# Patient Record
Sex: MALE | Race: WHITE | NOT HISPANIC OR LATINO | Employment: OTHER | URBAN - METROPOLITAN AREA
[De-identification: names, ages, dates, MRNs, and addresses within clinical notes are randomized per-mention and may not be internally consistent; named-entity substitution may affect disease eponyms.]

---

## 2017-01-06 ENCOUNTER — CLINICAL SUPPORT (OUTPATIENT)
Dept: AUDIOLOGY | Facility: CLINIC | Age: 82
End: 2017-01-06
Payer: MEDICARE

## 2017-01-06 DIAGNOSIS — H90.3 SENSORINEURAL HEARING LOSS, BILATERAL: Primary | ICD-10-CM

## 2017-01-06 PROCEDURE — 99499 UNLISTED E&M SERVICE: CPT | Mod: S$GLB,,, | Performed by: OTOLARYNGOLOGY

## 2017-01-06 NOTE — PROGRESS NOTES
Mr. Mendoza was seen for a hearing aid consultation.  I demonstrated the [employer] OPN1 [employer] hearing aids.  We discussed technology in the aids and he enjoyed that he could hear me whisper.  He liked the hearing aid in the left ear only.      He will think about it and call me when he is ready to order.

## 2017-01-19 ENCOUNTER — OFFICE VISIT (OUTPATIENT)
Dept: INTERNAL MEDICINE | Facility: CLINIC | Age: 82
End: 2017-01-19
Payer: MEDICARE

## 2017-01-19 ENCOUNTER — LAB VISIT (OUTPATIENT)
Dept: LAB | Facility: HOSPITAL | Age: 82
End: 2017-01-19
Attending: INTERNAL MEDICINE
Payer: MEDICARE

## 2017-01-19 DIAGNOSIS — Z00.00 ANNUAL PHYSICAL EXAM: ICD-10-CM

## 2017-01-19 DIAGNOSIS — R01.1 SYSTOLIC EJECTION MURMUR: ICD-10-CM

## 2017-01-19 DIAGNOSIS — I10 ESSENTIAL HYPERTENSION: ICD-10-CM

## 2017-01-19 DIAGNOSIS — L57.0 AK (ACTINIC KERATOSIS): Primary | ICD-10-CM

## 2017-01-19 LAB
ALBUMIN SERPL BCP-MCNC: 3.5 G/DL
ALP SERPL-CCNC: 49 U/L
ALT SERPL W/O P-5'-P-CCNC: 15 U/L
ANION GAP SERPL CALC-SCNC: 6 MMOL/L
AST SERPL-CCNC: 18 U/L
BASOPHILS # BLD AUTO: 0.05 K/UL
BASOPHILS NFR BLD: 1.1 %
BILIRUB SERPL-MCNC: 0.4 MG/DL
BUN SERPL-MCNC: 19 MG/DL
CALCIUM SERPL-MCNC: 9.7 MG/DL
CHLORIDE SERPL-SCNC: 106 MMOL/L
CHOLEST/HDLC SERPL: 3.2 {RATIO}
CO2 SERPL-SCNC: 28 MMOL/L
CREAT SERPL-MCNC: 0.9 MG/DL
DIFFERENTIAL METHOD: ABNORMAL
EOSINOPHIL # BLD AUTO: 0.2 K/UL
EOSINOPHIL NFR BLD: 4.8 %
ERYTHROCYTE [DISTWIDTH] IN BLOOD BY AUTOMATED COUNT: 14.9 %
EST. GFR  (AFRICAN AMERICAN): >60 ML/MIN/1.73 M^2
EST. GFR  (NON AFRICAN AMERICAN): >60 ML/MIN/1.73 M^2
GLUCOSE SERPL-MCNC: 98 MG/DL
HCT VFR BLD AUTO: 40.8 %
HDL/CHOLESTEROL RATIO: 31.6 %
HDLC SERPL-MCNC: 231 MG/DL
HDLC SERPL-MCNC: 73 MG/DL
HGB BLD-MCNC: 13.1 G/DL
LDLC SERPL CALC-MCNC: 142.4 MG/DL
LYMPHOCYTES # BLD AUTO: 1.1 K/UL
LYMPHOCYTES NFR BLD: 22.8 %
MCH RBC QN AUTO: 31.5 PG
MCHC RBC AUTO-ENTMCNC: 32.1 %
MCV RBC AUTO: 98 FL
MONOCYTES # BLD AUTO: 0.6 K/UL
MONOCYTES NFR BLD: 13.7 %
NEUTROPHILS # BLD AUTO: 2.7 K/UL
NEUTROPHILS NFR BLD: 57.6 %
NONHDLC SERPL-MCNC: 158 MG/DL
PLATELET # BLD AUTO: 205 K/UL
PMV BLD AUTO: 10.1 FL
POTASSIUM SERPL-SCNC: 4.8 MMOL/L
PROT SERPL-MCNC: 6.8 G/DL
RBC # BLD AUTO: 4.16 M/UL
SODIUM SERPL-SCNC: 140 MMOL/L
TRIGL SERPL-MCNC: 78 MG/DL
TSH SERPL DL<=0.005 MIU/L-ACNC: 2.17 UIU/ML
WBC # BLD AUTO: 4.6 K/UL

## 2017-01-19 PROCEDURE — 90471 IMMUNIZATION ADMIN: CPT | Mod: S$GLB,,, | Performed by: INTERNAL MEDICINE

## 2017-01-19 PROCEDURE — 84443 ASSAY THYROID STIM HORMONE: CPT

## 2017-01-19 PROCEDURE — 85025 COMPLETE CBC W/AUTO DIFF WBC: CPT | Mod: PO

## 2017-01-19 PROCEDURE — 99397 PER PM REEVAL EST PAT 65+ YR: CPT | Mod: 25,S$GLB,, | Performed by: INTERNAL MEDICINE

## 2017-01-19 PROCEDURE — 80061 LIPID PANEL: CPT

## 2017-01-19 PROCEDURE — 90715 TDAP VACCINE 7 YRS/> IM: CPT | Mod: S$GLB,,, | Performed by: INTERNAL MEDICINE

## 2017-01-19 PROCEDURE — 99499 UNLISTED E&M SERVICE: CPT | Mod: S$GLB,,, | Performed by: INTERNAL MEDICINE

## 2017-01-19 PROCEDURE — 80053 COMPREHEN METABOLIC PANEL: CPT

## 2017-01-19 PROCEDURE — 99999 PR PBB SHADOW E&M-EST. PATIENT-LVL IV: CPT | Mod: PBBFAC,,, | Performed by: INTERNAL MEDICINE

## 2017-01-19 PROCEDURE — 36415 COLL VENOUS BLD VENIPUNCTURE: CPT | Mod: PO

## 2017-01-19 RX ORDER — VALSARTAN 80 MG/1
80 TABLET ORAL DAILY
Qty: 45 TABLET | Refills: 6 | Status: SHIPPED | OUTPATIENT
Start: 2017-01-19 | End: 2017-10-27 | Stop reason: SDUPTHER

## 2017-01-21 VITALS
DIASTOLIC BLOOD PRESSURE: 66 MMHG | HEIGHT: 72 IN | BODY MASS INDEX: 24.2 KG/M2 | HEART RATE: 52 BPM | WEIGHT: 178.69 LBS | SYSTOLIC BLOOD PRESSURE: 120 MMHG | OXYGEN SATURATION: 97 %

## 2017-01-21 NOTE — PROGRESS NOTES
Mr. Mendoza is a very sweet 89-year-old gentleman, known to myself, who   presented to clinic on January 19th, at which time clinic billing was performed.    His note is being dictated today on January 21st.    CHIEF COMPLAINT:  Annual followup of hypertension and wellness exam.    HISTORY OF PRESENT ILLNESS:  Mr. Mendoza notes that for the most part he has   been doing well.  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  He was told by the physician that he had   a cardiac murmur.  He questions if this is something that should be evaluated.    He has no chest pain, no shortness of breath.  He does exercise a number of   times during the week.  He enjoys Pilates, does some weights for muscle   strengthening.  He has had some worsened hearing, even had a hearing evaluation   done.  Notes the hearing aid did seem to help, but not ready to pay the cost yet   for it.  May to have let down the road.    PAST MEDICAL, SURGICAL, SOCIAL HISTORY:  Please see as thoroughly stated in EPIC   chart, which has been reviewed.    REVIEW OF SYSTEMS:  HEENT:  Positive for decreased hearing, seems to be worse on the right.  CARDIOPULMONARY:  No chest pain, no shortness of breath.  The patient notes that   he did have some occasional mild fleeting dizzy type symptoms.  He notes that   he actually decreased his Diovan to 80 mg one tablet alternating with two   tablets on alternate days and notes that the symptoms have completely resolved.    He had no loss of consciousness.  No focal neurological abnormalities.  GASTROINTESTINAL:  No change in bowel habits.  No blood per rectum.  EXTREMITIES/RHEUMATOLOGIC:  Positive for some occasional discomfort radiating   from the right buttocks into the right leg, nothing significant.  Also positive   for occasional cramping in the legs at night once again only about once a month,   not regular.  SKIN:  Positive for rough patches of skin on  the face.  The patient would like   to return to Dermatology, whom he has seen in the past.    PHYSICAL EXAMINATION:  VITAL SIGNS:  Weight 178 pounds, blood pressure 136/64, pulse 52.  Recheck blood   pressure per MD is 120/66.  GENERAL:  The patient looks well.  HEENT:  Does show a couple of keratotic horns and actinic keratoses on the   forehead.  NECK:  Supple, negative for masses, thyromegaly, negative for lymphadenopathy.  LUNGS:  Clear bilaterally.  HEART:  Shows systolic ejection murmur at the left sternal location, no   definitive radiation to the carotid arteries.  No abnormalities otherwise.  ABDOMEN:  Soft, nontender, no masses or organomegaly.  EXTREMITIES:  Negative for edema.    ASSESSMENT AND PLAN:  1.  Essential hypertension, stable on Diovan 80 mg one tab every other day   alternating with two tabs on alternate days.  A. Check full fasting annual lab work with phone review.  2.  Physical positive for actinic keratoses on forehead.  A. Refer to Dermatology, Dr. Kerr.   3.  Physical positive for systolic ejection murmur, which seems to be new.  A. We will order 2D echocardiogram with color-flow Doppler studies and phone   review.  4.  Health maintenance.  Screening:  Last urological exam cystoscope in April 2016 per Dr. Barbosa.  Last colonoscopy approximately 5-1/2 years ago and normal.  A. Immunizations, tetanus Tdap.  B. We will order full fasting lab with phone review.  C. Phone review after echocardiogram as above, go from there.      FMS/HN  dd: 01/21/2017 11:31:38 (CST)  td: 01/21/2017 12:46:57 (CST)  Doc ID   #[id number]  Job ID #[id number]    CC:     This office note has been dictated.

## 2017-01-26 ENCOUNTER — HOSPITAL ENCOUNTER (OUTPATIENT)
Dept: CARDIOLOGY | Facility: CLINIC | Age: 82
Discharge: HOME | End: 2017-01-26
Payer: MEDICARE

## 2017-01-26 DIAGNOSIS — R01.1 SYSTOLIC EJECTION MURMUR: ICD-10-CM

## 2017-01-26 LAB
AORTIC VALVE STENOSIS: ABNORMAL
DIASTOLIC DYSFUNCTION: YES
ESTIMATED PA SYSTOLIC PRESSURE: 15.96
MITRAL VALVE REGURGITATION: ABNORMAL
RETIRED EF AND QEF - SEE NOTES: 55 (ref 55–65)
TRICUSPID VALVE REGURGITATION: ABNORMAL

## 2017-01-26 PROCEDURE — 93306 TTE W/DOPPLER COMPLETE: CPT | Mod: S$GLB,,, | Performed by: INTERNAL MEDICINE

## 2017-01-30 ENCOUNTER — TELEPHONE (OUTPATIENT)
Dept: INTERNAL MEDICINE | Facility: CLINIC | Age: 82
End: 2017-01-30

## 2017-01-31 NOTE — TELEPHONE ENCOUNTER
Spoke with Mr Mendoza ie his test results(1/26): Labs-showed CBC,CMP,TSH to be ok, Cholesterol/HDL/LDL were 231/72/142; 2D ECHO-showed normal EF at55%, +mild aortic stenosis,and +diastolic dysfunction/Left atrial enlargement. A/P#1-Mild HLD a)low fat diet/exercise/recheck x 1 year, #2-Mild AS a)repeat ECHO x 1 year.

## 2017-03-02 ENCOUNTER — OFFICE VISIT (OUTPATIENT)
Dept: UROLOGY | Facility: CLINIC | Age: 82
End: 2017-03-02
Payer: MEDICARE

## 2017-03-02 VITALS
HEIGHT: 72 IN | HEART RATE: 58 BPM | BODY MASS INDEX: 24.04 KG/M2 | WEIGHT: 177.5 LBS | DIASTOLIC BLOOD PRESSURE: 66 MMHG | SYSTOLIC BLOOD PRESSURE: 146 MMHG

## 2017-03-02 DIAGNOSIS — R39.198 DIFFICULTY URINATING: Primary | ICD-10-CM

## 2017-03-02 DIAGNOSIS — Z86.018 HISTORY OF BENIGN BLADDER TUMOR: ICD-10-CM

## 2017-03-02 LAB
BILIRUB SERPL-MCNC: NORMAL MG/DL
BLOOD URINE, POC: NORMAL
COLOR, POC UA: YELLOW
GLUCOSE UR QL STRIP: NORMAL
KETONES UR QL STRIP: NORMAL
LEUKOCYTE ESTERASE URINE, POC: NORMAL
NITRITE, POC UA: NORMAL
PH, POC UA: 5
POC RESIDUAL URINE VOLUME: 176 ML (ref 0–100)
PROTEIN, POC: NORMAL
SPECIFIC GRAVITY, POC UA: 1
UROBILINOGEN, POC UA: NORMAL

## 2017-03-02 PROCEDURE — 81002 URINALYSIS NONAUTO W/O SCOPE: CPT | Mod: S$GLB,,, | Performed by: NURSE PRACTITIONER

## 2017-03-02 PROCEDURE — 1159F MED LIST DOCD IN RCRD: CPT | Mod: S$GLB,,, | Performed by: NURSE PRACTITIONER

## 2017-03-02 PROCEDURE — 1126F AMNT PAIN NOTED NONE PRSNT: CPT | Mod: S$GLB,,, | Performed by: NURSE PRACTITIONER

## 2017-03-02 PROCEDURE — 1157F ADVNC CARE PLAN IN RCRD: CPT | Mod: S$GLB,,, | Performed by: NURSE PRACTITIONER

## 2017-03-02 PROCEDURE — 87086 URINE CULTURE/COLONY COUNT: CPT

## 2017-03-02 PROCEDURE — 99213 OFFICE O/P EST LOW 20 MIN: CPT | Mod: 25,S$GLB,, | Performed by: NURSE PRACTITIONER

## 2017-03-02 PROCEDURE — 51798 US URINE CAPACITY MEASURE: CPT | Mod: S$GLB,,, | Performed by: NURSE PRACTITIONER

## 2017-03-02 PROCEDURE — 99999 PR PBB SHADOW E&M-EST. PATIENT-LVL IV: CPT | Mod: PBBFAC,,, | Performed by: NURSE PRACTITIONER

## 2017-03-02 PROCEDURE — 1160F RVW MEDS BY RX/DR IN RCRD: CPT | Mod: S$GLB,,, | Performed by: NURSE PRACTITIONER

## 2017-03-02 NOTE — PROGRESS NOTES
CHIEF COMPLAINT:    Mr. Mendoza is a 89 y.o. male presenting for difficulty urinating.    PRESENTING ILLNESS:    Norm Mendoza is a 89 y.o. male with a PMH s/p TURBT w/ benign path report who presents for difficulty urinating.   Last seen with Dr. Barbosa on 6/18/15.    5/16/16- cysto with Dr. Barbosa. Urethra normal: Yes Prostate normal: trilobar hyperplasia. Bladder neck normal: Bladder neck normal Bladder normal: Yes     Before Mardi Gras he started the Atkins diet. That night he started to experience nocturia q hour, difficulty urinating with dysuria, decreased amount of urine, and then frequency every 30 min- 1 hr.   He also started to have constipation, so he increased his metamucil daily and gradually the constipation alleviated.   He stopped Atkins and then his urination problems have resolved.     Today, he has no urination complaints.  He reports doing well. Denies hematuria, abdominal pain, flank pain, fever, chills, nausea, and vomiting.  He reports a good urinary stream and complete bladder emptying.  Improved from 2 weeks.   He reports nocturia q 2 hrs and less frequency.   Denies constipation.   He has a hx of past UTI.       REVIEW OF SYSTEMS:    Review of Systems    Constitutional: Negative for fever and chills.   HENT: Negative for hearing loss.   Eyes: Negative for visual disturbance.   Respiratory: Negative for shortness of breath.   Cardiovascular: Negative for chest pain.   Gastrointestinal: Negative for nausea, vomiting, and constipation.   Genitourinary:  See above   Neurological: Negative for dizziness.   Hematological: Does not bruise/bleed easily.   Psychiatric/Behavioral: Negative for confusion.       PATIENT HISTORY:    Past Medical History:   Diagnosis Date    Allergy     Bladder cancer     tumor that was benign     Hematuria     Hypertension     Skin cancer     x3, had mohs on nose    Squamous cell carcinoma excised 12/5/14    R lower back    Urinary tract infection     x4        Past Surgical History:   Procedure Laterality Date    ACHILLES TENDON SURGERY      cataracts      CYSTOSCOPY      bladder tumor    EYE SURGERY      SKIN CANCER EXCISION         Family History   Problem Relation Age of Onset    Anesthesia problems Neg Hx     Malignant hypertension Neg Hx     Hypotension Neg Hx     Malignant hyperthermia Neg Hx     Pseudochol deficiency Neg Hx     Melanoma Neg Hx     Stroke Father     Stroke Brother        Social History     Social History    Marital status:      Spouse name: N/A    Number of children: 2    Years of education: N/A     Occupational History    Financial Work/ Retired    actor           Social History Main Topics    Smoking status: Former Smoker     Packs/day: 1.00     Years: 25.00     Types: Cigarettes     Quit date: 9/3/1970    Smokeless tobacco: Not on file    Alcohol use 2.4 oz/week     2 Glasses of wine, 2 Shots of liquor, 0 Standard drinks or equivalent per week      Comment: 4-5 daily    Drug use: No    Sexual activity: Yes     Partners: Female     Other Topics Concern    Not on file     Social History Narrative       Allergies:  Review of patient's allergies indicates no known allergies.    Medications:    Current Outpatient Prescriptions:     ACIDOPHILUS/PECTIN, CITRUS (ACIDOPHILUS PROBIOTIC ORAL), Take 1 capsule by mouth once daily., Disp: , Rfl:     aspirin 81 MG Chew, Take 81 mg by mouth once daily., Disp: , Rfl:     psyllium (METAMUCIL) packet, Take 1 packet by mouth once daily., Disp: , Rfl:     valsartan (DIOVAN) 80 MG tablet, Take 1 tablet (80 mg total) by mouth once daily. 1 tab every other day alternating with 2 tabs on alternate days, Disp: 45 tablet, Rfl: 6    PHYSICAL EXAMINATION:    Constitutional: He is oriented to person, place, and time. He appears well-developed and well-nourished.  He is in no apparent distress.    Neck: No tracheal deviation present.     Cardiovascular: Normal  rate.      Pulmonary/Chest: Effort normal. No respiratory distress.     Abdominal:  He exhibits no distension.  There is no CVA tenderness.     Lymphadenopathy:        Right: No supraclavicular adenopathy present.        Left: No supraclavicular adenopathy present.     Neurological: He is alert and oriented to person, place, and time.     Skin: Skin is warm and dry.     Extremities: No pitting edema noted in lower extremities bilaterally    Psych: Cooperative with normal affect.    Genitourinary: The penis is normal. The urethral meatus is normal. The testes, epididymides, and cord structures are normal in size and contour bilaterally. The scrotum is normal in size and contour.      Physical Exam      LABS:  PVR today with bladder scan 176.   U/a today shows no blood or infection.     Lab Results   Component Value Date    PSA 2.5 09/10/2015       IMPRESSION:    Encounter Diagnoses   Name Primary?    Difficulty urinating Yes    History of benign bladder tumor          PLAN:  Discussed LUTS. Not interested in medications for LUTS. Will continue to monitor.   UA   UC sent to the lab. Will call with results.    I encouraged him or any of his family members to call or email me with questions and/or concerns.    DAVID Billings

## 2017-03-02 NOTE — MR AVS SNAPSHOT
[facility] - Urology 4th Floor  [address]  [facility] [address]  Phone: [phone]                  Norm Mendoza   3/2/2017 9:40 AM   Office Visit    Description:  Male : 1926   Provider:  Melissa Hernandez NP   Department:  [facility]rology 4th Floor           Reason for Visit     Follow-up     Urinary Frequency           Diagnoses this Visit        Comments    Difficulty urinating    -  Primary     History of benign bladder tumor                To Do List           Future Appointments        Provider Department Dept Phone    3/14/2017 11:00 AM Shante Kerr MD [facility] D[facility]atology [phone]      Goals (5 Years of Data)     None      [facility]     [facility] Care Line -  Assistance  Registered nurses in the [facility] provide clinical advisement, health education, appointment booking, and other advisory services.  Call for this free service at [phone].             Medications           Message regarding Medications     Verify the changes and/or additions to your medication regime listed below are the same as discussed with your clinician today.  If any of these changes or additions are incorrect, please notify your healthcare provider.             Verify that the below list of medications is an accurate representation of the medications you are currently taking.  If none reported, the list may be blank. If incorrect, please contact your healthcare provider. Carry this list with you in case of emergency.           Current Medications     ACIDOPHILUS/PECTIN, CITRUS (ACIDOPHILUS PROBIOTIC ORAL) Take 1 capsule by mouth once daily.    aspirin 81 MG Chew Take 81 mg by mouth once daily.    psyllium (METAMUCIL) packet Take 1 packet by mouth once daily.    valsartan (DIOVAN) 80 MG tablet Take 1 tablet (80 mg total) by mouth once daily. 1 tab every other day alternating with 2 tabs on alternate days           Clinical Reference Information            Your Vitals Were     BP                   146/66           Blood Pressure          Most Recent Value    BP  (!)  146/66      Allergies as of 3/2/2017     No Known Allergies      Immunizations Administered on Date of Encounter - 3/2/2017     None      Orders Placed During Today's Visit      Normal Orders This Visit    POCT Bladder Scan     POCT urine dipstick without microscope     Urine culture          3/2/2017 10:10 AM - Ray Mcqueen LPN      Component Results     Component    Color    yellow    Spec Grav    1.000    pH, UA    5    WBC, UA    n    Nitrite    n    Protein    n    Glucose, UA    n    Ketones, UA    n    Urobilinogen    n    Bilirubin    n    Blood, UA    n         3/2/2017 10:12 AM - Ray Mcqueen LPN      Component Results     Component Value Flag Ref Range Units Status    POC Residual Urine Volume 176 (A) 0 - 100 mL Final            [facility] Sign-Up     Activating your [facility] account is as easy as 1-2-3!     1) Visit [email or web address], select Sign Up Now, enter this activation code and your date of birth, then select Next.  [email or web address]  Expires: 4/16/2017 10:12 AM      2) Create a username and password to use when you visit [facility] in the future and select a security question in case you lose your password and select Next.    3) Enter your e-mail address and click Sign Up!    Additional Information  If you have questions, please e-mail [email or web address] or call [phone] to talk to our [facility] staff. Remember, [facility] is NOT to be used for urgent needs. For medical emergencies, dial 911.         Language Assistance Services     ATTENTION: Language assistance services are available, free of charge. Please call [phone].      ATENCIÓN: Si habla español, tiene a lau disposición servicios gratuitos de asistencia lingüística. Llame al [phone].     CHÚ Ý: N?u b?n nói Ti?ng Vi?t, có các d?ch v? h? tr? ngôn ng? mi?n phí dành cho b?n. G?i s?  [phone].         [facility]th [facility] complies with applicable Federal civil rights laws and does not discriminate on the basis of race, color, national origin, age, disability, or sex.

## 2017-03-03 LAB — BACTERIA UR CULT: NO GROWTH

## 2017-04-27 ENCOUNTER — TELEPHONE (OUTPATIENT)
Dept: INTERNAL MEDICINE | Facility: CLINIC | Age: 82
End: 2017-04-27

## 2017-04-27 DIAGNOSIS — Z12.11 COLON CANCER SCREENING: Primary | ICD-10-CM

## 2017-04-27 NOTE — TELEPHONE ENCOUNTER
Pt states it is time for his colonoscopy. States he would like it done with [facility]/ [phone] ph or [phone] ph[address] [phone]. Pt needs a referral.     Please advise thanks.

## 2017-04-27 NOTE — TELEPHONE ENCOUNTER
----- Message from Guero Alarconr sent at 4/27/2017  4:16 PM CDT -----  Contact: self/ [phone] home  Type: Referral to a Specialist    Where would you like a referral to? Colonoscopy    Have you previously requested? Yes    Is the physician within the [facility]? No    Name and phone number of specialist: [facility]/ [phone]  or [phone] /[address] [place] [address]    Reason for appointment: Routine screening    Is an appointment scheduled with specialist? When? No    Comments: Pt states that he received a call from the company above stating that it was time for his Colonoscopy, it's been five years.  He would like to go to the company listed above and would like a referral sent in as soon as possible.  He would like a call back to discuss.  Please call and advise.    Thank you

## 2017-05-05 ENCOUNTER — OFFICE VISIT (OUTPATIENT)
Dept: DERMATOLOGY | Facility: CLINIC | Age: 82
End: 2017-05-05
Payer: MEDICARE

## 2017-05-05 DIAGNOSIS — L82.1 SK (SEBORRHEIC KERATOSIS): ICD-10-CM

## 2017-05-05 DIAGNOSIS — Z85.828 HISTORY OF NONMELANOMA SKIN CANCER: ICD-10-CM

## 2017-05-05 DIAGNOSIS — D18.00 ANGIOMA: ICD-10-CM

## 2017-05-05 DIAGNOSIS — D17.1 LIPOMA OF BACK: ICD-10-CM

## 2017-05-05 DIAGNOSIS — L57.0 AK (ACTINIC KERATOSIS): Primary | ICD-10-CM

## 2017-05-05 PROCEDURE — 17004 DESTROY PREMAL LESIONS 15/>: CPT | Mod: S$GLB,,, | Performed by: DERMATOLOGY

## 2017-05-05 PROCEDURE — 99213 OFFICE O/P EST LOW 20 MIN: CPT | Mod: 25,S$GLB,, | Performed by: DERMATOLOGY

## 2017-05-05 PROCEDURE — 1160F RVW MEDS BY RX/DR IN RCRD: CPT | Mod: S$GLB,,, | Performed by: DERMATOLOGY

## 2017-05-05 PROCEDURE — 1159F MED LIST DOCD IN RCRD: CPT | Mod: S$GLB,,, | Performed by: DERMATOLOGY

## 2017-05-05 PROCEDURE — 99999 PR PBB SHADOW E&M-EST. PATIENT-LVL II: CPT | Mod: PBBFAC,,, | Performed by: DERMATOLOGY

## 2017-05-05 NOTE — PROGRESS NOTES
Subjective:       Patient ID:  Norm Mendoza is a 89 y.o. male who presents for   Chief Complaint   Patient presents with    Skin Check     UBSE     HPI Comments: History of Present Illness: The patient presents for follow up of skin check.    The patient was last seen on: 12/18/15 for cryosurgery to actinic keratoses which have resolved.   No h/o nmsc  Other skin complaints: none        Review of Systems   Skin: Positive for recent sunburn (face). Negative for dry skin, daily sunscreen use, activity-related sunscreen use and tendency to form keloidal scars.   Hematologic/Lymphatic: Bruises/bleeds easily (on asa).        Objective:    Physical Exam   Constitutional: He appears well-developed and well-nourished. No distress.   HENT:   Mouth/Throat: Lips normal.    Eyes: Lids are normal.    Neurological: He is alert. He is not disoriented.   Psychiatric: He has a normal mood and affect.   Skin:   Areas Examined (abnormalities noted in diagram):   Scalp / Hair Palpated and Inspected  Head / Face Inspection Performed  Neck Inspection Performed  Chest / Axilla Inspection Performed  Back Inspection Performed  RUE Inspected  LUE Inspection Performed  Nails and Digits Inspection Performed                       Diagram Legend     Erythematous scaling macule/papule c/w actinic keratosis       Vascular papule c/w angioma      Pigmented verrucoid papule/plaque c/w seborrheic keratosis      Yellow umbilicated papule c/w sebaceous hyperplasia      Irregularly shaped tan macule c/w lentigo     1-2 mm smooth white papules consistent with Milia      Movable subcutaneous cyst with punctum c/w epidermal inclusion cyst      Subcutaneous movable cyst c/w pilar cyst      Firm pink to brown papule c/w dermatofibroma      Pedunculated fleshy papule(s) c/w skin tag(s)      Evenly pigmented macule c/w junctional nevus     Mildly variegated pigmented, slightly irregular-bordered macule c/w mildly atypical nevus      Flesh colored to  evenly pigmented papule c/w intradermal nevus       Pink pearly papule/plaque c/w basal cell carcinoma      Erythematous hyperkeratotic cursted plaque c/w SCC      Surgical scar with no sign of skin cancer recurrence      Open and closed comedones      Inflammatory papules and pustules      Verrucoid papule consistent consistent with wart     Erythematous eczematous patches and plaques     Dystrophic onycholytic nail with subungual debris c/w onychomycosis     Umbilicated papule    Erythematous-base heme-crusted tan verrucoid plaque consistent with inflamed seborrheic keratosis     Erythematous Silvery Scaling Plaque c/w Psoriasis     See annotation      Assessment / Plan:        AK (actinic keratosis)  Cryosurgery Procedure Note    Verbal consent from the patient is obtained and the patient is aware of the precancerous quality and need for treatment of these lesions. Liquid nitrogen cryosurgery is applied to the 15 actinic keratoses, as detailed in the physical exam, to produce a freeze injury. The patient is aware that blisters may form and is instructed on wound care with gentle cleansing and use of vaseline ointment to keep moist until healed. The patient is supplied a handout on cryosurgery and is instructed to call if lesions do not completely resolve.      SK (seborrheic keratosis)  These are benign inherited growths without a malignant potential. Reassurance given to patient. No treatment is necessary.       Angioma  This is a benign vascular lesion. Reassurance given. No treatment required.       History of nonmelanoma skin cancer  Area(s) of previous NMSC evaluated with no signs of recurrence.    Upper body skin examination performed today including at least 6 points as noted in physical examination. No lesions suspicious for malignancy noted.      Lipoma of back\  Reassurance.             Return in about 6 months (around 11/5/2017).

## 2017-05-05 NOTE — LETTER
May 5, 2017      Amber Jenkins MD  [address]  [place] [address]           [facility] - Dermatology  [address]  [facility] [address]  Phone: [phone]  Fax: [phone]          Patient: Norm Mendoza   MR Number: [id number]   YOB: 1926   Date of Visit: 5/5/2017       Dear Dr. Amber Jenkins:    Thank you for referring Norm Mendoza to me for evaluation. Attached you will find relevant portions of my assessment and plan of care.    If you have questions, please do not hesitate to call me. I look forward to following Norm Mendoza along with you.    Sincerely,    Shante Kerr MD    Enclosure  CC:  No Recipients    If you would like to receive this communication electronically, please contact [email or web address] or [phone] to request more information on [employer] access.    For providers and/or their staff who would like to refer a patient to [facility], please contact us through our one-stop-shop provider referral line, [facility], at [phone].    If you feel you have received this communication in error or would no longer like to receive these types of communications, please e-mail [email or web address]

## 2017-05-05 NOTE — MR AVS SNAPSHOT
[facility]  [address]  [facility] [address]  Phone: [phone]  Fax: [phone]                  Norm Mendoza   2017 10:00 AM   Office Visit    Description:  Male : 1926   Provider:  Shante Kerr MD   Department:  [facility]           Reason for Visit     Skin Check           Diagnoses this Visit        Comments    AK (actinic keratosis)    -  Primary     SK (seborrheic keratosis)         Angioma         History of nonmelanoma skin cancer         Lipoma of back                To Do List           Goals (5 Years of Data)     None      Follow-Up and Disposition     Return in about 6 months (around 2017).    Follow-up and Disposition History      [facility]     [facility] Nurse Care Line -  Assistance  Unless otherwise directed by your provider, please contact [facility], our nurse care line that is available for  assistance.     Registered nurses in the [facility] Center provide: appointment scheduling, clinical advisement, health education, and other advisory services.  Call: [phone] (toll free)               Medications           Message regarding Medications     Verify the changes and/or additions to your medication regime listed below are the same as discussed with your clinician today.  If any of these changes or additions are incorrect, please notify your healthcare provider.             Verify that the below list of medications is an accurate representation of the medications you are currently taking.  If none reported, the list may be blank. If incorrect, please contact your healthcare provider. Carry this list with you in case of emergency.           Current Medications     ACIDOPHILUS/PECTIN, CITRUS (ACIDOPHILUS PROBIOTIC ORAL) Take 1 capsule by mouth once daily.    aspirin 81 MG Chew Take 81 mg by mouth once daily.    psyllium (METAMUCIL) packet Take 1 packet by mouth once daily.    valsartan  (DIOVAN) 80 MG tablet Take 1 tablet (80 mg total) by mouth once daily. 1 tab every other day alternating with 2 tabs on alternate days           Clinical Reference Information           Allergies as of 5/5/2017     No Known Allergies      Immunizations Administered on Date of Encounter - 5/5/2017     None      [facility] Sign-Up     Activating your [facility] account is as easy as 1-2-3!     1) Visit [email or web address], select Sign Up Now, enter this activation code and your date of birth, then select Next.  [email or web address]  Expires: 6/19/2017 10:36 AM      2) Create a username and password to use when you visit [facility] in the future and select a security question in case you lose your password and select Next.    3) Enter your e-mail address and click Sign Up!    Additional Information  If you have questions, please e-mail [email or web address] or call [phone] to talk to our [facility] staff. Remember, [facility] is NOT to be used for urgent needs. For medical emergencies, dial 911.         Instructions    CRYOSURGERY      Your doctor has used a method called cryosurgery to treat your skin condition. Cryosurgery refers to the use of very cold substances to treat a variety of skin conditions such as warts, pre-skin cancers, molluscum contagiosum, sun spots, and several benign growths. The substance we use in cryosurgery is liquid nitrogen and is so cold (-195 degrees Celsius) that is burns when administered.     Following treatment in the office, the skin may immediately burn and become red. You may find the area around the lesion is affected as well. It is sometimes necessary to treat not only the lesion, but a small area of the surrounding normal skin to achieve a good response.     A blister, and even a blood filled blister, may form after treatment.   This is a normal response. If the blister is painful, it is acceptable to sterilize a needle and with rubbing alcohol and gently pop the blister. It is important  that you gently wash the area with soap and warm water as the blister fluid may contain wart virus if a wart was treated. Do no remove the roof of the blister.     The area treated can take anywhere from 1-3 weeks to heal. Healing time depends on the kind skin lesion treated, the location, and how aggressively the lesion was treated. It is recommended that the areas treated are covered with Vaseline or bacitracin ointment and a band-aid. If a band-aid is not practical, just ointment applied several times per day will do. Keeping these areas moist will speed the healing time.    Treatment with liquid nitrogen can leave a scar. In dark skin, it may be a light or dark scar, in light skin it may be a white or pink scar. These will generally fade with time, but may never go away completely.     If you have any concerns after your treatment, please feel free to call the office.       [address] [place] [address]/ [phone] FAX/ [email or web address]           Language Assistance Services     ATTENTION: Language assistance services are available, free of charge. Please call [phone].      ATENCIÓN: Si habla isabel, tiene a lau disposición servicios gratuitos de asistencia lingüística. Llame al [phone].     ALAYNA Ý: N?u b?n nói Ti?ng Vi?t, có các d?ch v? h? tr? ngôn ng? mi?n phí dành cho b?n. G?i s? [phone].         [facility] complies with applicable Federal civil rights laws and does not discriminate on the basis of race, color, national origin, age, disability, or sex.

## 2017-05-18 ENCOUNTER — OFFICE VISIT (OUTPATIENT)
Dept: INTERNAL MEDICINE | Facility: CLINIC | Age: 82
End: 2017-05-18
Payer: MEDICARE

## 2017-05-18 ENCOUNTER — TELEPHONE (OUTPATIENT)
Dept: INTERNAL MEDICINE | Facility: CLINIC | Age: 82
End: 2017-05-18

## 2017-05-18 ENCOUNTER — HOSPITAL ENCOUNTER (OUTPATIENT)
Dept: RADIOLOGY | Facility: HOSPITAL | Age: 82
Discharge: HOME | End: 2017-05-18
Attending: INTERNAL MEDICINE
Payer: MEDICARE

## 2017-05-18 VITALS
TEMPERATURE: 98 F | SYSTOLIC BLOOD PRESSURE: 134 MMHG | HEART RATE: 58 BPM | OXYGEN SATURATION: 95 % | BODY MASS INDEX: 22.57 KG/M2 | HEIGHT: 72 IN | WEIGHT: 166.63 LBS | DIASTOLIC BLOOD PRESSURE: 70 MMHG

## 2017-05-18 DIAGNOSIS — J18.9 PNEUMONIA OF RIGHT LOWER LOBE DUE TO INFECTIOUS ORGANISM: Primary | ICD-10-CM

## 2017-05-18 DIAGNOSIS — J18.9 PNEUMONIA OF RIGHT LOWER LOBE DUE TO INFECTIOUS ORGANISM: ICD-10-CM

## 2017-05-18 DIAGNOSIS — J40 BRONCHITIS: ICD-10-CM

## 2017-05-18 PROCEDURE — 96372 THER/PROPH/DIAG INJ SC/IM: CPT | Mod: S$GLB,,, | Performed by: INTERNAL MEDICINE

## 2017-05-18 PROCEDURE — 99999 PR PBB SHADOW E&M-EST. PATIENT-LVL IV: CPT | Mod: PBBFAC,,, | Performed by: INTERNAL MEDICINE

## 2017-05-18 PROCEDURE — 1159F MED LIST DOCD IN RCRD: CPT | Mod: S$GLB,,, | Performed by: INTERNAL MEDICINE

## 2017-05-18 PROCEDURE — 99214 OFFICE O/P EST MOD 30 MIN: CPT | Mod: 25,S$GLB,, | Performed by: INTERNAL MEDICINE

## 2017-05-18 PROCEDURE — 1126F AMNT PAIN NOTED NONE PRSNT: CPT | Mod: S$GLB,,, | Performed by: INTERNAL MEDICINE

## 2017-05-18 PROCEDURE — 71020 XR CHEST PA AND LATERAL: CPT | Mod: TC,PO

## 2017-05-18 PROCEDURE — 71020 XR CHEST PA AND LATERAL: CPT | Mod: 26,,, | Performed by: RADIOLOGY

## 2017-05-18 RX ORDER — GUAIFENESIN 1200 MG/1
TABLET, EXTENDED RELEASE ORAL
Qty: 20 TABLET | Refills: 0 | Status: SHIPPED | OUTPATIENT
Start: 2017-05-18 | End: 2017-07-13 | Stop reason: ALTCHOICE

## 2017-05-18 RX ORDER — AMOXICILLIN AND CLAVULANATE POTASSIUM 875; 125 MG/1; MG/1
1 TABLET, FILM COATED ORAL 2 TIMES DAILY
Qty: 20 TABLET | Refills: 0 | Status: SHIPPED | OUTPATIENT
Start: 2017-05-18 | End: 2017-05-28

## 2017-05-18 RX ORDER — TRIAMCINOLONE ACETONIDE 40 MG/ML
60 INJECTION, SUSPENSION INTRA-ARTICULAR; INTRAMUSCULAR
Status: COMPLETED | OUTPATIENT
Start: 2017-05-18 | End: 2017-05-18

## 2017-05-18 RX ORDER — CODEINE PHOSPHATE AND GUAIFENESIN 10; 100 MG/5ML; MG/5ML
5 SOLUTION ORAL EVERY 8 HOURS PRN
Qty: 118 ML | Refills: 0 | Status: SHIPPED | OUTPATIENT
Start: 2017-05-18 | End: 2017-05-28

## 2017-05-18 RX ADMIN — TRIAMCINOLONE ACETONIDE 60 MG: 40 INJECTION, SUSPENSION INTRA-ARTICULAR; INTRAMUSCULAR at 01:05

## 2017-05-18 NOTE — TELEPHONE ENCOUNTER
Tione, please call Mr Kelly and let him know that his CXR(5/18)-was clear/no pneumonia.  He still needs to take at least 1 week of the antibiotics and call if no better.  Thanks

## 2017-05-18 NOTE — MR AVS SNAPSHOT
[facility] [address]  [address]  [address]  [facility] [address]  Phone: [phone]                  Norm Mendoza   2017 11:30 AM   Office Visit    Description:  Male : 1926   Provider:  Amber Jenkins MD   Department:  [facility] [address]           Reason for Visit     Cough           Diagnoses this Visit        Comments    Pneumonia of right lower lobe due to infectious organism    -  Primary     Bronchitis                To Do List           Future Appointments        Provider Department Dept Phone    2017 1:30 PM [facility] XR1 SPORTS  LB LIMIT [facility] [phone]      Goals (5 Years of Data)     None       These Medications        Disp Refills Start End    amoxicillin-clavulanate 875-125mg (AUGMENTIN) 875-125 mg per tablet 20 tablet 0 2017    Take 1 tablet by mouth 2 (two) times daily. - Oral    Pharmacy: [facility] [address] [place] [address] [address] [facility] Ph #: [phone]       guaifenesin-codeine 100-10 mg/5 ml (TUSSI-ORGANIDIN NR)  mg/5 mL syrup 118 mL 0 2017    Take 5 mLs by mouth every 8 (eight) hours as needed for Cough. - Oral    Pharmacy: [facility] [place] [address] [address] [facility] Ph #: [phone]       guaifenesin (MUCINEX) 1,200 mg Ta12 20 tablet 0 2017     1 tab Q12 hours as needed for cough/congestion    Pharmacy: [facility] [place] [id number] [facility] Ph #: [phone]         [facility]     [facility] Care [facility] -  Assistance  Unless otherwise directed by your provider, please contact [facility], our nurse care line that is available for  assistance.     Registered nurses in the [facility] Center provide: appointment scheduling, clinical advisement, health education, and  other advisory services.  Call: [phone] (toll free)               Medications           Message regarding Medications     Verify the changes and/or additions to your medication regime listed below are the same as discussed with your clinician today.  If any of these changes or additions are incorrect, please notify your healthcare provider.        START taking these NEW medications        Refills    amoxicillin-clavulanate 875-125mg (AUGMENTIN) 875-125 mg per tablet 0    Sig: Take 1 tablet by mouth 2 (two) times daily.    Class: Normal    Route: Oral    guaifenesin-codeine 100-10 mg/5 ml (TUSSI-ORGANIDIN NR)  mg/5 mL syrup 0    Sig: Take 5 mLs by mouth every 8 (eight) hours as needed for Cough.    Class: Print    Route: Oral    guaifenesin (MUCINEX) 1,200 mg Ta12 0    Si tab Q12 hours as needed for cough/congestion    Class: Print      These medications were administered today        Dose Freq    triamcinolone acetonide injection 60 mg 60 mg Clinic/HOD 1 time    Sig: Inject 1.5 mLs (60 mg total) into the muscle one time.    Class: Normal    Route: Intramuscular           Verify that the below list of medications is an accurate representation of the medications you are currently taking.  If none reported, the list may be blank. If incorrect, please contact your healthcare provider. Carry this list with you in case of emergency.           Current Medications     ACIDOPHILUS/PECTIN, CITRUS (ACIDOPHILUS PROBIOTIC ORAL) Take 1 capsule by mouth once daily.    aspirin 81 MG Chew Take 81 mg by mouth once daily.    psyllium (METAMUCIL) packet Take 1 packet by mouth once daily.    valsartan (DIOVAN) 80 MG tablet Take 1 tablet (80 mg total) by mouth once daily. 1 tab every other day alternating with 2 tabs on alternate days    amoxicillin-clavulanate 875-125mg (AUGMENTIN) 875-125 mg per tablet Take 1 tablet by mouth 2 (two) times daily.    guaifenesin (MUCINEX) 1,200 mg Ta12 1 tab Q12 hours as needed for  cough/congestion    guaifenesin-codeine 100-10 mg/5 ml (TUSSI-ORGANIDIN NR)  mg/5 mL syrup Take 5 mLs by mouth every 8 (eight) hours as needed for Cough.           Clinical Reference Information           Your Vitals Were     BP Pulse Temp Height Weight SpO2    134/70 58 97.7 °F (36.5 °C) (Oral) 6' (1.829 m) 75.6 kg (166 lb 9.6 oz) 95%    PF BMI             250 L/min 22.6 kg/m2         Blood Pressure          Most Recent Value    BP  134/70      Allergies as of 5/18/2017     No Known Allergies      Immunizations Administered on Date of Encounter - 5/18/2017     None      Orders Placed During Today's Visit     Future Labs/Procedures Expected by Expires    X-Ray Chest PA And Lateral  5/18/2017 5/18/2018      Administrations This Visit     triamcinolone acetonide injection 60 mg     Admin Date Action Dose Route Administered By             05/18/2017 Given 60 mg Intramuscular Brittney Raymundo LPN                      [facility] Sign-Up     Activating your [facility] account is as easy as 1-2-3!     1) Visit [email or web address], select Sign Up Now, enter this activation code and your date of birth, then select Next.  [email or web address]  Expires: 6/19/2017 10:36 AM      2) Create a username and password to use when you visit [facility] in the future and select a security question in case you lose your password and select Next.    3) Enter your e-mail address and click Sign Up!    Additional Information  If you have questions, please e-mail [email or web address] or call [phone] to talk to our [facility] staff. Remember, [facility] is NOT to be used for urgent needs. For medical emergencies, dial 911.         Language Assistance Services     ATTENTION: Language assistance services are available, free of charge. Please call [phone].      ATENCIÓN: Si habla español, tiene a lau disposición servicios gratuitos de asistencia lingüística. Llame al [phone].     CHÚ Ý: N?u b?n nói Ti?ng Vi?t, có các d?ch v? h? tr?  kam zhu? mi?n phí dành cho b?n. G?i s? [phone].         [facility] [address] complies with applicable Federal civil rights laws and does not discriminate on the basis of race, color, national origin, age, disability, or sex.

## 2017-05-18 NOTE — PROGRESS NOTES
Mr. Mendoza is a very sweet 89-year-old gentleman, who presents today for   urgent care type appointment.    CHIEF COMPLAINT:  Cough.    HISTORY OF PRESENT ILLNESS:  Mr. Mendoza presents with a one-week history of   cough.  He notes his cough and congestion seem to worsen at night.  He has no   shortness of breath.  No chest pain.  He has been very fatigued and just feels   crummy during the day.  He is producing yellow discolored mucus.  He does have   some sinus congestion.  He notes he has used Nyquil a few times at bedtime.  He   notes in the past he has had walking pneumonia and his symptoms feel quite   similar to this.  He notes he gotten better and then got worse again, which is   disconcerting to him.  Feels congestion in the chest.  No nausea or vomiting.    PAST MEDICAL, SURGICAL, SOCIAL HISTORY:  Please see as thoroughly stated in EPIC   chart, which has been reviewed.    PHYSICAL EXAMINATION:  VITAL SIGNS:  Weight 166 pounds, blood pressure 134/70, pulse 58, temperature   97.7, peak flow is 250 liters per minute, pulse ox normal at 95%.  GENERAL:  The patient looks fatigued, but in no acute distress.  HEENT:  Sinuses nontender.  Retropharyngeal shows erythema, but no exudates.    TMs bilaterally are clear.  NECK:  Supple, negative for masses, negative for thyromegaly, negative for   lymphadenopathy, negative for JVD.  LUNGS:  Show coarse breath sounds throughout with crackles, more prominently in   the right mid to lower lung field zone.  HEART:  Regular rate and rhythm without arrhythmias.  ABDOMEN:  Soft, nontender.  EXTREMITIES:  Negative edema.    ASSESSMENT AND PLAN:  1.  Presumed pneumonia, right lower lobe.  A. Chest x-ray today to confirm.  B. Augmentin 875 mg one p.o. b.i.d. x10 days.  C. Robitussin with codeine as needed for cough and Mucinex over-the-counter as   directed.  2.  Bronchitis with secondary pneumonia.  A. Kenalog 60 mg.  B. Mucinex 1200 mg one q. 12 hours as needed.  C.  Robitussin with codeine and if no improvement, the patient will let us know.    ADDENDUM:  Plan to phone review after chest x-ray, go from there.  If any   development of shortness of breath, chest pain, fevers, chills or worsening, the   patient to present to clinic or Emergency Room ASAP.      FMS/HN  dd: 05/18/2017 13:07:02 (CDT)  td: 05/19/2017 10:37:52 (CDT)  Doc ID   #[id number]  Job ID #[id number]    CC:     This office note has been dictated.

## 2017-07-13 ENCOUNTER — OFFICE VISIT (OUTPATIENT)
Dept: INTERNAL MEDICINE | Facility: CLINIC | Age: 82
End: 2017-07-13
Payer: MEDICARE

## 2017-07-13 VITALS
HEIGHT: 72 IN | BODY MASS INDEX: 22.9 KG/M2 | WEIGHT: 169.06 LBS | HEART RATE: 60 BPM | SYSTOLIC BLOOD PRESSURE: 160 MMHG | DIASTOLIC BLOOD PRESSURE: 70 MMHG

## 2017-07-13 DIAGNOSIS — F10.20 ALCOHOL DEPENDENCE, DAILY USE: ICD-10-CM

## 2017-07-13 DIAGNOSIS — I51.89 LEFT VENTRICULAR DIASTOLIC DYSFUNCTION WITH PRESERVED SYSTOLIC FUNCTION: ICD-10-CM

## 2017-07-13 DIAGNOSIS — I10 ESSENTIAL HYPERTENSION: ICD-10-CM

## 2017-07-13 DIAGNOSIS — I35.0 NONRHEUMATIC AORTIC VALVE STENOSIS: ICD-10-CM

## 2017-07-13 DIAGNOSIS — Z85.828 H/O NONMELANOMA SKIN CANCER: ICD-10-CM

## 2017-07-13 DIAGNOSIS — I70.0 AORTIC ATHEROSCLEROSIS: ICD-10-CM

## 2017-07-13 DIAGNOSIS — Z13.5 SCREENING FOR GLAUCOMA: ICD-10-CM

## 2017-07-13 DIAGNOSIS — Z00.00 ENCOUNTER FOR PREVENTIVE HEALTH EXAMINATION: Primary | ICD-10-CM

## 2017-07-13 DIAGNOSIS — Z86.018 HISTORY OF BENIGN BLADDER TUMOR: ICD-10-CM

## 2017-07-13 PROCEDURE — 99999 PR PBB SHADOW E&M-EST. PATIENT-LVL III: CPT | Mod: PBBFAC,,, | Performed by: NURSE PRACTITIONER

## 2017-07-13 PROCEDURE — 99499 UNLISTED E&M SERVICE: CPT | Mod: S$GLB,,, | Performed by: NURSE PRACTITIONER

## 2017-07-13 PROCEDURE — G0439 PPPS, SUBSEQ VISIT: HCPCS | Mod: S$GLB,,, | Performed by: NURSE PRACTITIONER

## 2017-07-13 NOTE — PATIENT INSTRUCTIONS
Counseling and Referral of Other Preventative  (Italic type indicates deductible and co-insurance are waived)    Patient Name: Norm Mendoza  Today's Date: 7/13/2017      SERVICE LIMITATIONS RECOMMENDATION    Vaccines    · Pneumococcal (once after 65)    · Influenza (annually)    · Hepatitis B (if medium/high risk)    · Prevnar 13      Hepatitis B medium/high risk factors:       - End-stage renal disease       - Hemophiliacs who received Factor VII or         IX concentrates       - Clients of institutions for the mentally             retarded       - Persons who live in the same house as          a HepB carrier       - Homosexual men       - Illicit injectable drug abusers     Pneumococcal: Done, no repeat necessary     Influenza: Due fall 2017     Hepatitis B: N/A     Prevnar 13: Done, no repeat necessary    Prostate cancer screening (annually to age 75)     Prostate specific antigen (PSA) Shared decision making with Provider. Sometimes a co-pay may be required if the patient decides to have this test. The USPSTF no longer recommends prostate cancer screening routinely in medicine: per PCP    Colorectal cancer screening (to age 75)    · Fecal occult blood test (annual)  · Flexible sigmoidoscopy (5y)  · Screening colonoscopy (10y)  · Barium enema   N/A    Diabetes self-management training (no USPSTF recommendations)  Requires referral by treating physician for patient with diabetes or renal disease. 10 hours of initial DSMT sessions of no less than 30 minutes each in a continuous 12-month period. 2 hours of follow-up DSMT in subsequent years.  N/A    Glaucoma screening (no USPSTF recommendation)  Diabetes mellitus, family history   , age 50 or over    American, age 65 or over  Scheduled, see appointments    Medical nutrition therapy for diabetes or renal disease (no recommended schedule)  Requires referral by treating physician for patient with diabetes or renal disease or kidney  transplant within the past 3 years.  Can be provided in same year as diabetes self-management training (DSMT), and CMS recommends medical nutrition therapy take place after DSMT. Up to 3 hours for initial year and 2 hours in subsequent years.  N/A    Cardiovascular screening blood tests (every 5 years)  · Fasting lipid panel  Order as a panel if possible  Done this year, repeat every year    Diabetes screening tests (at least every 3 years, Medicare covers annually or at 6-month intervals for prediabetic patients)  · Fasting blood sugar (FBS) or glucose tolerance test (GTT)  Patient must be diagnosed with one of the following:       - Hypertension       - Dyslipidemia       - Obesity (BMI 30kg/m2)       - Previous elevated impaired FBS or GTT       ... or any two of the following:       - Overweight (BMI 25 but <30)       - Family history of diabetes       - Age 65 or older       - History of gestational diabetes or birth of baby weighing more than 9 pounds  Done this year, repeat every year    Abdominal aortic aneurysm screening (once)  · Sonogram   Limited to patients who meet one of the following criteria:       - Men who are 65-75 years old and have smoked more than 100 cigarette in their lifetime       - Anyone with a family history of abdominal aortic aneurysm       - Anyone recommended for screening by the USPSTF  N/A    HIV screening (annually for increased risk patients)  · HIV-1 and HIV-2 by EIA, or PEDRO, rapid antibody test or oral mucosa transudate  Patients must be at increased risk for HIV infection per USPSTF guidelines or pregnant. Tests covered annually for patient at increased risk or as requested by the patient. Pregnant patients may receive up to 3 tests during pregnancy.  Risks discussed, screening is not recommended    Smoking cessation counseling (up to 8 sessions per year)  Patients must be asymptomatic of tobacco-related conditions to receive as a preventative service.  Non-smoker     Subsequent annual wellness visit  At least 12 months since last AWV  Return in one year     The following information is provided to all patients.  This information is to help you find resources for any of the problems found today that may be affecting your health:                Living healthy guide: www.[facility].[place].[facility]      Understanding Diabetes: www.diabetes.org      Eating healthy: www.cdc.gov/healthyweight      CDC home safety checklist: www.cdc.gov/steadi/patient.html      Agency on Aging: www.goea.[place].[facility]      Alcoholics anonymous (AA): www.aa.org      Physical Activity: www.elis.nih.gov/wf7nzqc      Tobacco use: www.quitwithusla.org

## 2017-07-13 NOTE — PROGRESS NOTES
Norm Mendoza presented for a  Medicare AWV and comprehensive Health Risk Assessment today. The following components were reviewed and updated:    · Medical history  · Family History  · Social history  · Allergies and Current Medications  · Health Risk Assessment  · Health Maintenance  · Care Team     ** See Completed Assessments for Annual Wellness Visit within the encounter summary.**       The following assessments were completed:  · Living Situation  · CAGE  · Depression Screening  · Timed Get Up and Go  · Whisper Test  · Cognitive Function Screening  ·   ·   ·   · Nutrition Screening  · ADL Screening  · PAQ Screening    Vitals:    07/13/17 1004   BP: (!) 160/70   Pulse: 60   Weight: 76.7 kg (169 lb 1.5 oz)   Height: 6' (1.829 m)     Body mass index is 22.93 kg/m².  Physical Exam   Constitutional: He is oriented to person, place, and time. He appears well-developed and well-nourished.   HENT:   Head: Normocephalic.   Cardiovascular: Normal rate and regular rhythm.    Pulmonary/Chest: Effort normal and breath sounds normal.   Abdominal: Soft. Bowel sounds are normal.   Musculoskeletal: Normal range of motion. He exhibits no edema.   Neurological: He is alert and oriented to person, place, and time.   Psychiatric: He has a normal mood and affect.   Nursing note and vitals reviewed.        Diagnoses and health risks identified today and associated recommendations/orders:    1. Encounter for preventive health examination  Here for Health Risk Assessment. Follow up in one year.    2. Screening for glaucoma  - Ambulatory Referral to Optometry    3. Essential hypertension  Chronic, B/P elevated today. Advised to check B/P at home/pharmacy and Notify PCP if systolic B/P remains greater emilio 140. Followed by PCP.    4. Aortic atherosclerosis  Chronic, stable on current medication. Noted on CXR 1/26/17. Followed by PCP.    5. 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 Noted on ECHO 1/26/17. Followed by PCP.    6. Nonrheumatic aortic valve stenosis  Chronic, mild, stable. Followed by PCP.    7. History of benign bladder tumor  Stable. Followed by Urology.    8. H/O nonmelanoma skin cancer  Stable. Followed by Dermatology.    9. Alcohol dependence, daily use  Chronic, reports 3 bourbons daily and 12 beers on weekends. Followed by PCP.      Provided Norm with a 5-10 year written screening schedule and personal prevention plan. Recommendations were developed using the USPSTF age appropriate recommendations. Education, counseling, and referrals were provided as needed. After Visit Summary printed and given to patient which includes a list of additional screenings\tests needed.    Return in about 6 months (around 1/19/2018).with PCP.    Tamy Gibson NP

## 2017-10-27 DIAGNOSIS — I10 ESSENTIAL HYPERTENSION: ICD-10-CM

## 2017-10-27 RX ORDER — VALSARTAN 80 MG/1
TABLET ORAL
Qty: 45 TABLET | Refills: 1 | Status: SHIPPED | OUTPATIENT
Start: 2017-10-27 | End: 2018-02-22 | Stop reason: SDUPTHER

## 2017-12-01 ENCOUNTER — TELEPHONE (OUTPATIENT)
Dept: INTERNAL MEDICINE | Facility: CLINIC | Age: 82
End: 2017-12-01

## 2017-12-01 DIAGNOSIS — I10 ESSENTIAL HYPERTENSION: Primary | ICD-10-CM

## 2017-12-01 NOTE — TELEPHONE ENCOUNTER
----- Message from Laura Silverman sent at 12/1/2017  2:55 PM CST -----  Contact: Self [phone]  Doctor appointment and lab have been scheduled.  Please link lab orders to the lab appointment.  Date of doctor appointment:    Physical or EP:  04/05  Date of lab appointment: 03/29   Comments:

## 2018-01-05 ENCOUNTER — OFFICE VISIT (OUTPATIENT)
Dept: DERMATOLOGY | Facility: CLINIC | Age: 83
End: 2018-01-05
Payer: MEDICARE

## 2018-01-05 DIAGNOSIS — Z85.828 HISTORY OF NONMELANOMA SKIN CANCER: ICD-10-CM

## 2018-01-05 DIAGNOSIS — L57.8 CHRONIC SOLAR DERMATITIS: ICD-10-CM

## 2018-01-05 DIAGNOSIS — L82.1 SK (SEBORRHEIC KERATOSIS): ICD-10-CM

## 2018-01-05 DIAGNOSIS — L57.0 AK (ACTINIC KERATOSIS): Primary | ICD-10-CM

## 2018-01-05 PROCEDURE — 99213 OFFICE O/P EST LOW 20 MIN: CPT | Mod: 25,S$GLB,, | Performed by: DERMATOLOGY

## 2018-01-05 PROCEDURE — 99999 PR PBB SHADOW E&M-EST. PATIENT-LVL II: CPT | Mod: PBBFAC,,, | Performed by: DERMATOLOGY

## 2018-01-05 NOTE — ASSESSMENT & PLAN NOTE
Today's Plan:      PDT face 90 min     And  Cryosurgery Procedure Note    Verbal consent from the patient is obtained and the patient is aware of the precancerous quality and need for treatment of these lesions. Liquid nitrogen cryosurgery is applied to the 7 actinic keratoses, as detailed in the physical exam, to produce a freeze injury. The patient is aware that blisters may form and is instructed on wound care with gentle cleansing and use of vaseline ointment to keep moist until healed. The patient is supplied a handout on cryosurgery and is instructed to call if lesions do not completely resolve.

## 2018-01-05 NOTE — PATIENT INSTRUCTIONS

## 2018-01-05 NOTE — PROGRESS NOTES
Subjective:       Patient ID:  Norm Mendoza is a 89 y.o. male who presents for   Chief Complaint   Patient presents with    Skin Check     UBSE     History of Present Illness: The patient presents for follow up of skin check.    The patient was last seen on: 5.5.17 for cryosurgery to actinic keratoses which have resolved.   No h/o nmsc  Other skin complaints: none          Review of Systems   Skin: Positive for recent sunburn (face). Negative for dry skin, daily sunscreen use, activity-related sunscreen use and tendency to form keloidal scars.   Hematologic/Lymphatic: Bruises/bleeds easily (on asa).        Objective:    Physical Exam   Constitutional: He appears well-developed and well-nourished. No distress.   HENT:   Mouth/Throat: Lips normal.    Eyes: Lids are normal.    Neurological: He is alert. He is not disoriented.   Psychiatric: He has a normal mood and affect.   Skin:   Areas Examined (abnormalities noted in diagram):   Scalp / Hair Palpated and Inspected  Head / Face Inspection Performed  Neck Inspection Performed  Chest / Axilla Inspection Performed  Back Inspection Performed  RUE Inspected  LUE Inspection Performed  Nails and Digits Inspection Performed                   Diagram Legend     Erythematous scaling macule/papule c/w actinic keratosis       Vascular papule c/w angioma      Pigmented verrucoid papule/plaque c/w seborrheic keratosis      Yellow umbilicated papule c/w sebaceous hyperplasia      Irregularly shaped tan macule c/w lentigo     1-2 mm smooth white papules consistent with Milia      Movable subcutaneous cyst with punctum c/w epidermal inclusion cyst      Subcutaneous movable cyst c/w pilar cyst      Firm pink to brown papule c/w dermatofibroma      Pedunculated fleshy papule(s) c/w skin tag(s)      Evenly pigmented macule c/w junctional nevus     Mildly variegated pigmented, slightly irregular-bordered macule c/w mildly atypical nevus      Flesh colored to evenly pigmented  papule c/w intradermal nevus       Pink pearly papule/plaque c/w basal cell carcinoma      Erythematous hyperkeratotic cursted plaque c/w SCC      Surgical scar with no sign of skin cancer recurrence      Open and closed comedones      Inflammatory papules and pustules      Verrucoid papule consistent consistent with wart     Erythematous eczematous patches and plaques     Dystrophic onycholytic nail with subungual debris c/w onychomycosis     Umbilicated papule    Erythematous-base heme-crusted tan verrucoid plaque consistent with inflamed seborrheic keratosis     Erythematous Silvery Scaling Plaque c/w Psoriasis     See annotation      Assessment / Plan:        AK (actinic keratosis)  -     Photodynamic Therapy; Future    SK (seborrheic keratosis)  These are benign inherited growths without a malignant potential. Reassurance given to patient. No treatment is necessary.       Screening for skin cancer   Upper body skin examination performed today including at least 6 points as noted in physical examination. No lesions suspicious for malignancy noted.      Chronic solar dermatitis  Apply Am Lactin lotion or cream to arms and hands nightly. Available over-the counter.      AK (actinic keratosis)  Today's Plan:      PDT face 90 min     And  Cryosurgery Procedure Note    Verbal consent from the patient is obtained and the patient is aware of the precancerous quality and need for treatment of these lesions. Liquid nitrogen cryosurgery is applied to the 7 actinic keratoses, as detailed in the physical exam, to produce a freeze injury. The patient is aware that blisters may form and is instructed on wound care with gentle cleansing and use of vaseline ointment to keep moist until healed. The patient is supplied a handout on cryosurgery and is instructed to call if lesions do not completely resolve.        Return in about 3 months (around 4/5/2018).

## 2018-01-10 ENCOUNTER — TELEPHONE (OUTPATIENT)
Dept: DERMATOLOGY | Facility: CLINIC | Age: 83
End: 2018-01-10

## 2018-02-15 ENCOUNTER — CLINICAL SUPPORT (OUTPATIENT)
Dept: DERMATOLOGY | Facility: CLINIC | Age: 83
End: 2018-02-15
Payer: MEDICARE

## 2018-02-15 DIAGNOSIS — L57.0 AK (ACTINIC KERATOSIS): ICD-10-CM

## 2018-02-15 PROCEDURE — 96567 PDT DSTR PRMLG LES SKN: CPT | Mod: S$GLB,,, | Performed by: DERMATOLOGY

## 2018-02-15 PROCEDURE — 99999 PR PBB SHADOW E&M-EST. PATIENT-LVL II: CPT | Mod: PBBFAC,,,

## 2018-02-15 PROCEDURE — 99499 UNLISTED E&M SERVICE: CPT | Mod: S$GLB,,, | Performed by: DERMATOLOGY

## 2018-02-15 NOTE — Clinical Note
The pt lost track of time and the med sat on his face for 105 min instead of 90.  He did well under the light.

## 2018-02-22 DIAGNOSIS — I10 ESSENTIAL HYPERTENSION: ICD-10-CM

## 2018-02-22 RX ORDER — VALSARTAN 80 MG/1
TABLET ORAL
Qty: 45 TABLET | Refills: 0 | Status: SHIPPED | OUTPATIENT
Start: 2018-02-22 | End: 2018-03-29 | Stop reason: SDUPTHER

## 2018-03-29 ENCOUNTER — LAB VISIT (OUTPATIENT)
Dept: LAB | Facility: HOSPITAL | Age: 83
End: 2018-03-29
Attending: INTERNAL MEDICINE
Payer: MEDICARE

## 2018-03-29 DIAGNOSIS — I10 ESSENTIAL HYPERTENSION: ICD-10-CM

## 2018-03-29 LAB
ALBUMIN SERPL BCP-MCNC: 3.4 G/DL
ALP SERPL-CCNC: 48 U/L
ALT SERPL W/O P-5'-P-CCNC: 13 U/L
ANION GAP SERPL CALC-SCNC: 6 MMOL/L
AST SERPL-CCNC: 18 U/L
BASOPHILS # BLD AUTO: 0.06 K/UL
BASOPHILS NFR BLD: 1.3 %
BILIRUB SERPL-MCNC: 0.4 MG/DL
BUN SERPL-MCNC: 24 MG/DL
CALCIUM SERPL-MCNC: 9.3 MG/DL
CHLORIDE SERPL-SCNC: 110 MMOL/L
CHOLEST SERPL-MCNC: 227 MG/DL
CHOLEST/HDLC SERPL: 3.2 {RATIO}
CO2 SERPL-SCNC: 27 MMOL/L
CREAT SERPL-MCNC: 1.1 MG/DL
DIFFERENTIAL METHOD: ABNORMAL
EOSINOPHIL # BLD AUTO: 0.2 K/UL
EOSINOPHIL NFR BLD: 4 %
ERYTHROCYTE [DISTWIDTH] IN BLOOD BY AUTOMATED COUNT: 14.7 %
EST. GFR  (AFRICAN AMERICAN): >60 ML/MIN/1.73 M^2
EST. GFR  (NON AFRICAN AMERICAN): 58.4 ML/MIN/1.73 M^2
GLUCOSE SERPL-MCNC: 99 MG/DL
HCT VFR BLD AUTO: 41 %
HDLC SERPL-MCNC: 72 MG/DL
HDLC SERPL: 31.7 %
HGB BLD-MCNC: 13.4 G/DL
IMM GRANULOCYTES # BLD AUTO: 0.04 K/UL
IMM GRANULOCYTES NFR BLD AUTO: 0.9 %
LDLC SERPL CALC-MCNC: 142.4 MG/DL
LYMPHOCYTES # BLD AUTO: 1 K/UL
LYMPHOCYTES NFR BLD: 21.1 %
MCH RBC QN AUTO: 32 PG
MCHC RBC AUTO-ENTMCNC: 32.7 G/DL
MCV RBC AUTO: 98 FL
MONOCYTES # BLD AUTO: 0.4 K/UL
MONOCYTES NFR BLD: 9.7 %
NEUTROPHILS # BLD AUTO: 2.9 K/UL
NEUTROPHILS NFR BLD: 63 %
NONHDLC SERPL-MCNC: 155 MG/DL
NRBC BLD-RTO: 0 /100 WBC
PLATELET # BLD AUTO: 212 K/UL
PMV BLD AUTO: 10 FL
POTASSIUM SERPL-SCNC: 4.4 MMOL/L
PROT SERPL-MCNC: 6.5 G/DL
RBC # BLD AUTO: 4.19 M/UL
SODIUM SERPL-SCNC: 143 MMOL/L
TRIGL SERPL-MCNC: 63 MG/DL
TSH SERPL DL<=0.005 MIU/L-ACNC: 1.26 UIU/ML
WBC # BLD AUTO: 4.54 K/UL

## 2018-03-29 PROCEDURE — 80061 LIPID PANEL: CPT

## 2018-03-29 PROCEDURE — 84443 ASSAY THYROID STIM HORMONE: CPT

## 2018-03-29 PROCEDURE — 36415 COLL VENOUS BLD VENIPUNCTURE: CPT | Mod: PO

## 2018-03-29 PROCEDURE — 85025 COMPLETE CBC W/AUTO DIFF WBC: CPT

## 2018-03-29 PROCEDURE — 80053 COMPREHEN METABOLIC PANEL: CPT

## 2018-03-29 RX ORDER — VALSARTAN 80 MG/1
TABLET ORAL
Qty: 45 TABLET | Refills: 0 | Status: SHIPPED | OUTPATIENT
Start: 2018-03-29 | End: 2018-04-05 | Stop reason: SDUPTHER

## 2018-04-05 ENCOUNTER — OFFICE VISIT (OUTPATIENT)
Dept: INTERNAL MEDICINE | Facility: CLINIC | Age: 83
End: 2018-04-05
Payer: MEDICARE

## 2018-04-05 VITALS
SYSTOLIC BLOOD PRESSURE: 162 MMHG | HEART RATE: 53 BPM | BODY MASS INDEX: 24.4 KG/M2 | HEIGHT: 72 IN | OXYGEN SATURATION: 99 % | WEIGHT: 180.13 LBS | DIASTOLIC BLOOD PRESSURE: 78 MMHG

## 2018-04-05 DIAGNOSIS — I70.0 AORTIC ATHEROSCLEROSIS: ICD-10-CM

## 2018-04-05 DIAGNOSIS — I10 ESSENTIAL HYPERTENSION: ICD-10-CM

## 2018-04-05 DIAGNOSIS — Z00.00 ANNUAL PHYSICAL EXAM: Primary | ICD-10-CM

## 2018-04-05 DIAGNOSIS — E78.5 HYPERLIPIDEMIA, UNSPECIFIED HYPERLIPIDEMIA TYPE: ICD-10-CM

## 2018-04-05 PROCEDURE — 99397 PER PM REEVAL EST PAT 65+ YR: CPT | Mod: S$GLB,,, | Performed by: INTERNAL MEDICINE

## 2018-04-05 PROCEDURE — 99999 PR PBB SHADOW E&M-EST. PATIENT-LVL V: CPT | Mod: PBBFAC,,, | Performed by: INTERNAL MEDICINE

## 2018-04-05 PROCEDURE — 99499 UNLISTED E&M SERVICE: CPT | Mod: S$PBB,,, | Performed by: INTERNAL MEDICINE

## 2018-04-05 RX ORDER — VALSARTAN 80 MG/1
TABLET ORAL
Qty: 45 TABLET | Refills: 6 | Status: SHIPPED | OUTPATIENT
Start: 2018-04-05 | End: 2018-05-03 | Stop reason: SDUPTHER

## 2018-04-05 NOTE — PROGRESS NOTES
Mr. Mendoza is a very sweet 89-year-old gentleman, known to myself, who   presents for scheduled appointment.    CHIEF COMPLAINT:  Annual wellness check.    HISTORY OF PRESENT ILLNESS:  Mr. eMndoza presents for followup of hypertension   and health maintenance issues.  He notes he has been following a   low-carbohydrate diet and has been eating more meats and fried chicken and is   curious to see if his cholesterol has improved or gone up.  He does note that he   would like to see a cardiologist or have cardiac evaluation to try and get   evaluated for preventative cardiac evaluation, as he is concerned given his   family history of strokes in his father and brother.  He has had no chest pain,   no shortness of breath, no significant palpitations.  He notes he stays active   doing Pilates twice a week.  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    PAST MEDICAL, SURGICAL AND SOCIAL HISTORY:  Please see as thoroughly stated in   EPIC chart, which has been reviewed.    REVIEW OF SYSTEMS:  HEENT:  Negative for headaches or dizziness.  CARDIOPULMONARY:  No chest pain, no shortness of breath.  GASTROINTESTINAL:  No change in bowel habits.  No blood per rectum.  GENITOURINARY:  No BPH symptoms.  No urinary difficulties.  EXTREMITIES:  Negative for pain, cramps, etc.    PHYSICAL EXAMINATION:  VITAL SIGNS:  Weight 180 pounds, blood pressure 164/70, pulse 53.  Recheck   pressure per MD is 162/78.  Pulse ox 99%.  HEENT:  Grossly unremarkable.  NECK:  Negative for masses and thyromegaly.  Negative for lymphadenopathy.  LUNGS:  Clear.  HEART:  Regular rate and rhythm without arrhythmias, murmurs or gallops.  ABDOMEN:  Positive bowel sounds, soft and nontender.  No masses or organomegaly.  EXTREMITIES:  Showing trace of any pretibial edema.  DP pulses are present, but   decreased.  No  discoloration of nail beds.  Bilateral feet nice and healthy.    WORKUP:  Lab work done on 03/29/2018, showing TSH to be normal.  Cholesterol is   227 with , about the same as on last  check.  CBC is with H and H of 13   and 41.  Rest is unremarkable.    ASSESSMENT AND PLAN:  1.  Essential hypertension, uncontrolled, but the patient is noting he has only   been taking the valsartan once a day.  A.  We will go back to prior dosage on valsartan 80 mg one tablet every other   day alternating with two tablets on alternate days.  B.  Return to clinic in three to four months with one week prior BMP for   followup blood pressure check.  2.  Hyperlipidemia.  A.  I have discussed the importance of low-fat diet and decrease in intake of   red meats, fried foods with increased intake of vegetables.  B.  Repeat lipid panel in three to four months.  If no improvement, we will   consider pharmacologic therapy.  3.  History of aortic atherosclerosis in a 89-year-old gentleman requesting   cardiac evaluation.  A.  Refer to Cardiology, Dr. Guerra.  B.  Blood pressure and cholesterol control.  4.  Health maintenance.  5.  Screening.  6.  Last colonoscopy 6-1/2 years ago was normal.  Last urological exam in March 2017 with the patient recommended to return as needed.  No problems.  A.  Shingrix vaccine to be scheduled.  B.  Return to clinic in four months with one week prior lab work to include   basic chemistries, lipid panel and go from there or see sooner if needed.          /ls [id number] jaiden(s)        FMS/HN  dd: 04/05/2018 09:59:52 (CDT)  td: 04/05/2018 23:14:26 (CDT)  Doc ID   #[id number]  Job ID #[id number]    CC:     This office note has been dictated.

## 2018-04-10 ENCOUNTER — PES CALL (OUTPATIENT)
Dept: ADMINISTRATIVE | Facility: CLINIC | Age: 83
End: 2018-04-10

## 2018-05-02 PROBLEM — E78.2 MIXED HYPERLIPIDEMIA: Status: ACTIVE | Noted: 2018-05-02

## 2018-05-02 NOTE — PROGRESS NOTES
Subjective:   Patient ID:  Norm Mendoza is a 89 y.o. male who presents for evaluation of CVD    HPI:  The patient is here for dyslipidemia and other CVD.    The patient has no chest pain, SOB, TIA, palpitations, syncope or pre-syncope.Patient does not exercise a lot but does Pilates twice per week.Stopped driving.        Review of Systems   Constitution: Negative for chills, decreased appetite, diaphoresis, fever, weakness, malaise/fatigue, night sweats, weight gain and weight loss.   HENT: Negative for congestion, hoarse voice, nosebleeds, sore throat and tinnitus.    Eyes: Negative for blurred vision, double vision, vision loss in left eye, vision loss in right eye, visual disturbance and visual halos.   Cardiovascular: Negative for chest pain, claudication, cyanosis, dyspnea on exertion, irregular heartbeat, leg swelling, near-syncope, orthopnea, palpitations, paroxysmal nocturnal dyspnea and syncope.   Respiratory: Negative for cough, hemoptysis, shortness of breath, sleep disturbances due to breathing, snoring, sputum production and wheezing.    Endocrine: Negative for cold intolerance, heat intolerance, polydipsia, polyphagia and polyuria.   Hematologic/Lymphatic: Negative for adenopathy and bleeding problem. Does not bruise/bleed easily.   Skin: Negative for color change, dry skin, flushing, itching, nail changes, poor wound healing, rash, skin cancer, suspicious lesions and unusual hair distribution.   Musculoskeletal: Negative for arthritis, back pain, falls, gout, joint pain, joint swelling, muscle cramps, muscle weakness, myalgias and stiffness.   Gastrointestinal: Negative for abdominal pain, anorexia, change in bowel habit, constipation, diarrhea, dysphagia, heartburn, hematemesis, hematochezia, melena and vomiting.   Genitourinary: Negative for decreased libido, dysuria, hematuria, hesitancy and urgency.   Neurological: Negative for excessive daytime sleepiness, dizziness, focal weakness,  headaches, light-headedness, loss of balance, numbness, paresthesias, seizures, sensory change, tremors and vertigo.   Psychiatric/Behavioral: Negative for altered mental status, depression, hallucinations, memory loss, substance abuse and suicidal ideas. The patient does not have insomnia and is not nervous/anxious.    Allergic/Immunologic: Negative for environmental allergies and hives.       Objective: /64   Pulse 68   Ht 6' (1.829 m)   Wt 78.8 kg (173 lb 11.6 oz)   BMI 23.56 kg/m²      Physical Exam   Constitutional: He is oriented to person, place, and time. He appears well-developed and well-nourished. No distress.   HENT:   Head: Normocephalic.   Eyes: EOM are normal. Pupils are equal, round, and reactive to light.   Neck: Normal range of motion. No thyromegaly present.   Cardiovascular: Normal rate, regular rhythm and intact distal pulses.  Exam reveals friction rub. Exam reveals no gallop.    No murmur heard.  Pulses:       Carotid pulses are 3+ on the right side, and 3+ on the left side.       Radial pulses are 3+ on the right side, and 3+ on the left side.        Femoral pulses are 3+ on the right side, and 3+ on the left side.       Popliteal pulses are 3+ on the right side, and 3+ on the left side.        Dorsalis pedis pulses are 3+ on the right side, and 3+ on the left side.        Posterior tibial pulses are 3+ on the right side, and 3+ on the left side.   Pulmonary/Chest: Effort normal and breath sounds normal. No respiratory distress. He has no wheezes. He has no rales. He exhibits no tenderness.   Abdominal: Soft. He exhibits no distension and no mass. There is no tenderness.   Musculoskeletal: Normal range of motion.   Lymphadenopathy:     He has no cervical adenopathy.   Neurological: He is alert and oriented to person, place, and time.   Skin: Skin is warm. He is not diaphoretic. No cyanosis. Nails show no clubbing.   Psychiatric: He has a normal mood and affect. His speech is normal  and behavior is normal. Judgment and thought content normal. Cognition and memory are normal.   Visually appears more in mid 70s instead of 89    Assessment:     1. Aortic atherosclerosis    2. Left ventricular diastolic dysfunction with preserved systolic function    3. Nonrheumatic aortic valve stenosis    4. Essential hypertension    5. Aortic root dilation    6. Hypercholesterolemia    7. PVC's (premature ventricular contractions)        Plan:   Discussed diet , achieving and maintaining ideal body weight, and exercise.   We reviewed meds in detail.  Reassured-discussed goals, options , plan.  Discussed low dose statin and need to keep BP low with aortic dilation but factoring in his advanced age  Take the Valsartan twice per day  Add Pravastatin 40 mg at nite  Omega 3 600 per day of EPA /DHA in addition to his sardines and fish  Norm was seen today for hyperlipidemia, hypertension and heart murmur.    Diagnoses and all orders for this visit:    Aortic atherosclerosis  -     Lipid panel; Future; Expected date: 08/03/2018  -     Comprehensive metabolic panel; Future; Expected date: 08/03/2018  -     EKG 12-lead; Future; Expected date: 07/03/2019  -     pravastatin (PRAVACHOL) 40 MG tablet; Take 1 tablet (40 mg total) by mouth once daily. Take at nite  -     valsartan (DIOVAN) 80 MG tablet; TAKE 1 TABLET BY MOUTH twice daily-new order    Left ventricular diastolic dysfunction with preserved systolic function  -     valsartan (DIOVAN) 80 MG tablet; TAKE 1 TABLET BY MOUTH twice daily-new order    Nonrheumatic aortic valve stenosis  -     Lipid panel; Future; Expected date: 08/03/2018  -     Comprehensive metabolic panel; Future; Expected date: 08/03/2018  -     2D echo with color flow doppler; Future; Expected date: 05/04/2018  -     EKG 12-lead; Future; Expected date: 07/03/2019  -     pravastatin (PRAVACHOL) 40 MG tablet; Take 1 tablet (40 mg total) by mouth once daily. [facility]    Essential hypertension  -      EKG 12-lead; Future; Expected date: 07/03/2019  -     valsartan (DIOVAN) 80 MG tablet; TAKE 1 TABLET BY MOUTH twice daily-new order    Aortic root dilation  -     Comprehensive metabolic panel; Future; Expected date: 08/03/2018  -     2D echo with color flow doppler; Future; Expected date: 05/04/2018  -     EKG 12-lead; Future; Expected date: 07/03/2019    Hypercholesterolemia  -     Lipid panel; Future; Expected date: 08/03/2018  -     Comprehensive metabolic panel; Future; Expected date: 08/03/2018  -     pravastatin (PRAVACHOL) 40 MG tablet; Take 1 tablet (40 mg total) by mouth once daily. Take at nite    PVC's (premature ventricular contractions)  -     EKG 12-lead; Future; Expected date: 07/03/2019            Follow-up in about 15 months (around 8/3/2019) for with ECG; labs 3 months;CFD Charlie colunga.

## 2018-05-03 ENCOUNTER — OFFICE VISIT (OUTPATIENT)
Dept: CARDIOLOGY | Facility: CLINIC | Age: 83
End: 2018-05-03
Payer: MEDICARE

## 2018-05-03 VITALS
HEIGHT: 72 IN | HEART RATE: 68 BPM | DIASTOLIC BLOOD PRESSURE: 64 MMHG | WEIGHT: 173.75 LBS | SYSTOLIC BLOOD PRESSURE: 120 MMHG | BODY MASS INDEX: 23.53 KG/M2

## 2018-05-03 DIAGNOSIS — I10 ESSENTIAL HYPERTENSION: ICD-10-CM

## 2018-05-03 DIAGNOSIS — I49.3 PVC'S (PREMATURE VENTRICULAR CONTRACTIONS): ICD-10-CM

## 2018-05-03 DIAGNOSIS — E78.00 HYPERCHOLESTEROLEMIA: ICD-10-CM

## 2018-05-03 DIAGNOSIS — I77.810 AORTIC ROOT DILATION: ICD-10-CM

## 2018-05-03 DIAGNOSIS — I35.0 NONRHEUMATIC AORTIC VALVE STENOSIS: ICD-10-CM

## 2018-05-03 DIAGNOSIS — I70.0 AORTIC ATHEROSCLEROSIS: Primary | ICD-10-CM

## 2018-05-03 DIAGNOSIS — I51.89 LEFT VENTRICULAR DIASTOLIC DYSFUNCTION WITH PRESERVED SYSTOLIC FUNCTION: ICD-10-CM

## 2018-05-03 PROCEDURE — 99999 PR PBB SHADOW E&M-EST. PATIENT-LVL IV: CPT | Mod: PBBFAC,,, | Performed by: INTERNAL MEDICINE

## 2018-05-03 PROCEDURE — 99499 UNLISTED E&M SERVICE: CPT | Mod: S$PBB,,, | Performed by: INTERNAL MEDICINE

## 2018-05-03 PROCEDURE — 99204 OFFICE O/P NEW MOD 45 MIN: CPT | Mod: S$GLB,,, | Performed by: INTERNAL MEDICINE

## 2018-05-03 RX ORDER — VALSARTAN 80 MG/1
TABLET ORAL
Qty: 180 TABLET | Refills: 3 | Status: SHIPPED | OUTPATIENT
Start: 2018-05-03 | End: 2018-07-27

## 2018-05-03 RX ORDER — PRAVASTATIN SODIUM 40 MG/1
40 TABLET ORAL DAILY
Qty: 90 TABLET | Refills: 3 | Status: SHIPPED | OUTPATIENT
Start: 2018-05-03 | End: 2018-08-10

## 2018-05-03 NOTE — PATIENT INSTRUCTIONS
Discussed diet , achieving and maintaining ideal body weight, and exercise.   We reviewed meds in detail.  Reassured-discussed goals, options , plan.  Discussed low dose statin and need to keep BP low with aortic dilation but factoring in his advanced age  Take the Valsartan twice per day  Add Pravastatin 40 mg at nite  Omega 3 600 per day of EPA /DHA in addition to his sardines and fish

## 2018-05-03 NOTE — LETTER
May 3, 2018      Amber Jenkins MD  [address]  [place] [address]           [facility] - Cardiology  [address]  [facility] [address]  Phone: [phone]          Patient: Norm Mendoza   MR Number: [id number]   YOB: 1926   Date of Visit: 5/3/2018       Dear Dr. Amber Jenkins:    Thank you for referring Norm Mendoza to me for evaluation. Attached you will find relevant portions of my assessment and plan of care.    If you have questions, please do not hesitate to call me. I look forward to following Norm Mendoza along with you.    Sincerely,    Grant Guerra MD    Enclosure  CC:  No Recipients    If you would like to receive this communication electronically, please contact [email or web address] or [phone] to request more information on [facility] access.    For providers and/or their staff who would like to refer a patient to [facility], please contact us through our one-stop-shop provider referral line, [facility], at [phone].    If you feel you have received this communication in error or would no longer like to receive these types of communications, please e-mail [email or web address]

## 2018-05-10 ENCOUNTER — HOSPITAL ENCOUNTER (OUTPATIENT)
Dept: CARDIOLOGY | Facility: CLINIC | Age: 83
Discharge: HOME | End: 2018-05-10
Attending: INTERNAL MEDICINE
Payer: MEDICARE

## 2018-05-10 ENCOUNTER — CLINICAL SUPPORT (OUTPATIENT)
Dept: INFECTIOUS DISEASES | Facility: CLINIC | Age: 83
End: 2018-05-10
Payer: MEDICARE

## 2018-05-10 DIAGNOSIS — I35.0 NONRHEUMATIC AORTIC VALVE STENOSIS: ICD-10-CM

## 2018-05-10 DIAGNOSIS — I77.810 AORTIC ROOT DILATION: ICD-10-CM

## 2018-05-10 DIAGNOSIS — Z29.9 PREVENTIVE MEASURE: Primary | ICD-10-CM

## 2018-05-10 LAB
AORTIC VALVE REGURGITATION: NORMAL
ESTIMATED PA SYSTOLIC PRESSURE: 32.81
MITRAL VALVE REGURGITATION: NORMAL
RETIRED EF AND QEF - SEE NOTES: 58 (ref 55–65)
TRICUSPID VALVE REGURGITATION: NORMAL

## 2018-05-10 PROCEDURE — 90471 IMMUNIZATION ADMIN: CPT | Mod: S$GLB,,, | Performed by: INTERNAL MEDICINE

## 2018-05-10 PROCEDURE — 93306 TTE W/DOPPLER COMPLETE: CPT | Mod: S$GLB,,, | Performed by: INTERNAL MEDICINE

## 2018-05-10 PROCEDURE — 90736 HZV VACCINE LIVE SUBQ: CPT | Mod: S$GLB,,, | Performed by: INTERNAL MEDICINE

## 2018-05-10 NOTE — PROGRESS NOTES
Pt received the Shingrix vaccination. Pt tolerated the injection well. Return appt provided. Pt left the unit in NAD.

## 2018-06-07 ENCOUNTER — OFFICE VISIT (OUTPATIENT)
Dept: INTERNAL MEDICINE | Facility: CLINIC | Age: 83
End: 2018-06-07
Payer: MEDICARE

## 2018-06-07 VITALS
SYSTOLIC BLOOD PRESSURE: 120 MMHG | HEART RATE: 54 BPM | OXYGEN SATURATION: 97 % | WEIGHT: 175.69 LBS | DIASTOLIC BLOOD PRESSURE: 80 MMHG | BODY MASS INDEX: 23.8 KG/M2 | HEIGHT: 72 IN

## 2018-06-07 DIAGNOSIS — I77.810 AORTIC ROOT DILATION: ICD-10-CM

## 2018-06-07 DIAGNOSIS — L57.0 AK (ACTINIC KERATOSIS): ICD-10-CM

## 2018-06-07 DIAGNOSIS — I77.819 ECTATIC AORTA: ICD-10-CM

## 2018-06-07 DIAGNOSIS — I77.1 TORTUOUS AORTA: ICD-10-CM

## 2018-06-07 DIAGNOSIS — E78.2 MIXED HYPERLIPIDEMIA: ICD-10-CM

## 2018-06-07 DIAGNOSIS — I70.0 AORTIC ATHEROSCLEROSIS: ICD-10-CM

## 2018-06-07 DIAGNOSIS — I10 ESSENTIAL HYPERTENSION: ICD-10-CM

## 2018-06-07 DIAGNOSIS — I51.89 LEFT VENTRICULAR DIASTOLIC DYSFUNCTION WITH PRESERVED SYSTOLIC FUNCTION: ICD-10-CM

## 2018-06-07 DIAGNOSIS — Z00.00 ENCOUNTER FOR PREVENTIVE HEALTH EXAMINATION: Primary | ICD-10-CM

## 2018-06-07 DIAGNOSIS — Z85.828 H/O NONMELANOMA SKIN CANCER: ICD-10-CM

## 2018-06-07 DIAGNOSIS — Z86.018 HISTORY OF BENIGN BLADDER TUMOR: ICD-10-CM

## 2018-06-07 DIAGNOSIS — I49.3 PVC'S (PREMATURE VENTRICULAR CONTRACTIONS): ICD-10-CM

## 2018-06-07 DIAGNOSIS — E78.00 HYPERCHOLESTEROLEMIA: ICD-10-CM

## 2018-06-07 DIAGNOSIS — I35.0 NONRHEUMATIC AORTIC VALVE STENOSIS: ICD-10-CM

## 2018-06-07 DIAGNOSIS — F10.20 ALCOHOL DEPENDENCE, DAILY USE: ICD-10-CM

## 2018-06-07 PROCEDURE — 99999 PR PBB SHADOW E&M-EST. PATIENT-LVL IV: CPT | Mod: PBBFAC,,, | Performed by: NURSE PRACTITIONER

## 2018-06-07 PROCEDURE — G0439 PPPS, SUBSEQ VISIT: HCPCS | Mod: S$GLB,,, | Performed by: NURSE PRACTITIONER

## 2018-06-07 PROCEDURE — 99499 UNLISTED E&M SERVICE: CPT | Mod: S$PBB,,, | Performed by: NURSE PRACTITIONER

## 2018-06-07 NOTE — PATIENT INSTRUCTIONS
Counseling and Referral of Other Preventative  (Italic type indicates deductible and co-insurance are waived)    Patient Name: Norm Mendoza  Today's Date: 6/7/2018    Health Maintenance       Date Due Completion Date    Influenza Vaccine 08/01/2018 12/7/2017    Override on 2/4/2016: Done    Lipid Panel 03/29/2023 3/29/2018    TETANUS VACCINE 01/19/2027 1/19/2017        No orders of the defined types were placed in this encounter.    The following information is provided to all patients.  This information is to help you find resources for any of the problems found today that may be affecting your health:                Living healthy guide: www.[facility].[place].[facility]      Understanding Diabetes: www.diabetes.org      Eating healthy: www.cdc.gov/healthyweight      [facility] [place] home safety checklist: www.cdc.gov/steadi/patient.html      Agency on Aging: www.goea.[place].[facility]      Alcoholics anonymous (AA): www.aa.org      Physical Activity: www.elis.nih.gov/jq5gvio      Tobacco use: www.quitwithusla.org

## 2018-06-07 NOTE — PROGRESS NOTES
Norm Mendoza presented for a  Medicare AWV and comprehensive Health Risk Assessment today. The following components were reviewed and updated:    · Medical history  · Family History  · Social history  · Allergies and Current Medications  · Health Risk Assessment  · Health Maintenance  · Care Team     ** See Completed Assessments for Annual Wellness Visit within the encounter summary.**       The following assessments were completed:  · Living Situation  · CAGE  · Depression Screening  · Timed Get Up and Go  · Whisper Test  · Cognitive Function Screening  ·   ·   · Nutrition Screening  · ADL Screening  · PAQ Screening    Vitals:    06/07/18 0909   BP: 120/80   Pulse: (!) 54   SpO2: 97%   Weight: 79.7 kg (175 lb 11.3 oz)   Height: 6' (1.829 m)     Body mass index is 23.83 kg/m².  Physical Exam   Constitutional: He is oriented to person, place, and time. He appears well-developed and well-nourished.   HENT:   Head: Normocephalic and atraumatic.   Nose: Nose normal.   Eyes: Conjunctivae and EOM are normal.   Cardiovascular: Normal rate, regular rhythm, normal heart sounds and intact distal pulses.    Pulmonary/Chest: Effort normal and breath sounds normal.   Musculoskeletal: Normal range of motion.   Neurological: He is alert and oriented to person, place, and time.   Skin: Skin is warm and dry.   Psychiatric: He has a normal mood and affect. His behavior is normal. Judgment and thought content normal.   Nursing note and vitals reviewed.        Diagnoses and health risks identified today and associated recommendations/orders:    1. Encounter for preventive health examination  Assessment performed. Health maintenance updated. Chart review completed.    2. Aortic atherosclerosis  Noted on imaging. Stable with blood pressure control and statin therapy. Followed by PCP.    3. Tortuous aorta  Noted on imaging. Stable with blood pressure control and statin therapy. Followed by PCP.    4. Ectatic aorta  Noted on imaging.  Stable with blood pressure control and statin therapy. Followed by PCP.    5. Aortic root dilation  Chronic. Stable on current regimen. Followed by Cardiology.    6. Alcohol dependence, daily use  Stable. Followed by PCP.  Resources provided per handout.    7. Essential hypertension  Chronic. Stable on current regimen. Followed by PCP.    8. Hypercholesterolemia  Chronic. Stable on current regimen. Followed by PCP.    9. Left ventricular diastolic dysfunction with preserved systolic function  Chronic. Stable on current regimen. Followed by Cardiology.    10. Nonrheumatic aortic valve stenosis  Chronic. Stable on current regimen. Followed by Cardiology.    11. Mixed hyperlipidemia  Chronic. Stable on current regimen. Followed by PCP.    12. PVC's (premature ventricular contractions)  Chronic. Stable on current regimen. Followed by Cardiology.    13. H/O nonmelanoma skin cancer  Stable. Followed by Dermatology.    14. History of benign bladder tumor  Chronic.Stable. Followed by Urology.    15. AK (actinic keratosis)  Stable. Followed by Dermatology.      Provided Norm with a 5-10 year written screening schedule and personal prevention plan. Recommendations were developed using the USPSTF age appropriate recommendations. Education, counseling, and referrals were provided as needed. After Visit Summary printed and given to patient which includes a list of additional screenings\tests needed.    Follow-up for follow up with Primary Care Provider as instructed, ;sooner if problems, HRA in 1 year.    PAULIE Avendaño

## 2018-06-15 ENCOUNTER — OFFICE VISIT (OUTPATIENT)
Dept: DERMATOLOGY | Facility: CLINIC | Age: 83
End: 2018-06-15
Payer: MEDICARE

## 2018-06-15 DIAGNOSIS — L82.1 SK (SEBORRHEIC KERATOSIS): ICD-10-CM

## 2018-06-15 DIAGNOSIS — L57.0 AK (ACTINIC KERATOSIS): Primary | ICD-10-CM

## 2018-06-15 DIAGNOSIS — L82.0 INFLAMED SEBORRHEIC KERATOSIS: ICD-10-CM

## 2018-06-15 PROCEDURE — 17110 DESTRUCTION B9 LES UP TO 14: CPT | Mod: S$GLB,,, | Performed by: DERMATOLOGY

## 2018-06-15 PROCEDURE — 99999 PR PBB SHADOW E&M-EST. PATIENT-LVL II: CPT | Mod: PBBFAC,,, | Performed by: DERMATOLOGY

## 2018-06-15 PROCEDURE — 99212 OFFICE O/P EST SF 10 MIN: CPT | Mod: 25,S$GLB,, | Performed by: DERMATOLOGY

## 2018-06-15 NOTE — PATIENT INSTRUCTIONS

## 2018-06-15 NOTE — PROGRESS NOTES
Subjective:       Patient ID:  Norm Mendoza is a 89 y.o. male who presents for   Chief Complaint   Patient presents with    Lesion    Skin Check     post PDT  face      Patient complains of lesion(s)  Location: right lower lip  Duration: 1 week  Symptoms: scabs and bled. H/o fever blisters on lower lip in past  Relieving factors/Previous treatments: no symptoms and no previous treatment          Actinic Keratosis  - Follow-up  Symptom course: improving  Currently using: PDT face 105 min incubation.  Affected locations: face  Signs / symptoms: asymptomatic        Review of Systems   Skin: Positive for daily sunscreen use and activity-related sunscreen use. Negative for dry skin, tendency to form keloidal scars and recent sunburn.   Hematologic/Lymphatic: Bruises/bleeds easily (on asa).        Objective:    Physical Exam   Constitutional: He appears well-developed and well-nourished. No distress.   Neurological: He is alert and oriented to person, place, and time. He is not disoriented.   Psychiatric: He has a normal mood and affect.   Skin:   Areas Examined (abnormalities noted in diagram):   Scalp / Hair Palpated and Inspected  Head / Face Inspection Performed  Neck Inspection Performed              Diagram Legend     Erythematous scaling macule/papule c/w actinic keratosis       Vascular papule c/w angioma      Pigmented verrucoid papule/plaque c/w seborrheic keratosis      Yellow umbilicated papule c/w sebaceous hyperplasia      Irregularly shaped tan macule c/w lentigo     1-2 mm smooth white papules consistent with Milia      Movable subcutaneous cyst with punctum c/w epidermal inclusion cyst      Subcutaneous movable cyst c/w pilar cyst      Firm pink to brown papule c/w dermatofibroma      Pedunculated fleshy papule(s) c/w skin tag(s)      Evenly pigmented macule c/w junctional nevus     Mildly variegated pigmented, slightly irregular-bordered macule c/w mildly atypical nevus      Flesh colored to  evenly pigmented papule c/w intradermal nevus       Pink pearly papule/plaque c/w basal cell carcinoma      Erythematous hyperkeratotic cursted plaque c/w SCC      Surgical scar with no sign of skin cancer recurrence      Open and closed comedones      Inflammatory papules and pustules      Verrucoid papule consistent consistent with wart     Erythematous eczematous patches and plaques     Dystrophic onycholytic nail with subungual debris c/w onychomycosis     Umbilicated papule    Erythematous-base heme-crusted tan verrucoid plaque consistent with inflamed seborrheic keratosis     Erythematous Silvery Scaling Plaque c/w Psoriasis     See annotation      Assessment / Plan:        Inflamed seborrheic keratosis - left neck  Cuts when shaves  Procedure note for destruction via hyfrecation and curettage:    Verbal consent obtained. Risks of recurrence and scarring discussed.   1 lesions cleaned with alcohol and anesthetized with 1% lidocaine with epinephrine. Areas then lightly hyfrecated and curetted to remove gross lesion. Hemostasis achieved with aluminum chloride application. No complications.   Areas dressed with Vaseline jelly and bandage. Wound care discussed.      SK (seborrheic keratosis)  These are benign inherited growths without a malignant potential. Reassurance given to patient. No treatment is necessary.     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   Pt to f/u if not resolved in 2 months    AK (actinic keratosis)  Today's Plan:      Looks great s/p PDT face  Cont daily face sun protection    F/u 6 months      Follow-up in about 6 months (around 12/15/2018).

## 2018-07-11 ENCOUNTER — TELEPHONE (OUTPATIENT)
Dept: INTERNAL MEDICINE | Facility: CLINIC | Age: 83
End: 2018-07-11

## 2018-07-11 NOTE — TELEPHONE ENCOUNTER
Brionna, Mr Kelly had his 1st Shingrix vaccine 5/10(in ID) and he is being billed $483.00.  He should only be billed a $46.00 dollar co-pay. Could you call billing dept and find out why he is being billed this and ask them to clear this up. Thanks

## 2018-07-12 ENCOUNTER — HOSPITAL ENCOUNTER (OUTPATIENT)
Dept: RADIOLOGY | Facility: HOSPITAL | Age: 83
Discharge: HOME | End: 2018-07-12
Attending: NURSE PRACTITIONER
Payer: MEDICARE

## 2018-07-12 ENCOUNTER — CLINICAL SUPPORT (OUTPATIENT)
Dept: INFECTIOUS DISEASES | Facility: CLINIC | Age: 83
End: 2018-07-12
Payer: MEDICARE

## 2018-07-12 ENCOUNTER — OFFICE VISIT (OUTPATIENT)
Dept: INTERNAL MEDICINE | Facility: CLINIC | Age: 83
End: 2018-07-12
Payer: MEDICARE

## 2018-07-12 ENCOUNTER — TELEPHONE (OUTPATIENT)
Dept: INTERNAL MEDICINE | Facility: CLINIC | Age: 83
End: 2018-07-12

## 2018-07-12 VITALS
DIASTOLIC BLOOD PRESSURE: 64 MMHG | HEART RATE: 62 BPM | SYSTOLIC BLOOD PRESSURE: 122 MMHG | HEIGHT: 72 IN | OXYGEN SATURATION: 98 % | BODY MASS INDEX: 23.47 KG/M2 | WEIGHT: 173.31 LBS
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
## 2018-07-12 DIAGNOSIS — M54.2 NECK PAIN: ICD-10-CM

## 2018-07-12 DIAGNOSIS — I10 ESSENTIAL HYPERTENSION: ICD-10-CM

## 2018-07-12 DIAGNOSIS — I35.0 NONRHEUMATIC AORTIC VALVE STENOSIS: ICD-10-CM
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
## 2018-07-12 PROCEDURE — 90471 IMMUNIZATION ADMIN: CPT | Mod: S$GLB,,, | Performed by: INTERNAL MEDICINE

## 2018-07-12 PROCEDURE — 72040 X-RAY EXAM NECK SPINE 2-3 VW: CPT | Mod: 26,,, | Performed by: RADIOLOGY

## 2018-07-12 PROCEDURE — 99999 PR PBB SHADOW E&M-EST. PATIENT-LVL III: CPT | Mod: PBBFAC,,, | Performed by: NURSE PRACTITIONER

## 2018-07-12 PROCEDURE — 99213 OFFICE O/P EST LOW 20 MIN: CPT | Mod: S$GLB,,, | Performed by: NURSE PRACTITIONER

## 2018-07-12 PROCEDURE — 72040 X-RAY EXAM NECK SPINE 2-3 VW: CPT | Mod: TC

## 2018-07-12 PROCEDURE — 90750 HZV VACC RECOMBINANT IM: CPT | Mod: S$GLB,,, | Performed by: INTERNAL MEDICINE

## 2018-07-12 NOTE — TELEPHONE ENCOUNTER
----- Message from Cami Dong NP sent at 7/12/2018 11:33 AM CDT -----  Neck xray without fractures. Mild degeneration noted but not acute

## 2018-07-12 NOTE — PROGRESS NOTES
Pt received the final dose of his Shingrix vaccination. Pt tolerated the injection well. Pt left the unit in NAD.

## 2018-07-12 NOTE — PROGRESS NOTES
INTERNAL MEDICINE URGENT CARE NOTE    CHIEF COMPLAINT     Chief Complaint   Patient presents with    Fall     friday night, fell on side onto concreate     Back Pain     went dancing sunday, pain began sunday night/ pain worst lying down    Neck Pain     took tyelnol monday-wednesday AM, none taken last night       HPI     Norm Mendoza is a 89 y.o. male with htn, hld, aortic valve stenosis, and h/o bladder tumor and skin cancers who presents for an urgent visit today.    Here with c/o fall on Friday night tripped on uneven concrete and fell onto his left side (hit left side of face and left arm and left hip). No loc, denies disorientation.  Pain to the cervical spine/neck started 1-2 days later after dancing. Worse when laying down.   Pain located to the cervical spine to thoracic spine and radiating to the right shoulder.   Treated with tylenol     HTN- on valsartan 80mg. No lightheadedness, dizziness, chest pain.     Past Medical History:  Past Medical History:   Diagnosis Date    Alcohol dependence, daily use 7/13/2017    Allergy     Bladder cancer     tumor that was benign     Hematuria     Hypertension     Mixed hyperlipidemia 5/2/2018    Skin cancer     x3, had mohs on nose    Squamous cell carcinoma excised 12/5/14    R lower back    Urinary tract infection     x4       Home Medications:  Prior to Admission medications    Medication Sig Start Date End Date Taking? Authorizing Provider   ACIDOPHILUS/PECTIN, CITRUS (ACIDOPHILUS PROBIOTIC ORAL) Take 1 capsule by mouth once daily.   Yes Historical Provider, MD   aspirin 81 MG Chew Take 81 mg by mouth once daily.   Yes Historical Provider, MD   pravastatin (PRAVACHOL) 40 MG tablet Take 1 tablet (40 mg total) by mouth once daily. Take at nite 5/3/18  Yes Grant Guerra MD   psyllium (METAMUCIL) packet Take 1 packet by mouth once daily.   Yes Historical Provider, MD   valsartan (DIOVAN) 80 MG tablet TAKE 1 TABLET BY MOUTH twice daily-new order  5/3/18  Yes Grant Guerra MD       Review of Systems:  Review of Systems   Constitutional: Negative for chills and fever.   HENT: Negative for congestion, rhinorrhea, sinus pressure and sore throat.    Eyes: Negative for visual disturbance.   Respiratory: Negative for cough and shortness of breath.    Cardiovascular: Negative for chest pain, palpitations and leg swelling.   Gastrointestinal: Negative for abdominal pain, constipation, diarrhea, nausea and vomiting.   Genitourinary: Negative for dysuria, frequency and urgency.   Musculoskeletal: Positive for neck pain and neck stiffness. Negative for arthralgias and myalgias.   Skin: Positive for wound.   Neurological: Negative for dizziness, light-headedness and headaches.       Health Maintainence:   Immunizations:  Health Maintenance       Date Due Completion Date    Influenza Vaccine 08/01/2018 12/7/2017    Override on 2/4/2016: Done    Lipid Panel 03/29/2023 3/29/2018    TETANUS VACCINE 01/19/2027 1/19/2017           PHYSICAL EXAM     /64 (BP Location: Right arm, Patient Position: Sitting, BP Method: Large (Manual))   Pulse 62   Ht 6' (1.829 m)   Wt 78.6 kg (173 lb 4.5 oz)   SpO2 98%   BMI 23.50 kg/m²     Physical Exam   Constitutional: He is oriented to person, place, and time. He appears well-developed and well-nourished.   HENT:   Head: Normocephalic.   Right Ear: External ear normal.   Left Ear: External ear normal.   Nose: Nose normal.   Mouth/Throat: Oropharynx is clear and moist. No oropharyngeal exudate.   Eyes: Pupils are equal, round, and reactive to light.   Neck: No JVD present. No tracheal deviation present. No thyromegaly present.   Cardiovascular: Normal rate, regular rhythm and intact distal pulses.  Exam reveals no gallop and no friction rub.    Murmur heard.  Pulmonary/Chest: Breath sounds normal. No respiratory distress. He has no wheezes. He has no rales. He exhibits no tenderness.   Abdominal: Soft. Bowel sounds are normal. He  exhibits no distension. There is no tenderness.   Musculoskeletal: Normal range of motion. He exhibits no edema.        Cervical back: He exhibits tenderness and pain. He exhibits normal range of motion, no bony tenderness, no swelling, no edema, no deformity, no laceration, no spasm and normal pulse.        Back:    Lymphadenopathy:     He has no cervical adenopathy.   Neurological: He is alert and oriented to person, place, and time.   Skin: Skin is warm and dry. No rash noted.   Psychiatric: He has a normal mood and affect. His behavior is normal.   Vitals reviewed.      LABS     No results found for: LABA1C, HGBA1C  CMP  Sodium   Date Value Ref Range Status   03/29/2018 143 136 - 145 mmol/L Final     Potassium   Date Value Ref Range Status   03/29/2018 4.4 3.5 - 5.1 mmol/L Final     Chloride   Date Value Ref Range Status   03/29/2018 110 95 - 110 mmol/L Final     CO2   Date Value Ref Range Status   03/29/2018 27 23 - 29 mmol/L Final     Glucose   Date Value Ref Range Status   03/29/2018 99 70 - 110 mg/dL Final     BUN, Bld   Date Value Ref Range Status   03/29/2018 24 10 - 30 mg/dL Final     Creatinine   Date Value Ref Range Status   03/29/2018 1.1 0.5 - 1.4 mg/dL Final     Calcium   Date Value Ref Range Status   03/29/2018 9.3 8.7 - 10.5 mg/dL Final     Total Protein   Date Value Ref Range Status   03/29/2018 6.5 6.0 - 8.4 g/dL Final     Albumin   Date Value Ref Range Status   03/29/2018 3.4 (L) 3.5 - 5.2 g/dL Final     Total Bilirubin   Date Value Ref Range Status   03/29/2018 0.4 0.1 - 1.0 mg/dL Final     Comment:     For infants and newborns, interpretation of results should be based  on gestational age, weight and in agreement with clinical  observations.  Premature Infant recommended reference ranges:  Up to 24 hours.............<8.0 mg/dL  Up to 48 hours............<12.0 mg/dL  3-5 days..................<15.0 mg/dL  6-29 days.................<15.0 mg/dL       Alkaline Phosphatase   Date Value Ref Range  Status   03/29/2018 48 (L) 55 - 135 U/L Final     AST   Date Value Ref Range Status   03/29/2018 18 10 - 40 U/L Final     ALT   Date Value Ref Range Status   03/29/2018 13 10 - 44 U/L Final     Anion Gap   Date Value Ref Range Status   03/29/2018 6 (L) 8 - 16 mmol/L Final     eGFR if    Date Value Ref Range Status   03/29/2018 >60.0 >60 mL/min/1.73 m^2 Final     eGFR if non    Date Value Ref Range Status   03/29/2018 58.4 (A) >60 mL/min/1.73 m^2 Final     Comment:     Calculation used to obtain the estimated glomerular filtration  rate (eGFR) is the CKD-EPI equation.        Lab Results   Component Value Date    WBC 4.54 03/29/2018    HGB 13.4 (L) 03/29/2018    HCT 41.0 03/29/2018    MCV 98 03/29/2018     03/29/2018     Lab Results   Component Value Date    CHOL 227 (H) 03/29/2018    CHOL 231 (H) 01/19/2017    CHOL 220 (H) 03/24/2016     Lab Results   Component Value Date    HDL 72 03/29/2018    HDL 73 01/19/2017    HDL 71 03/24/2016     Lab Results   Component Value Date    LDLCALC 142.4 03/29/2018    LDLCALC 142.4 01/19/2017    LDLCALC 138.6 03/24/2016     Lab Results   Component Value Date    TRIG 63 03/29/2018    TRIG 78 01/19/2017    TRIG 52 03/24/2016     Lab Results   Component Value Date    CHOLHDL 31.7 03/29/2018    CHOLHDL 31.6 01/19/2017    CHOLHDL 32.3 03/24/2016     Lab Results   Component Value Date    TSH 1.261 03/29/2018       ASSESSMENT/PLAN     Norm Mendoza is a 89 y.o. male with  Past Medical History:   Diagnosis Date    Alcohol dependence, daily use 7/13/2017    Allergy     Bladder cancer     tumor that was benign     Hematuria     Hypertension     Mixed hyperlipidemia 5/2/2018    Skin cancer     x3, had mohs on nose    Squamous cell carcinoma excised 12/5/14    R lower back    Urinary tract infection     x4     Fall, initial encounter- no LOC. Advised to clean wounds bid with soap and water. Apply polysporin and cover.   -     X-Ray Cervical  Spine AP And Lateral; Future; Expected date: 07/12/2018    Neck pain- will send for imaging of the neck. May use tylenol as needed. Resolving.   -     X-Ray Cervical Spine AP And Lateral; Future; Expected date: 07/12/2018    Essential hypertension- stable/at goal. Will cont current meds.     Nonrheumatic aortic valve stenosis- stable. Followed by cards.     Follow up as needed and with PCP     Patient education provided from [facility]. Patient was counseled on when and how to seek emergent care.       Cami ROWLAND, MYCHAL, FNP-c   Department of Internal Medicine - [facility]  9:36 AM

## 2018-07-27 ENCOUNTER — TELEPHONE (OUTPATIENT)
Dept: INTERNAL MEDICINE | Facility: CLINIC | Age: 83
End: 2018-07-27

## 2018-07-27 DIAGNOSIS — I10 ESSENTIAL HYPERTENSION: Primary | ICD-10-CM

## 2018-07-27 RX ORDER — IRBESARTAN 150 MG/1
TABLET ORAL
Qty: 30 TABLET | Refills: 4 | Status: SHIPPED | OUTPATIENT
Start: 2018-07-27 | End: 2018-08-10 | Stop reason: SDUPTHER

## 2018-07-27 NOTE — TELEPHONE ENCOUNTER
Janet SADLER GregoryJamie MCLAUGHLIN Staff   Caller: Pita [phone] (Today, 10:55 AM)             The pharmacist call for an alternative medicine for the valsartan (DIOVAN) 80 MG tablet. She said it's looks like you called in the brand name to replace it but, his insurance doesn't cover the brand name.     Pharmacy: [facility] [address] [place] [address] [phone] (Phone) [phone] (Fax)

## 2018-07-27 NOTE — TELEPHONE ENCOUNTER
Juanita, please call Mr Mendoza and let him know I changed his Diovan(2 of 80mg tabs)to Avapro 150mg at 1 tab daily and sent it into his [facility]. Thanks

## 2018-08-03 ENCOUNTER — LAB VISIT (OUTPATIENT)
Dept: LAB | Facility: HOSPITAL | Age: 83
End: 2018-08-03
Attending: INTERNAL MEDICINE
Payer: MEDICARE

## 2018-08-03 DIAGNOSIS — I10 ESSENTIAL HYPERTENSION: ICD-10-CM

## 2018-08-03 DIAGNOSIS — I77.810 AORTIC ROOT DILATION: ICD-10-CM

## 2018-08-03 DIAGNOSIS — E78.5 HYPERLIPIDEMIA, UNSPECIFIED HYPERLIPIDEMIA TYPE: ICD-10-CM

## 2018-08-03 DIAGNOSIS — I35.0 NONRHEUMATIC AORTIC VALVE STENOSIS: ICD-10-CM

## 2018-08-03 DIAGNOSIS — I70.0 AORTIC ATHEROSCLEROSIS: ICD-10-CM

## 2018-08-03 DIAGNOSIS — E78.00 HYPERCHOLESTEROLEMIA: ICD-10-CM

## 2018-08-03 LAB
ALBUMIN SERPL BCP-MCNC: 3.4 G/DL
ALP SERPL-CCNC: 56 U/L
ALT SERPL W/O P-5'-P-CCNC: 15 U/L
ANION GAP SERPL CALC-SCNC: 6 MMOL/L
ANION GAP SERPL CALC-SCNC: 6 MMOL/L
AST SERPL-CCNC: 17 U/L
BILIRUB SERPL-MCNC: 0.5 MG/DL
BUN SERPL-MCNC: 19 MG/DL
BUN SERPL-MCNC: 19 MG/DL
CALCIUM SERPL-MCNC: 9.5 MG/DL
CALCIUM SERPL-MCNC: 9.5 MG/DL
CHLORIDE SERPL-SCNC: 106 MMOL/L
CHLORIDE SERPL-SCNC: 106 MMOL/L
CHOLEST SERPL-MCNC: 224 MG/DL
CHOLEST SERPL-MCNC: 224 MG/DL
CHOLEST/HDLC SERPL: 2.9 {RATIO}
CHOLEST/HDLC SERPL: 2.9 {RATIO}
CO2 SERPL-SCNC: 27 MMOL/L
CO2 SERPL-SCNC: 27 MMOL/L
CREAT SERPL-MCNC: 1 MG/DL
CREAT SERPL-MCNC: 1 MG/DL
EST. GFR  (AFRICAN AMERICAN): >60 ML/MIN/1.73 M^2
EST. GFR  (AFRICAN AMERICAN): >60 ML/MIN/1.73 M^2
EST. GFR  (NON AFRICAN AMERICAN): >60 ML/MIN/1.73 M^2
EST. GFR  (NON AFRICAN AMERICAN): >60 ML/MIN/1.73 M^2
GLUCOSE SERPL-MCNC: 100 MG/DL
GLUCOSE SERPL-MCNC: 100 MG/DL
HDLC SERPL-MCNC: 76 MG/DL
HDLC SERPL-MCNC: 76 MG/DL
HDLC SERPL: 33.9 %
HDLC SERPL: 33.9 %
LDLC SERPL CALC-MCNC: 136.6 MG/DL
LDLC SERPL CALC-MCNC: 136.6 MG/DL
NONHDLC SERPL-MCNC: 148 MG/DL
NONHDLC SERPL-MCNC: 148 MG/DL
POTASSIUM SERPL-SCNC: 4.4 MMOL/L
POTASSIUM SERPL-SCNC: 4.4 MMOL/L
PROT SERPL-MCNC: 6.9 G/DL
SODIUM SERPL-SCNC: 139 MMOL/L
SODIUM SERPL-SCNC: 139 MMOL/L
TRIGL SERPL-MCNC: 57 MG/DL
TRIGL SERPL-MCNC: 57 MG/DL

## 2018-08-03 PROCEDURE — 36415 COLL VENOUS BLD VENIPUNCTURE: CPT | Mod: PO

## 2018-08-03 PROCEDURE — 80061 LIPID PANEL: CPT

## 2018-08-03 PROCEDURE — 80053 COMPREHEN METABOLIC PANEL: CPT

## 2018-08-05 ENCOUNTER — TELEPHONE (OUTPATIENT)
Dept: CARDIOLOGY | Facility: CLINIC | Age: 83
End: 2018-08-05

## 2018-08-05 NOTE — TELEPHONE ENCOUNTER
Results of recent labs were given to patient and he verbalized understanding. He said he will discuss this with his PCP,  Dr. Jenkins, at his visit with nher next week. He does not want us to call in Atorvastatin yet as he still has a bottle of the Pravastatin. He will call when he's ready for us to call in the Atorvastatin.    Notes recorded by Grant Guerra MD on 8/3/2018 at 5:18 PM CDT  Lipids high so we should double dose of Prava till pills run out and then switch to atorva 20 mg -get comp and lipids in 4 months

## 2018-08-06 ENCOUNTER — TELEPHONE (OUTPATIENT)
Dept: CARDIOLOGY | Facility: CLINIC | Age: 83
End: 2018-08-06

## 2018-08-06 NOTE — TELEPHONE ENCOUNTER
Called patient gave the results patient verbalized understanding.  Wants to talk with Dr Jenkins before he does anything.

## 2018-08-06 NOTE — TELEPHONE ENCOUNTER
----- Message from Grant Guerra MD sent at 8/3/2018  5:18 PM CDT -----  Lipids high so we should double dose of Prava till pills run out and then switch to atorva 20 mg -get comp and lipids in 4 months

## 2018-08-10 ENCOUNTER — OFFICE VISIT (OUTPATIENT)
Dept: INTERNAL MEDICINE | Facility: CLINIC | Age: 83
End: 2018-08-10
Payer: MEDICARE

## 2018-08-10 VITALS
HEIGHT: 72 IN | TEMPERATURE: 98 F | HEART RATE: 55 BPM | WEIGHT: 171.75 LBS | BODY MASS INDEX: 23.26 KG/M2 | DIASTOLIC BLOOD PRESSURE: 70 MMHG | SYSTOLIC BLOOD PRESSURE: 140 MMHG | OXYGEN SATURATION: 99 %

## 2018-08-10 DIAGNOSIS — I10 ESSENTIAL HYPERTENSION: ICD-10-CM

## 2018-08-10 DIAGNOSIS — E78.5 HYPERLIPIDEMIA, UNSPECIFIED HYPERLIPIDEMIA TYPE: ICD-10-CM

## 2018-08-10 DIAGNOSIS — J40 BRONCHITIS: Primary | ICD-10-CM

## 2018-08-10 PROCEDURE — 99214 OFFICE O/P EST MOD 30 MIN: CPT | Mod: S$GLB,,, | Performed by: INTERNAL MEDICINE

## 2018-08-10 PROCEDURE — 99999 PR PBB SHADOW E&M-EST. PATIENT-LVL IV: CPT | Mod: PBBFAC,,, | Performed by: INTERNAL MEDICINE

## 2018-08-10 RX ORDER — GUAIFENESIN 1200 MG/1
TABLET, EXTENDED RELEASE ORAL
Qty: 20 TABLET | Refills: 0 | Status: SHIPPED | OUTPATIENT
Start: 2018-08-10 | End: 2019-04-18

## 2018-08-10 RX ORDER — AMOXICILLIN AND CLAVULANATE POTASSIUM 875; 125 MG/1; MG/1
1 TABLET, FILM COATED ORAL 2 TIMES DAILY
Qty: 20 TABLET | Refills: 0 | Status: SHIPPED | OUTPATIENT
Start: 2018-08-10 | End: 2018-12-05

## 2018-08-10 RX ORDER — IRBESARTAN 150 MG/1
TABLET ORAL
Qty: 135 TABLET | Refills: 0 | Status: SHIPPED | OUTPATIENT
Start: 2018-08-10 | End: 2018-10-10 | Stop reason: SDUPTHER

## 2018-08-10 NOTE — PROGRESS NOTES
Mr. Mendoza is a 89-year-old gentleman, known to myself, who presents for   scheduled followup appointment.    CHIEF COMPLAINT:  Followup hypertension and complaints of cough.    HISTORY OF PRESENT ILLNESS:  Mr. Mendoza presents status post having had his   Diovan changed to Avapro.  He is presently taking Avapro at 150 mg one tablet   daily.  He is disconcerted.  His is a little high on initial check.  It is   150/80.  He has questions about whether the Avapro is the same strength as the   prior Diovan he was taking Diovan 80 mg one tablet every other day alternating   with two tabs every other day.  Questions if the Avapro dose should be adjusted,   not having any problems with it.  His valsartan medication was pulled due   contaminants, has questions about that as well.  I have tried to reassure him   that he should be fine now that he is off the prior valsartan.  He does have an   upper respiratory tract infection with cough for the last two weeks.  He is   producing some yellow mucus.  Notes he was much sicker two weeks ago, but is a   little bit better now.  He notes that he saw Dr. Guerra who recommended therapy   for cholesterol that is Pravachol, but he took it for a few days, did not feel   well on it, stopped it.  He would like to avoid any medications, therapy and   would like to just work on diet for control of his cholesterol.  He has had no   chest pain, no shortness of breath.    PAST MEDICAL, SURGICAL, SOCIAL HISTORY:  Please see as thoroughly stated in EPIC   chart, which has been reviewed.    PHYSICAL EXAMINATION:  VITAL SIGNS:  Weight 171 pounds, blood pressure 150/70, pulse 55, temperature   97.5, recheck blood pressure per MD is 140/70, pulse ox 99%.  GENERAL:  The patient looks uncomfortable, but in no acute distress.  HEENT:  Sinuses nontender.  Retropharyngeal shows mild erythema.  No exudates.    Bilateral ear canals have some cerumen buildup.  TMs partially visualized and   normal.  NECK:   Supple.  Negative for masses or lymphadenopathy.  Negative for   thyromegaly.  LUNGS:  Show coarse breath sounds with congestion and wheezing on the right   side, left side much clearer, no crackles.  HEART:  Regular rate and rhythm with systolic ejection murmur, II/VI auscultated   at mid precordial location.  ABDOMEN:  Soft, nontender, no masses or organomegaly.  EXTREMITIES:  Negative edema.    WORKUP:  Review of lab work done on August 3rd showing comprehensive chemistry   to be unremarkable.  Cholesterol total 224 with , HDL 76.    ASSESSMENT AND PLAN:  1.  Essential hypertension with blood pressure slightly elevated.  A. We will increase Avapro 150 mg to one tablet every other day alternating with   two tabs every other day.  B. Return to clinic with prior chemistry in four months for followup or see the   patient sooner if needed.  2.  Mild hyperlipidemia in a patient with a history of tortuous aorta, mild   aortic valve stenosis and left ventricular diastolic dysfunction with no   evidence of coronary artery disease in a patient deferring on pharmacologic   therapy.  A. I have discussed low-fat diet at length and regular exercise.  B. We will repeat lipid panel in four months for followup.  C. I have recommended the patient continue with his baby aspirin one a day.  3.  Acute bronchitis.  A. Augmentin 875 mg one p.o. b.i.d. x10 days.  B. I have recommended Mucinex 1200 mg one q. 12 hours as needed or Robitussin,   which the patient notes he has been using over-the-counter.  C. If no improvement, the patient to call and we will order chest x-ray.  4.  Health maintenance.  A. Return to clinic in four months with one week prior lab to include   chemistries, lipid panel, address health maintenance issues then or see the   patient sooner if needed.      FMS/HN  dd: 08/10/2018 10:19:37 (CDT)  td: 08/10/2018 13:04:59 (CDT)  Doc ID   #[id number]  Job ID #[id number]    CC:     This office note has been dictated.

## 2018-08-10 NOTE — PATIENT INSTRUCTIONS
Medication Notes:  #1-Increase Avapro 150mg to 1 tab every other day alternating with 2 tabs every other day for Blood Pressure  #2-Augmentin antibiotics at 1 tab 2x/day x 10 days

## 2018-10-10 DIAGNOSIS — I10 ESSENTIAL HYPERTENSION: ICD-10-CM

## 2018-10-10 RX ORDER — IRBESARTAN 150 MG/1
TABLET ORAL
Qty: 135 TABLET | Refills: 1 | Status: SHIPPED | OUTPATIENT
Start: 2018-10-10 | End: 2018-10-11 | Stop reason: SDUPTHER

## 2018-10-11 RX ORDER — IRBESARTAN 150 MG/1
TABLET ORAL
Qty: 135 TABLET | Refills: 1 | Status: SHIPPED | OUTPATIENT
Start: 2018-10-11 | End: 2018-11-30

## 2018-10-11 NOTE — TELEPHONE ENCOUNTER
----- Message from Shaji Patel sent at 10/11/2018 12:00 PM CDT -----  Contact: self/[phone]  Pt is calling to speak with someone in the office in regards to his medication irbesartan (AVAPRO) 150 MG tablet. Pt states that they will not refill this medication. He states that the pharmacy is telling him they have not recieved the script. Please advise.        Thanks

## 2018-11-29 ENCOUNTER — TELEPHONE (OUTPATIENT)
Dept: INTERNAL MEDICINE | Facility: CLINIC | Age: 83
End: 2018-11-29

## 2018-11-29 NOTE — TELEPHONE ENCOUNTER
Left a message for the patient to call the office back.   To schedule appointment      Please Advise  Thank You

## 2018-11-29 NOTE — TELEPHONE ENCOUNTER
----- Message from Brittney Nascimento sent at 11/29/2018  8:25 AM CST -----  Contact: self/[phone]  Patient called in regards needing to Dr Jenkins office about the appointment that he is suppose/was schedule for the end of the year (unable to find those appointment lab and f/u). Please call and advise. Thank you

## 2018-11-30 ENCOUNTER — TELEPHONE (OUTPATIENT)
Dept: INTERNAL MEDICINE | Facility: CLINIC | Age: 83
End: 2018-11-30

## 2018-11-30 ENCOUNTER — LAB VISIT (OUTPATIENT)
Dept: LAB | Facility: HOSPITAL | Age: 83
End: 2018-11-30
Attending: INTERNAL MEDICINE
Payer: MEDICARE

## 2018-11-30 ENCOUNTER — OFFICE VISIT (OUTPATIENT)
Dept: INTERNAL MEDICINE | Facility: CLINIC | Age: 83
End: 2018-11-30
Payer: MEDICARE

## 2018-11-30 DIAGNOSIS — R53.83 OTHER FATIGUE: ICD-10-CM

## 2018-11-30 DIAGNOSIS — I10 ESSENTIAL HYPERTENSION: ICD-10-CM

## 2018-11-30 DIAGNOSIS — I48.91 ATRIAL FIBRILLATION, NEW ONSET: ICD-10-CM

## 2018-11-30 DIAGNOSIS — I49.9 CARDIAC ARRHYTHMIA, UNSPECIFIED CARDIAC ARRHYTHMIA TYPE: Primary | ICD-10-CM

## 2018-11-30 PROBLEM — I48.0 PAROXYSMAL ATRIAL FIBRILLATION: Status: ACTIVE | Noted: 2018-11-30

## 2018-11-30 LAB
ALBUMIN SERPL BCP-MCNC: 3.2 G/DL
ALP SERPL-CCNC: 52 U/L
ALT SERPL W/O P-5'-P-CCNC: 20 U/L
ANION GAP SERPL CALC-SCNC: 9 MMOL/L
AST SERPL-CCNC: 22 U/L
BASOPHILS # BLD AUTO: 0.06 K/UL
BASOPHILS NFR BLD: 0.8 %
BILIRUB SERPL-MCNC: 0.9 MG/DL
BUN SERPL-MCNC: 16 MG/DL
CALCIUM SERPL-MCNC: 9.3 MG/DL
CHLORIDE SERPL-SCNC: 107 MMOL/L
CO2 SERPL-SCNC: 24 MMOL/L
CREAT SERPL-MCNC: 1 MG/DL
DIFFERENTIAL METHOD: ABNORMAL
EOSINOPHIL # BLD AUTO: 0.1 K/UL
EOSINOPHIL NFR BLD: 0.6 %
ERYTHROCYTE [DISTWIDTH] IN BLOOD BY AUTOMATED COUNT: 14.6 %
EST. GFR  (AFRICAN AMERICAN): >60 ML/MIN/1.73 M^2
EST. GFR  (NON AFRICAN AMERICAN): >60 ML/MIN/1.73 M^2
GLUCOSE SERPL-MCNC: 96 MG/DL
HCT VFR BLD AUTO: 38.3 %
HGB BLD-MCNC: 12.6 G/DL
IMM GRANULOCYTES # BLD AUTO: 0.04 K/UL
IMM GRANULOCYTES NFR BLD AUTO: 0.5 %
LYMPHOCYTES # BLD AUTO: 1.1 K/UL
LYMPHOCYTES NFR BLD: 14.7 %
MCH RBC QN AUTO: 32.1 PG
MCHC RBC AUTO-ENTMCNC: 32.9 G/DL
MCV RBC AUTO: 98 FL
MONOCYTES # BLD AUTO: 1 K/UL
MONOCYTES NFR BLD: 13 %
NEUTROPHILS # BLD AUTO: 5.4 K/UL
NEUTROPHILS NFR BLD: 70.4 %
NRBC BLD-RTO: 0 /100 WBC
PLATELET # BLD AUTO: 235 K/UL
PMV BLD AUTO: 9.9 FL
POTASSIUM SERPL-SCNC: 4.4 MMOL/L
PROT SERPL-MCNC: 6.7 G/DL
RBC # BLD AUTO: 3.93 M/UL
SODIUM SERPL-SCNC: 140 MMOL/L
TROPONIN I SERPL DL<=0.01 NG/ML-MCNC: 0.01 NG/ML
TSH SERPL DL<=0.005 MIU/L-ACNC: 2.77 UIU/ML
WBC # BLD AUTO: 7.7 K/UL

## 2018-11-30 PROCEDURE — 1100F PTFALLS ASSESS-DOCD GE2>/YR: CPT | Mod: CPTII,HCNC,S$GLB, | Performed by: INTERNAL MEDICINE

## 2018-11-30 PROCEDURE — 99214 OFFICE O/P EST MOD 30 MIN: CPT | Mod: HCNC,S$GLB,, | Performed by: INTERNAL MEDICINE

## 2018-11-30 PROCEDURE — 3288F FALL RISK ASSESSMENT DOCD: CPT | Mod: CPTII,HCNC,S$GLB, | Performed by: INTERNAL MEDICINE

## 2018-11-30 PROCEDURE — 84443 ASSAY THYROID STIM HORMONE: CPT | Mod: HCNC

## 2018-11-30 PROCEDURE — 80053 COMPREHEN METABOLIC PANEL: CPT | Mod: HCNC

## 2018-11-30 PROCEDURE — 93010 ELECTROCARDIOGRAM REPORT: CPT | Mod: HCNC,S$GLB,, | Performed by: INTERNAL MEDICINE

## 2018-11-30 PROCEDURE — 85025 COMPLETE CBC W/AUTO DIFF WBC: CPT | Mod: HCNC

## 2018-11-30 PROCEDURE — 99999 PR PBB SHADOW E&M-EST. PATIENT-LVL IV: CPT | Mod: PBBFAC,HCNC,, | Performed by: INTERNAL MEDICINE

## 2018-11-30 PROCEDURE — 84484 ASSAY OF TROPONIN QUANT: CPT | Mod: HCNC

## 2018-11-30 PROCEDURE — 36415 COLL VENOUS BLD VENIPUNCTURE: CPT | Mod: HCNC,PO

## 2018-11-30 PROCEDURE — 93005 ELECTROCARDIOGRAM TRACING: CPT | Mod: HCNC,S$GLB,, | Performed by: INTERNAL MEDICINE

## 2018-11-30 RX ORDER — METOPROLOL SUCCINATE 25 MG/1
TABLET, EXTENDED RELEASE ORAL
Qty: 45 TABLET | Refills: 0 | Status: SHIPPED | OUTPATIENT
Start: 2018-11-30 | End: 2018-12-05

## 2018-11-30 RX ORDER — METOPROLOL SUCCINATE 25 MG/1
TABLET, EXTENDED RELEASE ORAL
Qty: 45 TABLET | Refills: 0 | Status: SHIPPED | OUTPATIENT
Start: 2018-11-30 | End: 2018-11-30 | Stop reason: SDUPTHER

## 2018-11-30 RX ORDER — IRBESARTAN 150 MG/1
TABLET ORAL
Qty: 90 TABLET | Refills: 0
Start: 2018-11-30 | End: 2018-12-05

## 2018-11-30 NOTE — TELEPHONE ENCOUNTER
Patient called and hung up before being transferred to schedule appointment, I called back, had to leave a message  for the patient to call the office back.       Please Advise  Thank You

## 2018-11-30 NOTE — TELEPHONE ENCOUNTER
Telephoned patient to inform of appointments made for f/u with Dr. Owens----- Message from Peggy Barrios sent at 11/30/2018  9:17 AM CST -----  Contact: Patient [phone]  Caller is requesting a sooner appointment. Caller declined first available appointment listed below. Caller will not accept being placed on the wait list and is requesting a message be sent to the provider.    When is the next available appointment: 12/1/18with Dr South  Did you offer to schedule the next available appt and put the patient on the wait list?: yes    What visit type: ep  Symptoms:    Patient preference of timeframe to be scheduled:    What is the reason the patient is requesting a sooner appointment? (insurance terminating, changing jobs):  Feels health is deteriorated.  Would you prefer an answer via Mindflas[employer]?:  no  Comments:  Patient would like an appointment with PCP

## 2018-12-01 ENCOUNTER — TELEPHONE (OUTPATIENT)
Dept: INTERNAL MEDICINE | Facility: CLINIC | Age: 83
End: 2018-12-01

## 2018-12-01 VITALS
HEIGHT: 72 IN | TEMPERATURE: 98 F | BODY MASS INDEX: 23.86 KG/M2 | HEART RATE: 67 BPM | WEIGHT: 176.13 LBS | SYSTOLIC BLOOD PRESSURE: 120 MMHG | DIASTOLIC BLOOD PRESSURE: 80 MMHG | OXYGEN SATURATION: 98 %

## 2018-12-01 NOTE — PROGRESS NOTES
HISTORY OF PRESENT ILLNESS:  Mr. Mendoza is a 89-year-old gentleman, known to   myself, who presented to clinic yesterday, 11/30/2018, at which time clinic   billing was performed.  His note is being dictated today on 12/01/2018.    CHIEF COMPLAINT:  Palpitations.    HISTORY OF PRESENT ILLNESS:  Mr. Mendoza presents noting that he feels as   though he is having decreased stamina.  He has been sleeping more.  He has   noticed palpitations, which seemed to occur about twice a day.  He notes they   only last seconds.  This has been ongoing now for about the last month or two.    He notes he has had no chest pain.  He has no shortness of breath, but he notes   that activities of daily living such as unloading the  are more   laborious than normal.  He has had no fevers or chills.  No trauma.  No present   complaints of chest pain.  He notes that he has been taking his Avapro 150 mg 1   tab daily only.    PAST MEDICAL, SURGICAL AND SOCIAL HISTORY:  Please see as thoroughly stated in   EPIC chart, which has been reviewed.    PHYSICAL EXAMINATION:  VITAL SIGNS:  Weight 176 pounds, blood pressure 125/77, pulse 67, recheck blood   pressure per M.D. is 120/80, pulse with 2 minute walk increases to 126.  GENERAL:  The patient looks comfortable, although somewhat fatigued.  HEENT:  Grossly unremarkable.  NECK:  Supple.  Negative for masses or thyromegaly, negative for   lymphadenopathy, negative for JVD.  LUNGS:  Clear bilaterally.  HEART:  Shows irregular rhythm with ventricular heart rate on exam at 84 to 90.  ABDOMEN:  Soft and nontender.  EXTREMITIES:  Negative for edema.    WORKUP:  EKG performed in clinic showing a new onset atrial fibrillation with   ventricular heart rate at 87 and no acute ischemic changes noted, although with   bundle-branch block in place.  I have reviewed with Dr. Guerra.    ASSESSMENT AND PLAN:  1.  Atrial fibrillation, new onset.  A.  We will initiate Toprol-XL 25 mg half tablet at  bedtime.  B.  We will start Eliquis 5 mg one p.o. b.i.d.  C.  Refer to Arrhythmia Cardiology ASAP.  D.  If any development of chest pain, shortness of breath, the patient is to   call or return to clinic or Emergency Room ASAP.  2.  Essential hypertension, controlled.  A.  For now, continue on the irbesartan 150 mg one a day.  B.  Toprol-XL 25 mg half tablet at bedtime for rate control only.  3.  Fatigue, probably secondary to new onset atrial fibrillation, rule out   other.  A.  Order chemistry, CBC, TSH, troponin.    ADDENDUM:  Plan is to phone review as the above.  See the patient back in four   to six weeks or see sooner if needed.      FMS/IN  dd: 12/01/2018 09:34:09 (CST)  td: 12/01/2018 09:54:38 (CST)  Doc ID   #[id number]  Job ID #[id number]    CC:     This office note has been dictated.

## 2018-12-01 NOTE — TELEPHONE ENCOUNTER
Message left for Mr Mendoza that labs(11/30) all normal.  Victor Hugo, would you please schedule pt for a RTC x 2-3 months and mail out to him.  Thanks

## 2018-12-03 NOTE — TELEPHONE ENCOUNTER
Spoke with patient, RTC appt scheduled. He wants to know if he will need labs before this appt. Please Advise.

## 2018-12-05 ENCOUNTER — HOSPITAL ENCOUNTER (OUTPATIENT)
Dept: CARDIOLOGY | Facility: CLINIC | Age: 83
Discharge: HOME | DRG: 378 | End: 2018-12-05
Payer: MEDICARE

## 2018-12-05 ENCOUNTER — INITIAL CONSULT (OUTPATIENT)
Dept: ELECTROPHYSIOLOGY | Facility: CLINIC | Age: 83
DRG: 378 | End: 2018-12-05
Payer: MEDICARE

## 2018-12-05 VITALS
BODY MASS INDEX: 23.3 KG/M2 | DIASTOLIC BLOOD PRESSURE: 70 MMHG | SYSTOLIC BLOOD PRESSURE: 128 MMHG | HEIGHT: 72 IN | WEIGHT: 172 LBS | HEART RATE: 93 BPM

## 2018-12-05 DIAGNOSIS — I77.810 AORTIC ROOT DILATION: ICD-10-CM

## 2018-12-05 DIAGNOSIS — I48.0 PAROXYSMAL ATRIAL FIBRILLATION: Primary | ICD-10-CM

## 2018-12-05 DIAGNOSIS — E78.2 MIXED HYPERLIPIDEMIA: ICD-10-CM

## 2018-12-05 DIAGNOSIS — E78.00 HYPERCHOLESTEROLEMIA: ICD-10-CM

## 2018-12-05 DIAGNOSIS — I49.3 PVC'S (PREMATURE VENTRICULAR CONTRACTIONS): ICD-10-CM

## 2018-12-05 DIAGNOSIS — I48.0 PAROXYSMAL ATRIAL FIBRILLATION: ICD-10-CM

## 2018-12-05 DIAGNOSIS — I10 ESSENTIAL HYPERTENSION: Primary | ICD-10-CM

## 2018-12-05 DIAGNOSIS — I70.0 AORTIC ATHEROSCLEROSIS: ICD-10-CM

## 2018-12-05 DIAGNOSIS — I35.0 NONRHEUMATIC AORTIC VALVE STENOSIS: ICD-10-CM

## 2018-12-05 PROCEDURE — 1101F PT FALLS ASSESS-DOCD LE1/YR: CPT | Mod: CPTII,HCNC,S$GLB, | Performed by: INTERNAL MEDICINE

## 2018-12-05 PROCEDURE — 93005 ELECTROCARDIOGRAM TRACING: CPT | Mod: HCNC,S$GLB,, | Performed by: INTERNAL MEDICINE

## 2018-12-05 PROCEDURE — 99205 OFFICE O/P NEW HI 60 MIN: CPT | Mod: HCNC,S$GLB,, | Performed by: INTERNAL MEDICINE

## 2018-12-05 PROCEDURE — 99999 PR PBB SHADOW E&M-EST. PATIENT-LVL III: CPT | Mod: PBBFAC,HCNC,, | Performed by: INTERNAL MEDICINE

## 2018-12-05 PROCEDURE — 93010 ELECTROCARDIOGRAM REPORT: CPT | Mod: HCNC,S$GLB,, | Performed by: INTERNAL MEDICINE

## 2018-12-05 RX ORDER — VALSARTAN 80 MG/1
80 TABLET ORAL DAILY
COMMUNITY
End: 2018-12-15 | Stop reason: CLARIF

## 2018-12-05 NOTE — LETTER
December 5, 2018      Amber Jenkins MD  [address]  [address], [address]  [facility] [address]           [facility] - Arrhythmia  [address]  [facility] [address]  Phone: [phone]  Fax: [phone]          Patient: Norm Mendoza   MR Number: [id number]   YOB: 1926   Date of Visit: 12/5/2018       Dear Dr. Amber Jenkins:    Thank you for referring Norm Mendoza to me for evaluation. Attached you will find relevant portions of my assessment and plan of care.    If you have questions, please do not hesitate to call me. I look forward to following Norm Mendoza along with you.    Sincerely,    Nikko Esquivel MD    Enclosure  CC:  No Recipients    If you would like to receive this communication electronically, please contact [email or web address] or [phone] to request more information on [employer] Link access.    For providers and/or their staff who would like to refer a patient to [facility], please contact us through our one-stop-shop provider referral line, [facility], at [phone].    If you feel you have received this communication in error or would no longer like to receive these types of communications, please e-mail [email or web address]

## 2018-12-05 NOTE — PROGRESS NOTES
Subjective:    Patient ID:  Norm Mendoza is a 89 y.o. male who presents for evaluation of AF    HPI     89 y.o. M  HTN on meds  HL on meds    For past month or so, had reduced stamina. Palpitations at times.  Found in new AF. Started on eliquis and toprol. While on toprol, had episodes of sluggishness... so stopped both eliquis and toprol.  TSH normal.    Great exertion tolerance. Pilates, workouts...    5/18 echo 55-60% LVEF    My interpretation of today's ECG is AF with controlled rate    Review of Systems   Constitution: Positive for malaise/fatigue. Negative for weakness.   HENT: Negative.  Negative for ear pain and tinnitus.    Eyes: Negative for blurred vision.   Cardiovascular: Positive for dyspnea on exertion and palpitations. Negative for chest pain, near-syncope and syncope.   Respiratory: Negative.  Negative for shortness of breath.    Endocrine: Negative.  Negative for polyuria.   Hematologic/Lymphatic: Does not bruise/bleed easily.   Skin: Negative.  Negative for rash.   Musculoskeletal: Negative.  Negative for joint pain and muscle weakness.   Gastrointestinal: Negative.  Negative for abdominal pain and change in bowel habit.   Genitourinary: Negative for frequency.   Neurological: Negative.  Negative for dizziness.   Psychiatric/Behavioral: Negative.  Negative for depression. The patient is not nervous/anxious.    Allergic/Immunologic: Negative for environmental allergies.        Objective:    Physical Exam   Constitutional: He is oriented to person, place, and time. He appears well-developed and well-nourished.   HENT:   Head: Normocephalic and atraumatic.   Eyes: Conjunctivae, EOM and lids are normal. No scleral icterus.   Neck: Normal range of motion. No JVD present. No tracheal deviation present. No thyromegaly present.   Cardiovascular: Normal rate, normal heart sounds and intact distal pulses. An irregularly irregular rhythm present.  No extrasystoles are present. PMI is not displaced.  Exam reveals no gallop and no friction rub.   No murmur heard.  Pulses:       Radial pulses are 2+ on the right side, and 2+ on the left side.   Pulmonary/Chest: Effort normal and breath sounds normal. No accessory muscle usage. No tachypnea. No respiratory distress. He has no wheezes. He has no rales.   Abdominal: Soft. Bowel sounds are normal. He exhibits no distension. There is no hepatosplenomegaly. There is no tenderness.   Musculoskeletal: Normal range of motion. He exhibits no edema.   Neurological: He is alert and oriented to person, place, and time. He has normal reflexes. He exhibits normal muscle tone.   Skin: Skin is warm and dry. No rash noted.   Psychiatric: He has a normal mood and affect. His behavior is normal.   Nursing note and vitals reviewed.        Assessment:       1. Essential hypertension    2. Nonrheumatic aortic valve stenosis    3. Mixed hyperlipidemia    4. Hypercholesterolemia    5. Paroxysmal atrial fibrillation         Plan:       Discussed AF and its basic pathophysiology, including its health implications and treatment options (rate vs. rhythm control, meds vs. procedural/device treatment) as appropriate for the patient.    CHADS-VASc=3 (age, HTN). Agree with a/c; continue Eliquis.  Stop toprol, which was likely the cause of sluggishness episodes (vs. tachy/stanislav?).  JACINTO/DCCV then 30-day monitor with autotrigger. f/u after that.    I discussed with patient risks, indications, benefits, and alternatives of the planned procedure. All questions were answered. Patient understands and wishes to proceed.

## 2018-12-05 NOTE — PROGRESS NOTES
CARDIOVERSION EDUCATION CHECK LIST      DAY OF PROCEDURE     12/7/18 @ 1 PM  JACINTO / CARDIOVERSION  Report to Cardiology Waiting Room on 3rd floor of the hospital  · Do not eat or drink anything after 12 mn on the night before your procedure.      Medications:  · You may take your usual morning medications with a sip of water      12/14/18 @ 10 AM - FOLLOW-UP EKG  @ [facility].  REPORT TO CARDIOLOGY WAITING ROOM ON 3RD FLOOR OF THE CLINIC       Directions to the Cardiology Waiting Room  If you park in the Parking Garage:  Take elevators to the 2nd floor  Walk up ramp and turn right by Gold Elevators  Take elevator to the 3rd floor  Upon exiting the elevator, turn away from the clinic areas  Walk long swift around to front of hospital to area with windows overlooking [facility]  Check in at Reception Desk  OR  If family is dropping you off:  Have them drop you off at the front of the Hospital  (Near the ER, where all the flags are hung).  Take the E elevators to the 3rd floor.  Check in at the Reception Desk in the waiting room.        · YOU WILL BE GOING HOME THE SAME DAY AS YOUR PROCEDURE  · YOU WILL NEED SOMEONE TO DRIVE YOU HOME AFTER YOUR PROCEDURE   · YOUR PAIN DURING YOUR PROCEDURE WILL BE MANAGED BY THE ANESTHESIA TEAM      Any need to reschedule or cancel procedures, or any questions regarding your procedure should be addressed directly with the Arrhythmia Department Nurses at the following phone number: [phone]

## 2018-12-06 ENCOUNTER — TELEPHONE (OUTPATIENT)
Dept: ELECTROPHYSIOLOGY | Facility: CLINIC | Age: 83
End: 2018-12-06

## 2018-12-06 ENCOUNTER — HOSPITAL ENCOUNTER (INPATIENT)
Facility: HOSPITAL | Age: 83
LOS: 3 days | Discharge: HOME | DRG: 378 | End: 2018-12-09
Attending: EMERGENCY MEDICINE | Admitting: EMERGENCY MEDICINE
Payer: MEDICARE

## 2018-12-06 DIAGNOSIS — D64.9 SYMPTOMATIC ANEMIA: Primary | ICD-10-CM

## 2018-12-06 DIAGNOSIS — K92.1 MELENA: ICD-10-CM

## 2018-12-06 DIAGNOSIS — K92.1 GASTROINTESTINAL HEMORRHAGE WITH MELENA: ICD-10-CM

## 2018-12-06 PROBLEM — K92.2 GIB (GASTROINTESTINAL BLEEDING): Status: ACTIVE | Noted: 2018-12-06

## 2018-12-06 LAB
ABO + RH BLD: NORMAL
ALBUMIN SERPL BCP-MCNC: 3 G/DL
ALP SERPL-CCNC: 40 U/L
ALT SERPL W/O P-5'-P-CCNC: 19 U/L
ANION GAP SERPL CALC-SCNC: 8 MMOL/L
AST SERPL-CCNC: 18 U/L
BASOPHILS # BLD AUTO: 0.07 K/UL
BASOPHILS NFR BLD: 0.8 %
BILIRUB SERPL-MCNC: 0.2 MG/DL
BLD GP AB SCN CELLS X3 SERPL QL: NORMAL
BUN SERPL-MCNC: 39 MG/DL
CALCIUM SERPL-MCNC: 8.6 MG/DL
CHLORIDE SERPL-SCNC: 110 MMOL/L
CO2 SERPL-SCNC: 22 MMOL/L
CREAT SERPL-MCNC: 0.9 MG/DL
DIFFERENTIAL METHOD: ABNORMAL
EOSINOPHIL # BLD AUTO: 0 K/UL
EOSINOPHIL NFR BLD: 0.1 %
ERYTHROCYTE [DISTWIDTH] IN BLOOD BY AUTOMATED COUNT: 14.5 %
EST. GFR  (AFRICAN AMERICAN): >60 ML/MIN/1.73 M^2
EST. GFR  (NON AFRICAN AMERICAN): >60 ML/MIN/1.73 M^2
GLUCOSE SERPL-MCNC: 126 MG/DL
HCT VFR BLD AUTO: 24.5 %
HGB BLD-MCNC: 7.1 G/DL
HGB BLD-MCNC: 7.8 G/DL
IMM GRANULOCYTES # BLD AUTO: 0.17 K/UL
IMM GRANULOCYTES NFR BLD AUTO: 2 %
INR PPP: 1
LYMPHOCYTES # BLD AUTO: 1.1 K/UL
LYMPHOCYTES NFR BLD: 12.9 %
MCH RBC QN AUTO: 31.8 PG
MCHC RBC AUTO-ENTMCNC: 31.8 G/DL
MCV RBC AUTO: 100 FL
MONOCYTES # BLD AUTO: 0.7 K/UL
MONOCYTES NFR BLD: 8.2 %
NEUTROPHILS # BLD AUTO: 6.6 K/UL
NEUTROPHILS NFR BLD: 76 %
NRBC BLD-RTO: 0 /100 WBC
PLATELET # BLD AUTO: 266 K/UL
PMV BLD AUTO: 9.7 FL
POTASSIUM SERPL-SCNC: 4.1 MMOL/L
PROT SERPL-MCNC: 5.8 G/DL
PROTHROMBIN TIME: 10.3 SEC
RBC # BLD AUTO: 2.45 M/UL
SODIUM SERPL-SCNC: 140 MMOL/L
WBC # BLD AUTO: 8.62 K/UL

## 2018-12-06 PROCEDURE — 96374 THER/PROPH/DIAG INJ IV PUSH: CPT | Mod: HCNC

## 2018-12-06 PROCEDURE — 85018 HEMOGLOBIN: CPT | Mod: HCNC

## 2018-12-06 PROCEDURE — 99285 EMERGENCY DEPT VISIT HI MDM: CPT | Mod: 25,HCNC

## 2018-12-06 PROCEDURE — 86920 COMPATIBILITY TEST SPIN: CPT | Mod: HCNC

## 2018-12-06 PROCEDURE — 25000003 PHARM REV CODE 250: Mod: HCNC | Performed by: INTERNAL MEDICINE

## 2018-12-06 PROCEDURE — C9113 INJ PANTOPRAZOLE SODIUM, VIA: HCPCS | Mod: HCNC | Performed by: EMERGENCY MEDICINE

## 2018-12-06 PROCEDURE — 99223 1ST HOSP IP/OBS HIGH 75: CPT | Mod: HCNC,AI,, | Performed by: INTERNAL MEDICINE

## 2018-12-06 PROCEDURE — 99285 EMERGENCY DEPT VISIT HI MDM: CPT | Mod: HCNC,,, | Performed by: EMERGENCY MEDICINE

## 2018-12-06 PROCEDURE — 63600175 PHARM REV CODE 636 W HCPCS: Mod: HCNC | Performed by: INTERNAL MEDICINE

## 2018-12-06 PROCEDURE — 96361 HYDRATE IV INFUSION ADD-ON: CPT | Mod: HCNC

## 2018-12-06 PROCEDURE — 25000003 PHARM REV CODE 250: Mod: HCNC | Performed by: EMERGENCY MEDICINE

## 2018-12-06 PROCEDURE — 93005 ELECTROCARDIOGRAM TRACING: CPT | Mod: HCNC

## 2018-12-06 PROCEDURE — C9113 INJ PANTOPRAZOLE SODIUM, VIA: HCPCS | Mod: HCNC | Performed by: INTERNAL MEDICINE

## 2018-12-06 PROCEDURE — 63600175 PHARM REV CODE 636 W HCPCS: Mod: HCNC | Performed by: EMERGENCY MEDICINE

## 2018-12-06 PROCEDURE — 85025 COMPLETE CBC W/AUTO DIFF WBC: CPT | Mod: HCNC

## 2018-12-06 PROCEDURE — 36415 COLL VENOUS BLD VENIPUNCTURE: CPT | Mod: HCNC

## 2018-12-06 PROCEDURE — 80053 COMPREHEN METABOLIC PANEL: CPT | Mod: HCNC

## 2018-12-06 PROCEDURE — 85610 PROTHROMBIN TIME: CPT | Mod: HCNC

## 2018-12-06 PROCEDURE — 93010 ELECTROCARDIOGRAM REPORT: CPT | Mod: HCNC,,, | Performed by: INTERNAL MEDICINE

## 2018-12-06 PROCEDURE — 11000001 HC ACUTE MED/SURG PRIVATE ROOM: Mod: HCNC

## 2018-12-06 PROCEDURE — 86901 BLOOD TYPING SEROLOGIC RH(D): CPT | Mod: HCNC

## 2018-12-06 RX ORDER — FOLIC ACID 1 MG/1
1 TABLET ORAL DAILY
Status: DISCONTINUED | OUTPATIENT
Start: 2018-12-06 | End: 2018-12-09 | Stop reason: HOSPADM

## 2018-12-06 RX ORDER — SODIUM CHLORIDE 0.9 % (FLUSH) 0.9 %
5 SYRINGE (ML) INJECTION
Status: DISCONTINUED | OUTPATIENT
Start: 2018-12-06 | End: 2018-12-09 | Stop reason: HOSPADM

## 2018-12-06 RX ORDER — ONDANSETRON 2 MG/ML
4 INJECTION INTRAMUSCULAR; INTRAVENOUS EVERY 8 HOURS PRN
Status: DISCONTINUED | OUTPATIENT
Start: 2018-12-06 | End: 2018-12-09 | Stop reason: HOSPADM

## 2018-12-06 RX ORDER — GLUCAGON 1 MG
1 KIT INJECTION
Status: DISCONTINUED | OUTPATIENT
Start: 2018-12-06 | End: 2018-12-09 | Stop reason: HOSPADM

## 2018-12-06 RX ORDER — IBUPROFEN 200 MG
24 TABLET ORAL
Status: DISCONTINUED | OUTPATIENT
Start: 2018-12-06 | End: 2018-12-09 | Stop reason: HOSPADM

## 2018-12-06 RX ORDER — HYDROCODONE BITARTRATE AND ACETAMINOPHEN 500; 5 MG/1; MG/1
TABLET ORAL
Status: DISCONTINUED | OUTPATIENT
Start: 2018-12-06 | End: 2018-12-09 | Stop reason: HOSPADM

## 2018-12-06 RX ORDER — THIAMINE HCL 100 MG
100 TABLET ORAL DAILY
Status: DISCONTINUED | OUTPATIENT
Start: 2018-12-06 | End: 2018-12-09 | Stop reason: HOSPADM

## 2018-12-06 RX ORDER — PANTOPRAZOLE SODIUM 40 MG/10ML
40 INJECTION, POWDER, LYOPHILIZED, FOR SOLUTION INTRAVENOUS 2 TIMES DAILY
Status: DISCONTINUED | OUTPATIENT
Start: 2018-12-06 | End: 2018-12-07

## 2018-12-06 RX ORDER — IBUPROFEN 200 MG
16 TABLET ORAL
Status: DISCONTINUED | OUTPATIENT
Start: 2018-12-06 | End: 2018-12-09 | Stop reason: HOSPADM

## 2018-12-06 RX ORDER — LORAZEPAM 0.5 MG/1
0.5 TABLET ORAL EVERY 6 HOURS PRN
Status: DISCONTINUED | OUTPATIENT
Start: 2018-12-06 | End: 2018-12-09 | Stop reason: HOSPADM

## 2018-12-06 RX ORDER — PANTOPRAZOLE SODIUM 40 MG/10ML
40 INJECTION, POWDER, LYOPHILIZED, FOR SOLUTION INTRAVENOUS DAILY
Status: DISCONTINUED | OUTPATIENT
Start: 2018-12-07 | End: 2018-12-06

## 2018-12-06 RX ORDER — PANTOPRAZOLE SODIUM 40 MG/10ML
80 INJECTION, POWDER, LYOPHILIZED, FOR SOLUTION INTRAVENOUS
Status: COMPLETED | OUTPATIENT
Start: 2018-12-06 | End: 2018-12-06

## 2018-12-06 RX ADMIN — SODIUM CHLORIDE 1000 ML: 0.9 INJECTION, SOLUTION INTRAVENOUS at 02:12

## 2018-12-06 RX ADMIN — PANTOPRAZOLE SODIUM 80 MG: 40 INJECTION, POWDER, FOR SOLUTION INTRAVENOUS at 02:12

## 2018-12-06 RX ADMIN — PANTOPRAZOLE SODIUM 40 MG: 40 INJECTION, POWDER, FOR SOLUTION INTRAVENOUS at 10:12

## 2018-12-06 RX ADMIN — FOLIC ACID 1 MG: 1 TABLET ORAL at 10:12

## 2018-12-06 RX ADMIN — THERA TABS 1 TABLET: TAB at 10:12

## 2018-12-06 RX ADMIN — Medication 100 MG: at 10:12

## 2018-12-06 NOTE — ED TRIAGE NOTES
C/o black tarry stool x 1 week after starting eliquis, states he stopped taking eliquis 2 days ago, denies pain, denies n/v/d, generalized weakness intermittent x 1 week reported by patient, denies weakness at present

## 2018-12-06 NOTE — ED PROVIDER NOTES
Encounter Date: 12/6/2018    SCRIBE #1 NOTE: I, Son Heidi, am scribing for, and in the presence of,  Dr. Olivia . I have scribed the following portions of the note - the Resident attestation.       History     Chief Complaint   Patient presents with    Melena     Pt reports dark stools x 1wk.  Pt on Eliquis.      HPI  Review of patient's allergies indicates:  No Known Allergies    Mr. Mendoza is a 89 yo M w/ pmh notable for daily EtOH use, last use yesterday, HTN, a fib, recently diagnosed a fib started on eliquis recently, started to have light headedness, dizziness, increased shortness of breath, and generalized weakness, associated with dark black tarry stools. Patient was seen at a physicians office yesterday and had a decreased H&H from baseline. Today presents with worsening exertional dyspnea and fatigue.  Denies chest pain, abd pain, nausea, vomiting, diarrhea, back pain. Denies fever or chills. Denies recent trauma.     Past Medical History:   Diagnosis Date    Alcohol dependence, daily use 7/13/2017    Allergy     Bladder cancer     tumor that was benign     Hematuria     Hypertension     Mixed hyperlipidemia 5/2/2018    Paroxysmal atrial fibrillation 11/30/2018    Skin cancer     x3, had mohs on nose    Squamous cell carcinoma excised 12/5/14    R lower back    Urinary tract infection     x4     Past Surgical History:   Procedure Laterality Date    ACHILLES TENDON SURGERY      cataracts      CYSTOSCOPY      bladder tumor    CYSTOSCOPY N/A 3/28/2014    [facility]formed [facility] at [facility] 1ST FLR    DILATION, URETHRA N/A 3/28/2014    Performed by [facility] at [facility]ST FLR    EXCISION-BLADDER TUMOR-TRANSURETHRAL (TURBT) N/A 3/28/2014    Performed by [facility] at [facility] 1ST FLR    EYE SURGERY      SKIN CANCER EXCISION       Family History   Problem Relation Age of Onset    Stroke Father     Stroke Brother     Anesthesia problems Neg Hx      Malignant hypertension Neg Hx     Hypotension Neg Hx     Malignant hyperthermia Neg Hx     Pseudochol deficiency Neg Hx     Melanoma Neg Hx      Social History     Tobacco Use    Smoking status: Former Smoker     Packs/day: 1.00     Years: 25.00     Pack years: 25.00     Types: Cigarettes     Last attempt to quit: 9/3/1970     Years since quittin.2    Smokeless tobacco: Never Used   Substance Use Topics    Alcohol use: Yes     Alcohol/week: 1.8 oz     Types: 3 Shots of liquor per week     Comment: 3 bourbons daily - 12 beer on weekends     Drug use: No     Review of Systems   Constitutional: Positive for fatigue. Negative for chills and fever.   HENT: Negative for congestion and sore throat.    Eyes: Negative for photophobia and visual disturbance.   Respiratory: Positive for shortness of breath. Negative for cough, choking, wheezing and stridor.    Cardiovascular: Negative for chest pain and leg swelling.   Gastrointestinal: Positive for blood in stool. Negative for abdominal distention, abdominal pain, constipation, diarrhea, nausea and vomiting.   Genitourinary: Negative for dysuria, flank pain and hematuria.   Musculoskeletal: Negative for back pain and neck pain.   Skin: Positive for pallor. Negative for rash and wound.   Neurological: Positive for dizziness, weakness (generalized) and light-headedness. Negative for syncope and numbness.   Psychiatric/Behavioral: Negative for agitation and confusion.       Physical Exam     Initial Vitals [18 1226]   BP Pulse Resp Temp SpO2   (!) 144/66 107 20 97.4 °F (36.3 °C) 100 %      MAP       --         Physical Exam    Nursing note and vitals reviewed.  Constitutional: He appears well-developed and well-nourished. He is not diaphoretic. He is cooperative.  Non-toxic appearance. He does not have a sickly appearance. No distress.   HENT:   Head: Normocephalic and atraumatic. Head is without raccoon's eyes, without Wong's sign, without abrasion, without  contusion and without laceration.   Mouth/Throat: Oropharynx is clear and moist.   Eyes: Conjunctivae and EOM are normal. Pupils are equal, round, and reactive to light. No scleral icterus.   Neck: Normal range of motion. No tracheal deviation present. No JVD present.   Cardiovascular: Intact distal pulses. Exam reveals no gallop and no friction rub.    Pulmonary/Chest: Breath sounds normal. No accessory muscle usage or stridor. No apnea. No respiratory distress. He has no decreased breath sounds. He has no wheezes. He has no rhonchi. He has no rales. He exhibits no tenderness.   Abdominal: Soft. He exhibits no distension. There is no tenderness. There is no rigidity, no rebound, no guarding, no CVA tenderness, no tenderness at McBurney's point and negative Ramos's sign.   Genitourinary: Rectal exam shows guaiac positive stool. Rectal exam shows no external hemorrhoid, no internal hemorrhoid, no fissure, no tenderness and anal tone normal. Guaiac positive stool. : Acceptable.  Musculoskeletal: Normal range of motion. He exhibits no edema.   Neurological: He is alert and oriented to person, place, and time. He has normal strength. He displays no atrophy and no tremor. No cranial nerve deficit or sensory deficit. He exhibits normal muscle tone. He displays no seizure activity. GCS eye subscore is 4. GCS verbal subscore is 5. GCS motor subscore is 6.   Skin: Skin is warm and dry. No rash noted. No erythema. There is pallor.         ED Course   Procedures  Labs Reviewed   CBC W/ AUTO DIFFERENTIAL - Abnormal; Notable for the following components:       Result Value    RBC 2.45 (*)     Hemoglobin 7.8 (*)     Hematocrit 24.5 (*)      (*)     MCH 31.8 (*)     MCHC 31.8 (*)     Immature Granulocytes 2.0 (*)     Immature Grans (Abs) 0.17 (*)     Gran% 76.0 (*)     Lymph% 12.9 (*)     All other components within normal limits   COMPREHENSIVE METABOLIC PANEL - Abnormal; Notable for the following  components:    CO2 22 (*)     Glucose 126 (*)     BUN, Bld 39 (*)     Calcium 8.6 (*)     Total Protein 5.8 (*)     Albumin 3.0 (*)     Alkaline Phosphatase 40 (*)     All other components within normal limits   PROTIME-INR   TYPE & SCREEN        HO3  89 yo M w/ recently diagnosed a fib presenting to the ED for evaluation of worsening fatigue, generalized weakness, intermittent shortness of breath, light headedness, and dizziness, after labwork at his provider found him to have a decreasing hemoglobin and hematocrit (9.3/28.7), dark stools, after recently starting eliquis for recently diagnosed a fib.  No abd or flank pain, no chest pain, no infectious symptoms.  Physical exam only remarkable for a fib, tachycardia, and hemoccult positive stool.  Concern for UGIB vs LGIB, symptomatic anemia, a fib.   Labs ordered at this time, including repeat CBC and fluids for resuscitation.   H&H is currently 7.8 and 24.5, BUN mildly elevated at 39, preserved egfr.   Anticipate admission and EGD during hospital stay.   Pierre Alejandra MD PGY3  12/06/2018 3:20 PM     HO3  At this time patient has been consulted to medicine for admission, admitted at this time, patient will require consult to GI.   Pierre Alejandra MD PGY3  12/06/2018 4:25 PM    Imaging Results    None                     Scribe Attestation:   Scribe #1: I performed the above scribed service and the documentation accurately describes the services I performed. I attest to the accuracy of the note.    Attending Attestation:   Physician Attestation Statement for Resident:  As the supervising MD   Physician Attestation Statement: I have personally seen and examined this patient.   I agree with the above history. -: Patient has melena, dehydration, anemia that is acute going to need to admit to the floor.    As the supervising MD I agree with the above PE.    As the supervising MD I agree with the above treatment, course, plan, and disposition.                        Clinical Impression:   The primary encounter diagnosis was Symptomatic anemia. A diagnosis of Melena was also pertinent to this visit.                             Pierre Alejandra MD  Resident  12/06/18 3066

## 2018-12-06 NOTE — TELEPHONE ENCOUNTER
Pre op labs reviewed, H&H significant drop. Called pt who reports he has had black stools for 1 week. This is about the time pt started Eliquis. Advised pt to report to ER now. Pt agrees and verbalized understanding. Canceled procedure for tomorrow PM.

## 2018-12-07 ENCOUNTER — ANESTHESIA (OUTPATIENT)
Dept: ENDOSCOPY | Facility: HOSPITAL | Age: 83
DRG: 378 | End: 2018-12-07
Payer: MEDICARE

## 2018-12-07 ENCOUNTER — ANESTHESIA EVENT (OUTPATIENT)
Dept: ENDOSCOPY | Facility: HOSPITAL | Age: 83
DRG: 378 | End: 2018-12-07
Payer: MEDICARE

## 2018-12-07 LAB
ANION GAP SERPL CALC-SCNC: 7 MMOL/L
BASOPHILS # BLD AUTO: 0.04 K/UL
BASOPHILS NFR BLD: 0.6 %
BLD PROD TYP BPU: NORMAL
BLD PROD TYP BPU: NORMAL
BLOOD UNIT EXPIRATION DATE: NORMAL
BLOOD UNIT EXPIRATION DATE: NORMAL
BLOOD UNIT TYPE CODE: 5100
BLOOD UNIT TYPE CODE: 5100
BLOOD UNIT TYPE: NORMAL
BLOOD UNIT TYPE: NORMAL
BUN SERPL-MCNC: 26 MG/DL
CALCIUM SERPL-MCNC: 8.4 MG/DL
CHLORIDE SERPL-SCNC: 111 MMOL/L
CO2 SERPL-SCNC: 23 MMOL/L
CODING SYSTEM: NORMAL
CODING SYSTEM: NORMAL
CREAT SERPL-MCNC: 0.8 MG/DL
DIFFERENTIAL METHOD: ABNORMAL
DISPENSE STATUS: NORMAL
DISPENSE STATUS: NORMAL
EOSINOPHIL # BLD AUTO: 0 K/UL
EOSINOPHIL NFR BLD: 0.1 %
ERYTHROCYTE [DISTWIDTH] IN BLOOD BY AUTOMATED COUNT: 17.4 %
EST. GFR  (AFRICAN AMERICAN): >60 ML/MIN/1.73 M^2
EST. GFR  (NON AFRICAN AMERICAN): >60 ML/MIN/1.73 M^2
GLUCOSE SERPL-MCNC: 108 MG/DL
HCT VFR BLD AUTO: 27 %
HGB BLD-MCNC: 6.5 G/DL
HGB BLD-MCNC: 8.2 G/DL
HGB BLD-MCNC: 8.5 G/DL
HGB BLD-MCNC: 8.6 G/DL
HGB BLD-MCNC: 8.7 G/DL
IMM GRANULOCYTES # BLD AUTO: 0.09 K/UL
IMM GRANULOCYTES NFR BLD AUTO: 1.3 %
LYMPHOCYTES # BLD AUTO: 0.7 K/UL
LYMPHOCYTES NFR BLD: 10.3 %
MCH RBC QN AUTO: 30.5 PG
MCHC RBC AUTO-ENTMCNC: 31.9 G/DL
MCV RBC AUTO: 96 FL
MONOCYTES # BLD AUTO: 0.3 K/UL
MONOCYTES NFR BLD: 4.9 %
NEUTROPHILS # BLD AUTO: 5.6 K/UL
NEUTROPHILS NFR BLD: 82.8 %
NRBC BLD-RTO: 0 /100 WBC
PLATELET # BLD AUTO: 217 K/UL
PMV BLD AUTO: 9.2 FL
POTASSIUM SERPL-SCNC: 4.1 MMOL/L
RBC # BLD AUTO: 2.82 M/UL
SODIUM SERPL-SCNC: 141 MMOL/L
TRANS ERYTHROCYTES VOL PATIENT: NORMAL ML
TRANS ERYTHROCYTES VOL PATIENT: NORMAL ML
WBC # BLD AUTO: 6.79 K/UL

## 2018-12-07 PROCEDURE — 63600175 PHARM REV CODE 636 W HCPCS: Mod: HCNC | Performed by: NURSE ANESTHETIST, CERTIFIED REGISTERED

## 2018-12-07 PROCEDURE — 85018 HEMOGLOBIN: CPT | Mod: 91,HCNC

## 2018-12-07 PROCEDURE — D9220A PRA ANESTHESIA: Mod: HCNC,ANES,, | Performed by: ANESTHESIOLOGY

## 2018-12-07 PROCEDURE — 88342 IMHCHEM/IMCYTCHM 1ST ANTB: CPT | Mod: HCNC | Performed by: PATHOLOGY

## 2018-12-07 PROCEDURE — 99232 SBSQ HOSP IP/OBS MODERATE 35: CPT | Mod: 25,HCNC,GC, | Performed by: INTERNAL MEDICINE

## 2018-12-07 PROCEDURE — 37000008 HC ANESTHESIA 1ST 15 MINUTES: Mod: HCNC | Performed by: INTERNAL MEDICINE

## 2018-12-07 PROCEDURE — 99233 SBSQ HOSP IP/OBS HIGH 50: CPT | Mod: HCNC,,, | Performed by: INTERNAL MEDICINE

## 2018-12-07 PROCEDURE — 88305 TISSUE EXAM BY PATHOLOGIST: CPT | Mod: HCNC | Performed by: PATHOLOGY

## 2018-12-07 PROCEDURE — 85025 COMPLETE CBC W/AUTO DIFF WBC: CPT | Mod: HCNC

## 2018-12-07 PROCEDURE — 85018 HEMOGLOBIN: CPT | Mod: HCNC

## 2018-12-07 PROCEDURE — 88342 IMHCHEM/IMCYTCHM 1ST ANTB: CPT | Mod: 26,HCNC,59, | Performed by: PATHOLOGY

## 2018-12-07 PROCEDURE — C9113 INJ PANTOPRAZOLE SODIUM, VIA: HCPCS | Mod: HCNC | Performed by: INTERNAL MEDICINE

## 2018-12-07 PROCEDURE — 80048 BASIC METABOLIC PNL TOTAL CA: CPT | Mod: HCNC

## 2018-12-07 PROCEDURE — 43239 EGD BIOPSY SINGLE/MULTIPLE: CPT | Mod: HCNC,GC,, | Performed by: INTERNAL MEDICINE

## 2018-12-07 PROCEDURE — 0DB68ZX EXCISION OF STOMACH, VIA NATURAL OR ARTIFICIAL OPENING ENDOSCOPIC, DIAGNOSTIC: ICD-10-PCS | Performed by: INTERNAL MEDICINE

## 2018-12-07 PROCEDURE — 11000001 HC ACUTE MED/SURG PRIVATE ROOM: Mod: HCNC

## 2018-12-07 PROCEDURE — 36415 COLL VENOUS BLD VENIPUNCTURE: CPT | Mod: HCNC

## 2018-12-07 PROCEDURE — 25000003 PHARM REV CODE 250: Mod: HCNC | Performed by: INTERNAL MEDICINE

## 2018-12-07 PROCEDURE — 25000003 PHARM REV CODE 250: Mod: HCNC | Performed by: NURSE ANESTHETIST, CERTIFIED REGISTERED

## 2018-12-07 PROCEDURE — 43239 EGD BIOPSY SINGLE/MULTIPLE: CPT | Mod: HCNC | Performed by: INTERNAL MEDICINE

## 2018-12-07 PROCEDURE — 27201012 HC FORCEPS, HOT/COLD, DISP: Mod: HCNC | Performed by: INTERNAL MEDICINE

## 2018-12-07 PROCEDURE — 88305 TISSUE EXAM BY PATHOLOGIST: CPT | Mod: 26,HCNC,, | Performed by: PATHOLOGY

## 2018-12-07 PROCEDURE — 63600175 PHARM REV CODE 636 W HCPCS: Mod: HCNC | Performed by: INTERNAL MEDICINE

## 2018-12-07 PROCEDURE — P9021 RED BLOOD CELLS UNIT: HCPCS | Mod: HCNC

## 2018-12-07 PROCEDURE — 88341 IMHCHEM/IMCYTCHM EA ADD ANTB: CPT | Mod: 26,HCNC,59, | Performed by: PATHOLOGY

## 2018-12-07 PROCEDURE — 37000009 HC ANESTHESIA EA ADD 15 MINS: Mod: HCNC | Performed by: INTERNAL MEDICINE

## 2018-12-07 PROCEDURE — 88341 IMHCHEM/IMCYTCHM EA ADD ANTB: CPT | Mod: HCNC | Performed by: PATHOLOGY

## 2018-12-07 RX ORDER — PANTOPRAZOLE SODIUM 40 MG/1
40 TABLET, DELAYED RELEASE ORAL 2 TIMES DAILY
Status: DISCONTINUED | OUTPATIENT
Start: 2018-12-07 | End: 2018-12-09 | Stop reason: HOSPADM

## 2018-12-07 RX ORDER — LIDOCAINE HCL/PF 100 MG/5ML
SYRINGE (ML) INTRAVENOUS
Status: DISCONTINUED | OUTPATIENT
Start: 2018-12-07 | End: 2018-12-07

## 2018-12-07 RX ORDER — SODIUM CHLORIDE, SODIUM LACTATE, POTASSIUM CHLORIDE, CALCIUM CHLORIDE 600; 310; 30; 20 MG/100ML; MG/100ML; MG/100ML; MG/100ML
INJECTION, SOLUTION INTRAVENOUS CONTINUOUS
Status: DISCONTINUED | OUTPATIENT
Start: 2018-12-07 | End: 2018-12-07

## 2018-12-07 RX ORDER — PROPOFOL 10 MG/ML
VIAL (ML) INTRAVENOUS
Status: DISCONTINUED | OUTPATIENT
Start: 2018-12-07 | End: 2018-12-07

## 2018-12-07 RX ORDER — SODIUM CHLORIDE 9 MG/ML
INJECTION, SOLUTION INTRAVENOUS CONTINUOUS PRN
Status: DISCONTINUED | OUTPATIENT
Start: 2018-12-07 | End: 2018-12-07

## 2018-12-07 RX ORDER — PROPOFOL 10 MG/ML
VIAL (ML) INTRAVENOUS CONTINUOUS PRN
Status: DISCONTINUED | OUTPATIENT
Start: 2018-12-07 | End: 2018-12-07

## 2018-12-07 RX ADMIN — FOLIC ACID 1 MG: 1 TABLET ORAL at 09:12

## 2018-12-07 RX ADMIN — SODIUM CHLORIDE, SODIUM LACTATE, POTASSIUM CHLORIDE, AND CALCIUM CHLORIDE: .6; .31; .03; .02 INJECTION, SOLUTION INTRAVENOUS at 09:12

## 2018-12-07 RX ADMIN — THERA TABS 1 TABLET: TAB at 09:12

## 2018-12-07 RX ADMIN — PROPOFOL 75 MCG/KG/MIN: 10 INJECTION, EMULSION INTRAVENOUS at 11:12

## 2018-12-07 RX ADMIN — PANTOPRAZOLE SODIUM 40 MG: 40 INJECTION, POWDER, FOR SOLUTION INTRAVENOUS at 09:12

## 2018-12-07 RX ADMIN — PANTOPRAZOLE SODIUM 40 MG: 40 TABLET, DELAYED RELEASE ORAL at 08:12

## 2018-12-07 RX ADMIN — Medication 100 MG: at 09:12

## 2018-12-07 RX ADMIN — PROPOFOL 50 MG: 10 INJECTION, EMULSION INTRAVENOUS at 11:12

## 2018-12-07 RX ADMIN — LIDOCAINE HYDROCHLORIDE 50 MG: 20 INJECTION, SOLUTION INTRAVENOUS at 11:12

## 2018-12-07 RX ADMIN — SODIUM CHLORIDE: 0.9 INJECTION, SOLUTION INTRAVENOUS at 11:12

## 2018-12-07 RX ADMIN — PROPOFOL 10 MG: 10 INJECTION, EMULSION INTRAVENOUS at 11:12

## 2018-12-07 NOTE — PT/OT/SLP PROGRESS
Physical Therapy      Patient Name:  Norm Mendoza   MRN:  [id number]    Pt evaluation attempted in PM. Pt and family report pt safely amb to bathroom and stretcher without any problems or concerns. Pt reports no needs for skilled PT at this time. Pt and family educated on importance of OOB activity over weekend and amb with RN staff. Educated on notifying MD if anything changes. Pt and family report no further questions or concerns from a mobility standpoint. Orders d/c; SW notified.    PASQUALE BROTHERS, PT   12/7/2018

## 2018-12-07 NOTE — H&P (VIEW-ONLY)
[facility]  Gastroenterology  Consult Note    Patient Name: Norm Mendoza  MRN: [id number]  Admission Date: 12/6/2018  Hospital Length of Stay: 1 days  Code Status: Full Code   Attending Provider: Cy Amador MD   Consulting Provider: Ian James MD  Primary Care Physician: Amber Jenkins MD  Principal Problem:Symptomatic anemia    Inpatient consult to Gastroenterology  Consult performed by: Ian James MD  Consult ordered by: Cy Amador MD        Subjective:     HPI:  Mr. Mendoza is a 89 y/o male with past medical history of HTN, HLD, on ASA 81mg, recently diagnosed with pAfib and started on Eliquis on 12/1, who presented to the ED with 1 weeks history of melena and symptomatic anemia. GI was consulted for evaluation of GI bleeding.    The patient states that he was started on Eliquis and metoprolol on 12/1 after he was found to have afib in clinic, and a referral was made to cardiology. He states that the same day he started the Eliquis, he started to feel tired with dizziness, and collapsed multiple times. Since then he noticed that his stools have been black and tarry. He continued to feel progressively more weak and short of breath. He was seen by Cardiology 2 days ago. Labs obtained at that time showed Hb of 8 from a baseline of 12-14, and he was contacted to refer to the ED.    The patient presented hemodynamically stable, with Hb of ~7-8, and dropped gradually to 6.5.    The patient denies abdominal pain, change in bowel habits, change in appetite, or weight loss. Denies hematemesis/coffee ground emesis. Denies history of GI bleeding or having an EGD before. Had a colonoscopy ~7 years ago that he reports was normal. Drinks ~4shots of hard liquor daily. Last dose of apixaban taken ~36 hours ago.    Past Medical History:   Diagnosis Date    Alcohol dependence, daily use 7/13/2017    Allergy     Bladder cancer     tumor that was benign     Hematuria     Hypertension      Mixed hyperlipidemia 2018    Paroxysmal atrial fibrillation 2018    Skin cancer     x3, had mohs on nose    Squamous cell carcinoma excised 14    R lower back    Symptomatic anemia 2018    Urinary tract infection     x4       Past Surgical History:   Procedure Laterality Date    ACHILLES TENDON SURGERY      cataracts      CYSTOSCOPY      bladder tumor    CYSTOSCOPY N/A 3/28/2014    [facility] 1ST FLR    DILATION, URETHRA N/A 3/28/2014    [facility] at [facility] OR 1ST FLR    EXCISION-BLADDER TUMOR-TRANSURETHRAL (TURBT) N/A 3/28/2014    Performed by [facility] at [facility] OR 1ST FLR    EYE SURGERY      SKIN CANCER EXCISION         Review of patient's allergies indicates:  No Known Allergies  Family History     Problem Relation (Age of Onset)    Stroke Father, Brother        Tobacco Use    Smoking status: Former Smoker     Packs/day: 1.00     Years: 25.00     Pack years: 25.00     Types: Cigarettes     Last attempt to quit: 9/3/1970     Years since quittin.2    Smokeless tobacco: Never Used   Substance and Sexual Activity    Alcohol use: Yes     Alcohol/week: 1.8 oz     Types: 3 Shots of liquor per week     Comment: 3 bourbons daily - 12 beer on weekends     Drug use: No    Sexual activity: Yes     Partners: Female     Review of Systems   Constitutional: Positive for activity change. Negative for appetite change and fever.   HENT: Negative for trouble swallowing.    Eyes: Negative for visual disturbance.   Respiratory: Positive for shortness of breath. Negative for cough.    Cardiovascular: Negative for chest pain.   Gastrointestinal: Positive for blood in stool. Negative for abdominal distention, abdominal pain, anal bleeding, constipation, diarrhea, nausea, rectal pain and vomiting.   Genitourinary: Negative for flank pain.   Musculoskeletal: Negative for arthralgias and back pain.   Skin: Negative for color  change.   Allergic/Immunologic: Negative for immunocompromised state.   Neurological: Positive for dizziness, syncope and light-headedness.   Psychiatric/Behavioral: Negative for confusion.     Objective:     Vital Signs (Most Recent):  Temp: 98 °F (36.7 °C) (12/07/18 1012)  Pulse: 78 (12/07/18 1012)  Resp: 18 (12/07/18 1012)  BP: 138/79 (12/07/18 1012)  SpO2: 99 % (12/07/18 1012) Vital Signs (24h Range):  Temp:  [97.4 °F (36.3 °C)-98.4 °F (36.9 °C)] 98 °F (36.7 °C)  Pulse:  [] 78  Resp:  [13-25] 18  SpO2:  [92 %-100 %] 99 %  BP: (113-165)/(66-95) 138/79     Weight: 81 kg (178 lb 9.2 oz) (12/06/18 2000)  Body mass index is 24.22 kg/m².      Intake/Output Summary (Last 24 hours) at 12/7/2018 1032  Last data filed at 12/7/2018 0400  Gross per 24 hour   Intake 1357 ml   Output --   Net 1357 ml       Lines/Drains/Airways     Peripheral Intravenous Line                 Peripheral IV - Single Lumen -- days         Peripheral IV - Single Lumen 12/06/18 1355 Left Forearm less than 1 day         Peripheral IV - Single Lumen 12/06/18 1801 Left Hand less than 1 day                Physical Exam   Constitutional: He is oriented to person, place, and time. He appears well-developed and well-nourished. No distress.   HENT:   Head: Normocephalic.   Eyes: Conjunctivae are normal. No scleral icterus.   Neck: Normal range of motion. Neck supple.   Cardiovascular: Normal rate and regular rhythm.   Pulmonary/Chest: Effort normal and breath sounds normal.   Abdominal: Soft. Bowel sounds are normal. He exhibits no distension and no mass. There is no tenderness. There is no guarding.   Musculoskeletal: Normal range of motion.   Neurological: He is alert and oriented to person, place, and time.   Skin: Skin is warm and dry. There is pallor.   Psychiatric: He has a normal mood and affect.       Significant Labs:  CBC:   Recent Labs   Lab 12/06/18  1315 12/06/18  1936 12/07/18  0012 12/07/18  0856   WBC 8.62  --   --  6.79   HGB 7.8*  7.1* 6.5* 8.7*  8.6*   HCT 24.5*  --   --  27.0*     --   --  217     BMP:   Recent Labs   Lab 12/07/18  0856         K 4.1   *   CO2 23   BUN 26   CREATININE 0.8   CALCIUM 8.4*     CMP:   Recent Labs   Lab 12/06/18  1315 12/07/18  0856   * 108   CALCIUM 8.6* 8.4*   ALBUMIN 3.0*  --    PROT 5.8*  --     141   K 4.1 4.1   CO2 22* 23    111*   BUN 39* 26   CREATININE 0.9 0.8   ALKPHOS 40*  --    ALT 19  --    AST 18  --    BILITOT 0.2  --      Coagulation:   Recent Labs   Lab 12/06/18  1315   INR 1.0     Lipase: No results for input(s): LIPASE in the last 48 hours.    Significant Imaging:  Imaging results within the past 24 hours have been reviewed.    Assessment/Plan:     GIB (gastrointestinal bleeding)    Patient is a 89 y/o male who presented with melena and severe symptomatic anema after starting Eliquis with an elevated BUN/Cr ratio suggestive of UGIB. Patient is hemodynamically stable. Will plan for an EGD for evaluation today.    Protonix IV BID.  Serial H/H's transfuse as needed.  Discontinue all NSAIDs and Heparin products  Maintain IV access with 2 large bore IVs  Keep NPO. Plan for EGD today           Thank you for your consult. I will follow-up with patient. Please contact us if you have any additional questions.    Ru James MD  Gastroenterology  [facility]

## 2018-12-07 NOTE — PLAN OF CARE
On Discharge planning assessment patient is in bed, resting quietly, friend at the bedside. Introduced myself and CM role in patients care plan, patient verbalized understanding.     Pt lives in a single story duplex home alone. He has family provided transportation at discharge. Patient denies HD, HH and coumadin.  Verified Pts Address, Emergency Contact, Pharmacy and PCP.     Pt denies any concerns for this visit, CM will continue to follow. CM name and number placed on patients white board and Health Packet given to the patient with a brief overview of the information provided patient verbalized understanding.        12/07/18 1529   Discharge Assessment   Assessment Type Discharge Planning Assessment   Confirmed/corrected address and phone number on facesheet? Yes   Assessment information obtained from? Patient   Expected Length of Stay (days) 2   Communicated expected length of stay with patient/caregiver yes   Prior to hospitilization cognitive status: Alert/Oriented   Prior to hospitalization functional status: Independent   Current cognitive status: Alert/Oriented   Current Functional Status: Independent   Facility Arrived From: N/A   Lives With alone   Able to Return to Prior Arrangements yes   Is patient able to care for self after discharge? Yes   Who are your caregiver(s) and their phone number(s)? Jamie [phone]   Patient's perception of discharge disposition home or selfcare   Readmission Within The Last 30 Days no previous admission in last 30 days   Patient currently being followed by outpatient case management? No   Patient currently receives any other outside agency services? No   Equipment Currently Used at Home none   Do you have any problems affording any of your prescribed medications? No   Is the patient taking medications as prescribed? yes   Does the patient have transportation home? Yes   Transportation Available car;family or friend will provide   Dialysis Name and Scheduled  days N/A   Does the patient receive services at the Coumadin Clinic? No   Discharge Plan A Home   Discharge Plan B Home Health   Patient/Family In Agreement With Plan yes     Amber Jenkins MD  [address] [facility], [address] / [facility] [address]  [phone]    Extended Emergency Contact Information  Primary Emergency Contact: Jamie Mendoza  Address: unknown   [place]  Mobile Phone: [phone]  Relation: Curtis Parker [facility] [address] [place] [address] [address] AT [facility]  [address] [facility] [address]  Phone: [phone] Fax: [phone]

## 2018-12-07 NOTE — DISCHARGE INSTRUCTIONS

## 2018-12-07 NOTE — HPI
Mr. Mendoza is a 89 y/o male with past medical history of HTN, HLD, on ASA 81mg, recently diagnosed with pAfib and started on Eliquis on 12/1, who presented to the ED with 1 weeks history of melena and symptomatic anemia. GI was consulted for evaluation of GI bleeding.    The patient states that he was started on Eliquis and metoprolol on 12/1 after he was found to have afib in clinic, and a referral was made to cardiology. He states that the same day he started the Eliquis, he started to feel tired with dizziness, and collapsed multiple times. Since then he noticed that his stools have been black and tarry. He continued to feel progressively more weak and short of breath. He was seen by Cardiology 2 days ago. Labs obtained at that time showed Hb of 8 from a baseline of 12-14, and he was contacted to refer to the ED.    The patient presented hemodynamically stable, with Hb of ~7-8, and dropped gradually to 6.5.    The patient denies abdominal pain, change in bowel habits, change in appetite, or weight loss. Denies hematemesis/coffee ground emesis. Denies history of GI bleeding or having an EGD before. Had a colonoscopy ~7 years ago that he reports was normal. Drinks ~4shots of hard liquor daily. Last dose of apixaban taken ~36 hours ago.

## 2018-12-07 NOTE — ED NOTES
Patient transported to floor with transporter via stretcher, with family, on monitor, with personal belongings

## 2018-12-07 NOTE — INTERVAL H&P NOTE
Pre-Procedure H and P Addendum    Patient seen and examined.  History and exam unchanged from prior history and physical.      Procedure: EGD  Indication: Melena  ASA Class: per anesthesiology  Airway: normal  Neck Mobility: full range of motion  Mallampatti score: per anesthesia  History of anesthesia problems: no  Family history of anesthesia problems: no  Anesthesia Plan: MAC    Anesthesia/Surgery risks, benefits and alternative options discussed and understood by patient/family.          Active Hospital Problems    Diagnosis  POA    *Symptomatic anemia [D64.9]  Yes    GIB (gastrointestinal bleeding) [K92.2]  Yes      Resolved Hospital Problems   No resolved problems to display.

## 2018-12-07 NOTE — NURSING TRANSFER
Nursing Transfer Note      12/7/2018     Transfer TO [facility] A/FROM:POST OP 26    Transfer via stretcher    Transfer with cardiac monitoring    Transported by CATARINO Aranda    Medicines sent: NONE    Chart send with patient: YES     Notified: son, girlfriend    Patient reassessed at: 12/7/18    Upon arrival to floor: cardiac monitor applied, patient oriented to room, call bell in reach and bed in lowest position

## 2018-12-07 NOTE — ASSESSMENT & PLAN NOTE
Patient is a 89 y/o male who presented with melena and severe symptomatic anema after starting Eliquis with an elevated BUN/Cr ratio suggestive of UGIB. Patient is hemodynamically stable. Will plan for an EGD for evaluation today.    Protonix IV BID.  Serial H/H's transfuse as needed.  Discontinue all NSAIDs and Heparin products  Maintain IV access with 2 large bore IVs  Keep NPO. Plan for EGD today

## 2018-12-07 NOTE — H&P
[facility] Hospital Medicine                                                         History and Physical       Team: Networked reference to record PCT  Cy Amador MD     Admit Date: 12/6/2018   HOD: 0       AUTUMN:  12/8/2018    Primary care Physician: Amber Jenkins MD    Principal Problem: Symptomatic anemia      Patient information was obtained from patient and ER records.      Code status: Full Code      HPI:       Mr Norm Mendoza is a 89 y.o. man with hx of recently diagnosed atrial fibrillation on Eliquis, hypertension and alcohol dependence presenting with several day hx of dark stool since starting Eliquis 12/1 with associated dizziness, lightheadedness and generalized weakness. He presented to his cardiologist office, and was found to be anemic around 9.3 from his baseline ~13. Patient has several dark stool since Sunday but has not mentioned to her cardiologist or pcp until labs revealed anemia. He took his last dose of Eliquis 12/5 evening. He takes baby aspirin, but denies any NSAIDS use. He drinks about 4-5 alcoholic beverages daily. Denies any syncope, cp, falls, dyspnea, N/V, abdominal pain, coffee ground emesis, bright blood in stool or bleeds anywhere else. He has no hx of EDG in the past and no ulcers. No recent trauma. He has been healthy otherwise w/o any recent hospitalization. His last colonoscopy was 6 years ago, and was normal as per patient.     Patient was to start on Eliquis then get JACINTO cardioversion.      Review of Systems:  Pain Scale: 0 /10   Constitutional: no fever or chills, weakness + dizziness +  Respiratory: no cough or shortness of breath  Cardiovascular: no chest pain or palpitations  Gastrointestinal: no nausea or vomiting, no abdominal pain or change in bowel habits, + melena  Genitourinary: no hematuria or dysuria  Integument/Breast: no rash or  pruritis  Hematologic/Lymphatic: no easy bruising or lymphadenopathy  Musculoskeletal: no arthralgias or myalgias  Neurological: no seizures or tremors  Behavioral/Psych: no depression or anxiety      PAST HISTORY:     Past Medical History:   Diagnosis Date    Alcohol dependence, daily use 7/13/2017    Allergy     Bladder cancer     tumor that was benign     Hematuria     Hypertension     Mixed hyperlipidemia 5/2/2018    Paroxysmal atrial fibrillation 11/30/2018    Skin cancer     x3, had mohs on nose    Squamous cell carcinoma excised 12/5/14    R lower back    Symptomatic anemia 12/6/2018    Urinary tract infection     x4       Past Surgical History:   Procedure Laterality Date    ACHILLES TENDON SURGERY      cataracts      CYSTOSCOPY      bladder tumor    CYSTOSCOPY N/A 3/28/2014    [facility] OR 1ST FLR    DILATION, URETHRA N/A 3/28/2014    [facility] at [facility] OR 1ST FLR    EXCISION-BLADDER TUMOR-TRANSURETHRAL (TURBT) N/A 3/28/2014    [facility] OR 1ST FLR    EYE SURGERY      SKIN CANCER EXCISION         Family History   Problem Relation Age of Onset    Stroke Father     Stroke Brother     Anesthesia problems Neg Hx     Malignant hypertension Neg Hx     Hypotension Neg Hx     Malignant hyperthermia Neg Hx     Pseudochol deficiency Neg Hx     Melanoma Neg Hx        Social History     Socioeconomic History    Marital status:      Spouse name: None    Number of children: 2    Years of education: None    Highest education level: None   Social Needs    Financial resource strain: None    Food insecurity - worry: None    Food insecurity - inability: None    Transportation needs - medical: None    Transportation needs - non-medical: None   Occupational History    Occupation: Financial Work/     Employer: retired    Occupation: actor    Occupation:    Tobacco Use     Smoking status: Former Smoker     Packs/day: 1.00     Years: 25.00     Pack years: 25.00     Types: Cigarettes     Last attempt to quit: 9/3/1970     Years since quittin.2    Smokeless tobacco: Never Used   Substance and Sexual Activity    Alcohol use: Yes     Alcohol/week: 1.8 oz     Types: 3 Shots of liquor per week     Comment: 3 bourbons daily - 12 beer on weekends     Drug use: No    Sexual activity: Yes     Partners: Female   Other Topics Concern    None   Social History Narrative    None         MEDICATIONS and ALLERGIES:   Reviewed    No current facility-administered medications on file prior to encounter.      Current Outpatient Medications on File Prior to Encounter   Medication Sig Dispense Refill    apixaban (ELIQUIS) 5 mg Tab Take 1 tablet (5 mg total) by mouth 2 (two) times daily. 60 tablet 0    aspirin 81 MG Chew Take 81 mg by mouth once daily.      psyllium (METAMUCIL) packet Take 1 packet by mouth once daily.      valsartan (DIOVAN) 80 MG tablet Take 80 mg by mouth once daily.      ACIDOPHILUS/PECTIN, CITRUS (ACIDOPHILUS PROBIOTIC ORAL) Take 1 capsule by mouth once daily.      guaiFENesin (MUCINEX) 1,200 mg Ta12 1 Tab 1-2x/day for cough/congestion 20 tablet 0        Review of patient's allergies indicates:  No Known Allergies        PHYSICAL EXAM:     Temp:  [97.4 °F (36.3 °C)]   Pulse:  []   Resp:  [15-25]   BP: (126-165)/(66-95)   SpO2:  [92 %-100 %]   Body mass index is 24.22 kg/m².     Intake/Output Summary (Last 24 hours) at 2018  Last data filed at 2018 1645  Gross per 24 hour   Intake 1000 ml   Output --   Net 1000 ml       General appearance: no distress, cooperative, non toxic  Mental status: Alert and oriented x 3  HEENT:  Conjunctival pallor noted, PERRL  Neck: supple, thyroid not enlarged  Pulm:   normal respiratory effort, CTA B, no c/w/r  Card: iRRR, S1, S2 normal, DONALD, no click, rub or gallop  Abd: soft, NT, ND, BS present; no masses, no  organomegaly  Ext: no c/c/e  Pulses: 2+, symmetric  Skin: Mild pallor, adequate cap refill, no clubbing  Neuro: Cranial Nerves grossly intact, no focal numbness or weakness, normal strength and tone       LABS and IMAGING:       Recent Results (from the past 24 hour(s))   CBC auto differential    Collection Time: 12/06/18  1:15 PM   Result Value Ref Range    WBC 8.62 3.90 - 12.70 K/uL    RBC 2.45 (L) 4.60 - 6.20 M/uL    Hemoglobin 7.8 (L) 14.0 - 18.0 g/dL    Hematocrit 24.5 (L) 40.0 - 54.0 %     (H) 82 - 98 fL    MCH 31.8 (H) 27.0 - 31.0 pg    MCHC 31.8 (L) 32.0 - 36.0 g/dL    RDW 14.5 11.5 - 14.5 %    Platelets 266 150 - 350 K/uL    MPV 9.7 9.2 - 12.9 fL    Immature Granulocytes 2.0 (H) 0.0 - 0.5 %    Gran # (ANC) 6.6 1.8 - 7.7 K/uL    Immature Grans (Abs) 0.17 (H) 0.00 - 0.04 K/uL    Lymph # 1.1 1.0 - 4.8 K/uL    Mono # 0.7 0.3 - 1.0 K/uL    Eos # 0.0 0.0 - 0.5 K/uL    Baso # 0.07 0.00 - 0.20 K/uL    nRBC 0 0 /100 WBC    Gran% 76.0 (H) 38.0 - 73.0 %    Lymph% 12.9 (L) 18.0 - 48.0 %    Mono% 8.2 4.0 - 15.0 %    Eosinophil% 0.1 0.0 - 8.0 %    Basophil% 0.8 0.0 - 1.9 %    Differential Method Automated    Comprehensive metabolic panel    Collection Time: 12/06/18  1:15 PM   Result Value Ref Range    Sodium 140 136 - 145 mmol/L    Potassium 4.1 3.5 - 5.1 mmol/L    Chloride 110 95 - 110 mmol/L    CO2 22 (L) 23 - 29 mmol/L    Glucose 126 (H) 70 - 110 mg/dL    BUN, Bld 39 (H) 10 - 30 mg/dL    Creatinine 0.9 0.5 - 1.4 mg/dL    Calcium 8.6 (L) 8.7 - 10.5 mg/dL    Total Protein 5.8 (L) 6.0 - 8.4 g/dL    Albumin 3.0 (L) 3.5 - 5.2 g/dL    Total Bilirubin 0.2 0.1 - 1.0 mg/dL    Alkaline Phosphatase 40 (L) 55 - 135 U/L    AST 18 10 - 40 U/L    ALT 19 10 - 44 U/L    Anion Gap 8 8 - 16 mmol/L    eGFR if African American >60.0 >60 mL/min/1.73 m^2    eGFR if non African American >60.0 >60 mL/min/1.73 m^2   Protime-INR    Collection Time: 12/06/18  1:15 PM   Result Value Ref Range    Prothrombin Time 10.3 9.0 - 12.5 sec    INR  1.0 0.8 - 1.2   Type & Screen    Collection Time: 12/06/18  1:15 PM   Result Value Ref Range    Group & Rh O POS     Indirect Felipe NEG    Hemoglobin    Collection Time: 12/06/18  7:36 PM   Result Value Ref Range    Hemoglobin 7.1 (L) 14.0 - 18.0 g/dL       No results for input(s): POCTGLUCOSE in the last 168 hours.    Active Hospital Problems    Diagnosis  POA    Symptomatic anemia [D64.9]  Yes      Resolved Hospital Problems   No resolved problems to display.           ASSESSMENT and PLAN:     Problems List:  Active Hospital Problems    Diagnosis  POA    Symptomatic anemia [D64.9]  Yes      Resolved Hospital Problems   No resolved problems to display.     Acute blood loss anemia from suspected UGIB  Hx of recent blood thinner with anemia and elevated BUN. Patient has recurrent episodes of melena with dizziness, and weakness, fortunately no syncope, falls or trauma. He had no coffee ground emesis. Long DDX - Duodenal / gastric ulcer,  Esophageal varices, Esophagitis, Hemorrhagic Gastritis, Zohra-Ewing, Boheers syndrome,  Portal Hypertensive Gastropathy, Malignancy, Dieulafoy's lesion, AVM, Angioma, Aorto-enteric fistula, Hemotobilia, Hemosuccus pancreaticus, Gastric antral vascular ectasia (GAVE) syndrome, Epistaxis.   - Currently HD stable  - Maintain 2 large bore IV  - Type and screen, consented  - Monitor H/H q6h  - Hemoglobin 13.4 --> 12.6 --> 9.3 --> 7.8 --> 7.1  - Will transfuse 2 units   - Transfuse as needed generally if < 7 or symptomatic   - PPI IV BID for now  - No hx of cirrhosis or varices   - Antiemetic PRN  - NPO status, last meal 12/5 evening as per patient   - Strict I/O  - He has no prior GI hx   - Avoid NSAIDs and A/c for now    Atrial Fibrillation on Eliquis  - Currently in rate controlled atrial fibrillation  - He was taken off his BB recently by cardiology  - Avoiding BB currently which can mask worsening GIB  - Consider started once stable  - Will need to avoid AC in the future,  risk/benifits discussed with patient   - Last TSH wnl    Alcohol dependence  - Never had hx of withdrawal symptoms or seizures or hallucination but patient never had stopped drinking for more than 2 days in the past several years  - CIWA protocol   - Folic acid, Thiamine, MV     Hypertension  - Holding home antihypertensive        Code Status:  Full code  Prophylaxis: SCD/SHEYLA due to bleed    Discharge plan and follow up - d/c home once medically stable    Cy Amador MD  Hospital Medicine Staff  Pager [phone]

## 2018-12-07 NOTE — PROGRESS NOTES
Dr. Garcia was at bedside previously speaking to pt, son and girlfriend regarding procedure and that biopsies were taken. Pt and family's questions addressed by MD, pt will be transported back to 11 th floor. Dr. Garcia will follow up with hospital ist and cardiology team to discuss pt plan of care.

## 2018-12-07 NOTE — ANESTHESIA PREPROCEDURE EVALUATION
12/07/2018  Norm Mendoza is a 89 y.o., male.    Anesthesia type: General/MAC   Diagnosis:       Symptomatic anemia [D64.9]      Melena [K92.1]       Pre-operative evaluation for Procedure(s) (LRB):  EGD (ESOPHAGOGASTRODUODENOSCOPY) (N/A)    Review of patient's allergies indicates:  No Known Allergies    No current facility-administered medications on file prior to encounter.      Current Outpatient Medications on File Prior to Encounter   Medication Sig Dispense Refill    apixaban (ELIQUIS) 5 mg Tab Take 1 tablet (5 mg total) by mouth 2 (two) times daily. 60 tablet 0    aspirin 81 MG Chew Take 81 mg by mouth once daily.      psyllium (METAMUCIL) packet Take 1 packet by mouth once daily.      valsartan (DIOVAN) 80 MG tablet Take 80 mg by mouth once daily.      ACIDOPHILUS/PECTIN, CITRUS (ACIDOPHILUS PROBIOTIC ORAL) Take 1 capsule by mouth once daily.      guaiFENesin (MUCINEX) 1,200 mg Ta12 1 Tab 1-2x/day for cough/congestion 20 tablet 0       Patient Active Problem List   Diagnosis    AK (actinic keratosis)    History of benign bladder tumor    H/O nonmelanoma skin cancer    Aortic atherosclerosis    Essential hypertension    Left ventricular diastolic dysfunction with preserved systolic function    Nonrheumatic aortic valve stenosis    Alcohol dependence, daily use    Mixed hyperlipidemia    Aortic root dilation    Hypercholesterolemia    PVC's (premature ventricular contractions)    Ectatic aorta    Tortuous aorta    Paroxysmal atrial fibrillation    Symptomatic anemia    GIB (gastrointestinal bleeding)       Past Surgical History:   Procedure Laterality Date    ACHILLES TENDON SURGERY      cataracts      CYSTOSCOPY      bladder tumor    CYSTOSCOPY N/A 3/28/2014    [facility] 1ST FLR    DILATION, URETHRA N/A 3/28/2014    Per[facility] by  [facility] at [facility] OR [address] [place] [address]    EXCISION-BLADDER TUMOR-TRANSURETHRAL (TURBT) N/A 3/28/2014    Performed by [facility] at [facility] OR [address] [place] [address]    EYE SURGERY      SKIN CANCER EXCISION             Recent Labs     12/07/18  0856   HCT 27.0*     Recent Labs     12/07/18  0856        Recent Labs     12/07/18  0856   K 4.1     Recent Labs     12/07/18  0856   CREATININE 0.8     Recent Labs     12/07/18  0856        No results for input(s): PT in the last 72 hours.                    Anesthesia Evaluation         Review of Systems  Anesthesia Hx:  No problems with previous Anesthesia   Social:  Non-Smoker, Alcohol Use    Hematology/Oncology:  Hematology Normal   Oncology Normal     Cardiovascular:   Hypertension, well controlled Denies MI.  Dysrhythmias atrial fibrillation  Denies Angina. Very active without limitations.   Pulmonary:  Pulmonary Normal  Denies COPD.  Denies Asthma.  Denies Shortness of breath.    Renal/:   Denies Chronic Renal Disease.     Hepatic/GI:   Denies Liver Disease.    Neurological:   Denies TIA. Denies CVA. Denies Seizures.    Endocrine:   Denies Diabetes.        Physical Exam  General:  Well nourished    Airway/Jaw/Neck:  Airway Findings: Mouth Opening: Normal Tongue: Normal  General Airway Assessment: Adult, Average  Mallampati: II  TM Distance: Normal, at least 6 cm  Jaw/Neck Findings:  Neck ROM: Normal ROM       Chest/Lungs:  Chest/Lungs Findings: Clear to auscultation     Heart/Vascular:  Heart Findings: Rate: Normal  Rhythm: Irregularly Irregular  Sounds: Normal  Heart Murmur  Systolic  Systolic Heart Murmur Grade: Grade II        Mental Status:  Mental Status Findings:  Cooperative, Alert and Oriented         Anesthesia Plan  Type of Anesthesia, risks & benefits discussed:  Anesthesia Type:  general  Patient's Preference:   Intra-op Monitoring Plan:   Intra-op Monitoring Plan Comments:   Post Op Pain Control Plan:   Post Op Pain Control Plan Comments: As  per surgeon's plan  Induction:   IV  Beta Blocker:  Patient is not currently on a Beta-Blocker (No further documentation required).       Informed Consent: Patient understands risks and agrees with Anesthesia plan.  Questions answered. Anesthesia consent signed with patient.  ASA Score: 3     Day of Surgery Review of History & Physical:    H&P update referred to the surgeon.         Ready For Surgery From Anesthesia Perspective.     CONCLUSIONS     1 - Normal left ventricular systolic function (EF 55-60%).     2 - Indeterminate LV diastolic function.     3 - Biatrial enlargement.     4 - No wall motion abnormalities.     5 - Normal right ventricular systolic function .     6 - Mildly enlarged ascending aorta.     7 - Trivial to mild aortic regurgitation.     8 - Mild mitral regurgitation.     9 - Mild tricuspid regurgitation.     10 - Trivial to mild pulmonic regurgitation.     11 - The estimated PA systolic pressure is 33 mmHg.

## 2018-12-07 NOTE — TRANSFER OF CARE
Anesthesia Transfer of Care Note    Patient: Norm Mendoza    Procedure(s) Performed: Procedure(s) (LRB):  EGD (ESOPHAGOGASTRODUODENOSCOPY) (N/A)    Patient location: [facility]    Anesthesia Type: general and MAC    Transport from OR: Transported from OR on 2-3 L/min O2 by NC with adequate spontaneous ventilation    Post pain: adequate analgesia    Post assessment: no apparent anesthetic complications    Post vital signs: stable    Level of consciousness: awake    Nausea/Vomiting: no nausea/vomiting    Complications: none    Transfer of care protocol was followed      Last vitals:   Visit Vitals  /79   Pulse 78   Temp 36.7 °C (98 °F)   Resp 18   Ht 6' (1.829 m)   Wt 81 kg (178 lb 9.2 oz)   SpO2 99%   BMI 24.22 kg/m²

## 2018-12-07 NOTE — TREATMENT PLAN
Treatment Plan  12/06/2018  10:08 PM    89 year old male with a history of HTN, HLD, PAF (last dose of AC last night), and alcohol dependence (no mention of cirrhosis with platelets and INR within normal limits) currently admitted for symptomatic anemia secondary to a suspected GI bleed. Per primary team patient patient with dizziness/LH as well as melena for a couple of days (Eliquis started on 12/1).    Vitals:  BP (!) 131/95   Pulse 100   Temp 97.4 °F (36.3 °C)   Resp 15   Ht 6' (1.829 m)   Wt 81 kg (178 lb 9.2 oz)   SpO2 99%   BMI 24.22 kg/m²     Labs:   12/6/2018 13:15   Sodium 140   Potassium 4.1   Chloride 110   CO2 22 (L)   Anion Gap 8   BUN, Bld 39 (H)   Creatinine 0.9   eGFR if non African American >60.0   eGFR if African American >60.0   Glucose 126 (H)   Calcium 8.6 (L)   Alkaline Phosphatase 40 (L)   Total Protein 5.8 (L)   Albumin 3.0 (L)   Total Bilirubin 0.2   AST 18   ALT 19      12/6/2018 13:15   WBC 8.62   RBC 2.45 (L)   Hemoglobin 7.8 (L) from 12.6 on 11/30/18   Hematocrit 24.5 (L)    (H)   MCH 31.8 (H)   MCHC 31.8 (L)   RDW 14.5   Platelets 266     INR 1.0    No prior EGD with last colonoscopy 6 years ago and normal per patient.  Currently AC is being held. He is on a PPI and is being transfused to 2 units.    Recommendations:  -Clear liquid diet today and Keep NPO past midnight  -Maintain IV access with 2 large bore IVs  -Intravascular resuscitation/support with IVFs   -Serial H/H's and pRBCs transfusion as indicated  -PPI IV BID  -Discontinue all NSAIDs and Heparin products  -Please correct any coagulopathy with platelets and FFP to a goal of platelets >50K and INR <2.0  -Please promptly notify GI team if there is significant change in patient's clinical status    Tabby Bravo M.D.  Gastroenterology Fellow, PGY-V  Pager: [phone]  [facility]

## 2018-12-07 NOTE — PROVATION PATIENT INSTRUCTIONS
Discharge Summary/Instructions after an Endoscopic Procedure  Patient Name: Norm Mendoza  Patient MRN: [id number]  Patient YOB: 1926 Friday, December 07, 2018  Austin Garcia MD  RESTRICTIONS:  During your procedure today, you received medications for sedation.  These   medications may affect your judgment, balance and coordination.  Therefore,   for 24 hours, you have the following restrictions:   - DO NOT drive a car, operate machinery, make legal/financial decisions,   sign important papers or drink alcohol.    ACTIVITY:  Today: no heavy lifting, straining or running due to procedural   sedation/anesthesia.  The following day: return to full activity including work.  DIET:  Eat and drink normally unless instructed otherwise.     TREATMENT FOR COMMON SIDE EFFECTS:  - Mild abdominal pain, nausea, belching, bloating or excessive gas:  rest,   eat lightly and use a heating pad.  - Sore Throat: treat with throat lozenges and/or gargle with warm salt   water.  - Because air was used during the procedure, expelling large amounts of air   from your rectum or belching is normal.  - If a bowel prep was taken, you may not have a bowel movement for 1-3 days.    This is normal.  SYMPTOMS TO WATCH FOR AND REPORT TO YOUR PHYSICIAN:  1. Abdominal pain or bloating, other than gas cramps.  2. Chest pain.  3. Back pain.  4. Signs of infection such as: chills or fever occurring within 24 hours   after the procedure.  5. Rectal bleeding, which would show as bright red, maroon, or black stools.   (A tablespoon of blood from the rectum is not serious, especially if   hemorrhoids are present.)  6. Vomiting.  7. Weakness or dizziness.  GO DIRECTLY TO THE NEAREST EMERGENCY ROOM IF YOU HAVE ANY OF THE FOLLOWING:      Difficulty breathing              Chills and/or fever over 101 F   Persistent vomiting and/or vomiting blood   Severe abdominal pain   Severe chest pain   Black, tarry stools   Bleeding- more than one  tablespoon   Any other symptom or condition that you feel may need urgent attention  Your doctor recommends these additional instructions:  If any biopsies were taken, your doctors clinic will contact you in 1 to 2   weeks with any results.  - Return patient to [facility] for ongoing care.   - Resume previous diet.   - Use Protonix (pantoprazole) 40 mg PO BID for 3 days and then decrease to   daily.  - Hold Eliquis for now.  Would revisit need for anticoagulation.  - Await pathology results.   - Repeat upper endoscopy pending pathology results  - The findings and recommendations were discussed with the patient.   For questions, problems or results please call your physician - Austin Garcia MD at Work:  [phone].  [facility], EMERGENCY ROOM PHONE NUMBER: [phone]  IF A COMPLICATION OR EMERGENCY SITUATION ARISES AND YOU ARE UNABLE TO REACH   YOUR PHYSICIAN - GO DIRECTLY TO THE EMERGENCY ROOM.  Austin Garcia MD  12/7/2018 12:05:06 PM  This report has been verified and signed electronically.  [employer]

## 2018-12-07 NOTE — PLAN OF CARE
Problem: Patient Care Overview  Goal: Plan of Care Review  Outcome: Ongoing (interventions implemented as appropriate)  Patient remained in stable condition through shift. Received 2 units of PRBC's. Remained free of falls. Remained NPO except sips of water with medications last night. Bed in locked and lowest position, call light in reach, all questions answered, declines any further needs at this time. Will continue to monitor.

## 2018-12-07 NOTE — TREATMENT PLAN
Treatment Plan  12/07/2018  12:33 PM    EGD completed with impression and recs below.    Impression:             - Normal esophagus.  - 1 cm type-I sliding hiatal hernia.  - Non-bleeding gastric ulcer with a flat pigmented spot (Saurabh Class IIc). Biopsied.  - Non-bleeding gastric ulcers with a clean ulcer base (Saurabh Class III). Biopsied.  - Normal greater curvature of the gastric body and antrum.  - Normal examined duodenum.  - He is at high-risk for recurrent bleeding if anticoagulation is restarted now.    Recommendation:         - Return patient to hospital [facility] for ongoing care.  - Resume previous diet.  - Use Protonix (pantoprazole) 40 mg PO BID for 3 days and then decrease to daily.  - Hold Eliquis for now. Would revisit need for anticoagulation.  - Await pathology results.  - Repeat upper endoscopy pending pathology results  - The findings and recommendations were discussed with the patient.    Tabby Bravo M.D.  Gastroenterology Fellow, PGY-V  Pager: [phone]  [facility]

## 2018-12-07 NOTE — PLAN OF CARE
SW assigned to case today 12/7/2018. SW will assist team with DC needs. LUANN in communication with CM.    Emerita Buchanan LMSW  [facility]  [id number]

## 2018-12-07 NOTE — ANESTHESIA RELEASE NOTE
Anesthesia Release from PACU Note    Patient: Norm Mendoza    Procedure(s) Performed: Procedure(s) (LRB):  EGD (ESOPHAGOGASTRODUODENOSCOPY) (N/A)    Anesthesia type: GEN    Post pain: Adequate analgesia reported    Post assessment: no apparent anesthetic complications, tolerated procedure well and no evidence of recall    Post vital signs: /76 (BP Location: Left arm, Patient Position: Lying)   Pulse 87   Temp 36.4 °C (97.5 °F) (Temporal)   Resp 18   Ht 6' (1.829 m)   Wt 81 kg (178 lb 9.2 oz)   SpO2 98%   BMI 24.22 kg/m²     Level of consciousness: awake, alert and oriented    Nausea/Vomiting: no nausea/no vomiting    Complications: none    Airway Patency: patent    Respiratory: unassisted, spontaneous ventilation, room air    Cardiovascular: stable and blood pressure at baseline    Hydration: euvolemic

## 2018-12-07 NOTE — ED NOTES
Telemetry Verification   Patient placed on Telemetry Box  Verified on War Room monitor  Box [id number]   Monitor Tech  Tia   Rate 115   Rhythm A fib

## 2018-12-07 NOTE — ANESTHESIA POSTPROCEDURE EVALUATION
Anesthesia Post Evaluation    Patient: Norm Mendoza    Procedure(s) Performed: Procedure(s) (LRB):  EGD (ESOPHAGOGASTRODUODENOSCOPY) (N/A)    Final Anesthesia Type: general  Patient location during evaluation: PACU  Patient participation: Yes- Able to Participate  Level of consciousness: awake and alert and oriented  Post-procedure vital signs: reviewed and stable  Pain management: adequate  Airway patency: patent  PONV status at discharge: No PONV  Anesthetic complications: no      Cardiovascular status: stable  Respiratory status: unassisted, spontaneous ventilation and room air  Hydration status: euvolemic  Follow-up not needed.        Visit Vitals  /76 (BP Location: Left arm, Patient Position: Lying)   Pulse 87   Temp 36.4 °C (97.5 °F) (Temporal)   Resp 18   Ht 6' (1.829 m)   Wt 81 kg (178 lb 9.2 oz)   SpO2 98%   BMI 24.22 kg/m²       Pain/Sergio Score: Pain Assessment Performed: Yes (12/7/2018 12:15 PM)  Presence of Pain: denies (12/7/2018 12:15 PM)

## 2018-12-07 NOTE — SUBJECTIVE & OBJECTIVE
Past Medical History:   Diagnosis Date    Alcohol dependence, daily use 2017    Allergy     Bladder cancer     tumor that was benign     Hematuria     Hypertension     Mixed hyperlipidemia 2018    Paroxysmal atrial fibrillation 2018    Skin cancer     x3, had mohs on nose    Squamous cell carcinoma excised 14    R lower back    Symptomatic anemia 2018    Urinary tract infection     x4       Past Surgical History:   Procedure Laterality Date    ACHILLES TENDON SURGERY      cataracts      CYSTOSCOPY      bladder tumor    CYSTOSCOPY N/A 3/28/2014    Performed by Sl[facility] Barbosa MD at [facility] [place]    DILATION, URETHRA N/A 3/28/2014    [facility] at [facility]    EXCISION-BLADDER TUMOR-TRANSURETHRAL (TURBT) N/A 3/28/2014    Performed by [facility]osa MD at [facility] OR [address] [place] [address]    EYE SURGERY      SKIN CANCER EXCISION         Review of patient's allergies indicates:  No Known Allergies  Family History     Problem Relation (Age of Onset)    Stroke Father, Brother        Tobacco Use    Smoking status: Former Smoker     Packs/day: 1.00     Years: 25.00     Pack years: 25.00     Types: Cigarettes     Last attempt to quit: 9/3/1970     Years since quittin.2    Smokeless tobacco: Never Used   Substance and Sexual Activity    Alcohol use: Yes     Alcohol/week: 1.8 oz     Types: 3 Shots of liquor per week     Comment: 3 bourbons daily - 12 beer on weekends     Drug use: No    Sexual activity: Yes     Partners: Female     Review of Systems   Constitutional: Positive for activity change. Negative for appetite change and fever.   HENT: Negative for trouble swallowing.    Eyes: Negative for visual disturbance.   Respiratory: Positive for shortness of breath. Negative for cough.    Cardiovascular: Negative for chest pain.   Gastrointestinal: Positive for blood in stool. Negative for abdominal distention, abdominal pain, anal bleeding,  constipation, diarrhea, nausea, rectal pain and vomiting.   Genitourinary: Negative for flank pain.   Musculoskeletal: Negative for arthralgias and back pain.   Skin: Negative for color change.   Allergic/Immunologic: Negative for immunocompromised state.   Neurological: Positive for dizziness, syncope and light-headedness.   Psychiatric/Behavioral: Negative for confusion.     Objective:     Vital Signs (Most Recent):  Temp: 98 °F (36.7 °C) (12/07/18 1012)  Pulse: 78 (12/07/18 1012)  Resp: 18 (12/07/18 1012)  BP: 138/79 (12/07/18 1012)  SpO2: 99 % (12/07/18 1012) Vital Signs (24h Range):  Temp:  [97.4 °F (36.3 °C)-98.4 °F (36.9 °C)] 98 °F (36.7 °C)  Pulse:  [] 78  Resp:  [13-25] 18  SpO2:  [92 %-100 %] 99 %  BP: (113-165)/(66-95) 138/79     Weight: 81 kg (178 lb 9.2 oz) (12/06/18 2000)  Body mass index is 24.22 kg/m².      Intake/Output Summary (Last 24 hours) at 12/7/2018 1032  Last data filed at 12/7/2018 0400  Gross per 24 hour   Intake 1357 ml   Output --   Net 1357 ml       Lines/Drains/Airways     Peripheral Intravenous Line                 Peripheral IV - Single Lumen -- days         Peripheral IV - Single Lumen 12/06/18 1355 Left Forearm less than 1 day         Peripheral IV - Single Lumen 12/06/18 1801 Left Hand less than 1 day                Physical Exam   Constitutional: He is oriented to person, place, and time. He appears well-developed and well-nourished. No distress.   HENT:   Head: Normocephalic.   Eyes: Conjunctivae are normal. No scleral icterus.   Neck: Normal range of motion. Neck supple.   Cardiovascular: Normal rate and regular rhythm.   Pulmonary/Chest: Effort normal and breath sounds normal.   Abdominal: Soft. Bowel sounds are normal. He exhibits no distension and no mass. There is no tenderness. There is no guarding.   Musculoskeletal: Normal range of motion.   Neurological: He is alert and oriented to person, place, and time.   Skin: Skin is warm and dry. There is pallor.    Psychiatric: He has a normal mood and affect.       Significant Labs:  CBC:   Recent Labs   Lab 12/06/18  1315 12/06/18  1936 12/07/18  0012 12/07/18  0856   WBC 8.62  --   --  6.79   HGB 7.8* 7.1* 6.5* 8.7*  8.6*   HCT 24.5*  --   --  27.0*     --   --  217     BMP:   Recent Labs   Lab 12/07/18  0856         K 4.1   *   CO2 23   BUN 26   CREATININE 0.8   CALCIUM 8.4*     CMP:   Recent Labs   Lab 12/06/18  1315 12/07/18  0856   * 108   CALCIUM 8.6* 8.4*   ALBUMIN 3.0*  --    PROT 5.8*  --     141   K 4.1 4.1   CO2 22* 23    111*   BUN 39* 26   CREATININE 0.9 0.8   ALKPHOS 40*  --    ALT 19  --    AST 18  --    BILITOT 0.2  --      Coagulation:   Recent Labs   Lab 12/06/18  1315   INR 1.0     Lipase: No results for input(s): LIPASE in the last 48 hours.    Significant Imaging:  Imaging results within the past 24 hours have been reviewed.

## 2018-12-08 LAB
ANION GAP SERPL CALC-SCNC: 5 MMOL/L
BUN SERPL-MCNC: 19 MG/DL
CALCIUM SERPL-MCNC: 8.2 MG/DL
CHLORIDE SERPL-SCNC: 113 MMOL/L
CO2 SERPL-SCNC: 23 MMOL/L
CREAT SERPL-MCNC: 0.8 MG/DL
EST. GFR  (AFRICAN AMERICAN): >60 ML/MIN/1.73 M^2
EST. GFR  (NON AFRICAN AMERICAN): >60 ML/MIN/1.73 M^2
GLUCOSE SERPL-MCNC: 98 MG/DL
HGB BLD-MCNC: 9 G/DL
HGB BLD-MCNC: 9.3 G/DL
POTASSIUM SERPL-SCNC: 3.8 MMOL/L
SODIUM SERPL-SCNC: 141 MMOL/L

## 2018-12-08 PROCEDURE — 11000001 HC ACUTE MED/SURG PRIVATE ROOM: Mod: HCNC

## 2018-12-08 PROCEDURE — A4216 STERILE WATER/SALINE, 10 ML: HCPCS | Mod: HCNC | Performed by: INTERNAL MEDICINE

## 2018-12-08 PROCEDURE — 36415 COLL VENOUS BLD VENIPUNCTURE: CPT | Mod: HCNC

## 2018-12-08 PROCEDURE — 80048 BASIC METABOLIC PNL TOTAL CA: CPT | Mod: HCNC

## 2018-12-08 PROCEDURE — 25000003 PHARM REV CODE 250: Mod: HCNC | Performed by: INTERNAL MEDICINE

## 2018-12-08 PROCEDURE — 85018 HEMOGLOBIN: CPT | Mod: HCNC

## 2018-12-08 PROCEDURE — 99233 SBSQ HOSP IP/OBS HIGH 50: CPT | Mod: HCNC,,, | Performed by: INTERNAL MEDICINE

## 2018-12-08 RX ADMIN — PANTOPRAZOLE SODIUM 40 MG: 40 TABLET, DELAYED RELEASE ORAL at 09:12

## 2018-12-08 RX ADMIN — THERA TABS 1 TABLET: TAB at 09:12

## 2018-12-08 RX ADMIN — FOLIC ACID 1 MG: 1 TABLET ORAL at 09:12

## 2018-12-08 RX ADMIN — Medication 5 ML: at 09:12

## 2018-12-08 RX ADMIN — Medication 100 MG: at 09:12

## 2018-12-08 NOTE — PLAN OF CARE
Problem: Patient Care Overview  Goal: Plan of Care Review  Outcome: Ongoing (interventions implemented as appropriate)  Patient remained in stable condition through shift. Tolerated full liquid diet well, no complaints of nausea. Per orders, advanced diet, placed orders for cardiac diet. Remained free of falls. Slept through the night. Educated patient and significant other on how to order breakfast and wrote dietary number on the whiteboard for patient/family to call as they please. Bed in locked and lowest position, call light in reach, all questions answered, declines any further needs at this time. Will continue to monitor.

## 2018-12-08 NOTE — PROGRESS NOTES
[facility] Hospital Medicine                                                                     Progress Note     Team: Networked reference to record PCT  Cy Amador MD   Admit Date: 12/6/2018   Hospital Day: 1  AUTUMN: 12/10/2018   Code status: Full Code   Principal Problem: Symptomatic anemia       SUMMARY:     Mr Norm Mendoza is a 89 y.o. man with hx of recently diagnosed atrial fibrillation on Eliquis, hypertension and alcohol dependence presenting with several day hx of dark stool since starting Eliquis 12/1 with associated dizziness, lightheadedness and generalized weakness. He presented to his cardiologist office, and was found to be anemic around 9.3 from his baseline ~13. Patient has several dark stool since Sunday but has not mentioned to her cardiologist or pcp until labs revealed anemia. He took his last dose of Eliquis 12/5 evening. He takes baby aspirin, but denies any NSAIDS use. He drinks about 4-5 alcoholic beverages daily. Denies any syncope, cp, falls, dyspnea, N/V, abdominal pain, coffee ground emesis, bright blood in stool or bleeds anywhere else. He has no hx of EDG in the past and no ulcers. No recent trauma. He has been healthy otherwise w/o any recent hospitalization. His last colonoscopy was 6 years ago, and was normal as per patient. Patient was to start on Eliquis then get JACINTO cardioversion.    In the ED patient was noted to have anemia from baseline, and trending down. Admitted to hospital medicine. PPI and 2 units pRBC given.       SUBJECTIVE:     Pt was seen and examined at bedside. Pt had no acute events overnight, and no new complaints this morning. Pt remained hemodynamically stable and afebrile. NPO. Continued to have small dark BM. H/H 6.5 overnight, given 2 units pRBC. GI saw patient s/p EDG noting multiple ulcers  (refer to their notes). Advancing diet now. Overall stable alia.     Denies any nausea, vomiting, diarrhea, constipation, trouble urinating, fevers, chills, malaise, headaches, chest pain, SOB, cough. Pt has been able to ambulate without any distress.      ROS (Positive in Bold, otherwise negative)  Pain Scale: 0 /10   Constitutional: fever, chills, night sweats  CV: chest pain, edema, palpitations  Resp: SOB, cough, sputum production  GI: changes in appetite, NVDC, pain, melena, hematochezia, GERD, hematemesis  : Dysuria, hematuria, urinary urgency, frequency  MSK: arthralgia/myalgia, joint swelling  SKIN: rashes, pruritis, petechiae   Neuro/Psych, FNDm anxiety, depression      OBJECTIVE:     Vitals:  Temp:  [97.5 °F (36.4 °C)-98.4 °F (36.9 °C)]   Pulse:  []   Resp:  [13-20]   BP: (113-139)/(67-95)   SpO2:  [96 %-100 %]      I & O (Last 24H):     Intake/Output Summary (Last 24 hours) at 12/7/2018 1920  Last data filed at 12/7/2018 1132  Gross per 24 hour   Intake 557 ml   Output --   Net 557 ml       General appearance: no distress, cooperative, non toxic  Mental status: Alert and oriented x 3  HEENT:  Conjunctival pallor improved, PERRL  Neck: supple, thyroid not enlarged  Pulm:  normal respiratory effort, CTA B, no c/w/r  Card: iRRR, S1, S2 normal, DONALD, no click, rub or gallop  Abd: soft, NT, ND, BS present; no masses, no organomegaly  Ext: no c/c/e  Pulses: 2+, symmetric  Skin: improved pallor, adequate cap refill, no clubbing  Neuro: Cranial Nerves grossly intact, no focal numbness or weakness, normal strength and tone       All recent labs and imaging has been reviewed.     Recent Results (from the past 24 hour(s))   Hemoglobin    Collection Time: 12/06/18  7:36 PM   Result Value Ref Range    Hemoglobin 7.1 (L) 14.0 - 18.0 g/dL   Hemoglobin    Collection Time: 12/07/18 12:12 AM   Result Value Ref Range    Hemoglobin 6.5 (L) 14.0 - 18.0 g/dL   Basic Metabolic Panel (BMP)    Collection Time: 12/07/18   8:56 AM   Result Value Ref Range    Sodium 141 136 - 145 mmol/L    Potassium 4.1 3.5 - 5.1 mmol/L    Chloride 111 (H) 95 - 110 mmol/L    CO2 23 23 - 29 mmol/L    Glucose 108 70 - 110 mg/dL    BUN, Bld 26 10 - 30 mg/dL    Creatinine 0.8 0.5 - 1.4 mg/dL    Calcium 8.4 (L) 8.7 - 10.5 mg/dL    Anion Gap 7 (L) 8 - 16 mmol/L    eGFR if African American >60.0 >60 mL/min/1.73 m^2    eGFR if non African American >60.0 >60 mL/min/1.73 m^2   Hemoglobin    Collection Time: 12/07/18  8:56 AM   Result Value Ref Range    Hemoglobin 8.7 (L) 14.0 - 18.0 g/dL   CBC auto differential    Collection Time: 12/07/18  8:56 AM   Result Value Ref Range    WBC 6.79 3.90 - 12.70 K/uL    RBC 2.82 (L) 4.60 - 6.20 M/uL    Hemoglobin 8.6 (L) 14.0 - 18.0 g/dL    Hematocrit 27.0 (L) 40.0 - 54.0 %    MCV 96 82 - 98 fL    MCH 30.5 27.0 - 31.0 pg    MCHC 31.9 (L) 32.0 - 36.0 g/dL    RDW 17.4 (H) 11.5 - 14.5 %    Platelets 217 150 - 350 K/uL    MPV 9.2 9.2 - 12.9 fL    Immature Granulocytes 1.3 (H) 0.0 - 0.5 %    Gran # (ANC) 5.6 1.8 - 7.7 K/uL    Immature Grans (Abs) 0.09 (H) 0.00 - 0.04 K/uL    Lymph # 0.7 (L) 1.0 - 4.8 K/uL    Mono # 0.3 0.3 - 1.0 K/uL    Eos # 0.0 0.0 - 0.5 K/uL    Baso # 0.04 0.00 - 0.20 K/uL    nRBC 0 0 /100 WBC    Gran% 82.8 (H) 38.0 - 73.0 %    Lymph% 10.3 (L) 18.0 - 48.0 %    Mono% 4.9 4.0 - 15.0 %    Eosinophil% 0.1 0.0 - 8.0 %    Basophil% 0.6 0.0 - 1.9 %    Differential Method Automated        No results for input(s): POCTGLUCOSE in the last 168 hours.    No results found for: HGBA1C     Active Hospital Problems    Diagnosis  POA    *Symptomatic anemia [D64.9]  Yes    GIB (gastrointestinal bleeding) [K92.2]  Yes      Resolved Hospital Problems   No resolved problems to display.          ASSESSMENT AND PLAN:       Acute blood loss anemia from suspected UGIB  Hx of recent blood thinner with anemia and elevated BUN. Patient has recurrent episodes of melena with dizziness, and weakness, fortunately no syncope, falls or trauma.  He had no coffee ground emesis. H/H dropped down to 6.5 s/p 2 units of pRBC. GI consulted s/p EGD noting multiple non bleeding ulcers, biopsied.   - Continue to be HD stable   - Maintain 2 large bore IV  - Type and screen, consented  - Monitor H/H q8h  - Hemoglobin 13.4 --> 12.6 --> 9.3 --> 7.8 --> 7.1 --> 6.5 --> 2 pRBC --> 8.6  - Transfuse as needed generally if < 7 or symptomatic   - PPI 40 mg PO BID for 3 days then daily PPI  - Antiemetic PRN  - Advance diet    - Strict I/O  - Avoid NSAIDs and A/c for now  - Pending biopsy results   - PT OT     Atrial Fibrillation on Eliquis  - Currently in rate controlled atrial fibrillation  - He was taken off his BB recently by cardiology  - Avoiding BB currently which can mask worsening GIB  - Consider started once stable  - Will need to avoid AC for now, risk/benifits discussed with patient   - Last TSH wnl     Alcohol dependence  - Never had hx of withdrawal symptoms or seizures or hallucination but patient never had stopped drinking for more than 2 days in the past several years  - CIWA protocol with PRN lorazepam   - Folic acid, Thiamine, MV   - Counseled on abstinence      Hypertension  - Holding home antihypertensive for now        Code Status:  Full code  Prophylaxis: SCD/SHEYLA due to bleed     Discharge plan and follow up - d/c home once medically stable     Cy Amador MD  Hospital Medicine Staff  Pager [phone]

## 2018-12-08 NOTE — PROGRESS NOTES
[facility] Hospital Medicine                                                                     Progress Note     Team: [facility] HOSP MED R Cy Amador MD   Admit Date: 12/6/2018   Hospital Day: 2  AUTUMN: 12/10/2018   Code status: Full Code   Principal Problem: Symptomatic anemia     SUMMARY:     Mr Norm Mendoza is a 89 y.o. man with hx of recently diagnosed atrial fibrillation on Eliquis, hypertension and alcohol dependence presenting with several day hx of dark stool since starting Eliquis 12/1 with associated dizziness, lightheadedness and generalized weakness. He presented to his cardiologist office, and was found to be anemic around 9.3 from his baseline ~13. Patient has several dark stool since Sunday but has not mentioned to her cardiologist or pcp until labs revealed anemia. He took his last dose of Eliquis 12/5 evening. He takes baby aspirin, but denies any NSAIDS use. He drinks about 4-5 alcoholic beverages daily. Denies any syncope, cp, falls, dyspnea, N/V, abdominal pain, coffee ground emesis, bright blood in stool or bleeds anywhere else. He has no hx of EDG in the past and no ulcers. No recent trauma. He has been healthy otherwise w/o any recent hospitalization. His last colonoscopy was 6 years ago, and was normal as per patient. Patient was to start on Eliquis then get JACINTO cardioversion.    In the ED patient was noted to have anemia from baseline, and trending down. Admitted to hospital medicine. PPI and 2 units pRBC given. S/p EGD 12/7 noting multiple ulcers.       SUBJECTIVE:     Pt was seen and examined at bedside. Pt had no acute events overnight, and no new complaints this morning. Pt remained hemodynamically stable and afebrile. Diet advanced, and tolerated regular diet now. Continues to have small dark stool but much improved, no bright  bloody stool noted. H/H stable. Denies any nausea, vomiting, diarrhea, constipation, trouble urinating, fevers, chills, malaise, headaches, chest pain, SOB, cough. Pt has been able to ambulate without any distress.    ROS (Positive in Bold, otherwise negative)  Pain Scale: 0 /10  Constitutional: fever, chills, night sweats  CV: chest pain, edema, palpitations  Resp: SOB, cough, sputum production  GI: changes in appetite, NVDC, pain, melena, hematochezia, GERD, hematemesis  : Dysuria, hematuria, urinary urgency, frequency  MSK: arthralgia/myalgia, joint swelling  SKIN: rashes, pruritis, petechiae   Neuro/Psych: FND, anxiety, depression      OBJECTIVE:     Vitals:  Temp:  [97.9 °F (36.6 °C)-98.9 °F (37.2 °C)]   Pulse:  []   Resp:  [14-21]   BP: ()/(63-71)   SpO2:  [96 %-99 %]      I & O (Last 24H):     Intake/Output Summary (Last 24 hours) at 12/8/2018 1727  Last data filed at 12/8/2018 0500  Gross per 24 hour   Intake 600 ml   Output --   Net 600 ml     General appearance: no distress, cooperative, non toxic  Mental status: Alert and oriented x 3  HEENT:  No conjunctival pallor , PERRL  Neck: supple, thyroid not enlarged  Pulm:  normal respiratory effort, CTA B, no c/w/r  Card: iRRR, S1, S2 normal, DONALD, no click, rub or gallop  Abd: soft, NT, ND, BS present; no masses, no organomegaly  Ext: no c/c/e  Pulses: 2+, symmetric  Skin: no pallor, adequate cap refill, no clubbing  Neuro: Cranial Nerves grossly intact, no focal numbness or weakness, normal strength and tone       All recent labs and imaging has been reviewed.     Recent Results (from the past 24 hour(s))   Hemoglobin    Collection Time: 12/07/18  8:18 PM   Result Value Ref Range    Hemoglobin 8.5 (L) 14.0 - 18.0 g/dL   Hemoglobin    Collection Time: 12/07/18 11:24 PM   Result Value Ref Range    Hemoglobin 8.2 (L) 14.0 - 18.0 g/dL   Basic Metabolic Panel (BMP)    Collection Time: 12/08/18  5:52 AM   Result Value Ref Range    Sodium 141 136 - 145  mmol/L    Potassium 3.8 3.5 - 5.1 mmol/L    Chloride 113 (H) 95 - 110 mmol/L    CO2 23 23 - 29 mmol/L    Glucose 98 70 - 110 mg/dL    BUN, Bld 19 10 - 30 mg/dL    Creatinine 0.8 0.5 - 1.4 mg/dL    Calcium 8.2 (L) 8.7 - 10.5 mg/dL    Anion Gap 5 (L) 8 - 16 mmol/L    eGFR if African American >60.0 >60 mL/min/1.73 m^2    eGFR if non African American >60.0 >60 mL/min/1.73 m^2   Hemoglobin    Collection Time: 12/08/18  9:51 AM   Result Value Ref Range    Hemoglobin 9.3 (L) 14.0 - 18.0 g/dL   Hemoglobin    Collection Time: 12/08/18  3:13 PM   Result Value Ref Range    Hemoglobin 9.0 (L) 14.0 - 18.0 g/dL       No results for input(s): POCTGLUCOSE in the last 168 hours.    No results found for: HGBA1C     Active Hospital Problems    Diagnosis  POA    *Symptomatic anemia [D64.9]  Yes    GIB (gastrointestinal bleeding) [K92.2]  Yes      Resolved Hospital Problems   No resolved problems to display.          ASSESSMENT AND PLAN:       Acute blood loss anemia from suspected UGIB  Hx of recent blood thinner with anemia and elevated BUN. Patient has recurrent episodes of melena with dizziness, and weakness, fortunately no syncope, falls or trauma. He had no coffee ground emesis. H/H dropped down to 6.5 s/p 2 units of pRBC. GI consulted s/p EGD noting multiple non bleeding ulcers, biopsied.   - Continue to be HD stable   - Maintain 2 large bore IV  - Type and screen, consented  - Monitor H/H q8h  - Hemoglobin 13.4 --> 9.3 --> 7.8 --> 7.1 --> 6.5 --> 2 pRBC --> 8.6 --> 8.2 --> 9 - stabilizing   - Transfuse as needed generally if < 7 or symptomatic   - PPI 40 mg PO BID for 2 more days then daily PPI  - Antiemetic PRN  - Cardiac diet  - Strict I/O  - Avoid NSAIDs and A/c for now   - Pending biopsy results   - PT OT     Atrial Fibrillation on Eliquis  - Currently in rate controlled atrial fibrillation. TSH wnl.   - He was taken off his BB recently by cardiology due to sluggishness episodes   - Avoiding restarting BB currently which  can mask worsening GIB  - Will need to avoid AC for now, risk/benifits discussed with patient   - Last TSH wnl  - Stroke education provided   - Alcohol abstinence endorsed  - HADBLED score - 4 pt - 8.9% risk per year for major bleed  - CHADvasc score - 3 pt - 3.2% risk per year for stroke and 4.6% risk of stroke/TIA/systemic embolism    Alcohol dependence  - Never had hx of withdrawal symptoms or seizures or hallucination but patient never had stopped drinking for more than 2 days in the past several years  - CIWA protocol with PRN lorazepam   - Folic acid, Thiamine, MV   - Counseled on abstinence      Hypertension  - Holding home antihypertensive for now        Code Status:  Full code  Prophylaxis: SCD/SHEYLA due to bleed     Discharge plan and follow up - d/c home once medically stable     Cy Amador MD  Hospital Medicine Staff  Pager [phone]

## 2018-12-09 VITALS
RESPIRATION RATE: 16 BRPM | DIASTOLIC BLOOD PRESSURE: 79 MMHG | HEART RATE: 85 BPM | OXYGEN SATURATION: 98 % | HEIGHT: 72 IN | BODY MASS INDEX: 25.08 KG/M2 | SYSTOLIC BLOOD PRESSURE: 131 MMHG | WEIGHT: 185.19 LBS | TEMPERATURE: 98 F

## 2018-12-09 PROBLEM — D64.9 SYMPTOMATIC ANEMIA: Status: RESOLVED | Noted: 2018-12-06 | Resolved: 2018-12-09

## 2018-12-09 PROBLEM — K92.2 GIB (GASTROINTESTINAL BLEEDING): Status: RESOLVED | Noted: 2018-12-06 | Resolved: 2018-12-09

## 2018-12-09 LAB
ANION GAP SERPL CALC-SCNC: 7 MMOL/L
BASOPHILS # BLD AUTO: 0.04 K/UL
BASOPHILS NFR BLD: 0.7 %
BUN SERPL-MCNC: 20 MG/DL
CALCIUM SERPL-MCNC: 8.2 MG/DL
CHLORIDE SERPL-SCNC: 110 MMOL/L
CO2 SERPL-SCNC: 23 MMOL/L
CREAT SERPL-MCNC: 0.9 MG/DL
DIFFERENTIAL METHOD: ABNORMAL
EOSINOPHIL # BLD AUTO: 0 K/UL
EOSINOPHIL NFR BLD: 0.2 %
ERYTHROCYTE [DISTWIDTH] IN BLOOD BY AUTOMATED COUNT: 18 %
EST. GFR  (AFRICAN AMERICAN): >60 ML/MIN/1.73 M^2
EST. GFR  (NON AFRICAN AMERICAN): >60 ML/MIN/1.73 M^2
GLUCOSE SERPL-MCNC: 112 MG/DL
HCT VFR BLD AUTO: 26.7 %
HGB BLD-MCNC: 8.5 G/DL
HGB BLD-MCNC: 8.7 G/DL
IMM GRANULOCYTES # BLD AUTO: 0.07 K/UL
IMM GRANULOCYTES NFR BLD AUTO: 1.2 %
LYMPHOCYTES # BLD AUTO: 0.8 K/UL
LYMPHOCYTES NFR BLD: 13.3 %
MCH RBC QN AUTO: 31.4 PG
MCHC RBC AUTO-ENTMCNC: 31.8 G/DL
MCV RBC AUTO: 99 FL
MONOCYTES # BLD AUTO: 0.6 K/UL
MONOCYTES NFR BLD: 10.1 %
NEUTROPHILS # BLD AUTO: 4.4 K/UL
NEUTROPHILS NFR BLD: 74.5 %
NRBC BLD-RTO: 0 /100 WBC
PLATELET # BLD AUTO: 229 K/UL
PMV BLD AUTO: 9.5 FL
POTASSIUM SERPL-SCNC: 3.8 MMOL/L
RBC # BLD AUTO: 2.71 M/UL
SODIUM SERPL-SCNC: 140 MMOL/L
WBC # BLD AUTO: 5.85 K/UL

## 2018-12-09 PROCEDURE — 80048 BASIC METABOLIC PNL TOTAL CA: CPT | Mod: HCNC

## 2018-12-09 PROCEDURE — 99900038 HC OT GENERIC THERAPY SCREENING (STAT): Mod: HCNC

## 2018-12-09 PROCEDURE — 25000003 PHARM REV CODE 250: Mod: HCNC | Performed by: INTERNAL MEDICINE

## 2018-12-09 PROCEDURE — 36415 COLL VENOUS BLD VENIPUNCTURE: CPT | Mod: HCNC

## 2018-12-09 PROCEDURE — 85018 HEMOGLOBIN: CPT | Mod: HCNC

## 2018-12-09 PROCEDURE — 85025 COMPLETE CBC W/AUTO DIFF WBC: CPT | Mod: HCNC

## 2018-12-09 PROCEDURE — 99239 HOSP IP/OBS DSCHRG MGMT >30: CPT | Mod: HCNC,,, | Performed by: INTERNAL MEDICINE

## 2018-12-09 RX ORDER — METOPROLOL SUCCINATE 25 MG/1
25 TABLET, EXTENDED RELEASE ORAL DAILY
Qty: 30 TABLET | Refills: 11 | Status: SHIPPED | OUTPATIENT
Start: 2018-12-09 | End: 2019-01-25 | Stop reason: SDUPTHER

## 2018-12-09 RX ORDER — PANTOPRAZOLE SODIUM 40 MG/1
40 TABLET, DELAYED RELEASE ORAL DAILY
Qty: 32 TABLET | Refills: 11 | Status: SHIPPED | OUTPATIENT
Start: 2018-12-09 | End: 2019-03-22 | Stop reason: SDUPTHER

## 2018-12-09 RX ADMIN — FOLIC ACID 1 MG: 1 TABLET ORAL at 09:12

## 2018-12-09 RX ADMIN — PANTOPRAZOLE SODIUM 40 MG: 40 TABLET, DELAYED RELEASE ORAL at 09:12

## 2018-12-09 RX ADMIN — THERA TABS 1 TABLET: TAB at 09:12

## 2018-12-09 RX ADMIN — Medication 100 MG: at 09:12

## 2018-12-09 NOTE — PLAN OF CARE
Problem: Patient Care Overview  Goal: Plan of Care Review  Outcome: Ongoing (interventions implemented as appropriate)  Mr. Zheng settled down for the night after his meds, V/S via Visi monitoring calibration and cardiac monitoring. Restless throughout the night resulting in his Visi working partially and his cardiac monitor the same. Leads checked 3 X and placed back on. States he didn't sleep well due to claustrophobia. Will continue to monitor.

## 2018-12-09 NOTE — PLAN OF CARE
Problem: Patient Care Overview  Goal: Plan of Care Review  Outcome: Outcome(s) achieved Date Met: 12/09/18  Pt is discharge to home in stable condtion

## 2018-12-09 NOTE — DISCHARGE SUMMARY
[facility]  Discharge Summary  Hospital Medicine    Patient Name: Norm Mendoza  YOB: 1926    Admit Date: 12/6/2018    Discharge Date and Time: 12/9/2018 @ 441 PM    Discharge Attending Physician: Cy Amador MD     Team: [facility] HOSP MED R    Reason for Admission:   Chief Complaint   Patient presents with    Melena     Pt reports dark stools x 1wk.  Pt on Eliquis.        Active Hospital Problems   No active problems to display.      Resolved Hospital Problems    Diagnosis Date Resolved POA    *Symptomatic anemia [D64.9] 12/09/2018 Yes    GIB (gastrointestinal bleeding) [K92.2] 12/09/2018 Yes       HPI:      Mr Norm Mendoza is a 89 y.o. man with hx of recently diagnosed atrial fibrillation on Eliquis, hypertension and alcohol dependence presenting with several day hx of dark stool since starting Eliquis 12/1 with associated dizziness, lightheadedness and generalized weakness. He presented to his cardiologist office, and was found to be anemic around 9.3 from his baseline ~13. Patient has several dark stool since Sunday but has not mentioned to her cardiologist or pcp until labs revealed anemia. He took his last dose of Eliquis 12/5 evening. He takes baby aspirin, but denies any NSAIDS use. He drinks about 4-5 alcoholic beverages daily. Denies any syncope, cp, falls, dyspnea, N/V, abdominal pain, coffee ground emesis, bright blood in stool or bleeds anywhere else. He has no hx of EDG in the past and no ulcers. No recent trauma. He has been healthy otherwise w/o any recent hospitalization. His last colonoscopy was 6 years ago, and was normal as per patient. Patient was to start on Eliquis then get JACINTO cardioversion.    Hospital Course:   Admitted to hospital medicine with telemetry for acute GIB. Stopped ASA 81 and AC. He continued to be HD stable. Started on PPI IV. We maintained 2 large bore IV. Type screen consented. Trended H/H, Hgb dropped to 6.5 s/p 2 units pRBC. GI  was consulted and they took him to EGD noting multiple non bleeding ulcers, biopsied. His h/h stabilized after stopped his AC and ASA. We monitored him for another 36 hrs in the hospital. We continued PPI oral BID for 3 days then daily. GI plans to do a repeat EGD in 2 months. Eating well on regular diet. PT OT seen patient and patient is very function, he does not meet any need.     Today on d/c day, he appears well. Ambulating and eating well. H/H stable. Ready for d/c. Lifestyle modification endorsed. Avoid alcohol caffeine chocolate tobacco products NSAIDs. CBC ordered for 12/11. Follow up with PCP , cardiology and GI.     D/c him on PPI daily and Metoprolol XL 25 mg daily    EGD   Impression:             - Normal esophagus.  - 1 cm type-I sliding hiatal hernia.  - Non-bleeding gastric ulcer with a flat pigmented spot (Saurabh Class IIc). Biopsied.  - Non-bleeding gastric ulcers with a clean ulcer base (Saurabh Class III). Biopsied.  - Normal greater curvature of the gastric body and antrum.  - Normal examined duodenum.  - He is at high-risk for recurrent bleeding if anticoagulation is restarted now.    Recommendation:         - Return patient to hospital [facility] for ongoing care.  - Resume previous diet.  - Use Protonix (pantoprazole) 40 mg PO BID for 3 days and then decrease to daily.  - Hold Eliquis for now. Would revisit need for anticoagulation.  - Await pathology results.  - Repeat upper endoscopy pending pathology results  - The findings and recommendations were discussed with the patient.      Principal Problem: Symptomatic anemia  Upper GI bleed due to PUD  Acute blood loss anemia from UGIB    Other Problems Addressed:  Atrial Fibrillation   Alcohol dependence  Hypertension    Procedures Performed: Procedure(s) (LRB):  EGD (ESOPHAGOGASTRODUODENOSCOPY) (N/A)    Special Care, Treatment, and Services Provided: na    Consults: GI    Significant Diagnostic Studies:  No results found for: EF  No results found  for: HGBA1C  CBC:   Recent Labs   Lab 12/09/18  0520 12/09/18  1535   WBC 5.85  --    RBC 2.71*  --    HGB 8.5* 8.7*   HCT 26.7*  --      --    MCV 99*  --    MCH 31.4*  --    MCHC 31.8*  --      BMP:   Recent Labs   Lab 12/09/18  0520   *      K 3.8      CO2 23   BUN 20   CREATININE 0.9   CALCIUM 8.2*       Final Diagnoses: Same as principal problem.    Discharged Condition: good  Face to face services were provided on 12/9/2018   Time Spent:  I spent > 30 minutes on the discharge, which included reviewing hospital course with patient/family, reviewing discharge medications, and arranging follow-up care.    Physical Exam on 12/9/2018:  /79   Pulse 85   Temp 98.1 °F (36.7 °C)   Resp 16   Ht 6' (1.829 m)   Wt 84 kg (185 lb 3 oz)   SpO2 98%   BMI 25.12 kg/m²   General appearance: no distress, cooperative, non toxic  Mental status: Alert and oriented x 3  HEENT:  No conjunctival pallor , PERRL  Neck: supple, thyroid not enlarged  Pulm:  normal respiratory effort, CTA B, no c/w/r  Card: iRRR, S1, S2 normal, DONALD, no click, rub or gallop  Abd: soft, NT, ND, BS present; no masses, no organomegaly  Ext: no c/c/e  Pulses: 2+, symmetric  Skin: no pallor, adequate cap refill, no clubbing  Neuro: Cranial Nerves grossly intact, no focal numbness or weakness, normal strength and tone     Disposition: Home or Self Care    Follow Up Instructions:   Follow-up Information     Amber Jenkins MD In 1 week.    Specialty:  Internal Medicine  Why:  Follow up for post hospital follow up, CBC check, medication check (BB started, PPI), biopsy results  Contact information:  [address]  [place] [address], [address]  [facility] [id number] [phone]             Call Cardiology.    Why:  Call you cardiologist to set up an apt for you Atrial Fibrillation                Future Appointments   Date Time Provider Department Center   3/22/2019 11:00 AM Amber Jenkins MD [facility]        Medications:  Reconciled Home Medications:      Medication List      START taking these medications    metoprolol succinate 25 MG 24 hr tablet  Commonly known as:  TOPROL-XL  Take 1 tablet (25 mg total) by mouth once daily. To rate control your Atrial Fibrillation     pantoprazole 40 MG tablet  Commonly known as:  PROTONIX  Take 1 tablet (40 mg total) by mouth once daily. Take one pill AM of 12/10 and one pill PM of 12/10 then daily until you see your GI doctor        CONTINUE taking these medications    ACIDOPHILUS PROBIOTIC ORAL  Take 1 capsule by mouth once daily.     guaiFENesin 1,200 mg Ta12  Commonly known as:  MUCINEX  1 Tab 1-2x/day for cough/congestion     psyllium packet  Commonly known as:  METAMUCIL  Take 1 packet by mouth once daily.     valsartan 80 MG tablet  Commonly known as:  DIOVAN  Take 80 mg by mouth once daily.        STOP taking these medications    apixaban 5 mg Tab  Commonly known as:  ELIQUIS     aspirin 81 MG Chew          Current Discharge Medication List      START taking these medications    Details   metoprolol succinate (TOPROL-XL) 25 MG 24 hr tablet Take 1 tablet (25 mg total) by mouth once daily. To rate control your Atrial Fibrillation  Qty: 30 tablet, Refills: 11      pantoprazole (PROTONIX) 40 MG tablet Take 1 tablet (40 mg total) by mouth once daily. Take one pill AM of 12/10 and one pill PM of 12/10 then daily until you see your GI doctor  Qty: 32 tablet, Refills: 11         CONTINUE these medications which have NOT CHANGED    Details   psyllium (METAMUCIL) packet Take 1 packet by mouth once daily.      valsartan (DIOVAN) 80 MG tablet Take 80 mg by mouth once daily.      ACIDOPHILUS/PECTIN, CITRUS (ACIDOPHILUS PROBIOTIC ORAL) Take 1 capsule by mouth once daily.      guaiFENesin (MUCINEX) 1,200 mg Ta12 1 Tab 1-2x/day for cough/congestion  Qty: 20 tablet, Refills: 0    Associated Diagnoses: Bronchitis         STOP taking these medications       apixaban (ELIQUIS) 5 mg Tab  Comments:   Reason for Stopping:         aspirin 81 MG Chew Comments:   Reason for Stopping:               Discharge Instructions:  - Discussed with patient     Discharge Procedure Orders   CBC W/ AUTO DIFFERENTIAL   Standing Status: Future Standing Exp. Date: 02/07/20     Ambulatory Referral to Gastroenterology   Referral Priority: Urgent Referral Type: Consultation   Referral Reason: Specialty Services Required   Requested Specialty: Gastroenterology   Number of Visits Requested: 1     Diet Adult Regular   Scheduling Instructions: AVOID - alcohol, caffeine, nsaids (aleve advil ibuprofen), carbonated soda until you ulcers heal         Cy Amador MD  Department of Hospital Medicine

## 2018-12-09 NOTE — PT/OT/SLP PROGRESS
Occupational Therapy Screen & Discharge       Patient Name:  Norm Mendoza   MRN:  [id number]    Pt admitted 12/7  And s/p EGD and JACINTO. Pt supine in bed with family present. Pt and family reporting pt is independent with ADLs and functional mobility. He has been walking in the room, has been downstairs in the cafeteria, and continues to engage in ADLs without assist. Family and patient with no DME needs at this time. Pt is at his functional baseline with no further acute OT needs. Education provided on importance of continued OOB mobility and engagement in ADLs with pt and family verbalizing understanding. Please re-consult if there is a change in functional status.      Kate Donaldson OTR/L  12/9/2018  Pager: [phone]

## 2018-12-10 ENCOUNTER — TELEPHONE (OUTPATIENT)
Dept: INTERNAL MEDICINE | Facility: CLINIC | Age: 83
End: 2018-12-10

## 2018-12-10 NOTE — TELEPHONE ENCOUNTER
Victor Hugo/Amy, please schedule Mr Mendoza to be seen in [facility] or in [facility] Medicine Residency clinic next Tuesday afternoon where he'll see me and a resident to f/u on his hospitalization for GI bleeding.  Thanks

## 2018-12-11 ENCOUNTER — LAB VISIT (OUTPATIENT)
Dept: LAB | Facility: HOSPITAL | Age: 83
End: 2018-12-11
Attending: INTERNAL MEDICINE
Payer: MEDICARE

## 2018-12-11 ENCOUNTER — PATIENT OUTREACH (OUTPATIENT)
Dept: ADMINISTRATIVE | Facility: CLINIC | Age: 83
End: 2018-12-11

## 2018-12-11 DIAGNOSIS — K92.1 GASTROINTESTINAL HEMORRHAGE WITH MELENA: ICD-10-CM

## 2018-12-11 DIAGNOSIS — D64.9 SYMPTOMATIC ANEMIA: ICD-10-CM

## 2018-12-11 LAB
BASOPHILS # BLD AUTO: 0.07 K/UL
BASOPHILS NFR BLD: 1.1 %
DIFFERENTIAL METHOD: ABNORMAL
EOSINOPHIL # BLD AUTO: 0 K/UL
EOSINOPHIL NFR BLD: 0.3 %
ERYTHROCYTE [DISTWIDTH] IN BLOOD BY AUTOMATED COUNT: 17.8 %
HCT VFR BLD AUTO: 31.4 %
HGB BLD-MCNC: 9.7 G/DL
IMM GRANULOCYTES # BLD AUTO: 0.05 K/UL
IMM GRANULOCYTES NFR BLD AUTO: 0.8 %
LYMPHOCYTES # BLD AUTO: 0.7 K/UL
LYMPHOCYTES NFR BLD: 10.7 %
MCH RBC QN AUTO: 31.1 PG
MCHC RBC AUTO-ENTMCNC: 30.9 G/DL
MCV RBC AUTO: 101 FL
MONOCYTES # BLD AUTO: 0.6 K/UL
MONOCYTES NFR BLD: 9 %
NEUTROPHILS # BLD AUTO: 5.1 K/UL
NEUTROPHILS NFR BLD: 78.1 %
NRBC BLD-RTO: 0 /100 WBC
PLATELET # BLD AUTO: 264 K/UL
PMV BLD AUTO: 9.5 FL
RBC # BLD AUTO: 3.12 M/UL
WBC # BLD AUTO: 6.52 K/UL

## 2018-12-11 PROCEDURE — 85025 COMPLETE CBC W/AUTO DIFF WBC: CPT | Mod: HCNC

## 2018-12-11 PROCEDURE — 36415 COLL VENOUS BLD VENIPUNCTURE: CPT | Mod: HCNC

## 2018-12-11 NOTE — PATIENT INSTRUCTIONS
When You Have Gastrointestinal (GI) Bleeding    Blood in your vomit or stool can be a sign of gastrointestinal (GI) bleeding. GI bleeding can be scary. But the cause may not be serious. You should always see a doctor if GI bleeding occurs.  The GI tract  The GI tract is the path through which food travels in the body. Food passes from the mouth down the esophagus (the tube from the mouth to the stomach). Food begins to break down in the stomach. It then moves through the duodenum, the first part of the small intestine. Nutrients are absorbed as food travels through the small intestine. What is left passes into the colon (large intestine) as waste. The colon removes water from the waste. Waste continues from the colon to the rectum (where stool is stored). Waste then leaves the body through the anus.  Causes of GI bleeding  GI bleeding can be caused by many different problems. Some of the more common causes include:  · Swollen veins in the anus (hemorrhoids)  · Swollen veins in the esophagus (varices)  · Sore on the lining of the GI tract (ulcer)  · Cuts or scrapes in the mouth or throat  · Infection caused by germs such as bacteria or parasites  · Food allergies, such as milk allergy in young children  · Medicines  · Inflammation of the GI tract (gastritis or esophagitis)  · Colitis (Crohn's disease or ulcerative colitis)  · Cancer (tumors or polyps)  · Abnormal pouches in the colon (diverticula)  · Tears in the esophagus or anus  · Nosebleed  · Abnormal blood vessels in the GI tract (angiodysplasia)  Diagnosing the cause of blood in stool  If blood is coming out in your stool, you may have a lower GI tract problem or a very fast upper GI tract bleed. Bleeding from the GI tract can be bright red. Or it may look dark and tarry. Tests may also find blood in your stool that cant be seen with the eye (occult blood). To find out the cause, tests that may be ordered include:  · Blood tests. A blood sample is taken and  sent to a lab for exam.  · Hemoccult test. Checks a stool sample for blood.  · Stool culture. Checks a stool sample for bacteria or parasites.  · X-ray, ultrasound, or CT scan. Imaging tests that take pictures of the digestive tract.  · Colonoscopy or sigmoidoscopy. This test uses a flexible tube with a tiny camera. The tube is inserted through your anus into your rectum to see the inside of your colon. Your provider can also take a tiny tissue sample (biopsy) and treat a bleeding source  Diagnosing the cause of blood in vomit  If you are vomiting blood or something that looks like coffee grounds, you may have an upper GI tract problem. To find the cause, tests that may be done include:  · Upper Endoscopy. A flexible tube with a tiny camera is inserted through your mouth and throat to see inside your upper GI tract. This lets your provider take a tiny tissue sample (biopsy) and treat a bleeding source.  · Nasogastric lavage. This can tell if you have upper GI or lower GI bleeding.  · X-ray, ultrasound, or CT scan. Imaging tests that take pictures of your digestive tract.  · Upper GI series. X-rays of the upper part of your GI tract taken from inside your body.  · Enteroscopy. This sends a flexible tube or a small, swallowed capsule camera into your small intestine.  When to call your healthcare provider  Call your healthcare provider right away if you have any of the following:  · Bleeding from your mouth or anus that can't be stopped  · Fever of 100.4°F (38.0°) or higher  · Bleeding along with feeling lightheaded or dizzy  · Signs of fluid loss (dehydration). These include a dry, sticky mouth, decreased urine output; and very dark urine.  · Belly (abdominal) pain   Date Last Reviewed: 7/1/2016  © 9836-0931 [employer]. [address] [place] [address], [place] [address]. All rights reserved. This information is not intended as a substitute for professional medical care. Always follow your healthcare  professional's instructions.

## 2018-12-12 NOTE — PHYSICIAN QUERY
PT Name: Norm Mendoza  MR #: [id number]     Physician Query Form - Gastrointestinal Ulcer  Clarification     CDS Pearl Huerta RN, BSN        Contact Information:  [phone]    [email or web address]         This form is a permanent document in the medical record.     Query Date: December 12, 2018    By submitting this query, we are merely seeking further clarification of documentation to reflect the severity of illness of your patient. Please utilize your independent clinical judgment when addressing the question(s) below.    The Medical record reflects the following:     Indicators   Supporting Clinical Findings Location in Medical Record   X   Gastrointestinal Ulcer Documented Gastric ulcer  EGD report 12/7, PNs and discharge summary    X   EGD/colonoscopy Findings - Normal esophagus.  - 1 cm type-I sliding hiatal hernia.  - Non-bleeding gastric ulcer with a flat pigmented spot (Saurabh Class IIc). Biopsied.  - Non-bleeding gastric ulcers with a clean ulcer       base (Saurabh Class III). Biopsied.  - Normal greater curvature of the gastric body and antrum.  - Normal examined duodenum.  - He is at high-risk for recurrent bleeding if            anticoagulation is restarted now. EGD 12/7     Radiology Findings     X   Treatment/Medication Transfused 2 units pRBC  Blood Bank    X   Other Symptomatic anemia  gastrointestinal bleeding H&P, PNs, Discharge summary      Provider, please further specify the gastrointestinal ulcer diagnosis. Pierre all that apply:      Location Acuity With Hemorrhage   Gastric [ X ]  Acute          [  ]  Chronic or unspecified [ X ]  Yes   [  ]  No  [   ] Clinically Undetermined     Please document in your progress notes daily for the duration of treatment until resolved, and include in your discharge summary.

## 2018-12-15 ENCOUNTER — NURSE TRIAGE (OUTPATIENT)
Dept: ADMINISTRATIVE | Facility: CLINIC | Age: 83
End: 2018-12-15

## 2018-12-15 ENCOUNTER — OFFICE VISIT (OUTPATIENT)
Dept: URGENT CARE | Facility: CLINIC | Age: 83
End: 2018-12-15
Payer: MEDICARE

## 2018-12-15 ENCOUNTER — HOSPITAL ENCOUNTER (EMERGENCY)
Facility: HOSPITAL | Age: 83
End: 2018-12-15
Attending: EMERGENCY MEDICINE
Payer: MEDICARE

## 2018-12-15 VITALS
DIASTOLIC BLOOD PRESSURE: 105 MMHG | HEART RATE: 112 BPM | OXYGEN SATURATION: 98 % | TEMPERATURE: 98 F | SYSTOLIC BLOOD PRESSURE: 149 MMHG | RESPIRATION RATE: 20 BRPM

## 2018-12-15 VITALS
TEMPERATURE: 97 F | HEIGHT: 72 IN | SYSTOLIC BLOOD PRESSURE: 165 MMHG | RESPIRATION RATE: 18 BRPM | WEIGHT: 180.31 LBS | HEART RATE: 96 BPM | OXYGEN SATURATION: 97 % | BODY MASS INDEX: 24.42 KG/M2 | DIASTOLIC BLOOD PRESSURE: 79 MMHG

## 2018-12-15 DIAGNOSIS — R06.02 SHORTNESS OF BREATH: Primary | ICD-10-CM

## 2018-12-15 DIAGNOSIS — R06.02 SHORTNESS OF BREATH: ICD-10-CM

## 2018-12-15 DIAGNOSIS — J81.0 ACUTE PULMONARY EDEMA: Primary | ICD-10-CM

## 2018-12-15 DIAGNOSIS — I48.19 PERSISTENT ATRIAL FIBRILLATION: ICD-10-CM

## 2018-12-15 LAB
ALBUMIN SERPL BCP-MCNC: 3.1 G/DL
ALP SERPL-CCNC: 67 U/L
ALT SERPL W/O P-5'-P-CCNC: 24 U/L
ANION GAP SERPL CALC-SCNC: 9 MMOL/L
AST SERPL-CCNC: 19 U/L
BASOPHILS # BLD AUTO: 0.07 K/UL
BASOPHILS NFR BLD: 1.1 %
BILIRUB SERPL-MCNC: 0.4 MG/DL
BNP SERPL-MCNC: 313 PG/ML
BUN SERPL-MCNC: 17 MG/DL
CALCIUM SERPL-MCNC: 9 MG/DL
CHLORIDE SERPL-SCNC: 107 MMOL/L
CO2 SERPL-SCNC: 22 MMOL/L
CREAT SERPL-MCNC: 1 MG/DL
DIFFERENTIAL METHOD: ABNORMAL
EOSINOPHIL # BLD AUTO: 0 K/UL
EOSINOPHIL NFR BLD: 0.2 %
ERYTHROCYTE [DISTWIDTH] IN BLOOD BY AUTOMATED COUNT: 17.5 %
EST. GFR  (AFRICAN AMERICAN): >60 ML/MIN/1.73 M^2
EST. GFR  (NON AFRICAN AMERICAN): >60 ML/MIN/1.73 M^2
GLUCOSE SERPL-MCNC: 112 MG/DL
GLUCOSE SERPL-MCNC: 121 MG/DL (ref 70–110)
HCT VFR BLD AUTO: 31.5 %
HGB BLD-MCNC: 9.9 G/DL
IMM GRANULOCYTES # BLD AUTO: 0.03 K/UL
IMM GRANULOCYTES NFR BLD AUTO: 0.5 %
INR PPP: 1
LYMPHOCYTES # BLD AUTO: 1.1 K/UL
LYMPHOCYTES NFR BLD: 17.8 %
MAGNESIUM SERPL-MCNC: 2.2 MG/DL
MCH RBC QN AUTO: 31.1 PG
MCHC RBC AUTO-ENTMCNC: 31.4 G/DL
MCV RBC AUTO: 99 FL
MONOCYTES # BLD AUTO: 0.7 K/UL
MONOCYTES NFR BLD: 10.4 %
NEUTROPHILS # BLD AUTO: 4.4 K/UL
NEUTROPHILS NFR BLD: 70 %
NRBC BLD-RTO: 0 /100 WBC
PHOSPHATE SERPL-MCNC: 3.5 MG/DL
PLATELET # BLD AUTO: 340 K/UL
PMV BLD AUTO: 9 FL
POC ANION GAP: 17 MMOL/L (ref 10–20)
POC BUN: 16 MMOL/L (ref 8–26)
POC CHLORIDE: 106 MMOL/L (ref 98–109)
POC CREATININE: 0.9 MG/DL (ref 0.6–1.3)
POC HEMATOCRIT: 32 %PCV (ref 42–52)
POC HEMOGLOBIN: 10.9 G/DL (ref 13.5–18)
POC ICA: 1.17 MMOL/L (ref 1.12–1.32)
POC POTASSIUM: 4.7 MMOL/L (ref 3.5–4.9)
POC SODIUM: 142 MMOL/L (ref 138–146)
POC TCO2: 25 MMOL/L (ref 24–29)
POTASSIUM SERPL-SCNC: 4.2 MMOL/L
PROT SERPL-MCNC: 6.4 G/DL
PROTHROMBIN TIME: 10.5 SEC
RBC # BLD AUTO: 3.18 M/UL
SODIUM SERPL-SCNC: 138 MMOL/L
TROPONIN I SERPL DL<=0.01 NG/ML-MCNC: 0.01 NG/ML
TROPONIN I SERPL DL<=0.01 NG/ML-MCNC: 0.03 NG/ML
WBC # BLD AUTO: 6.34 K/UL

## 2018-12-15 PROCEDURE — 96374 THER/PROPH/DIAG INJ IV PUSH: CPT | Mod: HCNC

## 2018-12-15 PROCEDURE — 85610 PROTHROMBIN TIME: CPT | Mod: HCNC

## 2018-12-15 PROCEDURE — 83735 ASSAY OF MAGNESIUM: CPT | Mod: HCNC

## 2018-12-15 PROCEDURE — 99284 EMERGENCY DEPT VISIT MOD MDM: CPT | Mod: HCNC,,, | Performed by: EMERGENCY MEDICINE

## 2018-12-15 PROCEDURE — 1101F PT FALLS ASSESS-DOCD LE1/YR: CPT | Mod: CPTII,S$GLB,, | Performed by: FAMILY MEDICINE

## 2018-12-15 PROCEDURE — 93005 ELECTROCARDIOGRAM TRACING: CPT | Mod: S$GLB,,, | Performed by: FAMILY MEDICINE

## 2018-12-15 PROCEDURE — 99214 OFFICE O/P EST MOD 30 MIN: CPT | Mod: S$GLB,,, | Performed by: FAMILY MEDICINE

## 2018-12-15 PROCEDURE — 84100 ASSAY OF PHOSPHORUS: CPT | Mod: HCNC

## 2018-12-15 PROCEDURE — 84484 ASSAY OF TROPONIN QUANT: CPT | Mod: 91,HCNC

## 2018-12-15 PROCEDURE — 71046 X-RAY EXAM CHEST 2 VIEWS: CPT | Mod: FY,S$GLB,, | Performed by: RADIOLOGY

## 2018-12-15 PROCEDURE — 85025 COMPLETE CBC W/AUTO DIFF WBC: CPT | Mod: HCNC

## 2018-12-15 PROCEDURE — 80053 COMPREHEN METABOLIC PANEL: CPT | Mod: HCNC

## 2018-12-15 PROCEDURE — 93000 ELECTROCARDIOGRAM COMPLETE: CPT | Mod: S$GLB,,, | Performed by: FAMILY MEDICINE

## 2018-12-15 PROCEDURE — 93005 ELECTROCARDIOGRAM TRACING: CPT | Mod: HCNC

## 2018-12-15 PROCEDURE — 93010 ELECTROCARDIOGRAM REPORT: CPT | Mod: HCNC,,, | Performed by: INTERNAL MEDICINE

## 2018-12-15 PROCEDURE — 83880 ASSAY OF NATRIURETIC PEPTIDE: CPT | Mod: HCNC

## 2018-12-15 PROCEDURE — 99284 EMERGENCY DEPT VISIT MOD MDM: CPT | Mod: 25,HCNC

## 2018-12-15 PROCEDURE — 80047 BASIC METABLC PNL IONIZED CA: CPT | Mod: QW,S$GLB,, | Performed by: FAMILY MEDICINE

## 2018-12-15 PROCEDURE — 63600175 PHARM REV CODE 636 W HCPCS: Mod: HCNC | Performed by: EMERGENCY MEDICINE

## 2018-12-15 PROCEDURE — 99499 UNLISTED E&M SERVICE: CPT | Mod: S$GLB,,, | Performed by: FAMILY MEDICINE

## 2018-12-15 PROCEDURE — 93010 ELECTROCARDIOGRAM REPORT: CPT | Mod: S$GLB,,, | Performed by: INTERNAL MEDICINE

## 2018-12-15 RX ORDER — METOPROLOL TARTRATE 25 MG/1
25 TABLET, FILM COATED ORAL 2 TIMES DAILY
Status: DISCONTINUED | OUTPATIENT
Start: 2018-12-15 | End: 2018-12-15

## 2018-12-15 RX ORDER — METOPROLOL TARTRATE 25 MG/1
25 TABLET, FILM COATED ORAL
Status: DISCONTINUED | OUTPATIENT
Start: 2018-12-15 | End: 2018-12-15 | Stop reason: HOSPADM

## 2018-12-15 RX ORDER — IRBESARTAN 150 MG/1
150 TABLET ORAL
Status: DISCONTINUED | OUTPATIENT
Start: 2018-12-15 | End: 2018-12-15

## 2018-12-15 RX ORDER — FUROSEMIDE 10 MG/ML
20 INJECTION INTRAMUSCULAR; INTRAVENOUS
Status: COMPLETED | OUTPATIENT
Start: 2018-12-15 | End: 2018-12-15

## 2018-12-15 RX ORDER — VALSARTAN 80 MG/1
80 TABLET ORAL
Status: DISCONTINUED | OUTPATIENT
Start: 2018-12-15 | End: 2018-12-15

## 2018-12-15 RX ADMIN — FUROSEMIDE 20 MG: 10 INJECTION, SOLUTION INTRAMUSCULAR; INTRAVENOUS at 02:12

## 2018-12-15 NOTE — ED PROVIDER NOTES
"Encounter Date: 12/15/2018    SCRIBE #1 NOTE: I, Son Heidi, am scribing for, and in the presence of,  Dr. Aguila . I have scribed the following portions of the note -       History     Chief Complaint   Patient presents with    Shortness of Breath     sent from      Time patient was seen by the provider: 2:16 PM      The patient is a 89 y.o. male with co-morbidities including: bladder cancer, HTN, paroxysmal atrial fibrillation who presents to the ED with a complaint of worsening shortness of breath. Patient was seen and evaluated at Urgent Care this morning for similar symptoms and was told to go to the ED for further evaluation. He states that he had a x-ray at Urgent Care and it showed fluids in his lungs. Onset was on Dec 9th since he was discharged from the hospital for "internal bleeding." His symptoms worsens with exertion and when he lies flat. He notes that his shortness of breath has been worsening for the past 3 days. Patient was a former smoker. Denies productive cough,chest pain, nausea or vomiting. Denies history of CHF.       The history is provided by the patient and medical records.     Review of patient's allergies indicates:  No Known Allergies  Past Medical History:   Diagnosis Date    Alcohol dependence, daily use 7/13/2017    Allergy     Bladder cancer     tumor that was benign     Hematuria     Hypertension     Mixed hyperlipidemia 5/2/2018    Paroxysmal atrial fibrillation 11/30/2018    Skin cancer     x3, had mohs on nose    Squamous cell carcinoma excised 12/5/14    R lower back    Symptomatic anemia 12/6/2018    Urinary tract infection     x4     Past Surgical History:   Procedure Laterality Date    ACHILLES TENDON SURGERY      cataracts      CYSTOSCOPY      bladder tumor    CYSTOSCOPY N/A 3/28/2014    Performed by Slvaa Barbosa MD at [facility] OR 1ST FLR    DILATION, URETHRA N/A 3/28/2014    Performed by Slava Barbosa MD at [facility] OR 1ST FLR    EGD " (ESOPHAGOGASTRODUODENOSCOPY) N/A 2018    Performed by Austin Garcia MD at [facility] ENDO (2ND FLR)    ESOPHAGOGASTRODUODENOSCOPY N/A 2018    Procedure: EGD (ESOPHAGOGASTRODUODENOSCOPY);  Surgeon: Austin Garcia MD;  Location: [facility] (2ND FLR);  Service: Endoscopy;  Laterality: N/A;    EXCISION-BLADDER TUMOR-TRANSURETHRAL (TURBT) N/A 3/28/2014    Performed by Sl[facility] at [facility] OR 1ST FLR    EYE SURGERY      SKIN CANCER EXCISION       Family History   Problem Relation Age of Onset    Stroke Father     Stroke Brother     Anesthesia problems Neg Hx     Malignant hypertension Neg Hx     Hypotension Neg Hx     Malignant hyperthermia Neg Hx     Pseudochol deficiency Neg Hx     Melanoma Neg Hx      Social History     Tobacco Use    Smoking status: Former Smoker     Packs/day: 1.00     Years: 25.00     Pack years: 25.00     Types: Cigarettes     Last attempt to quit: 9/3/1970     Years since quittin.3    Smokeless tobacco: Never Used   Substance Use Topics    Alcohol use: Yes     Alcohol/week: 1.8 oz     Types: 3 Shots of liquor per week     Comment: 3 bourbons daily - 12 beer on weekends     Drug use: No     Review of Systems   Constitutional: Negative for chills and fever.   HENT: Negative.    Eyes: Negative.    Respiratory: Positive for shortness of breath. Negative for cough.    Cardiovascular: Negative for chest pain.   Gastrointestinal: Negative.    Genitourinary: Negative.    Musculoskeletal: Negative.    Skin: Negative.    Neurological: Negative.    All other systems reviewed and are negative.      Physical Exam     Initial Vitals [12/15/18 1408]   BP Pulse Resp Temp SpO2   (!) 195/92 102 (!) 22 97.4 °F (36.3 °C) 99 %      MAP       --         Physical Exam    Vitals reviewed.  Constitutional:   89-year-old  man, mild respiratory difficulty noted   HENT:   Head: Normocephalic and atraumatic.   Mouth/Throat: Oropharynx is clear and moist.   Eyes: EOM are normal.  Pupils are equal, round, and reactive to light.   Neck: No tracheal deviation present.   Cardiovascular:   Irregularly irregular rhythm noted with intact distal pulses, 1/6 low pitch mid systolic murmur noted loudest at the left proximal parasternal border   Pulmonary/Chest: Breath sounds normal. No stridor. He has no wheezes. He has no rales.   Abdominal: Soft. He exhibits no distension. There is no tenderness.   Musculoskeletal: Normal range of motion.   Neurological: He is alert and oriented to person, place, and time. GCS score is 15. GCS eye subscore is 4. GCS verbal subscore is 5. GCS motor subscore is 6.   Skin: Skin is warm and dry.   Psychiatric: His behavior is normal. Thought content normal.         ED Course   Procedures  Labs Reviewed   CBC W/ AUTO DIFFERENTIAL - Abnormal; Notable for the following components:       Result Value    RBC 3.18 (*)     Hemoglobin 9.9 (*)     Hematocrit 31.5 (*)     MCV 99 (*)     MCH 31.1 (*)     MCHC 31.4 (*)     RDW 17.5 (*)     MPV 9.0 (*)     Lymph% 17.8 (*)     All other components within normal limits   COMPREHENSIVE METABOLIC PANEL - Abnormal; Notable for the following components:    CO2 22 (*)     Glucose 112 (*)     Albumin 3.1 (*)     All other components within normal limits    Narrative:     ADD ON MG AND PHOS PER DR ARIANE VILLEGAS  12/15/2018  14:57    TROPONIN I - Abnormal; Notable for the following components:    Troponin I 0.027 (*)     All other components within normal limits    Narrative:     ADD ON MG AND PHOS PER DR ARIANE VILLEGAS  12/15/2018  14:57    B-TYPE NATRIURETIC PEPTIDE - Abnormal; Notable for the following components:     (*)     All other components within normal limits    Narrative:     ADD ON MG AND PHOS PER DR ARIANE VILLEGAS  12/15/2018  14:57    PROTIME-INR   MAGNESIUM   PHOSPHORUS   MAGNESIUM    Narrative:     ADD ON MG AND PHOS PER DR ARIANE VILLEGAS  12/15/2018  14:57    PHOSPHORUS    Narrative:     ADD ON MG AND PHOS  PER DR ARIANE VILLEGAS  12/15/2018  14:57    TROPONIN I     EKG Readings: (Independently Interpreted)   Rhythm: Atrial Fibrillation. Heart Rate: 95 bpm . ST Segments: Normal ST Segments. Axis: Normal.   QT prolongation           Medical Decision Making:   History:   Old Medical Records: I decided to obtain old medical records.  Independently Interpreted Test(s):   I have ordered and independently interpreted EKG Reading(s) - see prior notes  Clinical Tests:   Lab Tests: Reviewed and Ordered  Medical Tests: Ordered and Reviewed            Scribe Attestation:   Scribe #1: I performed the above scribed service and the documentation accurately describes the services I performed. I attest to the accuracy of the note.    Attending Attestation:             Attending ED Notes:   Review the plain film obtained at the previous urgent care visit reveals increased interstitial lung markings bilaterally without evidence of consolidation consistent with mild pulmonary edema. Laboratory evaluation obtained today and reveals baseline, though improved anemia as well as a mild elevation of the serum BNP and troponin in this patient presenting with shortness of breath and pulmonary edema on chest x-ray.  An IV dose of Lasix has been administered with subsequent significant volume of diuresis.  The patient endorses resolution of his exertional dyspnea and orthopnea after diuresis.  Repeat laboratory testing/serial troponin reveals return to baseline after diuresis.  I do not feel that further inpatient management is indicated at this time, and the patient reports feeling well.  Findings and concerns as well as indications to return to the emergency department have been discussed with the patient and accompanying spouse, and all questions have been answered to the satisfaction.  He will be discharged home in improved condition with instructions to follow up with his primary care physician as already scheduled in 2 days and return to  the ED as needed for urgent concerns.             Clinical Impression:   The primary encounter diagnosis was Acute pulmonary edema. A diagnosis of Shortness of breath was also pertinent to this visit.      Disposition:   Disposition: Discharged  Condition: Stable                        Zac Aguila MD  12/20/18 9983

## 2018-12-15 NOTE — ED TRIAGE NOTES
Pt sent to ED from Urgent Care due to SOB. Pt states the doctor at  stated he had fluid in his lungs. Pt reports SOB worsens with laying flat and with exertion. Pt denies any chest pain or other complaint.

## 2018-12-15 NOTE — ED NOTES
Patient identifiers verified and correct for Norm Mendoza.   LOC: The patient is awake, alert and aware of environment with an appropriate affect, the patient is oriented x 3 and speaking appropriately.   APPEARANCE: Patient appears comfortable and in no acute distress, patient is clean and well groomed.  SKIN: The skin is warm and dry, color consistent with ethnicity, patient has normal skin turgor and moist mucus membranes, skin intact, no breakdown or bruising noted.   MUSCULOSKELETAL: Patient moving all extremities spontaneously, no swelling noted.  RESPIRATORY: Airway is open and patent, respirations are spontaneous, patient has a normal effort and rate, no accessory muscle use noted, pt placed on continuous pulse ox with O2 sats noted at 98% on room air.  CARDIAC: Pt placed on cardiac monitor. Patient has a normal rate and regular rhythm, no edema noted, capillary refill < 3 seconds.   GASTRO: Soft and non tender to palpation, no distention noted. Pt states bowel movements have been regular. Last BM was this morning.   : Pt denies any pain or frequency with urination.  NEURO: Pt opens eyes spontaneously, behavior appropriate to situation, follows commands, facial expression symmetrical, bilateral hand grasp equal and even, purposeful motor response noted, normal sensation in all extremities when touched with a finger.

## 2018-12-15 NOTE — PROGRESS NOTES
Subjective:       Patient ID: Norm Mendoza is a 89 y.o. male.    Vitals:  tympanic temperature is 98.2 °F (36.8 °C). His blood pressure is 149/105 (abnormal) and his pulse is 112 (abnormal). His respiration is 20 and oxygen saturation is 98%.     Chief Complaint: Shortness of Breath    Patient presents with c/o shortness of breath since leaving the hospital - worse in the past 3 days. Patient with a mild cough. Patient states his cardiologist told him he had a heart mumur in august.  Placed on a blood thinner due to the afib and developed gi bleed due ulcers from the blood thinner (eliquis). Patient hospitalized for the gi bleed - given transfusion, stopped the eliquis.  Patient was discharged from the hospital on Rajeev, Dec 9th, 2018. Patient denies any chest pain, numbness or tingling. Stools normal color.        Shortness of Breath   This is a new problem. Episode onset: Dec 9th. The problem occurs every few minutes. The problem has been unchanged. Associated symptoms include leg swelling. Pertinent negatives include no chest pain, fever, headaches, rash, sore throat or vomiting. He has tried nothing for the symptoms.       Constitution: Negative for chills, fatigue and fever.   HENT: Negative for congestion and sore throat.    Neck: Negative for painful lymph nodes.   Cardiovascular: Positive for leg swelling. Negative for chest pain.   Eyes: Negative for double vision and blurred vision.   Respiratory: Positive for cough and shortness of breath.         Orthopnea   Gastrointestinal: Negative for nausea, vomiting and diarrhea.   Genitourinary: Negative for dysuria, frequency and urgency.   Musculoskeletal: Negative for joint pain, joint swelling, muscle cramps and muscle ache.   Skin: Negative for color change, pale and rash.   Allergic/Immunologic: Negative for seasonal allergies.   Neurological: Negative for dizziness, history of vertigo, light-headedness, passing out and headaches.    Hematologic/Lymphatic: Negative for swollen lymph nodes, easy bruising/bleeding and history of blood clots. Does not bruise/bleed easily.   Psychiatric/Behavioral: Negative for nervous/anxious, sleep disturbance and depression. The patient is not nervous/anxious.        Objective:      Physical Exam   Constitutional: He is oriented to person, place, and time. He appears well-developed and well-nourished. He is cooperative.  Non-toxic appearance. He does not appear ill. No distress.   HENT:   Head: Normocephalic and atraumatic.   Right Ear: Hearing, tympanic membrane, external ear and ear canal normal.   Left Ear: Hearing, tympanic membrane, external ear and ear canal normal.   Nose: Nose normal. No mucosal edema, rhinorrhea or nasal deformity. No epistaxis. Right sinus exhibits no maxillary sinus tenderness and no frontal sinus tenderness. Left sinus exhibits no maxillary sinus tenderness and no frontal sinus tenderness.   Mouth/Throat: Uvula is midline, oropharynx is clear and moist and mucous membranes are normal. No trismus in the jaw. Normal dentition. No uvula swelling. No posterior oropharyngeal erythema.   Eyes: Conjunctivae and lids are normal. Right eye exhibits no discharge. Left eye exhibits no discharge. No scleral icterus.   Sclera clear bilat   Neck: Trachea normal, normal range of motion, full passive range of motion without pain and phonation normal. Neck supple.   Cardiovascular: Normal rate, intact distal pulses and normal pulses. An irregular rhythm present.   Murmur heard.  Pulses:       Radial pulses are 2+ on the right side, and 2+ on the left side.   Pulmonary/Chest: Breath sounds normal. No accessory muscle usage. Tachypnea (when laying flat for EKG) noted. No respiratory distress. He has no decreased breath sounds. He has no wheezes. He has no rhonchi. He has no rales.   Abdominal: Soft. Normal appearance and bowel sounds are normal. He exhibits no distension, no pulsatile midline mass and  no mass. There is no tenderness.   Musculoskeletal: Normal range of motion. He exhibits no deformity.        Right lower leg: He exhibits edema.        Left lower leg: He exhibits edema.   Pitting edema lower extremities   Neurological: He is alert and oriented to person, place, and time. He exhibits normal muscle tone. Coordination normal.   Skin: Skin is warm, dry and intact. He is not diaphoretic. No pallor.   Psychiatric: He has a normal mood and affect. His speech is normal and behavior is normal. Judgment and thought content normal. Cognition and memory are normal.   Nursing note and vitals reviewed.      EKG: unchanged from previous tracings, atrial fibrillation, rate 88bpm. No ectopy. No ST segment elevation or depression.     Results for orders placed or performed in visit on 12/15/18   POCT Chemistry Panel   Result Value Ref Range    POC Sodium 142 138 - 146 MMOL/L    POC Potassium 4.7 3.5 - 4.9 MMOL/L    POC Chloride 106 98 - 109 MMOL/L    POC BUN 16 8 - 26 MMOL/L    POC Glucose 121 (A) 70 - 110 MG/DL    POC Creatinine 0.9 0.6 - 1.3 mg/dL    POC iCA 1.17 1.12 - 1.32 MMOL/L    POC TCO2 25 24 - 29 MMOL/L    POC Hematocrit 32 (A) 42 - 52 %PCV    POC Hemoglobin 10.9 (A) 13.5 - 18 g/dL    POC Anion Gap 17 10.0 - 20 MMOL/L     X-ray Chest Pa And Lateral    Result Date: 12/15/2018  EXAMINATION: XR CHEST PA AND LATERAL CLINICAL HISTORY: orthopnea, sob. no hx of CHF; Shortness of breath TECHNIQUE: PA and lateral views of the chest were performed. COMPARISON: 05/18/2017 FINDINGS: The cardiomediastinal silhouette is not enlarged, noting calcification of the aorta..  There is no pleural effusion.  The trachea is midline.  The lungs are symmetrically expanded bilaterally with coarse interstitial attenuation bilaterally.  There is bilateral basilar subsegmental atelectasis or scarring..  No large focal consolidation seen.  There is no pneumothorax.  The osseous structures are remarkable for degenerative changes..      1. Increased interstitial attenuation bilaterally, findings suggest sequela of interstitial disease, hyperinflation suggests COPD/emphysema.  Superimposed interstitial edema upon chronic change is a consideration, no large focal consolidation. Electronically signed by: Alessandro Jiang MD Date:    12/15/2018 Time:    12:47    Assessment:       1. Shortness of breath    2. Persistent atrial fibrillation        Plan:         Shortness of breath  -     IN OFFICE EKG 12-LEAD (to Muse)  -     POCT Chemistry Panel  -     X-Ray Chest PA And Lateral; Future; Expected date: 12/15/2018  -     Refer to Emergency Dept.    Persistent atrial fibrillation  -     Refer to Emergency Dept.    discussed case with dr holt -plan to send to ER for further workup - possible CHF from afib- called report to [facility]. Pt declined ambulance transport. Friend will drive him        Patient Instructions   GO STRAIGHT TO THE ER AND DO NOT EAT OR DRINK ANYTHING UNLESS A HEALTHCARE PROVIDER GIVES IT TO YOU.

## 2018-12-15 NOTE — TELEPHONE ENCOUNTER
Reason for Disposition   Caller has already spoken with another triager or PCP AND has further questions AND triager able to answer questions.    Protocols used: ST NO CONTACT OR DUPLICATE CONTACT CALL-A-AH

## 2018-12-15 NOTE — PATIENT INSTRUCTIONS
GO STRAIGHT TO THE ER AND DO NOT EAT OR DRINK ANYTHING UNLESS A HEALTHCARE PROVIDER GIVES IT TO YOU.

## 2018-12-15 NOTE — PROGRESS NOTES
Subjective:       Patient ID: Norm Mendoza is a 89 y.o. male.    Vitals:  vitals were not taken for this visit.     Chief Complaint: Shortness of Breath    Patient presents with c/o shortness of breath since leaving the hospital. Patient with a mild cough. Patient states he thinks the cough is from a gi scope.       Shortness of Breath   This is a new problem.       Respiratory: Positive for shortness of breath.        Objective:      Physical Exam    Assessment:       No diagnosis found.    Plan:         There are no diagnoses linked to this encounter.

## 2018-12-15 NOTE — TELEPHONE ENCOUNTER
Reason for Disposition   [1] MILD difficulty breathing (e.g., minimal/no SOB at rest, SOB with walking, pulse <100) AND [2] NEW-onset or WORSE than normal    Protocols used: ST BREATHING DIFFICULTY-A-AH    Patient states has increasing shortness of breath since his most recent hospitalization. He is unable to take blood thinners because he has ulcers and had to undergo a blood transfusion. Patient does not feel any chest pain. Patient advised to go to the urgent care and be evaluated. He verbalized understanding.

## 2018-12-16 ENCOUNTER — TELEPHONE (OUTPATIENT)
Dept: URGENT CARE | Facility: CLINIC | Age: 83
End: 2018-12-16

## 2018-12-16 NOTE — ED NOTES
Talked with pharmacist about Valsartan. Order is being changed due to issues with medication. Awaiting new orders.

## 2018-12-18 ENCOUNTER — TELEPHONE (OUTPATIENT)
Dept: INTERNAL MEDICINE | Facility: CLINIC | Age: 83
End: 2018-12-18

## 2018-12-18 ENCOUNTER — OFFICE VISIT (OUTPATIENT)
Dept: INTERNAL MEDICINE | Facility: CLINIC | Age: 83
End: 2018-12-18
Payer: MEDICARE

## 2018-12-18 VITALS
HEART RATE: 56 BPM | DIASTOLIC BLOOD PRESSURE: 64 MMHG | BODY MASS INDEX: 24.24 KG/M2 | WEIGHT: 179 LBS | OXYGEN SATURATION: 97 % | HEIGHT: 72 IN | SYSTOLIC BLOOD PRESSURE: 90 MMHG

## 2018-12-18 DIAGNOSIS — I10 HYPERTENSION, UNSPECIFIED TYPE: ICD-10-CM

## 2018-12-18 DIAGNOSIS — I51.89 LEFT VENTRICULAR DIASTOLIC DYSFUNCTION WITH PRESERVED SYSTOLIC FUNCTION: ICD-10-CM

## 2018-12-18 DIAGNOSIS — K25.9 GASTRIC ULCER, UNSPECIFIED CHRONICITY, UNSPECIFIED WHETHER GASTRIC ULCER HEMORRHAGE OR PERFORATION PRESENT: Primary | ICD-10-CM

## 2018-12-18 DIAGNOSIS — I48.0 PAROXYSMAL ATRIAL FIBRILLATION: ICD-10-CM

## 2018-12-18 PROCEDURE — 99999 PR PBB SHADOW E&M-EST. PATIENT-LVL IV: CPT | Mod: PBBFAC,HCNC,GC, | Performed by: STUDENT IN AN ORGANIZED HEALTH CARE EDUCATION/TRAINING PROGRAM

## 2018-12-18 PROCEDURE — 1101F PT FALLS ASSESS-DOCD LE1/YR: CPT | Mod: CPTII,HCNC,GC,S$GLB | Performed by: STUDENT IN AN ORGANIZED HEALTH CARE EDUCATION/TRAINING PROGRAM

## 2018-12-18 PROCEDURE — 99214 OFFICE O/P EST MOD 30 MIN: CPT | Mod: HCNC,GC,S$GLB, | Performed by: STUDENT IN AN ORGANIZED HEALTH CARE EDUCATION/TRAINING PROGRAM

## 2018-12-18 RX ORDER — IRBESARTAN 75 MG/1
75 TABLET ORAL NIGHTLY
COMMUNITY
End: 2019-01-02 | Stop reason: SDUPTHER

## 2018-12-18 RX ORDER — IRBESARTAN 150 MG/1
75 TABLET ORAL NIGHTLY
Qty: 45 TABLET | Refills: 3 | Status: CANCELLED | OUTPATIENT
Start: 2018-12-18 | End: 2019-12-18

## 2018-12-18 NOTE — TELEPHONE ENCOUNTER
Jeffrey,  Mr Kelly's recent EGD showed preliminary report of  Lymphoma.  We're getting him into Heme-Onc.  He is rate controlled on Toprol 25mg but not presently on anticoagulant due to GI bleeding. He doesn't yet have a follow up with you and I assume anticoagulation is still not an option?  Thanks for your help, Amber

## 2018-12-18 NOTE — PROGRESS NOTES
Norm Mendoza  5/2/1926        Subjective     Chief Complaint: Hospital Follow Up    History of Present Illness:  Mr. Norm Mendoza is a 89 y.o. male with a past medical history significant for A.fib who presents to [facility] for hospital follow-up. The patient was recently discharged on 12/9/2018 following a 3 day admission for a GIB. Patient was on Eliquis at the time and had been complaining of 1 week of black, tarry stools. EGD at the time was significant for nonbleeding gastric ulcers. Biopsies were taken and the patient was instructed to start protonix and hold eliquis at discharge. He requiring IV fluids and blood transfusion during the hospitalization. 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 The patient denies any further bloody / black stools, palpitations, or dizziness. He presented to the ED on 12/15/18 for shortness of breath. Troponins were negative at the time and BNP was 300. CXR showed some pulmonary congestion so patient was given one dose of IV lasix in ED and sent home. Patient states he is breathing much better, he is only endorsing mild generalized weakness. He states he is still able to work and was able to take a long walk from the parking lot to the clinic today without experiencing any shortness of breath. Otherwise the patient is denying any symptoms at this time. Patient states he is taking his protonix and recently restarted his irbesartan 150mg because his blood pressure was elevated in the ED.     Review of Systems   Constitutional: Negative for chills, diaphoresis, fever and weight loss.   HENT: Negative for congestion, sinus pain and sore throat.    Eyes: Negative for blurred vision.   Respiratory: Negative for cough, shortness of breath, wheezing and stridor.    Cardiovascular: Negative for chest pain, palpitations, leg swelling and PND.   Gastrointestinal: Negative for abdominal pain,  blood in stool, constipation, diarrhea, heartburn, melena, nausea and vomiting.   Genitourinary: Negative for dysuria, flank pain, hematuria and urgency.   Musculoskeletal: Negative for falls and myalgias.   Skin: Negative for rash.   Neurological: Negative for dizziness and headaches.   Endo/Heme/Allergies: Does not bruise/bleed easily.   Psychiatric/Behavioral: Negative for depression.       PAST HISTORY:     Past Medical History:   Diagnosis Date    Alcohol dependence, daily use 7/13/2017    Allergy     Bladder cancer     tumor that was benign     Hematuria     Hypertension     Mixed hyperlipidemia 5/2/2018    Paroxysmal atrial fibrillation 11/30/2018    Skin cancer     x3, had mohs on nose    Squamous cell carcinoma excised 12/5/14    R lower back    Symptomatic anemia 12/6/2018    Urinary tract infection     x4       MEDICATIONS & ALLERGIES:     Current Outpatient Medications on File Prior to Visit   Medication Sig    irbesartan (AVAPRO) 75 MG tablet Take 75 mg by mouth every evening. 1 tab daily    ACIDOPHILUS/PECTIN, CITRUS (ACIDOPHILUS PROBIOTIC ORAL) Take 1 capsule by mouth once daily.    guaiFENesin (MUCINEX) 1,200 mg Ta12 1 Tab 1-2x/day for cough/congestion    metoprolol succinate (TOPROL-XL) 25 MG 24 hr tablet Take 1 tablet (25 mg total) by mouth once daily. To rate control your Atrial Fibrillation    pantoprazole (PROTONIX) 40 MG tablet Take 1 tablet (40 mg total) by mouth once daily. Take one pill AM of 12/10 and one pill PM of 12/10 then daily until you see your GI doctor    psyllium (METAMUCIL) packet Take 1 packet by mouth once daily.     No current facility-administered medications on file prior to visit.        Review of patient's allergies indicates:  No Known Allergies    OBJECTIVE:     Vital Signs:  Vitals:    12/18/18 1324   BP: 90/64   BP Location: Right arm   Patient Position: Sitting   BP Method: Large (Manual)   Pulse: (!) 56   SpO2: 97%   Weight: 81.2 kg (179 lb 0.2 oz)    Height: 6' (1.829 m)       Body mass index is 24.28 kg/m².     Physical Exam:  General:  Well developed, well nourished, no acute distress  Head: Normocephalic, atraumatic  Eyes: PERRL, EOMI, clear sclera  Throat: No posterior pharyngeal erythema or exudate, no tonsillar exudate  Neck: supple, normal ROM, no thyromegaly   CVS:  Irregularly irregular rhythm, normal rate, S1 and S2 normal, no murmurs, rubs, gallops, 2+ peripheral pulses  Resp:  Lungs clear to auscultation, no wheezes, rales, rhonchi, cough  GI:  Abdomen soft, non-tender, non-distended, normoactive bowel sounds  MSK:  No muscle atrophy, cyanosis, peripheral edema   Skin:  No rashes, ulcers, erythema  Neuro:  CNII-XII grossly intact, no focal deficits noted  Psych:  Appropriate mood and affect, normal judgement    Laboratory  Lab Results   Component Value Date    WBC 6.34 12/15/2018    HGB 9.9 (L) 12/15/2018    HCT 31.5 (L) 12/15/2018    MCV 99 (H) 12/15/2018     12/15/2018     Lab Results   Component Value Date     (H) 12/15/2018     12/15/2018    K 4.2 12/15/2018     12/15/2018    CO2 22 (L) 12/15/2018    BUN 17 12/15/2018    CREATININE 1.0 12/15/2018    CALCIUM 9.0 12/15/2018    MG 2.2 12/15/2018     Lab Results   Component Value Date    INR 1.0 12/15/2018    INR 1.0 12/06/2018    INR 1.0 12/05/2018     No results found for: HGBA1C  No results for input(s): POCTGLUCOSE in the last 72 hours.    Diagnostic Results:  Labs: Reviewed  ECG: Reviewed  X-Ray: Reviewed  Echo: Reviewed    ASSESSMENT & PLAN:   Mr. Norm Mendoza is a 89 y.o. male who presents to [facility] ED for hospital follow up and management of multiple medical comorbidities    Gastric ulcer  Patient with nonbleeding ulcers noted on EGD on 12/7/2018. Biopsies taken at that time are concerning for possible MALT-Lymphoma. Final molecular testing is pending and per pathology most likely will result in 7-14 days. Discussed diagnosis with patient and patient is  amenable to meeting with Oncology and discussing treatment options. No further signs of bleeding at this time, patient's hemoglobin remains stable. Will repeat CBC  -     Ambulatory Referral to Oncology, patient will be scheduled for after pathology results    Hypertension, unspecified type  Patient recently restarted home irbesartan after blood pressure was elevated in the ED. Given patient's recent GIB, would prefer higher BP's to lower BP's. Will decrease antihypertensive medication and continue to monitor        - Decreased Irbesartan from 150mg qd to 75mg qd    Paroxysmal atrial fibrillation  Patient initially diagnosed with A.fib with RVR on exertion. Started on Metoprolol XL -25mg and Eliquis. Anticoagulation stopped following hospitalization for GIB. Rate well controlled at this time. Patient follows with Dr. Esquivel in cardiology. Was being worked up for possible cardioversion. Now no longer on anticoagulation.  - Will continue Metoprolol 25mg for rate control and reach out to patient's cardiologist about future anticoagulation for patient    Left ventricular diastolic dysfunction with preserved systolic function  Patient presented to ED on 12/15/18 with elevated BNP and signs of volume overload requiring one dose of IV diuresis. Patient with a recent hospitalization for GIB requiring blood transfusion and IV fluids so possible contributory etiology. Last Echo was in 5/2018 which was significant for an EF of 55-60% and bilateral enlargement.     - Will reach out to patient's cardiologist about need for repeat echo.        RTC on January 2 with Dr. Kari PETIT  Internal Medicine PGY1  [facility]  [address] [place] [address]  [phone]  Attending Physician: Dr. Jenkins       I have personally seen and examined patient and agree with the A/P as noted above.     Amber Dodson

## 2018-12-18 NOTE — PATIENT INSTRUCTIONS
We will contact Dr. Esquivel about your cardiology follow up, they will call you to schedule it.    Referral to oncology sent for your ulcers, they will call to schedule an appointment with you    Decreased Irbesartan dose, take 75mg daily, continue your metoprolol    Continue your protonix daily.    Follow up with Dr. Jenkins on January 2

## 2018-12-18 NOTE — TELEPHONE ENCOUNTER
----- Message from Prema Carroll sent at 12/18/2018  2:50 PM CST -----  Pt  Is in need of a 1 mth follow up with Dr Jenkins.  Please call with an apt

## 2018-12-18 NOTE — TELEPHONE ENCOUNTER
Dr Garcia,  Mr Kelly presents for hospital follow up and path on EGD stomach biopsies shows probable lymphoma.  I assume he needs to be seen in Heme-Onc and we're working on that, but I wanted to make sure that seemed most appropriate to you.  Let me know, Amber Dodson

## 2018-12-20 ENCOUNTER — TELEPHONE (OUTPATIENT)
Dept: HEMATOLOGY/ONCOLOGY | Facility: CLINIC | Age: 83
End: 2018-12-20

## 2018-12-20 ENCOUNTER — INITIAL CONSULT (OUTPATIENT)
Dept: HEMATOLOGY/ONCOLOGY | Facility: CLINIC | Age: 83
End: 2018-12-20
Payer: MEDICARE

## 2018-12-20 ENCOUNTER — LAB VISIT (OUTPATIENT)
Dept: LAB | Facility: HOSPITAL | Age: 83
End: 2018-12-20
Attending: INTERNAL MEDICINE
Payer: MEDICARE

## 2018-12-20 VITALS
DIASTOLIC BLOOD PRESSURE: 81 MMHG | TEMPERATURE: 98 F | HEART RATE: 76 BPM | SYSTOLIC BLOOD PRESSURE: 124 MMHG | WEIGHT: 179.88 LBS | HEIGHT: 72 IN | BODY MASS INDEX: 24.36 KG/M2 | OXYGEN SATURATION: 96 %

## 2018-12-20 DIAGNOSIS — C88.4 MALT LYMPHOMA: Primary | ICD-10-CM

## 2018-12-20 DIAGNOSIS — I51.89 LEFT VENTRICULAR DIASTOLIC DYSFUNCTION WITH PRESERVED SYSTOLIC FUNCTION: ICD-10-CM

## 2018-12-20 DIAGNOSIS — K25.9 MULTIPLE GASTRIC ULCERS: ICD-10-CM

## 2018-12-20 DIAGNOSIS — I48.0 PAROXYSMAL ATRIAL FIBRILLATION: ICD-10-CM

## 2018-12-20 DIAGNOSIS — C88.4 MALT LYMPHOMA: ICD-10-CM

## 2018-12-20 PROBLEM — C88.40 MALT LYMPHOMA: Status: ACTIVE | Noted: 2018-12-20

## 2018-12-20 PROCEDURE — 36415 COLL VENOUS BLD VENIPUNCTURE: CPT | Mod: HCNC

## 2018-12-20 PROCEDURE — 99205 OFFICE O/P NEW HI 60 MIN: CPT | Mod: HCNC,GC,S$GLB, | Performed by: STUDENT IN AN ORGANIZED HEALTH CARE EDUCATION/TRAINING PROGRAM

## 2018-12-20 PROCEDURE — 1100F PTFALLS ASSESS-DOCD GE2>/YR: CPT | Mod: CPTII,HCNC,GC,S$GLB | Performed by: STUDENT IN AN ORGANIZED HEALTH CARE EDUCATION/TRAINING PROGRAM

## 2018-12-20 PROCEDURE — 87340 HEPATITIS B SURFACE AG IA: CPT | Mod: HCNC

## 2018-12-20 PROCEDURE — 3288F FALL RISK ASSESSMENT DOCD: CPT | Mod: CPTII,HCNC,GC,S$GLB | Performed by: STUDENT IN AN ORGANIZED HEALTH CARE EDUCATION/TRAINING PROGRAM

## 2018-12-20 PROCEDURE — 86704 HEP B CORE ANTIBODY TOTAL: CPT | Mod: HCNC

## 2018-12-20 PROCEDURE — 99999 PR PBB SHADOW E&M-EST. PATIENT-LVL III: CPT | Mod: PBBFAC,HCNC,GC, | Performed by: STUDENT IN AN ORGANIZED HEALTH CARE EDUCATION/TRAINING PROGRAM

## 2018-12-20 NOTE — PROGRESS NOTES
PATIENT: Norm Mendoza  MRN: [id number]  DATE: 12/20/2018      Diagnosis:   1. MALT lymphoma        Chief Complaint: No chief complaint on file.    Subjective:     Mr. Norm Mendoza is a 89 y.o. male with a-fib, HTN, who presents to the Hematologic clinic to establish care for recent diagnosis of MALT lymphoma.    Oncologic History  -The patient was recently discharged on 12/9/2018 following a 3 day admission for a GIB. Patient was on Eliquis at the time and had been complaining of 1 week of black, tarry stools. EGD at the time was significant for nonbleeding gastric ulcers. Biopsies were taken and the patient was instructed to start protonix and hold eliquis at discharge. He requiring IV fluids and blood transfusion during the hospitalization. Biopsy results returned 12/18/2018 concerning for possible MALT-Lymphoma with molecular studies pending.    Interval History  The patient was seen today, still feeling weak. Denies dyspnea. No lymphadenopathy. No abdominal pain. No further GIB. Has been off DOAC/ASA. Very functional 89-year-old. He works 3 days a week for a company called [employer]. Lives alone, has a girlfriend. Has one son who lives here, one in [place], one grand-daughter who lives in [place]. Does pilates once a week. No family history of cancer.    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    Mixed hyperlipidemia 5/2/2018    Paroxysmal atrial fibrillation 11/30/2018    Skin cancer     x3, had mohs on nose    Squamous cell carcinoma excised 12/5/14    R lower back    Symptomatic anemia 12/6/2018    Urinary tract infection     x4       Past Surgical HIstory:   Past Surgical History:   Procedure Laterality Date    ACHILLES TENDON SURGERY      cataracts      CYSTOSCOPY      bladder tumor     CYSTOSCOPY N/A 3/28/2014    Performed by [facility] at [facility] OR 1ST FLR    DILATION, URETHRA N/A 3/28/2014    Per[facility] by [facility] at [facility] OR 1ST FLR    EGD (ESOPHAGOGASTRODUODENOSCOPY) N/A 12/7/2018    Performed by Austin Garcia MD at [facility] ENDO (2ND FLR)    ESOPHAGOGASTRODUODENOSCOPY N/A 12/7/2018    Procedure: EGD (ESOPHAGOGASTRODUODENOSCOPY);  Surgeon: Austin Garcia MD;  Location: [facility] ENDO (2ND FLR);  Service: Endoscopy;  Laterality: N/A;    EXCISION-BLADDER TUMOR-TRANSURETHRAL (TURBT) N/A 3/28/2014    Performed by [facility] at [facility] OR 1ST FLR    EYE SURGERY      SKIN CANCER EXCISION         Family History:   Family History   Problem Relation Age of Onset    Stroke Father     Stroke Brother     Anesthesia problems Neg Hx     Malignant hypertension Neg Hx     Hypotension Neg Hx     Malignant hyperthermia Neg Hx     Pseudochol deficiency Neg Hx     Melanoma Neg Hx        Social History:  reports that he quit smoking about 48 years ago. His smoking use included cigarettes. He has a 25.00 pack-year smoking history. he has never used smokeless tobacco. He reports that he drinks about 1.8 oz of alcohol per week. He reports that he does not use drugs.    Allergies:  Review of patient's allergies indicates:  No Known Allergies    Medications:  Current Outpatient Medications   Medication Sig Dispense Refill    ACIDOPHILUS/PECTIN, CITRUS (ACIDOPHILUS PROBIOTIC ORAL) Take 1 capsule by mouth once daily.      FLUAD [id number], 65 YR UP,,PF, 45 mcg (15 mcg x 3)/0.5 mL Syrg ADM 0.5ML IM UTD  0    guaiFENesin (MUCINEX) 1,200 mg Ta12 1 Tab 1-2x/day for cough/congestion 20 tablet 0    irbesartan (AVAPRO) 75 MG tablet Take 75 mg by mouth every evening. 1 tab daily      metoprolol succinate (TOPROL-XL) 25 MG 24 hr tablet Take 1 tablet (25 mg total) by mouth once daily. To rate control your Atrial Fibrillation 30 tablet 11    pantoprazole (PROTONIX) 40 MG tablet Take 1  tablet (40 mg total) by mouth once daily. Take one pill AM of 12/10 and one pill PM of 12/10 then daily until you see your GI doctor 32 tablet 11    psyllium (METAMUCIL) packet Take 1 packet by mouth once daily.       No current facility-administered medications for this visit.        Review of Systems   Constitutional: Positive for fatigue. Negative for chills, fever and unexpected weight change.   HENT: Negative for nosebleeds and sore throat.    Respiratory: Negative for cough and shortness of breath.    Cardiovascular: Negative for chest pain and leg swelling.   Gastrointestinal: Negative for abdominal pain, blood in stool, nausea and vomiting.   Genitourinary: Negative for dysuria and hematuria.   Musculoskeletal: Negative for arthralgias and back pain.   Skin: Negative for rash.   Neurological: Negative for light-headedness and headaches.   Hematological: Negative for adenopathy. Does not bruise/bleed easily.       ECOG Performance Status: 1   Objective:      Vitals:   Vitals:    12/20/18 1512   BP: 124/81   Pulse: 76   Temp: 97.6 °F (36.4 °C)   SpO2: 96%   Weight: 81.6 kg (179 lb 14.3 oz)   Height: 6' (1.829 m)     BMI: Body mass index is 24.4 kg/m².    Physical Exam   Constitutional: He appears well-developed and well-nourished.   HENT:   Head: Normocephalic and atraumatic.   Eyes: EOM are normal. Pupils are equal, round, and reactive to light. No scleral icterus.   Neck: Neck supple.   Cardiovascular: Normal rate and regular rhythm.   Pulmonary/Chest: Effort normal and breath sounds normal. No respiratory distress.   Abdominal: Soft. He exhibits no distension. There is no tenderness.   Musculoskeletal: Normal range of motion. He exhibits no edema.   Lymphadenopathy:     He has no cervical adenopathy.   Neurological: He is alert.   Skin: Skin is warm and dry.   Psychiatric: He has a normal mood and affect. His behavior is normal.       Laboratory Data:  Admission on 12/15/2018, Discharged on 12/15/2018    Component Date Value Ref Range Status    WBC 12/15/2018 6.34  3.90 - 12.70 K/uL Final    RBC 12/15/2018 3.18* 4.60 - 6.20 M/uL Final    Hemoglobin 12/15/2018 9.9* 14.0 - 18.0 g/dL Final    Hematocrit 12/15/2018 31.5* 40.0 - 54.0 % Final    MCV 12/15/2018 99* 82 - 98 fL Final    MCH 12/15/2018 31.1* 27.0 - 31.0 pg Final    MCHC 12/15/2018 31.4* 32.0 - 36.0 g/dL Final    RDW 12/15/2018 17.5* 11.5 - 14.5 % Final    Platelets 12/15/2018 340  150 - 350 K/uL Final    MPV 12/15/2018 9.0* 9.2 - 12.9 fL Final    Immature Granulocytes 12/15/2018 0.5  0.0 - 0.5 % Final    Gran # (ANC) 12/15/2018 4.4  1.8 - 7.7 K/uL Final    Immature Grans (Abs) 12/15/2018 0.03  0.00 - 0.04 K/uL Final    Comment: Mild elevation in immature granulocytes is non specific and   can be seen in a variety of conditions including stress response,   acute inflammation, trauma and pregnancy. Correlation with other   laboratory and clinical findings is essential.      Lymph # 12/15/2018 1.1  1.0 - 4.8 K/uL Final    Mono # 12/15/2018 0.7  0.3 - 1.0 K/uL Final    Eos # 12/15/2018 0.0  0.0 - 0.5 K/uL Final    Baso # 12/15/2018 0.07  0.00 - 0.20 K/uL Final    nRBC 12/15/2018 0  0 /100 WBC Final    Gran% 12/15/2018 70.0  38.0 - 73.0 % Final    Lymph% 12/15/2018 17.8* 18.0 - 48.0 % Final    Mono% 12/15/2018 10.4  4.0 - 15.0 % Final    Eosinophil% 12/15/2018 0.2  0.0 - 8.0 % Final    Basophil% 12/15/2018 1.1  0.0 - 1.9 % Final    Differential Method 12/15/2018 Automated   Final    Sodium 12/15/2018 138  136 - 145 mmol/L Final    Potassium 12/15/2018 4.2  3.5 - 5.1 mmol/L Final    Chloride 12/15/2018 107  95 - 110 mmol/L Final    CO2 12/15/2018 22* 23 - 29 mmol/L Final    Glucose 12/15/2018 112* 70 - 110 mg/dL Final    BUN, Bld 12/15/2018 17  10 - 30 mg/dL Final    Creatinine 12/15/2018 1.0  0.5 - 1.4 mg/dL Final    Calcium 12/15/2018 9.0  8.7 - 10.5 mg/dL Final    Total Protein 12/15/2018 6.4  6.0 - 8.4 g/dL Final    Albumin  12/15/2018 3.1* 3.5 - 5.2 g/dL Final    Total Bilirubin 12/15/2018 0.4  0.1 - 1.0 mg/dL Final    Comment: For infants and newborns, interpretation of results should be based  on gestational age, weight and in agreement with clinical  observations.  Premature Infant recommended reference ranges:  Up to 24 hours.............<8.0 mg/dL  Up to 48 hours............<12.0 mg/dL  3-5 days..................<15.0 mg/dL  6-29 days.................<15.0 mg/dL      Alkaline Phosphatase 12/15/2018 67  55 - 135 U/L Final    AST 12/15/2018 19  10 - 40 U/L Final    ALT 12/15/2018 24  10 - 44 U/L Final    Anion Gap 12/15/2018 9  8 - 16 mmol/L Final    eGFR if African American 12/15/2018 >60.0  >60 mL/min/1.73 m^2 Final    eGFR if non African American 12/15/2018 >60.0  >60 mL/min/1.73 m^2 Final    Comment: Calculation used to obtain the estimated glomerular filtration  rate (eGFR) is the CKD-EPI equation.       Troponin I 12/15/2018 0.027* 0.000 - 0.026 ng/mL Final    Comment: The reference interval for Troponin I represents the 99th percentile   cutoff   for our facility and is consistent with 3rd generation assay   performance.      BNP 12/15/2018 313* 0 - 99 pg/mL Final    Values of less than 100 pg/ml are consistent with non-CHF populations.    Prothrombin Time 12/15/2018 10.5  9.0 - 12.5 sec Final    INR 12/15/2018 1.0  0.8 - 1.2 Final    Comment: Coumadin Therapy:  2.0 - 3.0 for INR for all indicators except mechanical heart valves  and antiphospholipid syndromes which should use 2.5 - 3.5.      Magnesium 12/15/2018 2.2  1.6 - 2.6 mg/dL Final    Phosphorus 12/15/2018 3.5  2.7 - 4.5 mg/dL Final    Troponin I 12/15/2018 0.009  0.000 - 0.026 ng/mL Final    Comment: The reference interval for Troponin I represents the 99th percentile   cutoff   for our facility and is consistent with 3rd generation assay   performance.     Office Visit on 12/15/2018   Component Date Value Ref Range Status    POC Sodium 12/15/2018 142   138 - 146 MMOL/L Final    POC Potassium 12/15/2018 4.7  3.5 - 4.9 MMOL/L Final    POC Chloride 12/15/2018 106  98 - 109 MMOL/L Final    POC BUN 12/15/2018 16  8 - 26 MMOL/L Final    POC Glucose 12/15/2018 121* 70 - 110 MG/DL Final    POC Creatinine 12/15/2018 0.9  0.6 - 1.3 mg/dL Final    POC iCA 12/15/2018 1.17  1.12 - 1.32 MMOL/L Final    POC TCO2 12/15/2018 25  24 - 29 MMOL/L Final    POC Hematocrit 12/15/2018 32* 42 - 52 %PCV Final    POC Hemoglobin 12/15/2018 10.9* 13.5 - 18 g/dL Final    POC Anion Gap 12/15/2018 17  10.0 - 20 MMOL/L Final     FINAL PATHOLOGIC DIAGNOSIS  1. ULCER LESSER CURVATURE, GASTRIC BODY, BIOPSY-:  -ATYPICAL LYMPHOCYTIC INFILTRATION, PENDING MOLECULAR STUDY. SEE COMMENT  -RARE FOCUS OF ACTIVE INFLAMMATION.  -HELICOBACTER IS NEGATIVE.  COMMENT: Fragments of gastric mucosa infiltrated with small to medium-sized atypical lymphocytes.  Lymphoepithelial lesions are also evident.  Flow cytometric analysis of tissue was not submitted.  Immunohistochemical studies were performed on paraffin block with adequate positive and negative controls . The  small atypical lymphocytes are positive for CD20, low KI -67, and are negative for CD10, CD23, cyclin D1. The  reactive T cells are CD3 positive, CD5 positive, and BCL 2 positive. Immunohistochemical study for Helicobacter is  negative.  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 Molecular study is pending to confirm the diagnosis. A supplemental report will follow.    Assessment:       1. MALT lymphoma           Plan:   1) MALT lymphoma  -H pylori negative  -Recent h/o GIB while was on DOAC for a-fib  -Molecular studies pending  -PET scan ordered  -Hepatitis studies ordered  -Pending staging, hepatitis studies, will likely pursue rituximab 375mg x4 weeks    Follow-up: 1/10/18    The following was staffed and discussed with supervising physician   Crescencio.    Kelly Beckwith MD  Hematology/Oncology fellow  Distress Screening Results: Psychosocial Distress screening score of Distress Score: 0 noted and reviewed. No intervention indicated.

## 2018-12-20 NOTE — Clinical Note
Please request authorization for rituximab treatment plan. Once approved, please schedule first chemotherapy appt for Friday, 1/11/2019.

## 2018-12-20 NOTE — TELEPHONE ENCOUNTER
Explained to pt that pathology report still pending additional report but he can see hem-onc for more work-up .  Pt requesting to come today as he is free this week.  Scheduled for 3pm today.    ========================================  ----- Message -----  From: Prema Carroll  Sent: 12/18/2018   2:51 PM  To: [facility] Cancer Navigation    Pt is needing an apt in oncology can someone please call with an apt

## 2018-12-21 LAB — HBV SURFACE AG SERPL QL IA: NEGATIVE

## 2018-12-22 ENCOUNTER — TELEPHONE (OUTPATIENT)
Dept: INTERNAL MEDICINE | Facility: CLINIC | Age: 83
End: 2018-12-22

## 2018-12-22 DIAGNOSIS — D64.9 ANEMIA, UNSPECIFIED TYPE: Primary | ICD-10-CM

## 2018-12-22 NOTE — TELEPHONE ENCOUNTER
Mr Mendoza calls with c/o fatigue and questions if metoprolol needs to be continued.  I've recommended he continue metoprolol 25mg daily for A-fib rate control; 'hopefully the decreased dose of irbesartan increases his BP and will help the fatigue.  He does need a CBC/BMP which I've recommended he do Monday 12/24.  VictorH ugo, please call Mr eMndoza to schedule lab today(12/24)-it's not fasting.  Thanks

## 2018-12-24 ENCOUNTER — TELEPHONE (OUTPATIENT)
Dept: HEMATOLOGY/ONCOLOGY | Facility: HOSPITAL | Age: 83
End: 2018-12-24

## 2018-12-24 ENCOUNTER — LAB VISIT (OUTPATIENT)
Dept: LAB | Facility: HOSPITAL | Age: 83
End: 2018-12-24
Attending: INTERNAL MEDICINE
Payer: MEDICARE

## 2018-12-24 DIAGNOSIS — D64.9 ANEMIA, UNSPECIFIED TYPE: ICD-10-CM

## 2018-12-24 LAB
BASOPHILS # BLD AUTO: 0.1 K/UL
BASOPHILS NFR BLD: 1.7 %
DIFFERENTIAL METHOD: ABNORMAL
EOSINOPHIL # BLD AUTO: 0.2 K/UL
EOSINOPHIL NFR BLD: 3.3 %
ERYTHROCYTE [DISTWIDTH] IN BLOOD BY AUTOMATED COUNT: 16.6 %
HCT VFR BLD AUTO: 34.3 %
HEPATITIS B CORE  AB, DONOR EVAL, (BLOOD CENTER): NORMAL
HGB BLD-MCNC: 10.4 G/DL
IMM GRANULOCYTES # BLD AUTO: 0.03 K/UL
IMM GRANULOCYTES NFR BLD AUTO: 0.5 %
LYMPHOCYTES # BLD AUTO: 0.9 K/UL
LYMPHOCYTES NFR BLD: 15.7 %
MCH RBC QN AUTO: 29.8 PG
MCHC RBC AUTO-ENTMCNC: 30.3 G/DL
MCV RBC AUTO: 98 FL
MONOCYTES # BLD AUTO: 0.7 K/UL
MONOCYTES NFR BLD: 11.4 %
NEUTROPHILS # BLD AUTO: 3.9 K/UL
NEUTROPHILS NFR BLD: 67.4 %
NRBC BLD-RTO: 0 /100 WBC
PLATELET # BLD AUTO: 283 K/UL
PMV BLD AUTO: 9.4 FL
RBC # BLD AUTO: 3.49 M/UL
WBC # BLD AUTO: 5.81 K/UL

## 2018-12-24 PROCEDURE — 85025 COMPLETE CBC W/AUTO DIFF WBC: CPT | Mod: HCNC

## 2018-12-26 ENCOUNTER — TELEPHONE (OUTPATIENT)
Dept: HEMATOLOGY/ONCOLOGY | Facility: HOSPITAL | Age: 83
End: 2018-12-26

## 2018-12-26 ENCOUNTER — TELEPHONE (OUTPATIENT)
Dept: INTERNAL MEDICINE | Facility: CLINIC | Age: 83
End: 2018-12-26

## 2018-12-26 NOTE — TELEPHONE ENCOUNTER
Said he wanted to talk to Dr. Beckwith about chemo after he had the results of his PET CT    Reviewed his upcoming schedule showing PET CT 1/3/19 and follow up with Dr. Beckwith 1/10/19.    He says he has the PET CT on 1/10/19.  However he likes the idea of the PET CT 1 week before and an appointment the week after.    Told him I would tell schedulers to confirm PET CT 1/3/19 and appointment 1/10/19 and to call him in the morning to verify his appointments.    Lion Cook MD  Hematology Oncology Fellow PGY5  Pager [phone]      ----- Message from Angie Malagon sent at 12/26/2018  1:48 PM CST -----  Contact: Patient  Requesting a call back to discuss chemo treatments that are to follow PET scan appt scheduled on 01/10/19.     Contact::[phone]

## 2018-12-26 NOTE — TELEPHONE ENCOUNTER
Spoke with Mr Kelly ie his test results(12/24)-showed H/H improved at 10/34%(last at 9/31%), BMP was ok with GFR at 52%. He is feeling a little better on lower dose of Irbesartan at 75mg. He will call to schedule RTC appt with Cardiology/Dr Esquivel and see me 1/2.

## 2018-12-27 ENCOUNTER — TELEPHONE (OUTPATIENT)
Dept: ELECTROPHYSIOLOGY | Facility: CLINIC | Age: 83
End: 2018-12-27

## 2018-12-27 NOTE — TELEPHONE ENCOUNTER
----- Message from Sunni Blair RN sent at 12/27/2018 10:17 AM CST -----  Contact: pt      ----- Message -----  From: Rosario Israel  Sent: 12/27/2018   9:45 AM  To: Marla Peck RN    Pt would like a call to ask some questions about his afib and blood thinning meds treatment.    Thanks

## 2018-12-27 NOTE — PHYSICIAN QUERY
PT Name: Norm Mendoza  MR #: [id number]    Physician Query Form - Pathology Findings Clarification     CDS Pearl Huerta RN, BSN        Contact Information:  [phone]    [email or web address]         This form is a permanent document in the medical record.     Query Date: December 27, 2018      By submitting this query, we are merely seeking further clarification of documentation.  Please utilize your independent clinical judgment when addressing the question(s) below.      The medical record contains the following:     Findings Supporting Clinical Information Location in Medical Record   Supplemental Diagnosis  See attached [facility] report:  ([address] [place] [address])  Immunoglobulin Heavy Chain(IgH) gene rearrangement by PCR analysis :Positive for a clonal IgH gene  rearrangement.  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  CD20  (Electronically Signed: 2018-12-24 11:23:52 )  Diagnosed by: Yelena Wheeler M.D.    FINAL PATHOLOGIC DIAGNOSIS  1. ULCER LESSER CURVATURE, GASTRIC BODY, BIOPSY-:  -ATYPICAL LYMPHOCYTIC INFILTRATION, PENDING MOLECULAR STUDY. SEE COMMENT  -RARE FOCUS OF ACTIVE INFLAMMATION.  -HELICOBACTER IS NEGATIVE.                         EGD   Impression:             - Normal esophagus.  - 1 cm type-I sliding hiatal hernia.  - Non-bleeding gastric ulcer with a flat pigmented spot (Saurabh Class IIc). Biopsied.  - Non-bleeding gastric ulcers with a clean ulcer base (Saurabh Class III). Biopsied.  - Normal greater curvature of the gastric body and antrum.  - Normal examined duodenum.  - He is at high-risk for recurrent bleeding if anticoagulation is restarted now.        Principal Problem: Symptomatic anemia  Upper GI bleed due to PUD  Acute blood loss anemia from UGIB     Pathology report final result 12/24                      EGD                                Discharge summary     Please  document the clinical significance of the Pathologists findings of      ULCER LESSER CURVATURE, GASTRIC BODY, BIOPSY-:  -ATYPICAL LYMPHOCYTIC INFILTRATION, PENDING MOLECULAR STUDY. SEE COMMENT  -RARE FOCUS OF ACTIVE INFLAMMATION.  -HELICOBACTER IS NEGATIVE.    Immunoglobulin Heavy Chain(IgH) gene rearrangement by PCR analysis :Positive for a clonal IgH gene  rearrangement.  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  CD20      [ X ] I agree with the Pathology Findings   [   ] I do not agree with the Pathology Findings   [   ] Other/Clarification of Findings:   [  ] Clinically Undetermined       Please document in your progress notes daily for the duration of treatment until resolved and include in your discharge summary.

## 2018-12-27 NOTE — TELEPHONE ENCOUNTER
----- Message from Rosi Benjamin MA sent at 12/27/2018  1:48 PM CST -----  Contact: Patient      ----- Message -----  From: Soila Graff  Sent: 12/27/2018   1:47 PM  To: Alvin [facility] Staff    The Pt is returning a call. Please call him back @ [phone]. Thanks, Soila

## 2018-12-31 ENCOUNTER — TELEPHONE (OUTPATIENT)
Dept: ELECTROPHYSIOLOGY | Facility: CLINIC | Age: 83
End: 2018-12-31

## 2018-12-31 NOTE — TELEPHONE ENCOUNTER
----- Message from Tiffany Friedman MA sent at 12/28/2018  4:53 PM CST -----  Contact: Pt called   I see you were speaking to pt yesterday, Pt is requesting a hospital f/u.  Please advise on how soon to f/u and I will call him to schedule it  ----- Message -----  From: Shelbi Veloz  Sent: 12/28/2018   3:53 PM  To: [facility] S[facility] Staff    Pt need to schedule a hospital f/u.Please call pt @ [phone]. Thank you.

## 2018-12-31 NOTE — TELEPHONE ENCOUNTER
Called pt back, pt states he was instructed by PCP to follow up with Dr Esquivel. Advised we would want to follow up after back on blood thinners so can plan DCCV (initially canceled r/t GI bleed). Pt states he was informed would not be placed back on blood thinner. Needs to meet with Dr Esquivel per PCP to discuss plan since plan of care has changed since initial evaluation. Apt arranged.

## 2019-01-02 ENCOUNTER — OFFICE VISIT (OUTPATIENT)
Dept: INTERNAL MEDICINE | Facility: CLINIC | Age: 84
End: 2019-01-02
Payer: MEDICARE

## 2019-01-02 VITALS
HEIGHT: 72 IN | HEART RATE: 72 BPM | DIASTOLIC BLOOD PRESSURE: 86 MMHG | WEIGHT: 177.94 LBS | TEMPERATURE: 98 F | OXYGEN SATURATION: 98 % | SYSTOLIC BLOOD PRESSURE: 144 MMHG | BODY MASS INDEX: 24.1 KG/M2

## 2019-01-02 DIAGNOSIS — I10 ESSENTIAL HYPERTENSION: ICD-10-CM

## 2019-01-02 DIAGNOSIS — C88.4 MALT LYMPHOMA: ICD-10-CM

## 2019-01-02 DIAGNOSIS — D64.9 ANEMIA, UNSPECIFIED TYPE: Primary | ICD-10-CM

## 2019-01-02 DIAGNOSIS — I48.91 ATRIAL FIBRILLATION, NEW ONSET: ICD-10-CM

## 2019-01-02 PROCEDURE — 99214 PR OFFICE/OUTPT VISIT, EST, LEVL IV, 30-39 MIN: ICD-10-PCS | Mod: HCNC,S$GLB,, | Performed by: INTERNAL MEDICINE

## 2019-01-02 PROCEDURE — 99999 PR PBB SHADOW E&M-EST. PATIENT-LVL V: CPT | Mod: PBBFAC,HCNC,, | Performed by: INTERNAL MEDICINE

## 2019-01-02 PROCEDURE — 1101F PR PT FALLS ASSESS DOC 0-1 FALLS W/OUT INJ PAST YR: ICD-10-PCS | Mod: CPTII,HCNC,S$GLB, | Performed by: INTERNAL MEDICINE

## 2019-01-02 PROCEDURE — 1101F PT FALLS ASSESS-DOCD LE1/YR: CPT | Mod: CPTII,HCNC,S$GLB, | Performed by: INTERNAL MEDICINE

## 2019-01-02 PROCEDURE — 99999 PR PBB SHADOW E&M-EST. PATIENT-LVL V: ICD-10-PCS | Mod: PBBFAC,HCNC,, | Performed by: INTERNAL MEDICINE

## 2019-01-02 PROCEDURE — 99214 OFFICE O/P EST MOD 30 MIN: CPT | Mod: HCNC,S$GLB,, | Performed by: INTERNAL MEDICINE

## 2019-01-02 RX ORDER — IRBESARTAN 150 MG/1
75 TABLET ORAL NIGHTLY
Qty: 90 TABLET | Refills: 1 | Status: SHIPPED | OUTPATIENT
Start: 2019-01-02 | End: 2019-01-10

## 2019-01-02 NOTE — PROGRESS NOTES
Mr. Mendoza is a 89-year-old gentleman, known to myself, who presents today for   followup of hypertension and atrial fibrillation.    HISTORY OF PRESENT ILLNESS:  Mr. Mendoza presents for followup of the above.    He notes that he is feeling a little better, but still has generalized fatigue.    He has had no further episodes of gastrointestinal bleeding.  Stools are brown.    No melena.  He has no chest pain, no shortness of breath.  He does have   occasional mild palpitations, but notes these are much improved on the   metoprolol.  He did in fact see Hematology/Oncology, Dr. Beckwith and has a PET   scan scheduled tomorrow with followup next week to determine course of therapy   for his MALT lymphoma in the gastric body.  He is presently taking half of a 150   mg of irbesartan that is 75 mg in addition to the metoprolol 25.    PAST MEDICAL, SURGICAL, AND SOCIAL HISTORY:  Please see as thoroughly stated in   EPIC chart, which has been reviewed.    PHYSICAL EXAMINATION:  VITAL SIGNS:  Weight 177 pounds, blood pressure 148/88, pulse 85, pulse ox 98%,   recheck blood pressure per M.D. is 144/86 with pulse 72.  GENERAL:  The patient looks comfortable.  HEENT:  Grossly unremarkable.  NECK:  Supple.  Negative for masses or thyromegaly, negative for   lymphadenopathy.  LUNGS:  Clear bilaterally.  HEART:  Shows irregular rhythm, but rate controlled at 72.  ABDOMEN:  Positive bowel sounds, soft, nontender, no masses.  No organomegaly.  EXTREMITIES:  Show trace if any pretibial edema.    WORKUP:  Review of lab work from December 24th showing chemistry improved with   glucose normal.  GFR is little low at 52, was 60.  CBC shows improvement in H   and H at 10 and 34%, was 9 and 31%.    ASSESSMENT AND PLAN:  1.  Essential hypertension with blood pressure elevated now on the lower dose of   irbesartan, not sure why blood pressure on last visit December 18th was so low.    The patient is feeling better, but blood pressure is  high.  A.  We will increase irbesartan to 150 mg one a day with the patient to continue   the Toprol-XL 25 mg one a day.  B.  Return to clinic for nurse blood pressure check in a week and then back to   me as a scheduled in two to three months or see sooner if needed.  2.  Chronic atrial fibrillation, under excellent rate controlled, but with   intolerance of anticoagulation secondary to GI bleeding.  A.  Continue on Toprol-XL 25 mg one a day.  B.  Appointment in Arrhythmia Cardiology, Dr. Esquivel as is scheduled later this   month and we will hold any anticoagulation until gastric ulcers eradicated and   no evidence of GI hemorrhage.  3.  Anemia, mild, but improved status post GI bleeding and transfusions.  A.  We will start Slow Fe 50 to 60 mg iron one a day.  B.  Repeat CBC with return to clinic lab.  4.  MALT lymphoma with pending PET scan and followup to Hematology/Oncology, Dr. Beckwith.  A.  We will follow along.  5.  Health maintenance.  A.  Patient appointments for his record.  B.  Return to clinic as is scheduled in March or see sooner if needed.      FMS/HN  dd: 01/02/2019 17:12:19 (CST)  td: 01/03/2019 02:59:38 (CST)  Doc ID   #[id number]  Job ID #[id number]    CC:     This office note has been dictated.

## 2019-01-02 NOTE — PATIENT INSTRUCTIONS
For Anemia:  #1-Slow Iron at 1/day for now     For Blood Pressure:  #1-Increase Irbesartan to 150mg at 1 whole tab daily

## 2019-01-03 ENCOUNTER — HOSPITAL ENCOUNTER (OUTPATIENT)
Dept: RADIOLOGY | Facility: HOSPITAL | Age: 84
Discharge: HOME | End: 2019-01-03
Attending: STUDENT IN AN ORGANIZED HEALTH CARE EDUCATION/TRAINING PROGRAM
Payer: MEDICARE

## 2019-01-03 DIAGNOSIS — C88.4 MALT LYMPHOMA: ICD-10-CM

## 2019-01-03 LAB — POCT GLUCOSE: 98 MG/DL (ref 70–110)

## 2019-01-03 PROCEDURE — 78815 NM PET CT ROUTINE: ICD-10-PCS | Mod: 26,HCNC,PI, | Performed by: RADIOLOGY

## 2019-01-03 PROCEDURE — 78815 PET IMAGE W/CT SKULL-THIGH: CPT | Mod: TC,HCNC

## 2019-01-03 PROCEDURE — A9552 F18 FDG: HCPCS | Mod: HCNC

## 2019-01-03 PROCEDURE — 78815 PET IMAGE W/CT SKULL-THIGH: CPT | Mod: 26,HCNC,PI, | Performed by: RADIOLOGY

## 2019-01-10 ENCOUNTER — CLINICAL SUPPORT (OUTPATIENT)
Dept: INTERNAL MEDICINE | Facility: CLINIC | Age: 84
End: 2019-01-10
Payer: MEDICARE

## 2019-01-10 ENCOUNTER — TELEPHONE (OUTPATIENT)
Dept: INTERNAL MEDICINE | Facility: CLINIC | Age: 84
End: 2019-01-10

## 2019-01-10 ENCOUNTER — OFFICE VISIT (OUTPATIENT)
Dept: HEMATOLOGY/ONCOLOGY | Facility: CLINIC | Age: 84
End: 2019-01-10
Payer: MEDICARE

## 2019-01-10 ENCOUNTER — LAB VISIT (OUTPATIENT)
Dept: LAB | Facility: HOSPITAL | Age: 84
End: 2019-01-10
Attending: STUDENT IN AN ORGANIZED HEALTH CARE EDUCATION/TRAINING PROGRAM
Payer: MEDICARE

## 2019-01-10 VITALS — DIASTOLIC BLOOD PRESSURE: 63 MMHG | SYSTOLIC BLOOD PRESSURE: 103 MMHG

## 2019-01-10 VITALS
WEIGHT: 177 LBS | HEART RATE: 101 BPM | TEMPERATURE: 98 F | BODY MASS INDEX: 23.98 KG/M2 | OXYGEN SATURATION: 98 % | RESPIRATION RATE: 18 BRPM | DIASTOLIC BLOOD PRESSURE: 79 MMHG | SYSTOLIC BLOOD PRESSURE: 125 MMHG | HEIGHT: 72 IN

## 2019-01-10 DIAGNOSIS — I10 ESSENTIAL HYPERTENSION: ICD-10-CM

## 2019-01-10 DIAGNOSIS — C88.4 MALT LYMPHOMA: Primary | ICD-10-CM

## 2019-01-10 DIAGNOSIS — C88.4 MALT LYMPHOMA: ICD-10-CM

## 2019-01-10 LAB
BASOPHILS # BLD AUTO: 0.08 K/UL
BASOPHILS NFR BLD: 1.1 %
DIFFERENTIAL METHOD: ABNORMAL
EOSINOPHIL # BLD AUTO: 0.1 K/UL
EOSINOPHIL NFR BLD: 1.1 %
ERYTHROCYTE [DISTWIDTH] IN BLOOD BY AUTOMATED COUNT: 15.9 %
HCT VFR BLD AUTO: 36.2 %
HGB BLD-MCNC: 11.1 G/DL
IMM GRANULOCYTES # BLD AUTO: 0.03 K/UL
IMM GRANULOCYTES NFR BLD AUTO: 0.4 %
LYMPHOCYTES # BLD AUTO: 1.1 K/UL
LYMPHOCYTES NFR BLD: 15.2 %
MCH RBC QN AUTO: 28.5 PG
MCHC RBC AUTO-ENTMCNC: 30.7 G/DL
MCV RBC AUTO: 93 FL
MONOCYTES # BLD AUTO: 0.6 K/UL
MONOCYTES NFR BLD: 9.2 %
NEUTROPHILS # BLD AUTO: 5.1 K/UL
NEUTROPHILS NFR BLD: 73 %
NRBC BLD-RTO: 0 /100 WBC
PLATELET # BLD AUTO: 243 K/UL
PMV BLD AUTO: 9.3 FL
RBC # BLD AUTO: 3.89 M/UL
WBC # BLD AUTO: 6.96 K/UL

## 2019-01-10 PROCEDURE — 99214 PR OFFICE/OUTPT VISIT, EST, LEVL IV, 30-39 MIN: ICD-10-PCS | Mod: HCNC,GC,S$GLB, | Performed by: STUDENT IN AN ORGANIZED HEALTH CARE EDUCATION/TRAINING PROGRAM

## 2019-01-10 PROCEDURE — 85025 COMPLETE CBC W/AUTO DIFF WBC: CPT | Mod: HCNC

## 2019-01-10 PROCEDURE — 1101F PT FALLS ASSESS-DOCD LE1/YR: CPT | Mod: CPTII,HCNC,GC,S$GLB | Performed by: STUDENT IN AN ORGANIZED HEALTH CARE EDUCATION/TRAINING PROGRAM

## 2019-01-10 PROCEDURE — 99214 OFFICE O/P EST MOD 30 MIN: CPT | Mod: HCNC,GC,S$GLB, | Performed by: STUDENT IN AN ORGANIZED HEALTH CARE EDUCATION/TRAINING PROGRAM

## 2019-01-10 PROCEDURE — 99999 PR PBB SHADOW E&M-EST. PATIENT-LVL II: CPT | Mod: PBBFAC,HCNC,,

## 2019-01-10 PROCEDURE — 36415 COLL VENOUS BLD VENIPUNCTURE: CPT | Mod: HCNC

## 2019-01-10 PROCEDURE — 99999 PR PBB SHADOW E&M-EST. PATIENT-LVL II: ICD-10-PCS | Mod: PBBFAC,HCNC,,

## 2019-01-10 PROCEDURE — 99999 PR PBB SHADOW E&M-EST. PATIENT-LVL IV: ICD-10-PCS | Mod: PBBFAC,HCNC,GC, | Performed by: STUDENT IN AN ORGANIZED HEALTH CARE EDUCATION/TRAINING PROGRAM

## 2019-01-10 PROCEDURE — 1101F PR PT FALLS ASSESS DOC 0-1 FALLS W/OUT INJ PAST YR: ICD-10-PCS | Mod: CPTII,HCNC,GC,S$GLB | Performed by: STUDENT IN AN ORGANIZED HEALTH CARE EDUCATION/TRAINING PROGRAM

## 2019-01-10 PROCEDURE — 99999 PR PBB SHADOW E&M-EST. PATIENT-LVL IV: CPT | Mod: PBBFAC,HCNC,GC, | Performed by: STUDENT IN AN ORGANIZED HEALTH CARE EDUCATION/TRAINING PROGRAM

## 2019-01-10 RX ORDER — IRBESARTAN 150 MG/1
TABLET ORAL
Qty: 90 TABLET | Refills: 1 | Status: SHIPPED | OUTPATIENT
Start: 2019-01-10 | End: 2019-02-22

## 2019-01-10 NOTE — PROGRESS NOTES
PATIENT: Norm Mendoza  MRN: [id number]  DATE: 1/10/2019      Diagnosis:   1. MALT lymphoma        Chief Complaint: MALT lymphoma    Subjective:     Mr. Norm Mendoza is a 89 y.o. male with a-fib, HTN, who presents to the Hematologic clinic to establish care for recent diagnosis of MALT lymphoma.    Oncologic History  -The patient was recently discharged on 12/9/2018 following a 3 day admission for a GIB. Patient was on Eliquis at the time and had been complaining of 1 week of black, tarry stools. EGD at the time was significant for nonbleeding gastric ulcers. Biopsies were taken and the patient was instructed to start protonix and hold eliquis at discharge. He requiring IV fluids and blood transfusion during the hospitalization. Biopsy results returned 12/18/2018 concerning for possible MALT-Lymphoma with molecular studies pending.  -PET confirmed stage 1 MALT lymphoma    Interval History  The patient was seen today, feeling better. Presents with his son. No fevers/chills, night sweats, lymphadenopathy. No melena, bleeding/bruising.     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    Mixed hyperlipidemia 5/2/2018    Paroxysmal atrial fibrillation 11/30/2018    Skin cancer     x3, had mohs on nose    Squamous cell carcinoma excised 12/5/14    R lower back    Symptomatic anemia 12/6/2018    Urinary tract infection     x4       Past Surgical HIstory:   Past Surgical History:   Procedure Laterality Date    ACHILLES TENDON SURGERY      cataracts      CYSTOSCOPY      bladder tumor    CYSTOSCOPY N/A 3/28/2014    Performed by [facility] at [facility] OR 1ST FLR    DILATION, URETHRA N/A 3/28/2014    Performed by [facility] at [facility] OR 1ST FLR    EGD (ESOPHAGOGASTRODUODENOSCOPY) N/A 12/7/2018    Performed by  Austin Garcia [facility] at [facility] ENDO (2ND FLR)    EXCISION-BLADDER TUMOR-TRANSURETHRAL (TURBT) N/A 3/28/2014    Performed by Sl[facility]osa [facility] at [facility] OR [address] [place] [address]    EYE SURGERY      SKIN CANCER EXCISION         Family History:   Family History   Problem Relation Age of Onset    Stroke Father     Stroke Brother     Anesthesia problems Neg Hx     Malignant hypertension Neg Hx     Hypotension Neg Hx     Malignant hyperthermia Neg Hx     Pseudochol deficiency Neg Hx     Melanoma Neg Hx        Social History:  reports that he quit smoking about 48 years ago. His smoking use included cigarettes. He has a 25.00 pack-year smoking history. he has never used smokeless tobacco. He reports that he drinks about 1.8 oz of alcohol per week. He reports that he does not use drugs.  Very functional 89-year-old. He works 3 days a week for a company called [employer]. Lives alone, has a girlfriend. Has one son who lives here, one in [place], one grand-daughter who lives in [place]. Does pilates once a week. No family history of cancer.    Allergies:  Review of patient's allergies indicates:  No Known Allergies    Medications:  Current Outpatient Medications   Medication Sig Dispense Refill    ACIDOPHILUS/PECTIN, CITRUS (ACIDOPHILUS PROBIOTIC ORAL) Take 1 capsule by mouth once daily.      ferrous sulfate, dried (SLOW FE) 160 mg (50 mg iron) TbSR Take 1 tablet (160 mg total) by mouth once daily. 30 tablet 3    FLUAD 4276-9196, 65 YR UP,,PF, 45 mcg (15 mcg x 3)/0.5 mL Syrg ADM 0.5ML IM UTD  0    guaiFENesin (MUCINEX) 1,200 mg Ta12 1 Tab 1-2x/day for cough/congestion 20 tablet 0    irbesartan (AVAPRO) 150 MG tablet 1 tab daily 90 tablet 1    metoprolol succinate (TOPROL-XL) 25 MG 24 hr tablet Take 1 tablet (25 mg total) by mouth once daily. To rate control your Atrial Fibrillation 30 tablet 11    pantoprazole (PROTONIX) 40 MG tablet Take 1 tablet (40 mg total) by mouth once daily. Take one pill AM of  12/10 and one pill PM of 12/10 then daily until you see your GI doctor 32 tablet 11    psyllium (METAMUCIL) packet Take 1 packet by mouth once daily.       No current facility-administered medications for this visit.        Review of Systems   Constitutional: Positive for fatigue. Negative for chills, fever and unexpected weight change.   HENT: Negative for nosebleeds and sore throat.    Respiratory: Negative for cough and shortness of breath.    Cardiovascular: Negative for chest pain and leg swelling.   Gastrointestinal: Negative for abdominal pain, blood in stool, nausea and vomiting.   Genitourinary: Negative for dysuria and hematuria.   Musculoskeletal: Negative for arthralgias and back pain.   Skin: Negative for rash.   Neurological: Negative for light-headedness and headaches.   Hematological: Negative for adenopathy. Does not bruise/bleed easily.       ECOG Performance Status: 1   Objective:      Vitals:   Vitals:    01/10/19 1602   BP: 125/79   BP Location: Left arm   Patient Position: Sitting   BP Method: Medium (Automatic)   Pulse: 101   Resp: 18   Temp: 97.7 °F (36.5 °C)   TempSrc: Oral   SpO2: 98%   Weight: 80.3 kg (177 lb 0.5 oz)   Height: 6' (1.829 m)     BMI: Body mass index is 24.01 kg/m².    Physical Exam   Constitutional: He appears well-developed and well-nourished.   HENT:   Head: Normocephalic and atraumatic.   Eyes: EOM are normal. Pupils are equal, round, and reactive to light. No scleral icterus.   Neck: Neck supple.   Cardiovascular: Normal rate and regular rhythm.   Pulmonary/Chest: Effort normal and breath sounds normal. No respiratory distress.   Abdominal: Soft. He exhibits no distension. There is no tenderness.   Musculoskeletal: Normal range of motion. He exhibits no edema.   Lymphadenopathy:     He has no cervical adenopathy.   Neurological: He is alert.   Skin: Skin is warm and dry.   Psychiatric: He has a normal mood and affect. His behavior is normal.       Laboratory Data:  No  visits with results within 1 Week(s) from this visit.   Latest known visit with results is:   Hospital Outpatient Visit on 01/03/2019   Component Date Value Ref Range Status    POCT Glucose 01/03/2019 98  70 - 110 mg/dL Final     FINAL PATHOLOGIC DIAGNOSIS  1. ULCER LESSER CURVATURE, GASTRIC BODY, BIOPSY-:  -ATYPICAL LYMPHOCYTIC INFILTRATION, PENDING MOLECULAR STUDY. SEE COMMENT  -RARE FOCUS OF ACTIVE INFLAMMATION.  -HELICOBACTER IS NEGATIVE.  COMMENT: Fragments of gastric mucosa infiltrated with small to medium-sized atypical lymphocytes.  Lymphoepithelial lesions are also evident.  Flow cytometric analysis of tissue was not submitted.  Immunohistochemical studies were performed on paraffin block with adequate positive and negative controls . The  small atypical lymphocytes are positive for CD20, low KI -67, and are negative for CD10, CD23, cyclin D1. The  reactive T cells are CD3 positive, CD5 positive, and BCL 2 positive. Immunohistochemical study for Helicobacter is  negative.  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 Molecular study is pending to confirm the diagnosis. A supplemental report will follow.    EXAMINATION:  NM PET CT ROUTINE    CLINICAL HISTORY:  Gastric MALT lymphoma    TECHNIQUE:  13.1 mCi of F18-FDG was administered intravenously in the right antecubital fossa.  After an approximately 60 min distribution time, PET/CT images were acquired from the skull base to the mid thigh. Transmission images were acquired to correct for attenuation using a whole body low-dose CT scan without contrast with the arms positioned above the head. Glycemia at the time of injection was 98 mg/dL.    COMPARISON:  Chest radiograph 12/15/2018, CT abdomen pelvis 09/11/2014    FINDINGS:  Quality of the study: Adequate.    Diffuse thickening of the gastric body with mild increased radiotracer uptake SUV max 4.04.  Given known biopsy preven  gastric MALT lymphoma this likely correlates with patient's known primary. No discrete lesion identified however and MALT type lymphoma typically difficult to differentiate for normal GI uptake.    Physiologic uptake of the tracer is present within the brain, salivary glands, myocardium, GI and  tracts.    Incidental CT findings: Mild mucosal thickening of the base of the ethmoid sinuses and at the left maxillary antrum.  Fusiform dilatation of the mid ascending thoracic aorta measuring 4.3 cm.  Abundant aortic annulus and aortic valve atherosclerotic calcification.  Mild three-vessel coronary artery atherosclerotic calcification.  Moderate right and small left pleural effusion.  Cholelithiasis.  Probable left peripelvic cysts.  Subcentimeter hypodensity in the left kidney too small to characterize, unchanged.  Small hiatal hernia.  Marked prostatomegaly with posterior impression on the bladder.  Diffuse thickening of the anterior bladder wall..  Mild aortic and bilateral iliac atherosclerotic calcification.  Multilevel degenerative changes and lumbar dextroscoliosis.      Impression       Diffuse thickening of the gastric body with mild increased radiotracer uptake. Given known biopsy proven gastric MALT lymphoma this likely correlates with patient's known primary. No discrete lesion identified however and MALT type lymphoma typically difficult to differentiate for normal GI uptake    Moderate right and small left pleural effusion.    Fusiform dilatation of the mid ascending thoracic aorta measuring 4.3 cm.    Unchanged diffuse bladder wall thickening which is nonspecific most likely on the basis of bladder outlet obstruction given marked prostatomegaly, cystitis is not excluded.    Cholelithiasis.    Electronically signed by resident: Josh Mclain  Date: 01/03/2019  Time: 15:31     Assessment:       1. MALT lymphoma           Plan:   1) Limited (Stage 1) MALT lymphoma  -H pylori negative  -Recent h/o GIB while  was on DOAC for a-fib  -PET negative for distant disease or lymphadenopathy  -Hepatitis studies negative  -CBC today  -Patient to see cardiology regarding management of a-fib  -Patient referred to radiation-oncology to discuss role of RT instead of rituximab  -Signed consent for rituximab if patient decides to go ahead with this, will schedule (rituximab 375mg x4 weeks with weekly labs)    Follow-up: 1 month     The following was staffed and discussed with supervising physician Dr. Ana Beckwith MD  Hematology/Oncology fellowDistress Screening Results: Psychosocial Distress screening score of Distress Score: 0 noted and reviewed. No intervention indicated.

## 2019-01-10 NOTE — PROGRESS NOTES
Patient came in for b/p check. Blood pressure reading was 110/62 (manual) 103/63(automatic). Dr Jenkins notified of patient's blood pressure reading.

## 2019-01-10 NOTE — TELEPHONE ENCOUNTER
BP much better. 'Will continue the Avapro 150mg daily.  Damonique, please call Mr Kelly and let him know to stay on the Avapro 150mg at 1 tab daily for now.  Thanks

## 2019-01-17 ENCOUNTER — INITIAL CONSULT (OUTPATIENT)
Dept: RADIATION ONCOLOGY | Facility: CLINIC | Age: 84
End: 2019-01-17
Payer: MEDICARE

## 2019-01-17 VITALS
OXYGEN SATURATION: 99 % | TEMPERATURE: 98 F | HEIGHT: 72 IN | SYSTOLIC BLOOD PRESSURE: 134 MMHG | HEART RATE: 73 BPM | RESPIRATION RATE: 20 BRPM | WEIGHT: 177.63 LBS | DIASTOLIC BLOOD PRESSURE: 70 MMHG | BODY MASS INDEX: 24.06 KG/M2

## 2019-01-17 DIAGNOSIS — C88.4 MALT LYMPHOMA: Primary | ICD-10-CM

## 2019-01-17 PROCEDURE — 99999 PR PBB SHADOW E&M-EST. PATIENT-LVL III: CPT | Mod: PBBFAC,HCNC,, | Performed by: RADIOLOGY

## 2019-01-17 PROCEDURE — 1100F PTFALLS ASSESS-DOCD GE2>/YR: CPT | Mod: CPTII,HCNC,S$GLB, | Performed by: RADIOLOGY

## 2019-01-17 PROCEDURE — 3288F FALL RISK ASSESSMENT DOCD: CPT | Mod: CPTII,HCNC,S$GLB, | Performed by: RADIOLOGY

## 2019-01-17 PROCEDURE — 1100F PR PT FALLS ASSESS DOC 2+ FALLS/FALL W/INJURY/YR: ICD-10-PCS | Mod: CPTII,HCNC,S$GLB, | Performed by: RADIOLOGY

## 2019-01-17 PROCEDURE — 99205 PR OFFICE/OUTPT VISIT, NEW, LEVL V, 60-74 MIN: ICD-10-PCS | Mod: HCNC,S$GLB,, | Performed by: RADIOLOGY

## 2019-01-17 PROCEDURE — 99999 PR PBB SHADOW E&M-EST. PATIENT-LVL III: ICD-10-PCS | Mod: PBBFAC,HCNC,, | Performed by: RADIOLOGY

## 2019-01-17 PROCEDURE — 99205 OFFICE O/P NEW HI 60 MIN: CPT | Mod: HCNC,S$GLB,, | Performed by: RADIOLOGY

## 2019-01-17 PROCEDURE — 3288F PR FALLS RISK ASSESSMENT DOCUMENTED: ICD-10-PCS | Mod: CPTII,HCNC,S$GLB, | Performed by: RADIOLOGY

## 2019-01-17 NOTE — PROGRESS NOTES
01/17/2019    Radiation Oncology Consultation    Assessment   This is a 89 y.o. 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 This was discovered due to GI bleed on blood thinner for A-Fib. He is referred for consideration of treatment options.     I discussed the natural history and treatment options available for early stage gastric MALT lymphoma including observation, definitive radiation, and rituximab. I think observation would be reasonable if he does not need to be on blood thinners for A-Fib, otherwise I recommend treatment. Per NCCN guidelines, radiation is the recommended curative treatment for early stage MALT, with >95% complete response and 80-90% 10 year DFS. Treatment would involve 30 Gy in 20 fractions to the stomach over 4 weeks. Potential short- and long-term side effects of radiation to the abdomen were discussed, particularly nausea and fatigue. Alternatively given his age, I think rituximab would also be a reasonable treatment; radiation treatment could be given in the future if his disease becomes refractory to systemic therapy.     At the conclusion of our discussion, he was leaning towards definitive radiation but wants to meet with both his cardiologist and medical oncologist again before deciding between his treatment options. We will tentatively schedule him for CT Simulation 2/8/19 with the understanding that he may cancel should he decide to pursue systemic therapy or observation.         Plan   1) Schedule CT Simulation for radiation treatment planning tentatively 2/8/19.       Chief Complaint   Patient presents with    malt lymphoma       HPI: Mr. Mendoza is a 89 y/o male who began experiencing decreased stamina and heart palpitations in 11/2018. He was diagnosed with A-Fib and started on Eliquis. On 12/6/18 he presented with dizziness and melena. EGD 12/7/18 revealed a 1.8 cm non-bleeding ulcer along the lesser curvature of the stomach  as well as several adjacent smaller ulcers. Biopsy revealed MALT lymphoma, H. Pylori negative. PET/CT 1/3/18 was negative for evidence of other disease. He has met with Heme Onc to discuss rituximab and is referred to Radiation Oncology for consideration of definitive radiation therapy.     In clinic today, the patient is unaccompanied. He has discontinued blood thinners and had no further melena or dizziness. He denies dyspnea or chest pains. He is scheduled to meet with Cardiology next week to discuss further management of A-Fib.         Prior Radiation History: None    Past Medical History:   Diagnosis Date    Alcohol dependence, daily use 2017    Allergy     Bladder cancer     tumor that was benign     Hematuria     Hypertension     MALT lymphoma 2018    Mixed hyperlipidemia 2018    Paroxysmal atrial fibrillation 2018    Skin cancer     x3, had mohs on nose    Squamous cell carcinoma excised 14    R lower back    Symptomatic anemia 2018    Urinary tract infection     x4       Past Surgical History:   Procedure Laterality Date    ACHILLES TENDON SURGERY      cataracts      CYSTOSCOPY      bladder tumor    CYSTOSCOPY N/A 3/28/2014    [facility] 1ST FLR    DILATION, URETHRA N/A 3/28/2014    [facility] OR 1ST FLR    EGD (ESOPHAGOGASTRODUODENOSCOPY) N/A 2018    Performed by Austin Garcia MD at [facility] ENDO (2ND FLR)    EXCISION-BLADDER TUMOR-TRANSURETHRAL (TURBT) N/A 3/28/2014    [facility] by [facility] at [facility] OR 1ST FLR    EYE SURGERY      SKIN CANCER EXCISION         Social History     Tobacco Use    Smoking status: Former Smoker     Packs/day: 1.00     Years: 25.00     Pack years: 25.00     Types: Cigarettes     Last attempt to quit: 9/3/1970     Years since quittin.4    Smokeless tobacco: Never Used   Substance Use Topics    Alcohol use: Yes     Alcohol/week: 1.8 oz      Types: 3 Shots of liquor per week     Comment: 3 bourbons daily - 12 beer on weekends     Drug use: No       Cancer-related family history is negative for Melanoma.    Current Outpatient Medications on File Prior to Visit   Medication Sig Dispense Refill    ACIDOPHILUS/PECTIN, CITRUS (ACIDOPHILUS PROBIOTIC ORAL) Take 1 capsule by mouth once daily.      ferrous sulfate, dried (SLOW FE) 160 mg (50 mg iron) TbSR Take 1 tablet (160 mg total) by mouth once daily. 30 tablet 3    FLUAD 7832-7987, 65 YR UP,,PF, 45 mcg (15 mcg x 3)/0.5 mL Syrg ADM 0.5ML IM UTD  0    irbesartan (AVAPRO) 150 MG tablet 1 tab daily 90 tablet 1    metoprolol succinate (TOPROL-XL) 25 MG 24 hr tablet Take 1 tablet (25 mg total) by mouth once daily. To rate control your Atrial Fibrillation 30 tablet 11    pantoprazole (PROTONIX) 40 MG tablet Take 1 tablet (40 mg total) by mouth once daily. Take one pill AM of 12/10 and one pill PM of 12/10 then daily until you see your GI doctor 32 tablet 11    psyllium (METAMUCIL) packet Take 1 packet by mouth once daily.      guaiFENesin (MUCINEX) 1,200 mg Ta12 1 Tab 1-2x/day for cough/congestion 20 tablet 0     No current facility-administered medications on file prior to visit.        Review of patient's allergies indicates:  No Known Allergies    Review of Systems   Constitutional: Negative for fever and weight loss.   HENT: Negative for ear pain and sore throat.    Eyes: Negative for blurred vision and double vision.   Respiratory: Negative for cough, hemoptysis and shortness of breath.    Cardiovascular: Negative for chest pain and leg swelling.   Gastrointestinal: Negative for abdominal pain, constipation, diarrhea, heartburn and nausea.   Genitourinary: Negative for dysuria and hematuria.   Musculoskeletal: Negative for falls and joint pain.   Neurological: Negative for tingling, speech change, focal weakness, seizures and headaches.   Psychiatric/Behavioral: Negative for depression. The patient is  not nervous/anxious.         Vital Signs: /70   Pulse 73   Temp 97.6 °F (36.4 °C) (Oral)   Resp 20   Ht 6' (1.829 m)   Wt 80.6 kg (177 lb 9.6 oz)   SpO2 99%   BMI 24.09 kg/m²     ECOG Performance Status: 1 - Ambulates, capable of light work    Physical Exam   Constitutional: He is oriented to person, place, and time and well-developed, well-nourished, and in no distress.   HENT:   Head: Normocephalic and atraumatic.   Mouth/Throat: Oropharynx is clear and moist.   Eyes: EOM are normal. Pupils are equal, round, and reactive to light. No scleral icterus.   Neck: Normal range of motion. Neck supple.   Pulmonary/Chest: No accessory muscle usage. No respiratory distress.   Abdominal: Soft. Normal appearance. He exhibits no distension.   Musculoskeletal: Normal range of motion. He exhibits no edema.   Lymphadenopathy:     He has no cervical adenopathy.        Right: No supraclavicular adenopathy present.        Left: No supraclavicular adenopathy present.   Neurological: He is alert and oriented to person, place, and time. No cranial nerve deficit. Gait normal.   Skin: Skin is warm and dry.   Psychiatric: Mood, affect and judgment normal.   Vitals reviewed.       Labs:    Imaging: I have personally reviewed the patient's available images and reports and summarized pertinent findings above in HPI.     Pathology: I have personally reviewed the patient's available pathology and summarized pertinent findings above in HPI.       - Thank you for allowing me to participate in the care of your patient.    Cisco Chavira MD, PhD

## 2019-01-17 NOTE — LETTER
January 17, 2019      Kelly Beckwith MD  [address]  [facility] [address]           [facility] Radiation Oncology  [address]  [facility] [address]  Phone: [phone]          Patient: Norm Mendoza   MR Number: [id number]   YOB: 1926   Date of Visit: 1/17/2019       Dear Dr. Kelly Beckwith:    Thank you for referring Norm Mendoza to me for evaluation. Attached you will find relevant portions of my assessment and plan of care.    If you have questions, please do not hesitate to call me. I look forward to following Norm Mendoza along with you.    Sincerely,    Cisco Chavira MD    Enclosure  CC:  No Recipients    If you would like to receive this communication electronically, please contact [email or web address] or [phone] to request more information on [employer] Link access.    For providers and/or their staff who would like to refer a patient to [facility], please contact us through our one-stop-shop provider referral line, [facility] , at [phone].    If you feel you have received this communication in error or would no longer like to receive these types of communications, please e-mail [email or web address]

## 2019-01-25 ENCOUNTER — HOSPITAL ENCOUNTER (OUTPATIENT)
Dept: CARDIOLOGY | Facility: CLINIC | Age: 84
Discharge: HOME | End: 2019-01-25
Payer: MEDICARE

## 2019-01-25 ENCOUNTER — OFFICE VISIT (OUTPATIENT)
Dept: ELECTROPHYSIOLOGY | Facility: CLINIC | Age: 84
End: 2019-01-25
Payer: MEDICARE

## 2019-01-25 VITALS
HEART RATE: 81 BPM | SYSTOLIC BLOOD PRESSURE: 138 MMHG | WEIGHT: 178.56 LBS | DIASTOLIC BLOOD PRESSURE: 74 MMHG | BODY MASS INDEX: 23.66 KG/M2 | HEIGHT: 73 IN

## 2019-01-25 DIAGNOSIS — I48.0 PAROXYSMAL ATRIAL FIBRILLATION: ICD-10-CM

## 2019-01-25 DIAGNOSIS — I51.89 LEFT VENTRICULAR DIASTOLIC DYSFUNCTION WITH PRESERVED SYSTOLIC FUNCTION: ICD-10-CM

## 2019-01-25 DIAGNOSIS — E78.2 MIXED HYPERLIPIDEMIA: ICD-10-CM

## 2019-01-25 DIAGNOSIS — I10 ESSENTIAL HYPERTENSION: Primary | ICD-10-CM

## 2019-01-25 DIAGNOSIS — I49.3 PVC'S (PREMATURE VENTRICULAR CONTRACTIONS): ICD-10-CM

## 2019-01-25 PROCEDURE — 1101F PT FALLS ASSESS-DOCD LE1/YR: CPT | Mod: HCNC,CPTII,S$GLB, | Performed by: INTERNAL MEDICINE

## 2019-01-25 PROCEDURE — 1101F PR PT FALLS ASSESS DOC 0-1 FALLS W/OUT INJ PAST YR: ICD-10-PCS | Mod: HCNC,CPTII,S$GLB, | Performed by: INTERNAL MEDICINE

## 2019-01-25 PROCEDURE — 93000 RHYTHM STRIP: ICD-10-PCS | Mod: HCNC,S$GLB,, | Performed by: INTERNAL MEDICINE

## 2019-01-25 PROCEDURE — 99999 PR PBB SHADOW E&M-EST. PATIENT-LVL III: ICD-10-PCS | Mod: PBBFAC,HCNC,, | Performed by: INTERNAL MEDICINE

## 2019-01-25 PROCEDURE — 99999 PR PBB SHADOW E&M-EST. PATIENT-LVL III: CPT | Mod: PBBFAC,HCNC,, | Performed by: INTERNAL MEDICINE

## 2019-01-25 PROCEDURE — 93005 RHYTHM STRIP: ICD-10-PCS | Mod: HCNC,S$GLB,, | Performed by: INTERNAL MEDICINE

## 2019-01-25 PROCEDURE — 93005 ELECTROCARDIOGRAM TRACING: CPT | Mod: HCNC,S$GLB,, | Performed by: INTERNAL MEDICINE

## 2019-01-25 PROCEDURE — 99214 PR OFFICE/OUTPT VISIT, EST, LEVL IV, 30-39 MIN: ICD-10-PCS | Mod: HCNC,S$GLB,, | Performed by: INTERNAL MEDICINE

## 2019-01-25 PROCEDURE — 93000 ELECTROCARDIOGRAM COMPLETE: CPT | Mod: HCNC,S$GLB,, | Performed by: INTERNAL MEDICINE

## 2019-01-25 PROCEDURE — 99214 OFFICE O/P EST MOD 30 MIN: CPT | Mod: HCNC,S$GLB,, | Performed by: INTERNAL MEDICINE

## 2019-01-25 RX ORDER — METOPROLOL SUCCINATE 50 MG/1
50 TABLET, EXTENDED RELEASE ORAL DAILY
Qty: 30 TABLET | Refills: 11 | Status: SHIPPED | OUTPATIENT
Start: 2019-01-25 | End: 2019-03-22

## 2019-01-25 NOTE — PROGRESS NOTES
Subjective:    Patient ID:  Norm Mendoza is a 89 y.o. male who presents for evaluation of AF    Atrial Fibrillation   Symptoms include palpitations. Symptoms are negative for chest pain, dizziness, shortness of breath, syncope and weakness. Past medical history includes atrial fibrillation.        89 y.o. M  HTN on meds  HL on meds  gastric (MALT) lymphoma    For past month or so, had reduced stamina. Palpitations at times.  Found in new AF. Started on eliquis and toprol. While on toprol, had episodes of sluggishness... so stopped both eliquis and toprol.  TSH normal.    Great exertion tolerance. Pilates, workouts...  After first visit 12/18, started a/c and planned DCCV and 30-day event monitor. Unfortunately, developed GI bleed and was found to have MALT lymphoma.  He's considering XRT and chemo for that.  Still gets some palpitations, but toprol has helped.    5/18 echo 55-60% LVEF    My interpretation of today's ECG is AF with controlled rate    Review of Systems   Constitution: Positive for malaise/fatigue. Negative for weakness.   HENT: Negative.  Negative for ear pain and tinnitus.    Eyes: Negative for blurred vision.   Cardiovascular: Positive for dyspnea on exertion and palpitations. Negative for chest pain, near-syncope and syncope.   Respiratory: Negative.  Negative for shortness of breath.    Endocrine: Negative.  Negative for polyuria.   Hematologic/Lymphatic: Does not bruise/bleed easily.   Skin: Negative.  Negative for rash.   Musculoskeletal: Negative.  Negative for joint pain and muscle weakness.   Gastrointestinal: Negative.  Negative for abdominal pain and change in bowel habit.   Genitourinary: Negative for frequency.   Neurological: Negative.  Negative for dizziness.   Psychiatric/Behavioral: Negative.  Negative for depression. The patient is not nervous/anxious.    Allergic/Immunologic: Negative for environmental allergies.        Objective:    Physical Exam   Constitutional: He is  oriented to person, place, and time. He appears well-developed and well-nourished.   HENT:   Head: Normocephalic and atraumatic.   Eyes: Conjunctivae, EOM and lids are normal. No scleral icterus.   Neck: Normal range of motion. No JVD present. No tracheal deviation present. No thyromegaly present.   Cardiovascular: Normal rate, normal heart sounds and intact distal pulses. An irregularly irregular rhythm present.  No extrasystoles are present. PMI is not displaced. Exam reveals no gallop and no friction rub.   No murmur heard.  Pulses:       Radial pulses are 2+ on the right side, and 2+ on the left side.   Pulmonary/Chest: Effort normal and breath sounds normal. No accessory muscle usage. No tachypnea. No respiratory distress. He has no wheezes. He has no rales.   Abdominal: Soft. Bowel sounds are normal. He exhibits no distension. There is no hepatosplenomegaly. There is no tenderness.   Musculoskeletal: Normal range of motion. He exhibits no edema.   Neurological: He is alert and oriented to person, place, and time. He has normal reflexes. He exhibits normal muscle tone.   Skin: Skin is warm and dry. No rash noted.   Psychiatric: He has a normal mood and affect. His behavior is normal.   Nursing note and vitals reviewed.        Assessment:       1. Essential hypertension    2. Left ventricular diastolic dysfunction with preserved systolic function    3. Mixed hyperlipidemia    4. PVC's (premature ventricular contractions)    5. Paroxysmal atrial fibrillation         Plan:       Discussed AF and its basic pathophysiology, including its health implications and treatment options (rate vs. rhythm control, meds vs. procedural/device treatment) as appropriate for the patient.    CHADS-VASc=3 (age, HTN). Will restart anticoagulation when able.  He likes toprol but still has palpitations at this very low dose; try higher dose (though still relatively low dose). Back to 25 if feels less well on 50.    He has questions re:  "CA therapies' effects on heart. Will refer to cardio-oncology clinic.    f/u with me 6 mos, or earlier prn -- including when "cleared" for anticoagulation.                "

## 2019-02-07 ENCOUNTER — OFFICE VISIT (OUTPATIENT)
Dept: HEMATOLOGY/ONCOLOGY | Facility: CLINIC | Age: 84
End: 2019-02-07
Payer: MEDICARE

## 2019-02-07 ENCOUNTER — LAB VISIT (OUTPATIENT)
Dept: LAB | Facility: HOSPITAL | Age: 84
End: 2019-02-07
Attending: STUDENT IN AN ORGANIZED HEALTH CARE EDUCATION/TRAINING PROGRAM
Payer: MEDICARE

## 2019-02-07 ENCOUNTER — PATIENT MESSAGE (OUTPATIENT)
Dept: ADMINISTRATIVE | Facility: OTHER | Age: 84
End: 2019-02-07

## 2019-02-07 ENCOUNTER — OFFICE VISIT (OUTPATIENT)
Dept: CARDIOLOGY | Facility: CLINIC | Age: 84
End: 2019-02-07
Payer: MEDICARE

## 2019-02-07 VITALS
BODY MASS INDEX: 24.54 KG/M2 | HEART RATE: 84 BPM | WEIGHT: 181.19 LBS | DIASTOLIC BLOOD PRESSURE: 98 MMHG | HEIGHT: 72 IN | SYSTOLIC BLOOD PRESSURE: 131 MMHG

## 2019-02-07 VITALS
BODY MASS INDEX: 24.76 KG/M2 | RESPIRATION RATE: 16 BRPM | TEMPERATURE: 98 F | DIASTOLIC BLOOD PRESSURE: 80 MMHG | HEART RATE: 84 BPM | WEIGHT: 182.56 LBS | SYSTOLIC BLOOD PRESSURE: 128 MMHG | OXYGEN SATURATION: 97 %

## 2019-02-07 DIAGNOSIS — C88.4 MALT LYMPHOMA: Primary | ICD-10-CM

## 2019-02-07 DIAGNOSIS — F10.20 ALCOHOL DEPENDENCE, DAILY USE: ICD-10-CM

## 2019-02-07 DIAGNOSIS — C88.4 MALT LYMPHOMA: ICD-10-CM

## 2019-02-07 DIAGNOSIS — I77.819 ECTATIC AORTA: ICD-10-CM

## 2019-02-07 DIAGNOSIS — I70.0 AORTIC ATHEROSCLEROSIS: ICD-10-CM

## 2019-02-07 DIAGNOSIS — I77.1 TORTUOUS AORTA: ICD-10-CM

## 2019-02-07 DIAGNOSIS — I10 ESSENTIAL HYPERTENSION: Primary | ICD-10-CM

## 2019-02-07 DIAGNOSIS — I77.810 AORTIC ROOT DILATION: ICD-10-CM

## 2019-02-07 DIAGNOSIS — I48.0 PAROXYSMAL ATRIAL FIBRILLATION: ICD-10-CM

## 2019-02-07 LAB
ALBUMIN SERPL BCP-MCNC: 3.3 G/DL
ALP SERPL-CCNC: 66 U/L
ALT SERPL W/O P-5'-P-CCNC: 23 U/L
ANION GAP SERPL CALC-SCNC: 7 MMOL/L
AST SERPL-CCNC: 23 U/L
BASOPHILS # BLD AUTO: 0.08 K/UL
BASOPHILS NFR BLD: 1.7 %
BILIRUB SERPL-MCNC: 0.4 MG/DL
BUN SERPL-MCNC: 21 MG/DL
CALCIUM SERPL-MCNC: 9.5 MG/DL
CHLORIDE SERPL-SCNC: 108 MMOL/L
CO2 SERPL-SCNC: 26 MMOL/L
CREAT SERPL-MCNC: 1.4 MG/DL
DIFFERENTIAL METHOD: ABNORMAL
EOSINOPHIL # BLD AUTO: 0 K/UL
EOSINOPHIL NFR BLD: 0 %
ERYTHROCYTE [DISTWIDTH] IN BLOOD BY AUTOMATED COUNT: 16.5 %
EST. GFR  (AFRICAN AMERICAN): 50.1 ML/MIN/1.73 M^2
EST. GFR  (NON AFRICAN AMERICAN): 43.3 ML/MIN/1.73 M^2
GLUCOSE SERPL-MCNC: 113 MG/DL
HCT VFR BLD AUTO: 36 %
HGB BLD-MCNC: 11.1 G/DL
IMM GRANULOCYTES # BLD AUTO: 0.02 K/UL
IMM GRANULOCYTES NFR BLD AUTO: 0.4 %
LYMPHOCYTES # BLD AUTO: 1 K/UL
LYMPHOCYTES NFR BLD: 21.6 %
MCH RBC QN AUTO: 27.4 PG
MCHC RBC AUTO-ENTMCNC: 30.8 G/DL
MCV RBC AUTO: 89 FL
MONOCYTES # BLD AUTO: 0.7 K/UL
MONOCYTES NFR BLD: 14.3 %
NEUTROPHILS # BLD AUTO: 3 K/UL
NEUTROPHILS NFR BLD: 62 %
NRBC BLD-RTO: 0 /100 WBC
PLATELET # BLD AUTO: 229 K/UL
PMV BLD AUTO: 9.6 FL
POTASSIUM SERPL-SCNC: 4.6 MMOL/L
PROT SERPL-MCNC: 6.6 G/DL
RBC # BLD AUTO: 4.05 M/UL
SODIUM SERPL-SCNC: 141 MMOL/L
WBC # BLD AUTO: 4.82 K/UL

## 2019-02-07 PROCEDURE — 36415 COLL VENOUS BLD VENIPUNCTURE: CPT | Mod: HCNC

## 2019-02-07 PROCEDURE — 99214 OFFICE O/P EST MOD 30 MIN: CPT | Mod: HCNC,S$GLB,, | Performed by: INTERNAL MEDICINE

## 2019-02-07 PROCEDURE — 99999 PR PBB SHADOW E&M-EST. PATIENT-LVL III: CPT | Mod: PBBFAC,HCNC,, | Performed by: INTERNAL MEDICINE

## 2019-02-07 PROCEDURE — 80053 COMPREHEN METABOLIC PANEL: CPT | Mod: HCNC

## 2019-02-07 PROCEDURE — 1101F PT FALLS ASSESS-DOCD LE1/YR: CPT | Mod: HCNC,CPTII,S$GLB, | Performed by: INTERNAL MEDICINE

## 2019-02-07 PROCEDURE — 99999 PR PBB SHADOW E&M-EST. PATIENT-LVL III: ICD-10-PCS | Mod: PBBFAC,HCNC,, | Performed by: INTERNAL MEDICINE

## 2019-02-07 PROCEDURE — 1101F PR PT FALLS ASSESS DOC 0-1 FALLS W/OUT INJ PAST YR: ICD-10-PCS | Mod: HCNC,CPTII,S$GLB, | Performed by: INTERNAL MEDICINE

## 2019-02-07 PROCEDURE — 1101F PR PT FALLS ASSESS DOC 0-1 FALLS W/OUT INJ PAST YR: ICD-10-PCS | Mod: HCNC,CPTII,GC,S$GLB | Performed by: STUDENT IN AN ORGANIZED HEALTH CARE EDUCATION/TRAINING PROGRAM

## 2019-02-07 PROCEDURE — 99215 PR OFFICE/OUTPT VISIT, EST, LEVL V, 40-54 MIN: ICD-10-PCS | Mod: HCNC,GC,S$GLB, | Performed by: STUDENT IN AN ORGANIZED HEALTH CARE EDUCATION/TRAINING PROGRAM

## 2019-02-07 PROCEDURE — 1101F PT FALLS ASSESS-DOCD LE1/YR: CPT | Mod: HCNC,CPTII,GC,S$GLB | Performed by: STUDENT IN AN ORGANIZED HEALTH CARE EDUCATION/TRAINING PROGRAM

## 2019-02-07 PROCEDURE — 99215 OFFICE O/P EST HI 40 MIN: CPT | Mod: HCNC,GC,S$GLB, | Performed by: STUDENT IN AN ORGANIZED HEALTH CARE EDUCATION/TRAINING PROGRAM

## 2019-02-07 PROCEDURE — 99214 PR OFFICE/OUTPT VISIT, EST, LEVL IV, 30-39 MIN: ICD-10-PCS | Mod: HCNC,S$GLB,, | Performed by: INTERNAL MEDICINE

## 2019-02-07 PROCEDURE — 85025 COMPLETE CBC W/AUTO DIFF WBC: CPT | Mod: HCNC

## 2019-02-07 PROCEDURE — 99999 PR PBB SHADOW E&M-EST. PATIENT-LVL III: ICD-10-PCS | Mod: PBBFAC,HCNC,GC, | Performed by: STUDENT IN AN ORGANIZED HEALTH CARE EDUCATION/TRAINING PROGRAM

## 2019-02-07 PROCEDURE — 99999 PR PBB SHADOW E&M-EST. PATIENT-LVL III: CPT | Mod: PBBFAC,HCNC,GC, | Performed by: STUDENT IN AN ORGANIZED HEALTH CARE EDUCATION/TRAINING PROGRAM

## 2019-02-07 NOTE — LETTER
February 7, 2019      Nikko Esquivel MD  [address] [facility] [address]           [facility]  [address]  [facility] [address]  Phone: [phone]          Patient: Norm Mendoza   MR Number: [id number]   YOB: 1926   Date of Visit: 2/7/2019       Dear Dr. Nikko Esquivel:    Thank you for referring Norm Mendoza to me for evaluation. Attached you will find relevant portions of my assessment and plan of care.    If you have questions, please do not hesitate to call me. I look forward to following Norm Mendoza along with you.    Sincerely,    Eri Ponce MD    Enclosure  CC:  No Recipients    If you would like to receive this communication electronically, please contact [email or web address] or [phone] to request more information on [employer] Link access.    For providers and/or their staff who would like to refer a patient to [facility], please contact us through our one-stop-shop provider referral line, [facility] [place], at [phone].    If you feel you have received this communication in error or would no longer like to receive these types of communications, please e-mail [email or web address]

## 2019-02-07 NOTE — PROGRESS NOTES
Subjective:   Patient ID:  Norm Mendoza is a 89 y.o. male who presents for evaluation of Essential hypertension; Left ventricular diastolic dysfunction with preserved systol; Mixed hyperlipidemia; PVC's (premature ventricular contractions); and Paroxysmal atrial fibrillation      HPI: He was recently diagnosed with MALT lymphoma following a GI bleed requiring transfuion after starting anticoagulation for newly diagnosed atrial fibrillation. He is asymptomatic with the afib but reports feeling terrible on metoprolol. He tried decreasing his dose down to 12.5 mg daily but still felt bad. He reports feeling tired, short of breath and weight gain with the medication. I reviewed his vitals in [facility] and it seems his rate was never very fast.  He is getting ready to start Rituxan for his lymphoma and was concerned about cardiac side effects. He has no other cardiac history. He had an echo done 5/18 which showed LVEF of 55-60% and moderate AS with an SUJEY of 1.17 and MG of 20.     ECG (1/25/19): Atrial fibrillation with an IVCD.    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    Mixed hyperlipidemia 5/2/2018    Paroxysmal atrial fibrillation 11/30/2018    Skin cancer     x3, had mohs on nose    Squamous cell carcinoma excised 12/5/14    R lower back    Symptomatic anemia 12/6/2018    Urinary tract infection     x4       Past Surgical History:   Procedure Laterality Date    ACHILLES TENDON SURGERY      cataracts      CYSTOSCOPY      bladder tumor    CYSTOSCOPY N/A 3/28/2014    [facility] 1ST FLR    DILATION, URETHRA N/A 3/28/2014    [facility] at [facility] OR 1ST FLR    EGD (ESOPHAGOGASTRODUODENOSCOPY) N/A 12/7/2018    Performed by Austin [facility] at [facility] ENDO (2ND FLR)    EXCISION-BLADDER  TUMOR-TRANSURETHRAL (TURBT) N/A 3/28/2014    [facility] OR [address] [place] [address]    EYE SURGERY      SKIN CANCER EXCISION         Social History     Socioeconomic History    Marital status:      Spouse name: Not on file    Number of children: 2    Years of education: Not on file    Highest education level: Not on file   Social Needs    Financial resource strain: Not on file    Food insecurity - worry: Not on file    Food insecurity - inability: Not on file    Transportation needs - medical: Not on file    Transportation needs - non-medical: Not on file   Occupational History    Occupation: Financial Work/     Employer: retired    Occupation: actor    Occupation:    Tobacco Use    Smoking status: Former Smoker     Packs/day: 1.00     Years: 25.00     Pack years: 25.00     Types: Cigarettes     Last attempt to quit: 9/3/1970     Years since quittin.4    Smokeless tobacco: Never Used   Substance and Sexual Activity    Alcohol use: Yes     Alcohol/week: 1.8 oz     Types: 3 Shots of liquor per week     Comment: 3 bourbons daily - 12 beer on weekends     Drug use: No    Sexual activity: Yes     Partners: Female   Other Topics Concern    Not on file   Social History Narrative    Not on file       Family History   Problem Relation Age of Onset    Stroke Father     Hypertension Father     Stroke Brother     Anesthesia problems Neg Hx     Malignant hypertension Neg Hx     Hypotension Neg Hx     Malignant hyperthermia Neg Hx     Pseudochol deficiency Neg Hx     Melanoma Neg Hx     Heart attack Neg Hx     Heart disease Neg Hx     Heart failure Neg Hx        Patient's Medications   New Prescriptions    No medications on file   Previous Medications    ACIDOPHILUS/PECTIN, CITRUS (ACIDOPHILUS PROBIOTIC ORAL)    Take 1 capsule by mouth once daily.    FERROUS SULFATE, DRIED (SLOW FE) 160 MG (50 MG IRON) TBSR    Take 1 tablet (160 mg total) by mouth  once daily.    FLUAD [id number], 65 YR UP,,PF, 45 MCG (15 MCG X 3)/0.5 ML SYRG    ADM 0.5ML IM UTD    GUAIFENESIN (MUCINEX) 1,200 MG TA12    1 Tab 1-2x/day for cough/congestion    IRBESARTAN (AVAPRO) 150 MG TABLET    1 tab daily    METOPROLOL SUCCINATE (TOPROL-XL) 50 MG 24 HR TABLET    Take 1 tablet (50 mg total) by mouth once daily. To rate control your Atrial Fibrillation    PANTOPRAZOLE (PROTONIX) 40 MG TABLET    Take 1 tablet (40 mg total) by mouth once daily. Take one pill AM of 12/10 and one pill PM of 12/10 then daily until you see your GI doctor    PSYLLIUM (METAMUCIL) PACKET    Take 1 packet by mouth once daily.   Modified Medications    No medications on file   Discontinued Medications    No medications on file       Review of Systems   Constitution: Positive for malaise/fatigue and weight gain. Negative for weakness.   HENT: Negative for hearing loss.    Eyes: Negative for visual disturbance.   Cardiovascular: Negative for chest pain, claudication, dyspnea on exertion, leg swelling, near-syncope, orthopnea, palpitations, paroxysmal nocturnal dyspnea and syncope.   Respiratory: Positive for shortness of breath. Negative for cough, sleep disturbances due to breathing, snoring and wheezing.    Endocrine: Negative for cold intolerance, heat intolerance, polydipsia, polyphagia and polyuria.   Hematologic/Lymphatic: Negative for bleeding problem. Does not bruise/bleed easily.   Skin: Negative for rash and suspicious lesions.   Musculoskeletal: Negative for arthritis, falls, joint pain, muscle weakness and myalgias.   Gastrointestinal: Negative for abdominal pain, change in bowel habit, constipation, diarrhea, heartburn, hematochezia, melena and nausea.   Genitourinary: Negative for hematuria and nocturia.   Neurological: Negative for excessive daytime sleepiness, dizziness, headaches, light-headedness and loss of balance.   Psychiatric/Behavioral: Negative for depression. The patient is not nervous/anxious.     Allergic/Immunologic: Negative for environmental allergies.       BP (!) 131/98 (BP Location: Left arm, Patient Position: Sitting, BP Method: Large (Automatic))   Pulse 84   Ht 6' (1.829 m)   Wt 82.2 kg (181 lb 3.5 oz)   BMI 24.58 kg/m²     Objective:   Physical Exam   Constitutional: He is oriented to person, place, and time. He appears well-developed and well-nourished.        HENT:   Head: Normocephalic and atraumatic.   Mouth/Throat: Oropharynx is clear and moist.   Eyes: Conjunctivae and EOM are normal. Pupils are equal, round, and reactive to light. No scleral icterus.   Neck: Normal range of motion. Neck supple. No hepatojugular reflux and no JVD present. No tracheal deviation present. No thyromegaly present.   Cardiovascular: Normal rate and intact distal pulses. An irregularly irregular rhythm present. PMI is not displaced.   Murmur heard.   Harsh midsystolic murmur is present with a grade of 2/6 at the upper right sternal border radiating to the neck.  Pulses:       Carotid pulses are 2+ on the right side, and 2+ on the left side.       Radial pulses are 2+ on the right side, and 2+ on the left side.        Dorsalis pedis pulses are 2+ on the right side, and 2+ on the left side.        Posterior tibial pulses are 2+ on the right side, and 2+ on the left side.   Pulmonary/Chest: Effort normal and breath sounds normal.   Abdominal: Soft. Bowel sounds are normal. He exhibits no distension and no mass. There is no hepatosplenomegaly. There is no tenderness.   Musculoskeletal: He exhibits no edema or tenderness.   Lymphadenopathy:     He has no cervical adenopathy.   Neurological: He is alert and oriented to person, place, and time.   Skin: Skin is warm and dry. No rash noted. No cyanosis or erythema. Nails show no clubbing.   Psychiatric: He has a normal mood and affect. His speech is normal and behavior is normal.       Lab Results   Component Value Date     12/24/2018    K 4.3 12/24/2018    CL  109 12/24/2018    CO2 24 12/24/2018    BUN 21 12/24/2018    CREATININE 1.2 12/24/2018     12/24/2018    MG 2.2 12/15/2018    AST 19 12/15/2018    ALT 24 12/15/2018    ALBUMIN 3.1 (L) 12/15/2018    PROT 6.4 12/15/2018    BILITOT 0.4 12/15/2018    WBC 6.96 01/10/2019    HGB 11.1 (L) 01/10/2019    HCT 36.2 (L) 01/10/2019    MCV 93 01/10/2019     01/10/2019    INR 1.0 12/15/2018    TSH 2.775 11/30/2018    CHOL 224 (H) 08/03/2018    CHOL 224 (H) 08/03/2018    HDL 76 (H) 08/03/2018    HDL 76 (H) 08/03/2018    LDLCALC 136.6 08/03/2018    LDLCALC 136.6 08/03/2018    TRIG 57 08/03/2018    TRIG 57 08/03/2018     (H) 12/15/2018       Assessment:     1. Essential hypertension : I have enrolled him in the Digital HTN program.   2. Paroxysmal atrial fibrillation : We discussed stopping his metoprolol and following his heart rate. As long as his rate is < 110 and he feels well, he doesn't need a rate controlling agent. If his rate increases or he becomes symptomatic, we discussed trying  Diltiazem. He is not anticoagulated given lymphoma and GI bleed.   3.      MALT lymphoma: I see no reason from a cardiac perspective why he couldn't take Rituxan.    Plan:     Norm was seen today for essential hypertension, left ventricular diastolic dysfunction with preserved systol, mixed hyperlipidemia, pvc's (premature ventricular contractions) and paroxysmal atrial fibrillation.    Diagnoses and all orders for this visit:    Essential hypertension  -     Hypertension Digital Medicine (HDMP) Enrollment Order    Paroxysmal atrial fibrillation        Thank you for allowing me to participate in this patient's care. Please do not hesitate to contact me with any questions or concerns.

## 2019-02-07 NOTE — PROGRESS NOTES
PATIENT: Norm Mendoza  MRN: [id number]  DATE: 2/7/2019      Diagnosis:   1. MALT lymphoma        Chief Complaint: MALT lymphoma    Subjective:     Mr. Norm Mendoza is a 89 y.o. male with a-fib, HTN, who presents to the Hematologic clinic for follow-up of stage 1 MALT lymphoma.    Oncologic History  -The patient was recently discharged on 12/9/2018 following a 3 day admission for a GIB. Patient was on Eliquis at the time and had been complaining of 1 week of black, tarry stools. EGD at the time was significant for nonbleeding gastric ulcers. Biopsies were taken and the patient was instructed to start protonix and hold eliquis at discharge. He requiring IV fluids and blood transfusion during the hospitalization. Biopsy results returned 12/18/2018 concerning for possible MALT-Lymphoma with molecular studies pending.  -PET confirmed stage 1 MALT lymphoma    Interval History  The patient was seen today, has decided to proceed with Rituximab. Has decided to stop beta-blocker after discussion with Dr. Ponce. Feels dyspnea and insomnia related to beta-blocker. No fevers/chills, night sweats. Decreased PO intake, and has been more sedentary for past 2 months.    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    Mixed hyperlipidemia 5/2/2018    Paroxysmal atrial fibrillation 11/30/2018    Skin cancer     x3, had mohs on nose    Squamous cell carcinoma excised 12/5/14    R lower back    Symptomatic anemia 12/6/2018    Urinary tract infection     x4       Past Surgical HIstory:   Past Surgical History:   Procedure Laterality Date    ACHILLES TENDON SURGERY      cataracts      CYSTOSCOPY      bladder tumor    CYSTOSCOPY N/A 3/28/2014    [facility]ST FLR    DILATION, URETHRA N/A 3/28/2014     [facility] at [facility] 1ST FLR    EGD (ESOPHAGOGASTRODUODENOSCOPY) N/A 12/7/2018    Performed by Austin Garcia MD at [facility] ENDO (2ND FLR)    EXCISION-BLADDER TUMOR-TRANSURETHRAL (TURBT) N/A 3/28/2014    Performed by [facility] at [facility] OR 1ST FLR    EYE SURGERY      SKIN CANCER EXCISION         Family History:   Family History   Problem Relation Age of Onset    Stroke Father     Hypertension Father     Stroke Brother     Anesthesia problems Neg Hx     Malignant hypertension Neg Hx     Hypotension Neg Hx     Malignant hyperthermia Neg Hx     Pseudochol deficiency Neg Hx     Melanoma Neg Hx     Heart attack Neg Hx     Heart disease Neg Hx     Heart failure Neg Hx        Social History:  reports that he quit smoking about 48 years ago. His smoking use included cigarettes. He has a 25.00 pack-year smoking history. he has never used smokeless tobacco. He reports that he drinks about 1.8 oz of alcohol per week. He reports that he does not use drugs.  Very functional 89-year-old. He works 3 days a week for a company called [employer]. Lives alone, has a girlfriend. Has one son who lives here, one in [place], one grand-daughter who lives in [place]. Does pilates once a week. No family history of cancer.    Allergies:  Review of patient's allergies indicates:  No Known Allergies    Medications:  Current Outpatient Medications   Medication Sig Dispense Refill    ACIDOPHILUS/PECTIN, CITRUS (ACIDOPHILUS PROBIOTIC ORAL) Take 1 capsule by mouth once daily.      ferrous sulfate, dried (SLOW FE) 160 mg (50 mg iron) TbSR Take 1 tablet (160 mg total) by mouth once daily. 30 tablet 3    FLUAD 5145-0390, 65 YR UP,,PF, 45 mcg (15 mcg x 3)/0.5 mL Syrg ADM 0.5ML IM UTD  0    guaiFENesin (MUCINEX) 1,200 mg Ta12 1 Tab 1-2x/day for cough/congestion 20 tablet 0    irbesartan (AVAPRO) 150 MG tablet 1 tab daily 90 tablet 1    metoprolol succinate (TOPROL-XL) 50 MG 24 hr  tablet Take 1 tablet (50 mg total) by mouth once daily. To rate control your Atrial Fibrillation 30 tablet 11    pantoprazole (PROTONIX) 40 MG tablet Take 1 tablet (40 mg total) by mouth once daily. Take one pill AM of 12/10 and one pill PM of 12/10 then daily until you see your GI doctor 32 tablet 11    psyllium (METAMUCIL) packet Take 1 packet by mouth once daily.       No current facility-administered medications for this visit.        Review of Systems   Constitutional: Positive for fatigue. Negative for chills, fever and unexpected weight change.   HENT: Negative for nosebleeds and sore throat.    Respiratory: Positive for shortness of breath. Negative for cough.    Cardiovascular: Negative for chest pain and leg swelling.   Gastrointestinal: Negative for abdominal pain, blood in stool, nausea and vomiting.   Genitourinary: Negative for dysuria and hematuria.   Musculoskeletal: Negative for arthralgias and back pain.   Skin: Negative for rash.   Neurological: Negative for light-headedness and headaches.   Hematological: Negative for adenopathy. Does not bruise/bleed easily.       ECOG Performance Status: 1   Objective:      Vitals:   Vitals:    02/07/19 1311   BP: 128/80   BP Location: Right arm   Patient Position: Sitting   BP Method: Medium (Automatic)   Pulse: 84   Resp: 16   Temp: 97.8 °F (36.6 °C)   TempSrc: Oral   SpO2: 97%   Weight: 82.8 kg (182 lb 8.7 oz)     BMI: Body mass index is 24.76 kg/m².    Physical Exam   Constitutional: He appears well-developed and well-nourished.   HENT:   Head: Normocephalic and atraumatic.   Eyes: EOM are normal. Pupils are equal, round, and reactive to light. No scleral icterus.   Neck: Neck supple.   Cardiovascular: Normal rate and regular rhythm.   Pulmonary/Chest: Effort normal and breath sounds normal. No respiratory distress.   Abdominal: Soft. He exhibits distension. There is no tenderness.   Musculoskeletal: Normal range of motion. He exhibits no edema.    Lymphadenopathy:     He has no cervical adenopathy.   Neurological: He is alert.   Skin: Skin is warm and dry.   Psychiatric: He has a normal mood and affect. His behavior is normal.       Laboratory Data:  No visits with results within 1 Week(s) from this visit.   Latest known visit with results is:   Lab Visit on 01/10/2019   Component Date Value Ref Range Status    WBC 01/10/2019 6.96  3.90 - 12.70 K/uL Final    RBC 01/10/2019 3.89* 4.60 - 6.20 M/uL Final    Hemoglobin 01/10/2019 11.1* 14.0 - 18.0 g/dL Final    Hematocrit 01/10/2019 36.2* 40.0 - 54.0 % Final    MCV 01/10/2019 93  82 - 98 fL Final    MCH 01/10/2019 28.5  27.0 - 31.0 pg Final    MCHC 01/10/2019 30.7* 32.0 - 36.0 g/dL Final    RDW 01/10/2019 15.9* 11.5 - 14.5 % Final    Platelets 01/10/2019 243  150 - 350 K/uL Final    MPV 01/10/2019 9.3  9.2 - 12.9 fL Final    Immature Granulocytes 01/10/2019 0.4  0.0 - 0.5 % Final    Gran # (ANC) 01/10/2019 5.1  1.8 - 7.7 K/uL Final    Immature Grans (Abs) 01/10/2019 0.03  0.00 - 0.04 K/uL Final    Comment: Mild elevation in immature granulocytes is non specific and   can be seen in a variety of conditions including stress response,   acute inflammation, trauma and pregnancy. Correlation with other   laboratory and clinical findings is essential.      Lymph # 01/10/2019 1.1  1.0 - 4.8 K/uL Final    Mono # 01/10/2019 0.6  0.3 - 1.0 K/uL Final    Eos # 01/10/2019 0.1  0.0 - 0.5 K/uL Final    Baso # 01/10/2019 0.08  0.00 - 0.20 K/uL Final    nRBC 01/10/2019 0  0 /100 WBC Final    Gran% 01/10/2019 73.0  38.0 - 73.0 % Final    Lymph% 01/10/2019 15.2* 18.0 - 48.0 % Final    Mono% 01/10/2019 9.2  4.0 - 15.0 % Final    Eosinophil% 01/10/2019 1.1  0.0 - 8.0 % Final    Basophil% 01/10/2019 1.1  0.0 - 1.9 % Final    Differential Method 01/10/2019 Automated   Final     FINAL PATHOLOGIC DIAGNOSIS  1. ULCER LESSER CURVATURE, GASTRIC BODY, BIOPSY-:  -ATYPICAL LYMPHOCYTIC INFILTRATION, PENDING MOLECULAR  STUDY. SEE COMMENT  -RARE FOCUS OF ACTIVE INFLAMMATION.  -HELICOBACTER IS NEGATIVE.  COMMENT: Fragments of gastric mucosa infiltrated with small to medium-sized atypical lymphocytes.  Lymphoepithelial lesions are also evident.  Flow cytometric analysis of tissue was not submitted.  Immunohistochemical studies were performed on paraffin block with adequate positive and negative controls . The  small atypical lymphocytes are positive for CD20, low KI -67, and are negative for CD10, CD23, cyclin D1. The  reactive T cells are CD3 positive, CD5 positive, and BCL 2 positive. Immunohistochemical study for Helicobacter is  negative.  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 Molecular study is pending to confirm the diagnosis. A supplemental report will follow.    EXAMINATION:  NM PET CT ROUTINE    CLINICAL HISTORY:  Gastric MALT lymphoma    TECHNIQUE:  13.1 mCi of F18-FDG was administered intravenously in the right antecubital fossa.  After an approximately 60 min distribution time, PET/CT images were acquired from the skull base to the mid thigh. Transmission images were acquired to correct for attenuation using a whole body low-dose CT scan without contrast with the arms positioned above the head. Glycemia at the time of injection was 98 mg/dL.    COMPARISON:  Chest radiograph 12/15/2018, CT abdomen pelvis 09/11/2014    FINDINGS:  Quality of the study: Adequate.    Diffuse thickening of the gastric body with mild increased radiotracer uptake SUV max 4.04.  Given known biopsy preven gastric MALT lymphoma this likely correlates with patient's known primary. No discrete lesion identified however and MALT type lymphoma typically difficult to differentiate for normal GI uptake.    Physiologic uptake of the tracer is present within the brain, salivary glands, myocardium, GI and  tracts.    Incidental CT findings: Mild mucosal thickening of the  base of the ethmoid sinuses and at the left maxillary antrum.  Fusiform dilatation of the mid ascending thoracic aorta measuring 4.3 cm.  Abundant aortic annulus and aortic valve atherosclerotic calcification.  Mild three-vessel coronary artery atherosclerotic calcification.  Moderate right and small left pleural effusion.  Cholelithiasis.  Probable left peripelvic cysts.  Subcentimeter hypodensity in the left kidney too small to characterize, unchanged.  Small hiatal hernia.  Marked prostatomegaly with posterior impression on the bladder.  Diffuse thickening of the anterior bladder wall..  Mild aortic and bilateral iliac atherosclerotic calcification.  Multilevel degenerative changes and lumbar dextroscoliosis.      Impression       Diffuse thickening of the gastric body with mild increased radiotracer uptake. Given known biopsy proven gastric MALT lymphoma this likely correlates with patient's known primary. No discrete lesion identified however and MALT type lymphoma typically difficult to differentiate for normal GI uptake    Moderate right and small left pleural effusion.    Fusiform dilatation of the mid ascending thoracic aorta measuring 4.3 cm.    Unchanged diffuse bladder wall thickening which is nonspecific most likely on the basis of bladder outlet obstruction given marked prostatomegaly, cystitis is not excluded.    Cholelithiasis.    Electronically signed by resident: Josh Mclain  Date: 01/03/2019  Time: 15:31     Assessment:       1. MALT lymphoma           Plan:   1) Limited (Stage 1) MALT lymphoma  -H pylori negative  -Recent h/o GIB while was on DOAC for a-fib  -PET negative for distant disease or lymphadenopathy  -Hepatitis studies negative  -Patient recently seen by cardiology for a-fib, off anti-coagulation, off of beta-blocker as of today  -Patient was seen by radiation-oncology to discuss role of RT instead of rituximab. Would like to pursue Rituximab at this time: 375mg x4 weeks with weekly  labs  -Labs today: CBC, CMP    Follow-up: rituximab x4, f/u visit in 6 weeks    The following was staffed and discussed with supervising physician Dr. Prather.    Kelly Beckwith MD  Hematology/Oncology fellow

## 2019-02-08 ENCOUNTER — TELEPHONE (OUTPATIENT)
Dept: RADIATION ONCOLOGY | Facility: CLINIC | Age: 84
End: 2019-02-08

## 2019-02-08 NOTE — TELEPHONE ENCOUNTER
Spoke with son and he informed me his dad was doing only chemo and not radiation.  Will address with Dr. Chavira on Monday.                                                                                                                                                    Called pt to inquire why he missed his SIM appointment and to reschedule at his convenience.

## 2019-02-11 ENCOUNTER — PATIENT OUTREACH (OUTPATIENT)
Dept: OTHER | Facility: OTHER | Age: 84
End: 2019-02-11

## 2019-02-11 NOTE — PROGRESS NOTES
Digital Medicine Enrollment Call    Introduced Mr. Norm Mendoza to Digital Medicine.     Discussed program expectations and requirements.    Introduced digital medicine care team.     Reviewed the importance of self-monitoring for digital medicine participation.     Reviewed that the Digital Medicine team is not available for emergencies and instructed the patient to call 911 or [facility] ([phone] or [phone]) if one arises.    Pt reports that he starts Chemotherapy onThursday and will last for 4 weeks.           Last 5 Patient Entered Readings                                      Current 30 Day Average: 160/108     Recent Readings 2/10/2019 2/10/2019 2/9/2019 2/9/2019 2/9/2019    SBP (mmHg) 138 173 172 162 161    DBP (mmHg) 83 104 88 114 115    Pulse 78 100 74 69 71

## 2019-02-11 NOTE — LETTER
February 11, 2019     Norm Mendoza  [address]  [facility] [address]       Dear Norm,    Welcome to [facility]! Our goal is to make care effective, proactive and convenient by using data you send us from home to better treat your chronic conditions.          My name is Maggie Lord, and I am your dedicated Digital Medicine clinician. As an expert in medication management, I will help ensure that the medications you are taking continue to provide the intended benefits and help you reach your goals. You can reach me directly at  or by sending me a message directly through your [facility] account.        I am Korin Altamirano and I will be your health . My job is to help you identify lifestyle changes to improve your disease control. We will talk about nutrition, exercise, and other ways you may be able to adjust your current habits to better your health. Additionally, we will help ensure you are completing the tests and screenings that are necessary to help manage your conditions. You can reach me directly at  or by sending me a message directly through your [facility] account.    Most importantly, YOU are at the center of this team. Together, we will work to improve your overall health and encourage you to meet your goals for a healthier lifestyle.     What we expect from YOU:  · Please take frequent home blood pressure measurements. We ask that you take at least 1 blood pressure reading per week, but more information will better help us get you know you. Be sure you rest for a few minutes before taking the reading in a quiet, comfortable place.     Be available to receive phone calls or [facility] messages, when appropriate, from your care team. Please let us know if there are any specific days or times that work best for us to reach you via phone.     Complete routine tests and screenings. Dont worry, we will help keep you on track!           What you should expect from your  Digital Medicine Care Team:   We will work with you to create a personalized plan of care and provide you with encouragement and education, including regarding lifestyle changes, that could help you manage your disease states.     We will adjust your current medications, if needed, and continue to monitor your long-term progress.     We will provide you and your physician with monthly progress reports after you have been in the program for more than 30 days.     We will send you reminders through [facility] and text messages to help ensure you do not miss any testing deadlines to help manage your disease states.    You will be able to reach us by phone or through your [facility] account by clicking our names under Care Team on the right side of the home screen.    I look forward to working with you to achieve your blood pressure goals!    We look forward to working with you to help manage your health,    Sincerely,    Your Digital Medicine Team    Please visit our websites to learn more:   · Hypertension: [email or web address]      Remember, we are not available for emergencies. If you have an emergency, please contact your doctors office directly or call [facility] ([phone] or [phone]) or 911.

## 2019-02-12 ENCOUNTER — PATIENT OUTREACH (OUTPATIENT)
Dept: OTHER | Facility: OTHER | Age: 84
End: 2019-02-12

## 2019-02-12 NOTE — PROGRESS NOTES
"Last 5 Patient Entered Readings                                      Current 30 Day Average: 156/107     Recent Readings 2/11/2019 2/10/2019 2/10/2019 2/9/2019 2/9/2019    SBP (mmHg) 140 138 173 172 162    DBP (mmHg) 95 83 104 88 114    Pulse 88 78 100 74 69          Digital Medicine: Health  Introduction    Introduced Mr. Norm Mendoza to Digital Medicine. Discussed health  role and recommended lifestyle modifications.    Patient has been taking his taking his BP readings while sitting at dining room table, has not been resting for at least 5 minutes, and waiting at least an hour after taking BP medications. Reviewed proper BP technique and importance of adherence to ensure accurate readings.    Lifestyle Assessment:  Current Dietary Habits(i.e. low sodium, food labels, dining out):   Reviewed AHA recommendations of 2,000 mg sodium daily and choosing items with less than 140 mg sodium per serving. Typical meals include the following:    Breakfast: toast, black coffee    Lunch: salad, liver and onions, yogurt and granola, black coffee    Dinner: "protein", hamburger, sardines in olive oil, sparingly water    Snacks: none    Beverages: "limited" water intake    Exercise:   Patient was previously attending fitness classes and piliates but has been unable to do so since he has been sick. Expressed interest in increasing daily physical activity.     Alcohol/Tobacco:   Patient reports "very sparingly" drinking alcohol. No tobacco use. Quit smoking 7 years ago.     Medication Adherence: has been compliant with the medicaiton regimen. Patient reports he has been taking "half of 25 mg metoprolol succinate tablet" and "2 irbesartan tablets per day". Patient had been experiencing fatigue and SOB. "For a day or two, I stopped taking my beta-blocker". Starting chemotherapy this Thursday. Will inform Digital Medicine PharmD.    Other goals: Patient reports beta-blocker are causing sleep issues.     Reviewed " AHA/AACE recommendations:  Limit sodium intake to <2000mg/day  Perform 150 minutes of physical activity per week    Reviewed the importance of self-monitoring, medication adherence, and that the health  can be used as a resource for lifestyle modifications to help reduce or maintain a healthy lifestyle.  Reviewed that the Digital Medicine team is not available for emergencies and instructed the patient to call 911 or [facility] ([phone] or [phone]) if one arises.

## 2019-02-13 ENCOUNTER — TELEPHONE (OUTPATIENT)
Dept: HEMATOLOGY/ONCOLOGY | Facility: CLINIC | Age: 84
End: 2019-02-13

## 2019-02-13 NOTE — TELEPHONE ENCOUNTER
Spoke to patient today, to start Rituximab tomorrow with labs prior. Will call us if any issues arise. All questions answered.

## 2019-02-14 ENCOUNTER — INFUSION (OUTPATIENT)
Dept: INFUSION THERAPY | Facility: HOSPITAL | Age: 84
End: 2019-02-14
Attending: INTERNAL MEDICINE
Payer: MEDICARE

## 2019-02-14 ENCOUNTER — LAB VISIT (OUTPATIENT)
Dept: LAB | Facility: HOSPITAL | Age: 84
End: 2019-02-14
Payer: MEDICARE

## 2019-02-14 VITALS
SYSTOLIC BLOOD PRESSURE: 142 MMHG | HEIGHT: 72 IN | HEART RATE: 84 BPM | BODY MASS INDEX: 24.73 KG/M2 | OXYGEN SATURATION: 100 % | WEIGHT: 182.56 LBS | DIASTOLIC BLOOD PRESSURE: 76 MMHG | TEMPERATURE: 98 F | RESPIRATION RATE: 17 BRPM

## 2019-02-14 DIAGNOSIS — C88.4 MALT LYMPHOMA: Primary | ICD-10-CM

## 2019-02-14 DIAGNOSIS — C88.4 MALT LYMPHOMA: ICD-10-CM

## 2019-02-14 LAB
BASOPHILS # BLD AUTO: 0.06 K/UL
BASOPHILS NFR BLD: 1.1 %
DIFFERENTIAL METHOD: ABNORMAL
EOSINOPHIL # BLD AUTO: 0 K/UL
EOSINOPHIL NFR BLD: 0.8 %
ERYTHROCYTE [DISTWIDTH] IN BLOOD BY AUTOMATED COUNT: 16.2 %
HCT VFR BLD AUTO: 38.4 %
HGB BLD-MCNC: 11.6 G/DL
IMM GRANULOCYTES # BLD AUTO: 0.02 K/UL
IMM GRANULOCYTES NFR BLD AUTO: 0.4 %
LYMPHOCYTES # BLD AUTO: 1 K/UL
LYMPHOCYTES NFR BLD: 18.4 %
MCH RBC QN AUTO: 26.6 PG
MCHC RBC AUTO-ENTMCNC: 30.2 G/DL
MCV RBC AUTO: 88 FL
MONOCYTES # BLD AUTO: 0.6 K/UL
MONOCYTES NFR BLD: 11.2 %
NEUTROPHILS # BLD AUTO: 3.6 K/UL
NEUTROPHILS NFR BLD: 68.1 %
NRBC BLD-RTO: 0 /100 WBC
PLATELET # BLD AUTO: 284 K/UL
PMV BLD AUTO: 9.6 FL
RBC # BLD AUTO: 4.36 M/UL
WBC # BLD AUTO: 5.27 K/UL

## 2019-02-14 PROCEDURE — 85025 COMPLETE CBC W/AUTO DIFF WBC: CPT | Mod: HCNC

## 2019-02-14 PROCEDURE — 25000003 PHARM REV CODE 250: Mod: HCNC | Performed by: INTERNAL MEDICINE

## 2019-02-14 PROCEDURE — 96375 TX/PRO/DX INJ NEW DRUG ADDON: CPT | Mod: HCNC

## 2019-02-14 PROCEDURE — 36415 COLL VENOUS BLD VENIPUNCTURE: CPT | Mod: HCNC

## 2019-02-14 PROCEDURE — S0028 INJECTION, FAMOTIDINE, 20 MG: HCPCS | Mod: HCNC | Performed by: INTERNAL MEDICINE

## 2019-02-14 PROCEDURE — 96367 TX/PROPH/DG ADDL SEQ IV INF: CPT | Mod: HCNC

## 2019-02-14 PROCEDURE — 63600175 PHARM REV CODE 636 W HCPCS: Mod: HCNC,JG | Performed by: INTERNAL MEDICINE

## 2019-02-14 PROCEDURE — 96415 CHEMO IV INFUSION ADDL HR: CPT | Mod: HCNC

## 2019-02-14 PROCEDURE — 96413 CHEMO IV INFUSION 1 HR: CPT | Mod: HCNC

## 2019-02-14 RX ORDER — SODIUM CHLORIDE 0.9 % (FLUSH) 0.9 %
10 SYRINGE (ML) INJECTION
Status: CANCELLED | OUTPATIENT
Start: 2019-03-07

## 2019-02-14 RX ORDER — MEPERIDINE HYDROCHLORIDE 25 MG/ML
25 INJECTION INTRAMUSCULAR; INTRAVENOUS; SUBCUTANEOUS
Status: CANCELLED | OUTPATIENT
Start: 2019-02-21 | End: 2019-02-14

## 2019-02-14 RX ORDER — HEPARIN 100 UNIT/ML
500 SYRINGE INTRAVENOUS
Status: CANCELLED | OUTPATIENT
Start: 2019-02-21

## 2019-02-14 RX ORDER — SODIUM CHLORIDE 0.9 % (FLUSH) 0.9 %
10 SYRINGE (ML) INJECTION
Status: CANCELLED | OUTPATIENT
Start: 2019-02-28

## 2019-02-14 RX ORDER — FAMOTIDINE 10 MG/ML
20 INJECTION INTRAVENOUS
Status: CANCELLED | OUTPATIENT
Start: 2019-02-14

## 2019-02-14 RX ORDER — ACETAMINOPHEN 325 MG/1
650 TABLET ORAL
Status: COMPLETED | OUTPATIENT
Start: 2019-02-14 | End: 2019-02-14

## 2019-02-14 RX ORDER — HEPARIN 100 UNIT/ML
500 SYRINGE INTRAVENOUS
Status: CANCELLED | OUTPATIENT
Start: 2019-02-14

## 2019-02-14 RX ORDER — SODIUM CHLORIDE 0.9 % (FLUSH) 0.9 %
10 SYRINGE (ML) INJECTION
Status: CANCELLED | OUTPATIENT
Start: 2019-02-14

## 2019-02-14 RX ORDER — MEPERIDINE HYDROCHLORIDE 25 MG/ML
25 INJECTION INTRAMUSCULAR; INTRAVENOUS; SUBCUTANEOUS
Status: CANCELLED | OUTPATIENT
Start: 2019-02-28 | End: 2019-02-28

## 2019-02-14 RX ORDER — FAMOTIDINE 10 MG/ML
20 INJECTION INTRAVENOUS
Status: CANCELLED | OUTPATIENT
Start: 2019-02-21

## 2019-02-14 RX ORDER — ACETAMINOPHEN 325 MG/1
650 TABLET ORAL
Status: CANCELLED | OUTPATIENT
Start: 2019-02-14

## 2019-02-14 RX ORDER — MEPERIDINE HYDROCHLORIDE 25 MG/ML
25 INJECTION INTRAMUSCULAR; INTRAVENOUS; SUBCUTANEOUS
Status: CANCELLED | OUTPATIENT
Start: 2019-02-14

## 2019-02-14 RX ORDER — FAMOTIDINE 10 MG/ML
20 INJECTION INTRAVENOUS
Status: CANCELLED | OUTPATIENT
Start: 2019-02-28

## 2019-02-14 RX ORDER — SODIUM CHLORIDE 0.9 % (FLUSH) 0.9 %
10 SYRINGE (ML) INJECTION
Status: DISCONTINUED | OUTPATIENT
Start: 2019-02-14 | End: 2019-02-14 | Stop reason: HOSPADM

## 2019-02-14 RX ORDER — ACETAMINOPHEN 325 MG/1
650 TABLET ORAL
Status: CANCELLED | OUTPATIENT
Start: 2019-03-07

## 2019-02-14 RX ORDER — ACETAMINOPHEN 325 MG/1
650 TABLET ORAL
Status: CANCELLED | OUTPATIENT
Start: 2019-02-28

## 2019-02-14 RX ORDER — HEPARIN 100 UNIT/ML
500 SYRINGE INTRAVENOUS
Status: DISCONTINUED | OUTPATIENT
Start: 2019-02-14 | End: 2019-02-14 | Stop reason: HOSPADM

## 2019-02-14 RX ORDER — SODIUM CHLORIDE 0.9 % (FLUSH) 0.9 %
10 SYRINGE (ML) INJECTION
Status: CANCELLED | OUTPATIENT
Start: 2019-02-21

## 2019-02-14 RX ORDER — HEPARIN 100 UNIT/ML
500 SYRINGE INTRAVENOUS
Status: CANCELLED | OUTPATIENT
Start: 2019-03-07

## 2019-02-14 RX ORDER — FAMOTIDINE 10 MG/ML
20 INJECTION INTRAVENOUS
Status: CANCELLED | OUTPATIENT
Start: 2019-03-07

## 2019-02-14 RX ORDER — HEPARIN 100 UNIT/ML
500 SYRINGE INTRAVENOUS
Status: CANCELLED | OUTPATIENT
Start: 2019-02-28

## 2019-02-14 RX ORDER — ACETAMINOPHEN 325 MG/1
650 TABLET ORAL
Status: CANCELLED | OUTPATIENT
Start: 2019-02-21

## 2019-02-14 RX ORDER — FAMOTIDINE 10 MG/ML
20 INJECTION INTRAVENOUS
Status: COMPLETED | OUTPATIENT
Start: 2019-02-14 | End: 2019-02-14

## 2019-02-14 RX ORDER — MEPERIDINE HYDROCHLORIDE 50 MG/ML
25 INJECTION INTRAMUSCULAR; INTRAVENOUS; SUBCUTANEOUS
Status: DISCONTINUED | OUTPATIENT
Start: 2019-02-14 | End: 2019-02-14 | Stop reason: HOSPADM

## 2019-02-14 RX ORDER — MEPERIDINE HYDROCHLORIDE 25 MG/ML
25 INJECTION INTRAMUSCULAR; INTRAVENOUS; SUBCUTANEOUS
Status: CANCELLED | OUTPATIENT
Start: 2019-03-07 | End: 2019-03-07

## 2019-02-14 RX ADMIN — RITUXIMAB 765 MG: 10 INJECTION, SOLUTION INTRAVENOUS at 09:02

## 2019-02-14 RX ADMIN — ACETAMINOPHEN 650 MG: 325 TABLET ORAL at 09:02

## 2019-02-14 RX ADMIN — DIPHENHYDRAMINE HYDROCHLORIDE 50 MG: 50 INJECTION, SOLUTION INTRAMUSCULAR; INTRAVENOUS at 09:02

## 2019-02-14 RX ADMIN — FAMOTIDINE 20 MG: 10 INJECTION, SOLUTION INTRAVENOUS at 09:02

## 2019-02-14 NOTE — PLAN OF CARE
Problem: Adult Inpatient Plan of Care  Goal: Plan of Care Review  Outcome: Ongoing (interventions implemented as appropriate)  Pt tolerated cycle 1 day 1 Rituxan well, with no complications or s/s of adverse reaction. VS stable throughout treatment. Extensive pt education performed regarding Rituxan (side effects, what to expect before, during and after infusion, and indications) and pt verbalized understanding. Left forearm PIV positive for blood return, SL and removed prior to discharge. Catheter tip intact. Pt discharged with no distress noted and ambulated independently out of clinic. RTC 2/21/19, pt verbalized understanding. AVS printed and given to pt.

## 2019-02-21 ENCOUNTER — INFUSION (OUTPATIENT)
Dept: INFUSION THERAPY | Facility: HOSPITAL | Age: 84
End: 2019-02-21
Attending: STUDENT IN AN ORGANIZED HEALTH CARE EDUCATION/TRAINING PROGRAM
Payer: MEDICARE

## 2019-02-21 VITALS
RESPIRATION RATE: 18 BRPM | HEIGHT: 72 IN | SYSTOLIC BLOOD PRESSURE: 166 MMHG | HEART RATE: 68 BPM | WEIGHT: 180.56 LBS | DIASTOLIC BLOOD PRESSURE: 93 MMHG | BODY MASS INDEX: 24.46 KG/M2

## 2019-02-21 DIAGNOSIS — C88.4 MALT LYMPHOMA: Primary | ICD-10-CM

## 2019-02-21 PROCEDURE — S0028 INJECTION, FAMOTIDINE, 20 MG: HCPCS | Mod: HCNC | Performed by: INTERNAL MEDICINE

## 2019-02-21 PROCEDURE — 25000003 PHARM REV CODE 250: Mod: HCNC | Performed by: INTERNAL MEDICINE

## 2019-02-21 PROCEDURE — A4216 STERILE WATER/SALINE, 10 ML: HCPCS | Mod: HCNC | Performed by: INTERNAL MEDICINE

## 2019-02-21 PROCEDURE — 96415 CHEMO IV INFUSION ADDL HR: CPT | Mod: HCNC

## 2019-02-21 PROCEDURE — 96413 CHEMO IV INFUSION 1 HR: CPT | Mod: HCNC

## 2019-02-21 PROCEDURE — 63600175 PHARM REV CODE 636 W HCPCS: Mod: HCNC,JG | Performed by: INTERNAL MEDICINE

## 2019-02-21 PROCEDURE — 96367 TX/PROPH/DG ADDL SEQ IV INF: CPT | Mod: HCNC

## 2019-02-21 PROCEDURE — 96375 TX/PRO/DX INJ NEW DRUG ADDON: CPT | Mod: HCNC

## 2019-02-21 RX ORDER — MEPERIDINE HYDROCHLORIDE 50 MG/ML
25 INJECTION INTRAMUSCULAR; INTRAVENOUS; SUBCUTANEOUS
Status: DISCONTINUED | OUTPATIENT
Start: 2019-02-21 | End: 2019-02-21 | Stop reason: HOSPADM

## 2019-02-21 RX ORDER — ACETAMINOPHEN 325 MG/1
650 TABLET ORAL
Status: COMPLETED | OUTPATIENT
Start: 2019-02-21 | End: 2019-02-21

## 2019-02-21 RX ORDER — FAMOTIDINE 10 MG/ML
20 INJECTION INTRAVENOUS
Status: COMPLETED | OUTPATIENT
Start: 2019-02-21 | End: 2019-02-21

## 2019-02-21 RX ORDER — SODIUM CHLORIDE 0.9 % (FLUSH) 0.9 %
10 SYRINGE (ML) INJECTION
Status: DISCONTINUED | OUTPATIENT
Start: 2019-02-21 | End: 2019-02-21 | Stop reason: HOSPADM

## 2019-02-21 RX ORDER — HEPARIN 100 UNIT/ML
500 SYRINGE INTRAVENOUS
Status: DISCONTINUED | OUTPATIENT
Start: 2019-02-21 | End: 2019-02-21 | Stop reason: HOSPADM

## 2019-02-21 RX ADMIN — Medication 10 ML: at 01:02

## 2019-02-21 RX ADMIN — ACETAMINOPHEN 650 MG: 325 TABLET ORAL at 09:02

## 2019-02-21 RX ADMIN — RITUXIMAB 765 MG: 10 INJECTION, SOLUTION INTRAVENOUS at 10:02

## 2019-02-21 RX ADMIN — DIPHENHYDRAMINE HYDROCHLORIDE 50 MG: 50 INJECTION, SOLUTION INTRAMUSCULAR; INTRAVENOUS at 09:02

## 2019-02-21 RX ADMIN — FAMOTIDINE 20 MG: 10 INJECTION, SOLUTION INTRAVENOUS at 09:02

## 2019-02-21 NOTE — PLAN OF CARE
Problem: Adult Inpatient Plan of Care  Goal: Plan of Care Review  Outcome: Ongoing (interventions implemented as appropriate)  Pt tolerated Rituxan without complications. VSS. No s/s of reaction. Instructed to contact MD with any questions. PIV removed and AVS given to patient.

## 2019-02-22 ENCOUNTER — PATIENT OUTREACH (OUTPATIENT)
Dept: OTHER | Facility: OTHER | Age: 84
End: 2019-02-22

## 2019-02-22 DIAGNOSIS — I10 ESSENTIAL HYPERTENSION: ICD-10-CM

## 2019-02-22 RX ORDER — IRBESARTAN 150 MG/1
300 TABLET ORAL DAILY
Qty: 90 TABLET | Refills: 1
Start: 2019-02-22 | End: 2019-03-22

## 2019-02-22 NOTE — PROGRESS NOTES
"Last 5 Patient Entered Readings                                      Current 30 Day Average: 152/101     Recent Readings 2/22/2019 2/19/2019 2/19/2019 2/19/2019 2/17/2019    SBP (mmHg) 170 151 165 145 142    DBP (mmHg) 128 88 99 97 106    Pulse 104 87 80 93 73        Hypertension Medications     irbesartan (AVAPRO) 150 MG tablet 1 tab daily    metoprolol succinate (TOPROL-XL) 50 MG 24 hr tablet Take 1 tablet (50 mg total) by mouth once daily. To rate control your Atrial Fibrillation     Called patient for digital medicine clinical pharmacist introduction.He says he feels fine. Patient is a poor historian. He says that prior to 12/2018, his BP was always low. He reports that PCP instructed him to take irbesartant 150 mg one day and then 300 mg the next day. He reports currently taking irbesartan 300 mg daily. He also says he was taking metoprolol 12.5 mg for the past 3 weeks, but decided to take 25 mg today because BP was high. He thinks something is wrong with the digital BP cuff because his BP is so high. He plans to go to [facility] and have BP checked. He will bring digital BP cuff to compare the BP. He reports drinking "half of a martini" nightly.     1) BP exceeds goal of <130/<80. Continue irbesartan 300 mg QD and metoprolol succinate 25 mg QD  2)  Informed patient that metoprolol cannot be stopped abruptly. Call MD or myself for tapering instructions if not tolerated.   3) Avoidance of alcohol especially with history of GI bleed  4) Patient knows to contact me with any non-urgent clinical changes or concerns. Go to ED or call [facility] for emergencies.   5) Will defer lifestyle counseling to digital medicine health .   6) Will continue to follow up    Maggie Lord Pharm.D.   Digital Medicine Clinical Pharmacist  [phone]                  "

## 2019-02-25 ENCOUNTER — PATIENT OUTREACH (OUTPATIENT)
Dept: OTHER | Facility: OTHER | Age: 84
End: 2019-02-25

## 2019-02-25 NOTE — PROGRESS NOTES
Last 5 Patient Entered Readings                                      Current 30 Day Average: 148/99     Recent Readings 2/24/2019 2/24/2019 2/24/2019 2/24/2019 2/23/2019    SBP (mmHg) 126 133 124 171 150    DBP (mmHg) 83 89 80 129 94    Pulse 66 77 75 80 76          Called patient to address high BP reading of 171/129 on 2/24/19, no answer, LVM. Will try contacting patient tomorrow.

## 2019-02-26 NOTE — PROGRESS NOTES
"Last 5 Patient Entered Readings                                      Current 30 Day Average: 148/99     Recent Readings 2/24/2019 2/24/2019 2/24/2019 2/24/2019 2/23/2019    SBP (mmHg) 126 133 124 171 150    DBP (mmHg) 83 89 80 129 94    Pulse 66 77 75 80 76          Called patient to address high BP reading on 2/24, denied associated symptoms.   Patient reports reading was taken before taking his "beta-blocker".  Reviewed hypertension protocol and instructed patient to call 911 or [facility] in case of emergency. Patient verbalized understanding.  "

## 2019-02-28 ENCOUNTER — INFUSION (OUTPATIENT)
Dept: INFUSION THERAPY | Facility: HOSPITAL | Age: 84
End: 2019-02-28
Attending: INTERNAL MEDICINE
Payer: MEDICARE

## 2019-02-28 ENCOUNTER — LAB VISIT (OUTPATIENT)
Dept: LAB | Facility: HOSPITAL | Age: 84
End: 2019-02-28
Payer: MEDICARE

## 2019-02-28 VITALS
RESPIRATION RATE: 18 BRPM | SYSTOLIC BLOOD PRESSURE: 134 MMHG | HEART RATE: 65 BPM | HEIGHT: 72 IN | DIASTOLIC BLOOD PRESSURE: 74 MMHG | BODY MASS INDEX: 24.22 KG/M2 | WEIGHT: 178.81 LBS

## 2019-02-28 DIAGNOSIS — C88.4 MALT LYMPHOMA: Primary | ICD-10-CM

## 2019-02-28 DIAGNOSIS — C88.4 MALT LYMPHOMA: ICD-10-CM

## 2019-02-28 LAB
BASOPHILS # BLD AUTO: 0.06 K/UL
BASOPHILS NFR BLD: 1.4 %
DIFFERENTIAL METHOD: ABNORMAL
EOSINOPHIL # BLD AUTO: 0.1 K/UL
EOSINOPHIL NFR BLD: 1.4 %
ERYTHROCYTE [DISTWIDTH] IN BLOOD BY AUTOMATED COUNT: 16.7 %
HCT VFR BLD AUTO: 38.3 %
HGB BLD-MCNC: 11.9 G/DL
IMM GRANULOCYTES # BLD AUTO: 0.02 K/UL
IMM GRANULOCYTES NFR BLD AUTO: 0.5 %
LYMPHOCYTES # BLD AUTO: 0.8 K/UL
LYMPHOCYTES NFR BLD: 18.3 %
MCH RBC QN AUTO: 26.7 PG
MCHC RBC AUTO-ENTMCNC: 31.1 G/DL
MCV RBC AUTO: 86 FL
MONOCYTES # BLD AUTO: 0.5 K/UL
MONOCYTES NFR BLD: 11.5 %
NEUTROPHILS # BLD AUTO: 3 K/UL
NEUTROPHILS NFR BLD: 66.9 %
NRBC BLD-RTO: 0 /100 WBC
PLATELET # BLD AUTO: 253 K/UL
PMV BLD AUTO: 9.8 FL
RBC # BLD AUTO: 4.45 M/UL
WBC # BLD AUTO: 4.43 K/UL

## 2019-02-28 PROCEDURE — 96367 TX/PROPH/DG ADDL SEQ IV INF: CPT

## 2019-02-28 PROCEDURE — 96413 CHEMO IV INFUSION 1 HR: CPT | Mod: HCNC

## 2019-02-28 PROCEDURE — 36415 COLL VENOUS BLD VENIPUNCTURE: CPT | Mod: HCNC

## 2019-02-28 PROCEDURE — 63600175 PHARM REV CODE 636 W HCPCS: Mod: HCNC,JG | Performed by: INTERNAL MEDICINE

## 2019-02-28 PROCEDURE — 85025 COMPLETE CBC W/AUTO DIFF WBC: CPT | Mod: HCNC

## 2019-02-28 PROCEDURE — 25000003 PHARM REV CODE 250: Mod: HCNC | Performed by: INTERNAL MEDICINE

## 2019-02-28 PROCEDURE — A4216 STERILE WATER/SALINE, 10 ML: HCPCS | Mod: HCNC | Performed by: INTERNAL MEDICINE

## 2019-02-28 PROCEDURE — 96365 THER/PROPH/DIAG IV INF INIT: CPT | Mod: HCNC

## 2019-02-28 PROCEDURE — S0028 INJECTION, FAMOTIDINE, 20 MG: HCPCS | Mod: HCNC | Performed by: INTERNAL MEDICINE

## 2019-02-28 PROCEDURE — 96415 CHEMO IV INFUSION ADDL HR: CPT | Mod: HCNC

## 2019-02-28 RX ORDER — FAMOTIDINE 10 MG/ML
20 INJECTION INTRAVENOUS
Status: COMPLETED | OUTPATIENT
Start: 2019-02-28 | End: 2019-02-28

## 2019-02-28 RX ORDER — ACETAMINOPHEN 325 MG/1
650 TABLET ORAL
Status: COMPLETED | OUTPATIENT
Start: 2019-02-28 | End: 2019-02-28

## 2019-02-28 RX ORDER — MEPERIDINE HYDROCHLORIDE 50 MG/ML
25 INJECTION INTRAMUSCULAR; INTRAVENOUS; SUBCUTANEOUS
Status: DISCONTINUED | OUTPATIENT
Start: 2019-02-28 | End: 2019-02-28 | Stop reason: HOSPADM

## 2019-02-28 RX ORDER — HEPARIN 100 UNIT/ML
500 SYRINGE INTRAVENOUS
Status: DISCONTINUED | OUTPATIENT
Start: 2019-02-28 | End: 2019-02-28 | Stop reason: HOSPADM

## 2019-02-28 RX ORDER — SODIUM CHLORIDE 0.9 % (FLUSH) 0.9 %
10 SYRINGE (ML) INJECTION
Status: DISCONTINUED | OUTPATIENT
Start: 2019-02-28 | End: 2019-02-28 | Stop reason: HOSPADM

## 2019-02-28 RX ADMIN — FAMOTIDINE 20 MG: 10 INJECTION, SOLUTION INTRAVENOUS at 09:02

## 2019-02-28 RX ADMIN — Medication 10 ML: at 01:02

## 2019-02-28 RX ADMIN — RITUXIMAB 765 MG: 10 INJECTION, SOLUTION INTRAVENOUS at 10:02

## 2019-02-28 RX ADMIN — DIPHENHYDRAMINE HYDROCHLORIDE 50 MG: 50 INJECTION, SOLUTION INTRAMUSCULAR; INTRAVENOUS at 09:02

## 2019-02-28 RX ADMIN — ACETAMINOPHEN 650 MG: 325 TABLET ORAL at 09:02

## 2019-02-28 NOTE — PLAN OF CARE
Problem: Adult Inpatient Plan of Care  Goal: Plan of Care Review  Patient tolerated infusion well. AVS provided, VS stable, discharged to home

## 2019-03-06 ENCOUNTER — PATIENT OUTREACH (OUTPATIENT)
Dept: OTHER | Facility: OTHER | Age: 84
End: 2019-03-06

## 2019-03-06 NOTE — PROGRESS NOTES
"Last 5 Patient Entered Readings                                      Current 30 Day Average: 142/95     Recent Readings 3/5/2019 3/5/2019 3/3/2019 3/2/2019 3/1/2019    SBP (mmHg) 149 160 105 135 132    DBP (mmHg) 89 98 65 78 109    Pulse 74 73 75 73 85        9:03am: Patient requested call back to complete f/u call. Will try again at 2pm       2:10pm: Patient attributes elevations in readings to stress.       Digital Medicine: Health  Follow Up    Lifestyle Modifications:    1.Dietary Modifications (Sodium intake <2,000mg/day, food labels, dining out): "Same". Will continue to encourage patient to limit sodium intake.     2.Physical Activity: Patient reports that his last infusion is scheduled for tomorrow, verbalized interest in increasing physical activity once feeling better. Patient reports he belongs to [employer]. Patient works 8am-4pm, 3 days per week.    3.Medication Therapy: Patient has been compliant with the medication regimen.    4.Patient has the following medication side effects/concerns: None     Follow up with Mr. Norm Mendoza completed. No further questions or concerns. Will continue to follow up to achieve health goals.    "

## 2019-03-07 ENCOUNTER — INFUSION (OUTPATIENT)
Dept: INFUSION THERAPY | Facility: HOSPITAL | Age: 84
End: 2019-03-07
Attending: STUDENT IN AN ORGANIZED HEALTH CARE EDUCATION/TRAINING PROGRAM
Payer: MEDICARE

## 2019-03-07 VITALS
RESPIRATION RATE: 18 BRPM | TEMPERATURE: 98 F | HEIGHT: 72 IN | HEART RATE: 68 BPM | WEIGHT: 181 LBS | OXYGEN SATURATION: 100 % | BODY MASS INDEX: 24.52 KG/M2 | SYSTOLIC BLOOD PRESSURE: 150 MMHG | DIASTOLIC BLOOD PRESSURE: 85 MMHG

## 2019-03-07 DIAGNOSIS — C88.4 MALT LYMPHOMA: Primary | ICD-10-CM

## 2019-03-07 PROCEDURE — 96367 TX/PROPH/DG ADDL SEQ IV INF: CPT | Mod: HCNC

## 2019-03-07 PROCEDURE — S0028 INJECTION, FAMOTIDINE, 20 MG: HCPCS | Mod: HCNC | Performed by: INTERNAL MEDICINE

## 2019-03-07 PROCEDURE — 63600175 PHARM REV CODE 636 W HCPCS: Mod: HCNC | Performed by: INTERNAL MEDICINE

## 2019-03-07 PROCEDURE — 96375 TX/PRO/DX INJ NEW DRUG ADDON: CPT | Mod: HCNC

## 2019-03-07 PROCEDURE — 96415 CHEMO IV INFUSION ADDL HR: CPT | Mod: HCNC

## 2019-03-07 PROCEDURE — 25000003 PHARM REV CODE 250: Mod: HCNC | Performed by: INTERNAL MEDICINE

## 2019-03-07 PROCEDURE — 96413 CHEMO IV INFUSION 1 HR: CPT | Mod: HCNC

## 2019-03-07 RX ORDER — MEPERIDINE HYDROCHLORIDE 25 MG/ML
25 INJECTION INTRAMUSCULAR; INTRAVENOUS; SUBCUTANEOUS
Status: DISCONTINUED | OUTPATIENT
Start: 2019-03-07 | End: 2019-03-07 | Stop reason: HOSPADM

## 2019-03-07 RX ORDER — ACETAMINOPHEN 325 MG/1
650 TABLET ORAL
Status: COMPLETED | OUTPATIENT
Start: 2019-03-07 | End: 2019-03-07

## 2019-03-07 RX ORDER — HEPARIN 100 UNIT/ML
500 SYRINGE INTRAVENOUS
Status: DISCONTINUED | OUTPATIENT
Start: 2019-03-07 | End: 2019-03-07 | Stop reason: HOSPADM

## 2019-03-07 RX ORDER — FAMOTIDINE 10 MG/ML
20 INJECTION INTRAVENOUS
Status: COMPLETED | OUTPATIENT
Start: 2019-03-07 | End: 2019-03-07

## 2019-03-07 RX ORDER — SODIUM CHLORIDE 0.9 % (FLUSH) 0.9 %
10 SYRINGE (ML) INJECTION
Status: DISCONTINUED | OUTPATIENT
Start: 2019-03-07 | End: 2019-03-07 | Stop reason: HOSPADM

## 2019-03-07 RX ADMIN — RITUXIMAB 765 MG: 10 INJECTION, SOLUTION INTRAVENOUS at 09:03

## 2019-03-07 RX ADMIN — DIPHENHYDRAMINE HYDROCHLORIDE 50 MG: 50 INJECTION, SOLUTION INTRAMUSCULAR; INTRAVENOUS at 09:03

## 2019-03-07 RX ADMIN — FAMOTIDINE 20 MG: 10 INJECTION, SOLUTION INTRAVENOUS at 09:03

## 2019-03-07 RX ADMIN — ACETAMINOPHEN 650 MG: 325 TABLET ORAL at 09:03

## 2019-03-07 NOTE — PLAN OF CARE
Problem: Adult Inpatient Plan of Care  Goal: Plan of Care Review  Pt admitted for cycle 4 of 4 Rituxan. Side effects and infusion reactions reviewed and Pt has been tolerating treatments well. Rituxan completed @ 12:34, no complaints offered. Plan of care and AVS reviewed  with Pt, pt to RTC for MD FLU 3/21. Pt instructed to contact MD with any further concerns or questions, he verbalized understanding. Pt discharged @ 12:40

## 2019-03-13 ENCOUNTER — PATIENT OUTREACH (OUTPATIENT)
Dept: OTHER | Facility: OTHER | Age: 84
End: 2019-03-13

## 2019-03-13 ENCOUNTER — HOSPITAL ENCOUNTER (EMERGENCY)
Facility: HOSPITAL | Age: 84
Discharge: HOME | End: 2019-03-14
Attending: EMERGENCY MEDICINE
Payer: MEDICARE

## 2019-03-13 ENCOUNTER — TELEPHONE (OUTPATIENT)
Dept: INTERNAL MEDICINE | Facility: CLINIC | Age: 84
End: 2019-03-13

## 2019-03-13 DIAGNOSIS — I10 ESSENTIAL HYPERTENSION: Primary | ICD-10-CM

## 2019-03-13 DIAGNOSIS — R06.02 SOB (SHORTNESS OF BREATH): ICD-10-CM

## 2019-03-13 LAB
ALBUMIN SERPL BCP-MCNC: 3.6 G/DL
ALP SERPL-CCNC: 85 U/L
ALT SERPL W/O P-5'-P-CCNC: 38 U/L
ANION GAP SERPL CALC-SCNC: 10 MMOL/L
AST SERPL-CCNC: 41 U/L
BASOPHILS # BLD AUTO: 0.07 K/UL
BASOPHILS NFR BLD: 1.3 %
BILIRUB SERPL-MCNC: 0.7 MG/DL
BUN SERPL-MCNC: 25 MG/DL
CALCIUM SERPL-MCNC: 9.6 MG/DL
CHLORIDE SERPL-SCNC: 110 MMOL/L
CO2 SERPL-SCNC: 21 MMOL/L
CREAT SERPL-MCNC: 1.1 MG/DL
DIFFERENTIAL METHOD: ABNORMAL
EOSINOPHIL # BLD AUTO: 0 K/UL
EOSINOPHIL NFR BLD: 0 %
ERYTHROCYTE [DISTWIDTH] IN BLOOD BY AUTOMATED COUNT: 18.4 %
EST. GFR  (AFRICAN AMERICAN): >60 ML/MIN/1.73 M^2
EST. GFR  (NON AFRICAN AMERICAN): 58 ML/MIN/1.73 M^2
GLUCOSE SERPL-MCNC: 100 MG/DL
HCT VFR BLD AUTO: 36.1 %
HGB BLD-MCNC: 11.6 G/DL
IMM GRANULOCYTES # BLD AUTO: 0.03 K/UL
IMM GRANULOCYTES NFR BLD AUTO: 0.6 %
INR PPP: 1.1
LYMPHOCYTES # BLD AUTO: 1 K/UL
LYMPHOCYTES NFR BLD: 19 %
MAGNESIUM SERPL-MCNC: 1.9 MG/DL
MCH RBC QN AUTO: 26 PG
MCHC RBC AUTO-ENTMCNC: 32.1 G/DL
MCV RBC AUTO: 81 FL
MONOCYTES # BLD AUTO: 0.7 K/UL
MONOCYTES NFR BLD: 12.5 %
NEUTROPHILS # BLD AUTO: 3.6 K/UL
NEUTROPHILS NFR BLD: 66.6 %
NRBC BLD-RTO: 0 /100 WBC
PHOSPHATE SERPL-MCNC: 4 MG/DL
PLATELET # BLD AUTO: 231 K/UL
PMV BLD AUTO: 10 FL
POTASSIUM SERPL-SCNC: 4.2 MMOL/L
PROT SERPL-MCNC: 6.9 G/DL
PROTHROMBIN TIME: 10.9 SEC
RBC # BLD AUTO: 4.46 M/UL
SODIUM SERPL-SCNC: 141 MMOL/L
WBC # BLD AUTO: 5.43 K/UL

## 2019-03-13 PROCEDURE — 25000003 PHARM REV CODE 250: Mod: HCNC | Performed by: EMERGENCY MEDICINE

## 2019-03-13 PROCEDURE — 83735 ASSAY OF MAGNESIUM: CPT | Mod: HCNC

## 2019-03-13 PROCEDURE — 99285 PR EMERGENCY DEPT VISIT,LEVEL V: ICD-10-PCS | Mod: HCNC,,, | Performed by: EMERGENCY MEDICINE

## 2019-03-13 PROCEDURE — 84100 ASSAY OF PHOSPHORUS: CPT | Mod: HCNC

## 2019-03-13 PROCEDURE — 99285 EMERGENCY DEPT VISIT HI MDM: CPT | Mod: HCNC,,, | Performed by: EMERGENCY MEDICINE

## 2019-03-13 PROCEDURE — 93010 ELECTROCARDIOGRAM REPORT: CPT | Mod: HCNC,,, | Performed by: INTERNAL MEDICINE

## 2019-03-13 PROCEDURE — 93005 ELECTROCARDIOGRAM TRACING: CPT | Mod: HCNC

## 2019-03-13 PROCEDURE — 93010 EKG 12-LEAD: ICD-10-PCS | Mod: HCNC,,, | Performed by: INTERNAL MEDICINE

## 2019-03-13 PROCEDURE — 99285 EMERGENCY DEPT VISIT HI MDM: CPT | Mod: 25,HCNC

## 2019-03-13 PROCEDURE — 96360 HYDRATION IV INFUSION INIT: CPT | Mod: HCNC

## 2019-03-13 PROCEDURE — 85025 COMPLETE CBC W/AUTO DIFF WBC: CPT | Mod: HCNC

## 2019-03-13 PROCEDURE — 85610 PROTHROMBIN TIME: CPT | Mod: HCNC

## 2019-03-13 PROCEDURE — 80053 COMPREHEN METABOLIC PANEL: CPT | Mod: HCNC

## 2019-03-13 PROCEDURE — 96361 HYDRATE IV INFUSION ADD-ON: CPT | Mod: HCNC

## 2019-03-13 RX ADMIN — SODIUM CHLORIDE 500 ML: 0.9 INJECTION, SOLUTION INTRAVENOUS at 09:03

## 2019-03-13 NOTE — TELEPHONE ENCOUNTER
----- Message from Peggy Barrios sent at 3/13/2019  2:05 PM CDT -----  Contact: Fredo [phone]  Patient is calling to let you know he is going to ED when patient gets off from work at 5:00pm.  Patient did not want to speak with an on call nurse. Patient reported his HPB with the machine was 175/? And he received a text that it was elevated and that it need to be checked.     Patient also reported that he feels debilitated and has felt that way since January. Patient also reports that at infusion appointments that the pressure readings were normal. Thinks it may be the machine.    Please call and advise  Thank you

## 2019-03-13 NOTE — PROGRESS NOTES
"Last 5 Patient Entered Readings                                      Current 30 Day Average: 141/93     Recent Readings 3/13/2019 3/12/2019 3/12/2019 3/12/2019 3/12/2019    SBP (mmHg) 166 151 172 172 182    DBP (mmHg) 127 96 103 103 106    Pulse 75 76 82 82 82        Hypertension Medications     irbesartan (AVAPRO) 150 MG tablet Take 2 tablets (300 mg total) by mouth once daily.    metoprolol succinate (TOPROL-XL) 50 MG 24 hr tablet Take 1 tablet (50 mg total) by mouth once daily. To rate control your Atrial Fibrillation     Called patient for BP follow up and to address high BP alert. Patient says right now he feels fine, but at night he feels "terrible." He denies changes in vision, SOB, unilateral numbness/ weakness. He says last night for the first time he had chest pain. When asked to describe the pain, he says "I felt terrible." He is unsure if the digital machine is accurate because he says a couple of months ago his BP was low. He says he will go to the ED or a clinic to have BP checked.     Offered to set up BP check with the nurse in PCP's office. Patient refused. I was unable to address medications or make medication changes as patient was at work and asked that I call him back.     Maggie Andrade, Pharm.D.   Digital Medicine Clinical Pharmacist  [phone]        "

## 2019-03-14 VITALS
DIASTOLIC BLOOD PRESSURE: 89 MMHG | HEIGHT: 72 IN | OXYGEN SATURATION: 98 % | TEMPERATURE: 98 F | SYSTOLIC BLOOD PRESSURE: 159 MMHG | RESPIRATION RATE: 18 BRPM | HEART RATE: 87 BPM | WEIGHT: 173 LBS | BODY MASS INDEX: 23.43 KG/M2

## 2019-03-14 NOTE — ED PROVIDER NOTES
Encounter Date: 3/13/2019    SCRIBE #1 NOTE: I, Damien Meyers, am scribing for, and in the presence of,  Dr. Crystal. I have scribed the entire note.       History     Chief Complaint   Patient presents with    Hypertension     Pt with the ER after his blood pressure monitoring device revealed an elevated blood pressure and sent a text message instructing him to report to the ER.   Pt reporting an episode of chest pain last night last approximately 30 minutes while in bed; no chest pain today.     Time patient was seen by the provider: 8:18 PM      The patient is a 89 y.o. male with co-morbidities including: a fib and hypertension who presents to the ED with a complaint of hypertension. Patient says he took his pressure on a home machine and has been getting very high readings for the past few days. Also mentions he is a non-hodgkin's lymphoma patient. Takes beta-blockers at 25mg a day. Associated symptoms include debilitation, insomnia, shortness of breath, and chest pain. Patient works as a  and cooks for himself.         The history is provided by the patient.     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    Mixed hyperlipidemia 5/2/2018    Paroxysmal atrial fibrillation 11/30/2018    Skin cancer     x3, had mohs on nose    Squamous cell carcinoma excised 12/5/14    R lower back    Symptomatic anemia 12/6/2018    Urinary tract infection     x4     Past Surgical History:   Procedure Laterality Date    ACHILLES TENDON SURGERY      cataracts      CYSTOSCOPY      bladder tumor    CYSTOSCOPY N/A 3/28/2014    Performed [facility] at [facility] OR 1ST FLR    DILATION, URETHRA N/A 3/28/2014    Performed by [facility] at [facility] OR 1ST FLR    EGD  (ESOPHAGOGASTRODUODENOSCOPY) N/A 2018    Performed by Austin Garcia MD at [facility] ENDO (2ND FLR)    EXCISION-BLADDER TUMOR-TRANSURETHRAL (TURBT) N/A 3/28/2014    Performed [facility] at [facility] OR 1ST FLR    EYE SURGERY      SKIN CANCER EXCISION       Family History   Problem Relation Age of Onset    Stroke Father     Hypertension Father     Stroke Brother     Anesthesia problems Neg Hx     Malignant hypertension Neg Hx     Hypotension Neg Hx     Malignant hyperthermia Neg Hx     Pseudochol deficiency Neg Hx     Melanoma Neg Hx     Heart attack Neg Hx     Heart disease Neg Hx     Heart failure Neg Hx      Social History     Tobacco Use    Smoking status: Former Smoker     Packs/day: 1.00     Years: 25.00     Pack years: 25.00     Types: Cigarettes     Last attempt to quit: 9/3/1970     Years since quittin.5    Smokeless tobacco: Never Used   Substance Use Topics    Alcohol use: Yes     Alcohol/week: 1.8 oz     Types: 3 Shots of liquor per week     Comment: 3 bourbons daily - 12 beer on weekends     Drug use: No     Review of Systems   Constitutional: Negative for chills and fever.   HENT: Negative for ear pain and sneezing.    Eyes: Negative for discharge and redness.   Respiratory: Positive for shortness of breath.    Cardiovascular: Positive for chest pain. Negative for palpitations.   Gastrointestinal: Negative for nausea and vomiting.   Genitourinary: Negative for dysuria and hematuria.   Musculoskeletal: Negative for back pain and neck pain.   Neurological: Negative for light-headedness and headaches.   Psychiatric/Behavioral: Negative for behavioral problems and confusion.       Physical Exam     Initial Vitals [19 1739]   BP Pulse Resp Temp SpO2   (!) 142/99 82 18 97.5 °F (36.4 °C) 98 %      MAP       --         Physical Exam    Constitutional: He is cooperative. He does not have a sickly appearance. He does not appear ill.   HENT:   Head: Normocephalic and  atraumatic.   Mouth/Throat: Mucous membranes are normal.   Neck: Normal range of motion. Neck supple.   Cardiovascular: Normal rate, regular rhythm and normal heart sounds.   Pulmonary/Chest: Breath sounds normal. No respiratory distress.   Abdominal: He exhibits no distension.   Musculoskeletal: Normal range of motion.   Neurological: He is alert. A cranial nerve deficit is present. GCS eye subscore is 4. GCS verbal subscore is 5. GCS motor subscore is 6.   Skin: Skin is warm and dry.         ED Course   Procedures  Labs Reviewed - No data to display  EKG Readings: (Independently Interpreted)   Initial Reading: No STEMI. Rhythm: Atrial Fibrillation. Heart Rate: 99.       Imaging Results    None          Medical Decision Making:   History:   Old Medical Records: I decided to obtain old medical records.  Initial Assessment:   Patient evaluated because of a concern for hypertension patient without symptoms  Independently Interpreted Test(s):   I have ordered and independently interpreted EKG Reading(s) - see prior notes  Clinical Tests:   Radiological Study: Ordered and Reviewed  Medical Tests: Ordered and Reviewed  ED Management:  Patient will be evaluated in the ED with blood work EKG and will be monitored            Scribe Attestation:   Scribe #1: I performed the above scribed service and the documentation accurately describes the services I performed. I attest to the accuracy of the note.    Attending Attestation:             Attending ED Notes:   Patient evaluated because of concern for hypertension patient evaluated in the ED with the blood work imaging EKG patient monitored is felt that the patient needs to follow up with primary care physician to continue to monitor his blood pressure and may need medication adjustment             Clinical Impression:       ICD-10-CM ICD-9-CM   1. Essential hypertension I10 401.9   2. SOB (shortness of breath) R06.02 786.05         Disposition:   Disposition:  Discharged  Condition: Stable                        Gómez Crystal MD  03/19/19 204

## 2019-03-14 NOTE — ED NOTES
Patient identifiers for Norm Mendoza checked and correct.  LOC: Patient is awake, alert, and aware of environment with an appropriate affect. Patient is oriented x 3 and speaking appropriately.  APPEARANCE: Patient resting comfortably and in no acute distress. Patient is clean and well groomed, patient's clothing is properly fastened.  SKIN: The skin is warm and dry. Patient has normal skin turgor and moist mucus membrances. Skin is intact; no bruising or breakdown noted.  MUSKULOSKELETAL: Patient is moving all extremities well, no obvious deformities noted. Pulses intact.   RESPIRATORY: Airway is open and patent. Respirations are spontaneous and non-labored with normal effort and rate.  CARDIAC: Patient has a normal rate and rhythm. No peripheral edema noted. Capillary refill < 3 seconds.  ABDOMEN: No distention noted. Bowel sounds active in all 4 quadrants. Soft and non-tender upon palpation.  NEUROLOGICAL: PERRL. Facial expression is symmetrical. Hand grasps are equal bilaterally. Normal sensation in all extremities when touched with finger.  Allergies reported: Review of patient's allergies indicates:  No Known Allergies

## 2019-03-15 ENCOUNTER — PES CALL (OUTPATIENT)
Dept: ADMINISTRATIVE | Facility: CLINIC | Age: 84
End: 2019-03-15

## 2019-03-18 ENCOUNTER — PATIENT OUTREACH (OUTPATIENT)
Dept: OTHER | Facility: OTHER | Age: 84
End: 2019-03-18

## 2019-03-18 NOTE — PROGRESS NOTES
"Last 5 Patient Entered Readings                                      Current 30 Day Average: 144/94     Recent Readings 3/18/2019 3/17/2019 3/16/2019 3/16/2019 3/15/2019    SBP (mmHg) 174 129 159 164 145    DBP (mmHg) 104 78 103 110 102    Pulse 84 78 73 69 106        Hypertension Medications     irbesartan (AVAPRO) 150 MG tablet Take 2 tablets (300 mg total) by mouth once daily.    metoprolol succinate (TOPROL-XL) 50 MG 24 hr tablet Take 1 tablet (50 mg total) by mouth once daily. To rate control your Atrial Fibrillation     Called patient for BP follow up after ED visit on 3/13/19. He is feeling fine today. He says that BP fluctuates and BP was high in the ED. He feels confident in the accuracy of the digital cuff. He is only taking irbesartan 150 mg daily. He says he has moments of weakness and feeling "debilitated" and contributes this mostly to the metoprolol. He started taking metoprolol succinate 25 mg BID instead of 50 mg QD    1) BP exceeds goal of <130/<80. Irbesartan 300 mg QD. Reviewed last CMP  2) Patient knows to contact me with any non-urgent clinical changes or concerns. Go to ED or call [facility] for emergencies.   3) Will defer lifestyle counseling to digital medicine health .   4) Will continue to follow up. Would consider switching metoprolol to carvedilol given uncontrolled BP.     Maggie Lord, Pharm.D.   Digital Medicine Clinical Pharmacist  [phone]          "

## 2019-03-21 ENCOUNTER — OFFICE VISIT (OUTPATIENT)
Dept: HEMATOLOGY/ONCOLOGY | Facility: CLINIC | Age: 84
End: 2019-03-21
Payer: MEDICARE

## 2019-03-21 VITALS
TEMPERATURE: 98 F | SYSTOLIC BLOOD PRESSURE: 162 MMHG | RESPIRATION RATE: 20 BRPM | HEIGHT: 72 IN | OXYGEN SATURATION: 98 % | WEIGHT: 176.81 LBS | DIASTOLIC BLOOD PRESSURE: 92 MMHG | HEART RATE: 73 BPM | BODY MASS INDEX: 23.95 KG/M2

## 2019-03-21 DIAGNOSIS — C88.4 MALT LYMPHOMA: Primary | ICD-10-CM

## 2019-03-21 PROCEDURE — 99214 PR OFFICE/OUTPT VISIT, EST, LEVL IV, 30-39 MIN: ICD-10-PCS | Mod: HCNC,GC,S$GLB, | Performed by: STUDENT IN AN ORGANIZED HEALTH CARE EDUCATION/TRAINING PROGRAM

## 2019-03-21 PROCEDURE — 99999 PR PBB SHADOW E&M-EST. PATIENT-LVL III: CPT | Mod: PBBFAC,HCNC,GC, | Performed by: STUDENT IN AN ORGANIZED HEALTH CARE EDUCATION/TRAINING PROGRAM

## 2019-03-21 PROCEDURE — 99214 OFFICE O/P EST MOD 30 MIN: CPT | Mod: HCNC,GC,S$GLB, | Performed by: STUDENT IN AN ORGANIZED HEALTH CARE EDUCATION/TRAINING PROGRAM

## 2019-03-21 PROCEDURE — 1101F PT FALLS ASSESS-DOCD LE1/YR: CPT | Mod: HCNC,CPTII,GC,S$GLB | Performed by: STUDENT IN AN ORGANIZED HEALTH CARE EDUCATION/TRAINING PROGRAM

## 2019-03-21 PROCEDURE — 1101F PR PT FALLS ASSESS DOC 0-1 FALLS W/OUT INJ PAST YR: ICD-10-PCS | Mod: HCNC,CPTII,GC,S$GLB | Performed by: STUDENT IN AN ORGANIZED HEALTH CARE EDUCATION/TRAINING PROGRAM

## 2019-03-21 PROCEDURE — 99999 PR PBB SHADOW E&M-EST. PATIENT-LVL III: ICD-10-PCS | Mod: PBBFAC,HCNC,GC, | Performed by: STUDENT IN AN ORGANIZED HEALTH CARE EDUCATION/TRAINING PROGRAM

## 2019-03-21 NOTE — PROGRESS NOTES
PATIENT: Norm Mendoza  MRN: [id number]  DATE: 3/21/2019      Diagnosis:   1. MALT lymphoma        Chief Complaint: MALT lymphoma    Subjective:     Mr. Norm Mendoza is a 89 y.o. male with a-fib, HTN, who presents to the Hematologic clinic for follow-up of stage 1 MALT lymphoma.    Oncologic History  -The patient was recently discharged on 12/9/2018 following a 3 day admission for a GIB. Patient was on Eliquis at the time and had been complaining of 1 week of black, tarry stools. EGD at the time was significant for nonbleeding gastric ulcers. Biopsies were taken and the patient was instructed to start protonix and hold eliquis at discharge. He requiring IV fluids and blood transfusion during the hospitalization. Biopsy results returned 12/18/2018 concerning for possible MALT-Lymphoma with molecular studies pending.  -PET confirmed stage 1 MALT lymphoma  -Completed Rituximab x4 on 3/7/19    Interval History  The patient endorses fatigue, which is improving. No night sweats, fevers/chills. Planning to lose 10 lbs to help with his HTN, has been on a diet and has purposefully lost 4 lbs.    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    Mixed hyperlipidemia 5/2/2018    Paroxysmal atrial fibrillation 11/30/2018    Skin cancer     x3, had mohs on nose    Squamous cell carcinoma excised 12/5/14    R lower back    Symptomatic anemia 12/6/2018    Urinary tract infection     x4       Past Surgical HIstory:   Past Surgical History:   Procedure Laterality Date    ACHILLES TENDON SURGERY      cataracts      CYSTOSCOPY      bladder tumor    CYSTOSCOPY N/A 3/28/2014    Performed by [facility] at [facility] OR 1ST FLR    DILATION, URETHRA N/A 3/28/2014    Performed by [facility] at [facility] OR 1ST FLR    EGD  (ESOPHAGOGASTRODUODENOSCOPY) N/A 12/7/2018    Performed by Austin Garcia [facility] at [facility] ENDO (2ND FLR)    EXCISION-BLADDER TUMOR-TRANSURETHRAL (TURBT) N/A 3/28/2014    Performed by [facility] at [facility] OR 1ST FLR    EYE SURGERY      SKIN CANCER EXCISION         Family History:   Family History   Problem Relation Age of Onset    Stroke Father     Hypertension Father     Stroke Brother     Anesthesia problems Neg Hx     Malignant hypertension Neg Hx     Hypotension Neg Hx     Malignant hyperthermia Neg Hx     Pseudochol deficiency Neg Hx     Melanoma Neg Hx     Heart attack Neg Hx     Heart disease Neg Hx     Heart failure Neg Hx        Social History:  reports that he quit smoking about 48 years ago. His smoking use included cigarettes. He has a 25.00 pack-year smoking history. he has never used smokeless tobacco. He reports that he drinks about 1.8 oz of alcohol per week. He reports that he does not use drugs.  Very functional 89-year-old. He works 3 days a week for a company called [employer]. Lives alone, has a girlfriend. Has one son who lives here, one in [place], one grand-daughter who lives in [place]. Does pilates once a week. No family history of cancer.    Allergies:  Review of patient's allergies indicates:  No Known Allergies    Medications:  Current Outpatient Medications   Medication Sig Dispense Refill    ferrous sulfate, dried (SLOW FE) 160 mg (50 mg iron) TbSR Take 1 tablet (160 mg total) by mouth once daily. 30 tablet 3    FLUAD 1406-4617, 65 YR UP,,PF, 45 mcg (15 mcg x 3)/0.5 mL Syrg ADM 0.5ML IM UTD  0    guaiFENesin (MUCINEX) 1,200 mg Ta12 1 Tab 1-2x/day for cough/congestion 20 tablet 0    irbesartan (AVAPRO) 150 MG tablet Take 2 tablets (300 mg total) by mouth once daily. 90 tablet 1    metoprolol succinate (TOPROL-XL) 50 MG 24 hr tablet Take 1 tablet (50 mg total) by mouth once daily. To rate control your Atrial Fibrillation 30 tablet 11     pantoprazole (PROTONIX) 40 MG tablet Take 1 tablet (40 mg total) by mouth once daily. Take one pill AM of 12/10 and one pill PM of 12/10 then daily until you see your GI doctor 32 tablet 11    psyllium (METAMUCIL) packet Take 1 packet by mouth once daily.       No current facility-administered medications for this visit.        Review of Systems   Constitutional: Positive for fatigue. Negative for chills, fever and unexpected weight change.   HENT: Negative for nosebleeds and sore throat.    Respiratory: Negative for cough and shortness of breath.    Cardiovascular: Negative for chest pain and leg swelling.   Gastrointestinal: Negative for abdominal pain, blood in stool, nausea and vomiting.   Genitourinary: Negative for dysuria and hematuria.   Musculoskeletal: Negative for arthralgias and back pain.   Skin: Negative for rash.   Neurological: Negative for light-headedness and headaches.   Hematological: Negative for adenopathy. Does not bruise/bleed easily.       ECOG Performance Status: 1   Objective:      Vitals:   Vitals:    03/21/19 1325   BP: (!) 162/92   BP Location: Right arm   Patient Position: Sitting   BP Method: Medium (Automatic)   Pulse: 73   Resp: 20   Temp: 97.6 °F (36.4 °C)   TempSrc: Oral   SpO2: 98%   Weight: 80.2 kg (176 lb 12.9 oz)   Height: 6' (1.829 m)     BMI: Body mass index is 23.98 kg/m².    Physical Exam   Constitutional: He appears well-developed and well-nourished.   HENT:   Head: Normocephalic and atraumatic.   Eyes: EOM are normal. Pupils are equal, round, and reactive to light. No scleral icterus.   Neck: Neck supple.   Cardiovascular: Normal rate and regular rhythm.   Pulmonary/Chest: Effort normal and breath sounds normal. No respiratory distress.   Abdominal: Soft. He exhibits no distension. There is no tenderness.   Musculoskeletal: Normal range of motion. He exhibits no edema.   Lymphadenopathy:     He has no cervical adenopathy.   Neurological: He is alert.   Skin: Skin is  warm and dry.   Psychiatric: He has a normal mood and affect. His behavior is normal.       Laboratory Data:  No visits with results within 1 Week(s) from this visit.   Latest known visit with results is:   Admission on 03/13/2019, Discharged on 03/14/2019   Component Date Value Ref Range Status    WBC 03/13/2019 5.43  3.90 - 12.70 K/uL Final    RBC 03/13/2019 4.46* 4.60 - 6.20 M/uL Final    Hemoglobin 03/13/2019 11.6* 14.0 - 18.0 g/dL Final    Hematocrit 03/13/2019 36.1* 40.0 - 54.0 % Final    MCV 03/13/2019 81* 82 - 98 fL Final    MCH 03/13/2019 26.0* 27.0 - 31.0 pg Final    MCHC 03/13/2019 32.1  32.0 - 36.0 g/dL Final    RDW 03/13/2019 18.4* 11.5 - 14.5 % Final    Platelets 03/13/2019 231  150 - 350 K/uL Final    MPV 03/13/2019 10.0  9.2 - 12.9 fL Final    Immature Granulocytes 03/13/2019 0.6* 0.0 - 0.5 % Final    Gran # (ANC) 03/13/2019 3.6  1.8 - 7.7 K/uL Final    Immature Grans (Abs) 03/13/2019 0.03  0.00 - 0.04 K/uL Final    Comment: Mild elevation in immature granulocytes is non specific and   can be seen in a variety of conditions including stress response,   acute inflammation, trauma and pregnancy. Correlation with other   laboratory and clinical findings is essential.      Lymph # 03/13/2019 1.0  1.0 - 4.8 K/uL Final    Mono # 03/13/2019 0.7  0.3 - 1.0 K/uL Final    Eos # 03/13/2019 0.0  0.0 - 0.5 K/uL Final    Baso # 03/13/2019 0.07  0.00 - 0.20 K/uL Final    nRBC 03/13/2019 0  0 /100 WBC Final    Gran% 03/13/2019 66.6  38.0 - 73.0 % Final    Lymph% 03/13/2019 19.0  18.0 - 48.0 % Final    Mono% 03/13/2019 12.5  4.0 - 15.0 % Final    Eosinophil% 03/13/2019 0.0  0.0 - 8.0 % Final    Basophil% 03/13/2019 1.3  0.0 - 1.9 % Final    Differential Method 03/13/2019 Automated   Final    Sodium 03/13/2019 141  136 - 145 mmol/L Final    Potassium 03/13/2019 4.2  3.5 - 5.1 mmol/L Final    Chloride 03/13/2019 110  95 - 110 mmol/L Final    CO2 03/13/2019 21* 23 - 29 mmol/L Final     Glucose 03/13/2019 100  70 - 110 mg/dL Final    BUN, Bld 03/13/2019 25  10 - 30 mg/dL Final    Creatinine 03/13/2019 1.1  0.5 - 1.4 mg/dL Final    Calcium 03/13/2019 9.6  8.7 - 10.5 mg/dL Final    Total Protein 03/13/2019 6.9  6.0 - 8.4 g/dL Final    Albumin 03/13/2019 3.6  3.5 - 5.2 g/dL Final    Total Bilirubin 03/13/2019 0.7  0.1 - 1.0 mg/dL Final    Comment: For infants and newborns, interpretation of results should be based  on gestational age, weight and in agreement with clinical  observations.  Premature Infant recommended reference ranges:  Up to 24 hours.............<8.0 mg/dL  Up to 48 hours............<12.0 mg/dL  3-5 days..................<15.0 mg/dL  6-29 days.................<15.0 mg/dL      Alkaline Phosphatase 03/13/2019 85  55 - 135 U/L Final    AST 03/13/2019 41* 10 - 40 U/L Final    ALT 03/13/2019 38  10 - 44 U/L Final    Anion Gap 03/13/2019 10  8 - 16 mmol/L Final    eGFR if African American 03/13/2019 >60.0  >60 mL/min/1.73 m^2 Final    eGFR if non African American 03/13/2019 58.0* >60 mL/min/1.73 m^2 Final    Comment: Calculation used to obtain the estimated glomerular filtration  rate (eGFR) is the CKD-EPI equation.       Prothrombin Time 03/13/2019 10.9  9.0 - 12.5 sec Final    INR 03/13/2019 1.1  0.8 - 1.2 Final    Comment: Coumadin Therapy:  2.0 - 3.0 for INR for all indicators except mechanical heart valves  and antiphospholipid syndromes which should use 2.5 - 3.5.      Magnesium 03/13/2019 1.9  1.6 - 2.6 mg/dL Final    Phosphorus 03/13/2019 4.0  2.7 - 4.5 mg/dL Final     FINAL PATHOLOGIC DIAGNOSIS  1. ULCER LESSER CURVATURE, GASTRIC BODY, BIOPSY-:  -ATYPICAL LYMPHOCYTIC INFILTRATION, PENDING MOLECULAR STUDY. SEE COMMENT  -RARE FOCUS OF ACTIVE INFLAMMATION.  -HELICOBACTER IS NEGATIVE.  COMMENT: Fragments of gastric mucosa infiltrated with small to medium-sized atypical lymphocytes.  Lymphoepithelial lesions are also evident.  Flow cytometric analysis of tissue was not  submitted.  Immunohistochemical studies were performed on paraffin block with adequate positive and negative controls . The  small atypical lymphocytes are positive for CD20, low KI -67, and are negative for CD10, CD23, cyclin D1. The  reactive T cells are CD3 positive, CD5 positive, and BCL 2 positive. Immunohistochemical study for Helicobacter is  negative.  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 Molecular study is pending to confirm the diagnosis. A supplemental report will follow.    EXAMINATION:  NM PET CT ROUTINE    CLINICAL HISTORY:  Gastric MALT lymphoma    TECHNIQUE:  13.1 mCi of F18-FDG was administered intravenously in the right antecubital fossa.  After an approximately 60 min distribution time, PET/CT images were acquired from the skull base to the mid thigh. Transmission images were acquired to correct for attenuation using a whole body low-dose CT scan without contrast with the arms positioned above the head. Glycemia at the time of injection was 98 mg/dL.    COMPARISON:  Chest radiograph 12/15/2018, CT abdomen pelvis 09/11/2014    FINDINGS:  Quality of the study: Adequate.    Diffuse thickening of the gastric body with mild increased radiotracer uptake SUV max 4.04.  Given known biopsy preven gastric MALT lymphoma this likely correlates with patient's known primary. No discrete lesion identified however and MALT type lymphoma typically difficult to differentiate for normal GI uptake.    Physiologic uptake of the tracer is present within the brain, salivary glands, myocardium, GI and  tracts.    Incidental CT findings: Mild mucosal thickening of the base of the ethmoid sinuses and at the left maxillary antrum.  Fusiform dilatation of the mid ascending thoracic aorta measuring 4.3 cm.  Abundant aortic annulus and aortic valve atherosclerotic calcification.  Mild three-vessel coronary artery atherosclerotic  calcification.  Moderate right and small left pleural effusion.  Cholelithiasis.  Probable left peripelvic cysts.  Subcentimeter hypodensity in the left kidney too small to characterize, unchanged.  Small hiatal hernia.  Marked prostatomegaly with posterior impression on the bladder.  Diffuse thickening of the anterior bladder wall..  Mild aortic and bilateral iliac atherosclerotic calcification.  Multilevel degenerative changes and lumbar dextroscoliosis.      Impression       Diffuse thickening of the gastric body with mild increased radiotracer uptake. Given known biopsy proven gastric MALT lymphoma this likely correlates with patient's known primary. No discrete lesion identified however and MALT type lymphoma typically difficult to differentiate for normal GI uptake    Moderate right and small left pleural effusion.    Fusiform dilatation of the mid ascending thoracic aorta measuring 4.3 cm.    Unchanged diffuse bladder wall thickening which is nonspecific most likely on the basis of bladder outlet obstruction given marked prostatomegaly, cystitis is not excluded.    Cholelithiasis.    Electronically signed by resident: Josh Mclain  Date: 01/03/2019  Time: 15:31     Assessment:       1. MALT lymphoma           Plan:   1) Limited (Stage 1) MALT lymphoma  -H pylori negative  -Recent h/o GIB while was on DOAC for a-fib  -PET negative for distant disease or lymphadenopathy  -Hepatitis studies negative  -Patient recently seen by cardiology for a-fib, off anti-coagulation, off of beta-blocker given AEs  -Patient completed Rituximab at this time: 375mg x4 weeks on 3/7/19  -Repeat PET in 4 weeks  -Repeat EGD in next month  -Pending response will consider maintenance Rituximab Q2-3 months for up to 8-12 months. Lack of response, will consider RT.    Follow-up: 6 weeks with CBC    The following was staffed and discussed with supervising physician Dr. Perez.    Kelly Beckwith MD  Hematology/Oncology fellow

## 2019-03-21 NOTE — Clinical Note
PET on 4/18/19EGD on 4/18 or 4/19 if possible (I sent a message to GI about this)F/u MD visit with CBC prior on 4/25Thank you

## 2019-03-22 ENCOUNTER — LAB VISIT (OUTPATIENT)
Dept: LAB | Facility: HOSPITAL | Age: 84
End: 2019-03-22
Attending: INTERNAL MEDICINE
Payer: MEDICARE

## 2019-03-22 ENCOUNTER — TELEPHONE (OUTPATIENT)
Dept: ENDOSCOPY | Facility: HOSPITAL | Age: 84
End: 2019-03-22

## 2019-03-22 ENCOUNTER — OFFICE VISIT (OUTPATIENT)
Dept: INTERNAL MEDICINE | Facility: CLINIC | Age: 84
End: 2019-03-22
Payer: MEDICARE

## 2019-03-22 VITALS
WEIGHT: 174 LBS | OXYGEN SATURATION: 98 % | SYSTOLIC BLOOD PRESSURE: 140 MMHG | HEART RATE: 78 BPM | HEIGHT: 72 IN | BODY MASS INDEX: 23.57 KG/M2 | DIASTOLIC BLOOD PRESSURE: 80 MMHG

## 2019-03-22 DIAGNOSIS — I48.0 PAROXYSMAL ATRIAL FIBRILLATION: ICD-10-CM

## 2019-03-22 DIAGNOSIS — R53.83 FATIGUE, UNSPECIFIED TYPE: ICD-10-CM

## 2019-03-22 DIAGNOSIS — C88.4 MALT LYMPHOMA: ICD-10-CM

## 2019-03-22 DIAGNOSIS — K27.9 PUD (PEPTIC ULCER DISEASE): ICD-10-CM

## 2019-03-22 DIAGNOSIS — D64.9 ANEMIA, UNSPECIFIED TYPE: ICD-10-CM

## 2019-03-22 DIAGNOSIS — I10 ESSENTIAL HYPERTENSION: Primary | ICD-10-CM

## 2019-03-22 DIAGNOSIS — E55.9 VITAMIN D DEFICIENCY: ICD-10-CM

## 2019-03-22 DIAGNOSIS — E46 PROTEIN-CALORIE MALNUTRITION, UNSPECIFIED SEVERITY: ICD-10-CM

## 2019-03-22 LAB
25(OH)D3+25(OH)D2 SERPL-MCNC: 31 NG/ML
BASOPHILS # BLD AUTO: 0.05 K/UL
BASOPHILS NFR BLD: 0.9 %
DIFFERENTIAL METHOD: ABNORMAL
EOSINOPHIL # BLD AUTO: 0.2 K/UL
EOSINOPHIL NFR BLD: 3.7 %
ERYTHROCYTE [DISTWIDTH] IN BLOOD BY AUTOMATED COUNT: 19.3 %
FERRITIN SERPL-MCNC: 19 NG/ML
HCT VFR BLD AUTO: 40.3 %
HGB BLD-MCNC: 12.2 G/DL
IMM GRANULOCYTES # BLD AUTO: 0.02 K/UL
IMM GRANULOCYTES NFR BLD AUTO: 0.4 %
LYMPHOCYTES # BLD AUTO: 1 K/UL
LYMPHOCYTES NFR BLD: 19.3 %
MCH RBC QN AUTO: 25.6 PG
MCHC RBC AUTO-ENTMCNC: 30.3 G/DL
MCV RBC AUTO: 85 FL
MONOCYTES # BLD AUTO: 0.6 K/UL
MONOCYTES NFR BLD: 11.3 %
NEUTROPHILS # BLD AUTO: 3.5 K/UL
NEUTROPHILS NFR BLD: 64.4 %
NRBC BLD-RTO: 0 /100 WBC
PLATELET # BLD AUTO: 260 K/UL
PMV BLD AUTO: 10.1 FL
RBC # BLD AUTO: 4.76 M/UL
VIT B12 SERPL-MCNC: 454 PG/ML
WBC # BLD AUTO: 5.38 K/UL

## 2019-03-22 PROCEDURE — 36415 COLL VENOUS BLD VENIPUNCTURE: CPT | Mod: HCNC,PO

## 2019-03-22 PROCEDURE — 99214 OFFICE O/P EST MOD 30 MIN: CPT | Mod: HCNC,S$GLB,, | Performed by: INTERNAL MEDICINE

## 2019-03-22 PROCEDURE — 99214 PR OFFICE/OUTPT VISIT, EST, LEVL IV, 30-39 MIN: ICD-10-PCS | Mod: HCNC,S$GLB,, | Performed by: INTERNAL MEDICINE

## 2019-03-22 PROCEDURE — 82306 VITAMIN D 25 HYDROXY: CPT | Mod: HCNC

## 2019-03-22 PROCEDURE — 93010 EKG 12-LEAD: ICD-10-PCS | Mod: HCNC,S$GLB,, | Performed by: INTERNAL MEDICINE

## 2019-03-22 PROCEDURE — 99499 RISK ADDL DX/OHS AUDIT: ICD-10-PCS | Mod: HCNC,S$GLB,, | Performed by: INTERNAL MEDICINE

## 2019-03-22 PROCEDURE — 82607 VITAMIN B-12: CPT | Mod: HCNC

## 2019-03-22 PROCEDURE — 99999 PR PBB SHADOW E&M-EST. PATIENT-LVL V: ICD-10-PCS | Mod: PBBFAC,HCNC,, | Performed by: INTERNAL MEDICINE

## 2019-03-22 PROCEDURE — 93005 EKG 12-LEAD: ICD-10-PCS | Mod: HCNC,S$GLB,, | Performed by: INTERNAL MEDICINE

## 2019-03-22 PROCEDURE — 1101F PT FALLS ASSESS-DOCD LE1/YR: CPT | Mod: HCNC,CPTII,S$GLB, | Performed by: INTERNAL MEDICINE

## 2019-03-22 PROCEDURE — 99499 UNLISTED E&M SERVICE: CPT | Mod: HCNC,S$GLB,, | Performed by: INTERNAL MEDICINE

## 2019-03-22 PROCEDURE — 1101F PR PT FALLS ASSESS DOC 0-1 FALLS W/OUT INJ PAST YR: ICD-10-PCS | Mod: HCNC,CPTII,S$GLB, | Performed by: INTERNAL MEDICINE

## 2019-03-22 PROCEDURE — 85025 COMPLETE CBC W/AUTO DIFF WBC: CPT | Mod: HCNC

## 2019-03-22 PROCEDURE — 99999 PR PBB SHADOW E&M-EST. PATIENT-LVL V: CPT | Mod: PBBFAC,HCNC,, | Performed by: INTERNAL MEDICINE

## 2019-03-22 PROCEDURE — 93010 ELECTROCARDIOGRAM REPORT: CPT | Mod: HCNC,S$GLB,, | Performed by: INTERNAL MEDICINE

## 2019-03-22 PROCEDURE — 82728 ASSAY OF FERRITIN: CPT | Mod: HCNC

## 2019-03-22 PROCEDURE — 93005 ELECTROCARDIOGRAM TRACING: CPT | Mod: HCNC,S$GLB,, | Performed by: INTERNAL MEDICINE

## 2019-03-22 RX ORDER — IRBESARTAN 150 MG/1
TABLET ORAL
Qty: 60 TABLET | Refills: 0
Start: 2019-03-22 | End: 2019-04-23 | Stop reason: SDUPTHER

## 2019-03-22 RX ORDER — PANTOPRAZOLE SODIUM 40 MG/1
40 TABLET, DELAYED RELEASE ORAL DAILY
Qty: 32 TABLET | Refills: 6 | Status: SHIPPED | OUTPATIENT
Start: 2019-03-22 | End: 2019-07-25

## 2019-03-22 RX ORDER — METOPROLOL SUCCINATE 50 MG/1
TABLET, EXTENDED RELEASE ORAL
Qty: 30 TABLET | Refills: 0
Start: 2019-03-22 | End: 2019-05-01

## 2019-03-22 NOTE — PATIENT INSTRUCTIONS
Medications for Blood Pressure:  #1-Metoprol XL 25mg at 1 tab 2x/day  #2-Irbestartan 150mg at 1 tab 2x/day

## 2019-03-23 NOTE — PROGRESS NOTES
Mr. Mendoza is a 89-year-old gentleman, known to myself, who presented to   clinic yesterday, March 22nd.  His note is being dictated today, March 23rd.    CHIEF COMPLAINT:  Followup of hypertension, status post Emergency Room   evaluation.    HISTORY OF PRESENT ILLNESS:  Mr. Mendoza presents, status post having been seen   in the Emergency Room on March 13th with elevated blood pressure.  He was   referred there per Digital Hypertension.  He did have full lab, chest x-ray and   EKG performed, which were unremarkable with the exception of his baseline atrial   fibrillation, which was rate controlled.  He notes that he has been following   in Digital Hypertension Clinic and recently had decreased his irbesartan 150 mg   tab to once a day.  He notes that just a couple of days before going to the   Emergency Room with elevated pressure, he was told to increase this back to   twice a day.  He is presently taking his metoprolol XL 25 mg b.i.d., which he   notes he has at home.  When he runs out of this, he plans to take half of the 50   mg tab b.i.d.  He has been seen in Hematology/Oncology and is presently   scheduled for an upper endoscopy and a PET scan.  He was treated with a course   of rituximab on March 7th.  He notes that he is soon to run out of his Protonix,   would like a refill on this.  He notes that he continues to work, is working   three days a week.  Notes that in the evening hours he feels tired and weak, not   as strong as he used to.  He does have two alcoholic beverages in the evening,   which I have recommended he decrease to discontinue for now as this can   aggravate his peptic ulcer disease.  He has no chest pain, no shortness of   breath, feels much better.  He has no swelling or edema in the legs.    PAST MEDICAL, SURGICAL, SOCIAL HISTORY:  Please see as thoroughly stated in EPIC   chart, which has been reviewed.    PHYSICAL EXAMINATION:  VITAL SIGNS:  Weight 174 pounds, blood pressure  initially per nurse check 158/82   with pulse 78, recheck blood pressure per MD is 134/84 and also third check is   140/80.  GENERAL:  The patient looks well, appears comfortable.  HEENT:  Grossly unremarkable.  NECK:  Supple, negative for masses, negative for JVD.  LUNGS:  Clear bilaterally.  HEART:  Shows what seems to be an irregular rhythm.  We will order EKG to   confirm and check cardiac rate.  ABDOMEN:  Soft, nontender.  EXTREMITIES:  Negative edema.    ASSESSMENT AND PLAN:  1.  Essential hypertension, uncontrolled in part secondary to the patient's   self-management of medications.  A. I have encouraged the patient to continue on medications including irbesartan   150 mg one p.o. b.i.d. and Toprol-XL 25 mg b.i.d. without making   self-adjustments without calling.  B. Continue to follow Digital Hypertension for now.  C. Return to clinic in three to four months for followup.  2.  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  A. He will follow along as per Hematology/Oncology.  3.  Chronic atrial fibrillation with anticoagulation contraindicated secondary   to peptic ulcer disease and history of multiple mucosa-associated lymphoid   tissue lymphoma.  A. We will check EKG to ensure rate controlled.  4.  Peptic ulcer disease.  A. I have given a refill on Protonix 40 mg daily.  B. Endoscopy followup as is scheduled.  5.  Complaint of fatigue, probably multifactorial, rule out any specific organic   etiology.  A. We will order chemistry, TSH, CBC with phone review.  6.  EKG performed in clinic shows atrial fibrillation with rate controlled at   67.  A. Phone review after lab.  B. Return to clinic by four months or see sooner if needed.      FMS/HN  dd: 03/23/2019 08:52:59 (CDT)  td: 03/23/2019 11:22:55 (CDT)  Doc ID   #[id number]  Job ID #[id number]    CC:     This office note has been dictated.

## 2019-03-24 PROBLEM — K27.9 PUD (PEPTIC ULCER DISEASE): Status: ACTIVE | Noted: 2019-03-24

## 2019-03-24 PROBLEM — I77.819 ECTATIC AORTA: Status: RESOLVED | Noted: 2018-06-07 | Resolved: 2019-03-24

## 2019-03-25 ENCOUNTER — TELEPHONE (OUTPATIENT)
Dept: INTERNAL MEDICINE | Facility: CLINIC | Age: 84
End: 2019-03-25

## 2019-03-25 NOTE — TELEPHONE ENCOUNTER
Spoke with Mr Mendoza ie his Labs(3/22)-showed Vitamin D/Vit B-12/CBC to all be ok; Ferritin was low at 19. EKG- showed atrial fib/rate controlled-no acute changes. I've recommended he start the Niferex 150mg daily-he agrees.

## 2019-04-02 ENCOUNTER — PATIENT OUTREACH (OUTPATIENT)
Dept: OTHER | Facility: OTHER | Age: 84
End: 2019-04-02

## 2019-04-02 NOTE — PROGRESS NOTES
Last 5 Patient Entered Readings                                      Current 30 Day Average: 147/95     Recent Readings 4/1/2019 4/1/2019 3/30/2019 3/30/2019 3/30/2019    SBP (mmHg) 126 114 130 139 142    DBP (mmHg) 85 66 73 91 105    Pulse 77 68 68 100 111        Hypertension Medications     irbesartan (AVAPRO) 150 MG tablet 1 tab 2x/day    metoprolol succinate (TOPROL-XL) 50 MG 24 hr tablet 1/2 tab 2x/day     Called patient for BP follow up. Encounter was brief. Patient confirmed that he is taking metoprolol 25 mg BID and irbesartan 150 mg BID as instructed by PCP at last visit. He is doing well with no complaints and says this seems to be helping with his BP.     1) BP exceeds goal of <130/<80. Continue current BP medications.   2) Patient knows to contact me with any non-urgent clinical changes or concerns. Go to ED or call [facility] for emergencies.   3) Will defer lifestyle counseling to digital medicine health .   4) Will continue to follow up.     Maggie Lord, Pharm.D.   Digital Medicine Clinical Pharmacist  [phone]

## 2019-04-04 ENCOUNTER — PATIENT OUTREACH (OUTPATIENT)
Dept: OTHER | Facility: OTHER | Age: 84
End: 2019-04-04

## 2019-04-04 NOTE — PROGRESS NOTES
Last 5 Patient Entered Readings                                      Current 30 Day Average: 145/94     Recent Readings 4/4/2019 4/3/2019 4/3/2019 4/2/2019 4/1/2019    SBP (mmHg) 121 111 107 136 126    DBP (mmHg) 87 62 70 81 85    Pulse 73 78 72 72 77        Digital Medicine: Health  Follow Up    Lifestyle Modifications:    1.Dietary Modifications (Sodium intake <2,000mg/day, food labels, dining out): Patient attributes improvement in BP readings to losing 8 lbs by switching to a low-carb diet and eliminating added sugar.     2.Physical Activity: Concerned about increasing physical activity with medical conditions. Intent to start exercise routine or piliates classes soon.     3.Medication Therapy: Patient has been compliant with the medication regimen.    4.Patient has the following medication side effects/concerns: None    Follow up with Mr. Norm Mendoza completed. No further questions or concerns. Will continue to follow up to achieve health goals.

## 2019-04-07 ENCOUNTER — OFFICE VISIT (OUTPATIENT)
Dept: URGENT CARE | Facility: CLINIC | Age: 84
End: 2019-04-07
Payer: MEDICARE

## 2019-04-07 VITALS
DIASTOLIC BLOOD PRESSURE: 70 MMHG | SYSTOLIC BLOOD PRESSURE: 117 MMHG | HEART RATE: 74 BPM | OXYGEN SATURATION: 95 % | TEMPERATURE: 97 F | RESPIRATION RATE: 18 BRPM | WEIGHT: 174 LBS | BODY MASS INDEX: 23.57 KG/M2 | HEIGHT: 72 IN

## 2019-04-07 DIAGNOSIS — R05.9 COUGH: ICD-10-CM

## 2019-04-07 DIAGNOSIS — J02.9 SORE THROAT: ICD-10-CM

## 2019-04-07 DIAGNOSIS — J06.9 UPPER RESPIRATORY TRACT INFECTION, UNSPECIFIED TYPE: Primary | ICD-10-CM

## 2019-04-07 LAB
CTP QC/QA: YES
CTP QC/QA: YES
FLUAV AG NPH QL: NEGATIVE
FLUBV AG NPH QL: NEGATIVE
S PYO RRNA THROAT QL PROBE: NEGATIVE

## 2019-04-07 PROCEDURE — 1101F PT FALLS ASSESS-DOCD LE1/YR: CPT | Mod: CPTII,S$GLB,, | Performed by: FAMILY MEDICINE

## 2019-04-07 PROCEDURE — 1101F PR PT FALLS ASSESS DOC 0-1 FALLS W/OUT INJ PAST YR: ICD-10-PCS | Mod: CPTII,S$GLB,, | Performed by: FAMILY MEDICINE

## 2019-04-07 PROCEDURE — 99213 PR OFFICE/OUTPT VISIT, EST, LEVL III, 20-29 MIN: ICD-10-PCS | Mod: S$GLB,,, | Performed by: FAMILY MEDICINE

## 2019-04-07 PROCEDURE — 87880 POCT RAPID STREP A: ICD-10-PCS | Mod: QW,S$GLB,, | Performed by: FAMILY MEDICINE

## 2019-04-07 PROCEDURE — 87804 POCT INFLUENZA A/B: ICD-10-PCS | Mod: QW,S$GLB,, | Performed by: FAMILY MEDICINE

## 2019-04-07 PROCEDURE — 87804 INFLUENZA ASSAY W/OPTIC: CPT | Mod: QW,S$GLB,, | Performed by: FAMILY MEDICINE

## 2019-04-07 PROCEDURE — 87880 STREP A ASSAY W/OPTIC: CPT | Mod: QW,S$GLB,, | Performed by: FAMILY MEDICINE

## 2019-04-07 PROCEDURE — 99213 OFFICE O/P EST LOW 20 MIN: CPT | Mod: S$GLB,,, | Performed by: FAMILY MEDICINE

## 2019-04-07 RX ORDER — AMOXICILLIN AND CLAVULANATE POTASSIUM 875; 125 MG/1; MG/1
1 TABLET, FILM COATED ORAL EVERY 12 HOURS
Qty: 14 TABLET | Refills: 0 | Status: SHIPPED | OUTPATIENT
Start: 2019-04-07 | End: 2019-04-14

## 2019-04-07 NOTE — PROGRESS NOTES
Subjective:       Patient ID: Norm Mendoza is a 89 y.o. male.    Vitals:  height is 6' (1.829 m) and weight is 78.9 kg (174 lb). His oral temperature is 97.4 °F (36.3 °C). His blood pressure is 117/70 and his pulse is 74. His respiration is 18 and oxygen saturation is 95%.     Chief Complaint: Cough    Patient states hes been having a cough and sore throat for 3 days. No fever. Not taking anything otc.  Visit his friend in the hospital Thursday and then the sore throat has been the next day.  Cough has been dry.  He is having an EGD done on Friday and was to be better by then to the procedure does not have to be postponed.    Cough   This is a new problem. The current episode started in the past 7 days. The problem has been gradually worsening. The problem occurs constantly. The cough is productive of sputum. Associated symptoms include nasal congestion and a sore throat. Pertinent negatives include no chills, ear pain, eye redness, fever, hemoptysis, myalgias, rash, shortness of breath or wheezing. He has tried OTC cough suppressant for the symptoms. The treatment provided mild relief. There is no history of asthma, bronchitis, COPD, emphysema or pneumonia.       Constitution: Positive for fatigue. Negative for appetite change, chills, sweating, fever and generalized weakness.   HENT: Positive for congestion and sore throat. Negative for ear pain, sinus pain, sinus pressure, trouble swallowing and voice change.    Neck: Negative for painful lymph nodes.   Eyes: Negative for eye redness.   Respiratory: Positive for cough and sputum production. Negative for chest tightness, bloody sputum, COPD, shortness of breath, stridor, wheezing and asthma.    Gastrointestinal: Negative for nausea and vomiting.   Musculoskeletal: Negative for muscle ache.   Skin: Negative for rash.   Allergic/Immunologic: Negative for seasonal allergies and asthma.   Hematologic/Lymphatic: Negative for swollen lymph nodes.        Objective:      Physical Exam   Constitutional: He is oriented to person, place, and time. He appears well-developed and well-nourished. He is cooperative.  Non-toxic appearance. He does not appear ill. No distress.   HENT:   Head: Normocephalic and atraumatic.   Right Ear: Hearing, tympanic membrane, external ear and ear canal normal.   Left Ear: Hearing, tympanic membrane, external ear and ear canal normal.   Nose: Mucosal edema present. No rhinorrhea or nasal deformity. No epistaxis. Right sinus exhibits no maxillary sinus tenderness and no frontal sinus tenderness. Left sinus exhibits no maxillary sinus tenderness and no frontal sinus tenderness.   Mouth/Throat: Uvula is midline and mucous membranes are normal. No trismus in the jaw. Normal dentition. No uvula swelling. Posterior oropharyngeal erythema present.   PND present   Eyes: Conjunctivae and lids are normal. No scleral icterus.   Sclera clear bilat   Neck: Trachea normal, full passive range of motion without pain and phonation normal. Neck supple.   Cardiovascular: Normal rate, regular rhythm, normal heart sounds, intact distal pulses and normal pulses.   Pulmonary/Chest: Effort normal and breath sounds normal. No accessory muscle usage. No tachypnea. No respiratory distress. He has no decreased breath sounds. He has no wheezes. He has no rhonchi. He has no rales.   Abdominal: Soft. Normal appearance and bowel sounds are normal. He exhibits no distension. There is no tenderness.   Musculoskeletal: Normal range of motion. He exhibits no edema or deformity.   Lymphadenopathy:     He has no cervical adenopathy.   Neurological: He is alert and oriented to person, place, and time. He exhibits normal muscle tone. Coordination normal.   Skin: Skin is warm, dry and intact. He is not diaphoretic. No pallor.   Psychiatric: He has a normal mood and affect. His speech is normal and behavior is normal. Judgment and thought content normal. Cognition and memory are  normal.   Nursing note and vitals reviewed.      Results for orders placed or performed in visit on 04/07/19   POCT Influenza A/B   Result Value Ref Range    Rapid Influenza A Ag Negative Negative    Rapid Influenza B Ag Negative Negative     Acceptable Yes    POCT rapid strep A   Result Value Ref Range    Rapid Strep A Screen Negative Negative     Acceptable Yes        Assessment:       1. Upper respiratory tract infection, unspecified type    2. Cough    3. Sore throat        Plan:         Upper respiratory tract infection, unspecified type    Cough  -     POCT Influenza A/B    Sore throat  -     POCT rapid strep A    Other orders  -     amoxicillin-clavulanate 875-125mg (AUGMENTIN) 875-125 mg per tablet; Take 1 tablet by mouth every 12 (twelve) hours. for 7 days  Dispense: 14 tablet; Refill: 0          Patient Instructions     PLEASE READ YOUR DISCHARGE INSTRUCTIONS ENTIRELY AS IT CONTAINS IMPORTANT INFORMATION.      Please drink plenty of fluids.    Please get plenty of rest.    Please return here or go to the Emergency Department for any concerns or worsening of condition.      If you do have Hypertension or palpitations, it is safe to take Coricidin HBP for relief of sinus symptoms.    If not allergic, please take over the counter Tylenol (Acetaminophen) and/or Motrin (Ibuprofen) as directed for control of pain and/or fever.  Please follow up with your primary care doctor or specialist as needed.    Sore throat recommendations: Warm fluids, warm salt water gargles, throat lozenges, tea, honey, soup, rest, hydration.    Use over the counter flonase: one spray each nostril twice daily OR two sprays each nostril once daily.     If you  smoke, please stop smoking.      Please return or see your primary care doctor if you develop new or worsening symptoms.     Please arrange follow up with your primary medical clinic as soon as possible. You must understand that you've received an  Urgent Care treatment only and that you may be released before all of your medical problems are known or treated. You, the patient, will arrange for follow up as instructed. If your symptoms worsen or fail to improve you should go to the Emergency Room.    Self-Care for Sore Throats    Sore throats happen for many reasons, such as colds, allergies, and infections caused by viruses or bacteria. In any case, your throat becomes red and sore. Your goal for self-care is to reduce your discomfort while giving your throat a chance to heal.  Moisten and soothe your throat  Tips include the following:  · Try a sip of water first thing after waking up.  · Keep your throat moist by drinking 6 or more glasses of clear liquids every day.  · Run a cool-air humidifier in your room overnight.  · Avoid cigarette smoke.   · Suck on throat lozenges, cough drops, hard candy, ice chips, or frozen fruit-juice bars. Use the sugar-free versions if your diet or medical condition requires them.  Gargle to ease irritation  Gargling every hour or 2 can ease irritation. Try gargling with 1 of these solutions:  · 1/4 teaspoon of salt in 1/2 cup of warm water  · An over-the-counter anesthetic gargle  Use medicine for more relief  Over-the-counter medicine can reduce sore throat symptoms. Ask your pharmacist if you have questions about which medicine to use:  · Ease pain with anesthetic sprays. Aspirin or an aspirin substitute also helps. Remember, never give aspirin to anyone 18 or younger, or if you are already taking blood thinners.   · For sore throats caused by allergies, try antihistamines to block the allergic reaction.  · Remember: unless a sore throat is caused by a bacterial infection, antibiotics wont help you.  Prevent future sore throats  Prevention tips include the following:  · Stop smoking or reduce contact with secondhand smoke. Smoke irritates the tender throat lining.  · Limit contact with pets and with allergy-causing  substances, such as pollen and mold.  · When youre around someone with a sore throat or cold, wash your hands often to keep viruses or bacteria from spreading.  · Dont strain your vocal cords.  Call your healthcare provider  Contact your healthcare provider if you have:  · A temperature over 101°F (38.3°C)  · White spots on the throat  · Great difficulty swallowing  · Trouble breathing  · A skin rash  · Recent exposure to someone else with strep bacteria  · Severe hoarseness and swollen glands in the neck or jaw   Date Last Reviewed: 8/1/2016  © 5020-6119 [employer]. [address] [place] [address]. All rights reserved. This information is not intended as a substitute for professional medical care. Always follow your healthcare professional's instructions.

## 2019-04-07 NOTE — PATIENT INSTRUCTIONS
PLEASE READ YOUR DISCHARGE INSTRUCTIONS ENTIRELY AS IT CONTAINS IMPORTANT INFORMATION.      Please drink plenty of fluids.    Please get plenty of rest.    Please return here or go to the Emergency Department for any concerns or worsening of condition.      If you do have Hypertension or palpitations, it is safe to take Coricidin HBP for relief of sinus symptoms.    If not allergic, please take over the counter Tylenol (Acetaminophen) and/or Motrin (Ibuprofen) as directed for control of pain and/or fever.  Please follow up with your primary care doctor or specialist as needed.    Sore throat recommendations: Warm fluids, warm salt water gargles, throat lozenges, tea, honey, soup, rest, hydration.    Use over the counter flonase: one spray each nostril twice daily OR two sprays each nostril once daily.     If you  smoke, please stop smoking.      Please return or see your primary care doctor if you develop new or worsening symptoms.     Please arrange follow up with your primary medical clinic as soon as possible. You must understand that you've received an Urgent Care treatment only and that you may be released before all of your medical problems are known or treated. You, the patient, will arrange for follow up as instructed. If your symptoms worsen or fail to improve you should go to the Emergency Room.    Self-Care for Sore Throats    Sore throats happen for many reasons, such as colds, allergies, and infections caused by viruses or bacteria. In any case, your throat becomes red and sore. Your goal for self-care is to reduce your discomfort while giving your throat a chance to heal.  Moisten and soothe your throat  Tips include the following:  · Try a sip of water first thing after waking up.  · Keep your throat moist by drinking 6 or more glasses of clear liquids every day.  · Run a cool-air humidifier in your room overnight.  · Avoid cigarette smoke.   · Suck on throat lozenges, cough drops, hard candy, ice  chips, or frozen fruit-juice bars. Use the sugar-free versions if your diet or medical condition requires them.  Gargle to ease irritation  Gargling every hour or 2 can ease irritation. Try gargling with 1 of these solutions:  · 1/4 teaspoon of salt in 1/2 cup of warm water  · An over-the-counter anesthetic gargle  Use medicine for more relief  Over-the-counter medicine can reduce sore throat symptoms. Ask your pharmacist if you have questions about which medicine to use:  · Ease pain with anesthetic sprays. Aspirin or an aspirin substitute also helps. Remember, never give aspirin to anyone 18 or younger, or if you are already taking blood thinners.   · For sore throats caused by allergies, try antihistamines to block the allergic reaction.  · Remember: unless a sore throat is caused by a bacterial infection, antibiotics wont help you.  Prevent future sore throats  Prevention tips include the following:  · Stop smoking or reduce contact with secondhand smoke. Smoke irritates the tender throat lining.  · Limit contact with pets and with allergy-causing substances, such as pollen and mold.  · When youre around someone with a sore throat or cold, wash your hands often to keep viruses or bacteria from spreading.  · Dont strain your vocal cords.  Call your healthcare provider  Contact your healthcare provider if you have:  · A temperature over 101°F (38.3°C)  · White spots on the throat  · Great difficulty swallowing  · Trouble breathing  · A skin rash  · Recent exposure to someone else with strep bacteria  · Severe hoarseness and swollen glands in the neck or jaw   Date Last Reviewed: 8/1/2016  © 3939-6914 [employer]. [address] [place] [address], [place] [address]. All rights reserved. This information is not intended as a substitute for professional medical care. Always follow your healthcare professional's instructions.

## 2019-04-12 ENCOUNTER — ANESTHESIA (OUTPATIENT)
Dept: ENDOSCOPY | Facility: HOSPITAL | Age: 84
End: 2019-04-12
Payer: MEDICARE

## 2019-04-12 ENCOUNTER — ANESTHESIA EVENT (OUTPATIENT)
Dept: ENDOSCOPY | Facility: HOSPITAL | Age: 84
End: 2019-04-12
Payer: MEDICARE

## 2019-04-12 ENCOUNTER — HOSPITAL ENCOUNTER (OUTPATIENT)
Facility: HOSPITAL | Age: 84
Discharge: HOME | End: 2019-04-12
Attending: INTERNAL MEDICINE | Admitting: INTERNAL MEDICINE
Payer: MEDICARE

## 2019-04-12 VITALS
DIASTOLIC BLOOD PRESSURE: 68 MMHG | SYSTOLIC BLOOD PRESSURE: 127 MMHG | OXYGEN SATURATION: 96 % | BODY MASS INDEX: 22.35 KG/M2 | RESPIRATION RATE: 18 BRPM | HEART RATE: 67 BPM | TEMPERATURE: 98 F | WEIGHT: 165 LBS | HEIGHT: 72 IN

## 2019-04-12 DIAGNOSIS — C88.4 MALT LYMPHOMA: Primary | ICD-10-CM

## 2019-04-12 PROCEDURE — 88305 TISSUE EXAM BY PATHOLOGIST: CPT | Mod: 26,HCNC,, | Performed by: PATHOLOGY

## 2019-04-12 PROCEDURE — E9220 PRA ENDO ANESTHESIA: ICD-10-PCS | Mod: HCNC,,, | Performed by: NURSE ANESTHETIST, CERTIFIED REGISTERED

## 2019-04-12 PROCEDURE — 43239 EGD BIOPSY SINGLE/MULTIPLE: CPT | Mod: HCNC | Performed by: INTERNAL MEDICINE

## 2019-04-12 PROCEDURE — E9220 PRA ENDO ANESTHESIA: HCPCS | Mod: HCNC,,, | Performed by: NURSE ANESTHETIST, CERTIFIED REGISTERED

## 2019-04-12 PROCEDURE — 88341 TISSUE SPECIMEN TO PATHOLOGY - SURGERY: ICD-10-PCS | Mod: 26,HCNC,, | Performed by: PATHOLOGY

## 2019-04-12 PROCEDURE — 43239 PR EGD, FLEX, W/BIOPSY, SGL/MULTI: ICD-10-PCS | Mod: HCNC,GC,, | Performed by: INTERNAL MEDICINE

## 2019-04-12 PROCEDURE — 63600175 PHARM REV CODE 636 W HCPCS: Mod: HCNC | Performed by: NURSE ANESTHETIST, CERTIFIED REGISTERED

## 2019-04-12 PROCEDURE — 37000008 HC ANESTHESIA 1ST 15 MINUTES: Mod: HCNC | Performed by: INTERNAL MEDICINE

## 2019-04-12 PROCEDURE — 88342 TISSUE SPECIMEN TO PATHOLOGY - SURGERY: ICD-10-PCS | Mod: 26,HCNC,, | Performed by: PATHOLOGY

## 2019-04-12 PROCEDURE — 25000003 PHARM REV CODE 250: Mod: HCNC | Performed by: INTERNAL MEDICINE

## 2019-04-12 PROCEDURE — 88342 IMHCHEM/IMCYTCHM 1ST ANTB: CPT | Mod: 26,HCNC,, | Performed by: PATHOLOGY

## 2019-04-12 PROCEDURE — 88305 TISSUE SPECIMEN TO PATHOLOGY - SURGERY: ICD-10-PCS | Mod: 26,HCNC,, | Performed by: PATHOLOGY

## 2019-04-12 PROCEDURE — 37000009 HC ANESTHESIA EA ADD 15 MINS: Mod: HCNC | Performed by: INTERNAL MEDICINE

## 2019-04-12 PROCEDURE — 43239 EGD BIOPSY SINGLE/MULTIPLE: CPT | Mod: HCNC,GC,, | Performed by: INTERNAL MEDICINE

## 2019-04-12 PROCEDURE — 88305 TISSUE EXAM BY PATHOLOGIST: CPT | Mod: HCNC,59 | Performed by: PATHOLOGY

## 2019-04-12 PROCEDURE — 88341 IMHCHEM/IMCYTCHM EA ADD ANTB: CPT | Mod: 26,HCNC,, | Performed by: PATHOLOGY

## 2019-04-12 PROCEDURE — 27201012 HC FORCEPS, HOT/COLD, DISP: Mod: HCNC | Performed by: INTERNAL MEDICINE

## 2019-04-12 RX ORDER — SODIUM CHLORIDE 0.9 % (FLUSH) 0.9 %
10 SYRINGE (ML) INJECTION
Status: DISCONTINUED | OUTPATIENT
Start: 2019-04-12 | End: 2019-04-12 | Stop reason: HOSPADM

## 2019-04-12 RX ORDER — LIDOCAINE HCL/PF 100 MG/5ML
SYRINGE (ML) INTRAVENOUS
Status: DISCONTINUED | OUTPATIENT
Start: 2019-04-12 | End: 2019-04-12

## 2019-04-12 RX ORDER — PROPOFOL 10 MG/ML
VIAL (ML) INTRAVENOUS CONTINUOUS PRN
Status: DISCONTINUED | OUTPATIENT
Start: 2019-04-12 | End: 2019-04-12

## 2019-04-12 RX ORDER — PROPOFOL 10 MG/ML
VIAL (ML) INTRAVENOUS
Status: DISCONTINUED | OUTPATIENT
Start: 2019-04-12 | End: 2019-04-12

## 2019-04-12 RX ORDER — SODIUM CHLORIDE 9 MG/ML
INJECTION, SOLUTION INTRAVENOUS CONTINUOUS
Status: DISCONTINUED | OUTPATIENT
Start: 2019-04-12 | End: 2019-04-12 | Stop reason: HOSPADM

## 2019-04-12 RX ADMIN — LIDOCAINE HYDROCHLORIDE 50 MG: 20 INJECTION, SOLUTION INTRAVENOUS at 11:04

## 2019-04-12 RX ADMIN — PROPOFOL 10 MG: 10 INJECTION, EMULSION INTRAVENOUS at 11:04

## 2019-04-12 RX ADMIN — SODIUM CHLORIDE: 0.9 INJECTION, SOLUTION INTRAVENOUS at 10:04

## 2019-04-12 RX ADMIN — PROPOFOL 60 MG: 10 INJECTION, EMULSION INTRAVENOUS at 11:04

## 2019-04-12 RX ADMIN — PROPOFOL 100 MCG/KG/MIN: 10 INJECTION, EMULSION INTRAVENOUS at 11:04

## 2019-04-12 NOTE — [employer] PATIENT INSTRUCTIONS
Discharge Summary/Instructions after an Endoscopic Procedure  Patient Name: Norm Mendoza  Patient MRN: [id number]  Patient YOB: 1926 Friday, April 12, 2019  Austin Garcia MD  RESTRICTIONS:  During your procedure today, you received medications for sedation.  These   medications may affect your judgment, balance and coordination.  Therefore,   for 24 hours, you have the following restrictions:   - DO NOT drive a car, operate machinery, make legal/financial decisions,   sign important papers or drink alcohol.    ACTIVITY:  Today: no heavy lifting, straining or running due to procedural   sedation/anesthesia.  The following day: return to full activity including work.  DIET:  Eat and drink normally unless instructed otherwise.     TREATMENT FOR COMMON SIDE EFFECTS:  - Mild abdominal pain, nausea, belching, bloating or excessive gas:  rest,   eat lightly and use a heating pad.  - Sore Throat: treat with throat lozenges and/or gargle with warm salt   water.  - Because air was used during the procedure, expelling large amounts of air   from your rectum or belching is normal.  - If a bowel prep was taken, you may not have a bowel movement for 1-3 days.    This is normal.  SYMPTOMS TO WATCH FOR AND REPORT TO YOUR PHYSICIAN:  1. Abdominal pain or bloating, other than gas cramps.  2. Chest pain.  3. Back pain.  4. Signs of infection such as: chills or fever occurring within 24 hours   after the procedure.  5. Rectal bleeding, which would show as bright red, maroon, or black stools.   (A tablespoon of blood from the rectum is not serious, especially if   hemorrhoids are present.)  6. Vomiting.  7. Weakness or dizziness.  GO DIRECTLY TO THE NEAREST EMERGENCY ROOM IF YOU HAVE ANY OF THE FOLLOWING:      Difficulty breathing              Chills and/or fever over 101 F   Persistent vomiting and/or vomiting blood   Severe abdominal pain   Severe chest pain   Black, tarry stools   Bleeding- more than one  tablespoon   Any other symptom or condition that you feel may need urgent attention  Your doctor recommends these additional instructions:  If any biopsies were taken, your doctors clinic will contact you in 1 to 2   weeks with any results.  - Discharge patient to home.   - Patient has a contact number available for emergencies.  The signs and   symptoms of potential delayed complications were discussed with the   patient.  Return to normal activities tomorrow.  Written discharge   instructions were provided to the patient.   - Resume previous diet.   - Continue present medications.   - Await pathology results.   - Telephone GI clinic for pathology results in 1 week.   - Now that the ulcers have healed, anticoagulation could be used if desired   or recommended by his treating physicians.  For questions, problems or results please call your physician - Austin Garcia MD at Work:  [phone].  [facility], EMERGENCY ROOM PHONE NUMBER: [phone]  IF A COMPLICATION OR EMERGENCY SITUATION ARISES AND YOU ARE UNABLE TO REACH   YOUR PHYSICIAN - GO DIRECTLY TO THE EMERGENCY ROOM.  Austin Garcia MD  4/12/2019 12:02:01 PM  This report has been verified and signed electronically.  [employer]

## 2019-04-12 NOTE — ANESTHESIA POSTPROCEDURE EVALUATION
Anesthesia Post Evaluation    Patient: Norm Mendoza    Procedure(s) Performed: Procedure(s) (LRB):  EGD (ESOPHAGOGASTRODUODENOSCOPY) (N/A)    Final Anesthesia Type: general  Patient location during evaluation: GI PACU  Patient participation: Yes- Able to Participate  Level of consciousness: awake and alert, awake and oriented  Post-procedure vital signs: reviewed and stable  Pain management: adequate  Airway patency: patent  PONV status at discharge: No PONV  Anesthetic complications: no      Cardiovascular status: blood pressure returned to baseline, stable and hemodynamically stable  Respiratory status: unassisted, spontaneous ventilation and room air  Hydration status: euvolemic  Follow-up not needed.          Vitals Value Taken Time   /68 4/12/2019 12:28 PM   Temp 36.4 °C (97.5 °F) 4/12/2019 12:05 PM   Pulse 67 4/12/2019 12:28 PM   Resp 18 4/12/2019 12:28 PM   SpO2 96 % 4/12/2019 12:28 PM         Event Time     Out of Recovery 12:47:13          Pain/Sergio Score: Sergio Score: 10 (4/12/2019 12:35 PM)

## 2019-04-12 NOTE — H&P
Short Stay Endoscopy History and Physical    PCP - Amber Jenkins MD     Procedure - EGD  ASA - per anesthesia  Mallampati - per anesthesia  History of Anesthesia problems - no  Family history Anesthesia problems -  no   Plan of anesthesia - General    HPI:  This is a 89 y.o. male here for follow-up of gastric MALT lymphoma.    Reflux - no  Dysphagia - no  Abdominal pain - no  Diarrhea - no    ROS:  Constitutional: No fevers, chills, No weight loss  CV: No chest pain  Pulm: No cough, No shortness of breath  Ophtho: No vision changes  GI: see HPI  Derm: No rash    Medical History:  has a past medical history of Alcohol dependence, daily use (7/13/2017), Allergy, Bladder cancer, Hematuria, Hypertension, MALT lymphoma (12/20/2018), Mixed hyperlipidemia (5/2/2018), Paroxysmal atrial fibrillation (11/30/2018), Skin cancer, Squamous cell carcinoma (excised 12/5/14), Symptomatic anemia (12/6/2018), and Urinary tract infection.    Surgical History:  has a past surgical history that includes cataracts; Cystoscopy; Achilles tendon surgery; Skin cancer excision; Eye surgery; and Esophagogastroduodenoscopy (N/A, 12/7/2018).    Family History: family history includes Hypertension in his father; Stroke in his brother and father.. Otherwise no colon cancer, inflammatory bowel disease, or GI malignancies.    Social History:  reports that he quit smoking about 48 years ago. His smoking use included cigarettes. He has a 25.00 pack-year smoking history. He has never used smokeless tobacco. He reports that he drinks about 1.8 oz of alcohol per week. He reports that he does not use drugs.    Review of patient's allergies indicates:  No Known Allergies    Medications:   Medications Prior to Admission   Medication Sig Dispense Refill Last Dose    amoxicillin-clavulanate 875-125mg (AUGMENTIN) 875-125 mg per tablet Take 1 tablet by mouth every 12 (twelve) hours. for 7 days 14 tablet 0 4/12/2019 at Unknown time    ferrous sulfate, dried  (SLOW FE) 160 mg (50 mg iron) TbSR Take 1 tablet (160 mg total) by mouth once daily. 30 tablet 3 4/11/2019 at Unknown time    irbesartan (AVAPRO) 150 MG tablet 1 tab 2x/day 60 tablet 0 4/12/2019 at Unknown time    metoprolol succinate (TOPROL-XL) 50 MG 24 hr tablet 1/2 tab 2x/day 30 tablet 0 4/12/2019 at Unknown time    pantoprazole (PROTONIX) 40 MG tablet Take 1 tablet (40 mg total) by mouth once daily. Take one pill AM of 12/10 and one pill PM of 12/10 then daily until you see your GI doctor 32 tablet 6 4/11/2019 at Unknown time    psyllium (METAMUCIL) packet Take 1 packet by mouth once daily.   4/11/2019 at Unknown time    FLUAD 4371-7116, 65 YR UP,,PF, 45 mcg (15 mcg x 3)/0.5 mL Syrg ADM 0.5ML IM UTD  0 More than a month at Unknown time    guaiFENesin (MUCINEX) 1,200 mg Ta12 1 Tab 1-2x/day for cough/congestion 20 tablet 0 More than a month at Unknown time       Physical Exam:    Vital Signs:   Vitals:    04/12/19 1056   BP: (!) 160/89   Pulse: 72   Resp: 14   Temp: 97.5 °F (36.4 °C)       General Appearance: Well appearing in no acute distress  Eyes:    No scleral icterus  ENT: Neck supple, Lips, mucosa, and tongue normal; teeth and gums normal  Lungs: CTA anteriorly  Heart:  Regular rate, S1, S2 normal, no murmurs heard.  Abdomen: Soft, non tender, non distended with normal bowel sounds. No hepatosplenomegaly, ascites, or mass.  Extremities: No edema  Skin: No rash    Labs:  Lab Results   Component Value Date    WBC 5.38 03/22/2019    HGB 12.2 (L) 03/22/2019    HCT 40.3 03/22/2019     03/22/2019    CHOL 224 (H) 08/03/2018    CHOL 224 (H) 08/03/2018    TRIG 57 08/03/2018    TRIG 57 08/03/2018    HDL 76 (H) 08/03/2018    HDL 76 (H) 08/03/2018    ALT 38 03/13/2019    AST 41 (H) 03/13/2019     03/13/2019    K 4.2 03/13/2019     03/13/2019    CREATININE 1.1 03/13/2019    BUN 25 03/13/2019    CO2 21 (L) 03/13/2019    TSH 2.775 11/30/2018    PSA 2.5 09/10/2015    INR 1.1 03/13/2019       I  have explained the risks and benefits of endoscopy procedures to the patient including but not limited to bleeding, perforation, infection, and death.      Jeremy Ernandez MD PGY-V  Gastroenterology Fellow  [facility]  P [phone]

## 2019-04-12 NOTE — TRANSFER OF CARE
Anesthesia Transfer of Care Note    Patient: Norm Mendoza    Procedure(s) Performed: Procedure(s) (LRB):  EGD (ESOPHAGOGASTRODUODENOSCOPY) (N/A)    Patient location: PACU    Anesthesia Type: general    Transport from OR: Transported from OR on room air with adequate spontaneous ventilation    Post pain: adequate analgesia    Post assessment: no apparent anesthetic complications and tolerated procedure well    Post vital signs: stable    Level of consciousness: awake    Nausea/Vomiting: no nausea/vomiting    Complications: none    Transfer of care protocol was followed      Last vitals:   Visit Vitals  BP (!) 160/89 (BP Location: Left arm, Patient Position: Lying)   Pulse 72   Temp 36.4 °C (97.5 °F) (Temporal)   Resp 14   Ht 6' (1.829 m)   Wt 74.8 kg (165 lb)   SpO2 98%   BMI 22.38 kg/m²

## 2019-04-12 NOTE — ANESTHESIA PREPROCEDURE EVALUATION
04/12/2019  Norm Mendoza is a 89 y.o., male.  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    Mixed hyperlipidemia 5/2/2018    Paroxysmal atrial fibrillation 11/30/2018    Skin cancer     x3, had mohs on nose    Squamous cell carcinoma excised 12/5/14    R lower back    Symptomatic anemia 12/6/2018    Urinary tract infection     x4     Past Surgical History:   Procedure Laterality Date    ACHILLES TENDON SURGERY      cataracts      CYSTOSCOPY      bladder tumor    CYSTOSCOPY N/A 3/28/2014    Performed by Sl[facility] Barbosa [facility] at [facility]ST [facility]    DILATION, URETHRA N/A 3/28/2014    Performed by Slava Barbosa MD at [facility] OR [address] [place] [address]    EGD (ESOPHAGOGASTRODUODENOSCOPY) N/A 12/7/2018    Performed by Austin Garcia MD at [facility] ENDO (2ND FLR)    EXCISION-BLADDER TUMOR-TRANSURETHRAL (TURBT) N/A 3/28/2014    Performed by Slava Barbosa MD at [facility] OR 1ST FLR    EYE SURGERY      SKIN CANCER EXCISION         Anesthesia Evaluation    I have reviewed the Patient Summary Reports.     I have reviewed the Medications.     Review of Systems  Anesthesia Hx:  No previous Anesthesia Denies Hx of Anesthetic complications  Neg history of prior surgery.   Social:  Alcohol Use, Former Smoker    Hematology/Oncology:         -- Lymphoma:   EENT/Dental:EENT/Dental Normal   Cardiovascular:   Exercise tolerance: good Hypertension, well controlled    Pulmonary:  Pulmonary Normal    Renal/:  Renal/ Normal     Hepatic/GI:   PUD,    Musculoskeletal:  Musculoskeletal Normal    Neurological:  Neurology Normal    Endocrine:  Endocrine Normal Denies Diabetes.    Dermatological:   H/o skin cancer   Psych:  Psychiatric Normal           Physical  Exam  General:  Well nourished    Airway/Jaw/Neck:  Airway Findings: Mouth Opening: Normal Tongue: Normal  General Airway Assessment: Adult  Mallampati: I  TM Distance: Normal, at least 6 cm         Dental:  DENTAL FINDINGS: Normal   Chest/Lungs:  Chest/Lungs Clear    Heart/Vascular:  Heart Findings: Normal       Mental Status:  Mental Status Findings: Normal        Anesthesia Plan  Type of Anesthesia, risks & benefits discussed:  Anesthesia Type:  general  Patient's Preference:   Intra-op Monitoring Plan: standard ASA monitors  Intra-op Monitoring Plan Comments:   Post Op Pain Control Plan:   Post Op Pain Control Plan Comments:   Induction:   IV  Beta Blocker:  Patient is on a Beta-Blocker and has received one dose within the past 24 hours (No further documentation required).       Informed Consent: Patient understands risks and agrees with Anesthesia plan.  Questions answered. Anesthesia consent signed with patient.  ASA Score: 3     Day of Surgery Review of History & Physical:    H&P update referred to the provider.         Ready For Surgery From Anesthesia Perspective.

## 2019-04-16 ENCOUNTER — TELEPHONE (OUTPATIENT)
Dept: INTERNAL MEDICINE | Facility: CLINIC | Age: 84
End: 2019-04-16

## 2019-04-16 NOTE — TELEPHONE ENCOUNTER
Spoke with pt advised him that Dr. Owens recommends a UC appt, offered pt a UC appt for tomorrow morning, pt would rather be seen today, will go to [facility] urgent care now.

## 2019-04-16 NOTE — TELEPHONE ENCOUNTER
Spoke with pt, he started having a cough 2 weeks ago with a sore throat he was treated with antibiotics, his symptoms cleared. The cough started right back after the course on antibiotics were done. He thinks he may have pneumonia. He is not having any other symptoms. Please Advise.

## 2019-04-16 NOTE — TELEPHONE ENCOUNTER
----- Message from Mariam Amador sent at 4/16/2019  9:53 AM CDT -----  Contact: [phone]  Patient would like to get a referral.  Referral to what specialty:  XRAY on chest  Does the patient want the referral with a specific physician:    Is the specialist an [facility]:    Reason (be specific):  Patient stated he has a bad cough and he think he has pneumonia   Does the patient already have the specialty clinic appointment scheduled:    If yes, what date is the appointment scheduled:     Is the insurance listed in Epic correct? (this is important for a referral):  yes  Comments:  Please advise, thanks     ##Advise the patient that once the physician approves this either a nurse or the  will return their call##

## 2019-04-17 ENCOUNTER — HOSPITAL ENCOUNTER (OUTPATIENT)
Dept: RADIOLOGY | Facility: HOSPITAL | Age: 84
Discharge: HOME | End: 2019-04-17
Attending: NURSE PRACTITIONER
Payer: MEDICARE

## 2019-04-17 ENCOUNTER — OFFICE VISIT (OUTPATIENT)
Dept: INTERNAL MEDICINE | Facility: CLINIC | Age: 84
End: 2019-04-17
Payer: MEDICARE

## 2019-04-17 ENCOUNTER — TELEPHONE (OUTPATIENT)
Dept: CARDIOLOGY | Facility: CLINIC | Age: 84
End: 2019-04-17

## 2019-04-17 VITALS
TEMPERATURE: 98 F | HEIGHT: 72 IN | SYSTOLIC BLOOD PRESSURE: 152 MMHG | HEART RATE: 82 BPM | OXYGEN SATURATION: 96 % | BODY MASS INDEX: 23.32 KG/M2 | WEIGHT: 172.19 LBS | DIASTOLIC BLOOD PRESSURE: 78 MMHG

## 2019-04-17 DIAGNOSIS — R05.9 COUGH: ICD-10-CM

## 2019-04-17 DIAGNOSIS — R05.9 COUGH: Primary | ICD-10-CM

## 2019-04-17 DIAGNOSIS — I50.9 CONGESTIVE HEART FAILURE, UNSPECIFIED HF CHRONICITY, UNSPECIFIED HEART FAILURE TYPE: ICD-10-CM

## 2019-04-17 DIAGNOSIS — R06.2 WHEEZE: ICD-10-CM

## 2019-04-17 PROCEDURE — 99214 PR OFFICE/OUTPT VISIT, EST, LEVL IV, 30-39 MIN: ICD-10-PCS | Mod: HCNC,25,S$GLB, | Performed by: NURSE PRACTITIONER

## 2019-04-17 PROCEDURE — 1101F PR PT FALLS ASSESS DOC 0-1 FALLS W/OUT INJ PAST YR: ICD-10-PCS | Mod: HCNC,CPTII,S$GLB, | Performed by: NURSE PRACTITIONER

## 2019-04-17 PROCEDURE — 99999 PR PBB SHADOW E&M-EST. PATIENT-LVL IV: CPT | Mod: PBBFAC,HCNC,, | Performed by: NURSE PRACTITIONER

## 2019-04-17 PROCEDURE — 71046 X-RAY EXAM CHEST 2 VIEWS: CPT | Mod: TC,HCNC

## 2019-04-17 PROCEDURE — 71046 X-RAY EXAM CHEST 2 VIEWS: CPT | Mod: 26,HCNC,, | Performed by: RADIOLOGY

## 2019-04-17 PROCEDURE — 1101F PT FALLS ASSESS-DOCD LE1/YR: CPT | Mod: HCNC,CPTII,S$GLB, | Performed by: NURSE PRACTITIONER

## 2019-04-17 PROCEDURE — 99999 PR PBB SHADOW E&M-EST. PATIENT-LVL IV: ICD-10-PCS | Mod: PBBFAC,HCNC,, | Performed by: NURSE PRACTITIONER

## 2019-04-17 PROCEDURE — 99214 OFFICE O/P EST MOD 30 MIN: CPT | Mod: HCNC,25,S$GLB, | Performed by: NURSE PRACTITIONER

## 2019-04-17 PROCEDURE — 94640 PR INHAL RX, AIRWAY OBST/DX SPUTUM INDUCT: ICD-10-PCS | Mod: HCNC,S$GLB,, | Performed by: NURSE PRACTITIONER

## 2019-04-17 PROCEDURE — 71046 XR CHEST PA AND LATERAL: ICD-10-PCS | Mod: 26,HCNC,, | Performed by: RADIOLOGY

## 2019-04-17 PROCEDURE — 94640 AIRWAY INHALATION TREATMENT: CPT | Mod: HCNC,S$GLB,, | Performed by: NURSE PRACTITIONER

## 2019-04-17 RX ORDER — FUROSEMIDE 80 MG/1
80 TABLET ORAL ONCE
Qty: 1 TABLET | Refills: 0 | Status: SHIPPED | OUTPATIENT
Start: 2019-04-17 | End: 2019-04-18

## 2019-04-17 RX ORDER — IPRATROPIUM BROMIDE AND ALBUTEROL SULFATE 2.5; .5 MG/3ML; MG/3ML
3 SOLUTION RESPIRATORY (INHALATION) ONCE
Status: COMPLETED | OUTPATIENT
Start: 2019-04-17 | End: 2019-04-17

## 2019-04-17 RX ADMIN — IPRATROPIUM BROMIDE AND ALBUTEROL SULFATE 3 ML: 2.5; .5 SOLUTION RESPIRATORY (INHALATION) at 05:04

## 2019-04-17 NOTE — PROGRESS NOTES
Two identifiers were used Name and D.O.B pt tolerated treatment well pt was instructed to wait for further instructions

## 2019-04-18 ENCOUNTER — OFFICE VISIT (OUTPATIENT)
Dept: CARDIOLOGY | Facility: CLINIC | Age: 84
End: 2019-04-18
Payer: MEDICARE

## 2019-04-18 ENCOUNTER — HOSPITAL ENCOUNTER (OUTPATIENT)
Dept: RADIOLOGY | Facility: HOSPITAL | Age: 84
Discharge: HOME | End: 2019-04-18
Attending: STUDENT IN AN ORGANIZED HEALTH CARE EDUCATION/TRAINING PROGRAM
Payer: MEDICARE

## 2019-04-18 ENCOUNTER — TELEPHONE (OUTPATIENT)
Dept: CARDIOLOGY | Facility: CLINIC | Age: 84
End: 2019-04-18

## 2019-04-18 VITALS
BODY MASS INDEX: 22.48 KG/M2 | SYSTOLIC BLOOD PRESSURE: 85 MMHG | WEIGHT: 166 LBS | OXYGEN SATURATION: 91 % | HEART RATE: 80 BPM | HEIGHT: 72 IN | DIASTOLIC BLOOD PRESSURE: 60 MMHG

## 2019-04-18 DIAGNOSIS — I50.30 DIASTOLIC CONGESTIVE HEART FAILURE, UNSPECIFIED HF CHRONICITY: ICD-10-CM

## 2019-04-18 DIAGNOSIS — R05.9 COUGH: Primary | ICD-10-CM

## 2019-04-18 DIAGNOSIS — I51.89 LEFT VENTRICULAR DIASTOLIC DYSFUNCTION WITH PRESERVED SYSTOLIC FUNCTION: ICD-10-CM

## 2019-04-18 DIAGNOSIS — I48.0 PAROXYSMAL ATRIAL FIBRILLATION: ICD-10-CM

## 2019-04-18 DIAGNOSIS — I10 ESSENTIAL HYPERTENSION: ICD-10-CM

## 2019-04-18 DIAGNOSIS — C88.4 MALT LYMPHOMA: ICD-10-CM

## 2019-04-18 PROCEDURE — 99214 OFFICE O/P EST MOD 30 MIN: CPT | Mod: HCNC,S$GLB,, | Performed by: INTERNAL MEDICINE

## 2019-04-18 PROCEDURE — 99214 PR OFFICE/OUTPT VISIT, EST, LEVL IV, 30-39 MIN: ICD-10-PCS | Mod: HCNC,S$GLB,, | Performed by: INTERNAL MEDICINE

## 2019-04-18 PROCEDURE — 1101F PT FALLS ASSESS-DOCD LE1/YR: CPT | Mod: HCNC,CPTII,S$GLB, | Performed by: INTERNAL MEDICINE

## 2019-04-18 PROCEDURE — 1101F PR PT FALLS ASSESS DOC 0-1 FALLS W/OUT INJ PAST YR: ICD-10-PCS | Mod: HCNC,CPTII,S$GLB, | Performed by: INTERNAL MEDICINE

## 2019-04-18 RX ORDER — CODEINE PHOSPHATE AND GUAIFENESIN 10; 100 MG/5ML; MG/5ML
5 SOLUTION ORAL EVERY 6 HOURS PRN
Qty: 1 BOTTLE | Refills: 0 | Status: SHIPPED | OUTPATIENT
Start: 2019-04-18 | End: 2019-04-25

## 2019-04-18 RX ORDER — CODEINE PHOSPHATE AND GUAIFENESIN 10; 100 MG/5ML; MG/5ML
5 SOLUTION ORAL EVERY 6 HOURS PRN
Qty: 1 BOTTLE | Refills: 0 | Status: SHIPPED | OUTPATIENT
Start: 2019-04-18 | End: 2019-04-18 | Stop reason: SDUPTHER

## 2019-04-18 RX ORDER — FUROSEMIDE 20 MG/1
20 TABLET ORAL
Qty: 30 TABLET | Refills: 3 | Status: ON HOLD | OUTPATIENT
Start: 2019-04-18 | End: 2019-07-12 | Stop reason: SDUPTHER

## 2019-04-18 RX ORDER — CODEINE PHOSPHATE AND GUAIFENESIN 10; 100 MG/5ML; MG/5ML
5 SOLUTION ORAL EVERY 6 HOURS PRN
Qty: 1 BOTTLE | Refills: 0 | Status: SHIPPED | OUTPATIENT
Start: 2019-04-18 | End: 2019-04-18

## 2019-04-18 NOTE — PROGRESS NOTES
Subjective:       Patient ID: Norm Mendoza is a 89 y.o. male.    Chief Complaint: Cough    Mr Mendoza presents to the clinic today for a cough. He went to [facility] urgent care on 4/7 with the same complaint and has not improved. On the 7th, he had symptoms about 3 days that began after he visited a friend in the hospital. The cough has slightly worsened since 4/7.  He now has difficulty sleeping through the night because he wakes up coughing. When this happens, he gets up, eats a snack, and then goes to sleep sitting up in a recliner.   He has a fib, not anticoagulated due to ulcers.  PMH also significant for aortic stenosis, diastolic dysfunction, MALT lymphoma, alcohol abuse, HTN. In 12/2018 he went to urgent care for a cough and was sent to the ER for swelling in his lungs. He was diuresed and discharged.   He is scheduled for a PET scan tomorrow.     Review of Systems   Constitutional: Negative for fatigue and fever.   HENT: Negative for congestion, postnasal drip, sinus pressure, sinus pain and sore throat.    Eyes: Negative for visual disturbance.   Respiratory: Positive for cough and choking. Negative for shortness of breath.         PND   Gastrointestinal: Negative for diarrhea, nausea and vomiting.   Genitourinary: Negative for dysuria.   Musculoskeletal: Negative for gait problem.   Skin: Negative for rash.   Neurological: Negative for headaches.   Psychiatric/Behavioral: Negative for confusion.       Objective:      Physical Exam   Constitutional: He is oriented to person, place, and time. He appears well-developed and well-nourished. No distress.   Eyes: No scleral icterus.   Neck: No JVD present.   Cardiovascular: Normal rate. An irregularly irregular rhythm present.   Murmur heard.  Pulmonary/Chest: Effort normal. No respiratory distress.   Rhonchi RLL   Musculoskeletal: He exhibits edema.   +2 pitting edema to lower legs bilat   Neurological: He is alert and oriented to person, place, and  time.   Skin: Skin is warm and dry. He is not diaphoretic.   Psychiatric: He has a normal mood and affect. His behavior is normal.   Nursing note and vitals reviewed.      Assessment:       1. Cough    2. Wheeze    3. Congestive heart failure, unspecified HF chronicity, unspecified heart failure type        Plan:   1. Cough  - X-Ray Chest PA And Lateral; Future  - Brain natriuretic peptide; Future  - CBC auto differential; Future    2. Wheeze  - albuterol-ipratropium 2.5 mg-0.5 mg/3 mL nebulizer solution 3 mL    3. Congestive heart failure, unspecified HF chronicity, unspecified heart failure type  - furosemide (LASIX) 80 MG tablet; Take 1 tablet (80 mg total) by mouth once. for 1 dose  Dispense: 1 tablet; Refill: 0  - See cardiology tomorrow follow PET scan.   He declines appt at 10AM with his cardiologist because of an important meeting. Scheduled to see a different cardiologist at 3PM.     Pt has been given instructions populated from [employer] database and has verbalized understanding of the after visit summary and information contained wherein.    Follow up with a primary care provider. May go to ER for acute shortness of breath, lightheadedness, fever, or any other emergent complaints or changes in condition.

## 2019-04-18 NOTE — PROGRESS NOTES
Subjective:   Patient ID:  Norm Mendoza is a 89 y.o. male is a new patient who presents for evaluation of Congestive Heart Failure (With Exacerbation) and Atrial Fibrillation  This is a 89 y.o. male here for follow-up of gastric MALT lymphoma. Patient has undergone immunotherapy Completed Rituximab x4 on 3/7/19    Echo 5/2018    1 - Normal left ventricular systolic function (EF 55-60%).     2 - Indeterminate LV diastolic function.     3 - Biatrial enlargement.     4 - No wall motion abnormalities.     5 - Normal right ventricular systolic function .     6 - Mildly enlarged ascending aorta.     7 - Trivial to mild aortic regurgitation.     8 - Mild mitral regurgitation.     9 - Mild tricuspid regurgitation.     10 - Trivial to mild pulmonic regurgitation.     11 - The estimated PA systolic pressure is 33 mmHg.     HPI:   Patient is experiencing cough, bringing phlegm. No fever. BNP is elevated on the lab work that is concerning for heart failure.   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     EKG: AF. LAD. interventricular conduction delay.     Patient Active Problem List   Diagnosis    AK (actinic keratosis)    History of benign bladder tumor    H/O nonmelanoma skin cancer    Aortic atherosclerosis    Essential hypertension    Left ventricular diastolic dysfunction with preserved systolic function    Nonrheumatic aortic valve stenosis    Alcohol dependence, daily use    Mixed hyperlipidemia    Aortic root dilation    Hypercholesterolemia    PVC's (premature ventricular contractions)    Tortuous aorta    Paroxysmal atrial fibrillation    MALT lymphoma    PUD (peptic ulcer disease)     BP (!) 85/60   Pulse 80   Ht 6' (1.829 m)   Wt 75.3 kg (166 lb)   SpO2 (!) 91%   BMI 22.51 kg/m²   Body mass index is 22.51 kg/m².  CrCl cannot be calculated (Patient's most recent lab result is older than the maximum 7 days allowed.).    Lab Results   Component Value Date     03/13/2019    K 4.2  03/13/2019     03/13/2019    CO2 21 (L) 03/13/2019    BUN 25 03/13/2019    CREATININE 1.1 03/13/2019     03/13/2019    MG 1.9 03/13/2019    AST 41 (H) 03/13/2019    ALT 38 03/13/2019    ALBUMIN 3.6 03/13/2019    PROT 6.9 03/13/2019    BILITOT 0.7 03/13/2019    WBC 5.85 04/17/2019    HGB 12.8 (L) 04/17/2019    HCT 39.6 (L) 04/17/2019    MCV 82 04/17/2019     04/17/2019    INR 1.1 03/13/2019    PSA 2.5 09/10/2015    TSH 2.775 11/30/2018    CHOL 224 (H) 08/03/2018    CHOL 224 (H) 08/03/2018    HDL 76 (H) 08/03/2018    HDL 76 (H) 08/03/2018    LDLCALC 136.6 08/03/2018    LDLCALC 136.6 08/03/2018    TRIG 57 08/03/2018    TRIG 57 08/03/2018       Current Outpatient Medications   Medication Sig    ferrous sulfate, dried (SLOW FE) 160 mg (50 mg iron) TbSR Take 1 tablet (160 mg total) by mouth once daily.    furosemide (LASIX) 80 MG tablet Take 1 tablet (80 mg total) by mouth once. for 1 dose    irbesartan (AVAPRO) 150 MG tablet 1 tab 2x/day    metoprolol succinate (TOPROL-XL) 50 MG 24 hr tablet 1/2 tab 2x/day    FLUAD 8323-1740, 65 YR UP,,PF, 45 mcg (15 mcg x 3)/0.5 mL Syrg ADM 0.5ML IM UTD    guaiFENesin (MUCINEX) 1,200 mg Ta12 1 Tab 1-2x/day for cough/congestion    pantoprazole (PROTONIX) 40 MG tablet Take 1 tablet (40 mg total) by mouth once daily. Take one pill AM of 12/10 and one pill PM of 12/10 then daily until you see your GI doctor    psyllium (METAMUCIL) packet Take 1 packet by mouth once daily.     No current facility-administered medications for this visit.        ROS    Objective:   Physical Exam    Assessment:     1. Cough    2. Essential hypertension    3. Paroxysmal atrial fibrillation    4. Left ventricular diastolic dysfunction with preserved systolic function        Plan:   Even thought the BNP is high. Patient does not show sign of heart failure. Patient appears to be having a bout of acute bronchitis. (presubambly viral) with no fever and leukocytosis. Treat with lasix only  if weight going up and orthopnea. But otherwise wait for bronchitis to clear up. Patient had a recent bout of heart failure (that appers to be due to volume overload from blood transfusion) and he was diuresed at the time. Cut BP meds in half till recovering from PNA.     Norm was seen today for congestive heart failure and atrial fibrillation.    Diagnoses and all orders for this visit:    Cough    Essential hypertension    Paroxysmal atrial fibrillation    Left ventricular diastolic dysfunction with preserved systolic function  -     Transthoracic Echo (TTE) Complete 2D; Future    Diastolic congestive heart failure, unspecified HF chronicity  -     Transthoracic Echo (TTE) Complete 2D; Future            RTC  6 wk

## 2019-04-18 NOTE — LETTER
April 18, 2019      Tatiana Encarnacion, NP  [address]  [facility] [address]           [facility]  [address]  [facility] [address]  Phone: [phone]  Fax: [phone]          Patient: Norm Mendoza   MR Number: [id number]   YOB: 1926   Date of Visit: 4/18/2019       Dear Tatiana Encarnacion:    Thank you for referring Norm Mendoza to me for evaluation. Attached you will find relevant portions of my assessment and plan of care.    If you have questions, please do not hesitate to call me. I look forward to following Norm Mendoza along with you.    Sincerely,    Maura Cifuentes MD    Enclosure  CC:  No Recipients    If you would like to receive this communication electronically, please contact [email or web address] or [phone] to request more information on [employer] access.    For providers and/or their staff who would like to refer a patient to [facility], please contact us through our one-stop-shop provider referral line, [facility], at [phone].    If you feel you have received this communication in error or would no longer like to receive these types of communications, please e-mail [email or web address]

## 2019-04-20 ENCOUNTER — HOSPITAL ENCOUNTER (OUTPATIENT)
Dept: RADIOLOGY | Facility: HOSPITAL | Age: 84
Discharge: HOME | End: 2019-04-20
Attending: STUDENT IN AN ORGANIZED HEALTH CARE EDUCATION/TRAINING PROGRAM
Payer: MEDICARE

## 2019-04-20 DIAGNOSIS — C88.4 MALT LYMPHOMA: ICD-10-CM

## 2019-04-20 PROCEDURE — A9552 F18 FDG: HCPCS | Mod: HCNC

## 2019-04-20 PROCEDURE — 78815 PET IMAGE W/CT SKULL-THIGH: CPT | Mod: 26,PS,HCNC, | Performed by: RADIOLOGY

## 2019-04-20 PROCEDURE — 78815 NM PET CT ROUTINE: ICD-10-PCS | Mod: 26,PS,HCNC, | Performed by: RADIOLOGY

## 2019-04-20 PROCEDURE — 78815 PET IMAGE W/CT SKULL-THIGH: CPT | Mod: TC,HCNC

## 2019-04-22 LAB — POCT GLUCOSE: 115 MG/DL (ref 70–110)

## 2019-04-23 ENCOUNTER — TELEPHONE (OUTPATIENT)
Dept: INTERNAL MEDICINE | Facility: CLINIC | Age: 84
End: 2019-04-23

## 2019-04-23 DIAGNOSIS — I10 ESSENTIAL HYPERTENSION: ICD-10-CM

## 2019-04-23 RX ORDER — IRBESARTAN 150 MG/1
TABLET ORAL
Qty: 60 TABLET | Refills: 0 | Status: SHIPPED | OUTPATIENT
Start: 2019-04-23 | End: 2019-04-23 | Stop reason: SDUPTHER

## 2019-04-23 RX ORDER — IRBESARTAN 150 MG/1
TABLET ORAL
Qty: 210 TABLET | Refills: 0 | Status: SHIPPED | OUTPATIENT
Start: 2019-04-23 | End: 2019-05-01

## 2019-04-23 NOTE — TELEPHONE ENCOUNTER
Pt needs a temporary supply of Irbesartan, [employer] won't fill until 5/1. Send rx to [facility] [address].

## 2019-04-23 NOTE — TELEPHONE ENCOUNTER
----- Message from Jack Sun sent at 4/23/2019 11:03 AM CDT -----  Contact: [phone]  Patient  requesting a call from the office to discuss getting BP medication for 7 days . Please call and advise, Thanks

## 2019-04-24 RX ORDER — IRBESARTAN 150 MG/1
TABLET ORAL
Qty: 60 TABLET | Refills: 0 | Status: SHIPPED | OUTPATIENT
Start: 2019-04-24 | End: 2019-05-01

## 2019-04-24 NOTE — PROGRESS NOTES
PATIENT: Norm Mendoza  MRN: [id number]  DATE: 4/25/2019      Diagnosis:   1. MALT lymphoma    2. Essential hypertension    3. Paroxysmal atrial fibrillation        Chief Complaint: Follow-up    Subjective:     Mr. Norm Mendoza is a 89 y.o. male with a-fib, HTN, new CHF, who presents to the Hematologic clinic for follow-up of stage 1 MALT lymphoma.    Oncologic History  -The patient was recently discharged on 12/9/2018 following a 3 day admission for a GIB. Patient was on Eliquis at the time and had been complaining of 1 week of black, tarry stools. EGD at the time was significant for nonbleeding gastric ulcers. Biopsies were taken and the patient was instructed to start protonix and hold eliquis at discharge. He requiring IV fluids and blood transfusion during the hospitalization. Biopsy results returned 12/18/2018 concerning for possible MALT-Lymphoma with molecular studies pending.  -PET confirmed stage 1 MALT lymphoma  -Completed Rituximab x4 on 3/7/19  -EGD: Erythematous, granular and scarred mucosa in the lesser curvature of the gastric body. Biopsied, + MALT lymphoma. Ulcers no longer present., PET was also done 4/20/19, no other site of disease    Interval History  Patient presents with a friend. Seen today, was recently on scheduled Lasix d/t worsening CHF. Has lost weight as a result. Blood pressure better. Now on Lasix PRN. No abdominal pain, no melena, no hematochezia.    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    Mixed hyperlipidemia 5/2/2018    Paroxysmal atrial fibrillation 11/30/2018    Skin cancer     x3, had mohs on nose    Squamous cell carcinoma excised 12/5/14    R lower back    Symptomatic anemia 12/6/2018    Urinary tract infection     x4       Past Surgical HIstory:   Past Surgical History:    Procedure Laterality Date    ACHILLES TENDON SURGERY      cataracts      CYSTOSCOPY      bladder tumor    CYSTOSCOPY N/A 3/28/2014    Performed by [facility] at [facility] OR 1ST FLR    DILATION, URETHRA N/A 3/28/2014    Performed by [facility] at [facility] OR 1ST FLR    EGD (ESOPHAGOGASTRODUODENOSCOPY) N/A 4/12/2019    Performed by Austin Garcia MD at [facility] ENDO (4TH FLR)    EGD (ESOPHAGOGASTRODUODENOSCOPY) N/A 12/7/2018    Performed by Austin Garcia MD at [facility] ENDO (2ND FLR)    EXCISION-BLADDER TUMOR-TRANSURETHRAL (TURBT) N/A 3/28/2014    Performed by [facility] at [facility] OR 1ST FLR    EYE SURGERY      SKIN CANCER EXCISION         Family History:   Family History   Problem Relation Age of Onset    Stroke Father     Hypertension Father     Stroke Brother     Anesthesia problems Neg Hx     Malignant hypertension Neg Hx     Hypotension Neg Hx     Malignant hyperthermia Neg Hx     Pseudochol deficiency Neg Hx     Melanoma Neg Hx     Heart attack Neg Hx     Heart disease Neg Hx     Heart failure Neg Hx        Social History:  reports that he quit smoking about 48 years ago. His smoking use included cigarettes. He has a 25.00 pack-year smoking history. He has never used smokeless tobacco. He reports that he drinks about 1.8 oz of alcohol per week. He reports that he does not use drugs.  Very functional 89-year-old. He works 3 days a week for a company called [employer]. Lives alone, has a girlfriend. Has one son who lives here, one in [place], one grand-daughter who lives in [place]. Does pilates once a week. No family history of cancer.    Allergies:  Review of patient's allergies indicates:  No Known Allergies    Medications:  Current Outpatient Medications   Medication Sig Dispense Refill    ferrous sulfate, dried (SLOW FE) 160 mg (50 mg iron) TbSR Take 1 tablet (160 mg total) by mouth once daily. 30 tablet 3    FLUAD [id number], 65 YR UP,,PF, 45 mcg  (15 mcg x 3)/0.5 mL Syrg ADM 0.5ML IM UTD  0    furosemide (LASIX) 20 MG tablet Take 1 tablet (20 mg total) by mouth as needed (leg edema, orthopnea, SOB,. weight gain). 30 tablet 3    guaifenesin-codeine 100-10 mg/5 ml (TUSSI-ORGANIDIN NR)  mg/5 mL syrup Take 5 mLs by mouth every 6 (six) hours as needed for Cough. X 7 days. 1 Bottle 0    irbesartan (AVAPRO) 150 MG tablet TAKE 1 TABLET BY MOUTH TWICE DAILY 210 tablet 0    irbesartan (AVAPRO) 150 MG tablet TAKE 1 TABLET BY MOUTH TWICE DAILY 60 tablet 0    metoprolol succinate (TOPROL-XL) 50 MG 24 hr tablet 1/2 tab 2x/day 30 tablet 0    pantoprazole (PROTONIX) 40 MG tablet Take 1 tablet (40 mg total) by mouth once daily. Take one pill AM of 12/10 and one pill PM of 12/10 then daily until you see your GI doctor 32 tablet 6    psyllium (METAMUCIL) packet Take 1 packet by mouth once daily.       No current facility-administered medications for this visit.        Review of Systems   Constitutional: Positive for fatigue. Negative for chills, fever and unexpected weight change.   HENT: Negative for nosebleeds and sore throat.    Respiratory: Negative for cough and shortness of breath.    Cardiovascular: Negative for chest pain and leg swelling.   Gastrointestinal: Negative for abdominal pain, blood in stool, nausea and vomiting.   Genitourinary: Negative for dysuria and hematuria.   Musculoskeletal: Negative for arthralgias and back pain.   Skin: Negative for rash.   Neurological: Negative for light-headedness and headaches.   Hematological: Negative for adenopathy. Does not bruise/bleed easily.       ECOG Performance Status: 1   Objective:      Vitals:   Vitals:    04/25/19 1421   BP: (!) 137/92   BP Location: Left arm   Patient Position: Sitting   BP Method: Medium (Automatic)   Pulse: 88   Resp: 20   Temp: 97.6 °F (36.4 °C)   TempSrc: Oral   SpO2: 99%   Weight: 77.5 kg (170 lb 13.7 oz)   Height: 6' (1.829 m)     BMI: Body mass index is 23.17  kg/m².    Physical Exam   Constitutional: He appears well-developed and well-nourished.   HENT:   Head: Normocephalic and atraumatic.   Eyes: Pupils are equal, round, and reactive to light. EOM are normal. No scleral icterus.   Neck: Neck supple.   Cardiovascular: Normal rate and regular rhythm.   Pulmonary/Chest: Effort normal and breath sounds normal. No respiratory distress.   Abdominal: Soft. He exhibits no distension. There is no tenderness.   Musculoskeletal: Normal range of motion. He exhibits no edema.   Lymphadenopathy:     He has no cervical adenopathy.   Neurological: He is alert.   Skin: Skin is warm and dry.   Psychiatric: He has a normal mood and affect. His behavior is normal.       Laboratory Data:  Lab Visit on 04/25/2019   Component Date Value Ref Range Status    LD 04/25/2019 230  110 - 260 U/L Final    Results are increased in hemolyzed samples.    Sodium 04/25/2019 141  136 - 145 mmol/L Final    Potassium 04/25/2019 4.4  3.5 - 5.1 mmol/L Final    Chloride 04/25/2019 105  95 - 110 mmol/L Final    CO2 04/25/2019 27  23 - 29 mmol/L Final    Glucose 04/25/2019 108  70 - 110 mg/dL Final    BUN, Bld 04/25/2019 33* 10 - 30 mg/dL Final    Creatinine 04/25/2019 1.1  0.5 - 1.4 mg/dL Final    Calcium 04/25/2019 9.8  8.7 - 10.5 mg/dL Final    Total Protein 04/25/2019 7.2  6.0 - 8.4 g/dL Final    Albumin 04/25/2019 3.1* 3.5 - 5.2 g/dL Final    Total Bilirubin 04/25/2019 0.5  0.1 - 1.0 mg/dL Final    Comment: For infants and newborns, interpretation of results should be based  on gestational age, weight and in agreement with clinical  observations.  Premature Infant recommended reference ranges:  Up to 24 hours.............<8.0 mg/dL  Up to 48 hours............<12.0 mg/dL  3-5 days..................<15.0 mg/dL  6-29 days.................<15.0 mg/dL      Alkaline Phosphatase 04/25/2019 82  55 - 135 U/L Final    AST 04/25/2019 23  10 - 40 U/L Final    ALT 04/25/2019 24  10 - 44 U/L Final     Anion Gap 04/25/2019 9  8 - 16 mmol/L Final    eGFR if African American 04/25/2019 >60.0  >60 mL/min/1.73 m^2 Final    eGFR if non African American 04/25/2019 58.0* >60 mL/min/1.73 m^2 Final    Comment: Calculation used to obtain the estimated glomerular filtration  rate (eGFR) is the CKD-EPI equation.       WBC 04/25/2019 6.82  3.90 - 12.70 K/uL Final    RBC 04/25/2019 4.88  4.60 - 6.20 M/uL Final    Hemoglobin 04/25/2019 13.0* 14.0 - 18.0 g/dL Final    Hematocrit 04/25/2019 40.3  40.0 - 54.0 % Final    MCV 04/25/2019 83  82 - 98 fL Final    MCH 04/25/2019 26.6* 27.0 - 31.0 pg Final    MCHC 04/25/2019 32.3  32.0 - 36.0 g/dL Final    RDW 04/25/2019 23.0* 11.5 - 14.5 % Final    Platelets 04/25/2019 293  150 - 350 K/uL Final    MPV 04/25/2019 8.8* 9.2 - 12.9 fL Final    Immature Granulocytes 04/25/2019 2.3* 0.0 - 0.5 % Final    Gran # (ANC) 04/25/2019 4.7  1.8 - 7.7 K/uL Final    Immature Grans (Abs) 04/25/2019 0.16* 0.00 - 0.04 K/uL Final    Comment: Mild elevation in immature granulocytes is non specific and   can be seen in a variety of conditions including stress response,   acute inflammation, trauma and pregnancy. Correlation with other   laboratory and clinical findings is essential.      Lymph # 04/25/2019 1.2  1.0 - 4.8 K/uL Final    Mono # 04/25/2019 0.6  0.3 - 1.0 K/uL Final    Eos # 04/25/2019 0.1  0.0 - 0.5 K/uL Final    Baso # 04/25/2019 0.09  0.00 - 0.20 K/uL Final    nRBC 04/25/2019 0  0 /100 WBC Final    Gran% 04/25/2019 68.8  38.0 - 73.0 % Final    Lymph% 04/25/2019 18.2  18.0 - 48.0 % Final    Mono% 04/25/2019 8.1  4.0 - 15.0 % Final    Eosinophil% 04/25/2019 1.3  0.0 - 8.0 % Final    Basophil% 04/25/2019 1.3  0.0 - 1.9 % Final    Differential Method 04/25/2019 Automated   Final   Hospital Outpatient Visit on 04/25/2019   Component Date Value Ref Range Status    Ascending aorta 04/25/2019 4.30  cm Final    STJ 04/25/2019 3.49  cm Final    AV mean gradient 04/25/2019  13.78  mmHg Final    Ao peak juanis 04/25/2019 2.44  m/s Final    Ao VTI 04/25/2019 48.40  cm Final    IVRT 04/25/2019 0.10  msec Final    IVS 04/25/2019 0.98  0.6 - 1.1 cm Final    LA size 04/25/2019 4.70  cm Final    Left Atrium Major Axis 04/25/2019 5.74  cm Final    Left Atrium Minor Axis 04/25/2019 6.01  cm Final    LVIDD 04/25/2019 4.86  3.5 - 6.0 cm Final    LVIDS 04/25/2019 3.84  2.1 - 4.0 cm Final    LVOT diameter 04/25/2019 2.56  cm Final    LVOT peak VTI 04/25/2019 13.32  cm Final    PW 04/25/2019 1.09  0.6 - 1.1 cm Final    RA Major Axis 04/25/2019 6.04  cm Final    RA Width 04/25/2019 5.26  cm Final    RVDD 04/25/2019 4.27  cm Final    Sinus 04/25/2019 3.88  cm Final    TAPSE 04/25/2019 1.32  cm Final    TDI LATERAL 04/25/2019 0.10   Final    TDI SEPTAL 04/25/2019 0.06   Final    LA WIDTH 04/25/2019 5.15  cm Final    LV Diastolic Volume 04/25/2019 110.43  mL Final    LV Systolic Volume 04/25/2019 63.57  mL Final    LVOT peak juanis 04/25/2019 [id number]  m/s Final    FS 04/25/2019 21  % Final    LA volume 04/25/2019 120.81  cm3 Final    LV mass 04/25/2019 181.99  g Final    Left Ventricle Relative Wall Thick* 04/25/2019 0.45  cm Final    AV valve area 04/25/2019 1.42  cm2 Final    AV Velocity Ratio 04/25/2019 0.27   Final    AV index (prosthetic) 04/25/2019 0.28   Final    Mean e' 04/25/2019 0.08   Final    LVOT area 04/25/2019 5.14  cm2 Final    LVOT stroke volume 04/25/2019 68.53  cm3 Final    AV peak gradient 04/25/2019 23.81  mmHg Final    BSA 04/25/2019 1.96  m2 Final    LV Systolic Volume Index 04/25/2019 32.3  mL/m2 Final    LV Diastolic Volume Index 04/25/2019 56.11  mL/m2 Final    LA Volume Index 04/25/2019 61.4  mL/m2 Final    LV Mass Index 04/25/2019 92.5  g/m2 Final    Right Atrial Pressure (from IVC) 04/25/2019 3  mmHg Final   Hospital Outpatient Visit on 04/20/2019   Component Date Value Ref Range Status    POCT Glucose 04/20/2019 115* 70 - 110  mg/dL Final     FINAL PATHOLOGIC DIAGNOSIS  1. ULCER LESSER CURVATURE, GASTRIC BODY, BIOPSY-:  -ATYPICAL LYMPHOCYTIC INFILTRATION, PENDING MOLECULAR STUDY. SEE COMMENT  -RARE FOCUS OF ACTIVE INFLAMMATION.  -HELICOBACTER IS NEGATIVE.  COMMENT: Fragments of gastric mucosa infiltrated with small to medium-sized atypical lymphocytes.  Lymphoepithelial lesions are also evident.  Flow cytometric analysis of tissue was not submitted.  Immunohistochemical studies were performed on paraffin block with adequate positive and negative controls . The  small atypical lymphocytes are positive for CD20, low KI -67, and are negative for CD10, CD23, cyclin D1. The  reactive T cells are CD3 positive, CD5 positive, and BCL 2 positive. Immunohistochemical study for Helicobacter is  negative.  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 Molecular study is pending to confirm the diagnosis. A supplemental report will follow.    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  After an approximately 60 min distribution time, PET/CT images were acquired from the skull base to the mid thigh. Transmission images were acquired to correct for attenuation using a whole body low-dose CT scan without contrast with the arms positioned above the head. Glycemia at the time of injection was 115 mg/dL.    COMPARISON:  NM PET CT routine 01/03/2019    FINDINGS:  Quality of the study: Adequate.    In this patient with biopsy proven Gastric MALT lymphoma, the gastric wall appears mildly thickened, however less conspicuous when compared to PET CT 01/03/2019.  There is no increased tracer uptake within the gastric body or other discrete lesion identified; however,  MALT type lymphoma is typically difficult to differentiate from normal GI uptake.    Physiologic uptake of the tracer is present within the brain, salivary glands, myocardium, GI and  tracts.    Incidental CT findings:    Bilateral maxillary sinus mucosal thickening with near complete opacification of the partially visualized right maxillary sinus.    Coronary artery and atherosclerotic calcification.  Aortic valve calcification.  Fusiform dilatation of the mid ascending thoracic aorta to 4.4 cm, stable from prior.    Bibasilar atelectasis.  0.5 cm nodule in the left upper lobe abutting the pleura, stable from prior.  Interval resolution of bilateral pleural effusions.    There is a large calcified gallstone in the gallbladder lumen without evidence of acute cholelithiasis.    Prostatomegaly and diffuse bladder wall thickening, similar to prior.      Impression       No definite evidence of active marginal zone MALT.  The stomach is decompressed with mild wall thickening; however is less conspicuous metabollically when compared to PET CT 01/03/2019.    No increased tracer uptake identified.    Cholelithiasis without evidence of acute cholecystitis.    Prostatomegaly with bladder wall thickening likely secondary to outlet obstruction.    Interval resolution of bilateral pleural effusions.       Assessment:       1. MALT lymphoma    2. Essential hypertension    3. Paroxysmal atrial fibrillation           Plan:   1) Limited (Stage 1) MALT lymphoma  -H pylori negative  -Recent h/o GIB while was on DOAC for a-fib  -PET negative for distant disease or lymphadenopathy  -Hepatitis studies negative  -Patient recently seen by cardiology for a-fib, off anti-coagulation, off of beta-blocker given AEs  -Patient completed Rituximab at this time: 375mg x4 weeks on 3/7/19  -Persistent MALT lymphoma on EGD, PET was also done, no other site of disease  -Will refer back to radiation oncology to discuss benefits/risks of pursuing RT,  given persistent disease, and patient's desire to be back on anti-coagulation for his a-fib  -Pending response will consider maintenance Rituximab Q2-3 months for up to 8-12 months. Lack of response, will consider RT.    2) New systolic CHF, A-fib  -Of note, patient had an ECHO today ordered by cardiology, which showed Left ventricular systolic function. The estimated ejection fraction is 43%. Global hypokinetic wall motion. Septal wall has abnormal motion.   -Patient appears euvolemic, however may need additional ischemic work-up  -Will need further work-up by cardiology.    Follow-up: visit in 3 months with labs prior (CBC, CMP)    The following was staffed and discussed with supervising physician Dr. Prather.    Kelly Beckwith MD  Hematology/Oncology fellow    Distress Screening Results: Psychosocial Distress screening score of Distress Score: 0 noted and reviewed. No intervention indicated.

## 2019-04-25 ENCOUNTER — OFFICE VISIT (OUTPATIENT)
Dept: HEMATOLOGY/ONCOLOGY | Facility: CLINIC | Age: 84
End: 2019-04-25
Payer: MEDICARE

## 2019-04-25 ENCOUNTER — HOSPITAL ENCOUNTER (OUTPATIENT)
Dept: CARDIOLOGY | Facility: CLINIC | Age: 84
Discharge: HOME | End: 2019-04-25
Attending: INTERNAL MEDICINE
Payer: MEDICARE

## 2019-04-25 ENCOUNTER — LAB VISIT (OUTPATIENT)
Dept: LAB | Facility: HOSPITAL | Age: 84
End: 2019-04-25
Payer: MEDICARE

## 2019-04-25 VITALS
BODY MASS INDEX: 22.48 KG/M2 | HEART RATE: 65 BPM | SYSTOLIC BLOOD PRESSURE: 85 MMHG | DIASTOLIC BLOOD PRESSURE: 60 MMHG | HEIGHT: 72 IN | WEIGHT: 166 LBS

## 2019-04-25 VITALS
SYSTOLIC BLOOD PRESSURE: 137 MMHG | HEART RATE: 88 BPM | DIASTOLIC BLOOD PRESSURE: 92 MMHG | RESPIRATION RATE: 20 BRPM | WEIGHT: 170.88 LBS | OXYGEN SATURATION: 99 % | BODY MASS INDEX: 23.14 KG/M2 | TEMPERATURE: 98 F | HEIGHT: 72 IN

## 2019-04-25 DIAGNOSIS — I10 ESSENTIAL HYPERTENSION: ICD-10-CM

## 2019-04-25 DIAGNOSIS — C88.4 MALT LYMPHOMA: Primary | ICD-10-CM

## 2019-04-25 DIAGNOSIS — C88.4 MALT LYMPHOMA: ICD-10-CM

## 2019-04-25 DIAGNOSIS — I48.0 PAROXYSMAL ATRIAL FIBRILLATION: ICD-10-CM

## 2019-04-25 DIAGNOSIS — I50.30 DIASTOLIC CONGESTIVE HEART FAILURE, UNSPECIFIED HF CHRONICITY: ICD-10-CM

## 2019-04-25 DIAGNOSIS — I51.89 LEFT VENTRICULAR DIASTOLIC DYSFUNCTION WITH PRESERVED SYSTOLIC FUNCTION: ICD-10-CM

## 2019-04-25 LAB
ALBUMIN SERPL BCP-MCNC: 3.1 G/DL (ref 3.5–5.2)
ALP SERPL-CCNC: 82 U/L (ref 55–135)
ALT SERPL W/O P-5'-P-CCNC: 24 U/L (ref 10–44)
ANION GAP SERPL CALC-SCNC: 9 MMOL/L (ref 8–16)
ASCENDING AORTA: 4.3 CM
AST SERPL-CCNC: 23 U/L (ref 10–40)
AV INDEX (PROSTH): 0.28
AV MEAN GRADIENT: 13.78 MMHG
AV PEAK GRADIENT: 23.81 MMHG
AV VALVE AREA: 1.42 CM2
AV VELOCITY RATIO: 0.27
BASOPHILS # BLD AUTO: 0.09 K/UL (ref 0–0.2)
BASOPHILS NFR BLD: 1.3 % (ref 0–1.9)
BILIRUB SERPL-MCNC: 0.5 MG/DL (ref 0.1–1)
BSA FOR ECHO PROCEDURE: 1.96 M2
BUN SERPL-MCNC: 33 MG/DL (ref 10–30)
CALCIUM SERPL-MCNC: 9.8 MG/DL (ref 8.7–10.5)
CHLORIDE SERPL-SCNC: 105 MMOL/L (ref 95–110)
CO2 SERPL-SCNC: 27 MMOL/L (ref 23–29)
CREAT SERPL-MCNC: 1.1 MG/DL (ref 0.5–1.4)
CV ECHO LV RWT: 0.45 CM
DIFFERENTIAL METHOD: ABNORMAL
DOP CALC AO PEAK VEL: 2.44 M/S
DOP CALC AO VTI: 48.4 CM
DOP CALC LVOT AREA: 5.14 CM2
DOP CALC LVOT DIAMETER: 2.56 CM
DOP CALC LVOT PEAK VEL: 0.65 M/S
DOP CALC LVOT STROKE VOLUME: 68.53 CM3
DOP CALCLVOT PEAK VEL VTI: 13.32 CM
ECHO LV POSTERIOR WALL: 1.09 CM (ref 0.6–1.1)
EOSINOPHIL # BLD AUTO: 0.1 K/UL (ref 0–0.5)
EOSINOPHIL NFR BLD: 1.3 % (ref 0–8)
ERYTHROCYTE [DISTWIDTH] IN BLOOD BY AUTOMATED COUNT: 23 % (ref 11.5–14.5)
EST. GFR  (AFRICAN AMERICAN): >60 ML/MIN/1.73 M^2
EST. GFR  (NON AFRICAN AMERICAN): 58 ML/MIN/1.73 M^2
FRACTIONAL SHORTENING: 21 % (ref 28–44)
GLUCOSE SERPL-MCNC: 108 MG/DL (ref 70–110)
HCT VFR BLD AUTO: 40.3 % (ref 40–54)
HGB BLD-MCNC: 13 G/DL (ref 14–18)
IMM GRANULOCYTES # BLD AUTO: 0.16 K/UL (ref 0–0.04)
IMM GRANULOCYTES NFR BLD AUTO: 2.3 % (ref 0–0.5)
INTERVENTRICULAR SEPTUM: 0.98 CM (ref 0.6–1.1)
IVRT: 0.1 MSEC
LA MAJOR: 5.74 CM
LA MINOR: 6.01 CM
LA WIDTH: 5.15 CM
LDH SERPL L TO P-CCNC: 230 U/L (ref 110–260)
LEFT ATRIUM SIZE: 4.7 CM
LEFT ATRIUM VOLUME INDEX: 61.4 ML/M2
LEFT ATRIUM VOLUME: 120.81 CM3
LEFT INTERNAL DIMENSION IN SYSTOLE: 3.84 CM (ref 2.1–4)
LEFT VENTRICLE DIASTOLIC VOLUME INDEX: 56.11 ML/M2
LEFT VENTRICLE DIASTOLIC VOLUME: 110.43 ML
LEFT VENTRICLE MASS INDEX: 92.5 G/M2
LEFT VENTRICLE SYSTOLIC VOLUME INDEX: 32.3 ML/M2
LEFT VENTRICLE SYSTOLIC VOLUME: 63.57 ML
LEFT VENTRICULAR INTERNAL DIMENSION IN DIASTOLE: 4.86 CM (ref 3.5–6)
LEFT VENTRICULAR MASS: 181.99 G
LYMPHOCYTES # BLD AUTO: 1.2 K/UL (ref 1–4.8)
LYMPHOCYTES NFR BLD: 18.2 % (ref 18–48)
MCH RBC QN AUTO: 26.6 PG (ref 27–31)
MCHC RBC AUTO-ENTMCNC: 32.3 G/DL (ref 32–36)
MCV RBC AUTO: 83 FL (ref 82–98)
MONOCYTES # BLD AUTO: 0.6 K/UL (ref 0.3–1)
MONOCYTES NFR BLD: 8.1 % (ref 4–15)
NEUTROPHILS # BLD AUTO: 4.7 K/UL (ref 1.8–7.7)
NEUTROPHILS NFR BLD: 68.8 % (ref 38–73)
NRBC BLD-RTO: 0 /100 WBC
PLATELET # BLD AUTO: 293 K/UL (ref 150–350)
PMV BLD AUTO: 8.8 FL (ref 9.2–12.9)
POTASSIUM SERPL-SCNC: 4.4 MMOL/L (ref 3.5–5.1)
PROT SERPL-MCNC: 7.2 G/DL (ref 6–8.4)
RA MAJOR: 6.04 CM
RA PRESSURE: 3 MMHG
RA WIDTH: 5.26 CM
RBC # BLD AUTO: 4.88 M/UL (ref 4.6–6.2)
RIGHT VENTRICULAR END-DIASTOLIC DIMENSION: 4.27 CM
SINUS: 3.88 CM
SODIUM SERPL-SCNC: 141 MMOL/L (ref 136–145)
STJ: 3.49 CM
TDI LATERAL: 0.1
TDI SEPTAL: 0.06
TDI: 0.08
TRICUSPID ANNULAR PLANE SYSTOLIC EXCURSION: 1.32 CM
WBC # BLD AUTO: 6.82 K/UL (ref 3.9–12.7)

## 2019-04-25 PROCEDURE — 93308 TRANSTHORACIC ECHO (TTE) LIMITED (CUPID ONLY): ICD-10-PCS | Mod: HCNC,S$GLB,, | Performed by: INTERNAL MEDICINE

## 2019-04-25 PROCEDURE — 93308 TTE F-UP OR LMTD: CPT | Mod: HCNC,S$GLB,, | Performed by: INTERNAL MEDICINE

## 2019-04-25 PROCEDURE — 80053 COMPREHEN METABOLIC PANEL: CPT | Mod: HCNC

## 2019-04-25 PROCEDURE — 99214 PR OFFICE/OUTPT VISIT, EST, LEVL IV, 30-39 MIN: ICD-10-PCS | Mod: HCNC,GC,S$GLB, | Performed by: STUDENT IN AN ORGANIZED HEALTH CARE EDUCATION/TRAINING PROGRAM

## 2019-04-25 PROCEDURE — 1101F PT FALLS ASSESS-DOCD LE1/YR: CPT | Mod: HCNC,CPTII,GC,S$GLB | Performed by: STUDENT IN AN ORGANIZED HEALTH CARE EDUCATION/TRAINING PROGRAM

## 2019-04-25 PROCEDURE — 99999 PR PBB SHADOW E&M-EST. PATIENT-LVL III: CPT | Mod: PBBFAC,HCNC,GC, | Performed by: STUDENT IN AN ORGANIZED HEALTH CARE EDUCATION/TRAINING PROGRAM

## 2019-04-25 PROCEDURE — 36415 COLL VENOUS BLD VENIPUNCTURE: CPT | Mod: HCNC

## 2019-04-25 PROCEDURE — 85025 COMPLETE CBC W/AUTO DIFF WBC: CPT | Mod: HCNC

## 2019-04-25 PROCEDURE — 1101F PR PT FALLS ASSESS DOC 0-1 FALLS W/OUT INJ PAST YR: ICD-10-PCS | Mod: HCNC,CPTII,GC,S$GLB | Performed by: STUDENT IN AN ORGANIZED HEALTH CARE EDUCATION/TRAINING PROGRAM

## 2019-04-25 PROCEDURE — 99999 PR PBB SHADOW E&M-EST. PATIENT-LVL III: ICD-10-PCS | Mod: PBBFAC,HCNC,GC, | Performed by: STUDENT IN AN ORGANIZED HEALTH CARE EDUCATION/TRAINING PROGRAM

## 2019-04-25 PROCEDURE — 83615 LACTATE (LD) (LDH) ENZYME: CPT | Mod: HCNC

## 2019-04-25 PROCEDURE — 99214 OFFICE O/P EST MOD 30 MIN: CPT | Mod: HCNC,GC,S$GLB, | Performed by: STUDENT IN AN ORGANIZED HEALTH CARE EDUCATION/TRAINING PROGRAM

## 2019-04-25 NOTE — TELEPHONE ENCOUNTER
Victor Hugo, please call pt's pharmacy.  I have erx'd in his Irbesartan 150mg BID x last 3 days.  Have they gotten the refill or not? Thanks

## 2019-04-27 NOTE — TELEPHONE ENCOUNTER
Victor Hugo, please call pt and let him know that we're trying to get his Irbesartan authorized by his insurance.  Thanks

## 2019-04-29 ENCOUNTER — TELEPHONE (OUTPATIENT)
Dept: CARDIOLOGY | Facility: CLINIC | Age: 84
End: 2019-04-29

## 2019-04-29 ENCOUNTER — TELEPHONE (OUTPATIENT)
Dept: RADIATION ONCOLOGY | Facility: CLINIC | Age: 84
End: 2019-04-29

## 2019-04-29 DIAGNOSIS — I42.8 NICM (NONISCHEMIC CARDIOMYOPATHY): Primary | ICD-10-CM

## 2019-04-29 NOTE — TELEPHONE ENCOUNTER
plz let pt. Know that echo (ultrasound of the heart) function appears a little lower than before. What we would like to do is repeat the US in another 4 wks if the heart function is worsening. We have seen a few case of toxicity to the heart with Rituximab.  No changes in medication for now,

## 2019-04-29 NOTE — TELEPHONE ENCOUNTER
Appointments made per pt's time request.----- Message from Kelly Beckwith MD sent at 4/29/2019  9:05 AM CDT -----  Thank you!    ----- Message -----  From: Cisco Chavira MD  Sent: 4/29/2019   7:19 AM  To: Cami Linn RN, Dg Prather MD, #    Yes, I will be happy to.     Cami, will you please set patient up for f/u visit with me and CT Sim for same day?     ----- Message -----  From: Kelly Beckwith MD  Sent: 4/25/2019   3:42 PM  To: Dg Prather MD, Cisco Chavira MD    Hi Dr. Chavira,    This patient whom you previously saw in consultation, has persistent MALT lymphoma on EGD after completion of Rituximab. No other sites of disease. Patient would ideally like to go back on anti-coagulation for his a-fib. He also is dealing with worsening CHF, managed by cardiology. Wondering if you can see him again to discuss benefits/risks of pursuing radiation?    Thank you.    Kelly

## 2019-04-30 RX ORDER — IRBESARTAN 150 MG/1
TABLET ORAL
Qty: 183 TABLET | Refills: 0 | OUTPATIENT
Start: 2019-04-30

## 2019-05-01 RX ORDER — IRBESARTAN 150 MG/1
TABLET ORAL
Qty: 30 TABLET | Refills: 0
Start: 2019-05-01 | End: 2019-05-19 | Stop reason: SDUPTHER

## 2019-05-01 RX ORDER — IRBESARTAN 150 MG/1
TABLET ORAL
Qty: 30 TABLET | Refills: 0
Start: 2019-05-01 | End: 2019-11-19 | Stop reason: SDUPTHER

## 2019-05-01 RX ORDER — METOPROLOL SUCCINATE 25 MG/1
TABLET, EXTENDED RELEASE ORAL
Qty: 30 TABLET | Refills: 4 | Status: SHIPPED | OUTPATIENT
Start: 2019-05-01 | End: 2019-09-30 | Stop reason: SDUPTHER

## 2019-05-01 NOTE — TELEPHONE ENCOUNTER
Spoke with Mr Kelly who is taking Irbesartan 150mg at 1 daily s/p Cardiology eval/Dr Cifuentes, where med doses were changed.  He is also taking: Toprol XL 25mg QD and Lasix 20mg prn fluid retention. Medication list reconciled and accurate as of today.

## 2019-05-04 ENCOUNTER — OFFICE VISIT (OUTPATIENT)
Dept: URGENT CARE | Facility: CLINIC | Age: 84
End: 2019-05-04
Payer: MEDICARE

## 2019-05-04 VITALS
DIASTOLIC BLOOD PRESSURE: 68 MMHG | RESPIRATION RATE: 20 BRPM | TEMPERATURE: 98 F | OXYGEN SATURATION: 98 % | WEIGHT: 170 LBS | SYSTOLIC BLOOD PRESSURE: 116 MMHG | HEIGHT: 72 IN | BODY MASS INDEX: 23.03 KG/M2 | HEART RATE: 81 BPM

## 2019-05-04 DIAGNOSIS — I10 HYPERTENSION, UNSPECIFIED TYPE: Primary | ICD-10-CM

## 2019-05-04 PROCEDURE — 99214 OFFICE O/P EST MOD 30 MIN: CPT | Mod: S$GLB,,, | Performed by: FAMILY MEDICINE

## 2019-05-04 PROCEDURE — 1101F PT FALLS ASSESS-DOCD LE1/YR: CPT | Mod: CPTII,S$GLB,, | Performed by: FAMILY MEDICINE

## 2019-05-04 PROCEDURE — 1101F PR PT FALLS ASSESS DOC 0-1 FALLS W/OUT INJ PAST YR: ICD-10-PCS | Mod: CPTII,S$GLB,, | Performed by: FAMILY MEDICINE

## 2019-05-04 PROCEDURE — 99214 PR OFFICE/OUTPT VISIT, EST, LEVL IV, 30-39 MIN: ICD-10-PCS | Mod: S$GLB,,, | Performed by: FAMILY MEDICINE

## 2019-05-04 NOTE — PROGRESS NOTES
Subjective:       Patient ID: Norm Mendoza is a 89 y.o. male.    Vitals:  height is 6' (1.829 m) and weight is 77.1 kg (170 lb). His temperature is 98.2 °F (36.8 °C). His blood pressure is 116/68 and his pulse is 81. His respiration is 20 and oxygen saturation is 98%.     Chief Complaint: Hypertension    89-year-old male with history of hypertension, atrial fibrillation, CHF, Hodgkin's lymphoma who took his blood pressure this morning and noted a systolic BP of 185.  He felt fine.  He came to the urgent care today hoping to simply have his blood pressure checked.  No complaints chest pain or palpitations.  No shortness of breath          Hypertension   This is a new problem. The current episode started today. The problem is unchanged. The problem is controlled. Pertinent negatives include no blurred vision, chest pain, headaches or shortness of breath. Past treatments include beta blockers and diuretics.       Constitution: Negative for chills, fatigue and fever.   HENT: Negative for congestion and sore throat.    Neck: Negative for painful lymph nodes.   Cardiovascular: Negative for chest pain and leg swelling.   Eyes: Negative for double vision and blurred vision.   Respiratory: Negative for cough and shortness of breath.    Gastrointestinal: Negative for nausea, vomiting and diarrhea.   Genitourinary: Negative for dysuria, frequency and urgency.   Musculoskeletal: Negative for joint pain, joint swelling, muscle cramps and muscle ache.   Skin: Negative for color change, pale and rash.   Allergic/Immunologic: Negative for seasonal allergies.   Neurological: Negative for dizziness, history of vertigo, light-headedness, passing out and headaches.   Hematologic/Lymphatic: Negative for swollen lymph nodes, easy bruising/bleeding and history of blood clots. Does not bruise/bleed easily.   Psychiatric/Behavioral: Negative for nervous/anxious, sleep disturbance and depression. The patient is not nervous/anxious.         Objective:      Physical Exam   Constitutional: He is oriented to person, place, and time. He appears well-developed and well-nourished. He is cooperative.  Non-toxic appearance. He does not appear ill. No distress.   HENT:   Head: Normocephalic and atraumatic.   Right Ear: Hearing, tympanic membrane, external ear and ear canal normal.   Left Ear: Hearing, tympanic membrane, external ear and ear canal normal.   Nose: Nose normal. No mucosal edema, rhinorrhea or nasal deformity. No epistaxis. Right sinus exhibits no maxillary sinus tenderness and no frontal sinus tenderness. Left sinus exhibits no maxillary sinus tenderness and no frontal sinus tenderness.   Mouth/Throat: Uvula is midline, oropharynx is clear and moist and mucous membranes are normal. No trismus in the jaw. Normal dentition. No uvula swelling. No posterior oropharyngeal erythema.   Eyes: Pupils are equal, round, and reactive to light. Conjunctivae and lids are normal. Right eye exhibits no discharge. Left eye exhibits no discharge. No scleral icterus.   Sclera clear bilat   Neck: Trachea normal, normal range of motion, full passive range of motion without pain and phonation normal. Neck supple.   Cardiovascular: Normal rate, regular rhythm, normal heart sounds, intact distal pulses and normal pulses.   Pulmonary/Chest: Effort normal and breath sounds normal. No stridor. No respiratory distress. He has no wheezes.   Abdominal: Soft. Normal appearance and bowel sounds are normal. He exhibits no distension, no pulsatile midline mass and no mass. There is no tenderness. There is no guarding.   Musculoskeletal: Normal range of motion. He exhibits no deformity.   Trace pretibial and pedal edema   Lymphadenopathy:     He has no cervical adenopathy.   Neurological: He is alert and oriented to person, place, and time. He exhibits normal muscle tone. Coordination normal.   Skin: Skin is warm, dry and intact. He is not diaphoretic. No pallor.    Psychiatric: He has a normal mood and affect. His speech is normal and behavior is normal. Judgment and thought content normal. Cognition and memory are normal.   Nursing note and vitals reviewed.      Assessment:       1. Hypertension, unspecified type                CONTROLLED  Plan:       Patient reassured    Hypertension, unspecified type    CONTINUE MONITORING YOUR BLOOD PRESSURE AT HOME AS YOU ARE.    KEEP PLANNED FOLLOW-UP APPOINTMENTS FOR LATER THIS MONTH.    Make sure that you follow up with your primary care doctor in the next 2-5 days if needed .  Return to the Urgent Care if signs or symptoms change and certainly if you have worsening symptoms go to the nearest emergency department for further evaluation.

## 2019-05-04 NOTE — PATIENT INSTRUCTIONS
Controlling High Blood Pressure  High blood pressure (hypertension) is often called the silent killer. This is because many people who have it dont know it. High blood pressure is defined as 140/90 mm Hg or higher. Know your blood pressure and remember to check it regularly. Doing so can save your life. Here are some things you can do to help control your blood pressure.    Choose heart-healthy foods  · Select low-salt, low-fat foods. Limit sodium intake to 2,400 mg per day or the amount suggested by your healthcare provider.  · Limit canned, dried, cured, packaged, and fast foods. These can contain a lot of salt.  · Eat 8 to 10 servings of fruits and vegetables every day.  · Choose lean meats, fish, or chicken.  · Eat whole-grain pasta, brown rice, and beans.  · Eat 2 to 3 servings of low-fat or fat-free dairy products.  · Ask your doctor about the DASH eating plan. This plan helps reduce blood pressure.  · When you go to a restaurant, ask that your meal be prepared with no added salt.  Maintain a healthy weight  · Ask your healthcare provider how many calories to eat a day. Then stick to that number.  · Ask your healthcare provider what weight range is healthiest for you. If you are overweight, a weight loss of only 3% to 5% of your body weight can help lower blood pressure. Generally, a good weight loss goal is to lose 10% of your body weight in a year.  · Limit snacks and sweets.  · Get regular exercise.  Get up and get active  · Choose activities you enjoy. Find ones you can do with friends or family. This includes bicycling, dancing, walking, and jogging.  · Park farther away from building entrances.  · Use stairs instead of the elevator.  · When you can, walk or bike instead of driving.  · Marshall leaves, garden, or do household repairs.  · Be active at a moderate to vigorous level of physical activity for at least 40 minutes for a minimum of 3 to 4 days a week.   Manage stress  · Make time to relax and enjoy  life. Find time to laugh.  · Communicate your concerns with your loved ones and your healthcare provider.  · Visit with family and friends, and keep up with hobbies.  Limit alcohol and quit smoking  · Men should have no more than 2 drinks per day.  · Women should have no more than 1 drink per day.  · Talk with your healthcare provider about quitting smoking. Smoking significantly increases your risk for heart disease and stroke. Ask your healthcare provider about community smoking cessation programs and other options.  Medicines  If lifestyle changes arent enough, your healthcare provider may prescribe high blood pressure medicine. Take all medicines as prescribed. If you have any questions about your medicines, ask your healthcare provider before stopping or changing them.   Date Last Reviewed: 4/27/2016  © 3431-9161 [employer]. [address] [place] [address], [place] [address]. All rights reserved. This information is not intended as a substitute for professional medical care. Always follow your healthcare professional's instructions.      CONTINUE MONITORING YOUR BLOOD PRESSURE AT HOME AS YOU ARE.    KEEP PLANNED FOLLOW-UP APPOINTMENTS FOR LATER THIS MONTH.    Make sure that you follow up with your primary care doctor in the next 2-5 days if needed .  Return to the Urgent Care if signs or symptoms change and certainly if you have worsening symptoms go to the nearest emergency department for further evaluation.

## 2019-05-09 ENCOUNTER — PATIENT OUTREACH (OUTPATIENT)
Dept: OTHER | Facility: OTHER | Age: 84
End: 2019-05-09

## 2019-05-09 NOTE — PROGRESS NOTES
Last 5 Patient Entered Readings                                      Current 30 Day Average: 135/86     Recent Readings 5/8/2019 5/6/2019 5/6/2019 5/4/2019 5/4/2019    SBP (mmHg) 141 125 156 181 161    DBP (mmHg) 99 72 94 112 104    Pulse 81 71 85 86 84            Digital Medicine: Health  Follow Up    Left voicemail to follow up with Mr. Norm Mendoza.  Current BP average 135/86 mmHg is not at goal, 130/80.

## 2019-05-10 ENCOUNTER — PATIENT OUTREACH (OUTPATIENT)
Dept: OTHER | Facility: OTHER | Age: 84
End: 2019-05-10

## 2019-05-10 NOTE — PROGRESS NOTES
Last 5 Patient Entered Readings                                      Current 30 Day Average: 135/87     Recent Readings 5/10/2019 5/9/2019 5/8/2019 5/6/2019 5/6/2019    SBP (mmHg) 123 161 141 125 156    DBP (mmHg) 72 139 99 72 94    Pulse 78 95 81 71 85          Called patient for BP follow up. No answer. Left message for call back      Maggie Andrade, Pharm.D.   [facility] Clinical Pharmacist  [phone]

## 2019-05-16 ENCOUNTER — OFFICE VISIT (OUTPATIENT)
Dept: RADIATION ONCOLOGY | Facility: CLINIC | Age: 84
End: 2019-05-16
Payer: MEDICARE

## 2019-05-16 ENCOUNTER — HOSPITAL ENCOUNTER (OUTPATIENT)
Dept: CARDIOLOGY | Facility: CLINIC | Age: 84
Discharge: HOME | End: 2019-05-16
Attending: INTERNAL MEDICINE
Payer: MEDICARE

## 2019-05-16 VITALS
WEIGHT: 174.63 LBS | SYSTOLIC BLOOD PRESSURE: 135 MMHG | DIASTOLIC BLOOD PRESSURE: 79 MMHG | RESPIRATION RATE: 18 BRPM | HEART RATE: 82 BPM | OXYGEN SATURATION: 98 % | TEMPERATURE: 97 F | BODY MASS INDEX: 23.65 KG/M2 | HEIGHT: 72 IN

## 2019-05-16 VITALS
BODY MASS INDEX: 22.35 KG/M2 | SYSTOLIC BLOOD PRESSURE: 130 MMHG | HEIGHT: 72 IN | WEIGHT: 165 LBS | DIASTOLIC BLOOD PRESSURE: 78 MMHG

## 2019-05-16 DIAGNOSIS — I42.8 NICM (NONISCHEMIC CARDIOMYOPATHY): ICD-10-CM

## 2019-05-16 DIAGNOSIS — C88.4 MALT LYMPHOMA: Primary | ICD-10-CM

## 2019-05-16 LAB
ASCENDING AORTA: 4.07 CM
BSA FOR ECHO PROCEDURE: 1.95 M2
CV ECHO LV RWT: 0.41 CM
CV STRESS BASE HR: 70 BPM
DIASTOLIC BLOOD PRESSURE: 88 MMHG
DOP CALC LVOT AREA: 4.05 CM2
DOP CALC LVOT DIAMETER: 2.27 CM
DOP CALC LVOT PEAK VEL: 0.59 M/S
DOP CALC LVOT STROKE VOLUME: 49.19 CM3
DOP CALCLVOT PEAK VEL VTI: 12.16 CM
E WAVE DECELERATION TIME: 157.54 MSEC
E/A RATIO: 1.79
E/E' RATIO: 21.11
ECHO LV POSTERIOR WALL: 1 CM (ref 0.6–1.1)
FRACTIONAL SHORTENING: 18 % (ref 28–44)
INTERVENTRICULAR SEPTUM: 0.95 CM (ref 0.6–1.1)
IVRT: 0.09 MSEC
LA MAJOR: 6.41 CM
LA MINOR: 6.42 CM
LA WIDTH: 5.14 CM
LEFT ATRIUM SIZE: 4 CM
LEFT ATRIUM VOLUME INDEX: 57.1 ML/M2
LEFT ATRIUM VOLUME: 112.11 CM3
LEFT INTERNAL DIMENSION IN SYSTOLE: 3.97 CM (ref 2.1–4)
LEFT VENTRICLE DIASTOLIC VOLUME INDEX: 55.28 ML/M2
LEFT VENTRICLE DIASTOLIC VOLUME: 108.52 ML
LEFT VENTRICLE MASS INDEX: 84.4 G/M2
LEFT VENTRICLE SYSTOLIC VOLUME INDEX: 35.1 ML/M2
LEFT VENTRICLE SYSTOLIC VOLUME: 68.94 ML
LEFT VENTRICULAR INTERNAL DIMENSION IN DIASTOLE: 4.82 CM (ref 3.5–6)
LEFT VENTRICULAR MASS: 165.59 G
LV LATERAL E/E' RATIO: 23.75
LV SEPTAL E/E' RATIO: 19
MV PEAK A VEL: 0.53 M/S
MV PEAK E VEL: 0.95 M/S
OHS CV CPX 1 MINUTE RECOVERY HEART RATE: 103 BPM
OHS CV CPX 85 PERCENT MAX PREDICTED HEART RATE MALE: 108
OHS CV CPX ESTIMATED METS: 5
OHS CV CPX MAX PREDICTED HEART RATE: 127
OHS CV CPX PATIENT IS FEMALE: 0
OHS CV CPX PATIENT IS MALE: 1
OHS CV CPX PEAK DIASTOLIC BLOOD PRESSURE: 92 MMHG
OHS CV CPX PEAK HEAR RATE: 134 BPM
OHS CV CPX PEAK RATE PRESSURE PRODUCT: NORMAL
OHS CV CPX PEAK SYSTOLIC BLOOD PRESSURE: 132 MMHG
OHS CV CPX PERCENT MAX PREDICTED HEART RATE ACHIEVED: 106
OHS CV CPX PERCENT TARGET HEART RATE ACHIEVED: 125.23
OHS CV CPX RATE PRESSURE PRODUCT PRESENTING: NORMAL
OHS CV CPX TARGET HEART RATE: 107
PISA TR MAX VEL: 2.7 M/S
PULM VEIN S/D RATIO: 0.7
PV PEAK D VEL: 0.54 M/S
PV PEAK S VEL: 0.38 M/S
RA MAJOR: 6.28 CM
RA WIDTH: 4.92 CM
RETIRED EF AND QEF - SEE NOTES: 40 %
RIGHT VENTRICULAR END-DIASTOLIC DIMENSION: 4.11 CM
RV TISSUE DOPPLER FREE WALL SYSTOLIC VELOCITY 1 (APICAL 4 CHAMBER VIEW): 12.21 M/S
SINUS: 3.67 CM
STJ: 3.14 CM
STRESS ECHO POST EXERCISE DUR MIN: 7 MIN
STRESS ECHO POST EXERCISE DUR SEC: 10
SYSTOLIC BLOOD PRESSURE: 144 MMHG
TDI LATERAL: 0.04
TDI SEPTAL: 0.05
TDI: 0.05
TR MAX PG: 29.16 MMHG
TRICUSPID ANNULAR PLANE SYSTOLIC EXCURSION: 1.77 CM

## 2019-05-16 PROCEDURE — 99213 PR OFFICE/OUTPT VISIT, EST, LEVL III, 20-29 MIN: ICD-10-PCS | Mod: HCNC,S$GLB,, | Performed by: RADIOLOGY

## 2019-05-16 PROCEDURE — 99999 PR PBB SHADOW E&M-EST. PATIENT-LVL III: CPT | Mod: PBBFAC,HCNC,, | Performed by: RADIOLOGY

## 2019-05-16 PROCEDURE — 93351 ECHOCARDIOGRAM STRESS TEST (CUPID ONLY): ICD-10-PCS | Mod: HCNC,S$GLB,, | Performed by: INTERNAL MEDICINE

## 2019-05-16 PROCEDURE — 1101F PR PT FALLS ASSESS DOC 0-1 FALLS W/OUT INJ PAST YR: ICD-10-PCS | Mod: HCNC,CPTII,S$GLB, | Performed by: RADIOLOGY

## 2019-05-16 PROCEDURE — 99213 OFFICE O/P EST LOW 20 MIN: CPT | Mod: HCNC,S$GLB,, | Performed by: RADIOLOGY

## 2019-05-16 PROCEDURE — 99999 PR PBB SHADOW E&M-EST. PATIENT-LVL III: ICD-10-PCS | Mod: PBBFAC,HCNC,, | Performed by: RADIOLOGY

## 2019-05-16 PROCEDURE — 93351 STRESS TTE COMPLETE: CPT | Mod: HCNC,S$GLB,, | Performed by: INTERNAL MEDICINE

## 2019-05-16 PROCEDURE — 1101F PT FALLS ASSESS-DOCD LE1/YR: CPT | Mod: HCNC,CPTII,S$GLB, | Performed by: RADIOLOGY

## 2019-05-16 NOTE — PROGRESS NOTES
05/16/2019    Radiation Oncology Follow-Up Visit    Prior Radiation History: None    Assessment   This is a 89 y.o. 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 This was discovered due to GI bleed on blood thinner for A-Fib. He elected treatment with Rituximab in early 2019. Re-staging EGD 4/12/19 demonstrated resolution of ulcers, but biopsy of erythematous residual area revealed persistent MALT. He is referred back for consideration of definitive radiation.     I again discussed the role of radiotherapy for isolated gastric MALT. At this time since his ulcers have resolved, he plans to discuss with his cardiologist whether anti-coagulation can be restarted for A-Fib. If that will not be an issue, then I think continued observation is very reasonable since he otherwise has no symptoms from his cancer. Radiation will continue to be available as an option in the future should he wish to pursue it.  .        Plan   1) Patient wishes to continue observation at this time. He will continue to follow with Heme/Onc for surveillance and call us if he wants definitive treatment in the future.        Chief Complaint   Patient presents with    MALT Lymphoma     Consent for SIM       HPI: Mr. Mendoza returns today for consideration of RT for gastric MALT. After our initial consultation he decided to get treatment with Rituximab. He underwent EGD 4/12/19 that noted resolution of prior gastric ulcers, though biopsy of residual site revealed persistent MALT. PET/CT 4/20/19 demonstrated mild residual uptake in the stomach not significantly above physiologic level. He denies N/V, abdominal pain, hematemesis, melena, constipation, or diarrhea.       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    Mixed hyperlipidemia 5/2/2018     Paroxysmal atrial fibrillation 2018    Skin cancer     x3, had mohs on nose    Squamous cell carcinoma excised 14    R lower back    Symptomatic anemia 2018    Urinary tract infection     x4       Past Surgical History:   Procedure Laterality Date    ACHILLES TENDON SURGERY      cataracts      CYSTOSCOPY      bladder tumor    CYSTOSCOPY N/A 3/28/2014    [facility]    DILATION, URETHRA N/A 3/28/2014    [facility]    EGD (ESOPHAGOGASTRODUODENOSCOPY) N/A 2019    [facility] (4[facility]    EGD (ESOPHAGOGASTRODUODENOSCOPY) N/A 2018    [facility] (2ND F[facility]    EXCISION-BLADDER TUMOR-TRANSURETHRAL (TURBT) N/A 3/28/2014    [facility]    EYE SURGERY      SKIN CANCER EXCISION         Social History     Tobacco Use    Smoking status: Former Smoker     Packs/day: 1.00     Years: 25.00     Pack years: 25.00     Types: Cigarettes     Last attempt to quit: 9/3/1970     Years since quittin.7    Smokeless tobacco: Never Used   Substance Use Topics    Alcohol use: Yes     Alcohol/week: 1.8 oz     Types: 3 Shots of liquor per week     Comment: 3 bourbons daily - 12 beer on weekends     Drug use: No       Cancer-related family history is negative for Melanoma.    Current Outpatient Medications on File Prior to Visit   Medication Sig Dispense Refill    furosemide (LASIX) 20 MG tablet Take 1 tablet (20 mg total) by mouth as needed (leg edema, orthopnea, SOB,. weight gain). 30 tablet 3    irbesartan (AVAPRO) 150 MG tablet TAKE 1 TABLET BY MOUTH  DAILY 30 tablet 0    irbesartan (AVAPRO) 150 MG tablet TAKE 1 TABLET BY MOUTH  DAILY 30 tablet 0    metoprolol succinate (TOPROL-XL) 25 MG 24 hr tablet 1 tab daily 30 tablet 4    psyllium (METAMUCIL) packet Take 1 packet by mouth once daily.       ferrous sulfate, dried (SLOW FE) 160 mg (50 mg iron) TbSR Take 1 tablet (160 mg total) by mouth once daily. 30 tablet 3    FLUAD 9908-1698, 65 YR UP,,PF, 45 mcg (15 mcg x 3)/0.5 mL Syrg ADM 0.5ML IM UTD  0    pantoprazole (PROTONIX) 40 MG tablet Take 1 tablet (40 mg total) by mouth once daily. Take one pill AM of 12/10 and one pill PM of 12/10 then daily until you see your GI doctor 32 tablet 6     No current facility-administered medications on file prior to visit.        Review of patient's allergies indicates:  No Known Allergies    Review of Systems   Constitutional: Negative for fever and weight loss.   HENT: Positive for hearing loss. Negative for ear pain and sore throat.    Eyes: Negative for blurred vision and double vision.   Respiratory: Negative for cough, hemoptysis and shortness of breath.    Cardiovascular: Negative for chest pain and leg swelling.   Gastrointestinal: Negative for abdominal pain, constipation, diarrhea, heartburn and nausea.   Genitourinary: Negative for dysuria and hematuria.   Musculoskeletal: Negative for falls and joint pain.   Neurological: Negative for tingling, speech change, focal weakness, seizures and headaches.   Psychiatric/Behavioral: Negative for depression. The patient is not nervous/anxious.         Vital Signs: /79 (BP Location: Right arm, Patient Position: Sitting)   Pulse 82   Temp 97.4 °F (36.3 °C) (Oral)   Resp 18   Ht 6' (1.829 m)   Wt 79.2 kg (174 lb 9.6 oz)   SpO2 98%   BMI 23.68 kg/m²     ECOG Performance Status: 1 - Ambulates, capable of light work    Physical Exam   Constitutional: He is oriented to person, place, and time and well-developed, well-nourished, and in no distress.   HENT:   Head: Normocephalic and atraumatic.   Mouth/Throat: Oropharynx is clear and moist.   Eyes: EOM are normal. No scleral icterus.   Neck: Normal range of motion. Neck supple.   Pulmonary/Chest: No accessory muscle usage. No respiratory distress.   Abdominal:  Soft. Normal appearance. He exhibits no distension.   Musculoskeletal: Normal range of motion. He exhibits no edema.   Lymphadenopathy:     He has no cervical adenopathy.        Right: No supraclavicular adenopathy present.        Left: No supraclavicular adenopathy present.   Neurological: He is alert and oriented to person, place, and time. No cranial nerve deficit. Gait normal.   Skin: Skin is warm and dry.   Psychiatric: Mood, affect and judgment normal.   Vitals reviewed.       Labs:    Imaging: I have personally reviewed the patient's available images and reports and summarized pertinent findings above in HPI.     Pathology: I have personally reviewed the patient's available pathology and summarized pertinent findings above in HPI.

## 2019-05-17 NOTE — PROGRESS NOTES
"Last 5 Patient Entered Readings                                      Current 30 Day Average: 135/88     Recent Readings 5/17/2019 5/16/2019 5/16/2019 5/16/2019 5/16/2019    SBP (mmHg) 132 143 149 154 158    DBP (mmHg) 79 88 88 110 100    Pulse 78 79 78 82 75        Hypertension Medications     furosemide (LASIX) 20 MG tablet Take 1 tablet (20 mg total) by mouth as needed (leg edema, orthopnea, SOB,. weight gain).    irbesartan (AVAPRO) 150 MG tablet TAKE 1 TABLET BY MOUTH  DAILY    irbesartan (AVAPRO) 150 MG tablet TAKE 1 TABLET BY MOUTH  DAILY    metoprolol succinate (TOPROL-XL) 25 MG 24 hr tablet 1 tab daily     Called patient for BP follow up. When discussed occasional elevations in BP, patient says "I can tell you exactly what's going on. When liquid builds up in my body, my weight goes up, and BP goes up. My cardiologist just prescribed me a diuretic." He thinks his BP has been better since. He also says that he recently got someone to show him how to properly use the BP machine. He thinks he may have been putting on incorrectly. Yesterday, BP was 158/100 and 154/110 mmHg per digital readings. Patient thought the DBP was his HR reading and didn't know DBP > 100 was considered elevated. He says he doesn't pay attention to salt and ask why he should. He says he eats things like sardines or hamburgers for dinner. He says he is willing to talk with his health  about sodium intake and he "absolutely" doesn't want an increase in medications.     1) BP average exceeds goal of <130/<80. Digital BP readings are variable. Continue current BP medications.  2) I reviewed BP goal and importance of sodium monitoring   3) Patient knows to contact me with any non-urgent clinical changes or concerns. Go to ED or call [facility] for emergencies.   4) Will defer lifestyle counseling to digital medicine health . Will place task for health  to address sodium monitoring  5) Will continue to follow up.     Maggie" ALEIDA Andrade Pharm.D.   [facility] Clinical Pharmacist  [phone]

## 2019-05-17 NOTE — PROGRESS NOTES
Last 5 Patient Entered Readings                                      Current 30 Day Average: 135/88     Recent Readings 5/16/2019 5/16/2019 5/16/2019 5/16/2019 5/15/2019    SBP (mmHg) 143 149 154 158 109    DBP (mmHg) 88 88 110 100 63    Pulse 79 78 82 75 71          DM clinician attempting to reach patient, push next HC outreach 1 week.

## 2019-05-19 DIAGNOSIS — I10 ESSENTIAL HYPERTENSION: ICD-10-CM

## 2019-05-19 RX ORDER — IRBESARTAN 150 MG/1
TABLET ORAL
Qty: 60 TABLET | Refills: 0 | Status: SHIPPED | OUTPATIENT
Start: 2019-05-19 | End: 2019-05-30 | Stop reason: SDUPTHER

## 2019-05-19 RX ORDER — IRBESARTAN 150 MG/1
TABLET ORAL
Qty: 53 TABLET | Refills: 0 | Status: SHIPPED | OUTPATIENT
Start: 2019-05-19 | End: 2019-05-30 | Stop reason: SDUPTHER

## 2019-05-22 ENCOUNTER — TELEPHONE (OUTPATIENT)
Dept: CARDIOLOGY | Facility: CLINIC | Age: 84
End: 2019-05-22

## 2019-05-22 NOTE — PROGRESS NOTES
Please let pt. Know that the heart function is about 40% and with stress it shows some improvement. No change in medications and we will discuss this further on the next appointment.

## 2019-05-22 NOTE — TELEPHONE ENCOUNTER
----- Message from Maura Cifuentes MD sent at 5/22/2019  1:55 PM CDT -----  Please let pt. Know that the heart function is about 40% and with stress it shows some improvement. No change in medications and we will discuss this further on the next appointment.

## 2019-05-23 NOTE — PROGRESS NOTES
Last 5 Patient Entered Readings                                      Current 30 Day Average: 138/86     Recent Readings 5/23/2019 5/23/2019 5/22/2019 5/22/2019 5/22/2019    SBP (mmHg) 150 135 122 104 135    DBP (mmHg) 107 83 67 76 96    Pulse 70 76 72 82 71        Digital Medicine: Health  Follow Up    Lifestyle Modifications:    1.Dietary Modifications (Sodium intake <2,000mg/day, food labels, dining out): Reports never adding table salt to plate. Verbalized intent to eliminate sausage and increase water intake, will assess in 2 weeks. Typical meals consist of the following:    Breakfast: Bowl of cereal, toast with butter, coffee, sausage and eggs    Lunch: chicken salad wrap, soup    Dinner: hamburger with Worcestershire sauce   Snacks: none   Beverages: 1 cup of water, 4-5 cups coffee per day     2.Physical Activity: Concerned about increasing physical activity with medical conditions. Has an upcoming appointment to discuss further with physician.     3.Medication Therapy: Patient has been compliant with the medication regimen.    4.Patient has the following medication side effects/concerns: None  (Frequency/Alleviating factors/Precipitating factors, etc.)     Follow up with Mr. Norm Mendoza completed. No further questions or concerns. Will continue to follow up to achieve health goals.

## 2019-05-29 ENCOUNTER — TELEPHONE (OUTPATIENT)
Dept: CARDIOLOGY | Facility: CLINIC | Age: 84
End: 2019-05-29

## 2019-05-30 ENCOUNTER — OFFICE VISIT (OUTPATIENT)
Dept: CARDIOLOGY | Facility: CLINIC | Age: 84
End: 2019-05-30
Payer: MEDICARE

## 2019-05-30 VITALS
DIASTOLIC BLOOD PRESSURE: 78 MMHG | SYSTOLIC BLOOD PRESSURE: 122 MMHG | OXYGEN SATURATION: 98 % | HEART RATE: 77 BPM | HEIGHT: 72 IN | BODY MASS INDEX: 22.93 KG/M2 | WEIGHT: 169.31 LBS

## 2019-05-30 DIAGNOSIS — I50.30 DIASTOLIC CONGESTIVE HEART FAILURE, UNSPECIFIED HF CHRONICITY: ICD-10-CM

## 2019-05-30 DIAGNOSIS — E78.00 HYPERCHOLESTEROLEMIA: ICD-10-CM

## 2019-05-30 DIAGNOSIS — I42.8 OTHER CARDIOMYOPATHY: ICD-10-CM

## 2019-05-30 DIAGNOSIS — I10 ESSENTIAL HYPERTENSION: ICD-10-CM

## 2019-05-30 DIAGNOSIS — I48.20 CHRONIC ATRIAL FIBRILLATION: Primary | ICD-10-CM

## 2019-05-30 DIAGNOSIS — I49.3 PVC'S (PREMATURE VENTRICULAR CONTRACTIONS): ICD-10-CM

## 2019-05-30 DIAGNOSIS — I70.0 AORTIC ATHEROSCLEROSIS: ICD-10-CM

## 2019-05-30 PROCEDURE — 1101F PT FALLS ASSESS-DOCD LE1/YR: CPT | Mod: HCNC,CPTII,S$GLB, | Performed by: INTERNAL MEDICINE

## 2019-05-30 PROCEDURE — 99214 PR OFFICE/OUTPT VISIT, EST, LEVL IV, 30-39 MIN: ICD-10-PCS | Mod: HCNC,S$GLB,, | Performed by: INTERNAL MEDICINE

## 2019-05-30 PROCEDURE — 99499 RISK ADDL DX/OHS AUDIT: ICD-10-PCS | Mod: HCNC,S$GLB,, | Performed by: INTERNAL MEDICINE

## 2019-05-30 PROCEDURE — 99499 UNLISTED E&M SERVICE: CPT | Mod: HCNC,S$GLB,, | Performed by: INTERNAL MEDICINE

## 2019-05-30 PROCEDURE — 99214 OFFICE O/P EST MOD 30 MIN: CPT | Mod: HCNC,S$GLB,, | Performed by: INTERNAL MEDICINE

## 2019-05-30 PROCEDURE — 1101F PR PT FALLS ASSESS DOC 0-1 FALLS W/OUT INJ PAST YR: ICD-10-PCS | Mod: HCNC,CPTII,S$GLB, | Performed by: INTERNAL MEDICINE

## 2019-05-30 RX ORDER — GUAIFENESIN 100 MG/5ML
200 SOLUTION ORAL 3 TIMES DAILY PRN
Refills: 0 | COMMUNITY
Start: 2019-05-30 | End: 2019-06-09

## 2019-05-30 NOTE — PROGRESS NOTES
Subjective:   Patient ID:  Norm Mendoza is a 89 y.o. male who presents for follow-up of Cough  Norm Mendoza is a 89 y.o. male is a new patient who presents for evaluation of Congestive Heart Failure (With Exacerbation) and Atrial Fibrillation  This is a 89 y.o. male here for follow-up of gastric MALT lymphoma. Patient has undergone immunotherapy Completed Rituximab x4 on 3/7/19     Echo 5/2018    1 - Normal left ventricular systolic function (EF 55-60%).     2 - Indeterminate LV diastolic function.     3 - Biatrial enlargement.     4 - No wall motion abnormalities.     5 - Normal right ventricular systolic function .     6 - Mildly enlarged ascending aorta.     7 - Trivial to mild aortic regurgitation.     8 - Mild mitral regurgitation.     9 - Mild tricuspid regurgitation.     10 - Trivial to mild pulmonic regurgitation.     11 - The estimated PA systolic pressure is 33 mmHg.      HPI:   Patient is experiencing cough, bringing phlegm. No fever. BNP is elevated on the lab work that is concerning for heart failure.   Patient works at a [employer] 3 days a week.      EKG: AF. LAD. interventricular conduction delay.        Echo 5/2019  · Left ventricular systolic function. The estimated ejection fraction is 43%  · Global hypokinetic wall motion.  · Septal wall has abnormal motion.  · Concentric left ventricular remodeling.  · Severe left atrial enlargement.  · Mild right ventricular enlargement.  · Low normal right ventricular systolic function.  · Moderate right atrial enlargement.  · Moderate aortic valve stenosis.  · Aortic valve area is 1.42 cm2; peak velocity is 2.44 m/s; mean gradient is 13.78 mmHg.  · Moderate mitral sclerosis.  · Normal central venous pressure (3 mm Hg).    Exercise stress echo:  Patient in AF at baseline and during stress  · The patient reported no symptoms during the stress test.  · The test was stopped secondary to fatigue.  · There were no arrhythmias during  stress.  · Mildly decreased left ventricular systolic function. The estimated ejection fraction is 40%, 2D quantitative LVEF 33%.  · Grade II (moderate) left ventricular diastolic dysfunction consistent with pseudonormalization.  · Elevated left atrial pressure.  · Severe left atrial enlargement.  · The ascending aorta is moderately dilated.  · Mild tricuspid regurgitation.  · Low normal right ventricular systolic function.  · Moderate right atrial enlargement.  · Concentric left ventricular remodeling.  · Overall, the patient's exercise capacity was normal.  · The EKG portion of this study is negative for myocardial ischemia.  · No obvious wall motion abnormalities at stress. LV function augments but still not normal at stress.       HPI:   Drop in EF note and patient was called back for further discussion. In chronic AF during the stress test.   Patient is experiencing cough, bringing phlegm. No fever. BNP is elevated on the lab work that is concerning for heart failure.   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   No orthopnea or PND.     Patient Active Problem List   Diagnosis    AK (actinic keratosis)    History of benign bladder tumor    H/O nonmelanoma skin cancer    Aortic atherosclerosis    Essential hypertension    Left ventricular diastolic dysfunction with preserved systolic function    Nonrheumatic aortic valve stenosis    Alcohol dependence, daily use    Mixed hyperlipidemia    Aortic root dilation    Hypercholesterolemia    PVC's (premature ventricular contractions)    Tortuous aorta    Paroxysmal atrial fibrillation    MALT lymphoma    PUD (peptic ulcer disease)     /78   Pulse 77   Ht 6' (1.829 m)   Wt 76.8 kg (169 lb 4.8 oz)   SpO2 98%   BMI 22.96 kg/m²   Body mass index is 22.96 kg/m².  CrCl cannot be calculated (Patient's most recent lab result is older than the maximum 7 days allowed.).    Lab Results   Component Value Date     04/25/2019    K 4.4  04/25/2019     04/25/2019    CO2 27 04/25/2019    BUN 33 (H) 04/25/2019    CREATININE 1.1 04/25/2019     04/25/2019    MG 1.9 03/13/2019    AST 23 04/25/2019    ALT 24 04/25/2019    ALBUMIN 3.1 (L) 04/25/2019    PROT 7.2 04/25/2019    BILITOT 0.5 04/25/2019    WBC 6.82 04/25/2019    HGB 13.0 (L) 04/25/2019    HCT 40.3 04/25/2019    MCV 83 04/25/2019     04/25/2019    INR 1.1 03/13/2019    PSA 2.5 09/10/2015    TSH 2.775 11/30/2018    CHOL 224 (H) 08/03/2018    CHOL 224 (H) 08/03/2018    HDL 76 (H) 08/03/2018    HDL 76 (H) 08/03/2018    LDLCALC 136.6 08/03/2018    LDLCALC 136.6 08/03/2018    TRIG 57 08/03/2018    TRIG 57 08/03/2018       Current Outpatient Medications   Medication Sig    furosemide (LASIX) 20 MG tablet Take 1 tablet (20 mg total) by mouth as needed (leg edema, orthopnea, SOB,. weight gain).    irbesartan (AVAPRO) 150 MG tablet TAKE 1 TABLET BY MOUTH  DAILY    metoprolol succinate (TOPROL-XL) 25 MG 24 hr tablet 1 tab daily    pantoprazole (PROTONIX) 40 MG tablet Take 1 tablet (40 mg total) by mouth once daily. Take one pill AM of 12/10 and one pill PM of 12/10 then daily until you see your GI doctor    psyllium (METAMUCIL) packet Take 1 packet by mouth once daily.    UNABLE TO FIND Take 1 Dose by mouth once daily. Hydroperoxide. Pt stated he put it in his water pick to cleanse his teeth    guaifenesin 100 mg/5 ml (ROBITUSSIN) 100 mg/5 mL syrup Take 10 mLs (200 mg total) by mouth 3 (three) times daily as needed for Cough.     No current facility-administered medications for this visit.        Review of Systems   Constitution: Positive for malaise/fatigue. Negative for chills, decreased appetite, night sweats, weight gain and weight loss.   Eyes: Negative for blurred vision, double vision, visual disturbance and visual halos.   Cardiovascular: Positive for dyspnea on exertion. Negative for chest pain, claudication, cyanosis, irregular heartbeat, leg swelling, near-syncope,  orthopnea, palpitations, paroxysmal nocturnal dyspnea and syncope.   Respiratory: Negative for cough, hemoptysis, snoring, sputum production and wheezing.    Endocrine: Negative for cold intolerance, heat intolerance, polydipsia and polyphagia.   Hematologic/Lymphatic: Negative for adenopathy and bleeding problem. Does not bruise/bleed easily.   Skin: Negative for flushing, itching, poor wound healing and rash.   Musculoskeletal: Negative for arthritis, back pain, falls, gout, joint pain, joint swelling, muscle cramps, muscle weakness, myalgias, neck pain and stiffness.   Gastrointestinal: Negative for bloating, abdominal pain, anorexia, diarrhea, dysphagia, excessive appetite, flatus, hematemesis, jaundice, melena and nausea.   Genitourinary: Negative for hesitancy and incomplete emptying.   Neurological: Negative for aphonia, brief paralysis, difficulty with concentration, disturbances in coordination, excessive daytime sleepiness, dizziness, focal weakness, light-headedness, loss of balance and weakness.   Psychiatric/Behavioral: Negative for altered mental status, depression, hallucinations, hypervigilance, memory loss, substance abuse and suicidal ideas. The patient does not have insomnia and is not nervous/anxious.        Objective:   Physical Exam   Constitutional: He is oriented to person, place, and time. He appears well-developed and well-nourished. No distress.   HENT:   Head: Normocephalic and atraumatic.   Nose: Nose normal.   Mouth/Throat: No oropharyngeal exudate.   Eyes: Pupils are equal, round, and reactive to light. Conjunctivae and EOM are normal. Right eye exhibits no discharge. Left eye exhibits no discharge. No scleral icterus.   Neck: Normal range of motion. Neck supple. No JVD present. No tracheal deviation present. No thyromegaly present.   Cardiovascular: Normal rate, regular rhythm, normal heart sounds and intact distal pulses. Exam reveals no gallop and no friction rub.   No murmur  heard.  Pulmonary/Chest: Effort normal and breath sounds normal. No stridor. No respiratory distress. He has no wheezes. He has no rales. He exhibits no tenderness.   Abdominal: Soft. Bowel sounds are normal. He exhibits no distension and no mass. There is no tenderness.   Musculoskeletal: He exhibits no edema or tenderness.   Lymphadenopathy:     He has no cervical adenopathy.   Neurological: He is alert and oriented to person, place, and time. He displays normal reflexes. No cranial nerve deficit. He exhibits normal muscle tone. Coordination normal.   Skin: Skin is warm. No rash noted. He is not diaphoretic. No erythema. No pallor.   Psychiatric: He has a normal mood and affect. His behavior is normal. Judgment and thought content normal.       Assessment:     1. Chronic atrial fibrillation    2. Other cardiomyopathy    3. Diastolic congestive heart failure, unspecified HF chronicity    4. Essential hypertension    5. Aortic atherosclerosis    6. Hypercholesterolemia    7. PVC's (premature ventricular contractions)        Plan:   Suspect AF induced CM. Patient has already been treated for his MALToma but based on his prior GI bleed (at the time lymphomatous ulcer) since he is in remission (based on radoncologist and hem onc noted ), given his stroke risk I started him back on xeralto and referring to Dr. Crowder for WATCHMAN and AF ablation evaluation. He wants to go back on AC to decrease the stroke risk and understands the bleeding risk.    Will reassess EF post AF ablation and watchman    RTC 3 MO

## 2019-05-31 NOTE — TELEPHONE ENCOUNTER
----- Message from Shelbi Veloz sent at 5/31/2019  8:24 AM CDT -----  Contact: Pt called   Pt stated he was told by Dr. Cifuentes that he would start taking a blood thinner on yesterday But it wasn't sent to the pharmacy. If so please send to [email or web address] [address] [place] [address] [employer] [address] AT [address]  [phone] (Phone)[phone] (Fax). Lv 5/29/19 Dr. Cifuentes. Please call pt @ [phone]. Thank you.

## 2019-06-04 ENCOUNTER — TELEPHONE (OUTPATIENT)
Dept: CARDIOLOGY | Facility: CLINIC | Age: 84
End: 2019-06-04

## 2019-06-06 ENCOUNTER — PATIENT OUTREACH (OUTPATIENT)
Dept: OTHER | Facility: OTHER | Age: 84
End: 2019-06-06

## 2019-06-06 NOTE — PROGRESS NOTES
"Last 5 Patient Entered Readings                                      Current 30 Day Average: 135/82     Recent Readings 6/6/2019 6/5/2019 6/5/2019 6/5/2019 6/4/2019    SBP (mmHg) 124 151 166 125 141    DBP (mmHg) 78 98 84 72 81    Pulse 71 77 63 95 69          Digital Medicine: Health  Follow Up    Lifestyle Modifications:    1.Dietary Modifications (Sodium intake <2,000mg/day, food labels, dining out): Reports increased water intake, ordering water at lunch rather than coffee. Reports decreased intake of sausage, has not had it "in a while".     2.Physical Activity: Deferred     3.Medication Therapy: Patient has been compliant with the medication regimen.    4.Patient has the following medication side effects/concerns: None    Follow up with Mr. Norm Mendoza completed. No further questions or concerns. Will continue to follow up to achieve health goals.    "

## 2019-06-18 ENCOUNTER — INITIAL CONSULT (OUTPATIENT)
Dept: ELECTROPHYSIOLOGY | Facility: CLINIC | Age: 84
End: 2019-06-18
Payer: MEDICARE

## 2019-06-18 VITALS
WEIGHT: 172.81 LBS | DIASTOLIC BLOOD PRESSURE: 68 MMHG | HEIGHT: 72 IN | BODY MASS INDEX: 23.41 KG/M2 | SYSTOLIC BLOOD PRESSURE: 100 MMHG

## 2019-06-18 DIAGNOSIS — I10 ESSENTIAL HYPERTENSION: ICD-10-CM

## 2019-06-18 DIAGNOSIS — I42.0 DILATED CARDIOMYOPATHY: ICD-10-CM

## 2019-06-18 DIAGNOSIS — I48.19 PERSISTENT ATRIAL FIBRILLATION: Primary | ICD-10-CM

## 2019-06-18 DIAGNOSIS — C88.4 MALT LYMPHOMA: ICD-10-CM

## 2019-06-18 DIAGNOSIS — K27.9 PUD (PEPTIC ULCER DISEASE): ICD-10-CM

## 2019-06-18 PROCEDURE — 1101F PT FALLS ASSESS-DOCD LE1/YR: CPT | Mod: HCNC,CPTII,S$GLB, | Performed by: INTERNAL MEDICINE

## 2019-06-18 PROCEDURE — 1101F PR PT FALLS ASSESS DOC 0-1 FALLS W/OUT INJ PAST YR: ICD-10-PCS | Mod: HCNC,CPTII,S$GLB, | Performed by: INTERNAL MEDICINE

## 2019-06-18 PROCEDURE — 99205 OFFICE O/P NEW HI 60 MIN: CPT | Mod: HCNC,S$GLB,, | Performed by: INTERNAL MEDICINE

## 2019-06-18 PROCEDURE — 99999 PR PBB SHADOW E&M-EST. PATIENT-LVL III: ICD-10-PCS | Mod: PBBFAC,HCNC,, | Performed by: INTERNAL MEDICINE

## 2019-06-18 PROCEDURE — 99999 PR PBB SHADOW E&M-EST. PATIENT-LVL III: CPT | Mod: PBBFAC,HCNC,, | Performed by: INTERNAL MEDICINE

## 2019-06-18 PROCEDURE — 99205 PR OFFICE/OUTPT VISIT, NEW, LEVL V, 60-74 MIN: ICD-10-PCS | Mod: HCNC,S$GLB,, | Performed by: INTERNAL MEDICINE

## 2019-06-18 NOTE — LETTER
June 18, 2019      Maura Cifuentes MD  [address] [facility]  [address]th [address]  [place] [address]           [facility] Ar[facility]  [address]  [place] [address]  Phone: [phone]  Fax: [phone]          Patient: Norm Mendoza   MR Number: [id number]   YOB: 1926   Date of Visit: 6/18/2019       Dear Dr. Maura Cifuentes:    Thank you for referring Norm Mendoza to me for evaluation. Attached you will find relevant portions of my assessment and plan of care.    If you have questions, please do not hesitate to call me. I look forward to following Norm Mendoza along with you.    Sincerely,    Hi Crowder MD    Enclosure  CC:  No Recipients    If you would like to receive this communication electronically, please contact [email or web address] or [phone] to request more information on [employer] access.    For providers and/or their staff who would like to refer a patient to [facility], please contact us through our one-stop-shop provider referral line, [facility] [place], at [phone].    If you feel you have received this communication in error or would no longer like to receive these types of communications, please e-mail [email or web address]

## 2019-06-18 NOTE — PROGRESS NOTES
Subjective:    Patient ID:  Norm Mendoza is a 89 y.o. male who presents for evaluation of watchman consult      89 yoM here for LAAO consideration. He has had development of HF as well as persistent AF. He was in NSR 2014. The next ECG 11/18 showed AF. EF 5/18 was normal in NSR. He was started on OAC 12/18 for AF. 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 He underwent Rituximab therapy and had resolution of his ulcers 4/19 but had residual MALT. He did not receive radiation.  He has mild HF symptoms, treated with lasix. Stress echo 5/19 showed EF 40%. He was prescribed xarelto 5/30/19 but he has not taken it.    Echo 5/18:  CONCLUSIONS     1 - Normal left ventricular systolic function (EF 55-60%).     2 - Indeterminate LV diastolic function.     3 - Biatrial enlargement.     4 - No wall motion abnormalities.     5 - Normal right ventricular systolic function .     6 - Mildly enlarged ascending aorta.     7 - Trivial to mild aortic regurgitation.     8 - Mild mitral regurgitation.     9 - Mild tricuspid regurgitation.     10 - Trivial to mild pulmonic regurgitation.     11 - The estimated PA systolic pressure is 33 mmHg.     Stress echo 5/19:  · THE PATIENT IS IN ATRIAL FIBRILLATION FOR THE ENTIRETY OF THE TEST  · The patient reported no symptoms during the stress test.  · The test was stopped secondary to fatigue.  · There were no arrhythmias during stress.  · Mildly decreased left ventricular systolic function. The estimated ejection fraction is 40%, 2D quantitative LVEF 33%.  · Grade II (moderate) left ventricular diastolic dysfunction consistent with pseudonormalization.  · Elevated left atrial pressure.  · Severe left atrial enlargement.  · The ascending aorta is moderately dilated.  · Mild tricuspid regurgitation.  · Low normal right ventricular systolic function.  · Moderate right atrial enlargement.  · Concentric left ventricular remodeling.  · Overall, the  patient's exercise capacity was normal.  · The EKG portion of this study is negative for myocardial ischemia.  · No obvious wall motion abnormalities at stress. LV function augments but still not normal at stress.    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  5/2/2018: Mixed hyperlipidemia  11/30/2018: Paroxysmal atrial fibrillation  No date: Skin cancer      Comment:  x3, had mohs on nose  excised 12/5/14: Squamous cell carcinoma      Comment:  R lower back  12/6/2018: Symptomatic anemia  No date: Urinary tract infection      Comment:  x4    Past Surgical History:  No date: ACHILLES TENDON SURGERY  No date: cataracts  No date: CYSTOSCOPY      Comment:  bladder tumor  3/28/2014: CYSTOSCOPY; N/A      Comment:  Performed by Slava Barbosa MD at [facility] OR 1ST FLR  3/28/2014: DILATION, URETHRA; N/A      Comment:  Performed by Slava Barbosa MD at [facility] OR 1ST FLR  4/12/2019: EGD (ESOPHAGOGASTRODUODENOSCOPY); N/A      Comment:  Performed by Austin Garcia MD at [facility] ENDO (4TH FLR)  12/7/2018: EGD (ESOPHAGOGASTRODUODENOSCOPY); N/A      Comment:  Performed by Austin Garcia MD at [facility] ENDO (2ND FLR)  3/28/2014: EXCISION-BLADDER TUMOR-TRANSURETHRAL (TURBT); N/A      Comment:  Performed by Slava Barbosa MD at [facility] OR 1ST FLR  No date: EYE SURGERY  No date: SKIN CANCER EXCISION    Social History    Socioeconomic History      Marital status:       Spouse name: Not on file      Number of children: 2      Years of education: Not on file      Highest education level: Not on file    Occupational History      Occupation: Financial Work/        Employer: retired      Occupation: actor      Occupation:     Social Needs      Financial resource strain: Not on file      Food insecurity:        Worry: Not on file         Inability: Not on file      Transportation needs:        Medical: Not on file        Non-medical: Not on file    Tobacco Use      Smoking status: Former Smoker        Packs/day: 1.00        Years: 25.00        Pack years: 25        Types: Cigarettes        Quit date: 9/3/1970        Years since quittin.8      Smokeless tobacco: Never Used    Substance and Sexual Activity      Alcohol use: Yes        Alcohol/week: 1.8 oz        Types: 3 Shots of liquor per week        Comment: 3 bourbons daily - 12 beer on weekends       Drug use: No      Sexual activity: Yes        Partners: Female    Lifestyle      Physical activity:        Days per week: Not on file        Minutes per session: Not on file      Stress: Not on file    Relationships      Social connections:        Talks on phone: Not on file        Gets together: Not on file        Attends Nondenominational service: Not on file        Active member of club or organization: Not on file        Attends meetings of clubs or organizations: Not on file        Relationship status: Not on file    Other Topics      Concerns:        Not on file    Social History Narrative      Not on file      Review of patient's family history indicates:  Problem: Stroke      Relation: Father          Age of Onset: (Not Specified)  Problem: Hypertension      Relation: Father          Age of Onset: (Not Specified)  Problem: Stroke      Relation: Brother          Age of Onset: (Not Specified)  Problem: Anesthesia problems      Relation: Neg Hx          Age of Onset: (Not Specified)  Problem: Malignant hypertension      Relation: Neg Hx          Age of Onset: (Not Specified)  Problem: Hypotension      Relation: Neg Hx          Age of Onset: (Not Specified)  Problem: Malignant hyperthermia      Relation: Neg Hx          Age of Onset: (Not Specified)  Problem: Pseudochol deficiency      Relation: Neg Hx          Age of Onset: (Not Specified)  Problem: Melanoma      Relation: Neg Hx          Age of  Onset: (Not Specified)  Problem: Heart attack      Relation: Neg Hx          Age of Onset: (Not Specified)  Problem: Heart disease      Relation: Neg Hx          Age of Onset: (Not Specified)  Problem: Heart failure      Relation: Neg Hx          Age of Onset: (Not Specified)        Review of Systems   Constitution: Negative.   HENT: Negative.    Eyes: Negative.    Cardiovascular: Positive for dyspnea on exertion, irregular heartbeat and leg swelling. Negative for chest pain, near-syncope, palpitations and syncope.   Respiratory: Negative.  Negative for shortness of breath.    Endocrine: Negative.    Hematologic/Lymphatic: Negative.    Skin: Negative.    Musculoskeletal: Negative.    Gastrointestinal: Negative.    Genitourinary: Negative.    Neurological: Negative.  Negative for dizziness and light-headedness.   Psychiatric/Behavioral: Negative.    Allergic/Immunologic: Negative.         Objective:    Physical Exam   Constitutional: He is oriented to person, place, and time. He appears well-developed and well-nourished. No distress.   HENT:   Head: Normocephalic and atraumatic.   Eyes: Pupils are equal, round, and reactive to light. EOM are normal.   Neck: Normal range of motion. No JVD present. No thyromegaly present.   Cardiovascular: Normal rate, S1 normal, S2 normal and normal heart sounds. An irregularly irregular rhythm present. PMI is not displaced. Exam reveals no gallop and no friction rub.   No murmur heard.  Pulmonary/Chest: Effort normal and breath sounds normal. No respiratory distress. He has no wheezes. He has no rales.   Abdominal: Soft. Bowel sounds are normal. He exhibits no distension. There is no tenderness. There is no rebound and no guarding.   Musculoskeletal: Normal range of motion. He exhibits no edema or tenderness.   Neurological: He is alert and oriented to person, place, and time. No cranial nerve deficit.   Skin: Skin is warm and dry. No rash noted. No erythema.   Psychiatric: He has a  normal mood and affect. His behavior is normal. Judgment and thought content normal.   Vitals reviewed.    ECG: AF with normal V rates, nl QRS        Assessment:       1. Persistent atrial fibrillation    2. Essential hypertension    3. MALT lymphoma    4. PUD (peptic ulcer disease)    5. Dilated cardiomyopathy         Plan:       89 yoM persistent AF, HTN, MALT, PUD, cardiomyopathy here for AF management. He has symptomatic AF with associated cardiomyopathy. He has high risk for CVA and contraindication for long term anticoagulation. I offered JACINTO/CV to restore NSR and hopefully to restore LV function. Afterwards, we will discuss options for LAAO. He will start xarelto now and we will plan on JACINTO/CV in 2 weeks. Follow up in 6 weeks.

## 2019-06-21 ENCOUNTER — PATIENT OUTREACH (OUTPATIENT)
Dept: OTHER | Facility: OTHER | Age: 84
End: 2019-06-21

## 2019-06-21 NOTE — PROGRESS NOTES
"Last 5 Patient Entered Readings                                      Current 30 Day Average: 131/81     Recent Readings 6/21/2019 6/21/2019 6/21/2019 6/21/2019 6/20/2019    SBP (mmHg) 174 170 160 149 121    DBP (mmHg) 105 87 91 80 71    Pulse 78 69 67 68 72        Hypertension Medications     furosemide (LASIX) 20 MG tablet Take 1 tablet (20 mg total) by mouth as needed (leg edema, orthopnea, SOB,. weight gain).    irbesartan (AVAPRO) 150 MG tablet TAKE 1 TABLET BY MOUTH  DAILY    metoprolol succinate (TOPROL-XL) 25 MG 24 hr tablet 1 tab daily     Called patient for BP follow up. He is doing well with no complaints. Denies symptoms of hypertension (chest pain, SOB, headache, unilateral numbness/ weakness, vision changes, etc.). He contributes occasional elevated BP readings to edema. He says he takes a diuretic to help with this. He says when he takes the diuretic, his weight comes down and his BP comes down. He says he is now in "the hands of a cardiologist" who will help resolve this. Patient says he ate Japanese food last night and is not aware of sodium content.     1) 30-day BP average exceeds goal of <130/<80. Continue current BP medications.   2) Patient knows to contact me with any non-urgent clinical changes or concerns. Go to ED or call [facility] for emergencies.   3) Will defer lifestyle counseling to digital medicine health . Will place task for health  to address sodium restriction  4) Will continue to follow up.     Maggie Andrade, Pharm.D.   Digital Medicine Clinical Pharmacist  [phone]          "

## 2019-06-23 DIAGNOSIS — I48.19 PERSISTENT ATRIAL FIBRILLATION: Primary | ICD-10-CM

## 2019-06-24 ENCOUNTER — TELEPHONE (OUTPATIENT)
Dept: CARDIOLOGY | Facility: CLINIC | Age: 84
End: 2019-06-24

## 2019-06-24 NOTE — TELEPHONE ENCOUNTER
Pt stated he cancelled the appt because he saw EP and he thought the PET test is not necessary. He stated seems like Dr. Cifuentes's directions changed after seeing EP. Asked the patient did another physician tell him it wasn't necessary. He stated no.  He has a procedure done on 7/12/19 and he does not think the test is needed. Advised the patient that I will send a message to .

## 2019-07-05 ENCOUNTER — PATIENT OUTREACH (OUTPATIENT)
Dept: OTHER | Facility: OTHER | Age: 84
End: 2019-07-05

## 2019-07-05 ENCOUNTER — LAB VISIT (OUTPATIENT)
Dept: LAB | Facility: HOSPITAL | Age: 84
End: 2019-07-05
Attending: INTERNAL MEDICINE
Payer: MEDICARE

## 2019-07-05 DIAGNOSIS — I48.19 PERSISTENT ATRIAL FIBRILLATION: ICD-10-CM

## 2019-07-05 LAB
ANION GAP SERPL CALC-SCNC: 9 MMOL/L (ref 8–16)
APTT BLDCRRT: 38.2 SEC (ref 21–32)
BASOPHILS # BLD AUTO: 0.06 K/UL (ref 0–0.2)
BASOPHILS NFR BLD: 1.4 % (ref 0–1.9)
BUN SERPL-MCNC: 34 MG/DL (ref 10–30)
CALCIUM SERPL-MCNC: 9 MG/DL (ref 8.7–10.5)
CHLORIDE SERPL-SCNC: 106 MMOL/L (ref 95–110)
CO2 SERPL-SCNC: 23 MMOL/L (ref 23–29)
CREAT SERPL-MCNC: 1.2 MG/DL (ref 0.5–1.4)
DIFFERENTIAL METHOD: ABNORMAL
EOSINOPHIL # BLD AUTO: 0 K/UL (ref 0–0.5)
EOSINOPHIL NFR BLD: 0.2 % (ref 0–8)
ERYTHROCYTE [DISTWIDTH] IN BLOOD BY AUTOMATED COUNT: 18.8 % (ref 11.5–14.5)
EST. GFR  (AFRICAN AMERICAN): 59.9 ML/MIN/1.73 M^2
EST. GFR  (NON AFRICAN AMERICAN): 51.8 ML/MIN/1.73 M^2
GLUCOSE SERPL-MCNC: 100 MG/DL (ref 70–110)
HCT VFR BLD AUTO: 43.7 % (ref 40–54)
HGB BLD-MCNC: 13.8 G/DL (ref 14–18)
IMM GRANULOCYTES # BLD AUTO: 0.03 K/UL (ref 0–0.04)
IMM GRANULOCYTES NFR BLD AUTO: 0.7 % (ref 0–0.5)
INR PPP: 1.3 (ref 0.8–1.2)
LYMPHOCYTES # BLD AUTO: 0.9 K/UL (ref 1–4.8)
LYMPHOCYTES NFR BLD: 20 % (ref 18–48)
MCH RBC QN AUTO: 29.9 PG (ref 27–31)
MCHC RBC AUTO-ENTMCNC: 31.6 G/DL (ref 32–36)
MCV RBC AUTO: 95 FL (ref 82–98)
MONOCYTES # BLD AUTO: 0.6 K/UL (ref 0.3–1)
MONOCYTES NFR BLD: 13.7 % (ref 4–15)
NEUTROPHILS # BLD AUTO: 2.8 K/UL (ref 1.8–7.7)
NEUTROPHILS NFR BLD: 64 % (ref 38–73)
NRBC BLD-RTO: 0 /100 WBC
PLATELET # BLD AUTO: 181 K/UL (ref 150–350)
PMV BLD AUTO: 9.8 FL (ref 9.2–12.9)
POTASSIUM SERPL-SCNC: 4.1 MMOL/L (ref 3.5–5.1)
PROTHROMBIN TIME: 12.8 SEC (ref 9–12.5)
RBC # BLD AUTO: 4.61 M/UL (ref 4.6–6.2)
SODIUM SERPL-SCNC: 138 MMOL/L (ref 136–145)
WBC # BLD AUTO: 4.39 K/UL (ref 3.9–12.7)

## 2019-07-05 PROCEDURE — 85730 THROMBOPLASTIN TIME PARTIAL: CPT | Mod: HCNC

## 2019-07-05 PROCEDURE — 85025 COMPLETE CBC W/AUTO DIFF WBC: CPT | Mod: HCNC

## 2019-07-05 PROCEDURE — 80048 BASIC METABOLIC PNL TOTAL CA: CPT | Mod: HCNC

## 2019-07-05 PROCEDURE — 36415 COLL VENOUS BLD VENIPUNCTURE: CPT | Mod: HCNC

## 2019-07-05 PROCEDURE — 85610 PROTHROMBIN TIME: CPT | Mod: HCNC

## 2019-07-05 NOTE — PROGRESS NOTES
"Last 5 Patient Entered Readings                                      Current 30 Day Average: 127/82     Recent Readings 7/5/2019 7/4/2019 7/4/2019 7/4/2019 7/4/2019    SBP (mmHg) 148 98 101 128 124    DBP (mmHg) 94 59 56 81 78    Pulse 81 80 77 68 80        Called patient to discuss low-sodium diet and complete HC task.     Next Saturday at 8am, cardiology appointment.     Digital Medicine: Health  Follow Up    Lifestyle Modifications:    1.Dietary Modifications (Sodium intake <2,000mg/day, food labels, dining out): Reports eating nakia boiled oysters, crab cake hamburger from a seafood restaurant last night for dinner. Reports efforts to "avoid" dishes with a lot of salt, and eats "less salty options like cheeseburgers and Boudin". Ate yogurt, fresh fruit and granola for breakfast this morning. Reports his solution is to "take a diuretic and keep eating salt". Reports he was did not realize elevated levels of sodium in some foods, reports he will "look out from now on". Reports he will start splitting entrees or eating half. Reports he is drinking "a lot" of water, about 4 bottles per day.    2.Physical Activity: Deferred    3.Medication Therapy: Patient has been compliant with the medication regimen.    4.Patient has the following medication side effects/concerns: None  (Frequency/Alleviating factors/Precipitating factors, etc.)     Follow up with Mr. Norm Mendoza completed. No further questions or concerns. Will continue to follow up to achieve health goals.    "

## 2019-07-11 ENCOUNTER — TELEPHONE (OUTPATIENT)
Dept: ELECTROPHYSIOLOGY | Facility: CLINIC | Age: 84
End: 2019-07-11

## 2019-07-11 ENCOUNTER — ANESTHESIA EVENT (OUTPATIENT)
Dept: MEDSURG UNIT | Facility: HOSPITAL | Age: 84
End: 2019-07-11
Payer: MEDICARE

## 2019-07-11 NOTE — TELEPHONE ENCOUNTER
----- Message from Elvira Martino MA sent at 7/11/2019 10:42 AM CDT -----  Contact: patient called  Please call the patient at [phone] he need to talk to you about his procedure that schedule for tomorrow. Thank you.

## 2019-07-11 NOTE — TELEPHONE ENCOUNTER
Spoke with son to discuss the planned JACINTO/DCCV for tomorrow [place]. His dad is on Xarelto and he is thinking that they should reschedule the procedure in case they need to evacuate tomorrow. He is not sure how his dad will react at 89 after having anesthesia. He will discuss with his dad and call me back.

## 2019-07-11 NOTE — ANESTHESIA PREPROCEDURE EVALUATION
07/11/2019  Norm Mendoza is a 89 y.o., male.    Anesthesia Evaluation         Review of Systems      Physical Exam  General:  Well nourished    Airway/Jaw/Neck:  Airway Findings: Mouth Opening: Normal Tongue: Normal  General Airway Assessment: Adult  Mallampati: II  Improves to II with phonation.  TM Distance: Normal, at least 6 cm      Dental:  Dental Findings: In tact   Chest/Lungs:  Chest/Lungs Findings: Clear to auscultation     Heart/Vascular:  Heart Findings: Rate: Normal  Rhythm: Regular Rhythm  Sounds: Normal        Mental Status:  Mental Status Findings:  Cooperative, Alert and Oriented         Anesthesia Plan  Type of Anesthesia, risks & benefits discussed:  Anesthesia Type:  general  Patient's Preference: General  Intra-op Monitoring Plan: standard ASA monitors  Intra-op Monitoring Plan Comments: Standard ASA monitors.   Post Op Pain Control Plan: per primary service following discharge from PACU  Post Op Pain Control Plan Comments: Per primary service.     Induction:   IV  Beta Blocker:  Patient is not currently on a Beta-Blocker (No further documentation required).       Informed Consent: Patient understands risks and agrees with Anesthesia plan.  Questions answered. Anesthesia consent signed with patient.  ASA Score: 3     Day of Surgery Review of History & Physical:    H&P update referred to the surgeon.     Anesthesia Plan Notes: Chart reviewed, patient interviewed and examined.  The plan for general anesthesia was explained.  Questions were answered and the consent was signed.  Gregoria ROCHE         Ready For Surgery From Anesthesia Perspective.

## 2019-07-11 NOTE — TELEPHONE ENCOUNTER
Spoke with patient. He does NOT want to reschedule and will be here for JACINTO/DCCV tomorrow. He will fast after 12mn and be here at 6am (3rd floor SSCU). States he has been compliant with his Xarelto and has no signs of bleeding, but does not want to risk postponing the procedure.

## 2019-07-12 ENCOUNTER — HOSPITAL ENCOUNTER (OUTPATIENT)
Facility: HOSPITAL | Age: 84
Discharge: HOME | End: 2019-07-12
Attending: INTERNAL MEDICINE | Admitting: INTERNAL MEDICINE
Payer: MEDICARE

## 2019-07-12 ENCOUNTER — ANESTHESIA (OUTPATIENT)
Dept: MEDSURG UNIT | Facility: HOSPITAL | Age: 84
End: 2019-07-12
Payer: MEDICARE

## 2019-07-12 VITALS
OXYGEN SATURATION: 99 % | HEIGHT: 72 IN | TEMPERATURE: 98 F | WEIGHT: 170 LBS | BODY MASS INDEX: 23.03 KG/M2 | RESPIRATION RATE: 20 BRPM | DIASTOLIC BLOOD PRESSURE: 64 MMHG | SYSTOLIC BLOOD PRESSURE: 111 MMHG | HEART RATE: 50 BPM

## 2019-07-12 DIAGNOSIS — I48.91 ATRIAL FIBRILLATION: ICD-10-CM

## 2019-07-12 DIAGNOSIS — I48.19 PERSISTENT ATRIAL FIBRILLATION: Primary | ICD-10-CM

## 2019-07-12 DIAGNOSIS — I48.0 PAF (PAROXYSMAL ATRIAL FIBRILLATION): ICD-10-CM

## 2019-07-12 LAB
AORTIC ROOT ANNULUS: 3.7 CM
ASCENDING AORTA: 4 CM
BSA FOR ECHO PROCEDURE: 1.98 M2
STJ: 3.3 CM

## 2019-07-12 PROCEDURE — 93005 ELECTROCARDIOGRAM TRACING: CPT | Mod: HCNC,59

## 2019-07-12 PROCEDURE — 25000003 PHARM REV CODE 250: Mod: HCNC | Performed by: INTERNAL MEDICINE

## 2019-07-12 PROCEDURE — D9220A PRA ANESTHESIA: Mod: HCNC,ANES,, | Performed by: ANESTHESIOLOGY

## 2019-07-12 PROCEDURE — 92960 CARDIOVERSION ELECTRIC EXT: CPT | Mod: HCNC,,, | Performed by: INTERNAL MEDICINE

## 2019-07-12 PROCEDURE — 63600175 PHARM REV CODE 636 W HCPCS: Mod: HCNC | Performed by: NURSE ANESTHETIST, CERTIFIED REGISTERED

## 2019-07-12 PROCEDURE — D9220A PRA ANESTHESIA: ICD-10-PCS | Mod: HCNC,ANES,, | Performed by: ANESTHESIOLOGY

## 2019-07-12 PROCEDURE — 25000003 PHARM REV CODE 250: Mod: HCNC | Performed by: NURSE PRACTITIONER

## 2019-07-12 PROCEDURE — 37000008 HC ANESTHESIA 1ST 15 MINUTES: Mod: HCNC | Performed by: INTERNAL MEDICINE

## 2019-07-12 PROCEDURE — 92960 PR CARDIOVERSION, ELECTIVE;EXTERN: ICD-10-PCS | Mod: HCNC,,, | Performed by: INTERNAL MEDICINE

## 2019-07-12 PROCEDURE — 92960 CARDIOVERSION ELECTRIC EXT: CPT | Mod: HCNC | Performed by: INTERNAL MEDICINE

## 2019-07-12 PROCEDURE — 37000009 HC ANESTHESIA EA ADD 15 MINS: Mod: HCNC | Performed by: INTERNAL MEDICINE

## 2019-07-12 RX ORDER — SODIUM CHLORIDE 0.9 % (FLUSH) 0.9 %
5 SYRINGE (ML) INJECTION
Status: DISCONTINUED | OUTPATIENT
Start: 2019-07-12 | End: 2019-07-12 | Stop reason: HOSPADM

## 2019-07-12 RX ORDER — DIPHENHYDRAMINE HYDROCHLORIDE 50 MG/ML
25 INJECTION INTRAMUSCULAR; INTRAVENOUS EVERY 6 HOURS PRN
Status: DISCONTINUED | OUTPATIENT
Start: 2019-07-12 | End: 2019-07-12 | Stop reason: HOSPADM

## 2019-07-12 RX ORDER — FUROSEMIDE 20 MG/1
TABLET ORAL
Qty: 30 TABLET | Refills: 0 | Status: SHIPPED | OUTPATIENT
Start: 2019-07-12 | End: 2019-07-25 | Stop reason: SDUPTHER

## 2019-07-12 RX ORDER — SILVER SULFADIAZINE 10 G/1000G
CREAM TOPICAL
Status: DISCONTINUED | OUTPATIENT
Start: 2019-07-12 | End: 2019-07-12 | Stop reason: HOSPADM

## 2019-07-12 RX ORDER — FENTANYL CITRATE 50 UG/ML
25 INJECTION, SOLUTION INTRAMUSCULAR; INTRAVENOUS EVERY 5 MIN PRN
Status: DISCONTINUED | OUTPATIENT
Start: 2019-07-12 | End: 2019-07-12 | Stop reason: HOSPADM

## 2019-07-12 RX ORDER — LIDOCAINE HCL/PF 100 MG/5ML
SYRINGE (ML) INTRAVENOUS
Status: DISCONTINUED | OUTPATIENT
Start: 2019-07-12 | End: 2019-07-12

## 2019-07-12 RX ORDER — SODIUM CHLORIDE 9 MG/ML
INJECTION, SOLUTION INTRAVENOUS CONTINUOUS
Status: DISCONTINUED | OUTPATIENT
Start: 2019-07-12 | End: 2019-07-12 | Stop reason: HOSPADM

## 2019-07-12 RX ORDER — HYDROMORPHONE HYDROCHLORIDE 1 MG/ML
0.2 INJECTION, SOLUTION INTRAMUSCULAR; INTRAVENOUS; SUBCUTANEOUS EVERY 5 MIN PRN
Status: DISCONTINUED | OUTPATIENT
Start: 2019-07-12 | End: 2019-07-12 | Stop reason: HOSPADM

## 2019-07-12 RX ORDER — FUROSEMIDE 20 MG/1
20 TABLET ORAL
Qty: 30 TABLET | Refills: 3 | Status: SHIPPED | OUTPATIENT
Start: 2019-07-12 | End: 2019-09-03 | Stop reason: SDUPTHER

## 2019-07-12 RX ORDER — PROPOFOL 10 MG/ML
VIAL (ML) INTRAVENOUS
Status: DISCONTINUED | OUTPATIENT
Start: 2019-07-12 | End: 2019-07-12

## 2019-07-12 RX ORDER — PROPOFOL 10 MG/ML
VIAL (ML) INTRAVENOUS CONTINUOUS PRN
Status: DISCONTINUED | OUTPATIENT
Start: 2019-07-12 | End: 2019-07-12

## 2019-07-12 RX ADMIN — SODIUM CHLORIDE: 0.9 INJECTION, SOLUTION INTRAVENOUS at 07:07

## 2019-07-12 RX ADMIN — PROPOFOL 10 MG: 10 INJECTION, EMULSION INTRAVENOUS at 08:07

## 2019-07-12 RX ADMIN — PROPOFOL 70 MCG/KG/MIN: 10 INJECTION, EMULSION INTRAVENOUS at 07:07

## 2019-07-12 RX ADMIN — PROPOFOL 20 MG: 10 INJECTION, EMULSION INTRAVENOUS at 07:07

## 2019-07-12 RX ADMIN — LIDOCAINE HYDROCHLORIDE 40 MG: 20 INJECTION, SOLUTION INTRAVENOUS at 07:07

## 2019-07-12 NOTE — PLAN OF CARE
Vss. sb with 1st degree av block noted on cm.  Pt denies pain or sob.  Pt's son updated over phone by ep pacu rn. Verbalizes understanding. Pt tolerating sips of water.  Silvadene to chest and back s/p cardioversion. Silvadene in pocket. See flowsheet for full assessment.

## 2019-07-12 NOTE — PROGRESS NOTES
Pt's son updated over phone by ep pacu rn. Verbalizes understanding.  md to update family in waiting room.  Per son, has not been updated yet.

## 2019-07-12 NOTE — DISCHARGE SUMMARY
[facility]  Cardiac Electrophysiology  Discharge Summary      Patient Name: Norm Mendoza  MRN: [id number]  Admission Date: 7/12/2019  Hospital Length of Stay: 0 days  Discharge Date and Time:  07/12/2019 10:00 AM  Attending Physician: Hi Crowder MD    Discharging Provider: Elizabeth Monsalve NP  Primary Care Physician: Amber Jenkins MD    HPI:  89 year old male  with PMH pAF, HF (EF ~40%), HTN, DLD, MALT, PUD who presents for JACINTO+/-DCCV with Dr Crowder. Jd started on Xarelto but being considered for Watchman due to hx anemia in setting of PUD. Patient completed EGD on 4/12/19 that showed normal esophagus with 2 cm type-1 sliding hiatal hernia in addition to erythematous, granular and scarred mucosa in the lesser curvature of gastric body.   Patient denies any acute symptoms on admit.      Procedure(s) (LRB):  Cardioversion or Defibrillation (N/A)  Transesophageal echo (JACINTO) intra-procedure log documentation (N/A)        Hospital Course: JACINTO done showed no BROCK thrombus, hence proceeded to DCCV which converted patient from AF to sinus bradycardia. Patient tolerated procedure no complications noted.    Post procedure  EKG showed  Sinus bradycardia. Patient continued to monitor, ambulated around the unit, denies any symptoms. Heart rate recovered to mid to high 50's BPM.    Plan to continue home medications including xarelto with dinner for CVA prophylaxis   Pt to follow up with EKG in 1 week,  and 6 weeks with MD Vel Armendariz     Discharge plans/instructions discussed with patient and his son    who verbalized understanding  and agreement of plans of care. No further questions or concerns  voiced at this time.     Consults:    -Anesthesia     Significant Diagnostic Studies: JACINTO    Final Active Diagnoses:    Diagnosis Date Noted POA    PRINCIPAL PROBLEM:  Persistent atrial fibrillation [I48.1] 11/30/2018 Yes      Problems Resolved During this Admission:       Discharged  Condition: good    Disposition: Home or Self Care    Follow Up:  Follow-up Information     Hi Crowder MD In 6 weeks.    Specialties:  Electrophysiology, Cardiovascular Disease  Why:  EKG in 1 week   Contact information:  Danette Monson  [facility] [id number] [phone]                 Patient Instructions:      Diet Cardiac     No driving until:   Order Comments: For 24 hours post procedure     Notify your health care provider if you experience any of the following:  temperature >100.4     Notify your health care provider if you experience any of the following:  persistent nausea and vomiting or diarrhea     Notify your health care provider if you experience any of the following:  severe uncontrolled pain     Notify your health care provider if you experience any of the following:  redness, tenderness, or signs of infection (pain, swelling, redness, odor or green/yellow discharge around incision site)     Notify your health care provider if you experience any of the following:  difficulty breathing or increased cough     Notify your health care provider if you experience any of the following:  severe persistent headache     Notify your health care provider if you experience any of the following:  worsening rash     Notify your health care provider if you experience any of the following:  persistent dizziness, light-headedness, or visual disturbances     Notify your health care provider if you experience any of the following:  increased confusion or weakness     Notify your health care provider if you experience any of the following:   Order Comments: For any concerning medical symptoms     Medications:  Reconciled Home Medications:      Medication List      CONTINUE taking these medications    furosemide 20 MG tablet  Commonly known as:  LASIX  Take 1 tablet (20 mg total) by mouth as needed (leg edema, orthopnea, SOB,. weight gain).     irbesartan 150 MG tablet  Commonly known as:  AVAPRO  TAKE 1  TABLET BY MOUTH  DAILY     metoprolol succinate 25 MG 24 hr tablet  Commonly known as:  TOPROL-XL  1 tab daily     pantoprazole 40 MG tablet  Commonly known as:  PROTONIX  Take 1 tablet (40 mg total) by mouth once daily. Take one pill AM of 12/10 and one pill PM of 12/10 then daily until you see your GI doctor     psyllium packet  Commonly known as:  METAMUCIL  Take 1 packet by mouth once daily.     rivaroxaban 20 mg Tab  Commonly known as:  XARELTO  Take 1 tablet (20 mg total) by mouth daily with dinner or evening meal.     UNABLE TO FIND  Take 1 Dose by mouth once daily. Hydroperoxide. Pt stated he put it in his water pick to cleanse his teeth            Time spent on the discharge of patient: 30  minutes    Elizabeth Monsalve NP  Cardiac Electrophysiology  [facility]    STAFF MD Vel Armendariz

## 2019-07-12 NOTE — PLAN OF CARE
Problem: Adult Inpatient Plan of Care  Goal: Plan of Care Review  Outcome: Ongoing (interventions implemented as appropriate)  Received report from NELLY Cash. Patient s/p DCCV, AAOx3. VSS, no c/o pain or discomfort at this time, resp even and unlabored.Post procedure protocol reviewed with patient and patient's family. Understanding verbalized. Family members at bedside. Nurse call bell within reach. Will continue to monitor per post procedure protocol.

## 2019-07-12 NOTE — HPI
89 yo M with PMHx pAF, HFmEF (EF ~40%), HTN, DLD, MALT, PUD who presents for JACINTO+/-DCCV with Dr Crowder. Jd started on Xarelto but being considered for Watchman due to hx anemia in setting of PUD. Patient completed EGD on 4/12/19 that showed normal esophagus with 2 cm type-1 sliding hiatal hernia in addition to erythematous, granular and scarred mucosa in the lesser curvature of gastric body.     Hgb 13.8  Plt 181  INR 1.3    5/16/2019: PER REPORT:  · THE PATIENT IS IN ATRIAL FIBRILLATION FOR THE ENTIRETY OF THE TEST  · The patient reported no symptoms during the stress test.  · The test was stopped secondary to fatigue.  · There were no arrhythmias during stress.  · Mildly decreased left ventricular systolic function. The estimated ejection fraction is 40%, 2D quantitative LVEF 33%.  · Grade II (moderate) left ventricular diastolic dysfunction consistent with pseudonormalization.  · Elevated left atrial pressure.  · Severe left atrial enlargement.  · The ascending aorta is moderately dilated.  · Mild tricuspid regurgitation.  · Low normal right ventricular systolic function.  · Moderate right atrial enlargement.  · Concentric left ventricular remodeling.  · Overall, the patient's exercise capacity was normal.  · The EKG portion of this study is negative for myocardial ischemia.  · No obvious wall motion abnormalities at stress. LV function augments but still not normal at stress.

## 2019-07-12 NOTE — SUBJECTIVE & OBJECTIVE
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    Mixed hyperlipidemia 5/2/2018    Paroxysmal atrial fibrillation 11/30/2018    Skin cancer     x3, had mohs on nose    Squamous cell carcinoma excised 12/5/14    R lower back    Symptomatic anemia 12/6/2018    Urinary tract infection     x4       Past Surgical History:   Procedure Laterality Date    ACHILLES TENDON SURGERY      cataracts      CYSTOSCOPY      bladder tumor    CYSTOSCOPY N/A 3/28/2014    Performed by [facility] at [facility] OR 1ST FLR    DILATION, URETHRA N/A 3/28/2014    Performed by [facility] at [facility] OR 1ST FLR    EGD (ESOPHAGOGASTRODUODENOSCOPY) N/A 4/12/2019    Performed by Austin Garcia MD at [facility] ENDO (4TH FLR)    EGD (ESOPHAGOGASTRODUODENOSCOPY) N/A 12/7/2018    Performed by Austin Garcia MD at [facility] ENDO (2ND FLR)    EXCISION-BLADDER TUMOR-TRANSURETHRAL (TURBT) N/A 3/28/2014    Performed by Sl[facility]osa [facility] at [facility] OR 1ST FLR    EYE SURGERY      SKIN CANCER EXCISION         Review of patient's allergies indicates:  No Known Allergies    No current facility-administered medications on file prior to encounter.      Current Outpatient Medications on File Prior to Encounter   Medication Sig    furosemide (LASIX) 20 MG tablet Take 1 tablet (20 mg total) by mouth as needed (leg edema, orthopnea, SOB,. weight gain).    irbesartan (AVAPRO) 150 MG tablet TAKE 1 TABLET BY MOUTH  DAILY    metoprolol succinate (TOPROL-XL) 25 MG 24 hr tablet 1 tab daily    rivaroxaban (XARELTO) 20 mg Tab Take 1 tablet (20 mg total) by mouth daily with dinner or evening meal.    pantoprazole (PROTONIX) 40 MG tablet Take 1 tablet (40 mg total) by mouth once daily. Take one pill AM of 12/10 and one pill PM of 12/10 then daily until you see your GI doctor    psyllium  (METAMUCIL) packet Take 1 packet by mouth once daily.    UNABLE TO FIND Take 1 Dose by mouth once daily. Hydroperoxide. Pt stated he put it in his water pick to cleanse his teeth     Family History     Problem Relation (Age of Onset)    Hypertension Father    Stroke Father, Brother        Tobacco Use    Smoking status: Former Smoker     Packs/day: 1.00     Years: 25.00     Pack years: 25.00     Types: Cigarettes     Last attempt to quit: 9/3/1970     Years since quittin.8    Smokeless tobacco: Never Used   Substance and Sexual Activity    Alcohol use: Yes     Alcohol/week: 1.8 oz     Types: 3 Shots of liquor per week     Comment: 3 bourbons daily - 12 beer on weekends     Drug use: No    Sexual activity: Yes     Partners: Female     Review of Systems   Constitution: Negative for chills, decreased appetite, diaphoresis, fever, malaise/fatigue, night sweats, weight gain and weight loss.   Eyes: Negative.    Cardiovascular: Negative for chest pain, irregular heartbeat, leg swelling, near-syncope, orthopnea, palpitations, paroxysmal nocturnal dyspnea and syncope.   Respiratory: Negative for cough, shortness of breath, sputum production and wheezing.    Endocrine: Negative.    Hematologic/Lymphatic: Negative for bleeding problem.   Skin: Negative for color change, flushing, rash and suspicious lesions.   Musculoskeletal: Negative.    Gastrointestinal: Negative for bloating, abdominal pain, change in bowel habit, constipation, diarrhea, heartburn, melena, nausea and vomiting.   Genitourinary: Negative for flank pain, frequency, hematuria, incomplete emptying and urgency.   Neurological: Negative for dizziness, focal weakness, headaches, light-headedness, numbness, paresthesias, seizures, sensory change and weakness.   Psychiatric/Behavioral: Negative for altered mental status. The patient is not nervous/anxious.      Objective:     Vital Signs (Most Recent):    Vital Signs (24h Range):           There is no  height or weight on file to calculate BMI.            No intake or output data in the 24 hours ending 07/12/19 0714    Lines/Drains/Airways          None          Physical Exam   Constitutional: He is oriented to person, place, and time. He appears well-developed and well-nourished. No distress.   HENT:   Head: Normocephalic and atraumatic.   Mouth/Throat: Oropharynx is clear and moist.   Eyes: Pupils are equal, round, and reactive to light. EOM are normal.   Neck: Normal range of motion. Neck supple. No JVD present.   Cardiovascular: Normal rate. An irregularly irregular rhythm present. Exam reveals no gallop and no friction rub.   No murmur heard.  Pulmonary/Chest: Effort normal and breath sounds normal. No respiratory distress. He has no wheezes. He has no rales. He exhibits no tenderness.   Abdominal: Soft. Bowel sounds are normal. He exhibits no distension. There is no tenderness.   Musculoskeletal: Normal range of motion. He exhibits no edema.   Lymphadenopathy:     He has no cervical adenopathy.   Neurological: He is alert and oriented to person, place, and time.   Skin: Skin is warm and dry. No erythema.   Psychiatric: He has a normal mood and affect. His behavior is normal. Judgment and thought content normal.       Significant Labs: All pertinent lab results from the last 24 hours have been reviewed.    Significant Imaging: Reviewed in EPIC.

## 2019-07-12 NOTE — H&P
[facility]  Cardiology  History and Physical     Patient Name: Norm Mendoza  MRN: [id number]  Admission Date: 7/12/2019  Code Status: Prior   Attending Provider: Hi Crowder MD   Primary Care Physician: Amber Jenkins MD  Principal Problem:<principal problem not specified>    Patient information was obtained from patient and ER records.     Subjective:     HPI: 89 yo M with PMHx pAF, HFmEF (EF ~40%), HTN, DLD, MALT, PUD who presents for JACINTO+/-DCCV with Dr Crowder. Jd started on Xarelto but being considered for Watchman due to hx anemia in setting of PUD. Patient completed EGD on 4/12/19 that showed normal esophagus with 2 cm type-1 sliding hiatal hernia in addition to erythematous, granular and scarred mucosa in the lesser curvature of gastric body.     Hgb 13.8  Plt 181  INR 1.3    Dysphagia or odynophagia:  No  Liver Disease, esophageal disease, or known varices:  No  Upper GI Bleeding: No  Snoring:  No  Sleep Apnea:  No  Prior neck surgery or radiation:  No  History of anesthetic difficulties:  No  Family history of anesthetic difficulties:  No  Last oral intake:  12 hours ago  Able to move neck in all directions:  Yes        MARK 5/16/2019: PER REPORT:  · THE PATIENT IS IN ATRIAL FIBRILLATION FOR THE ENTIRETY OF THE TEST  · The patient reported no symptoms during the stress test.  · The test was stopped secondary to fatigue.  · There were no arrhythmias during stress.  · Mildly decreased left ventricular systolic function. The estimated ejection fraction is 40%, 2D quantitative LVEF 33%.  · Grade II (moderate) left ventricular diastolic dysfunction consistent with pseudonormalization.  · Elevated left atrial pressure.  · Severe left atrial enlargement.  · The ascending aorta is moderately dilated.  · Mild tricuspid regurgitation.  · Low normal right ventricular systolic function.  · Moderate right atrial enlargement.  · Concentric left ventricular remodeling.  · Overall,  the patient's exercise capacity was normal.  · The EKG portion of this study is negative for myocardial ischemia.  · No obvious wall motion abnormalities at stress. LV function augments but still not normal at stress.         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    Mixed hyperlipidemia 5/2/2018    Paroxysmal atrial fibrillation 11/30/2018    Skin cancer     x3, had mohs on nose    Squamous cell carcinoma excised 12/5/14    R lower back    Symptomatic anemia 12/6/2018    Urinary tract infection     x4       Past Surgical History:   Procedure Laterality Date    ACHILLES TENDON SURGERY      cataracts      CYSTOSCOPY      bladder tumor    CYSTOSCOPY N/A 3/28/2014    Performed by [facility] at [facility] OR 1ST FLR    DILATION, URETHRA N/A 3/28/2014    Performed by [facility] at [facility] OR 1ST FLR    EGD (ESOPHAGOGASTRODUODENOSCOPY) N/A 4/12/2019    Performed by Austin Garcia MD at [facility] ENDO (4TH FLR)    EGD (ESOPHAGOGASTRODUODENOSCOPY) N/A 12/7/2018    Performed by Austin Garcia MD at [facility] ENDO (2ND FLR)    EXCISION-BLADDER TUMOR-TRANSURETHRAL (TURBT) N/A 3/28/2014    Performed by [facility] at [facility] OR 1ST FLR    EYE SURGERY      SKIN CANCER EXCISION         Review of patient's allergies indicates:  No Known Allergies    No current facility-administered medications on file prior to encounter.      Current Outpatient Medications on File Prior to Encounter   Medication Sig    furosemide (LASIX) 20 MG tablet Take 1 tablet (20 mg total) by mouth as needed (leg edema, orthopnea, SOB,. weight gain).    irbesartan (AVAPRO) 150 MG tablet TAKE 1 TABLET BY MOUTH  DAILY    metoprolol succinate (TOPROL-XL) 25 MG 24 hr tablet 1 tab daily    rivaroxaban (XARELTO) 20 mg Tab Take 1 tablet (20 mg total) by mouth daily with  dinner or evening meal.    pantoprazole (PROTONIX) 40 MG tablet Take 1 tablet (40 mg total) by mouth once daily. Take one pill AM of 12/10 and one pill PM of 12/10 then daily until you see your GI doctor    psyllium (METAMUCIL) packet Take 1 packet by mouth once daily.    UNABLE TO FIND Take 1 Dose by mouth once daily. Hydroperoxide. Pt stated he put it in his water pick to cleanse his teeth     Family History     Problem Relation (Age of Onset)    Hypertension Father    Stroke Father, Brother        Tobacco Use    Smoking status: Former Smoker     Packs/day: 1.00     Years: 25.00     Pack years: 25.00     Types: Cigarettes     Last attempt to quit: 9/3/1970     Years since quittin.8    Smokeless tobacco: Never Used   Substance and Sexual Activity    Alcohol use: Yes     Alcohol/week: 1.8 oz     Types: 3 Shots of liquor per week     Comment: 3 bourbons daily - 12 beer on weekends     Drug use: No    Sexual activity: Yes     Partners: Female     Review of Systems   Constitution: Negative for chills, decreased appetite, diaphoresis, fever, malaise/fatigue, night sweats, weight gain and weight loss.   Eyes: Negative.    Cardiovascular: Negative for chest pain, irregular heartbeat, leg swelling, near-syncope, orthopnea, palpitations, paroxysmal nocturnal dyspnea and syncope.   Respiratory: Negative for cough, shortness of breath, sputum production and wheezing.    Endocrine: Negative.    Hematologic/Lymphatic: Negative for bleeding problem.   Skin: Negative for color change, flushing, rash and suspicious lesions.   Musculoskeletal: Negative.    Gastrointestinal: Negative for bloating, abdominal pain, change in bowel habit, constipation, diarrhea, heartburn, melena, nausea and vomiting.   Genitourinary: Negative for flank pain, frequency, hematuria, incomplete emptying and urgency.   Neurological: Negative for dizziness, focal weakness, headaches, light-headedness, numbness, paresthesias, seizures, sensory  change and weakness.   Psychiatric/Behavioral: Negative for altered mental status. The patient is not nervous/anxious.      Objective:     Vital Signs (Most Recent):    Vital Signs (24h Range):           There is no height or weight on file to calculate BMI.            No intake or output data in the 24 hours ending 07/12/19 0714    Lines/Drains/Airways          None          Physical Exam   Constitutional: He is oriented to person, place, and time. He appears well-developed and well-nourished. No distress.   HENT:   Head: Normocephalic and atraumatic.   Mouth/Throat: Oropharynx is clear and moist.   Eyes: Pupils are equal, round, and reactive to light. EOM are normal.   Neck: Normal range of motion. Neck supple. No JVD present.   Cardiovascular: Normal rate. An irregularly irregular rhythm present. Exam reveals no gallop and no friction rub.   No murmur heard.  Pulmonary/Chest: Effort normal and breath sounds normal. No respiratory distress. He has no wheezes. He has no rales. He exhibits no tenderness.   Abdominal: Soft. Bowel sounds are normal. He exhibits no distension. There is no tenderness.   Musculoskeletal: Normal range of motion. He exhibits no edema.   Lymphadenopathy:     He has no cervical adenopathy.   Neurological: He is alert and oriented to person, place, and time.   Skin: Skin is warm and dry. No erythema.   Psychiatric: He has a normal mood and affect. His behavior is normal. Judgment and thought content normal.       Significant Labs: All pertinent lab results from the last 24 hours have been reviewed.    Significant Imaging: Reviewed in EPIC.    Assessment and Plan:     Persistent atrial fibrillation  1. JACINTO for evaluation of BROCK prior to DCCV.    -No absolute contraindications of esophageal stricture, tumor, perforation, laceration,or diverticulum and/or active GI bleed  -The risks, benefits & alternatives of the procedure were explained to the patient.   -The risks of transesophageal echo  include but are not limited to:  Dental trauma, esophageal trauma/perforation, bleeding, laryngospasm/brochospasm, aspiration, sore throat/hoarseness, & dislodgement of the endotracheal tube/nasogastric tube (where applicable).    -The risks of moderate sedation include hypotension, respiratory depression, arrhythmias, bronchospasm, & death.    -Informed consent was obtained. The patient is agreeable to proceed with the procedure and all questions and concerns addressed.    Case discussed with an attending in echocardiography lab.     Further recommendations per attending addendum        Jeanette Alba MD  Cardiology   [facility]

## 2019-07-12 NOTE — TRANSFER OF CARE
Anesthesia Transfer of Care Note    Patient: Norm Mendoza    Procedure(s) Performed: Procedure(s) (LRB):  Cardioversion or Defibrillation (N/A)  Transesophageal echo (JACINTO) intra-procedure log documentation (N/A)    Patient location: Cath Lab    Anesthesia Type: general    Transport from OR: Transported from OR on 2-3 L/min O2 by NC with adequate spontaneous ventilation    Post pain: adequate analgesia    Post assessment: no apparent anesthetic complications and tolerated procedure well    Post vital signs: stable    Level of consciousness: awake    Nausea/Vomiting: no nausea/vomiting    Complications: none    Transfer of care protocol was followed      Last vitals:   Visit Vitals  /70 (BP Location: Right arm, Patient Position: Lying)   Pulse 76   Temp 35.7 °C (96.3 °F) (Oral)   Resp 20   Ht 6' (1.829 m)   Wt 77.1 kg (170 lb)   SpO2 (!) 92%   BMI 23.06 kg/m²

## 2019-07-12 NOTE — NURSING TRANSFER
Nursing Transfer Note      7/12/2019     Transfer To: ep pacu 3 to sscu 4    Transfer via stretcher    Transfer with cardiac monitoring, tele box sscu 4 on     Transported by francisco meraz    Medicines sent: silvadene    Chart send with patient: Yes    Notified: son  Patient reassessed at: 7/12/19 0845    Upon arrival to floor: cardiac monitor applied, patient oriented to room, call bell in reach and bed in lowest position

## 2019-07-12 NOTE — PROGRESS NOTES
Patient discharged per MD orders. Instructions given on medications, wound care, activity, signs of infection, when to call MD, and follow up appointments. Pt verbalized understanding.  Patient AAOx3, VSS, no c/o pain or discomfort at this time. Telemetry and PIV removed. Patient refused transport, ambulated off unit with family.

## 2019-07-12 NOTE — ANESTHESIA POSTPROCEDURE EVALUATION
Anesthesia Post Evaluation    Patient: Norm Mendoza    Procedure(s) Performed: Procedure(s) (LRB):  Cardioversion or Defibrillation (N/A)  Transesophageal echo (JACINTO) intra-procedure log documentation (N/A)    Final Anesthesia Type: general  Patient location during evaluation: PACU  Patient participation: Yes- Able to Participate  Level of consciousness: awake and alert  Post-procedure vital signs: reviewed and stable  Pain management: adequate  Airway patency: patent  PONV status at discharge: No PONV  Anesthetic complications: no      Cardiovascular status: hemodynamically stable  Respiratory status: unassisted  Hydration status: euvolemic  Follow-up not needed.          Vitals Value Taken Time   /62 7/12/2019  8:32 AM   Temp 36.6 °C (97.9 °F) 7/12/2019  8:20 AM   Pulse 51 7/12/2019  8:39 AM   Resp 20 7/12/2019  8:39 AM   SpO2 98 % 7/12/2019  8:39 AM   Vitals shown include unvalidated device data.      No case tracking events are documented in the log.      Pain/Sergio Score: Pain Rating Prior to Med Admin: 0 (7/12/2019  8:20 AM)  Sergio Score: 4 (7/12/2019  8:20 AM)

## 2019-07-12 NOTE — ASSESSMENT & PLAN NOTE
1. JACINTO for evaluation of BROCK prior to DCCV.    -No absolute contraindications of esophageal stricture, tumor, perforation, laceration,or diverticulum and/or active GI bleed  -The risks, benefits & alternatives of the procedure were explained to the patient.   -The risks of transesophageal echo include but are not limited to:  Dental trauma, esophageal trauma/perforation, bleeding, laryngospasm/brochospasm, aspiration, sore throat/hoarseness, & dislodgement of the endotracheal tube/nasogastric tube (where applicable).    -The risks of moderate sedation include hypotension, respiratory depression, arrhythmias, bronchospasm, & death.    -Informed consent was obtained. The patient is agreeable to proceed with the procedure and all questions and concerns addressed.    Case discussed with an attending in echocardiography lab.     Further recommendations per attending addendum

## 2019-07-12 NOTE — PROGRESS NOTES
Patient ambulated around unit with no difficulty. No SOB or fatigue noted. HR SB-SR 50-60. Will monitor.

## 2019-07-17 ENCOUNTER — PATIENT OUTREACH (OUTPATIENT)
Dept: OTHER | Facility: OTHER | Age: 84
End: 2019-07-17

## 2019-07-17 NOTE — PROGRESS NOTES
Last 5 Patient Entered Readings                                      Current 30 Day Average: 123/80     Recent Readings 7/17/2019 7/16/2019 7/16/2019 7/16/2019 7/16/2019    SBP (mmHg) 127 137 179 181 127    DBP (mmHg) 63 80 83 100 80    Pulse 66 65 54 58 62          Called to f/u on sodium intake/ healthy eating habits, no answer LVM. Will call back in 5 days.

## 2019-07-19 ENCOUNTER — HOSPITAL ENCOUNTER (OUTPATIENT)
Dept: CARDIOLOGY | Facility: CLINIC | Age: 84
Discharge: HOME | End: 2019-07-19
Payer: MEDICARE

## 2019-07-19 DIAGNOSIS — I48.19 PERSISTENT ATRIAL FIBRILLATION: ICD-10-CM

## 2019-07-19 PROCEDURE — 93005 ELECTROCARDIOGRAM TRACING: CPT | Mod: HCNC,S$GLB,, | Performed by: INTERNAL MEDICINE

## 2019-07-19 PROCEDURE — 93005 RHYTHM STRIP: ICD-10-PCS | Mod: HCNC,S$GLB,, | Performed by: INTERNAL MEDICINE

## 2019-07-19 PROCEDURE — 93010 ELECTROCARDIOGRAM REPORT: CPT | Mod: HCNC,S$GLB,, | Performed by: INTERNAL MEDICINE

## 2019-07-19 PROCEDURE — 93010 RHYTHM STRIP: ICD-10-PCS | Mod: HCNC,S$GLB,, | Performed by: INTERNAL MEDICINE

## 2019-07-22 NOTE — PROGRESS NOTES
Last 5 Patient Entered Readings                                      Current 30 Day Average: 127/79     Recent Readings 7/22/2019 7/22/2019 7/21/2019 7/21/2019 7/21/2019    SBP (mmHg) 172 170 135 129 145    DBP (mmHg) 77 97 62 71 72    Pulse 69 71 59 61 65            Digital Medicine: Health  Follow Up    Left voicemail to follow up with Mr. Norm Mendoza.  Current BP average 127/79 mmHg is at goal, 130/80

## 2019-07-25 ENCOUNTER — LAB VISIT (OUTPATIENT)
Dept: LAB | Facility: HOSPITAL | Age: 84
End: 2019-07-25
Payer: MEDICARE

## 2019-07-25 ENCOUNTER — OFFICE VISIT (OUTPATIENT)
Dept: HEMATOLOGY/ONCOLOGY | Facility: CLINIC | Age: 84
End: 2019-07-25
Payer: MEDICARE

## 2019-07-25 VITALS
BODY MASS INDEX: 23.74 KG/M2 | RESPIRATION RATE: 17 BRPM | WEIGHT: 175.25 LBS | HEIGHT: 72 IN | SYSTOLIC BLOOD PRESSURE: 122 MMHG | HEART RATE: 52 BPM | DIASTOLIC BLOOD PRESSURE: 64 MMHG | TEMPERATURE: 98 F | OXYGEN SATURATION: 95 %

## 2019-07-25 DIAGNOSIS — I48.19 PERSISTENT ATRIAL FIBRILLATION: ICD-10-CM

## 2019-07-25 DIAGNOSIS — Z79.01 ON CONTINUOUS ORAL ANTICOAGULATION: ICD-10-CM

## 2019-07-25 DIAGNOSIS — C88.4 MALT LYMPHOMA: ICD-10-CM

## 2019-07-25 DIAGNOSIS — C88.4 MALT LYMPHOMA: Primary | ICD-10-CM

## 2019-07-25 LAB
ALBUMIN SERPL BCP-MCNC: 3.3 G/DL (ref 3.5–5.2)
ALP SERPL-CCNC: 64 U/L (ref 55–135)
ALT SERPL W/O P-5'-P-CCNC: 20 U/L (ref 10–44)
ANION GAP SERPL CALC-SCNC: 6 MMOL/L (ref 8–16)
AST SERPL-CCNC: 23 U/L (ref 10–40)
BASOPHILS # BLD AUTO: 0.06 K/UL (ref 0–0.2)
BASOPHILS NFR BLD: 1.3 % (ref 0–1.9)
BILIRUB SERPL-MCNC: 0.6 MG/DL (ref 0.1–1)
BUN SERPL-MCNC: 31 MG/DL (ref 10–30)
CALCIUM SERPL-MCNC: 9.5 MG/DL (ref 8.7–10.5)
CHLORIDE SERPL-SCNC: 104 MMOL/L (ref 95–110)
CO2 SERPL-SCNC: 27 MMOL/L (ref 23–29)
CREAT SERPL-MCNC: 1 MG/DL (ref 0.5–1.4)
DIFFERENTIAL METHOD: ABNORMAL
EOSINOPHIL # BLD AUTO: 0 K/UL (ref 0–0.5)
EOSINOPHIL NFR BLD: 0.2 % (ref 0–8)
ERYTHROCYTE [DISTWIDTH] IN BLOOD BY AUTOMATED COUNT: 16.3 % (ref 11.5–14.5)
EST. GFR  (AFRICAN AMERICAN): >60 ML/MIN/1.73 M^2
EST. GFR  (NON AFRICAN AMERICAN): >60 ML/MIN/1.73 M^2
GLUCOSE SERPL-MCNC: 115 MG/DL (ref 70–110)
HCT VFR BLD AUTO: 42.3 % (ref 40–54)
HGB BLD-MCNC: 13.6 G/DL (ref 14–18)
IMM GRANULOCYTES # BLD AUTO: 0.04 K/UL (ref 0–0.04)
IMM GRANULOCYTES NFR BLD AUTO: 0.9 % (ref 0–0.5)
LYMPHOCYTES # BLD AUTO: 1 K/UL (ref 1–4.8)
LYMPHOCYTES NFR BLD: 21.4 % (ref 18–48)
MCH RBC QN AUTO: 30.8 PG (ref 27–31)
MCHC RBC AUTO-ENTMCNC: 32.2 G/DL (ref 32–36)
MCV RBC AUTO: 96 FL (ref 82–98)
MONOCYTES # BLD AUTO: 0.6 K/UL (ref 0.3–1)
MONOCYTES NFR BLD: 12.6 % (ref 4–15)
NEUTROPHILS # BLD AUTO: 3 K/UL (ref 1.8–7.7)
NEUTROPHILS NFR BLD: 63.6 % (ref 38–73)
NRBC BLD-RTO: 0 /100 WBC
PLATELET # BLD AUTO: 186 K/UL (ref 150–350)
PMV BLD AUTO: 9 FL (ref 9.2–12.9)
POTASSIUM SERPL-SCNC: 4.3 MMOL/L (ref 3.5–5.1)
PROT SERPL-MCNC: 6.5 G/DL (ref 6–8.4)
RBC # BLD AUTO: 4.42 M/UL (ref 4.6–6.2)
SODIUM SERPL-SCNC: 137 MMOL/L (ref 136–145)
WBC # BLD AUTO: 4.68 K/UL (ref 3.9–12.7)

## 2019-07-25 PROCEDURE — 1101F PT FALLS ASSESS-DOCD LE1/YR: CPT | Mod: HCNC,CPTII,GC,S$GLB | Performed by: STUDENT IN AN ORGANIZED HEALTH CARE EDUCATION/TRAINING PROGRAM

## 2019-07-25 PROCEDURE — 99214 PR OFFICE/OUTPT VISIT, EST, LEVL IV, 30-39 MIN: ICD-10-PCS | Mod: HCNC,GC,S$GLB, | Performed by: STUDENT IN AN ORGANIZED HEALTH CARE EDUCATION/TRAINING PROGRAM

## 2019-07-25 PROCEDURE — 99999 PR PBB SHADOW E&M-EST. PATIENT-LVL III: ICD-10-PCS | Mod: PBBFAC,HCNC,GC, | Performed by: STUDENT IN AN ORGANIZED HEALTH CARE EDUCATION/TRAINING PROGRAM

## 2019-07-25 PROCEDURE — 36415 COLL VENOUS BLD VENIPUNCTURE: CPT | Mod: HCNC

## 2019-07-25 PROCEDURE — 99214 OFFICE O/P EST MOD 30 MIN: CPT | Mod: HCNC,GC,S$GLB, | Performed by: STUDENT IN AN ORGANIZED HEALTH CARE EDUCATION/TRAINING PROGRAM

## 2019-07-25 PROCEDURE — 85025 COMPLETE CBC W/AUTO DIFF WBC: CPT | Mod: HCNC

## 2019-07-25 PROCEDURE — 99999 PR PBB SHADOW E&M-EST. PATIENT-LVL III: CPT | Mod: PBBFAC,HCNC,GC, | Performed by: STUDENT IN AN ORGANIZED HEALTH CARE EDUCATION/TRAINING PROGRAM

## 2019-07-25 PROCEDURE — 1101F PR PT FALLS ASSESS DOC 0-1 FALLS W/OUT INJ PAST YR: ICD-10-PCS | Mod: HCNC,CPTII,GC,S$GLB | Performed by: STUDENT IN AN ORGANIZED HEALTH CARE EDUCATION/TRAINING PROGRAM

## 2019-07-25 PROCEDURE — 80053 COMPREHEN METABOLIC PANEL: CPT | Mod: HCNC

## 2019-07-25 NOTE — PROGRESS NOTES
PATIENT: Norm Mendoza  MRN: [id number]  DATE: 7/25/2019      Diagnosis:   1. MALT lymphoma        Chief Complaint: No chief complaint on file.    Subjective:     Mr. Norm Mendoza is a 89 y.o. male with a-fib, HTN, new CHF, who presents to the Hematologic clinic for follow-up of stage 1 MALT lymphoma.    Oncologic History  -The patient was recently discharged on 12/9/2018 following a 3 day admission for a GIB. Patient was on Eliquis at the time and had been complaining of 1 week of black, tarry stools. EGD at the time was significant for nonbleeding gastric ulcers. Biopsies were taken and the patient was instructed to start protonix and hold eliquis at discharge. He requiring IV fluids and blood transfusion during the hospitalization. Biopsy results returned 12/18/2018 concerning for possible MALT-Lymphoma with molecular studies pending.  -PET confirmed stage 1 MALT lymphoma  -Completed Rituximab x4 on 3/7/19  -EGD: Erythematous, granular and scarred mucosa in the lesser curvature of the gastric body. Biopsied, + MALT lymphoma. Ulcers no longer present., PET was also done 4/20/19, no other site of disease    Interval History  Patient presents alone. Back on Xarelto, no melena, no hematochezia. No abdominal pain. Gained 5 lbs, taking lasix PRN. He is still working. ECOG 0.    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    Mixed hyperlipidemia 5/2/2018    Paroxysmal atrial fibrillation 11/30/2018    Skin cancer     x3, had mohs on nose    Squamous cell carcinoma excised 12/5/14    R lower back    Symptomatic anemia 12/6/2018    Urinary tract infection     x4       Past Surgical HIstory:   Past Surgical History:   Procedure Laterality Date    ACHILLES TENDON SURGERY      Cardioversion or Defibrillation N/A 7/12/2019     Performed by Hi Crowder MD at [facility] EP LAB    cataracts      CYSTOSCOPY      bladder tumor    CYSTOSCOPY N/A 3/28/2014    Performed by Slava Barbosa MD at [facility] OR 1ST [place]    DILATION, URETHRA N/A 3/28/2014    Performed by Slava Barbosa MD at [facility] OR 1ST FLR    EGD (ESOPHAGOGASTRODUODENOSCOPY) N/A 4/12/2019    Performed by Austin Garcia MD at [facility] ENDO (4TH FLR)    EGD (ESOPHAGOGASTRODUODENOSCOPY) N/A 12/7/2018    Performed by Austin Garcia MD at [facility] ENDO (2ND FLR)    EXCISION-BLADDER TUMOR-TRANSURETHRAL (TURBT) N/A 3/28/2014    Performed by Slava Barbosa MD at [facility] OR 1ST FLR    EYE SURGERY      SKIN CANCER EXCISION      Transesophageal echo (JACINTO) intra-procedure log documentation N/A 7/12/2019    Performed by [facility] Diagnostic Provider at [facility] EP LAB       Family History:   Family History   Problem Relation Age of Onset    Stroke Father     Hypertension Father     Stroke Brother     Anesthesia problems Neg Hx     Malignant hypertension Neg Hx     Hypotension Neg Hx     Malignant hyperthermia Neg Hx     Pseudochol deficiency Neg Hx     Melanoma Neg Hx     Heart attack Neg Hx     Heart disease Neg Hx     Heart failure Neg Hx        Social History:  reports that he quit smoking about 48 years ago. His smoking use included cigarettes. He has a 25.00 pack-year smoking history. He has never used smokeless tobacco. He reports that he drinks about 1.8 oz of alcohol per week. He reports that he does not use drugs.  Very functional 89-year-old. He works 3 days a week for a company called [employer]. Lives alone, has a girlfriend. Has one son who lives here, one in [place], one grand-daughter who lives in [place]. Does pilates once a week. No family history of cancer.    Allergies:  Review of patient's allergies indicates:  No Known Allergies    Medications:  Current Outpatient Medications   Medication Sig Dispense Refill    furosemide (LASIX) 20 MG tablet  Take 1 tablet (20 mg total) by mouth as needed (leg edema, orthopnea, SOB,. weight gain). 30 tablet 3    irbesartan (AVAPRO) 150 MG tablet TAKE 1 TABLET BY MOUTH  DAILY 30 tablet 0    metoprolol succinate (TOPROL-XL) 25 MG 24 hr tablet 1 tab daily 30 tablet 4    psyllium (METAMUCIL) packet Take 1 packet by mouth once daily.      rivaroxaban (XARELTO) 20 mg Tab Take 1 tablet (20 mg total) by mouth daily with dinner or evening meal. 30 tablet 6    UNABLE TO FIND Take 1 Dose by mouth once daily. Hydroperoxide. Pt stated he put it in his water pick to cleanse his teeth       No current facility-administered medications for this visit.        Review of Systems   Constitutional: Negative for chills, fatigue, fever and unexpected weight change.   HENT: Negative for nosebleeds and sore throat.    Respiratory: Negative for cough and shortness of breath.    Cardiovascular: Negative for chest pain and leg swelling.   Gastrointestinal: Negative for abdominal pain, blood in stool, nausea and vomiting.   Genitourinary: Negative for dysuria and hematuria.   Musculoskeletal: Negative for arthralgias and back pain.   Skin: Negative for rash.   Neurological: Negative for light-headedness and headaches.   Hematological: Negative for adenopathy. Does not bruise/bleed easily.       ECOG Performance Status: 0  Objective:      Vitals:   Vitals:    07/25/19 1317   BP: 122/64   Pulse: (!) 52   Resp: 17   Temp: 97.7 °F (36.5 °C)   SpO2: 95%   Weight: 79.5 kg (175 lb 4.3 oz)   Height: 6' (1.829 m)     BMI: Body mass index is 23.77 kg/m².    Physical Exam   Constitutional: He appears well-developed and well-nourished.   HENT:   Head: Normocephalic and atraumatic.   Eyes: Pupils are equal, round, and reactive to light. EOM are normal. No scleral icterus.   Neck: Neck supple.   Cardiovascular: Normal rate and regular rhythm.   Pulmonary/Chest: Effort normal and breath sounds normal. No respiratory distress.   Abdominal: Soft. He exhibits no  distension. There is no tenderness.   Musculoskeletal: Normal range of motion. He exhibits no edema.   Lymphadenopathy:     He has no cervical adenopathy.   Neurological: He is alert.   Skin: Skin is warm and dry.   Psychiatric: He has a normal mood and affect. His behavior is normal.       Laboratory Data:  Lab Visit on 07/25/2019   Component Date Value Ref Range Status    Sodium 07/25/2019 137  136 - 145 mmol/L Final    Potassium 07/25/2019 4.3  3.5 - 5.1 mmol/L Final    Chloride 07/25/2019 104  95 - 110 mmol/L Final    CO2 07/25/2019 27  23 - 29 mmol/L Final    Glucose 07/25/2019 115* 70 - 110 mg/dL Final    BUN, Bld 07/25/2019 31* 10 - 30 mg/dL Final    Creatinine 07/25/2019 1.0  0.5 - 1.4 mg/dL Final    Calcium 07/25/2019 9.5  8.7 - 10.5 mg/dL Final    Total Protein 07/25/2019 6.5  6.0 - 8.4 g/dL Final    Albumin 07/25/2019 3.3* 3.5 - 5.2 g/dL Final    Total Bilirubin 07/25/2019 0.6  0.1 - 1.0 mg/dL Final    Comment: For infants and newborns, interpretation of results should be based  on gestational age, weight and in agreement with clinical  observations.  Premature Infant recommended reference ranges:  Up to 24 hours.............<8.0 mg/dL  Up to 48 hours............<12.0 mg/dL  3-5 days..................<15.0 mg/dL  6-29 days.................<15.0 mg/dL      Alkaline Phosphatase 07/25/2019 64  55 - 135 U/L Final    AST 07/25/2019 23  10 - 40 U/L Final    ALT 07/25/2019 20  10 - 44 U/L Final    Anion Gap 07/25/2019 6* 8 - 16 mmol/L Final    eGFR if African American 07/25/2019 >60.0  >60 mL/min/1.73 m^2 Final    eGFR if non African American 07/25/2019 >60.0  >60 mL/min/1.73 m^2 Final    Comment: Calculation used to obtain the estimated glomerular filtration  rate (eGFR) is the CKD-EPI equation.       WBC 07/25/2019 4.68  3.90 - 12.70 K/uL Final    RBC 07/25/2019 4.42* 4.60 - 6.20 M/uL Final    Hemoglobin 07/25/2019 13.6* 14.0 - 18.0 g/dL Final    Hematocrit 07/25/2019 42.3  40.0 - 54.0 %  Final    Mean Corpuscular Volume 07/25/2019 96  82 - 98 fL Final    Mean Corpuscular Hemoglobin 07/25/2019 30.8  27.0 - 31.0 pg Final    Mean Corpuscular Hemoglobin Conc 07/25/2019 32.2  32.0 - 36.0 g/dL Final    RDW 07/25/2019 16.3* 11.5 - 14.5 % Final    Platelets 07/25/2019 186  150 - 350 K/uL Final    MPV 07/25/2019 9.0* 9.2 - 12.9 fL Final    Immature Granulocytes 07/25/2019 0.9* 0.0 - 0.5 % Final    Gran # (ANC) 07/25/2019 3.0  1.8 - 7.7 K/uL Final    Immature Grans (Abs) 07/25/2019 0.04  0.00 - 0.04 K/uL Final    Comment: Mild elevation in immature granulocytes is non specific and   can be seen in a variety of conditions including stress response,   acute inflammation, trauma and pregnancy. Correlation with other   laboratory and clinical findings is essential.      Lymph # 07/25/2019 1.0  1.0 - 4.8 K/uL Final    Mono # 07/25/2019 0.6  0.3 - 1.0 K/uL Final    Eos # 07/25/2019 0.0  0.0 - 0.5 K/uL Final    Baso # 07/25/2019 0.06  0.00 - 0.20 K/uL Final    nRBC 07/25/2019 0  0 /100 WBC Final    Gran% 07/25/2019 63.6  38.0 - 73.0 % Final    Lymph% 07/25/2019 21.4  18.0 - 48.0 % Final    Mono% 07/25/2019 12.6  4.0 - 15.0 % Final    Eosinophil% 07/25/2019 0.2  0.0 - 8.0 % Final    Basophil% 07/25/2019 1.3  0.0 - 1.9 % Final    Differential Method 07/25/2019 Automated   Final     FINAL PATHOLOGIC DIAGNOSIS  1. ULCER LESSER CURVATURE, GASTRIC BODY, BIOPSY-:  -ATYPICAL LYMPHOCYTIC INFILTRATION, PENDING MOLECULAR STUDY. SEE COMMENT  -RARE FOCUS OF ACTIVE INFLAMMATION.  -HELICOBACTER IS NEGATIVE.  COMMENT: Fragments of gastric mucosa infiltrated with small to medium-sized atypical lymphocytes.  Lymphoepithelial lesions are also evident.  Flow cytometric analysis of tissue was not submitted.  Immunohistochemical studies were performed on paraffin block with adequate positive and negative controls . The  small atypical lymphocytes are positive for CD20, low KI -67, and are negative for CD10, CD23,  cyclin D1. The  reactive T cells are CD3 positive, CD5 positive, and BCL 2 positive. Immunohistochemical study for Helicobacter is  negative.  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 Molecular study is pending to confirm the diagnosis. A supplemental report will follow.    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  After an approximately 60 min distribution time, PET/CT images were acquired from the skull base to the mid thigh. Transmission images were acquired to correct for attenuation using a whole body low-dose CT scan without contrast with the arms positioned above the head. Glycemia at the time of injection was 115 mg/dL.    COMPARISON:  NM PET CT routine 01/03/2019    FINDINGS:  Quality of the study: Adequate.    In this patient with biopsy proven Gastric MALT lymphoma, the gastric wall appears mildly thickened, however less conspicuous when compared to PET CT 01/03/2019.  There is no increased tracer uptake within the gastric body or other discrete lesion identified; however, MALT type lymphoma is typically difficult to differentiate from normal GI uptake.    Physiologic uptake of the tracer is present within the brain, salivary glands, myocardium, GI and  tracts.    Incidental CT findings:    Bilateral maxillary sinus mucosal thickening with near complete opacification of the partially visualized right maxillary sinus.    Coronary artery and atherosclerotic calcification.  Aortic valve calcification.  Fusiform dilatation of the mid ascending thoracic aorta to 4.4 cm, stable from prior.    Bibasilar atelectasis.  0.5 cm nodule in the left upper lobe abutting the pleura, stable from prior.  Interval  resolution of bilateral pleural effusions.    There is a large calcified gallstone in the gallbladder lumen without evidence of acute cholelithiasis.    Prostatomegaly and diffuse bladder wall thickening, similar to prior.      Impression       No definite evidence of active marginal zone MALT.  The stomach is decompressed with mild wall thickening; however is less conspicuous metabollically when compared to PET CT 01/03/2019.    No increased tracer uptake identified.    Cholelithiasis without evidence of acute cholecystitis.    Prostatomegaly with bladder wall thickening likely secondary to outlet obstruction.    Interval resolution of bilateral pleural effusions.       Assessment:       1. MALT lymphoma           Plan:   1) Limited (Stage 1) MALT lymphoma  -H pylori negative  -Recent h/o GIB while was on DOAC for a-fib  -PET negative for distant disease or lymphadenopathy  -Hepatitis studies negative  -Patient recently seen by cardiology for a-fib, off anti-coagulation, off of beta-blocker given AEs  -Patient completed Rituximab at this time: 375mg x4 weeks on 3/7/19  -Persistent MALT lymphoma on EGD, PET was also done, no other site of disease  -Was seen again by radiation oncology, would prefer to pursue observation at this time  -Repeat labs with MD visit Q3mo (CBC, CMP, LDH) for first 1-2 years    2) New systolic CHF, A-fib  -ECHO showed Left ventricular systolic function. The estimated ejection fraction is 43%. Global hypokinetic wall motion. Septal wall has abnormal motion.   -S/p cardioversion via JACINTO. Patient now back on Xarelto.   -On lasix PRN  -To follow-up with cardiology    Follow-up: visit in 3 months with labs prior (CBC, CMP, LDH)    The following was staffed and discussed with supervising physician Dr. Prather.    Kelly Beckwith MD  Hematology/Oncology fellow  Distress Screening Results: Psychosocial Distress screening score of Distress Score: 0 noted and reviewed. No intervention indicated.

## 2019-07-25 NOTE — PROGRESS NOTES
Last 5 Patient Entered Readings                                      Current 30 Day Average: 126/78     Recent Readings 7/25/2019 7/25/2019 7/25/2019 7/25/2019 7/25/2019    SBP (mmHg) 117 119 163 162 139    DBP (mmHg) 67 61 77 83 75    Pulse 75 53 54 58 56        Called to discuss ALERT reading of 183/95 this morning. Denies s/sx of hypertension- headache, chest pains, change in vision. BP reading in the office today 122/64  Reports recently charging BP device. Reports he typically does not rest for very long. Reviewed proper BP technique, patient verbalized understanding and stated he will rest for at least 5 minutes prior to taking a reading.    Digital Medicine: Health  Follow Up    Lifestyle Modifications:    1.Dietary Modifications (Sodium intake <2,000mg/day, food labels, dining out): Reports drinking about 3 glasses of water per day. Reports plans to eat corn on the cob and sardines (in olive oil), with salad for dinner tonight. Patient did not seem to realize elevated levels of sodium in sardines, but ended call early because he was busy. Will try to discuss further next call.    2.Physical Activity: Deferred    3.Medication Therapy: Patient has been compliant with the medication regimen.    4.Patient has the following medication side effects/concerns: none  (Frequency/Alleviating factors/Precipitating factors, etc.)     Follow up with Mr. Norm Mendoza completed. No further questions or concerns. Will continue to follow up to achieve health goals.

## 2019-07-30 PROBLEM — Z79.01 ON CONTINUOUS ORAL ANTICOAGULATION: Status: ACTIVE | Noted: 2019-07-30

## 2019-08-15 ENCOUNTER — PATIENT OUTREACH (OUTPATIENT)
Dept: OTHER | Facility: OTHER | Age: 84
End: 2019-08-15

## 2019-08-15 NOTE — PROGRESS NOTES
Last 5 Patient Entered Readings                                      Current 30 Day Average: 137/77     Recent Readings 8/15/2019 8/14/2019 8/13/2019 8/13/2019 8/12/2019    SBP (mmHg) 132 137 131 137 163    DBP (mmHg) 82 62 77 80 86    Pulse 54 60 70 80 72          Patient not available to complete HC f/u. Will call again next week.

## 2019-08-20 NOTE — PROGRESS NOTES
"Last 5 Patient Entered Readings                                      Current 30 Day Average: 141/77     Recent Readings 8/20/2019 8/20/2019 8/19/2019 8/19/2019 8/19/2019    SBP (mmHg) 172 181 152 175 188    DBP (mmHg) 83 78 73 76 93    Pulse 54 54 55 56 55        Called to discuss ALERT BP readings received today. Patient states he feels "fine", denies symptoms of hypertension. Stated "I have no idea why it is high". RCB to discuss further and complete HC f/u next week.   "

## 2019-08-23 NOTE — PROGRESS NOTES
"Last 5 Patient Entered Readings                                      Current 30 Day Average: 144/77     Recent Readings 8/23/2019 8/22/2019 8/21/2019 8/20/2019 8/20/2019    SBP (mmHg) 191 162 158 172 181    DBP (mmHg) 85 99 73 83 78    Pulse 57 51 48 54 54        Called to discuss ALERT BP readings received today. Spoke with Claudette Cotter, his "close ". Claudette stated that patient took his diuretic and he is "feeling better". Currently taking a nap and unable to speak with me now. Elevated reading are likely affected by sodium intake and improper blood pressure technique. Blood pressure device may not be adequately charged. WCB next week.  "

## 2019-08-30 ENCOUNTER — PATIENT OUTREACH (OUTPATIENT)
Dept: OTHER | Facility: OTHER | Age: 84
End: 2019-08-30

## 2019-08-30 NOTE — PROGRESS NOTES
Last 5 Patient Entered Readings                                      Current 30 Day Average: 146/78     Recent Readings 8/30/2019 8/29/2019 8/28/2019 8/27/2019 8/27/2019    SBP (mmHg) 122 130 130 183 195    DBP (mmHg) 66 68 84 87 87    Pulse 61 61 70 65 53        Hypertension Medications     furosemide (LASIX) 20 MG tablet Take 1 tablet (20 mg total) by mouth as needed (leg edema, orthopnea, SOB,. weight gain).    irbesartan (AVAPRO) 150 MG tablet TAKE 1 TABLET BY MOUTH  DAILY    metoprolol succinate (TOPROL-XL) 25 MG 24 hr tablet 1 tab daily     Called patient for BP follow up and to address high BP alerts. Patient denies symptoms of hypertension associated with higher BP readings. He contributes spikes to BP to edema and says that when he takes a diuretic, his BP comes down. Patient says he likes salty foods and doesn't think salt is an issue. He says he will consider reducing sodium intake.     1) 30-day BP average exceeds goal of <130/<80. Continue current BP medications.   2) Patient knows to contact me with any non-urgent clinical changes or concerns. Go to ED or call [facility] for emergencies.   3) Will defer lifestyle counseling to digital medicine health . Stressed importance of sodium restriction.   4) Will continue to follow up.     Maggie Andrade, Pharm.D.   Digital Medicine Clinical Pharmacist  [phone]

## 2019-09-03 ENCOUNTER — PATIENT OUTREACH (OUTPATIENT)
Dept: OTHER | Facility: OTHER | Age: 84
End: 2019-09-03

## 2019-09-03 ENCOUNTER — OFFICE VISIT (OUTPATIENT)
Dept: ELECTROPHYSIOLOGY | Facility: CLINIC | Age: 84
End: 2019-09-03
Payer: MEDICARE

## 2019-09-03 ENCOUNTER — HOSPITAL ENCOUNTER (OUTPATIENT)
Dept: CARDIOLOGY | Facility: CLINIC | Age: 84
Discharge: HOME | End: 2019-09-03
Payer: MEDICARE

## 2019-09-03 VITALS
SYSTOLIC BLOOD PRESSURE: 134 MMHG | HEART RATE: 50 BPM | WEIGHT: 178.13 LBS | BODY MASS INDEX: 23.61 KG/M2 | DIASTOLIC BLOOD PRESSURE: 71 MMHG | HEIGHT: 73 IN

## 2019-09-03 DIAGNOSIS — I48.19 PERSISTENT ATRIAL FIBRILLATION: ICD-10-CM

## 2019-09-03 DIAGNOSIS — I42.0 DILATED CARDIOMYOPATHY: ICD-10-CM

## 2019-09-03 DIAGNOSIS — I48.19 PERSISTENT ATRIAL FIBRILLATION: Primary | ICD-10-CM

## 2019-09-03 DIAGNOSIS — Z79.01 ON CONTINUOUS ORAL ANTICOAGULATION: ICD-10-CM

## 2019-09-03 DIAGNOSIS — Z92.89 HISTORY OF CARDIOVERSION: ICD-10-CM

## 2019-09-03 DIAGNOSIS — I10 ESSENTIAL HYPERTENSION: ICD-10-CM

## 2019-09-03 PROCEDURE — 1100F PR PT FALLS ASSESS DOC 2+ FALLS/FALL W/INJURY/YR: ICD-10-PCS | Mod: HCNC,CPTII,S$GLB, | Performed by: NURSE PRACTITIONER

## 2019-09-03 PROCEDURE — 3288F FALL RISK ASSESSMENT DOCD: CPT | Mod: HCNC,CPTII,S$GLB, | Performed by: NURSE PRACTITIONER

## 2019-09-03 PROCEDURE — 99999 PR PBB SHADOW E&M-EST. PATIENT-LVL III: ICD-10-PCS | Mod: PBBFAC,HCNC,, | Performed by: NURSE PRACTITIONER

## 2019-09-03 PROCEDURE — 93005 ELECTROCARDIOGRAM TRACING: CPT | Mod: HCNC,S$GLB,, | Performed by: INTERNAL MEDICINE

## 2019-09-03 PROCEDURE — 93005 RHYTHM STRIP: ICD-10-PCS | Mod: HCNC,S$GLB,, | Performed by: INTERNAL MEDICINE

## 2019-09-03 PROCEDURE — 99999 PR PBB SHADOW E&M-EST. PATIENT-LVL III: CPT | Mod: PBBFAC,HCNC,, | Performed by: NURSE PRACTITIONER

## 2019-09-03 PROCEDURE — 93010 ELECTROCARDIOGRAM REPORT: CPT | Mod: HCNC,S$GLB,, | Performed by: INTERNAL MEDICINE

## 2019-09-03 PROCEDURE — 1100F PTFALLS ASSESS-DOCD GE2>/YR: CPT | Mod: HCNC,CPTII,S$GLB, | Performed by: NURSE PRACTITIONER

## 2019-09-03 PROCEDURE — 99214 PR OFFICE/OUTPT VISIT, EST, LEVL IV, 30-39 MIN: ICD-10-PCS | Mod: HCNC,S$GLB,, | Performed by: NURSE PRACTITIONER

## 2019-09-03 PROCEDURE — 93010 RHYTHM STRIP: ICD-10-PCS | Mod: HCNC,S$GLB,, | Performed by: INTERNAL MEDICINE

## 2019-09-03 PROCEDURE — 99214 OFFICE O/P EST MOD 30 MIN: CPT | Mod: HCNC,S$GLB,, | Performed by: NURSE PRACTITIONER

## 2019-09-03 PROCEDURE — 3288F PR FALLS RISK ASSESSMENT DOCUMENTED: ICD-10-PCS | Mod: HCNC,CPTII,S$GLB, | Performed by: NURSE PRACTITIONER

## 2019-09-03 NOTE — PROGRESS NOTES
Last 5 Patient Entered Readings                                      Current 30 Day Average: 146/78     Recent Readings 9/3/2019 9/2/2019 9/1/2019 9/1/2019 9/1/2019    SBP (mmHg) 167 154 96 112 162    DBP (mmHg) 112 52 58 69 86    Pulse 73 66 50 54 58          Called to discuss ALERT BP readings received today. Patient RCB, currently in a meeting.

## 2019-09-03 NOTE — Clinical Note
6w s/p DCCV. Big symptom improvement. On xarelto with hx of GIB. Pt is interested in [facility] - let me know what you think. Also, will check echo to see if EF improves in SR. Do you want to start him on AAD (amio?) or wait until echo? balaji Albright

## 2019-09-03 NOTE — PROGRESS NOTES
Mr. Mendoza is a patient of Dr. Crowder and was last seen in clinic 6/18/2019.      Subjective:   Patient ID:  Norm Mendoza is a 89 y.o. male who presents for follow-up of Atrial Fibrillation  .     HPI:    Mr. Mendoza is a 89 y.o. male with AF, GIB, CM, HTN, MALT, PUD here for follow up after cardioversion.     Background:    89 yoM here for LAAO consideration. He has had development of HF as well as persistent AF. He was in NSR 2014. The next ECG 11/18 showed AF. EF 5/18 was normal in NSR. He was started on OAC 12/18 for AF. 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 He underwent Rituximab therapy and had resolution of his ulcers 4/19 but had residual MALT. He did not receive radiation.  He has mild HF symptoms, treated with lasix. Stress echo 5/19 showed EF 40%. He was prescribed xarelto 5/30/19 but he has not taken it.    89 yoM persistent AF, HTN, MALT, PUD, cardiomyopathy here for AF management. He has symptomatic AF with associated cardiomyopathy. He has high risk for CVA and contraindication for long term anticoagulation. I offered JACINTO/CV to restore NSR and hopefully to restore LV function. Afterwards, we will discuss options for LAAO. He will start xarelto now and we will plan on JACINTO/CV in 2 weeks. Follow up in 6 weeks.     Update (09/03/2019):    7/12/2019: Underwent successful JACINTO/DCCV.    Today he says he has been feeling well. Using less diuretic - less DUGAN/SOB. More energy. Denies palps, CP, LH, syncope. No recent bleeds.    He is currently taking xarelto 20mg daily for stroke prophylaxis and denies significant bleeding episodes. He is currently being treated with metoprolol succinate 25mg daily for HR control.  Kidney function is stable, with a creatinine of 1 on 7/25/2019.    I have personally reviewed the patient's EKG today, which shows sinus bradycardia at 50bpm. MA interval is 338. QRS is 130. QT is 438.    Recent Cardiac Tests:    JACINTO  "(7/12/2019):  · Mildly decreased left ventricular systolic function. The estimated ejection fraction is 40-45%  · Mild right ventricular enlargement.  · Mildly reduced right ventricular systolic function.  · Mild-to-moderate mitral regurgitation.  · No interatrial septal defect present.  · Normal appearing left atrial appendage. No thrombus is present in the appendage. Emptying velocity 0.3 m/s.    Current Outpatient Medications   Medication Sig    furosemide (LASIX) 20 MG tablet Take 1 tablet (20 mg total) by mouth as needed (leg edema, orthopnea, SOB,. weight gain).    irbesartan (AVAPRO) 150 MG tablet TAKE 1 TABLET BY MOUTH  DAILY    rivaroxaban (XARELTO) 20 mg Tab Take 1 tablet (20 mg total) by mouth daily with dinner or evening meal.    UNABLE TO FIND Take 1 Dose by mouth once daily. Hydroperoxide. Pt stated he put it in his water pick to cleanse his teeth    metoprolol succinate (TOPROL-XL) 25 MG 24 hr tablet 1 tab daily    psyllium (METAMUCIL) packet Take 1 packet by mouth once daily.     No current facility-administered medications for this visit.        Review of Systems   Constitution: Negative for malaise/fatigue.   Cardiovascular: Negative for chest pain, dyspnea on exertion, irregular heartbeat, leg swelling and palpitations.   Respiratory: Negative for shortness of breath.    Hematologic/Lymphatic: Negative for bleeding problem.   Skin: Negative for rash.   Musculoskeletal: Negative for myalgias.   Gastrointestinal: Negative for hematemesis, hematochezia and nausea.   Genitourinary: Negative for hematuria.   Neurological: Negative for light-headedness.   Psychiatric/Behavioral: Negative for altered mental status.   Allergic/Immunologic: Negative for persistent infections.         Objective:          /71   Pulse (!) 50   Ht 6' 1" (1.854 m)   Wt 80.8 kg (178 lb 2.1 oz)   BMI 23.50 kg/m²     Physical Exam   Constitutional: He is oriented to person, place, and time. He appears " well-developed and well-nourished.   HENT:   Head: Normocephalic.   Nose: Nose normal.   Eyes: Pupils are equal, round, and reactive to light.   Cardiovascular: Regular rhythm, S1 normal and S2 normal. Bradycardia present.   No murmur heard.  Pulses:       Radial pulses are 2+ on the right side, and 2+ on the left side.   Pulmonary/Chest: Breath sounds normal. No respiratory distress.   Abdominal: Normal appearance.   Musculoskeletal: Normal range of motion. He exhibits no edema.   Neurological: He is alert and oriented to person, place, and time.   Skin: Skin is warm and dry. No erythema.   Psychiatric: He has a normal mood and affect. His speech is normal and behavior is normal.   Nursing note and vitals reviewed.        Lab Results   Component Value Date     07/25/2019    K 4.3 07/25/2019    MG 1.9 03/13/2019    BUN 31 (H) 07/25/2019    CREATININE 1.0 07/25/2019    ALT 20 07/25/2019    AST 23 07/25/2019    HGB 13.6 (L) 07/25/2019    HCT 42.3 07/25/2019    TSH 2.775 11/30/2018    LDLCALC 136.6 08/03/2018    LDLCALC 136.6 08/03/2018       Recent Labs   Lab 12/06/18  1315 12/15/18  1426 03/13/19  2154 07/05/19  0945   INR 1.0 1.0 1.1 1.3 H       Assessment:     1. Persistent atrial fibrillation    2. Essential hypertension    3. Dilated cardiomyopathy    4. On continuous oral anticoagulation    5. History of cardioversion      Plan:     In summary, Mr. Mendoza is a 89 y.o. male with AF, GIB, CM, HTN, MALT, PUD here for follow up after cardioversion.   He is now 6 weeks s/p JACINTO/DCCV. He feels better in SR, not on as much diuretic. Less DUGAN. He is interested in whether his EF has improved in SR. Will obtain echo 3 mo s/p DCCV. We discussed next steps. He is not a good long term OAC candidate due to GIB. Currently tolerating xarelto. Reviewed LAAO. He is interested in Watchman. Will discuss this plus AAD with Dr. Crowder (UPDATE: Will wait until echo results to decide on AAD - likely amio. Patient will f/u with  Dr. Crowder to discuss watchman further.) On digital HTN.     Continue current medications.  Echo in 6 weeks.  F/U re: AAD and LAAO.  RTC as scheduled following procedure.    *A copy of this note has been sent to Dr. Crowder*    Follow up as scheduled following procedure.    ------------------------------------------------------------------    MYCHAL Yuan, NP-C  Cardiac Electrophysiology

## 2019-09-04 RX ORDER — FUROSEMIDE 20 MG/1
20 TABLET ORAL
Qty: 30 TABLET | Refills: 3 | Status: SHIPPED | OUTPATIENT
Start: 2019-09-04 | End: 2019-09-11

## 2019-09-08 ENCOUNTER — TELEPHONE (OUTPATIENT)
Dept: INTERNAL MEDICINE | Facility: CLINIC | Age: 84
End: 2019-09-08

## 2019-09-09 NOTE — TELEPHONE ENCOUNTER
----- Message from Maggie Andrade PharmD sent at 9/3/2019  6:04 PM CDT -----  Regarding: Labile BP  Hi Dr. Owens,     Please review labile BP readings. In the past patient did not want to make any medication adjustments. I am not sure that medication adjustment is even warranted. I believe the extreme elevations could be to incorrect monitoring technique or high sodium foods.     Do you have any suggestions on further management?    Thanks!    Maggie Andrade, Pharm.D.   [employer] [facility] Clinical Pharmacist  [phone]

## 2019-09-11 ENCOUNTER — TELEPHONE (OUTPATIENT)
Dept: INTERNAL MEDICINE | Facility: CLINIC | Age: 84
End: 2019-09-11

## 2019-09-11 ENCOUNTER — OFFICE VISIT (OUTPATIENT)
Dept: INTERNAL MEDICINE | Facility: CLINIC | Age: 84
End: 2019-09-11
Payer: MEDICARE

## 2019-09-11 VITALS
WEIGHT: 175.06 LBS | DIASTOLIC BLOOD PRESSURE: 80 MMHG | HEART RATE: 46 BPM | OXYGEN SATURATION: 98 % | TEMPERATURE: 98 F | BODY MASS INDEX: 23.71 KG/M2 | SYSTOLIC BLOOD PRESSURE: 160 MMHG | HEIGHT: 72 IN

## 2019-09-11 DIAGNOSIS — H53.9 VISION CHANGES: ICD-10-CM

## 2019-09-11 DIAGNOSIS — C88.4 MALT LYMPHOMA: ICD-10-CM

## 2019-09-11 DIAGNOSIS — E61.1 IRON DEFICIENCY: ICD-10-CM

## 2019-09-11 DIAGNOSIS — I48.0 PAROXYSMAL ATRIAL FIBRILLATION: ICD-10-CM

## 2019-09-11 DIAGNOSIS — I10 ESSENTIAL HYPERTENSION: Primary | ICD-10-CM

## 2019-09-11 PROCEDURE — 99499 UNLISTED E&M SERVICE: CPT | Mod: HCNC,S$GLB,, | Performed by: INTERNAL MEDICINE

## 2019-09-11 PROCEDURE — 99499 RISK ADDL DX/OHS AUDIT: ICD-10-PCS | Mod: HCNC,S$GLB,, | Performed by: INTERNAL MEDICINE

## 2019-09-11 PROCEDURE — 99999 PR PBB SHADOW E&M-EST. PATIENT-LVL IV: CPT | Mod: PBBFAC,HCNC,, | Performed by: INTERNAL MEDICINE

## 2019-09-11 PROCEDURE — 1101F PT FALLS ASSESS-DOCD LE1/YR: CPT | Mod: HCNC,CPTII,S$GLB, | Performed by: INTERNAL MEDICINE

## 2019-09-11 PROCEDURE — 1101F PR PT FALLS ASSESS DOC 0-1 FALLS W/OUT INJ PAST YR: ICD-10-PCS | Mod: HCNC,CPTII,S$GLB, | Performed by: INTERNAL MEDICINE

## 2019-09-11 PROCEDURE — 99214 PR OFFICE/OUTPT VISIT, EST, LEVL IV, 30-39 MIN: ICD-10-PCS | Mod: HCNC,S$GLB,, | Performed by: INTERNAL MEDICINE

## 2019-09-11 PROCEDURE — 99214 OFFICE O/P EST MOD 30 MIN: CPT | Mod: HCNC,S$GLB,, | Performed by: INTERNAL MEDICINE

## 2019-09-11 PROCEDURE — 99999 PR PBB SHADOW E&M-EST. PATIENT-LVL IV: ICD-10-PCS | Mod: PBBFAC,HCNC,, | Performed by: INTERNAL MEDICINE

## 2019-09-11 RX ORDER — FUROSEMIDE 20 MG/1
TABLET ORAL
Qty: 90 TABLET | Refills: 0 | Status: SHIPPED | OUTPATIENT
Start: 2019-09-11 | End: 2019-11-14 | Stop reason: SDUPTHER

## 2019-09-11 RX ORDER — FUROSEMIDE 20 MG/1
TABLET ORAL
Qty: 90 TABLET | Refills: 0 | Status: SHIPPED | OUTPATIENT
Start: 2019-09-11 | End: 2019-09-11 | Stop reason: SDUPTHER

## 2019-09-11 NOTE — PROGRESS NOTES
Subjective:       Patient ID: Norm Mendoza is a 89 y.o. male.    Chief Complaint:   Follow-up and Hypertension    HPI: Mr Mendoza today for f/u elevated BPs per Digital HTN pharmacists.  He has been taking lasix 20mg(4-5 tabs) once weekly for fluid.  He takes his Irbesartan 160mg and Toprol XL 25mg tab daily.  He has had resolution of PUD by EGD 4/2019; Lymphoma being followed conservatively. He was restarted on Xaralto end May and has been  back in sinus rhythm s/p DCCV 6/2019. He feels good-np CP/SOB/Edema  Past Medical, Surgical, Social History: Please see as stated in Epic chart which has been reviewed.    Current Outpatient Medications   Medication Sig Dispense Refill    furosemide (LASIX) 20 MG tablet Take 1 tablet (20 mg total) by mouth as needed (leg edema, orthopnea, SOB,. weight gain). 30 tablet 3    irbesartan (AVAPRO) 150 MG tablet TAKE 1 TABLET BY MOUTH  DAILY 30 tablet 0    metoprolol succinate (TOPROL-XL) 25 MG 24 hr tablet 1 tab daily 30 tablet 4    psyllium (METAMUCIL) packet Take 1 packet by mouth once daily.      rivaroxaban (XARELTO) 20 mg Tab Take 1 tablet (20 mg total) by mouth daily with dinner or evening meal. 30 tablet 6    UNABLE TO FIND Take 1 Dose by mouth once daily. Hydroperoxide. Pt stated he put it in his water pick to cleanse his teeth       No current facility-administered medications for this visit.        Review of Systems   Eyes: Positive for visual disturbance.        +Worsening vision   Respiratory: Negative for cough, chest tightness, shortness of breath and wheezing.    Cardiovascular: Negative for chest pain and leg swelling.       Objective:      Lab Results   Component Value Date    WBC 4.68 07/25/2019    HGB 13.6 (L) 07/25/2019    HCT 42.3 07/25/2019     07/25/2019    CHOL 224 (H) 08/03/2018    CHOL 224 (H) 08/03/2018    TRIG 57 08/03/2018    TRIG 57 08/03/2018    HDL 76 (H) 08/03/2018    HDL 76 (H) 08/03/2018    ALT 20 07/25/2019    AST 23  07/25/2019     07/25/2019    K 4.3 07/25/2019     07/25/2019    CREATININE 1.0 07/25/2019    BUN 31 (H) 07/25/2019    CO2 27 07/25/2019    TSH 2.775 11/30/2018    PSA 2.5 09/10/2015    INR 1.3 (H) 07/05/2019     Physical Exam   Constitutional: He appears well-developed and well-nourished.   HENT:   Head: Normocephalic and atraumatic.   Cardiovascular: Normal rate, regular rhythm and normal heart sounds.   VHR at 48/regular   Pulmonary/Chest: Effort normal and breath sounds normal.   Abdominal: Soft. Bowel sounds are normal. He exhibits no mass. There is no tenderness.   Musculoskeletal: He exhibits no edema.   Skin: Skin is warm and dry.   Psychiatric: He has a normal mood and affect.   Vitals reviewed.        Vital Signs  Temp: 97.8 °F (36.6 °C)  Temp src: Oral  Pulse: (!) 46  SpO2: 98 %  BP: (!) 148/78  Pain Score: 0-No pain  Height and Weight  Height: 6' (182.9 cm)  Weight: 79.4 kg (175 lb 0.7 oz)  BSA (Calculated - sq m): 2.01 sq meters  BMI (Calculated): 23.8  Weight in (lb) to have BMI = 25: 183.9]    Assessment:       1. Essential hypertension    2. Paroxysmal atrial fibrillation    3. MALT lymphoma        Plan:     Health Maintenance   Topic Date Due    Lipid Panel  08/03/2023    TETANUS VACCINE  01/19/2027    Pneumococcal Vaccine (65+ High/Highest Risk)  Completed        Norm was seen today for follow-up and hypertension.    Diagnoses and all orders for this visit:    Essential hypertension/BP high today-irradic per Digital HTN      -    Lasix 20mg to be taken at 2 tabs 2days/week      -    Continue daily Irbesartan 160mg QD and Toprol XL 25mg QD      -    RTC x 3 months with prior chemistry        Paroxysmal atrial fibrillation/rate controlled and on Xaralto      -    F/u per [facility]    MALT lymphoma       -   F/u per Heme-Onc/Dr Galindo    Health Maintenance       -   RTC x 3 months with full fasting lab       -   Flu shot    Vision Change        -   Optometry appt to be  scheduled

## 2019-09-11 NOTE — TELEPHONE ENCOUNTER
----- Message from Carol Salguero sent at 9/11/2019  1:23 PM CDT -----  Contact: pharmacy/scotty/[phone]  Pharmacy called in regards to getting clarification on the pt Rx for     furosemide (LASIX) 20 MG tablet 90 tablet 0 9/11/2019  No 2 tabs 2 days/week for fluid/pressure    [facility]  Please advise

## 2019-09-20 ENCOUNTER — HOSPITAL ENCOUNTER (OUTPATIENT)
Dept: CARDIOLOGY | Facility: CLINIC | Age: 84
Discharge: HOME | End: 2019-09-20
Attending: NURSE PRACTITIONER
Payer: MEDICARE

## 2019-09-20 VITALS
HEART RATE: 70 BPM | WEIGHT: 175 LBS | BODY MASS INDEX: 23.7 KG/M2 | HEIGHT: 72 IN | SYSTOLIC BLOOD PRESSURE: 130 MMHG | DIASTOLIC BLOOD PRESSURE: 70 MMHG

## 2019-09-20 DIAGNOSIS — I48.19 PERSISTENT ATRIAL FIBRILLATION: ICD-10-CM

## 2019-09-20 LAB
ASCENDING AORTA: 4.23 CM
AV INDEX (PROSTH): 0.27
AV MEAN GRADIENT: 18 MMHG
AV PEAK GRADIENT: 34 MMHG
AV VALVE AREA: 1.24 CM2
AV VELOCITY RATIO: 0.26
BSA FOR ECHO PROCEDURE: 2.01 M2
CV ECHO LV RWT: 0.52 CM
DOP CALC AO PEAK VEL: 2.92 M/S
DOP CALC AO VTI: 64.89 CM
DOP CALC LVOT AREA: 4.7 CM2
DOP CALC LVOT DIAMETER: 2.44 CM
DOP CALC LVOT PEAK VEL: 0.76 M/S
DOP CALC LVOT STROKE VOLUME: 80.62 CM3
DOP CALCLVOT PEAK VEL VTI: 17.25 CM
E WAVE DECELERATION TIME: 537.05 MSEC
E/A RATIO: 0.43
E/E' RATIO: 8.33 M/S
ECHO LV POSTERIOR WALL: 1.11 CM (ref 0.6–1.1)
FRACTIONAL SHORTENING: 25 % (ref 28–44)
INTERVENTRICULAR SEPTUM: 1.07 CM (ref 0.6–1.1)
IVRT: 0.13 MSEC
LA MAJOR: 5.2 CM
LA MINOR: 5.22 CM
LA WIDTH: 4.58 CM
LEFT ATRIUM SIZE: 4.09 CM
LEFT ATRIUM VOLUME INDEX: 41.2 ML/M2
LEFT ATRIUM VOLUME: 82.96 CM3
LEFT INTERNAL DIMENSION IN SYSTOLE: 3.19 CM (ref 2.1–4)
LEFT VENTRICLE DIASTOLIC VOLUME INDEX: 40.44 ML/M2
LEFT VENTRICLE DIASTOLIC VOLUME: 81.39 ML
LEFT VENTRICLE MASS INDEX: 79 G/M2
LEFT VENTRICLE SYSTOLIC VOLUME INDEX: 20.2 ML/M2
LEFT VENTRICLE SYSTOLIC VOLUME: 40.67 ML
LEFT VENTRICULAR INTERNAL DIMENSION IN DIASTOLE: 4.26 CM (ref 3.5–6)
LEFT VENTRICULAR MASS: 158.5 G
LV LATERAL E/E' RATIO: 8.33 M/S
LV SEPTAL E/E' RATIO: 8.33 M/S
MV PEAK A VEL: 1.16 M/S
MV PEAK E VEL: 0.5 M/S
PISA TR MAX VEL: 2.84 M/S
PULM VEIN S/D RATIO: 1.63
PV PEAK D VEL: 0.27 M/S
PV PEAK S VEL: 0.44 M/S
RA MAJOR: 5.06 CM
RA PRESSURE: 3 MMHG
RA WIDTH: 4.19 CM
RIGHT VENTRICULAR END-DIASTOLIC DIMENSION: 3.93 CM
RV TISSUE DOPPLER FREE WALL SYSTOLIC VELOCITY 1 (APICAL 4 CHAMBER VIEW): 13.1 CM/S
SINUS: 4.16 CM
STJ: 3.36 CM
TDI LATERAL: 0.06 M/S
TDI SEPTAL: 0.06 M/S
TDI: 0.06 M/S
TR MAX PG: 32 MMHG
TRICUSPID ANNULAR PLANE SYSTOLIC EXCURSION: 2.05 CM
TV REST PULMONARY ARTERY PRESSURE: 35 MMHG

## 2019-09-20 PROCEDURE — 93306 TTE W/DOPPLER COMPLETE: CPT | Mod: 26,HCNC,, | Performed by: INTERNAL MEDICINE

## 2019-09-20 PROCEDURE — 93306 TTE W/DOPPLER COMPLETE: CPT | Mod: HCNC

## 2019-09-20 PROCEDURE — 93306 ECHO (CUPID ONLY): ICD-10-PCS | Mod: 26,HCNC,, | Performed by: INTERNAL MEDICINE

## 2019-09-27 ENCOUNTER — PATIENT OUTREACH (OUTPATIENT)
Dept: OTHER | Facility: OTHER | Age: 84
End: 2019-09-27

## 2019-09-30 ENCOUNTER — PATIENT OUTREACH (OUTPATIENT)
Dept: ADMINISTRATIVE | Facility: OTHER | Age: 84
End: 2019-09-30

## 2019-09-30 DIAGNOSIS — I10 ESSENTIAL HYPERTENSION: ICD-10-CM

## 2019-10-01 ENCOUNTER — OFFICE VISIT (OUTPATIENT)
Dept: ELECTROPHYSIOLOGY | Facility: CLINIC | Age: 84
End: 2019-10-01
Payer: MEDICARE

## 2019-10-01 ENCOUNTER — HOSPITAL ENCOUNTER (OUTPATIENT)
Dept: CARDIOLOGY | Facility: CLINIC | Age: 84
Discharge: HOME | End: 2019-10-01
Payer: MEDICARE

## 2019-10-01 VITALS
DIASTOLIC BLOOD PRESSURE: 72 MMHG | BODY MASS INDEX: 23.83 KG/M2 | HEART RATE: 59 BPM | WEIGHT: 175.94 LBS | SYSTOLIC BLOOD PRESSURE: 120 MMHG | HEIGHT: 72 IN

## 2019-10-01 DIAGNOSIS — C88.4 MALT LYMPHOMA: ICD-10-CM

## 2019-10-01 DIAGNOSIS — I48.19 PERSISTENT ATRIAL FIBRILLATION: Primary | ICD-10-CM

## 2019-10-01 DIAGNOSIS — I48.19 PERSISTENT ATRIAL FIBRILLATION: ICD-10-CM

## 2019-10-01 DIAGNOSIS — I48.0 PAROXYSMAL ATRIAL FIBRILLATION: ICD-10-CM

## 2019-10-01 DIAGNOSIS — I10 ESSENTIAL HYPERTENSION: ICD-10-CM

## 2019-10-01 PROCEDURE — 1100F PTFALLS ASSESS-DOCD GE2>/YR: CPT | Mod: HCNC,CPTII,S$GLB, | Performed by: INTERNAL MEDICINE

## 2019-10-01 PROCEDURE — 99999 PR PBB SHADOW E&M-EST. PATIENT-LVL III: ICD-10-PCS | Mod: PBBFAC,HCNC,, | Performed by: INTERNAL MEDICINE

## 2019-10-01 PROCEDURE — 93010 ELECTROCARDIOGRAM REPORT: CPT | Mod: HCNC,S$GLB,, | Performed by: INTERNAL MEDICINE

## 2019-10-01 PROCEDURE — 99999 PR PBB SHADOW E&M-EST. PATIENT-LVL III: CPT | Mod: PBBFAC,HCNC,, | Performed by: INTERNAL MEDICINE

## 2019-10-01 PROCEDURE — 3288F FALL RISK ASSESSMENT DOCD: CPT | Mod: HCNC,CPTII,S$GLB, | Performed by: INTERNAL MEDICINE

## 2019-10-01 PROCEDURE — 1100F PR PT FALLS ASSESS DOC 2+ FALLS/FALL W/INJURY/YR: ICD-10-PCS | Mod: HCNC,CPTII,S$GLB, | Performed by: INTERNAL MEDICINE

## 2019-10-01 PROCEDURE — 99215 PR OFFICE/OUTPT VISIT, EST, LEVL V, 40-54 MIN: ICD-10-PCS | Mod: HCNC,S$GLB,, | Performed by: INTERNAL MEDICINE

## 2019-10-01 PROCEDURE — 99499 RISK ADDL DX/OHS AUDIT: ICD-10-PCS | Mod: HCNC,S$GLB,, | Performed by: INTERNAL MEDICINE

## 2019-10-01 PROCEDURE — 99499 UNLISTED E&M SERVICE: CPT | Mod: HCNC,S$GLB,, | Performed by: INTERNAL MEDICINE

## 2019-10-01 PROCEDURE — 93005 RHYTHM STRIP: ICD-10-PCS | Mod: HCNC,S$GLB,, | Performed by: INTERNAL MEDICINE

## 2019-10-01 PROCEDURE — 3288F PR FALLS RISK ASSESSMENT DOCUMENTED: ICD-10-PCS | Mod: HCNC,CPTII,S$GLB, | Performed by: INTERNAL MEDICINE

## 2019-10-01 PROCEDURE — 99215 OFFICE O/P EST HI 40 MIN: CPT | Mod: HCNC,S$GLB,, | Performed by: INTERNAL MEDICINE

## 2019-10-01 PROCEDURE — 93010 RHYTHM STRIP: ICD-10-PCS | Mod: HCNC,S$GLB,, | Performed by: INTERNAL MEDICINE

## 2019-10-01 PROCEDURE — 93005 ELECTROCARDIOGRAM TRACING: CPT | Mod: HCNC,S$GLB,, | Performed by: INTERNAL MEDICINE

## 2019-10-01 RX ORDER — METOPROLOL SUCCINATE 25 MG/1
TABLET, EXTENDED RELEASE ORAL
Qty: 30 TABLET | Refills: 6 | Status: SHIPPED | OUTPATIENT
Start: 2019-10-01 | End: 2020-05-01

## 2019-10-01 NOTE — PROGRESS NOTES
Subjective:    Patient ID:  Norm Mendoza is a 89 y.o. male who presents for follow-up of No chief complaint on file.      89 yoM here for LAAO consideration.     6/18/19: He has had development of HF as well as persistent AF. He was in NSR 2014. The next ECG 11/18 showed AF. EF 5/18 was normal in NSR. He was started on OAC 12/18 for AF. 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 He underwent Rituximab therapy and had resolution of his ulcers 4/19 but had residual MALT. He did not receive radiation.  He has mild HF symptoms, treated with lasix. Stress echo 5/19 showed EF 40%. He was prescribed xarelto 5/30/19 but he has not taken it.    Interval history: He was cardioverted 7/12/19 with mild improvement in symptoms. EF 45% in NSR. He asks about Watchman.     Echo 5/18:  CONCLUSIONS     1 - Normal left ventricular systolic function (EF 55-60%).     2 - Indeterminate LV diastolic function.     3 - Biatrial enlargement.     4 - No wall motion abnormalities.     5 - Normal right ventricular systolic function .     6 - Mildly enlarged ascending aorta.     7 - Trivial to mild aortic regurgitation.     8 - Mild mitral regurgitation.     9 - Mild tricuspid regurgitation.     10 - Trivial to mild pulmonic regurgitation.     11 - The estimated PA systolic pressure is 33 mmHg.     Stress echo 5/19:  · THE PATIENT IS IN ATRIAL FIBRILLATION FOR THE ENTIRETY OF THE TEST  · The patient reported no symptoms during the stress test.  · The test was stopped secondary to fatigue.  · There were no arrhythmias during stress.  · Mildly decreased left ventricular systolic function. The estimated ejection fraction is 40%, 2D quantitative LVEF 33%.  · Grade II (moderate) left ventricular diastolic dysfunction consistent with pseudonormalization.  · Elevated left atrial pressure.  · Severe left atrial enlargement.  · The ascending aorta is moderately dilated.  · Mild tricuspid regurgitation.  · Low  normal right ventricular systolic function.  · Moderate right atrial enlargement.  · Concentric left ventricular remodeling.  · Overall, the patient's exercise capacity was normal.  · The EKG portion of this study is negative for myocardial ischemia.  · No obvious wall motion abnormalities at stress. LV function augments but still not normal at stress.    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  5/2/2018: Mixed hyperlipidemia  11/30/2018: Paroxysmal atrial fibrillation  No date: Skin cancer      Comment:  x3, had mohs on nose  excised 12/5/14: Squamous cell carcinoma      Comment:  R lower back  12/6/2018: Symptomatic anemia  No date: Urinary tract infection      Comment:  x4    Past Surgical History:  No date: ACHILLES TENDON SURGERY  No date: cataracts  No date: CYSTOSCOPY      Comment:  bladder tumor  12/7/2018: ESOPHAGOGASTRODUODENOSCOPY; N/A      Comment:  Procedure: EGD (ESOPHAGOGASTRODUODENOSCOPY);  Surgeon:                Austin Garcia MD;  Location: [facility] (2ND FLR);                 Service: Endoscopy;  Laterality: N/A;  4/12/2019: ESOPHAGOGASTRODUODENOSCOPY; N/A      Comment:  Procedure: EGD (ESOPHAGOGASTRODUODENOSCOPY);  Surgeon:                Austin Garcia MD;  Location: [facility] (4TH FLR);                 Service: Endoscopy;  Laterality: N/A;  please schedule                within 4 weeks  No date: EYE SURGERY  No date: SKIN CANCER EXCISION  7/12/2019: TREATMENT OF CARDIAC ARRHYTHMIA; N/A      Comment:  Procedure: Cardioversion or Defibrillation;  Surgeon:                Hi Crowder MD;  Location: [facility] EP LAB;  Service:               Cardiology;  Laterality: N/A;  AF, JACINTO, DCCV, MAC, MB, 3                Prep    Social History    Socioeconomic History      Marital status:       Spouse name: Not on file       Number of children: 2      Years of education: Not on file      Highest education level: Not on file    Occupational History      Occupation: Financial Work/        Employer: retired      Occupation: actor      Occupation:     Social Needs      Financial resource strain: Not on file      Food insecurity:        Worry: Not on file        Inability: Not on file      Transportation needs:        Medical: Not on file        Non-medical: Not on file    Tobacco Use      Smoking status: Former Smoker        Packs/day: 1.00        Years: 25.00        Pack years: 25        Types: Cigarettes        Quit date: 9/3/1970        Years since quittin.1      Smokeless tobacco: Never Used    Substance and Sexual Activity      Alcohol use: Yes        Alcohol/week: 3.0 standard drinks        Types: 3 Shots of liquor per week        Comment: 3 bourbons daily - 12 beer on weekends       Drug use: No      Sexual activity: Yes        Partners: Female    Lifestyle      Physical activity:        Days per week: Not on file        Minutes per session: Not on file      Stress: Not on file    Relationships      Social connections:        Talks on phone: Not on file        Gets together: Not on file        Attends Restorationist service: Not on file        Active member of club or organization: Not on file        Attends meetings of clubs or organizations: Not on file        Relationship status: Not on file    Other Topics      Concerns:        Not on file    Social History Narrative      Not on file      Review of patient's family history indicates:  Problem: Stroke      Relation: Father          Age of Onset: (Not Specified)  Problem: Hypertension      Relation: Father          Age of Onset: (Not Specified)  Problem: Stroke      Relation: Brother          Age of Onset: (Not Specified)  Problem: Anesthesia problems      Relation: Neg Hx          Age of Onset: (Not Specified)  Problem: Malignant hypertension       Relation: Neg Hx          Age of Onset: (Not Specified)  Problem: Hypotension      Relation: Neg Hx          Age of Onset: (Not Specified)  Problem: Malignant hyperthermia      Relation: Neg Hx          Age of Onset: (Not Specified)  Problem: Pseudochol deficiency      Relation: Neg Hx          Age of Onset: (Not Specified)  Problem: Melanoma      Relation: Neg Hx          Age of Onset: (Not Specified)  Problem: Heart attack      Relation: Neg Hx          Age of Onset: (Not Specified)  Problem: Heart disease      Relation: Neg Hx          Age of Onset: (Not Specified)  Problem: Heart failure      Relation: Neg Hx          Age of Onset: (Not Specified)        Review of Systems   Constitution: Negative.   HENT: Negative.    Eyes: Negative.    Cardiovascular: Positive for dyspnea on exertion, irregular heartbeat and leg swelling. Negative for chest pain, near-syncope, palpitations and syncope.   Respiratory: Negative.  Negative for shortness of breath.    Endocrine: Negative.    Hematologic/Lymphatic: Negative.    Skin: Negative.    Musculoskeletal: Negative.    Gastrointestinal: Negative.    Genitourinary: Negative.    Neurological: Negative.  Negative for dizziness and light-headedness.   Psychiatric/Behavioral: Negative.    Allergic/Immunologic: Negative.         Objective:    Physical Exam   Constitutional: He is oriented to person, place, and time. He appears well-developed and well-nourished. No distress.   HENT:   Head: Normocephalic and atraumatic.   Eyes: Pupils are equal, round, and reactive to light. EOM are normal.   Neck: Normal range of motion. No JVD present. No thyromegaly present.   Cardiovascular: Normal rate, S1 normal, S2 normal and normal heart sounds. An irregularly irregular rhythm present. PMI is not displaced. Exam reveals no gallop and no friction rub.   No murmur heard.  Pulmonary/Chest: Effort normal and breath sounds normal. No respiratory distress. He has no wheezes. He has no rales.    Abdominal: Soft. Bowel sounds are normal. He exhibits no distension. There is no tenderness. There is no rebound and no guarding.   Musculoskeletal: Normal range of motion. He exhibits no edema or tenderness.   Neurological: He is alert and oriented to person, place, and time. No cranial nerve deficit.   Skin: Skin is warm and dry. No rash noted. No erythema.   Psychiatric: He has a normal mood and affect. His behavior is normal. Judgment and thought content normal.   Vitals reviewed.    ECG: NSR  mg, IVCD 126        Assessment:       1. Persistent atrial fibrillation    2. MALT lymphoma    3. Essential hypertension         Plan:       89 yoF persistent AF s./p CV here for AF management. He has improved symptoms in AF. He asks about LAAO. The patient can tolerate short term OAC but is not a candidate for long term OAC due to recurrent bleeding issues (MALT). I discussed management options regarding LAAO. I discussed the process of LAAO via Watchman including pre-procedure testing- JACINTO & CT- as well as the need to take OAC for 6 weeks post implant. We discussed post procedure JACINTO studies to assess BROCK occlusion. Additionally I mentioned the need to take DAPT up to the 6 month point post implant.      Pre-procedure Watchman studies: JACINTO for BROCK geometry (will try to use CV JACINTO study)  Watchman with Anesthesia support    LAAO via Watchman

## 2019-10-01 NOTE — H&P (VIEW-ONLY)
Subjective:    Patient ID:  Norm Mendoza is a 89 y.o. male who presents for follow-up of No chief complaint on file.      89 yoM here for LAAO consideration.     6/18/19: He has had development of HF as well as persistent AF. He was in NSR 2014. The next ECG 11/18 showed AF. EF 5/18 was normal in NSR. He was started on OAC 12/18 for AF. 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 He underwent Rituximab therapy and had resolution of his ulcers 4/19 but had residual MALT. He did not receive radiation.  He has mild HF symptoms, treated with lasix. Stress echo 5/19 showed EF 40%. He was prescribed xarelto 5/30/19 but he has not taken it.    Interval history: He was cardioverted 7/12/19 with mild improvement in symptoms. EF 45% in NSR. He asks about Watchman.     Echo 5/18:  CONCLUSIONS     1 - Normal left ventricular systolic function (EF 55-60%).     2 - Indeterminate LV diastolic function.     3 - Biatrial enlargement.     4 - No wall motion abnormalities.     5 - Normal right ventricular systolic function .     6 - Mildly enlarged ascending aorta.     7 - Trivial to mild aortic regurgitation.     8 - Mild mitral regurgitation.     9 - Mild tricuspid regurgitation.     10 - Trivial to mild pulmonic regurgitation.     11 - The estimated PA systolic pressure is 33 mmHg.     Stress echo 5/19:  · THE PATIENT IS IN ATRIAL FIBRILLATION FOR THE ENTIRETY OF THE TEST  · The patient reported no symptoms during the stress test.  · The test was stopped secondary to fatigue.  · There were no arrhythmias during stress.  · Mildly decreased left ventricular systolic function. The estimated ejection fraction is 40%, 2D quantitative LVEF 33%.  · Grade II (moderate) left ventricular diastolic dysfunction consistent with pseudonormalization.  · Elevated left atrial pressure.  · Severe left atrial enlargement.  · The ascending aorta is moderately dilated.  · Mild tricuspid regurgitation.  · Low  normal right ventricular systolic function.  · Moderate right atrial enlargement.  · Concentric left ventricular remodeling.  · Overall, the patient's exercise capacity was normal.  · The EKG portion of this study is negative for myocardial ischemia.  · No obvious wall motion abnormalities at stress. LV function augments but still not normal at stress.    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  5/2/2018: Mixed hyperlipidemia  11/30/2018: Paroxysmal atrial fibrillation  No date: Skin cancer      Comment:  x3, had mohs on nose  excised 12/5/14: Squamous cell carcinoma      Comment:  R lower back  12/6/2018: Symptomatic anemia  No date: Urinary tract infection      Comment:  x4    Past Surgical History:  No date: ACHILLES TENDON SURGERY  No date: cataracts  No date: CYSTOSCOPY      Comment:  bladder tumor  12/7/2018: ESOPHAGOGASTRODUODENOSCOPY; N/A      Comment:  Procedure: EGD (ESOPHAGOGASTRODUODENOSCOPY);  Surgeon:                Austin Garcia MD;  Location: [facility] (2ND FLR);                 Service: Endoscopy;  Laterality: N/A;  4/12/2019: ESOPHAGOGASTRODUODENOSCOPY; N/A      Comment:  Procedure: EGD (ESOPHAGOGASTRODUODENOSCOPY);  Surgeon:                Austin Garcia MD;  Location: [facility] (4TH FLR);                 Service: Endoscopy;  Laterality: N/A;  please schedule                within 4 weeks  No date: EYE SURGERY  No date: SKIN CANCER EXCISION  7/12/2019: TREATMENT OF CARDIAC ARRHYTHMIA; N/A      Comment:  Procedure: Cardioversion or Defibrillation;  Surgeon:                Hi Crowder MD;  Location: [facility] EP LAB;  Service:               Cardiology;  Laterality: N/A;  AF, JACINTO, DCCV, MAC, MB, 3                Prep    Social History    Socioeconomic History      Marital status:       Spouse name: Not on file       Number of children: 2      Years of education: Not on file      Highest education level: Not on file    Occupational History      Occupation: Financial Work/        Employer: retired      Occupation: actor      Occupation:     Social Needs      Financial resource strain: Not on file      Food insecurity:        Worry: Not on file        Inability: Not on file      Transportation needs:        Medical: Not on file        Non-medical: Not on file    Tobacco Use      Smoking status: Former Smoker        Packs/day: 1.00        Years: 25.00        Pack years: 25        Types: Cigarettes        Quit date: 9/3/1970        Years since quittin.1      Smokeless tobacco: Never Used    Substance and Sexual Activity      Alcohol use: Yes        Alcohol/week: 3.0 standard drinks        Types: 3 Shots of liquor per week        Comment: 3 bourbons daily - 12 beer on weekends       Drug use: No      Sexual activity: Yes        Partners: Female    Lifestyle      Physical activity:        Days per week: Not on file        Minutes per session: Not on file      Stress: Not on file    Relationships      Social connections:        Talks on phone: Not on file        Gets together: Not on file        Attends Cheondoism service: Not on file        Active member of club or organization: Not on file        Attends meetings of clubs or organizations: Not on file        Relationship status: Not on file    Other Topics      Concerns:        Not on file    Social History Narrative      Not on file      Review of patient's family history indicates:  Problem: Stroke      Relation: Father          Age of Onset: (Not Specified)  Problem: Hypertension      Relation: Father          Age of Onset: (Not Specified)  Problem: Stroke      Relation: Brother          Age of Onset: (Not Specified)  Problem: Anesthesia problems      Relation: Neg Hx          Age of Onset: (Not Specified)  Problem: Malignant hypertension       Relation: Neg Hx          Age of Onset: (Not Specified)  Problem: Hypotension      Relation: Neg Hx          Age of Onset: (Not Specified)  Problem: Malignant hyperthermia      Relation: Neg Hx          Age of Onset: (Not Specified)  Problem: Pseudochol deficiency      Relation: Neg Hx          Age of Onset: (Not Specified)  Problem: Melanoma      Relation: Neg Hx          Age of Onset: (Not Specified)  Problem: Heart attack      Relation: Neg Hx          Age of Onset: (Not Specified)  Problem: Heart disease      Relation: Neg Hx          Age of Onset: (Not Specified)  Problem: Heart failure      Relation: Neg Hx          Age of Onset: (Not Specified)        Review of Systems   Constitution: Negative.   HENT: Negative.    Eyes: Negative.    Cardiovascular: Positive for dyspnea on exertion, irregular heartbeat and leg swelling. Negative for chest pain, near-syncope, palpitations and syncope.   Respiratory: Negative.  Negative for shortness of breath.    Endocrine: Negative.    Hematologic/Lymphatic: Negative.    Skin: Negative.    Musculoskeletal: Negative.    Gastrointestinal: Negative.    Genitourinary: Negative.    Neurological: Negative.  Negative for dizziness and light-headedness.   Psychiatric/Behavioral: Negative.    Allergic/Immunologic: Negative.         Objective:    Physical Exam   Constitutional: He is oriented to person, place, and time. He appears well-developed and well-nourished. No distress.   HENT:   Head: Normocephalic and atraumatic.   Eyes: Pupils are equal, round, and reactive to light. EOM are normal.   Neck: Normal range of motion. No JVD present. No thyromegaly present.   Cardiovascular: Normal rate, S1 normal, S2 normal and normal heart sounds. An irregularly irregular rhythm present. PMI is not displaced. Exam reveals no gallop and no friction rub.   No murmur heard.  Pulmonary/Chest: Effort normal and breath sounds normal. No respiratory distress. He has no wheezes. He has no rales.    Abdominal: Soft. Bowel sounds are normal. He exhibits no distension. There is no tenderness. There is no rebound and no guarding.   Musculoskeletal: Normal range of motion. He exhibits no edema or tenderness.   Neurological: He is alert and oriented to person, place, and time. No cranial nerve deficit.   Skin: Skin is warm and dry. No rash noted. No erythema.   Psychiatric: He has a normal mood and affect. His behavior is normal. Judgment and thought content normal.   Vitals reviewed.    ECG: NSR  mg, IVCD 126        Assessment:       1. Persistent atrial fibrillation    2. MALT lymphoma    3. Essential hypertension         Plan:       89 yoF persistent AF s./p CV here for AF management. He has improved symptoms in AF. He asks about LAAO. The patient can tolerate short term OAC but is not a candidate for long term OAC due to recurrent bleeding issues (MALT). I discussed management options regarding LAAO. I discussed the process of LAAO via Watchman including pre-procedure testing- JACINTO & CT- as well as the need to take OAC for 6 weeks post implant. We discussed post procedure JACINTO studies to assess BROCK occlusion. Additionally I mentioned the need to take DAPT up to the 6 month point post implant.      Pre-procedure Watchman studies: JACINTO for BROCK geometry (will try to use CV JACINTO study)  Watchman with Anesthesia support    LAAO via Watchman

## 2019-10-03 ENCOUNTER — IMMUNIZATION (OUTPATIENT)
Dept: PHARMACY | Facility: CLINIC | Age: 84
End: 2019-10-03
Payer: MEDICARE

## 2019-10-07 ENCOUNTER — TELEPHONE (OUTPATIENT)
Dept: ELECTROPHYSIOLOGY | Facility: CLINIC | Age: 84
End: 2019-10-07

## 2019-10-07 NOTE — TELEPHONE ENCOUNTER
----- Message from Dread Sharp MA sent at 10/7/2019 11:13 AM CDT -----  Contact: Self      ----- Message -----  From: Nissa Golden  Sent: 10/7/2019  10:05 AM CDT  To: Vel Do Staff    Pt just had a call about his co-pay & further payments regarding procedure. Pt would like to know if the procdure has been approved by his insurance. Please call. Thanks    [phone]

## 2019-10-07 NOTE — TELEPHONE ENCOUNTER
Spoke with patient and advised that his insurance has approved the Watchman on 10/11/19. He verbalized understanding and voiced no other questions or concerns.

## 2019-10-10 ENCOUNTER — TELEPHONE (OUTPATIENT)
Dept: ELECTROPHYSIOLOGY | Facility: CLINIC | Age: 84
End: 2019-10-10

## 2019-10-10 NOTE — TELEPHONE ENCOUNTER
Spoke to Norm Mendoza    CONFIRMED procedure arrival time of 11am   Reiterated instructions including:  -Directions to check in desk  -NPO after midnight night prior to procedure   -Patient states that he has missed one dose this week but no other missed doses. Confirmed taking Xarelto once daily in the evening.   -Pre-procedure LABS 10/1/19- Labs received. Hgb low but at patient's baseline. History of GI bleed 12/18. Patient confirms no signs of bleeding. Labs reviewed with Jaquelin Ceron RN.  -Bathe night prior and morning prior to procedure with Hibiclens solution or an antibacterial soap     Pt verbalizes understanding of above and appreciates call.

## 2019-10-11 ENCOUNTER — ANESTHESIA (OUTPATIENT)
Dept: MEDSURG UNIT | Facility: HOSPITAL | Age: 84
DRG: 274 | End: 2019-10-11
Payer: MEDICARE

## 2019-10-11 ENCOUNTER — HOSPITAL ENCOUNTER (OUTPATIENT)
Dept: CARDIOLOGY | Facility: CLINIC | Age: 84
Discharge: HOME | End: 2019-10-11
Attending: INTERNAL MEDICINE
Payer: MEDICARE

## 2019-10-11 ENCOUNTER — ANESTHESIA EVENT (OUTPATIENT)
Dept: MEDSURG UNIT | Facility: HOSPITAL | Age: 84
DRG: 274 | End: 2019-10-11
Payer: MEDICARE

## 2019-10-11 ENCOUNTER — HOSPITAL ENCOUNTER (INPATIENT)
Facility: HOSPITAL | Age: 84
LOS: 1 days | Discharge: HOME | DRG: 274 | End: 2019-10-12
Attending: INTERNAL MEDICINE | Admitting: INTERNAL MEDICINE
Payer: MEDICARE

## 2019-10-11 DIAGNOSIS — I48.91 ATRIAL FIBRILLATION: ICD-10-CM

## 2019-10-11 DIAGNOSIS — I48.19 PERSISTENT ATRIAL FIBRILLATION: ICD-10-CM

## 2019-10-11 DIAGNOSIS — I48.0 PAROXYSMAL ATRIAL FIBRILLATION: ICD-10-CM

## 2019-10-11 LAB
ABO + RH BLD: NORMAL
ASCENDING AORTA: 4.2 CM
BLD GP AB SCN CELLS X3 SERPL QL: NORMAL
BSA FOR ECHO PROCEDURE: 1.98 M2
POC ACTIVATED CLOTTING TIME K: 301 SEC (ref 74–137)
PROX AORTA: 4.2 CM
SAMPLE: ABNORMAL
SINUS: 3.6 CM
SINUS: 3.6 CM

## 2019-10-11 PROCEDURE — 33340 PERQ CLSR TCAT L ATR APNDGE: CPT | Mod: Q0,HCNC,, | Performed by: INTERNAL MEDICINE

## 2019-10-11 PROCEDURE — D9220A PRA ANESTHESIA: Mod: Q0,HCNC,ANES, | Performed by: ANESTHESIOLOGY

## 2019-10-11 PROCEDURE — 86901 BLOOD TYPING SEROLOGIC RH(D): CPT | Mod: HCNC

## 2019-10-11 PROCEDURE — 93005 ELECTROCARDIOGRAM TRACING: CPT | Mod: HCNC

## 2019-10-11 PROCEDURE — 63600175 PHARM REV CODE 636 W HCPCS: Mod: HCNC | Performed by: NURSE PRACTITIONER

## 2019-10-11 PROCEDURE — 93010 ELECTROCARDIOGRAM REPORT: CPT | Mod: HCNC,,, | Performed by: INTERNAL MEDICINE

## 2019-10-11 PROCEDURE — 86920 COMPATIBILITY TEST SPIN: CPT | Mod: HCNC

## 2019-10-11 PROCEDURE — C1894 INTRO/SHEATH, NON-LASER: HCPCS | Mod: HCNC | Performed by: INTERNAL MEDICINE

## 2019-10-11 PROCEDURE — 63600175 PHARM REV CODE 636 W HCPCS: Mod: HCNC | Performed by: REGISTERED NURSE

## 2019-10-11 PROCEDURE — 33340 PERQ CLSR TCAT L ATR APNDGE: CPT | Mod: HCNC | Performed by: INTERNAL MEDICINE

## 2019-10-11 PROCEDURE — 93355 TRANSESOPHAGEAL ECHO (TEE) (CUPID ONLY): ICD-10-PCS | Mod: HCNC,,, | Performed by: INTERNAL MEDICINE

## 2019-10-11 PROCEDURE — 93355 ECHO TRANSESOPHAGEAL (TEE): CPT | Mod: HCNC,,, | Performed by: INTERNAL MEDICINE

## 2019-10-11 PROCEDURE — 25000003 PHARM REV CODE 250: Mod: HCNC | Performed by: INTERNAL MEDICINE

## 2019-10-11 PROCEDURE — 37000009 HC ANESTHESIA EA ADD 15 MINS: Mod: HCNC | Performed by: INTERNAL MEDICINE

## 2019-10-11 PROCEDURE — C1769 GUIDE WIRE: HCPCS | Mod: HCNC | Performed by: INTERNAL MEDICINE

## 2019-10-11 PROCEDURE — 25500020 PHARM REV CODE 255: Mod: HCNC | Performed by: INTERNAL MEDICINE

## 2019-10-11 PROCEDURE — 93320 DOPPLER ECHO COMPLETE: CPT | Mod: HCNC

## 2019-10-11 PROCEDURE — 27800903 OPTIME MED/SURG SUP & DEVICES OTHER IMPLANTS: Mod: HCNC | Performed by: INTERNAL MEDICINE

## 2019-10-11 PROCEDURE — 20600001 HC STEP DOWN PRIVATE ROOM: Mod: HCNC

## 2019-10-11 PROCEDURE — D9220A PRA ANESTHESIA: ICD-10-PCS | Mod: Q0,HCNC,ANES, | Performed by: ANESTHESIOLOGY

## 2019-10-11 PROCEDURE — 93010 EKG 12-LEAD: ICD-10-PCS | Mod: HCNC,,, | Performed by: INTERNAL MEDICINE

## 2019-10-11 PROCEDURE — 63600175 PHARM REV CODE 636 W HCPCS: Mod: HCNC | Performed by: INTERNAL MEDICINE

## 2019-10-11 PROCEDURE — 27201423 OPTIME MED/SURG SUP & DEVICES STERILE SUPPLY: Mod: HCNC | Performed by: INTERNAL MEDICINE

## 2019-10-11 PROCEDURE — 25000003 PHARM REV CODE 250: Mod: HCNC | Performed by: REGISTERED NURSE

## 2019-10-11 PROCEDURE — 33340 PR TRANSCATH CLOSURE, PERC, LEFT ATRIAL APPENDAGE, W/IMPLANT: ICD-10-PCS | Mod: Q0,HCNC,, | Performed by: INTERNAL MEDICINE

## 2019-10-11 PROCEDURE — 37000008 HC ANESTHESIA 1ST 15 MINUTES: Mod: HCNC | Performed by: INTERNAL MEDICINE

## 2019-10-11 DEVICE — LEFT ATRIAL APPENDAGE CLOSURE DEVICE WITH DELIVERY SYSTEM
Type: IMPLANTABLE DEVICE | Site: HEART | Status: FUNCTIONAL
Brand: WATCHMAN®

## 2019-10-11 RX ORDER — SODIUM CHLORIDE 0.9 % (FLUSH) 0.9 %
5 SYRINGE (ML) INJECTION
Status: DISCONTINUED | OUTPATIENT
Start: 2019-10-11 | End: 2019-10-11

## 2019-10-11 RX ORDER — LIDOCAINE HCL/PF 100 MG/5ML
SYRINGE (ML) INTRAVENOUS
Status: DISCONTINUED | OUTPATIENT
Start: 2019-10-11 | End: 2019-10-11

## 2019-10-11 RX ORDER — HYDROCODONE BITARTRATE AND ACETAMINOPHEN 500; 5 MG/1; MG/1
TABLET ORAL
Status: DISCONTINUED | OUTPATIENT
Start: 2019-10-11 | End: 2019-10-12 | Stop reason: HOSPADM

## 2019-10-11 RX ORDER — EPHEDRINE SULFATE 50 MG/ML
INJECTION, SOLUTION INTRAVENOUS
Status: DISCONTINUED | OUTPATIENT
Start: 2019-10-11 | End: 2019-10-11

## 2019-10-11 RX ORDER — NEOSTIGMINE METHYLSULFATE 0.5 MG/ML
INJECTION, SOLUTION INTRAVENOUS
Status: DISCONTINUED | OUTPATIENT
Start: 2019-10-11 | End: 2019-10-11

## 2019-10-11 RX ORDER — IODIXANOL 320 MG/ML
INJECTION, SOLUTION INTRAVASCULAR
Status: DISCONTINUED | OUTPATIENT
Start: 2019-10-11 | End: 2019-10-11

## 2019-10-11 RX ORDER — PROPOFOL 10 MG/ML
VIAL (ML) INTRAVENOUS
Status: DISCONTINUED | OUTPATIENT
Start: 2019-10-11 | End: 2019-10-11

## 2019-10-11 RX ORDER — IRBESARTAN 150 MG/1
150 TABLET ORAL DAILY
Status: DISCONTINUED | OUTPATIENT
Start: 2019-10-12 | End: 2019-10-12 | Stop reason: HOSPADM

## 2019-10-11 RX ORDER — LIDOCAINE HYDROCHLORIDE 20 MG/ML
INJECTION, SOLUTION INFILTRATION; PERINEURAL
Status: DISCONTINUED | OUTPATIENT
Start: 2019-10-11 | End: 2019-10-11

## 2019-10-11 RX ORDER — ACETAMINOPHEN 325 MG/1
650 TABLET ORAL EVERY 6 HOURS PRN
Status: DISCONTINUED | OUTPATIENT
Start: 2019-10-11 | End: 2019-10-12 | Stop reason: HOSPADM

## 2019-10-11 RX ORDER — GLYCOPYRROLATE 0.2 MG/ML
INJECTION INTRAMUSCULAR; INTRAVENOUS
Status: DISCONTINUED | OUTPATIENT
Start: 2019-10-11 | End: 2019-10-11

## 2019-10-11 RX ORDER — CEFAZOLIN SODIUM 1 G/3ML
2 INJECTION, POWDER, FOR SOLUTION INTRAMUSCULAR; INTRAVENOUS
Status: COMPLETED | OUTPATIENT
Start: 2019-10-11 | End: 2019-10-11

## 2019-10-11 RX ORDER — HEPARIN SODIUM 200 [USP'U]/100ML
INJECTION, SOLUTION INTRAVENOUS
Status: DISCONTINUED | OUTPATIENT
Start: 2019-10-11 | End: 2019-10-11

## 2019-10-11 RX ORDER — SODIUM CHLORIDE 9 MG/ML
INJECTION, SOLUTION INTRAVENOUS CONTINUOUS
Status: DISCONTINUED | OUTPATIENT
Start: 2019-10-11 | End: 2019-10-11

## 2019-10-11 RX ORDER — HYDROCODONE BITARTRATE AND ACETAMINOPHEN 500; 5 MG/1; MG/1
TABLET ORAL
Status: DISCONTINUED | OUTPATIENT
Start: 2019-10-11 | End: 2019-10-11

## 2019-10-11 RX ORDER — PROTAMINE SULFATE 10 MG/ML
INJECTION, SOLUTION INTRAVENOUS
Status: DISCONTINUED | OUTPATIENT
Start: 2019-10-11 | End: 2019-10-11

## 2019-10-11 RX ORDER — SODIUM CHLORIDE 9 MG/ML
INJECTION, SOLUTION INTRAVENOUS CONTINUOUS PRN
Status: DISCONTINUED | OUTPATIENT
Start: 2019-10-11 | End: 2019-10-11

## 2019-10-11 RX ORDER — SODIUM CHLORIDE 0.9 % (FLUSH) 0.9 %
3 SYRINGE (ML) INJECTION
Status: DISCONTINUED | OUTPATIENT
Start: 2019-10-11 | End: 2019-10-11 | Stop reason: HOSPADM

## 2019-10-11 RX ORDER — FENTANYL CITRATE 50 UG/ML
INJECTION, SOLUTION INTRAMUSCULAR; INTRAVENOUS
Status: DISCONTINUED | OUTPATIENT
Start: 2019-10-11 | End: 2019-10-11

## 2019-10-11 RX ORDER — HEPARIN SODIUM 1000 [USP'U]/ML
INJECTION, SOLUTION INTRAVENOUS; SUBCUTANEOUS
Status: DISCONTINUED | OUTPATIENT
Start: 2019-10-11 | End: 2019-10-11

## 2019-10-11 RX ORDER — METOPROLOL SUCCINATE 25 MG/1
25 TABLET, EXTENDED RELEASE ORAL DAILY
Status: DISCONTINUED | OUTPATIENT
Start: 2019-10-12 | End: 2019-10-12 | Stop reason: HOSPADM

## 2019-10-11 RX ORDER — ROCURONIUM BROMIDE 10 MG/ML
INJECTION, SOLUTION INTRAVENOUS
Status: DISCONTINUED | OUTPATIENT
Start: 2019-10-11 | End: 2019-10-11

## 2019-10-11 RX ADMIN — EPHEDRINE SULFATE 30 MG: 50 INJECTION, SOLUTION INTRAMUSCULAR; INTRAVENOUS; SUBCUTANEOUS at 03:10

## 2019-10-11 RX ADMIN — HEPARIN SODIUM 10000 UNITS: 1000 INJECTION INTRAVENOUS; SUBCUTANEOUS at 03:10

## 2019-10-11 RX ADMIN — GLYCOPYRROLATE 0.6 MG: 0.2 INJECTION, SOLUTION INTRAMUSCULAR; INTRAVENOUS at 04:10

## 2019-10-11 RX ADMIN — HEPARIN SODIUM 2000 UNITS: 1000 INJECTION INTRAVENOUS; SUBCUTANEOUS at 04:10

## 2019-10-11 RX ADMIN — NEOSTIGMINE METHYLSULFATE 4 MG: 0.5 INJECTION INTRAVENOUS at 04:10

## 2019-10-11 RX ADMIN — CEFAZOLIN 2 G: 330 INJECTION, POWDER, FOR SOLUTION INTRAMUSCULAR; INTRAVENOUS at 03:10

## 2019-10-11 RX ADMIN — PROPOFOL 160 MG: 10 INJECTION, EMULSION INTRAVENOUS at 03:10

## 2019-10-11 RX ADMIN — ROCURONIUM BROMIDE 50 MG: 10 INJECTION, SOLUTION INTRAVENOUS at 03:10

## 2019-10-11 RX ADMIN — PROTAMINE SULFATE 60 MG: 10 INJECTION, SOLUTION INTRAVENOUS at 04:10

## 2019-10-11 RX ADMIN — LIDOCAINE HYDROCHLORIDE 100 MG: 20 INJECTION, SOLUTION INTRAVENOUS at 03:10

## 2019-10-11 RX ADMIN — ROCURONIUM BROMIDE 20 MG: 10 INJECTION, SOLUTION INTRAVENOUS at 03:10

## 2019-10-11 RX ADMIN — FENTANYL CITRATE 75 MCG: 50 INJECTION, SOLUTION INTRAMUSCULAR; INTRAVENOUS at 03:10

## 2019-10-11 RX ADMIN — SODIUM CHLORIDE: 9 INJECTION, SOLUTION INTRAVENOUS at 02:10

## 2019-10-11 RX ADMIN — EPHEDRINE SULFATE 10 MG: 50 INJECTION, SOLUTION INTRAMUSCULAR; INTRAVENOUS; SUBCUTANEOUS at 03:10

## 2019-10-11 RX ADMIN — FENTANYL CITRATE 25 MCG: 50 INJECTION, SOLUTION INTRAMUSCULAR; INTRAVENOUS at 02:10

## 2019-10-11 RX ADMIN — PROPOFOL 30 MG: 10 INJECTION, EMULSION INTRAVENOUS at 03:10

## 2019-10-11 NOTE — INTERVAL H&P NOTE
The patient has been examined and the H&P has been reviewed:    I concur with the findings and no changes have occurred since H&P was written.   Consent confirmed in chart.   Blood consent signed.   Watchman today with Dr Crowder.   Systolic murmur on exam.    Anesthesia/Surgery risks, benefits and alternative options discussed and understood by patient/family.          Active Hospital Problems    Diagnosis  POA    Persistent atrial fibrillation [I48.19]  Yes      Resolved Hospital Problems   No resolved problems to display.

## 2019-10-11 NOTE — ANESTHESIA PREPROCEDURE EVALUATION
10/11/2019  Pre-operative evaluation for Procedure(s) (LRB):  Left atrial appendage closure device (N/A)  Transesophageal echo (JACINTO) intra-procedure log documentation (N/A)    Norm Mendoza is a 89 y.o. male EF 40% mil-mod MR, HTN, EtOH abuse, a fib here for BROCK occlusion.     Patient Active Problem List   Diagnosis    AK (actinic keratosis)    History of benign bladder tumor    H/O nonmelanoma skin cancer    Aortic atherosclerosis    Essential hypertension    Left ventricular diastolic dysfunction with preserved systolic function    Nonrheumatic aortic valve stenosis    Alcohol dependence, daily use    Mixed hyperlipidemia    Aortic root dilation    Hypercholesterolemia    PVC's (premature ventricular contractions)    Tortuous aorta    Persistent atrial fibrillation    MALT lymphoma    PUD (peptic ulcer disease)    Dilated cardiomyopathy    On continuous oral anticoagulation    History of cardioversion       Review of patient's allergies indicates:  No Known Allergies    No current facility-administered medications on file prior to encounter.      Current Outpatient Medications on File Prior to Encounter   Medication Sig Dispense Refill    irbesartan (AVAPRO) 150 MG tablet TAKE 1 TABLET BY MOUTH  DAILY 30 tablet 0    metoprolol succinate (TOPROL-XL) 25 MG 24 hr tablet TAKE 1 TABLET BY MOUTH DAILY 30 tablet 6    psyllium (METAMUCIL) packet Take 1 packet by mouth once daily.      rivaroxaban (XARELTO) 20 mg Tab Take 1 tablet (20 mg total) by mouth daily with dinner or evening meal. 30 tablet 6    furosemide (LASIX) 20 MG tablet 2 tabs 2 days/week for fluid/pressure 90 tablet 0    UNABLE TO FIND Take 1 Dose by mouth once daily. Hydroperoxide. Pt stated he put it in his water pick to cleanse his teeth         Past Surgical History:   Procedure Laterality Date    ACHILLES TENDON  SURGERY      cataracts      CYSTOSCOPY      bladder tumor    ESOPHAGOGASTRODUODENOSCOPY N/A 2018    Procedure: EGD (ESOPHAGOGASTRODUODENOSCOPY);  Surgeon: Austin Garcia MD;  Location: [facility] ENDO (2ND FLR);  Service: Endoscopy;  Laterality: N/A;    ESOPHAGOGASTRODUODENOSCOPY N/A 2019    Procedure: EGD (ESOPHAGOGASTRODUODENOSCOPY);  Surgeon: Austin Garcia MD;  Location: [facility] ENDO (4TH FLR);  Service: Endoscopy;  Laterality: N/A;  please schedule within 4 weeks    EYE SURGERY      SKIN CANCER EXCISION      TREATMENT OF CARDIAC ARRHYTHMIA N/A 2019    Procedure: Cardioversion or Defibrillation;  Surgeon: Hi Crowder MD;  Location: [facility] EP LAB;  Service: Cardiology;  Laterality: N/A;  AF, JACINTO, DCCV, MAC, MB, 3 Prep       Social History     Socioeconomic History    Marital status:      Spouse name: Not on file    Number of children: 2    Years of education: Not on file    Highest education level: Not on file   Occupational History    Occupation: Financial Work/     Employer: retired    Occupation: actor    Occupation:    Social Needs    Financial resource strain: Not on file    Food insecurity:     Worry: Not on file     Inability: Not on file    Transportation needs:     Medical: Not on file     Non-medical: Not on file   Tobacco Use    Smoking status: Former Smoker     Packs/day: 1.00     Years: 25.00     Pack years: 25.00     Types: Cigarettes     Last attempt to quit: 9/3/1970     Years since quittin.1    Smokeless tobacco: Never Used   Substance and Sexual Activity    Alcohol use: Yes     Alcohol/week: 3.0 standard drinks     Types: 3 Shots of liquor per week     Comment: 3 bourbons daily - 12 beer on weekends     Drug use: No    Sexual activity: Yes     Partners: Female   Lifestyle    Physical activity:     Days per week: Not on file     Minutes per session: Not on file    Stress: Not on file   Relationships    Social  connections:     Talks on phone: Not on file     Gets together: Not on file     Attends Alevism service: Not on file     Active member of club or organization: Not on file     Attends meetings of clubs or organizations: Not on file     Relationship status: Not on file   Other Topics Concern    Not on file   Social History Narrative    Not on file         CBC: No results for input(s): WBC, RBC, HGB, HCT, PLT, MCV, MCH, MCHC in the last 72 hours.    CMP: No results for input(s): NA, K, CL, CO2, BUN, CREATININE, GLU, MG, PHOS, CALCIUM, ALBUMIN, PROT, ALKPHOS, ALT, AST, BILITOT in the last 72 hours.    INR  No results for input(s): PT, INR, PROTIME, APTT in the last 72 hours.        Diagnostic Studies:      EKD Echo:  Results for orders placed or performed during the hospital encounter of 05/10/18   2D echo with color flow doppler   Result Value Ref Range    QEF 58 55 - 65    Mitral Valve Regurgitation MILD     Aortic Valve Regurgitation TRIVIAL TO MILD     Est. PA Systolic Pressure 32.81     Tricuspid Valve Regurgitation MILD          Anesthesia Evaluation    I have reviewed the Patient Summary Reports.     I have reviewed the Medications.     Review of Systems  Anesthesia Hx:  History of prior surgery of interest to airway management or planning: Denies Family Hx of Anesthesia complications.   Denies Personal Hx of Anesthesia complications.       Physical Exam  General:  Well nourished    Airway/Jaw/Neck:  Airway Findings: Mouth Opening: Normal Tongue: Normal  General Airway Assessment: Adult  Mallampati: III  Improves to II with phonation.  TM Distance: Normal, at least 6 cm      Dental:  Dental Findings: In tact   Chest/Lungs:  Chest/Lungs Findings: Clear to auscultation, Normal Respiratory Rate         Mental Status:  Mental Status Findings:  Cooperative, Alert and Oriented         Anesthesia Plan  Type of Anesthesia, risks & benefits discussed:  Anesthesia Type:  general  Patient's Preference:    Intra-op Monitoring Plan: standard ASA monitors  Intra-op Monitoring Plan Comments:   Post Op Pain Control Plan: multimodal analgesia  Post Op Pain Control Plan Comments:   Induction:   IV  Beta Blocker:         Informed Consent: Patient understands risks and agrees with Anesthesia plan.  Questions answered. Anesthesia consent signed with patient.  ASA Score: 3     Day of Surgery Review of History & Physical:    H&P update referred to the provider.         Ready For Surgery From Anesthesia Perspective.

## 2019-10-11 NOTE — HPI
89 YO M with HTN, AF, MALT with associated GI bleeding who comes for WATCHMAN device implantation by Dr Crowder.    Dysphagia or odynophagia:  No  Liver Disease, esophageal disease, or known varices:  No  Upper GI Bleeding: No  Snoring:  No  Sleep Apnea:  No  Prior neck surgery or radiation:  No  History of anesthetic difficulties:  No  Family history of anesthetic difficulties:  No  Last oral intake:  12 hours ago  Able to move neck in all directions:  Yes  Anticoagulation/Antiplatelets: rivaroxaban    Platelet count:   Lab Results  Component Value Date    10/01/2019      INR:  Lab Results  Component Value Date   INR 1.1 10/01/2019   INR 1.3 (H) 07/05/2019   INR 1.1 03/13/2019      Last TTE:   Mildly decreased left ventricular systolic function. The estimated ejection fraction is 45%. Global hypokinetic wall motion.  Grade I (mild) left ventricular diastolic dysfunction consistent with impaired relaxation. Normal left atrial pressure.  Concentric left ventricular remodeling.  Moderate aortic valve stenosis. Aortic valve area is 1.24 cm2; peak velocity is 2.92 m/s; mean gradient is 18 mmHg.  The ascending aorta and sinuses of Valsalva are mildly dilated.  Mild mitral regurgitation.  Normal right ventricular systolic function.  Mild tricuspid regurgitation.  The estimated PA systolic pressure is 35 mm Hg  Normal central venous pressure (3 mm Hg).  Mild left atrial enlargement.    Last JACINTO:  Mildly decreased left ventricular systolic function. The estimated ejection fraction is 40-45%  Mild right ventricular enlargement.  Mildly reduced right ventricular systolic function.  Mild-to-moderate mitral regurgitation.  No interatrial septal defect present.  Normal appearing left atrial appendage. No thrombus is present in the appendage. Emptying velocity 0.3 m/s.     Last EGD:  - Normal esophagus.                        - 1 cm type-I sliding hiatal hernia.                        - Non-bleeding gastric ulcer with a  flat pigmented                         spot (Saurabh Class IIc). Biopsied.                        - Non-bleeding gastric ulcers with a clean ulcer                         base (Saurabh Class III). Biopsied.                        - Normal greater curvature of the gastric body and                         antrum.                        - Normal examined duodenum.

## 2019-10-11 NOTE — Clinical Note
Angiography performed of the BROCK. Angiography performed via hand injection with 6 mL of contrast.

## 2019-10-11 NOTE — CONSULTS
[facility]  Cardiology  Consult Note    Patient Name: Norm Mendoza  MRN: [id number]  Admission Date: 10/11/2019  Hospital Length of Stay: 0 days  Code Status: Prior   Attending Provider: Hi Crowder MD   Consulting Provider: Shawn Herrera MD  Primary Care Physician: Amber Jenkins MD  Principal Problem:<principal problem not specified>    Patient information was obtained from patient, past medical records and ER records.       Subjective:     Chief Complaint: AF with GI bleed      HPI:   89 YO M with HTN, AF, MALT with associated GI bleeding who comes for WATCHMAN device implantation by Dr Crowder.    Dysphagia or odynophagia:  No  Liver Disease, esophageal disease, or known varices:  No  Upper GI Bleeding: No  Snoring:  No  Sleep Apnea:  No  Prior neck surgery or radiation:  No  History of anesthetic difficulties:  No  Family history of anesthetic difficulties:  No  Last oral intake:  12 hours ago  Able to move neck in all directions:  Yes  Anticoagulation/Antiplatelets: rivaroxaban    Platelet count:   Lab Results  Component Value Date    10/01/2019      INR:  Lab Results  Component Value Date   INR 1.1 10/01/2019   INR 1.3 (H) 07/05/2019   INR 1.1 03/13/2019      Last TTE:   Mildly decreased left ventricular systolic function. The estimated ejection fraction is 45%. Global hypokinetic wall motion.  Grade I (mild) left ventricular diastolic dysfunction consistent with impaired relaxation. Normal left atrial pressure.  Concentric left ventricular remodeling.  Moderate aortic valve stenosis. Aortic valve area is 1.24 cm2; peak velocity is 2.92 m/s; mean gradient is 18 mmHg.  The ascending aorta and sinuses of Valsalva are mildly dilated.  Mild mitral regurgitation.  Normal right ventricular systolic function.  Mild tricuspid regurgitation.  The estimated PA systolic pressure is 35 mm Hg  Normal central venous pressure (3 mm Hg).  Mild left atrial  enlargement.    Last JACINTO:  Mildly decreased left ventricular systolic function. The estimated ejection fraction is 40-45%  Mild right ventricular enlargement.  Mildly reduced right ventricular systolic function.  Mild-to-moderate mitral regurgitation.  No interatrial septal defect present.  Normal appearing left atrial appendage. No thrombus is present in the appendage. Emptying velocity 0.3 m/s.     Last EGD:  - Normal esophagus.                        - 1 cm type-I sliding hiatal hernia.                        - Non-bleeding gastric ulcer with a flat pigmented                         spot (Saurabh Class IIc). Biopsied.                        - Non-bleeding gastric ulcers with a clean ulcer                         base (Saurabh Class III). Biopsied.                        - Normal greater curvature of the gastric body and                         antrum.                        - Normal examined duodenum.    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    Mixed hyperlipidemia 5/2/2018    Paroxysmal atrial fibrillation 11/30/2018    Skin cancer     x3, had mohs on nose    Squamous cell carcinoma excised 12/5/14    R lower back    Symptomatic anemia 12/6/2018    Urinary tract infection     x4       Past Surgical History:   Procedure Laterality Date    ACHILLES TENDON SURGERY      cataracts      CYSTOSCOPY      bladder tumor    ESOPHAGOGASTRODUODENOSCOPY N/A 12/7/2018    Procedure: EGD (ESOPHAGOGASTRODUODENOSCOPY);  Surgeon: Austin Garcia MD;  Location: [facility] ENDO (2ND FLR);  Service: Endoscopy;  Laterality: N/A;    ESOPHAGOGASTRODUODENOSCOPY N/A 4/12/2019    Procedure: EGD (ESOPHAGOGASTRODUODENOSCOPY);  Surgeon: Austin Garcia MD;  Location: [facility] (4TH FLR);  Service: Endoscopy;  Laterality: N/A;  please schedule within 4 weeks    EYE SURGERY       SKIN CANCER EXCISION      TREATMENT OF CARDIAC ARRHYTHMIA N/A 2019    Procedure: Cardioversion or Defibrillation;  Surgeon: Hi Crowder MD;  Location: [facility];  Service: Cardiology;  Laterality: N/A;  AF, JACINTO, DCCV, MAC, MB, 3 Prep       Review of patient's allergies indicates:  No Known Allergies    No current facility-administered medications on file prior to encounter.      Current Outpatient Medications on File Prior to Encounter   Medication Sig    irbesartan (AVAPRO) 150 MG tablet TAKE 1 TABLET BY MOUTH  DAILY    metoprolol succinate (TOPROL-XL) 25 MG 24 hr tablet TAKE 1 TABLET BY MOUTH DAILY    psyllium (METAMUCIL) packet Take 1 packet by mouth once daily.    rivaroxaban (XARELTO) 20 mg Tab Take 1 tablet (20 mg total) by mouth daily with dinner or evening meal.    furosemide (LASIX) 20 MG tablet 2 tabs 2 days/week for fluid/pressure    UNABLE TO FIND Take 1 Dose by mouth once daily. Hydroperoxide. Pt stated he put it in his water pick to cleanse his teeth     Family History     Problem Relation (Age of Onset)    Hypertension Father    Stroke Father, Brother        Tobacco Use    Smoking status: Former Smoker     Packs/day: 1.00     Years: 25.00     Pack years: 25.00     Types: Cigarettes     Last attempt to quit: 9/3/1970     Years since quittin.1    Smokeless tobacco: Never Used   Substance and Sexual Activity    Alcohol use: Yes     Alcohol/week: 3.0 standard drinks     Types: 3 Shots of liquor per week     Comment: 3 bourbons daily - 12 beer on weekends     Drug use: No    Sexual activity: Yes     Partners: Female     Review of Systems   Constitution: Negative for chills and decreased appetite.   HENT: Negative for congestion, ear discharge and ear pain.    Eyes: Negative for blurred vision and discharge.   Cardiovascular: Negative for chest pain, claudication, cyanosis and dyspnea on exertion.   Respiratory: Negative for cough and hemoptysis.    Endocrine: Negative  for cold intolerance and heat intolerance.   Skin: Negative for color change and nail changes.   Musculoskeletal: Negative for arthritis and back pain.   Gastrointestinal: Negative for bloating and abdominal pain.   Genitourinary: Negative for bladder incontinence and decreased libido.   Neurological: Negative for aphonia and brief paralysis.     Objective:     Vital Signs (Most Recent):  Temp: 96.3 °F (35.7 °C) (10/11/19 1215)  Pulse: (!) 44 (10/11/19 1215)  Resp: 18 (10/11/19 1215)  BP: (!) 170/74 (10/11/19 1216)  SpO2: 97 % (10/11/19 1215) Vital Signs (24h Range):  Temp:  [96.3 °F (35.7 °C)] 96.3 °F (35.7 °C)  Pulse:  [44] 44  Resp:  [18] 18  SpO2:  [97 %] 97 %  BP: (170-185)/(74-81) 170/74     Weight: 77.1 kg (170 lb)  Body mass index is 23.06 kg/m².    SpO2: 97 %  O2 Device (Oxygen Therapy): room air    No intake or output data in the 24 hours ending 10/11/19 1233    Lines/Drains/Airways     Peripheral Intravenous Line                 Peripheral IV - Single Lumen 07/12/19 0719 18 G Right Forearm 91 days         Peripheral IV - Single Lumen 10/11/19 1232 18 G Left Antecubital less than 1 day         Peripheral IV - Single Lumen 10/11/19 1232 20 G Right Antecubital less than 1 day                Physical Exam   Constitutional: He is oriented to person, place, and time. He appears well-developed and well-nourished.   HENT:   Head: Normocephalic and atraumatic.   Nose: Nose normal.   Mouth/Throat: No oropharyngeal exudate.   Eyes: Right eye exhibits no discharge. Left eye exhibits no discharge. No scleral icterus.   Neck: Normal range of motion. Neck supple. No JVD present.   Cardiovascular: Normal rate, regular rhythm, S1 normal and S2 normal. Exam reveals no gallop, no S3, no S4, no distant heart sounds, no friction rub, no midsystolic click and no opening snap.   No murmur heard.  Pulses:       Radial pulses are 2+ on the right side, and 2+ on the left side.        Femoral pulses are 2+ on the right side, and 2+  on the left side.  Pulmonary/Chest: Effort normal and breath sounds normal. No respiratory distress. He has no wheezes. He has no rales. He exhibits no tenderness.   Abdominal: Soft. Bowel sounds are normal. He exhibits no distension. There is no tenderness. There is no rebound.   Musculoskeletal: Normal range of motion. He exhibits no edema, tenderness or deformity.   Lymphadenopathy:     He has no cervical adenopathy.   Neurological: He is alert and oriented to person, place, and time. No cranial nerve deficit.   Skin: Skin is warm and dry.   Psychiatric: He has a normal mood and affect. His behavior is normal.       Significant Labs: All pertinent lab results from the last 24 hours have been reviewed.    Significant Imaging: All pertinent images from the last 24h have been reviewed.      Assessment and Plan:     Persistent atrial fibrillation  1. JACINTO for guidance during Watchman  -No absolute contraindications of esophageal stricture, tumor, perforation, laceration,or diverticulum and/or active GI bleed  -The risks, benefits & alternatives of the procedure were explained to the patient.   -The risks of transesophageal echo include but are not limited to:  Dental trauma, esophageal trauma/perforation, bleeding, laryngospasm/brochospasm, aspiration, sore throat/hoarseness, & dislodgement of the endotracheal tube/nasogastric tube (where applicable).    -The risks of moderate sedation include hypotension, respiratory depression, arrhythmias, bronchospasm, & death.    -Informed consent was obtained. The patient is agreeable to proceed with the procedure and all questions and concerns addressed.    Case discussed with an attending in echocardiography lab.     Further recommendations per attending addendum          VTE Risk Mitigation (From admission, onward)    None          Shawn Herrera MD  Cardiology   [facility]

## 2019-10-11 NOTE — Clinical Note
Knees and elbows padded, heels floated, safety strap applied above the knees, warming blanket applied.

## 2019-10-11 NOTE — SUBJECTIVE & OBJECTIVE
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    Mixed hyperlipidemia 5/2/2018    Paroxysmal atrial fibrillation 11/30/2018    Skin cancer     x3, had mohs on nose    Squamous cell carcinoma excised 12/5/14    R lower back    Symptomatic anemia 12/6/2018    Urinary tract infection     x4       Past Surgical History:   Procedure Laterality Date    ACHILLES TENDON SURGERY      cataracts      CYSTOSCOPY      bladder tumor    ESOPHAGOGASTRODUODENOSCOPY N/A 12/7/2018    Procedure: EGD (ESOPHAGOGASTRODUODENOSCOPY);  Surgeon: Austin Garcia MD;  Location: [facility] ENDO (2ND FLR);  Service: Endoscopy;  Laterality: N/A;    ESOPHAGOGASTRODUODENOSCOPY N/A 4/12/2019    Procedure: EGD (ESOPHAGOGASTRODUODENOSCOPY);  Surgeon: Austin Garcia MD;  Location: [facility] ENDO (4TH FLR);  Service: Endoscopy;  Laterality: N/A;  please schedule within 4 weeks    EYE SURGERY      SKIN CANCER EXCISION      TREATMENT OF CARDIAC ARRHYTHMIA N/A 7/12/2019    Procedure: Cardioversion or Defibrillation;  Surgeon: Hi Crowder MD;  Location: [facility] EP LAB;  Service: Cardiology;  Laterality: N/A;  AF, JACINTO, DCCV, MAC, MB, 3 Prep       Review of patient's allergies indicates:  No Known Allergies    No current facility-administered medications on file prior to encounter.      Current Outpatient Medications on File Prior to Encounter   Medication Sig    irbesartan (AVAPRO) 150 MG tablet TAKE 1 TABLET BY MOUTH  DAILY    metoprolol succinate (TOPROL-XL) 25 MG 24 hr tablet TAKE 1 TABLET BY MOUTH DAILY    psyllium (METAMUCIL) packet Take 1 packet by mouth once daily.    rivaroxaban (XARELTO) 20 mg Tab Take 1 tablet (20 mg total) by mouth daily with dinner or evening meal.    furosemide (LASIX) 20 MG tablet 2 tabs 2 days/week for fluid/pressure    UNABLE TO FIND Take 1 Dose by mouth  once daily. Hydroperoxide. Pt stated he put it in his water pick to cleanse his teeth     Family History     Problem Relation (Age of Onset)    Hypertension Father    Stroke Father, Brother        Tobacco Use    Smoking status: Former Smoker     Packs/day: 1.00     Years: 25.00     Pack years: 25.00     Types: Cigarettes     Last attempt to quit: 9/3/1970     Years since quittin.1    Smokeless tobacco: Never Used   Substance and Sexual Activity    Alcohol use: Yes     Alcohol/week: 3.0 standard drinks     Types: 3 Shots of liquor per week     Comment: 3 bourbons daily - 12 beer on weekends     Drug use: No    Sexual activity: Yes     Partners: Female     Review of Systems   Constitution: Negative for chills and decreased appetite.   HENT: Negative for congestion, ear discharge and ear pain.    Eyes: Negative for blurred vision and discharge.   Cardiovascular: Negative for chest pain, claudication, cyanosis and dyspnea on exertion.   Respiratory: Negative for cough and hemoptysis.    Endocrine: Negative for cold intolerance and heat intolerance.   Skin: Negative for color change and nail changes.   Musculoskeletal: Negative for arthritis and back pain.   Gastrointestinal: Negative for bloating and abdominal pain.   Genitourinary: Negative for bladder incontinence and decreased libido.   Neurological: Negative for aphonia and brief paralysis.     Objective:     Vital Signs (Most Recent):  Temp: 96.3 °F (35.7 °C) (10/11/19 1215)  Pulse: (!) 44 (10/11/19 1215)  Resp: 18 (10/11/19 1215)  BP: (!) 170/74 (10/11/19 1216)  SpO2: 97 % (10/11/19 1215) Vital Signs (24h Range):  Temp:  [96.3 °F (35.7 °C)] 96.3 °F (35.7 °C)  Pulse:  [44] 44  Resp:  [18] 18  SpO2:  [97 %] 97 %  BP: (170-185)/(74-81) 170/74     Weight: 77.1 kg (170 lb)  Body mass index is 23.06 kg/m².    SpO2: 97 %  O2 Device (Oxygen Therapy): room air    No intake or output data in the 24 hours ending 10/11/19 1233    Lines/Drains/Airways     Peripheral  Intravenous Line                 Peripheral IV - Single Lumen 07/12/19 0719 18 G Right Forearm 91 days         Peripheral IV - Single Lumen 10/11/19 1232 18 G Left Antecubital less than 1 day         Peripheral IV - Single Lumen 10/11/19 1232 20 G Right Antecubital less than 1 day                Physical Exam   Constitutional: He is oriented to person, place, and time. He appears well-developed and well-nourished.   HENT:   Head: Normocephalic and atraumatic.   Nose: Nose normal.   Mouth/Throat: No oropharyngeal exudate.   Eyes: Right eye exhibits no discharge. Left eye exhibits no discharge. No scleral icterus.   Neck: Normal range of motion. Neck supple. No JVD present.   Cardiovascular: Normal rate, regular rhythm, S1 normal and S2 normal. Exam reveals no gallop, no S3, no S4, no distant heart sounds, no friction rub, no midsystolic click and no opening snap.   No murmur heard.  Pulses:       Radial pulses are 2+ on the right side, and 2+ on the left side.        Femoral pulses are 2+ on the right side, and 2+ on the left side.  Pulmonary/Chest: Effort normal and breath sounds normal. No respiratory distress. He has no wheezes. He has no rales. He exhibits no tenderness.   Abdominal: Soft. Bowel sounds are normal. He exhibits no distension. There is no tenderness. There is no rebound.   Musculoskeletal: Normal range of motion. He exhibits no edema, tenderness or deformity.   Lymphadenopathy:     He has no cervical adenopathy.   Neurological: He is alert and oriented to person, place, and time. No cranial nerve deficit.   Skin: Skin is warm and dry.   Psychiatric: He has a normal mood and affect. His behavior is normal.       Significant Labs: All pertinent lab results from the last 24 hours have been reviewed.    Significant Imaging: All pertinent images from the last 24h have been reviewed.

## 2019-10-11 NOTE — BRIEF OP NOTE
Mr Mendoza presented to the EP lab in the fasting state. Consent confirmed. Safety time out performed. Sedation per anesthesia. Lidocaine for local anesthetic at right groin. Ultrasound guided right femoral vein access x1. Heparin bolus given. Single transseptal puncture using combination of JACINTO and fluoroscopy. Watchman deployed in left atrial appendage under fluoroscopy and JACINTO guidance. No significant change in baseline effusion on JACINTO.     Sheaths pulled in lab.   Right groin sutured with two sutures.     6 hours bed rest.   Remove sutures in 4 hours.   Over night observation.   Resume anticoagulation tomorrow.     George Arias MD PGY7

## 2019-10-11 NOTE — TRANSFER OF CARE
Anesthesia Transfer of Care Note    Patient: Norm Mendoza    Procedure(s) Performed: Procedure(s) (LRB):  Left atrial appendage closure device (N/A)  Transesophageal echo (JACINTO) intra-procedure log documentation (N/A)    Patient location: PACU    Anesthesia Type: general    Transport from OR: Transported from OR on 6-10 L/min O2 by face mask with adequate spontaneous ventilation    Post pain: adequate analgesia    Post assessment: tolerated procedure well and no apparent anesthetic complications    Post vital signs: stable    Level of consciousness: awake, alert and oriented    Nausea/Vomiting: no nausea/vomiting    Complications: none    Transfer of care protocol was followed      Last vitals:   Visit Vitals  BP (!) 170/74 (BP Location: Right arm, Patient Position: Lying)   Pulse (!) 44   Temp 35.7 °C (96.3 °F) (Oral)   Resp 18   Ht 6' (1.829 m)   Wt 77.1 kg (170 lb)   SpO2 97%   BMI 23.06 kg/m²

## 2019-10-11 NOTE — ASSESSMENT & PLAN NOTE
1. JACINTO for guidance during Watchman  -No absolute contraindications of esophageal stricture, tumor, perforation, laceration,or diverticulum and/or active GI bleed  -The risks, benefits & alternatives of the procedure were explained to the patient.   -The risks of transesophageal echo include but are not limited to:  Dental trauma, esophageal trauma/perforation, bleeding, laryngospasm/brochospasm, aspiration, sore throat/hoarseness, & dislodgement of the endotracheal tube/nasogastric tube (where applicable).    -The risks of moderate sedation include hypotension, respiratory depression, arrhythmias, bronchospasm, & death.    -Informed consent was obtained. The patient is agreeable to proceed with the procedure and all questions and concerns addressed.    Case discussed with an attending in echocardiography lab.     Further recommendations per attending addendum

## 2019-10-12 VITALS
WEIGHT: 172.75 LBS | HEART RATE: 52 BPM | HEIGHT: 72 IN | DIASTOLIC BLOOD PRESSURE: 52 MMHG | OXYGEN SATURATION: 98 % | BODY MASS INDEX: 23.4 KG/M2 | SYSTOLIC BLOOD PRESSURE: 94 MMHG | RESPIRATION RATE: 16 BRPM | TEMPERATURE: 98 F

## 2019-10-12 PROCEDURE — 25000003 PHARM REV CODE 250: Mod: HCNC | Performed by: STUDENT IN AN ORGANIZED HEALTH CARE EDUCATION/TRAINING PROGRAM

## 2019-10-12 RX ADMIN — METOPROLOL SUCCINATE 25 MG: 25 TABLET, EXTENDED RELEASE ORAL at 08:10

## 2019-10-12 RX ADMIN — IRBESARTAN 150 MG: 150 TABLET, FILM COATED ORAL at 08:10

## 2019-10-12 NOTE — NURSING
Patient discharge.  Removed PIVs x 2.  Removed telemetry and notified Telemetry of discharged.  Watchman information packet provided to patient.  Patient escorted to parking garage via wheelchair.  Girlfriend at bedside.

## 2019-10-12 NOTE — NURSING TRANSFER
Nursing Transfer Note      10/11/2019     Transfer From: PACU to 326    Transfer via stretcher    Transfer with cardiac monitoring    Transported by RN x2    Medicines sent: none    Chart send with patient: Yes    Notified: significant other    Patient reassessed at: 10/11/19 @ 3130 nataliya/ MONIKA PozoU RN. [facility] c/d/i.     Upon arrival to floor: cardiac monitor applied, patient oriented to room, call bell in reach and bed in lowest position

## 2019-10-13 NOTE — DISCHARGE SUMMARY
Discharge Summary  Cardiology      Admit Date: 10/11/2019    Discharge Date :10/11/2019    Attending Physician: Kathia De Leon     Discharge Physician: Kathia De Leon    Discharged Condition: good    Discharge Diagnosis: Persistent atrial fibrillation [I48.19]    Treatments/Procedures: Procedure(s) (LRB):  Left atrial appendage closure device (N/A)  Transesophageal echo (JACINTO) intra-procedure log documentation (N/A)    Hospital Course:   Mr Mendoza presented to the EP lab in the fasting state. Consent confirmed. Safety time out performed. Sedation per anesthesia. Lidocaine for local anesthetic at right groin. Ultrasound guided right femoral vein access x1. Heparin bolus given. Single transseptal puncture using combination of JAICNTO and fluoroscopy. Watchman deployed in left atrial appendage under fluoroscopy and JACINTO guidance. No significant change in baseline effusion on JACINTO.      Sheaths pulled in lab. Right groin sutured with two sutures. 6 hours bed rest. Remove sutures in 4 hours. Observed overnight and discharged the next day.      Significant Diagnostic Studies: none    Disposition: Home or Self Care    Diet: Regular    Follow up: With Dr. Crowder in clinic in 6 weeks    Patient Instructions:   Discharge Medication List as of 10/12/2019 11:22 AM      CONTINUE these medications which have NOT CHANGED    Details   irbesartan (AVAPRO) 150 MG tablet TAKE 1 TABLET BY MOUTH  DAILY, No Print      metoprolol succinate (TOPROL-XL) 25 MG 24 hr tablet TAKE 1 TABLET BY MOUTH DAILY, Normal      psyllium (METAMUCIL) packet Take 1 packet by mouth once daily., Until Discontinued, Historical Med      rivaroxaban (XARELTO) 20 mg Tab Take 1 tablet (20 mg total) by mouth daily with dinner or evening meal., Starting Fri 5/31/2019, Normal      furosemide (LASIX) 20 MG tablet 2 tabs 2 days/week for fluid/pressure, Normal      UNABLE TO FIND Take 1 Dose by mouth once daily. Hydroperoxide. Pt stated he put it in his water pick to cleanse his  teeth, Historical Med             Kathia De Leon MD  Cardiology, PGY IV

## 2019-10-14 NOTE — ANESTHESIA POSTPROCEDURE EVALUATION
Anesthesia Post Evaluation    Patient: Norm Mendoza    Procedure(s) Performed: Procedure(s) (LRB):  Left atrial appendage closure device (N/A)  Transesophageal echo (JACINTO) intra-procedure log documentation (N/A)    Final Anesthesia Type: general  Patient location during evaluation: PACU  Patient participation: Yes- Able to Participate  Level of consciousness: awake and alert and oriented  Post-procedure vital signs: reviewed and stable  Pain management: adequate  Airway patency: patent  PONV status at discharge: No PONV  Anesthetic complications: no      Cardiovascular status: hemodynamically stable  Respiratory status: unassisted  Hydration status: euvolemic  Follow-up not needed.          Vitals Value Taken Time   BP 94/52 10/12/2019 11:24 AM   Temp 36.5 °C (97.7 °F) 10/12/2019 11:24 AM   Pulse 52 10/12/2019 12:00 PM   Resp 16 10/12/2019 11:24 AM   SpO2 98 % 10/12/2019 11:24 AM         No case tracking events are documented in the log.      Pain/Sergio Score: No data recorded

## 2019-10-14 NOTE — PROGRESS NOTES
Digital Medicine: Clinician Follow-Up    Called patient to address high BP alert. Patient says that he is having trouble connecting his BP machine to his phone. He says he has an appointment tomorrow and will go to the [facility]. He is doing well with no complaints. Patient reports taking furosemide once every three- five days. He reports that when he retains fluid his BP spikes. This is when he takes a furosemide tablet. He says he says that he prefers to defer to his cardiologist for changes to his BP medications.     The history is provided by the patient.     Follow Up  Follow-up reason(s): routine education        INTERVENTION(S)  recommended diet modifications    PLAN  patient verbalizes understanding    30-day BP average exceeds goal of <130/<80 mmHg. Continue current medications.   Encouraged patient to limit dietary sodium. Defer to health  for further lifestyle counseling      There are no preventive care reminders to display for this patient.    Last 5 Patient Entered Readings                                      Current 30 Day Average: 143/74     Recent Readings 10/11/2019 10/10/2019 10/10/2019 10/9/2019 10/9/2019    SBP (mmHg) 159 145 136 105 118    DBP (mmHg) 84 71 69 58 68    Pulse 51 51 57 64 64             Hypertension Medications             furosemide (LASIX) 20 MG tablet 2 tabs 2 days/week for fluid/pressure    irbesartan (AVAPRO) 150 MG tablet TAKE 1 TABLET BY MOUTH  DAILY    metoprolol succinate (TOPROL-XL) 25 MG 24 hr tablet TAKE 1 TABLET BY MOUTH DAILY

## 2019-10-15 ENCOUNTER — OFFICE VISIT (OUTPATIENT)
Dept: OPTOMETRY | Facility: CLINIC | Age: 84
End: 2019-10-15
Payer: COMMERCIAL

## 2019-10-15 DIAGNOSIS — Z01.00 ROUTINE EYE EXAM: Primary | ICD-10-CM

## 2019-10-15 DIAGNOSIS — H52.203 ASTIGMATISM WITH PRESBYOPIA, BILATERAL: ICD-10-CM

## 2019-10-15 DIAGNOSIS — H52.4 ASTIGMATISM WITH PRESBYOPIA, BILATERAL: ICD-10-CM

## 2019-10-15 DIAGNOSIS — I10 ESSENTIAL HYPERTENSION: ICD-10-CM

## 2019-10-15 LAB
BLD PROD TYP BPU: NORMAL
BLOOD UNIT EXPIRATION DATE: NORMAL
BLOOD UNIT TYPE CODE: 5100
BLOOD UNIT TYPE: NORMAL
CODING SYSTEM: NORMAL
DISPENSE STATUS: NORMAL
TRANS ERYTHROCYTES VOL PATIENT: NORMAL ML

## 2019-10-15 PROCEDURE — 92015 DETERMINE REFRACTIVE STATE: CPT | Mod: S$GLB,,, | Performed by: OPTOMETRIST

## 2019-10-15 PROCEDURE — 99999 PR PBB SHADOW E&M-EST. PATIENT-LVL II: CPT | Mod: PBBFAC,,, | Performed by: OPTOMETRIST

## 2019-10-15 PROCEDURE — 92015 PR REFRACTION: ICD-10-PCS | Mod: S$GLB,,, | Performed by: OPTOMETRIST

## 2019-10-15 PROCEDURE — 99999 PR PBB SHADOW E&M-EST. PATIENT-LVL II: ICD-10-PCS | Mod: PBBFAC,,, | Performed by: OPTOMETRIST

## 2019-10-15 PROCEDURE — 92004 PR EYE EXAM, NEW PATIENT,COMPREHESV: ICD-10-PCS | Mod: S$GLB,,, | Performed by: OPTOMETRIST

## 2019-10-15 PROCEDURE — 92004 COMPRE OPH EXAM NEW PT 1/>: CPT | Mod: S$GLB,,, | Performed by: OPTOMETRIST

## 2019-10-15 NOTE — LETTER
October 15, 2019      Amber Jenkins MD  [address]  [facility] [address]           [facility]  [address]  [facility] [address]  Phone: [phone]  Fax: [phone]          Patient: Norm Mendoza   MR Number: [id number]   YOB: 1926   Date of Visit: 10/15/2019       Dear Dr. Amber Jenkins:    Thank you for referring Norm Mendoza to me for evaluation. Attached you will find relevant portions of my assessment and plan of care.    If you have questions, please do not hesitate to call me. I look forward to following Norm Mendoza along with you.    Sincerely,    Lizbeth Rehman, OD    Enclosure  CC:  No Recipients    If you would like to receive this communication electronically, please contact [email or web address] or [phone] to request more information on myaNUMBER Link access.    For providers and/or their staff who would like to refer a patient to [facility], please contact us through our one-stop-shop provider referral line, [facility], at [phone].    If you feel you have received this communication in error or would no longer like to receive these types of communications, please e-mail [email or web address]

## 2019-10-15 NOTE — PROGRESS NOTES
HPI     Last eye exam was approximately 5-6 years ago.  Patient states decrease in near vision with +2.75 OTC readers. No distance   vision complaints. Occasionally sees floaters OU.   Patient denies diplopia, headaches, flashes, and pain.      Last edited by Brooklynn Cordon on 10/15/2019  9:48 AM. (History)            Assessment /Plan     For exam results, see Encounter Report.    Routine eye exam    Astigmatism with presbyopia, bilateral    Essential hypertension            1-2.  Bifocal rx given.    3.  No retinopathy--monitor yearly.  BP control.  Eye health normal OU.

## 2019-10-17 NOTE — PROGRESS NOTES
"Digital Medicine: Health  Follow-Up    Patient reports he is well, no complaints.   Discussed high BP alert received yesterday, denied symptoms of hypertension. Spoke with DM clinician yesterday.     Note, patient works 3 days per week.     The history is provided by the patient.   Hypertension   This is a chronic problem.           Diet:       Reports he thinks he is eating "too much sugar". Reports eating chocolate fudge cake last night.     Reports he "hate" salt, does not add salt to his plate. Reports he is aware of elevated sodium levels in prepared foods, set goal to limit intake. Stated, "I am really am going to cut back on prepared foods", set goal in chart to assess in 2 weeks.     Set goal to increase water intake. Verbalized goal to purchase a case of water bottles, and count bottle caps at the end of each day.     Physical Activity:       Stays active working 3 days per week.       [facility]    INTERVENTION(S)  recommended diet modifications, encouragement/support and goal setting    PLAN  patient verbalizes understanding and patient amenable to changes      There are no preventive care reminders to display for this patient.    Last 5 Patient Entered Readings                                      Current 30 Day Average: 141/76     Recent Readings 10/17/2019 10/16/2019 10/16/2019 10/16/2019 10/15/2019    SBP (mmHg) 150 167 127 155 136    DBP (mmHg) 109 94 75 137 78    Pulse 57 69 59 67 58                "

## 2019-10-23 NOTE — PROGRESS NOTES
PATIENT: Norm Mendoza  MRN: [id number]  DATE: 10/24/2019      Diagnosis:   1. MALT lymphoma    2. On continuous oral anticoagulation    3. Persistent atrial fibrillation    4. Dilated cardiomyopathy        Chief Complaint: MALT lymphoma    Subjective:     Mr. Norm Mendoza is a 89 y.o. male with a-fib, HTN, new CHF, who presents to the Hematologic clinic for follow-up of stage 1 MALT lymphoma.    Oncologic History  -The patient was recently discharged on 12/9/2018 following a 3 day admission for a GIB. Patient was on Eliquis at the time and had been complaining of 1 week of black, tarry stools. EGD at the time was significant for nonbleeding gastric ulcers. Biopsies were taken and the patient was instructed to start protonix and hold eliquis at discharge. He requiring IV fluids and blood transfusion during the hospitalization. Biopsy results returned 12/18/2018 concerning for possible MALT-Lymphoma with molecular studies pending.  -PET confirmed stage 1 MALT lymphoma  -Completed Rituximab x4 on 3/7/19  -EGD: Erythematous, granular and scarred mucosa in the lesser curvature of the gastric body. Biopsied, + MALT lymphoma. Ulcers no longer present., PET was also done 4/20/19, no other site of disease    Interval History  Patient presents alone. Had Watchman procedure a few weeks ago. Still on Xarelto, no melena, no hematochezia. No abdominal pain. Gained 6 lbs, is still on Lasix PRN. 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    Past Medical History:   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    Mixed hyperlipidemia 5/2/2018    Paroxysmal atrial fibrillation 11/30/2018    Skin cancer     x3, had mohs on nose    Squamous cell carcinoma excised 12/5/14    R lower back    Symptomatic anemia 12/6/2018    Urinary  tract infection     x4       Past Surgical HIstory:   Past Surgical History:   Procedure Laterality Date    ACHILLES TENDON SURGERY      CATARACT EXTRACTION W/  INTRAOCULAR LENS IMPLANT Bilateral 2013    CLOSURE OF LEFT ATRIAL APPENDAGE USING DEVICE N/A 10/11/2019    Procedure: Left atrial appendage closure device;  Surgeon: Hi Crowder MD;  Location: [facility] EP LAB;  Service: Cardiology;  Laterality: N/A;  AF, JACINTO, Watchman Implant, BSci, Gen, MB, 3 Prep    CYSTOSCOPY      bladder tumor    ESOPHAGOGASTRODUODENOSCOPY N/A 12/7/2018    Procedure: EGD (ESOPHAGOGASTRODUODENOSCOPY);  Surgeon: Austin Garcia MD;  Location: [facility] ENDO (2ND FLR);  Service: Endoscopy;  Laterality: N/A;    ESOPHAGOGASTRODUODENOSCOPY N/A 4/12/2019    Procedure: EGD (ESOPHAGOGASTRODUODENOSCOPY);  Surgeon: Austin Garcia MD;  Location: [facility] ENDO (4TH FLR);  Service: Endoscopy;  Laterality: N/A;  please schedule within 4 weeks    SKIN CANCER EXCISION      TREATMENT OF CARDIAC ARRHYTHMIA N/A 7/12/2019    Procedure: Cardioversion or Defibrillation;  Surgeon: Hi Crowder MD;  Location: [facility] EP LAB;  Service: Cardiology;  Laterality: N/A;  AF, JACINTO, DCCV, MAC, MB, 3 Prep       Family History:   Family History   Problem Relation Age of Onset    Stroke Father     Hypertension Father     Stroke Brother     Anesthesia problems Neg Hx     Malignant hypertension Neg Hx     Hypotension Neg Hx     Malignant hyperthermia Neg Hx     Pseudochol deficiency Neg Hx     Melanoma Neg Hx     Heart attack Neg Hx     Heart disease Neg Hx     Heart failure Neg Hx     Cataracts Neg Hx     Glaucoma Neg Hx     Macular degeneration Neg Hx        Social History:  reports that he quit smoking about 49 years ago. His smoking use included cigarettes. He has a 25.00 pack-year smoking history. He has never used smokeless tobacco. He reports that he drinks about 3.0 standard drinks of alcohol per week. He reports that he does not use  drugs.  Very functional 89-year-old. He works 3 days a week for a company called [employer]. Lives alone, has a girlfriend. Has one son who lives here, one in [place], one grand-daughter who lives in [place]. Does pilates once a week. No family history of cancer.    Allergies:  Review of patient's allergies indicates:  No Known Allergies    Medications:  Current Outpatient Medications   Medication Sig Dispense Refill    furosemide (LASIX) 20 MG tablet 2 tabs 2 days/week for fluid/pressure 90 tablet 0    irbesartan (AVAPRO) 150 MG tablet TAKE 1 TABLET BY MOUTH  DAILY 30 tablet 0    metoprolol succinate (TOPROL-XL) 25 MG 24 hr tablet TAKE 1 TABLET BY MOUTH DAILY 30 tablet 6    psyllium (METAMUCIL) packet Take 1 packet by mouth once daily.      rivaroxaban (XARELTO) 20 mg Tab Take 1 tablet (20 mg total) by mouth daily with dinner or evening meal. 30 tablet 6    UNABLE TO FIND Take 1 Dose by mouth once daily. Hydroperoxide. Pt stated he put it in his water pick to cleanse his teeth       No current facility-administered medications for this visit.        Review of Systems   Constitutional: Negative for chills, fatigue, fever and unexpected weight change.   HENT: Negative for nosebleeds and sore throat.    Respiratory: Negative for cough and shortness of breath.    Cardiovascular: Negative for chest pain and leg swelling.   Gastrointestinal: Negative for abdominal pain, blood in stool, nausea and vomiting.   Genitourinary: Negative for dysuria and hematuria.   Musculoskeletal: Negative for arthralgias and back pain.   Skin: Negative for rash.   Neurological: Negative for light-headedness and headaches.   Hematological: Negative for adenopathy. Does not bruise/bleed easily.       ECOG Performance Status: 0  Objective:      Vitals:   Vitals:    10/24/19 1306   BP: (!) 104/58   Pulse: (!) 56   Resp: 14   Temp: 97.8 °F (36.6 °C)   SpO2: 96%   Weight: 80.8 kg (178 lb 2.1 oz)     BMI: Body mass index is  24.16 kg/m².    Physical Exam   Constitutional: He appears well-developed and well-nourished.   HENT:   Head: Normocephalic and atraumatic.   Eyes: Pupils are equal, round, and reactive to light. EOM are normal. No scleral icterus.   Neck: Neck supple.   Cardiovascular: Normal rate and regular rhythm.   Pulmonary/Chest: Effort normal and breath sounds normal. No respiratory distress.   Abdominal: Soft. He exhibits no distension. There is no tenderness.   Musculoskeletal: Normal range of motion. He exhibits no edema.   Lymphadenopathy:     He has no cervical adenopathy.   Neurological: He is alert.   Skin: Skin is warm and dry.   Psychiatric: He has a normal mood and affect. His behavior is normal.       Laboratory Data:  Lab Visit on 10/24/2019   Component Date Value Ref Range Status    LD 10/24/2019 236  110 - 260 U/L Final    Results are increased in hemolyzed samples.    WBC 10/24/2019 4.41  3.90 - 12.70 K/uL Final    RBC 10/24/2019 4.06* 4.60 - 6.20 M/uL Final    Hemoglobin 10/24/2019 13.1* 14.0 - 18.0 g/dL Final    Hematocrit 10/24/2019 39.9* 40.0 - 54.0 % Final    Mean Corpuscular Volume 10/24/2019 98  82 - 98 fL Final    Mean Corpuscular Hemoglobin 10/24/2019 32.3* 27.0 - 31.0 pg Final    Mean Corpuscular Hemoglobin Conc 10/24/2019 32.8  32.0 - 36.0 g/dL Final    RDW 10/24/2019 14.2  11.5 - 14.5 % Final    Platelets 10/24/2019 202  150 - 350 K/uL Final    MPV 10/24/2019 9.3  9.2 - 12.9 fL Final    Immature Granulocytes 10/24/2019 0.9* 0.0 - 0.5 % Final    Gran # (ANC) 10/24/2019 3.1  1.8 - 7.7 K/uL Final    Immature Grans (Abs) 10/24/2019 0.04  0.00 - 0.04 K/uL Final    Comment: Mild elevation in immature granulocytes is non specific and   can be seen in a variety of conditions including stress response,   acute inflammation, trauma and pregnancy. Correlation with other   laboratory and clinical findings is essential.      Lymph # 10/24/2019 0.8* 1.0 - 4.8 K/uL Final    Mono # 10/24/2019 0.4   0.3 - 1.0 K/uL Final    Eos # 10/24/2019 0.0  0.0 - 0.5 K/uL Final    Baso # 10/24/2019 0.05  0.00 - 0.20 K/uL Final    nRBC 10/24/2019 0  0 /100 WBC Final    Gran% 10/24/2019 70.8  38.0 - 73.0 % Final    Lymph% 10/24/2019 18.1  18.0 - 48.0 % Final    Mono% 10/24/2019 9.1  4.0 - 15.0 % Final    Eosinophil% 10/24/2019 0.0  0.0 - 8.0 % Final    Basophil% 10/24/2019 1.1  0.0 - 1.9 % Final    Differential Method 10/24/2019 Automated   Final    Sodium 10/24/2019 139  136 - 145 mmol/L Final    Potassium 10/24/2019 4.4  3.5 - 5.1 mmol/L Final    Chloride 10/24/2019 106  95 - 110 mmol/L Final    CO2 10/24/2019 28  23 - 29 mmol/L Final    Glucose 10/24/2019 122* 70 - 110 mg/dL Final    BUN, Bld 10/24/2019 22  10 - 30 mg/dL Final    Creatinine 10/24/2019 1.2  0.5 - 1.4 mg/dL Final    Calcium 10/24/2019 9.1  8.7 - 10.5 mg/dL Final    Total Protein 10/24/2019 6.9  6.0 - 8.4 g/dL Final    Albumin 10/24/2019 3.5  3.5 - 5.2 g/dL Final    Total Bilirubin 10/24/2019 0.6  0.1 - 1.0 mg/dL Final    Comment: For infants and newborns, interpretation of results should be based  on gestational age, weight and in agreement with clinical  observations.  Premature Infant recommended reference ranges:  Up to 24 hours.............<8.0 mg/dL  Up to 48 hours............<12.0 mg/dL  3-5 days..................<15.0 mg/dL  6-29 days.................<15.0 mg/dL      Alkaline Phosphatase 10/24/2019 53* 55 - 135 U/L Final    AST 10/24/2019 20  10 - 40 U/L Final    ALT 10/24/2019 15  10 - 44 U/L Final    Anion Gap 10/24/2019 5* 8 - 16 mmol/L Final    eGFR if  10/24/2019 59.9* >60 mL/min/1.73 m^2 Final    eGFR if non  10/24/2019 51.8* >60 mL/min/1.73 m^2 Final    Comment: Calculation used to obtain the estimated glomerular filtration  rate (eGFR) is the CKD-EPI equation.        FINAL PATHOLOGIC DIAGNOSIS  1. ULCER LESSER CURVATURE, GASTRIC BODY, BIOPSY-:  -ATYPICAL LYMPHOCYTIC INFILTRATION,  PENDING MOLECULAR STUDY. SEE COMMENT  -RARE FOCUS OF ACTIVE INFLAMMATION.  -HELICOBACTER IS NEGATIVE.  COMMENT: Fragments of gastric mucosa infiltrated with small to medium-sized atypical lymphocytes.  Lymphoepithelial lesions are also evident.  Flow cytometric analysis of tissue was not submitted.  Immunohistochemical studies were performed on paraffin block with adequate positive and negative controls . The  small atypical lymphocytes are positive for CD20, low KI -67, and are negative for CD10, CD23, cyclin D1. The  reactive T cells are CD3 positive, CD5 positive, and BCL 2 positive. Immunohistochemical study for Helicobacter is  negative.  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 Molecular study is pending to confirm the diagnosis. A supplemental report will follow.    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  After an approximately 60 min distribution time, PET/CT images were acquired from the skull base to the mid thigh. Transmission images were acquired to correct for attenuation using a whole body low-dose CT scan without contrast with the arms positioned above the head. Glycemia at the time of injection was 115 mg/dL.    COMPARISON:  NM PET CT routine 01/03/2019    FINDINGS:  Quality of the study: Adequate.    In this patient with biopsy proven Gastric MALT lymphoma, the gastric wall appears mildly thickened, however less conspicuous when compared to PET CT 01/03/2019.  There is no increased tracer uptake within the gastric body or other discrete lesion identified; however, MALT type lymphoma is typically difficult to differentiate from normal GI uptake.    Physiologic uptake of the tracer is present  within the brain, salivary glands, myocardium, GI and  tracts.    Incidental CT findings:    Bilateral maxillary sinus mucosal thickening with near complete opacification of the partially visualized right maxillary sinus.    Coronary artery and atherosclerotic calcification.  Aortic valve calcification.  Fusiform dilatation of the mid ascending thoracic aorta to 4.4 cm, stable from prior.    Bibasilar atelectasis.  0.5 cm nodule in the left upper lobe abutting the pleura, stable from prior.  Interval resolution of bilateral pleural effusions.    There is a large calcified gallstone in the gallbladder lumen without evidence of acute cholelithiasis.    Prostatomegaly and diffuse bladder wall thickening, similar to prior.      Impression       No definite evidence of active marginal zone MALT.  The stomach is decompressed with mild wall thickening; however is less conspicuous metabollically when compared to PET CT 01/03/2019.    No increased tracer uptake identified.    Cholelithiasis without evidence of acute cholecystitis.    Prostatomegaly with bladder wall thickening likely secondary to outlet obstruction.    Interval resolution of bilateral pleural effusions.       Assessment:       1. MALT lymphoma    2. On continuous oral anticoagulation    3. Persistent atrial fibrillation    4. Dilated cardiomyopathy           Plan:   1) Limited (Stage 1) MALT lymphoma  -H pylori negative  -Recent h/o GIB while was on DOAC for a-fib  -PET negative for distant disease or lymphadenopathy  -Hepatitis studies negative  -Patient recently seen by cardiology for a-fib, off anti-coagulation, off of beta-blocker given AEs  -Patient completed Rituximab at this time: 375mg x4 weeks on 3/7/19  -Persistent MALT lymphoma on EGD, PET was also done, no other site of disease  -Was seen again by radiation oncology, would prefer to pursue observation at this time  -Repeat labs with MD visit Q3mo (CBC, CMP, LDH) for first 1-2 years    2) New  systolic CHF, A-fib  -ECHO showed Left ventricular systolic function. The estimated ejection fraction is 43%. Global hypokinetic wall motion. Septal wall has abnormal motion.   -S/p cardioversion via JACINTO.   -S/p Watchman  -Patient now back on Xarelto.   -On lasix PRN  -To follow-up with cardiology    Follow-up: visit in 3 months with labs prior (CBC, CMP, LDH)    The following was staffed and discussed with supervising physician Dr. Maldonado.    Kelly Beckwith MD  Hematology/Oncology Fellow

## 2019-10-24 ENCOUNTER — LAB VISIT (OUTPATIENT)
Dept: LAB | Facility: HOSPITAL | Age: 84
End: 2019-10-24
Payer: MEDICARE

## 2019-10-24 ENCOUNTER — OFFICE VISIT (OUTPATIENT)
Dept: HEMATOLOGY/ONCOLOGY | Facility: CLINIC | Age: 84
End: 2019-10-24
Payer: MEDICARE

## 2019-10-24 VITALS
SYSTOLIC BLOOD PRESSURE: 104 MMHG | HEART RATE: 56 BPM | WEIGHT: 178.13 LBS | OXYGEN SATURATION: 96 % | TEMPERATURE: 98 F | DIASTOLIC BLOOD PRESSURE: 58 MMHG | BODY MASS INDEX: 24.16 KG/M2 | RESPIRATION RATE: 14 BRPM

## 2019-10-24 DIAGNOSIS — I48.19 PERSISTENT ATRIAL FIBRILLATION: ICD-10-CM

## 2019-10-24 DIAGNOSIS — C88.4 MALT LYMPHOMA: Primary | ICD-10-CM

## 2019-10-24 DIAGNOSIS — C88.4 MALT LYMPHOMA: ICD-10-CM

## 2019-10-24 DIAGNOSIS — Z79.01 ON CONTINUOUS ORAL ANTICOAGULATION: ICD-10-CM

## 2019-10-24 DIAGNOSIS — I42.0 DILATED CARDIOMYOPATHY: ICD-10-CM

## 2019-10-24 LAB
ALBUMIN SERPL BCP-MCNC: 3.5 G/DL (ref 3.5–5.2)
ALP SERPL-CCNC: 53 U/L (ref 55–135)
ALT SERPL W/O P-5'-P-CCNC: 15 U/L (ref 10–44)
ANION GAP SERPL CALC-SCNC: 5 MMOL/L (ref 8–16)
AST SERPL-CCNC: 20 U/L (ref 10–40)
BASOPHILS # BLD AUTO: 0.05 K/UL (ref 0–0.2)
BASOPHILS NFR BLD: 1.1 % (ref 0–1.9)
BILIRUB SERPL-MCNC: 0.6 MG/DL (ref 0.1–1)
BUN SERPL-MCNC: 22 MG/DL (ref 10–30)
CALCIUM SERPL-MCNC: 9.1 MG/DL (ref 8.7–10.5)
CHLORIDE SERPL-SCNC: 106 MMOL/L (ref 95–110)
CO2 SERPL-SCNC: 28 MMOL/L (ref 23–29)
CREAT SERPL-MCNC: 1.2 MG/DL (ref 0.5–1.4)
DIFFERENTIAL METHOD: ABNORMAL
EOSINOPHIL # BLD AUTO: 0 K/UL (ref 0–0.5)
EOSINOPHIL NFR BLD: 0 % (ref 0–8)
ERYTHROCYTE [DISTWIDTH] IN BLOOD BY AUTOMATED COUNT: 14.2 % (ref 11.5–14.5)
EST. GFR  (AFRICAN AMERICAN): 59.9 ML/MIN/1.73 M^2
EST. GFR  (NON AFRICAN AMERICAN): 51.8 ML/MIN/1.73 M^2
GLUCOSE SERPL-MCNC: 122 MG/DL (ref 70–110)
HCT VFR BLD AUTO: 39.9 % (ref 40–54)
HGB BLD-MCNC: 13.1 G/DL (ref 14–18)
IMM GRANULOCYTES # BLD AUTO: 0.04 K/UL (ref 0–0.04)
IMM GRANULOCYTES NFR BLD AUTO: 0.9 % (ref 0–0.5)
LDH SERPL L TO P-CCNC: 236 U/L (ref 110–260)
LYMPHOCYTES # BLD AUTO: 0.8 K/UL (ref 1–4.8)
LYMPHOCYTES NFR BLD: 18.1 % (ref 18–48)
MCH RBC QN AUTO: 32.3 PG (ref 27–31)
MCHC RBC AUTO-ENTMCNC: 32.8 G/DL (ref 32–36)
MCV RBC AUTO: 98 FL (ref 82–98)
MONOCYTES # BLD AUTO: 0.4 K/UL (ref 0.3–1)
MONOCYTES NFR BLD: 9.1 % (ref 4–15)
NEUTROPHILS # BLD AUTO: 3.1 K/UL (ref 1.8–7.7)
NEUTROPHILS NFR BLD: 70.8 % (ref 38–73)
NRBC BLD-RTO: 0 /100 WBC
PLATELET # BLD AUTO: 202 K/UL (ref 150–350)
PMV BLD AUTO: 9.3 FL (ref 9.2–12.9)
POTASSIUM SERPL-SCNC: 4.4 MMOL/L (ref 3.5–5.1)
PROT SERPL-MCNC: 6.9 G/DL (ref 6–8.4)
RBC # BLD AUTO: 4.06 M/UL (ref 4.6–6.2)
SODIUM SERPL-SCNC: 139 MMOL/L (ref 136–145)
WBC # BLD AUTO: 4.41 K/UL (ref 3.9–12.7)

## 2019-10-24 PROCEDURE — 99215 OFFICE O/P EST HI 40 MIN: CPT | Mod: HCNC,GC,S$GLB, | Performed by: STUDENT IN AN ORGANIZED HEALTH CARE EDUCATION/TRAINING PROGRAM

## 2019-10-24 PROCEDURE — 36415 COLL VENOUS BLD VENIPUNCTURE: CPT | Mod: HCNC

## 2019-10-24 PROCEDURE — 99215 PR OFFICE/OUTPT VISIT, EST, LEVL V, 40-54 MIN: ICD-10-PCS | Mod: HCNC,GC,S$GLB, | Performed by: STUDENT IN AN ORGANIZED HEALTH CARE EDUCATION/TRAINING PROGRAM

## 2019-10-24 PROCEDURE — 83615 LACTATE (LD) (LDH) ENZYME: CPT | Mod: HCNC

## 2019-10-24 PROCEDURE — 99999 PR PBB SHADOW E&M-EST. PATIENT-LVL III: ICD-10-PCS | Mod: PBBFAC,HCNC,GC, | Performed by: STUDENT IN AN ORGANIZED HEALTH CARE EDUCATION/TRAINING PROGRAM

## 2019-10-24 PROCEDURE — 85025 COMPLETE CBC W/AUTO DIFF WBC: CPT | Mod: HCNC

## 2019-10-24 PROCEDURE — 99999 PR PBB SHADOW E&M-EST. PATIENT-LVL III: CPT | Mod: PBBFAC,HCNC,GC, | Performed by: STUDENT IN AN ORGANIZED HEALTH CARE EDUCATION/TRAINING PROGRAM

## 2019-10-24 PROCEDURE — 80053 COMPREHEN METABOLIC PANEL: CPT | Mod: HCNC

## 2019-10-24 PROCEDURE — 1101F PT FALLS ASSESS-DOCD LE1/YR: CPT | Mod: HCNC,CPTII,GC,S$GLB | Performed by: STUDENT IN AN ORGANIZED HEALTH CARE EDUCATION/TRAINING PROGRAM

## 2019-10-24 PROCEDURE — 1101F PR PT FALLS ASSESS DOC 0-1 FALLS W/OUT INJ PAST YR: ICD-10-PCS | Mod: HCNC,CPTII,GC,S$GLB | Performed by: STUDENT IN AN ORGANIZED HEALTH CARE EDUCATION/TRAINING PROGRAM

## 2019-10-31 ENCOUNTER — PATIENT OUTREACH (OUTPATIENT)
Dept: OTHER | Facility: OTHER | Age: 84
End: 2019-10-31

## 2019-11-01 ENCOUNTER — PATIENT OUTREACH (OUTPATIENT)
Dept: OTHER | Facility: OTHER | Age: 84
End: 2019-11-01

## 2019-11-04 NOTE — PROGRESS NOTES
Digital Medicine: Clinician Follow-Up    Called patient to follow up. Patient endorses adherence to medication regimen. Patient denies hypotensive s/sx (lightheadedness, dizziness, nausea, fatigue); patient denies hypertensive s/sx (SOB, CP, severe headaches, changes in vision). Instructed patient to seek medical care if BP > 180/110 and is accompanied by hypertensive s/sx associated, patient confirms understanding. He states that his BP is elevated due to his heart failure. Noticed his BP readings in office are well controlled.       The history is provided by the patient. No  was used.     Follow Up  Follow-up reason(s): routine education        Last 5 Patient Entered Readings                                      Current 30 Day Average: 142/81     Recent Readings 11/4/2019 11/4/2019 11/4/2019 11/3/2019 11/3/2019    SBP (mmHg) 143 151 161 113 133    DBP (mmHg) 70 121 105 93 109    Pulse 55 56 59 56 56        INTERVENTION(S)  reviewed appropriate dose schedule, encouragement/support and goal setting    PLAN  patient verbalizes understanding and demonstrates understanding via teach back    Assessment:  Reviewed recent readings. Per 2017 ACC/ AHA HTN guidelines (goal of BP < 130/80), current 30-day average need to be addressed more throroughly today.     Plan:  Continue current medication regimen. Spoke with health  who believes that elevated BP may be due to high salt consumption. Informed patient to charge his BP cuff today and resume checking his BP using proper measurement techniques as discussed. In future encounters, will address dietary habits. I will continue to monitor regularly and will follow-up in 2 to 3 weeks, sooner if blood pressure begins to trend upward or downward.         There are no preventive care reminders to display for this patient.      Hypertension Medications             furosemide (LASIX) 20 MG tablet 2 tabs 2 days/week for fluid/pressure    irbesartan (AVAPRO) 150  MG tablet TAKE 1 TABLET BY MOUTH  DAILY    metoprolol succinate (TOPROL-XL) 25 MG 24 hr tablet TAKE 1 TABLET BY MOUTH DAILY

## 2019-11-14 DIAGNOSIS — I10 ESSENTIAL HYPERTENSION: ICD-10-CM

## 2019-11-15 DIAGNOSIS — I48.0 PAROXYSMAL ATRIAL FIBRILLATION: Primary | ICD-10-CM

## 2019-11-15 DIAGNOSIS — Z95.818 PRESENCE OF WATCHMAN LEFT ATRIAL APPENDAGE CLOSURE DEVICE: ICD-10-CM

## 2019-11-15 RX ORDER — FUROSEMIDE 20 MG/1
TABLET ORAL
Qty: 30 TABLET | Refills: 6 | Status: SHIPPED | OUTPATIENT
Start: 2019-11-15 | End: 2020-09-15

## 2019-11-18 NOTE — PROGRESS NOTES
"Mr. Mendoza is a patient of Dr. Crowder and was last seen in clinic 10/1/2019.      Subjective:   Patient ID:  Norm Mendoza is a 89 y.o. male who presents for follow-up of Atrial Fibrillation  .     HPI:    Mr. Mendoza is a 89 y.o. male with AF, CM (recovered), GIB hx, Watchman here for follow up after Watchman implantation.    Background:    6/18/19: He has had development of HF as well as persistent AF. He was in NSR 2014. The next ECG 11/18 showed AF. EF 5/18 was normal in NSR. He was started on OAC 12/18 for AF. 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 He underwent Rituximab therapy and had resolution of his ulcers 4/19 but had residual MALT. He did not receive radiation.  He has mild HF symptoms, treated with lasix. Stress echo 5/19 showed EF 40%. He was prescribed xarelto 5/30/19 but he has not taken it.    9/3/2019: He was cardioverted 7/12/19 with mild improvement in symptoms. EF 45% in NSR. He asks about Watchman. The patient can tolerate short term OAC but is not a candidate for long term OAC due to recurrent bleeding issues (MALT). I discussed management options regarding LAAO. I discussed the process of LAAO via Watchman including pre-procedure testing- JACINTO & CT- as well as the need to take OAC for 6 weeks post implant. We discussed post procedure JACINTO studies to assess BROCK occlusion. Additionally I mentioned the need to take DAPT up to the 6 month point post implant.      101/2019: JACINTO for BROCK geometry (will try to use CV JACINTO study)  Watchman with Anesthesia support    LAAO via Watchman    Update (11/19/2019):    10/11/2019 Successful left atrial appendage closure with 27 mm Watchman device.    Today he says he has been feeling very energetic. Less DUGAN, increased activity tolerance. Denies palpitations, LH, syncope. "I can run up the stairs!"    He is currently taking xarelto 20mg daily for stroke prophylaxis and denies significant bleeding episodes. He " is currently being treated with metoprolol succinate 25mg daily for HR control.  Kidney function is stable, with a creatinine of 1.2 on 10/24/2019.    I have personally reviewed the patient's EKG today, which shows sinus rhythm with 1st degree AVB and LBBB and PVC at 65bpm. WY interval is 264. QRS is 126. QTc is 424.    Recent Cardiac Tests:    JACINTO (10/11/2019):  · Normal left ventricular systolic function. The estimated ejection fraction is 65%  · Mild left atrial enlargement.  · No thrombus is present in the appendage.  · No interatrial septal defect present.  · The ascending aorta is mildly dilated.  · No plaque present.  · Prior to LAAO implantation, BROCK measured 2.5 (W) x 2.9 cm at 0 degrees, 1.8 (W) x 3.0 at 45 degrees, 1.8 (W) x 2.8 cm at 90 degrees, and 1.8 (W) x 2.4 cm at 135 degrees  · Successfull implantation of a 27 mm WATCHMAN device. No laura-device leak noted. Compression noted to be 20% after implantation. 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  · Prior to LAAO implantation, trival pericardial effusion is present. This is did not change in size after implantation. It remained anterior in location.    Current Outpatient Medications   Medication Sig    furosemide (LASIX) 20 MG tablet TAKE 1 TABLET BY MOUTH ONCE DAILY AS NEEDED LEG EDEMA    irbesartan (AVAPRO) 150 MG tablet TAKE 1 TABLET BY MOUTH  DAILY    metoprolol succinate (TOPROL-XL) 25 MG 24 hr tablet TAKE 1 TABLET BY MOUTH DAILY    psyllium (METAMUCIL) packet Take 1 packet by mouth once daily.    rivaroxaban (XARELTO) 20 mg Tab Take 1 tablet (20 mg total) by mouth daily with dinner or evening meal.    UNABLE TO FIND Take 1 Dose by mouth once daily. Hydroperoxide. Pt stated he put it in his water pick to cleanse his teeth     No current facility-administered medications for this visit.      Review of Systems   Constitution: Negative for malaise/fatigue.   Cardiovascular: Negative for chest pain, dyspnea on exertion, irregular  heartbeat, leg swelling and palpitations.   Respiratory: Negative for shortness of breath.    Hematologic/Lymphatic: Negative for bleeding problem.   Skin: Negative for rash.   Musculoskeletal: Negative for myalgias.   Gastrointestinal: Negative for hematemesis, hematochezia and nausea.   Genitourinary: Negative for hematuria.   Neurological: Negative for light-headedness.   Psychiatric/Behavioral: Negative for altered mental status.   Allergic/Immunologic: Negative for persistent infections.     Objective:          BP (!) 168/79   Pulse 69   Ht 6' (1.829 m)   Wt 79.2 kg (174 lb 9.7 oz)   BMI 23.68 kg/m²     Physical Exam   Constitutional: He is oriented to person, place, and time. He appears well-developed and well-nourished.   HENT:   Head: Normocephalic.   Nose: Nose normal.   Eyes: Pupils are equal, round, and reactive to light.   Cardiovascular: Normal rate, regular rhythm, S1 normal and S2 normal.   No murmur heard.  Pulses:       Radial pulses are 2+ on the right side, and 2+ on the left side.   Pulmonary/Chest: Breath sounds normal. No respiratory distress.   Abdominal: Normal appearance.   Musculoskeletal: Normal range of motion. He exhibits no edema.   Neurological: He is alert and oriented to person, place, and time.   Skin: Skin is warm and dry. No erythema.   Psychiatric: He has a normal mood and affect. His speech is normal and behavior is normal.   Nursing note and vitals reviewed.        Lab Results   Component Value Date     10/24/2019    K 4.4 10/24/2019    MG 1.9 03/13/2019    BUN 22 10/24/2019    CREATININE 1.2 10/24/2019    ALT 15 10/24/2019    AST 20 10/24/2019    HGB 13.1 (L) 10/24/2019    HCT 39.9 (L) 10/24/2019    TSH 2.775 11/30/2018    LDLCALC 136.6 08/03/2018    LDLCALC 136.6 08/03/2018       Recent Labs   Lab 12/15/18  1426 03/13/19  2154 07/05/19  0945 10/01/19  1403   INR 1.0 1.1 1.3 H 1.1       Assessment:     1. Dilated cardiomyopathy    2. Essential hypertension    3.  Persistent atrial fibrillation    4. PVC's (premature ventricular contractions)    5. History of cardioversion    6. On continuous oral anticoagulation      Plan:     In summary, Mr. Mendoza is a 89 y.o. male with AF, CM (recovered), GIB hx, Watchman here for follow up after Watchman implantation.  He is feeling great. Remains in SR. EF has recovered to 65% per last JACINTO. Next JACINTO scheduled for 12/4/2019. At that point he will switch from xarelto to DAPT. Continue DAPT for 6 months. Given recovery of heart function in SR, will likely start AAD - will diascuss with Dr. Crowder.    12/4/2019 JACINTO  Discuss AAD. (UPDATE: Per Dr. Crowder will start amio 200mg daily. Patient called at home and plan reviewed with patient.)  RTC 6 months with TSH and LFTs, sooner if needed.    *A copy of this note has been sent to Dr. Crowder*    Follow up in about 6 months (around 5/19/2020).    ------------------------------------------------------------------    MYCHAL Yuan, NP-C  Cardiac Electrophysiology

## 2019-11-19 ENCOUNTER — HOSPITAL ENCOUNTER (OUTPATIENT)
Dept: CARDIOLOGY | Facility: CLINIC | Age: 84
Discharge: HOME | End: 2019-11-19
Payer: MEDICARE

## 2019-11-19 ENCOUNTER — OFFICE VISIT (OUTPATIENT)
Dept: ELECTROPHYSIOLOGY | Facility: CLINIC | Age: 84
End: 2019-11-19
Payer: MEDICARE

## 2019-11-19 VITALS
DIASTOLIC BLOOD PRESSURE: 79 MMHG | WEIGHT: 174.63 LBS | HEART RATE: 69 BPM | BODY MASS INDEX: 23.65 KG/M2 | SYSTOLIC BLOOD PRESSURE: 168 MMHG | HEIGHT: 72 IN

## 2019-11-19 DIAGNOSIS — I10 ESSENTIAL HYPERTENSION: ICD-10-CM

## 2019-11-19 DIAGNOSIS — Z79.01 ON CONTINUOUS ORAL ANTICOAGULATION: ICD-10-CM

## 2019-11-19 DIAGNOSIS — I48.19 PERSISTENT ATRIAL FIBRILLATION: ICD-10-CM

## 2019-11-19 DIAGNOSIS — I42.0 DILATED CARDIOMYOPATHY: Primary | ICD-10-CM

## 2019-11-19 DIAGNOSIS — I49.3 PVC'S (PREMATURE VENTRICULAR CONTRACTIONS): ICD-10-CM

## 2019-11-19 DIAGNOSIS — Z92.89 HISTORY OF CARDIOVERSION: ICD-10-CM

## 2019-11-19 PROCEDURE — 93010 ELECTROCARDIOGRAM REPORT: CPT | Mod: HCNC,S$GLB,, | Performed by: INTERNAL MEDICINE

## 2019-11-19 PROCEDURE — 93005 RHYTHM STRIP: ICD-10-PCS | Mod: HCNC,S$GLB,, | Performed by: INTERNAL MEDICINE

## 2019-11-19 PROCEDURE — 99214 OFFICE O/P EST MOD 30 MIN: CPT | Mod: HCNC,S$GLB,, | Performed by: NURSE PRACTITIONER

## 2019-11-19 PROCEDURE — 93010 RHYTHM STRIP: ICD-10-PCS | Mod: HCNC,S$GLB,, | Performed by: INTERNAL MEDICINE

## 2019-11-19 PROCEDURE — 93005 ELECTROCARDIOGRAM TRACING: CPT | Mod: HCNC,S$GLB,, | Performed by: INTERNAL MEDICINE

## 2019-11-19 PROCEDURE — 99499 RISK ADDL DX/OHS AUDIT: ICD-10-PCS | Mod: HCNC,S$GLB,, | Performed by: NURSE PRACTITIONER

## 2019-11-19 PROCEDURE — 1101F PT FALLS ASSESS-DOCD LE1/YR: CPT | Mod: HCNC,CPTII,S$GLB, | Performed by: NURSE PRACTITIONER

## 2019-11-19 PROCEDURE — 99499 UNLISTED E&M SERVICE: CPT | Mod: HCNC,S$GLB,, | Performed by: NURSE PRACTITIONER

## 2019-11-19 PROCEDURE — 99999 PR PBB SHADOW E&M-EST. PATIENT-LVL III: CPT | Mod: PBBFAC,HCNC,, | Performed by: NURSE PRACTITIONER

## 2019-11-19 PROCEDURE — 99999 PR PBB SHADOW E&M-EST. PATIENT-LVL III: ICD-10-PCS | Mod: PBBFAC,HCNC,, | Performed by: NURSE PRACTITIONER

## 2019-11-19 PROCEDURE — 1101F PR PT FALLS ASSESS DOC 0-1 FALLS W/OUT INJ PAST YR: ICD-10-PCS | Mod: HCNC,CPTII,S$GLB, | Performed by: NURSE PRACTITIONER

## 2019-11-19 PROCEDURE — 99214 PR OFFICE/OUTPT VISIT, EST, LEVL IV, 30-39 MIN: ICD-10-PCS | Mod: HCNC,S$GLB,, | Performed by: NURSE PRACTITIONER

## 2019-11-19 RX ORDER — AMIODARONE HYDROCHLORIDE 200 MG/1
200 TABLET ORAL DAILY
Qty: 30 TABLET | Refills: 11 | Status: SHIPPED | OUTPATIENT
Start: 2019-11-19 | End: 2020-03-05

## 2019-11-19 RX ORDER — IRBESARTAN 150 MG/1
TABLET ORAL
Qty: 30 TABLET | Refills: 3
Start: 2019-11-19 | End: 2019-11-21 | Stop reason: SDUPTHER

## 2019-11-19 NOTE — Clinical Note
EF has recovered in SR per JACINTO. He feels great. S/p Watchman. Follow up JACINTO in 2 weeks. Did you want to start amiodarone given EF and symptom improvement in SR or continue to monitor since he has remained in rhythm for a while without AAD? Thx

## 2019-11-19 NOTE — PATIENT INSTRUCTIONS
JACINTO is scheduled for 12/4/2019. At that time you will find out if you can stop xarelto and start aspirin and plavix.  Return to clinic in 6 months.

## 2019-11-21 DIAGNOSIS — I10 ESSENTIAL HYPERTENSION: ICD-10-CM

## 2019-11-21 RX ORDER — IRBESARTAN 150 MG/1
TABLET ORAL
Qty: 90 TABLET | Refills: 1 | Status: SHIPPED | OUTPATIENT
Start: 2019-11-21 | End: 2020-07-13 | Stop reason: SDUPTHER

## 2019-11-21 NOTE — PROGRESS NOTES
"Digital Medicine: Health  Follow-Up    Patient reports he is well, no complaints.     Reports he recently had an EKG, was informed his heart is "normal". Patient attributes fluctuations in readings to "the [employer] machine".     Patient distracted during conversation, purchasing a hearing aid. Requested call back.     1/28/20: called back to complete HC follow up. Patient reports he is well, no complaints. Reports he has a bladder scan scheduled today.    The history is provided by the patient.     Follow Up  Follow-up reason(s): reading review and routine education          INTERVENTION(S)  encouragement/support, denied further coaching, denied resources and denied questions    PLAN  patient verbalizes understanding, demonstrates understanding via teach back, patient amenable to changes and continue monitoring      There are no preventive care reminders to display for this patient.    Last 5 Patient Entered Readings                                      Current 30 Day Average: 140/78     Recent Readings 11/21/2019 11/20/2019 11/20/2019 11/18/2019 11/18/2019    SBP (mmHg) 149 198 156 125 118    DBP (mmHg) 74 94 76 65 67    Pulse 60 54 82 69 71                      Diet Screening   No change to diet.      Physical Activity Screening   No change to exercise routine.        Medication Adherence Screening   He misses doses: never        SDOH  "

## 2019-11-22 ENCOUNTER — PATIENT OUTREACH (OUTPATIENT)
Dept: OTHER | Facility: OTHER | Age: 84
End: 2019-11-22

## 2019-11-22 NOTE — PROGRESS NOTES
"Digital Medicine: Clinician Follow-Up    Called patient to in response to HTN alert. Patient endorses adherence to medication regimen. Patient denies hypotensive s/sx (lightheadedness, dizziness, nausea, fatigue); patient denies hypertensive s/sx (SOB, CP, severe headaches, changes in vision). Instructed patient to seek medical care if BP > 180/110 and is accompanied by hypertensive s/sx associated, patient confirms understanding. He reports that his blood pressure "goes up and down" however he is "fine right now".           The history is provided by the patient. No  was used.     Follow Up  Follow-up reason(s): reading review      Alert received.   Care Team received high BP alert.  Patient is not experiencing symptoms.    Last 5 Patient Entered Readings                                      Current 30 Day Average: 142/79     Recent Readings 11/22/2019 11/21/2019 11/21/2019 11/20/2019 11/20/2019    SBP (mmHg) 150 178 149 198 156    DBP (mmHg) 74 86 74 94 76    Pulse 57 60 60 54 82        INTERVENTION(S)  reviewed appropriate dose schedule, encouragement/support and goal setting    PLAN  patient verbalizes understanding and continue monitoring    Assessment:  Reviewed recent readings. Per 2017 ACC/ AHA HTN guidelines (goal of BP < 130/80), current 30-day average need to be addressed more throroughly today.     Plan:  Continue current medication regimen. If BP remains elevated above goal, may consider increasing irbesartan to 300 mg daily. Encouraged patient to charge his BP cuff this weekend then resume to check his BP using proper measurement techniques. I will continue to monitor regularly and will follow-up in 2 to 3 weeks, sooner if blood pressure begins to trend upward or downward.         There are no preventive care reminders to display for this patient.    Hypertension Medications             furosemide (LASIX) 20 MG tablet TAKE 1 TABLET BY MOUTH ONCE DAILY AS NEEDED LEG EDEMA    " irbesartan (AVAPRO) 150 MG tablet TAKE 1 TABLET BY MOUTH  DAILY    metoprolol succinate (TOPROL-XL) 25 MG 24 hr tablet TAKE 1 TABLET BY MOUTH DAILY                 Screenings

## 2019-11-22 NOTE — ED TRIAGE NOTES
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 Pt also with Hx of HTN. Pt has been wearing B/P monitor and had elevated B/P today and was called by [facility] to come to ED. PT states he had some dull aching chest pain last night but itn went away. Pt c/o of dyspnea on exertion and general weakness. Denies C/P.   no

## 2019-12-02 ENCOUNTER — TELEPHONE (OUTPATIENT)
Dept: ELECTROPHYSIOLOGY | Facility: CLINIC | Age: 84
End: 2019-12-02

## 2019-12-02 NOTE — TELEPHONE ENCOUNTER
Spoke to Norm Mendoza    CONFIRMED procedure arrival time of 9am on 12/4  Reiterated instructions including:  -Directions to check in desk  -NPO after midnight night prior to procedure  -Confirmed compliance of Xarelto     Pt verbalizes understanding of above and appreciates call.

## 2019-12-04 ENCOUNTER — HOSPITAL ENCOUNTER (OUTPATIENT)
Facility: HOSPITAL | Age: 84
Discharge: HOME | End: 2019-12-04
Attending: INTERNAL MEDICINE | Admitting: INTERNAL MEDICINE
Payer: MEDICARE

## 2019-12-04 ENCOUNTER — ANESTHESIA EVENT (OUTPATIENT)
Dept: MEDSURG UNIT | Facility: HOSPITAL | Age: 84
End: 2019-12-04
Payer: MEDICARE

## 2019-12-04 ENCOUNTER — ANESTHESIA (OUTPATIENT)
Dept: MEDSURG UNIT | Facility: HOSPITAL | Age: 84
End: 2019-12-04
Payer: MEDICARE

## 2019-12-04 ENCOUNTER — HOSPITAL ENCOUNTER (OUTPATIENT)
Dept: CARDIOLOGY | Facility: CLINIC | Age: 84
Discharge: HOME | End: 2019-12-04
Attending: INTERNAL MEDICINE | Admitting: INTERNAL MEDICINE
Payer: MEDICARE

## 2019-12-04 VITALS
DIASTOLIC BLOOD PRESSURE: 67 MMHG | TEMPERATURE: 97 F | RESPIRATION RATE: 18 BRPM | OXYGEN SATURATION: 97 % | SYSTOLIC BLOOD PRESSURE: 150 MMHG | HEART RATE: 46 BPM

## 2019-12-04 DIAGNOSIS — I48.0 PAROXYSMAL ATRIAL FIBRILLATION: ICD-10-CM

## 2019-12-04 DIAGNOSIS — I48.19 PERSISTENT ATRIAL FIBRILLATION: ICD-10-CM

## 2019-12-04 DIAGNOSIS — I48.91 ATRIAL FIBRILLATION, UNSPECIFIED TYPE: ICD-10-CM

## 2019-12-04 PROCEDURE — D9220A PRA ANESTHESIA: Mod: HCNC,CRNA,, | Performed by: NURSE ANESTHETIST, CERTIFIED REGISTERED

## 2019-12-04 PROCEDURE — 93325 TRANSESOPHAGEAL ECHO (TEE) (CUPID ONLY): ICD-10-PCS | Mod: 26,HCNC,, | Performed by: INTERNAL MEDICINE

## 2019-12-04 PROCEDURE — 63600175 PHARM REV CODE 636 W HCPCS: Mod: HCNC | Performed by: NURSE ANESTHETIST, CERTIFIED REGISTERED

## 2019-12-04 PROCEDURE — 93312 TRANSESOPHAGEAL ECHO (TEE) (CUPID ONLY): ICD-10-PCS | Mod: 26,HCNC,, | Performed by: INTERNAL MEDICINE

## 2019-12-04 PROCEDURE — 93320 TRANSESOPHAGEAL ECHO (TEE) (CUPID ONLY): ICD-10-PCS | Mod: 26,HCNC,, | Performed by: INTERNAL MEDICINE

## 2019-12-04 PROCEDURE — 93312 ECHO TRANSESOPHAGEAL: CPT | Mod: 26,HCNC,, | Performed by: INTERNAL MEDICINE

## 2019-12-04 PROCEDURE — 93320 DOPPLER ECHO COMPLETE: CPT | Mod: 26,HCNC,, | Performed by: INTERNAL MEDICINE

## 2019-12-04 PROCEDURE — 37000009 HC ANESTHESIA EA ADD 15 MINS: Mod: HCNC

## 2019-12-04 PROCEDURE — 93325 DOPPLER ECHO COLOR FLOW MAPG: CPT | Mod: 26,HCNC,, | Performed by: INTERNAL MEDICINE

## 2019-12-04 PROCEDURE — D9220A PRA ANESTHESIA: ICD-10-PCS | Mod: HCNC,ANES,, | Performed by: ANESTHESIOLOGY

## 2019-12-04 PROCEDURE — D9220A PRA ANESTHESIA: ICD-10-PCS | Mod: HCNC,CRNA,, | Performed by: NURSE ANESTHETIST, CERTIFIED REGISTERED

## 2019-12-04 PROCEDURE — 37000008 HC ANESTHESIA 1ST 15 MINUTES: Mod: HCNC

## 2019-12-04 PROCEDURE — 25000003 PHARM REV CODE 250: Mod: HCNC | Performed by: NURSE ANESTHETIST, CERTIFIED REGISTERED

## 2019-12-04 PROCEDURE — 93312 ECHO TRANSESOPHAGEAL: CPT | Mod: HCNC

## 2019-12-04 PROCEDURE — D9220A PRA ANESTHESIA: Mod: HCNC,ANES,, | Performed by: ANESTHESIOLOGY

## 2019-12-04 RX ORDER — SODIUM CHLORIDE 9 MG/ML
INJECTION, SOLUTION INTRAVENOUS CONTINUOUS PRN
Status: DISCONTINUED | OUTPATIENT
Start: 2019-12-04 | End: 2019-12-04

## 2019-12-04 RX ORDER — PROPOFOL 10 MG/ML
VIAL (ML) INTRAVENOUS
Status: DISCONTINUED | OUTPATIENT
Start: 2019-12-04 | End: 2019-12-04

## 2019-12-04 RX ORDER — PROPOFOL 10 MG/ML
VIAL (ML) INTRAVENOUS CONTINUOUS PRN
Status: DISCONTINUED | OUTPATIENT
Start: 2019-12-04 | End: 2019-12-04

## 2019-12-04 RX ORDER — SODIUM CHLORIDE 0.9 % (FLUSH) 0.9 %
10 SYRINGE (ML) INJECTION
Status: DISCONTINUED | OUTPATIENT
Start: 2019-12-04 | End: 2019-12-04 | Stop reason: HOSPADM

## 2019-12-04 RX ORDER — FENTANYL CITRATE 50 UG/ML
INJECTION, SOLUTION INTRAMUSCULAR; INTRAVENOUS
Status: DISCONTINUED | OUTPATIENT
Start: 2019-12-04 | End: 2019-12-04

## 2019-12-04 RX ORDER — CLOPIDOGREL BISULFATE 75 MG/1
75 TABLET ORAL DAILY
Qty: 30 TABLET | Refills: 11 | Status: SHIPPED | OUTPATIENT
Start: 2019-12-04 | End: 2020-08-18

## 2019-12-04 RX ORDER — GLYCOPYRROLATE 0.2 MG/ML
INJECTION INTRAMUSCULAR; INTRAVENOUS
Status: DISCONTINUED | OUTPATIENT
Start: 2019-12-04 | End: 2019-12-04

## 2019-12-04 RX ORDER — LIDOCAINE HCL/PF 100 MG/5ML
SYRINGE (ML) INTRAVENOUS
Status: DISCONTINUED | OUTPATIENT
Start: 2019-12-04 | End: 2019-12-04

## 2019-12-04 RX ORDER — SODIUM CHLORIDE 0.9 % (FLUSH) 0.9 %
5 SYRINGE (ML) INJECTION
Status: DISCONTINUED | OUTPATIENT
Start: 2019-12-04 | End: 2019-12-04 | Stop reason: HOSPADM

## 2019-12-04 RX ADMIN — SODIUM CHLORIDE: 0.9 INJECTION, SOLUTION INTRAVENOUS at 11:12

## 2019-12-04 RX ADMIN — FENTANYL CITRATE 25 MCG: 50 INJECTION, SOLUTION INTRAMUSCULAR; INTRAVENOUS at 11:12

## 2019-12-04 RX ADMIN — PROPOFOL 20 MG: 10 INJECTION, EMULSION INTRAVENOUS at 11:12

## 2019-12-04 RX ADMIN — PROPOFOL 100 MCG/KG/MIN: 10 INJECTION, EMULSION INTRAVENOUS at 11:12

## 2019-12-04 RX ADMIN — GLYCOPYRROLATE 0.1 MG: 0.2 INJECTION, SOLUTION INTRAMUSCULAR; INTRAVENOUS at 11:12

## 2019-12-04 RX ADMIN — LIDOCAINE HYDROCHLORIDE 20 MG: 20 INJECTION, SOLUTION INTRAVENOUS at 11:12

## 2019-12-04 NOTE — DISCHARGE INSTRUCTIONS
You had a transesophageal echocardiogram that showed good position of your watchman device. You are to STOP taking Xarelto. A new prescription of Plavix has been ordered which you should take daily as instructed. Please follow up in the EP clinic at your next scheduled appointment.

## 2019-12-04 NOTE — ASSESSMENT & PLAN NOTE
1. JACINTO for evaluation of post Watchman device implantation  -No absolute contraindications of esophageal stricture, tumor, perforation, laceration,or diverticulum and/or active GI bleed  -The risks, benefits & alternatives of the procedure were explained to the patient.   -The risks of transesophageal echo include but are not limited to:  Dental trauma, esophageal trauma/perforation, bleeding, laryngospasm/brochospasm, aspiration, sore throat/hoarseness, & dislodgement of the endotracheal tube/nasogastric tube (where applicable).    -The risks of moderate sedation include hypotension, respiratory depression, arrhythmias, bronchospasm, & death.    -Informed consent was obtained. The patient is agreeable to proceed with the procedure and all questions and concerns addressed.    Case discussed with an attending in echocardiography lab.     Further recommendations per attending addendum

## 2019-12-04 NOTE — NURSING TRANSFER
Nursing Transfer Note      12/4/2019     Transfer [facility] Phase II to Short Stay [facility]    Transfer via strecher    Transfer with RA, saline locked    Transported by CATARINO Vanegas    Medicines sent: N/A    Chart send with patient: Yes    Notified: Martina @1222    Patient reassessed at: 12/4/19 @1228    Upon arrival to floor: Floor staff notified of patient's arrival. No complications or concerns during transport.

## 2019-12-04 NOTE — ANESTHESIA PREPROCEDURE EVALUATION
12/04/2019    Norm Mendoza is a 89 y.o. male EF 40% mil-mod MR, HTN, , a fib     Patient Active Problem List   Diagnosis    AK (actinic keratosis)    History of benign bladder tumor    H/O nonmelanoma skin cancer    Aortic atherosclerosis    Essential hypertension    Left ventricular diastolic dysfunction with preserved systolic function    Nonrheumatic aortic valve stenosis    Alcohol dependence, daily use    Mixed hyperlipidemia    Aortic root dilation    Hypercholesterolemia    PVC's (premature ventricular contractions)    Tortuous aorta    Persistent atrial fibrillation    MALT lymphoma    PUD (peptic ulcer disease)    Dilated cardiomyopathy    On continuous oral anticoagulation    History of cardioversion    Atrial fibrillation       Review of patient's allergies indicates:  No Known Allergies    Current Facility-Administered Medications on File Prior to Visit   Medication Dose Route Frequency Provider Last Rate Last Dose    sodium chloride 0.9% flush 5 mL  5 mL Intravenous PRN Emerita Hartmann NP         Current Outpatient Medications on File Prior to Visit   Medication Sig Dispense Refill    amiodarone (PACERONE) 200 MG Tab Take 1 tablet (200 mg total) by mouth once daily. 30 tablet 11    furosemide (LASIX) 20 MG tablet TAKE 1 TABLET BY MOUTH ONCE DAILY AS NEEDED LEG EDEMA 30 tablet 6    irbesartan (AVAPRO) 150 MG tablet TAKE 1 TABLET BY MOUTH  DAILY 90 tablet 1    metoprolol succinate (TOPROL-XL) 25 MG 24 hr tablet TAKE 1 TABLET BY MOUTH DAILY 30 tablet 6    psyllium (METAMUCIL) packet Take 1 packet by mouth once daily.      rivaroxaban (XARELTO) 20 mg Tab Take 1 tablet (20 mg total) by mouth daily with dinner or evening meal. 30 tablet 6    UNABLE TO FIND Take 1 Dose by mouth once daily. Hydroperoxide. Pt stated he put it in his water pick to cleanse his teeth          Past Surgical History:   Procedure Laterality Date    ACHILLES TENDON SURGERY      CATARACT EXTRACTION W/  INTRAOCULAR LENS IMPLANT Bilateral 2013    CLOSURE OF LEFT ATRIAL APPENDAGE USING DEVICE N/A 10/11/2019    Procedure: Left atrial appendage closure device;  Surgeon: Hi Crowder MD;  Location: [facility] EP LAB;  Service: Cardiology;  Laterality: N/A;  AF, JACINTO, Watchman Implant, BSci, Gen, MB, 3 Prep    CYSTOSCOPY      bladder tumor    ESOPHAGOGASTRODUODENOSCOPY N/A 2018    Procedure: EGD (ESOPHAGOGASTRODUODENOSCOPY);  Surgeon: Austin Garcia MD;  Location: [facility] ENDO (2ND FLR);  Service: Endoscopy;  Laterality: N/A;    ESOPHAGOGASTRODUODENOSCOPY N/A 2019    Procedure: EGD (ESOPHAGOGASTRODUODENOSCOPY);  Surgeon: Austin Garcia MD;  Location: [facility] ENDO (4TH FLR);  Service: Endoscopy;  Laterality: N/A;  please schedule within 4 weeks    SKIN CANCER EXCISION      TREATMENT OF CARDIAC ARRHYTHMIA N/A 2019    Procedure: Cardioversion or Defibrillation;  Surgeon: Hi Crowder MD;  Location: [facility] EP LAB;  Service: Cardiology;  Laterality: N/A;  AF, JACINTO, DCCV, MAC, MB, 3 Prep       Social History     Socioeconomic History    Marital status:      Spouse name: Not on file    Number of children: 2    Years of education: Not on file    Highest education level: Not on file   Occupational History    Occupation: Financial Work/     Employer: retired    Occupation: actor    Occupation:    Social Needs    Financial resource strain: Not on file    Food insecurity:     Worry: Not on file     Inability: Not on file    Transportation needs:     Medical: Not on file     Non-medical: Not on file   Tobacco Use    Smoking status: Former Smoker     Packs/day: 1.00     Years: 25.00     Pack years: 25.00     Types: Cigarettes     Last attempt to quit: 9/3/1970     Years since quittin.2    Smokeless tobacco: Never Used   Substance and Sexual Activity     Alcohol use: Yes     Alcohol/week: 3.0 standard drinks     Types: 3 Shots of liquor per week     Comment: 3 bourbons daily - 12 beer on weekends     Drug use: No    Sexual activity: Yes     Partners: Female   Lifestyle    Physical activity:     Days per week: Not on file     Minutes per session: Not on file    Stress: Not on file   Relationships    Social connections:     Talks on phone: Not on file     Gets together: Not on file     Attends Anabaptism service: Not on file     Active member of club or organization: Not on file     Attends meetings of clubs or organizations: Not on file     Relationship status: Not on file   Other Topics Concern    Not on file   Social History Narrative    Not on file         CBC: No results for input(s): WBC, RBC, HGB, HCT, PLT, MCV, MCH, MCHC in the last 72 hours.    CMP: No results for input(s): NA, K, CL, CO2, BUN, CREATININE, GLU, MG, PHOS, CALCIUM, ALBUMIN, PROT, ALKPHOS, ALT, AST, BILITOT in the last 72 hours.    INR  No results for input(s): PT, INR, PROTIME, APTT in the last 72 hours.        Diagnostic Studies:      EKD Echo:  Results for orders placed or performed during the hospital encounter of 05/10/18   2D echo with color flow doppler   Result Value Ref Range    QEF 58 55 - 65    Mitral Valve Regurgitation MILD     Aortic Valve Regurgitation TRIVIAL TO MILD     Est. PA Systolic Pressure 32.81     Tricuspid Valve Regurgitation MILD          Pre-op Assessment    I have reviewed the Patient Summary Reports.      I have reviewed the Medications.     Review of Systems  Anesthesia Hx:  History of prior surgery of interest to airway management or planning: Denies Family Hx of Anesthesia complications.   Denies Personal Hx of Anesthesia complications.       Physical Exam  General:  Well nourished    Airway/Jaw/Neck:  Airway Findings: Mouth Opening: Normal Tongue: Normal  General Airway Assessment: Adult  Mallampati: III  Improves to II with phonation.  TM  Distance: Normal, at least 6 cm      Dental:  Dental Findings: In tact   Chest/Lungs:  Chest/Lungs Findings: Clear to auscultation, Normal Respiratory Rate         Mental Status:  Mental Status Findings:  Cooperative, Alert and Oriented         Anesthesia Plan  Type of Anesthesia, risks & benefits discussed:  Anesthesia Type:  general  Patient's Preference:   Intra-op Monitoring Plan: standard ASA monitors  Intra-op Monitoring Plan Comments:   Post Op Pain Control Plan: multimodal analgesia  Post Op Pain Control Plan Comments:   Induction:   IV  Beta Blocker:         Informed Consent: Patient understands risks and agrees with Anesthesia plan.  Questions answered. Anesthesia consent signed with patient.  ASA Score: 3     Day of Surgery Review of History & Physical:    H&P update referred to the provider.         Ready For Surgery From Anesthesia Perspective.

## 2019-12-04 NOTE — PLAN OF CARE
Received report from NELLY Villegas. Patient s/p JACINTO, AAOx3. VSS, no c/o pain or discomfort at this time, resp even and unlabored. Post procedure protocol reviewed with patient and patient's family. Understanding verbalized. Family members at bedside. Nurse call bell within reach. Will continue to monitor per post procedure protocol.

## 2019-12-04 NOTE — SUBJECTIVE & OBJECTIVE
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    Mixed hyperlipidemia 5/2/2018    Paroxysmal atrial fibrillation 11/30/2018    Skin cancer     x3, had mohs on nose    Squamous cell carcinoma excised 12/5/14    R lower back    Symptomatic anemia 12/6/2018    Urinary tract infection     x4       Past Surgical History:   Procedure Laterality Date    ACHILLES TENDON SURGERY      CATARACT EXTRACTION W/  INTRAOCULAR LENS IMPLANT Bilateral 2013    CLOSURE OF LEFT ATRIAL APPENDAGE USING DEVICE N/A 10/11/2019    Procedure: Left atrial appendage closure device;  Surgeon: Hi Crowder MD;  Location: [facility] EP LAB;  Service: Cardiology;  Laterality: N/A;  AF, JACINTO, Watchman Implant, BSci, Gen, MB, 3 Prep    CYSTOSCOPY      bladder tumor    ESOPHAGOGASTRODUODENOSCOPY N/A 12/7/2018    Procedure: EGD (ESOPHAGOGASTRODUODENOSCOPY);  Surgeon: Austin Garcia MD;  Location: [facility] ENDO (2ND FLR);  Service: Endoscopy;  Laterality: N/A;    ESOPHAGOGASTRODUODENOSCOPY N/A 4/12/2019    Procedure: EGD (ESOPHAGOGASTRODUODENOSCOPY);  Surgeon: Austin Garcia MD;  Location: [facility] ENDO (4TH FLR);  Service: Endoscopy;  Laterality: N/A;  please schedule within 4 weeks    SKIN CANCER EXCISION      TREATMENT OF CARDIAC ARRHYTHMIA N/A 7/12/2019    Procedure: Cardioversion or Defibrillation;  Surgeon: Hi Crowder MD;  Location: [facility] EP LAB;  Service: Cardiology;  Laterality: N/A;  AF, JACINTO, DCCV, MAC, MB, 3 Prep       Review of patient's allergies indicates:  No Known Allergies    No current facility-administered medications on file prior to encounter.      Current Outpatient Medications on File Prior to Encounter   Medication Sig    metoprolol succinate (TOPROL-XL) 25 MG 24 hr tablet TAKE 1 TABLET BY MOUTH DAILY    psyllium (METAMUCIL) packet Take 1  packet by mouth once daily.    rivaroxaban (XARELTO) 20 mg Tab Take 1 tablet (20 mg total) by mouth daily with dinner or evening meal.    UNABLE TO FIND Take 1 Dose by mouth once daily. Hydroperoxide. Pt stated he put it in his water pick to cleanse his teeth     Family History     Problem Relation (Age of Onset)    Hypertension Father    Stroke Father, Brother        Tobacco Use    Smoking status: Former Smoker     Packs/day: 1.00     Years: 25.00     Pack years: 25.00     Types: Cigarettes     Last attempt to quit: 9/3/1970     Years since quittin.2    Smokeless tobacco: Never Used   Substance and Sexual Activity    Alcohol use: Yes     Alcohol/week: 3.0 standard drinks     Types: 3 Shots of liquor per week     Comment: 3 bourbons daily - 12 beer on weekends     Drug use: No    Sexual activity: Yes     Partners: Female     Review of Systems   Constitution: Negative for chills and fever.   HENT: Negative for congestion.    Cardiovascular: Negative for chest pain, dyspnea on exertion, near-syncope, orthopnea, palpitations, paroxysmal nocturnal dyspnea and syncope.   Respiratory: Negative for cough and shortness of breath.    Gastrointestinal: Negative for nausea and vomiting.   Genitourinary: Negative for dysuria.   Neurological: Negative for dizziness, focal weakness, headaches, light-headedness and weakness.     Objective:     Vital Signs (Most Recent):  Temp: 96.2 °F (35.7 °C) (19)  Pulse: (!) 48 (19)  Resp: 18 (19)  BP: 128/64 (19)  SpO2: 97 % (19) Vital Signs (24h Range):  Temp:  [96.2 °F (35.7 °C)] 96.2 °F (35.7 °C)  Pulse:  [48] 48  Resp:  [18] 18  SpO2:  [97 %] 97 %  BP: (128-145)/(64-67) 128/64        There is no height or weight on file to calculate BMI.    SpO2: 97 %  O2 Device (Oxygen Therapy): room air    No intake or output data in the 24 hours ending 19 1018    Lines/Drains/Airways     None                 Physical Exam    Constitutional: He is oriented to person, place, and time. No distress.   HENT:   Mouth/Throat: Oropharynx is clear and moist.   Eyes: Pupils are equal, round, and reactive to light.   Neck: No JVD present.   Cardiovascular: Normal rate and intact distal pulses.   Pulmonary/Chest: Effort normal and breath sounds normal.   Abdominal: Soft. Bowel sounds are normal.   Musculoskeletal: He exhibits no edema.   Neurological: He is alert and oriented to person, place, and time.   Skin: Skin is warm and dry. He is not diaphoretic.   Psychiatric: He has a normal mood and affect. His behavior is normal.   Vitals reviewed.      Significant Labs: All pertinent lab results from the last 24 hours have been reviewed.    Significant Imaging: Echocardiogram:   Transthoracic echo (TTE) complete (Cupid Only):   Results for orders placed or performed during the hospital encounter of 09/20/19   Echo Color Flow Doppler? Yes   Result Value Ref Range    Ascending aorta 4.23 cm    STJ 3.36 cm    AV mean gradient 18 mmHg    Ao peak eric 2.92 m/s    Ao VTI 64.89 cm    IVRT 0.13 msec    IVS 1.07 0.6 - 1.1 cm    LA size 4.09 cm    Left Atrium Major Axis 5.20 cm    Left Atrium Minor Axis 5.22 cm    LVIDD 4.26 3.5 - 6.0 cm    LVIDS 3.19 2.1 - 4.0 cm    LVOT diameter 2.44 cm    LVOT peak VTI 17.25 cm    PW 1.11 (A) 0.6 - 1.1 cm    MV Peak A Eric 1.16 m/s    E wave decelartion time 537.05 msec    MV Peak E Eric 0.50 m/s    PV Peak D Eric 0.27 m/s    PV Peak S Eric 0.44 m/s    RA Major Axis 5.06 cm    RA Width 4.19 cm    RVDD 3.93 cm    Sinus 4.16 cm    TAPSE 2.05 cm    TR Max Eric 2.84 m/s    TDI LATERAL 0.06 m/s    TDI SEPTAL 0.06 m/s    LA WIDTH 4.58 cm    LV Diastolic Volume 81.39 mL    LV Systolic Volume 40.67 mL    RV S' 13.10 cm/s    LVOT peak eric 0.76 m/s    LV LATERAL E/E' RATIO 8.33 m/s    LV SEPTAL E/E' RATIO 8.33 m/s    FS 25 %    LA volume 82.96 cm3    LV mass 158.50 g    Left Ventricle Relative Wall Thickness 0.52 cm    AV valve area 1.24  cm2    AV Velocity Ratio 0.26     AV index (prosthetic) 0.27     E/A ratio 0.43     Mean e' 0.06 m/s    Pulm vein S/D ratio 1.63     LVOT area 4.7 cm2    LVOT stroke volume 80.62 cm3    AV peak gradient 34 mmHg    E/E' ratio 8.33 m/s    Triscuspid Valve Regurgitation Peak Gradient 32 mmHg    BSA 2.01 m2    LV Systolic Volume Index 20.2 mL/m2    LV Diastolic Volume Index 40.44 mL/m2    LA Volume Index 41.2 mL/m2    LV Mass Index 79 g/m2    Right Atrial Pressure (from IVC) 3 mmHg    TV rest pulmonary artery pressure 35 mmHg    Narrative    · Mildly decreased left ventricular systolic function. The estimated   ejection fraction is 45%. Global hypokinetic wall motion.  · Grade I (mild) left ventricular diastolic dysfunction consistent with   impaired relaxation. Normal left atrial pressure.  · Concentric left ventricular remodeling.  · Moderate aortic valve stenosis. Aortic valve area is 1.24 cm2; peak   velocity is 2.92 m/s; mean gradient is 18 mmHg.  · The ascending aorta and sinuses of Valsalva are mildly dilated.  · Mild mitral regurgitation.  · Normal right ventricular systolic function.  · Mild tricuspid regurgitation.  · The estimated PA systolic pressure is 35 mm Hg  · Normal central venous pressure (3 mm Hg).  · Mild left atrial enlargement.

## 2019-12-04 NOTE — DISCHARGE SUMMARY
[facility]  Cardiology  Discharge Summary      Patient Name: Norm Mendoza  MRN: [id number]  Admission Date: 12/4/2019  Hospital Length of Stay: 0 days  Discharge Date and Time:  12/04/2019 12:00 PM  Attending Physician: Hi Crowder MD    Discharging Provider: Kaity Jauregui MD  Primary Care Physician: Amber Jenkins MD    HPI: 89 year old male with atrial fibrillation, CM (recovered), GI bleed requiring transfusion and was found to have MALT s/p Rituximab therapy and had resolution of his ulcers 4/19 but had residual MALT. Patient had Watchman placed by Dr. Crowder 10/11/19.     Patient is on Xarelto.      JACINTO (10/11/19):   · Normal left ventricular systolic function. The estimated ejection fraction is 65%  · Mild left atrial enlargement.  · No thrombus is present in the appendage.  · No interatrial septal defect present.  · The ascending aorta is mildly dilated.  · No plaque present.  · Prior to LAAO implantation, BROCK measured 2.5 (W) x 2.9 cm at 0 degrees, 1.8 (W) x 3.0 at 45 degrees, 1.8 (W) x 2.8 cm at 90 degrees, and 1.8 (W) x 2.4 cm at 135 degrees  · Successfull implantation of a 27 mm WATCHMAN device. No laura-device leak noted. Compression noted to be 20% after implantation. 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  · Prior to LAAO implantation, trival pericardial effusion is present. This is did not change in size after implantation. It remained anterior in location.    JACINTO (09/19):   · Normal left ventricular systolic function. The estimated ejection fraction is 65%  · Mild left atrial enlargement.  · No thrombus is present in the appendage.  · No interatrial septal defect present.  · The ascending aorta is mildly dilated.    Dysphagia or odynophagia:  No  Liver Disease, esophageal disease, or known varices:  No  Upper GI Bleeding: Yes  Snoring:  No  Sleep Apnea:  No  Prior neck surgery or radiation:  No  History of anesthetic difficulties:  No  Family  history of anesthetic difficulties:  No  Last oral intake:  12 hours ago  Able to move neck in all directions:  Yes      Procedure(s) (LRB):  ECHOCARDIOGRAM,TRANSESOPHAGEAL (N/A)     Hospital Course: Patient presented for O/P JACINTO which showed good positioning of Watchman device without evidence of para device leak. No ASD was seen either. Patient tolerated the procedure well and was observed afterwards. Per Dr. Crowder, his home Xarelto was discontinued. He was given a prescription for Plavix to take indefinitely. He will follow up in the EP lab.     Significant Diagnostic Studies:    Pending Diagnostic Studies:     Procedure Component Value Units Date/Time    Transesophageal echo (JACINTO) [[id number]]     Order Status:  Sent Lab Status:  No result           Final Active Diagnoses:    Diagnosis Date Noted POA    Atrial fibrillation [I48.91] 12/04/2019 Yes      Problems Resolved During this Admission:     Discharged Condition: good    Disposition: Home or Self Care    Follow Up:  Follow-up Information     Hi Crowder MD.    Specialties:  Electrophysiology, Cardiovascular Disease  Why:  Please follow up in the EP clinic at your next scheduled appointment.   Contact information:  [address] [place] [id number] [phone]             Patient Instructions:   You had a transesophageal echocardiogram that showed good position of your watchman device. You are to STOP taking Xarelto. A new prescription of Plavix has been ordered which you should take daily as instructed. Please follow up in the EP clinic at your next scheduled appointment.     Medications:  Reconciled Home Medications:      Medication List      START taking these medications    clopidogrel 75 mg tablet  Commonly known as:  PLAVIX  Take 1 tablet (75 mg total) by mouth once daily.        CONTINUE taking these medications    amiodarone 200 MG Tab  Commonly known as:  PACERONE  Take 1 tablet (200 mg total) by mouth once daily.     furosemide 20  MG tablet  Commonly known as:  LASIX  TAKE 1 TABLET BY MOUTH ONCE DAILY AS NEEDED LEG EDEMA     irbesartan 150 MG tablet  Commonly known as:  AVAPRO  TAKE 1 TABLET BY MOUTH  DAILY     metoprolol succinate 25 MG 24 hr tablet  Commonly known as:  TOPROL-XL  TAKE 1 TABLET BY MOUTH DAILY     psyllium packet  Commonly known as:  METAMUCIL  Take 1 packet by mouth once daily.     UNABLE TO FIND  Take 1 Dose by mouth once daily. Hydroperoxide. Pt stated he put it in his water pick to cleanse his teeth        STOP taking these medications    rivaroxaban 20 mg Tab  Commonly known as:  SONIA Jauregui MD  Cardiology Fellow, PGY-5   [facility]

## 2019-12-04 NOTE — ANESTHESIA POSTPROCEDURE EVALUATION
Anesthesia Post Evaluation    Patient: Norm Mendoza    Procedure(s) Performed: Procedure(s) (LRB):  ECHOCARDIOGRAM,TRANSESOPHAGEAL (N/A)    Final Anesthesia Type: general    Patient location during evaluation: Cath Lab  Patient participation: Yes- Able to Participate  Level of consciousness: awake and alert  Post-procedure vital signs: reviewed and stable  Pain management: adequate  Airway patency: patent    PONV status at discharge: No PONV  Anesthetic complications: no      Cardiovascular status: blood pressure returned to baseline  Respiratory status: unassisted, spontaneous ventilation and room air  Hydration status: euvolemic            Vitals Value Taken Time   /67 12/4/2019 12:35 PM   Temp 35.8 °C (96.5 °F) 12/4/2019 12:35 PM   Pulse 46 12/4/2019 12:35 PM   Resp 18 12/4/2019 12:35 PM   SpO2 97 % 12/4/2019 12:35 PM         No case tracking events are documented in the log.      Pain/Sergio Score: Sergio Score: 10 (12/4/2019 12:08 PM)

## 2019-12-04 NOTE — H&P
[facility]  Cardiology  History and Physical     Patient Name: Norm Mendoza  MRN: [id number]  Admission Date: 12/4/2019  Code Status: Prior   Attending Provider: Hi Crowder MD   Primary Care Physician: Amber Jenkins MD    Patient information was obtained from patient and ER records.     Subjective:     HPI: 89 year old male with atrial fibrillation, CM (recovered), GI bleed requiring transfusion and was found to have MALT s/p Rituximab therapy and had resolution of his ulcers 4/19 but had residual MALT. Patient had Watchman placed by Dr. Crowder 10/11/19.     Patient is on Xarelto.      JACINTO (10/19):   · Normal left ventricular systolic function. The estimated ejection fraction is 65%  · Mild left atrial enlargement.  · No thrombus is present in the appendage.  · No interatrial septal defect present.  · The ascending aorta is mildly dilated.  · No plaque present.  · Prior to LAAO implantation, BROCK measured 2.5 (W) x 2.9 cm at 0 degrees, 1.8 (W) x 3.0 at 45 degrees, 1.8 (W) x 2.8 cm at 90 degrees, and 1.8 (W) x 2.4 cm at 135 degrees  · Successfull implantation of a 27 mm WATCHMAN device. No laura-device leak noted. Compression noted to be 20% after implantation. 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  · Prior to LAAO implantation, trival pericardial effusion is present. This is did not change in size after implantation. It remained anterior in location.    TTE (09/19):   · Normal left ventricular systolic function. The estimated ejection fraction is 65%  · Mild left atrial enlargement.  · No thrombus is present in the appendage.  · No interatrial septal defect present.  · The ascending aorta is mildly dilated.    EGD (4/2019):                         - Normal esophagus.                        - 2 cm type-I sliding hiatal hernia.                        - Erythematous, granular and scarred mucosa in the                         lesser curvature of the  gastric body. Biopsied.                         Ulcers no longer present.                        - Normal examined duodenum.                        - Biopsies were taken with a cold forceps for histology in the gastric antrum.    Dysphagia or odynophagia:  No  Liver Disease, esophageal disease, or known varices:  No  Upper GI Bleeding: Yes  Snoring:  No  Sleep Apnea:  No  Prior neck surgery or radiation:  No  History of anesthetic difficulties:  No  Family history of anesthetic difficulties:  No  Last oral intake:  12 hours ago  Able to move neck in all directions:  Yes      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    Mixed hyperlipidemia 5/2/2018    Paroxysmal atrial fibrillation 11/30/2018    Skin cancer     x3, had mohs on nose    Squamous cell carcinoma excised 12/5/14    R lower back    Symptomatic anemia 12/6/2018    Urinary tract infection     x4       Past Surgical History:   Procedure Laterality Date    ACHILLES TENDON SURGERY      CATARACT EXTRACTION W/  INTRAOCULAR LENS IMPLANT Bilateral 2013    CLOSURE OF LEFT ATRIAL APPENDAGE USING DEVICE N/A 10/11/2019    Procedure: Left atrial appendage closure device;  Surgeon: Hi Crowder MD;  Location: [facility] EP LAB;  Service: Cardiology;  Laterality: N/A;  AF, JACINTO, Watchman Implant, BSci, Gen, MB, 3 Prep    CYSTOSCOPY      bladder tumor    ESOPHAGOGASTRODUODENOSCOPY N/A 12/7/2018    Procedure: EGD (ESOPHAGOGASTRODUODENOSCOPY);  Surgeon: Austin Garcia MD;  Location: [facility] (2ND FLR);  Service: Endoscopy;  Laterality: N/A;    ESOPHAGOGASTRODUODENOSCOPY N/A 4/12/2019    Procedure: EGD (ESOPHAGOGASTRODUODENOSCOPY);  Surgeon: Austin Garcia MD;  Location: [facility] (4TH FLR);  Service: Endoscopy;  Laterality: N/A;  please schedule within 4 weeks    SKIN CANCER EXCISION       TREATMENT OF CARDIAC ARRHYTHMIA N/A 2019    Procedure: Cardioversion or Defibrillation;  Surgeon: Hi Crowder MD;  Location: [facility] LAB;  Service: Cardiology;  Laterality: N/A;  AF, JACINTO, DCCV, MAC, MB, 3 Prep       Review of patient's allergies indicates:  No Known Allergies    No current facility-administered medications on file prior to encounter.      Current Outpatient Medications on File Prior to Encounter   Medication Sig    metoprolol succinate (TOPROL-XL) 25 MG 24 hr tablet TAKE 1 TABLET BY MOUTH DAILY    psyllium (METAMUCIL) packet Take 1 packet by mouth once daily.    rivaroxaban (XARELTO) 20 mg Tab Take 1 tablet (20 mg total) by mouth daily with dinner or evening meal.    UNABLE TO FIND Take 1 Dose by mouth once daily. Hydroperoxide. Pt stated he put it in his water pick to cleanse his teeth     Family History     Problem Relation (Age of Onset)    Hypertension Father    Stroke Father, Brother        Tobacco Use    Smoking status: Former Smoker     Packs/day: 1.00     Years: 25.00     Pack years: 25.00     Types: Cigarettes     Last attempt to quit: 9/3/1970     Years since quittin.2    Smokeless tobacco: Never Used   Substance and Sexual Activity    Alcohol use: Yes     Alcohol/week: 3.0 standard drinks     Types: 3 Shots of liquor per week     Comment: 3 bourbons daily - 12 beer on weekends     Drug use: No    Sexual activity: Yes     Partners: Female     Review of Systems   Constitution: Negative for chills and fever.   HENT: Negative for congestion.    Cardiovascular: Negative for chest pain, dyspnea on exertion, near-syncope, orthopnea, palpitations, paroxysmal nocturnal dyspnea and syncope.   Respiratory: Negative for cough and shortness of breath.    Gastrointestinal: Negative for nausea and vomiting.   Genitourinary: Negative for dysuria.   Neurological: Negative for dizziness, focal weakness, headaches, light-headedness and weakness.     Objective:     Vital  Signs (Most Recent):  Temp: 96.2 °F (35.7 °C) (12/04/19 0932)  Pulse: (!) 48 (12/04/19 0932)  Resp: 18 (12/04/19 0932)  BP: 128/64 (12/04/19 0933)  SpO2: 97 % (12/04/19 0932) Vital Signs (24h Range):  Temp:  [96.2 °F (35.7 °C)] 96.2 °F (35.7 °C)  Pulse:  [48] 48  Resp:  [18] 18  SpO2:  [97 %] 97 %  BP: (128-145)/(64-67) 128/64        There is no height or weight on file to calculate BMI.    SpO2: 97 %  O2 Device (Oxygen Therapy): room air    No intake or output data in the 24 hours ending 12/04/19 1018    Lines/Drains/Airways     None                 Physical Exam   Constitutional: He is oriented to person, place, and time. No distress.   HENT:   Mouth/Throat: Oropharynx is clear and moist.   Eyes: Pupils are equal, round, and reactive to light.   Neck: No JVD present.   Cardiovascular: Normal rate and intact distal pulses.   Pulmonary/Chest: Effort normal and breath sounds normal.   Abdominal: Soft. Bowel sounds are normal.   Musculoskeletal: He exhibits no edema.   Neurological: He is alert and oriented to person, place, and time.   Skin: Skin is warm and dry. He is not diaphoretic.   Psychiatric: He has a normal mood and affect. His behavior is normal.   Vitals reviewed.      Significant Labs: All pertinent lab results from the last 24 hours have been reviewed.    Significant Imaging: Echocardiogram:   Transthoracic echo (TTE) complete (Cupid Only):   Results for orders placed or performed during the hospital encounter of 09/20/19   Echo Color Flow Doppler? Yes   Result Value Ref Range    Ascending aorta 4.23 cm    STJ 3.36 cm    AV mean gradient 18 mmHg    Ao peak eric 2.92 m/s    Ao VTI 64.89 cm    IVRT 0.13 msec    IVS 1.07 0.6 - 1.1 cm    LA size 4.09 cm    Left Atrium Major Axis 5.20 cm    Left Atrium Minor Axis 5.22 cm    LVIDD 4.26 3.5 - 6.0 cm    LVIDS 3.19 2.1 - 4.0 cm    LVOT diameter 2.44 cm    LVOT peak VTI 17.25 cm    PW 1.11 (A) 0.6 - 1.1 cm    MV Peak A Eric 1.16 m/s    E wave decelartion time 537.05  msec    MV Peak E Eric 0.50 m/s    PV Peak D Eric 0.27 m/s    PV Peak S Eric 0.44 m/s    RA Major Axis 5.06 cm    RA Width 4.19 cm    RVDD 3.93 cm    Sinus 4.16 cm    TAPSE 2.05 cm    TR Max Eric 2.84 m/s    TDI LATERAL 0.06 m/s    TDI SEPTAL 0.06 m/s    LA WIDTH 4.58 cm    LV Diastolic Volume 81.39 mL    LV Systolic Volume 40.67 mL    RV S' 13.10 cm/s    LVOT peak eric 0.76 m/s    LV LATERAL E/E' RATIO 8.33 m/s    LV SEPTAL E/E' RATIO 8.33 m/s    FS 25 %    LA volume 82.96 cm3    LV mass 158.50 g    Left Ventricle Relative Wall Thickness 0.52 cm    AV valve area 1.24 cm2    AV Velocity Ratio 0.26     AV index (prosthetic) 0.27     E/A ratio 0.43     Mean e' 0.06 m/s    Pulm vein S/D ratio 1.63     LVOT area 4.7 cm2    LVOT stroke volume 80.62 cm3    AV peak gradient 34 mmHg    E/E' ratio 8.33 m/s    Triscuspid Valve Regurgitation Peak Gradient 32 mmHg    BSA 2.01 m2    LV Systolic Volume Index 20.2 mL/m2    LV Diastolic Volume Index 40.44 mL/m2    LA Volume Index 41.2 mL/m2    LV Mass Index 79 g/m2    Right Atrial Pressure (from IVC) 3 mmHg    TV rest pulmonary artery pressure 35 mmHg    Narrative    · Mildly decreased left ventricular systolic function. The estimated   ejection fraction is 45%. Global hypokinetic wall motion.  · Grade I (mild) left ventricular diastolic dysfunction consistent with   impaired relaxation. Normal left atrial pressure.  · Concentric left ventricular remodeling.  · Moderate aortic valve stenosis. Aortic valve area is 1.24 cm2; peak   velocity is 2.92 m/s; mean gradient is 18 mmHg.  · The ascending aorta and sinuses of Valsalva are mildly dilated.  · Mild mitral regurgitation.  · Normal right ventricular systolic function.  · Mild tricuspid regurgitation.  · The estimated PA systolic pressure is 35 mm Hg  · Normal central venous pressure (3 mm Hg).  · Mild left atrial enlargement.        Assessment and Plan:     Atrial fibrillation  1. JACINTO for evaluation of post Watchman device  implantation  -No absolute contraindications of esophageal stricture, tumor, perforation, laceration,or diverticulum and/or active GI bleed  -The risks, benefits & alternatives of the procedure were explained to the patient.   -The risks of transesophageal echo include but are not limited to:  Dental trauma, esophageal trauma/perforation, bleeding, laryngospasm/brochospasm, aspiration, sore throat/hoarseness, & dislodgement of the endotracheal tube/nasogastric tube (where applicable).    -The risks of moderate sedation include hypotension, respiratory depression, arrhythmias, bronchospasm, & death.    -Informed consent was obtained. The patient is agreeable to proceed with the procedure and all questions and concerns addressed.    Case discussed with an attending in echocardiography lab.     Further recommendations per attending addendum    Kaity Jauregui MD  Cardiology Fellow, PGY-5  [facility]

## 2019-12-04 NOTE — HPI
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 Patient had Watchman placed by Dr. Crowder 10/11/19.     Patient is on Xarelto.      JACINTO (10/11/19):   · Normal left ventricular systolic function. The estimated ejection fraction is 65%  · Mild left atrial enlargement.  · No thrombus is present in the appendage.  · No interatrial septal defect present.  · The ascending aorta is mildly dilated.    Dysphagia or odynophagia:  No  Liver Disease, esophageal disease, or known varices:  No  Upper GI Bleeding: Yes  Snoring:  No  Sleep Apnea:  No  Prior neck surgery or radiation:  No  History of anesthetic difficulties:  No  Family history of anesthetic difficulties:  No  Last oral intake:  12 hours ago  Able to move neck in all directions:  Yes

## 2019-12-04 NOTE — TRANSFER OF CARE
Anesthesia Transfer of Care Note    Patient: Norm Mendoza    Procedure(s) Performed: Procedure(s) (LRB):  ECHOCARDIOGRAM,TRANSESOPHAGEAL (N/A)    Patient location: [facility]    Anesthesia Type: general    Transport from OR: Transported from OR on 2-3 L/min O2 by NC with adequate spontaneous ventilation    Post pain: adequate analgesia    Post assessment: no apparent anesthetic complications and tolerated procedure well    Post vital signs: stable    Level of consciousness: awake, alert and oriented    Nausea/Vomiting: no nausea/vomiting    Complications: none    Transfer of care protocol was followed      Last vitals: 12/04/19 1154  Visit Vitals  /58   Pulse 52   Temp 96.3   Resp 17   SpO2 100%

## 2019-12-10 ENCOUNTER — PATIENT OUTREACH (OUTPATIENT)
Dept: OTHER | Facility: OTHER | Age: 84
End: 2019-12-10

## 2019-12-10 NOTE — PROGRESS NOTES
"Digital Medicine: Clinician Follow-Up    Called patient in response to HTN alert. Patient endorses adherence to medication regimen. Patient denies hypotensive s/sx (lightheadedness, dizziness, nausea, fatigue); patient denies hypertensive s/sx (SOB, CP, severe headaches, changes in vision). Instructed patient to seek medical care if BP > 180/110 and is accompanied by hypertensive s/sx associated, patient confirms understanding. He states that he "feels fine" at this time. He reports that he gets frustrated when he forgets to check his BP.      The history is provided by the patient. No  was used.     Last 5 Patient Entered Readings                                      Current 30 Day Average: 145/76     Recent Readings 12/9/2019 12/8/2019 12/8/2019 12/6/2019 12/5/2019    SBP (mmHg) 184 165 186 154 147    DBP (mmHg) 90 75 112 76 72    Pulse 51 51 61 58 50        INTERVENTION(S)  reviewed appropriate dose schedule, reviewed monitoring technique, encouragement/support and goal setting    PLAN  patient verbalizes understanding and continue monitoring    Assessment:   Reviewed recent readings. Per 2017 ACC/ AHA HTN guidelines (goal of BP < 130/80), current 30-day average need to be addressed more throroughly today.     Plan:  Continue current medication regimen. Patient's blood pressure goal is advisable to be <140/90 mmHg due to fall risk. Reviewed proper BP measurement techniques and encouraged patient to charge his BP cuff this evening. Informed patient that he can check his BP about 2-3 times per week to help reduce burden of forgetting and set a schedule of when he will check his BP. I will continue to monitor regularly and will follow-up in 2 to 3 weeks, sooner if blood pressure begins to trend upward or downward.         There are no preventive care reminders to display for this patient.    Hypertension Medications             furosemide (LASIX) 20 MG tablet TAKE 1 TABLET BY MOUTH ONCE DAILY AS " NEEDED LEG EDEMA    irbesartan (AVAPRO) 150 MG tablet TAKE 1 TABLET BY MOUTH  DAILY    metoprolol succinate (TOPROL-XL) 25 MG 24 hr tablet TAKE 1 TABLET BY MOUTH DAILY

## 2019-12-17 ENCOUNTER — OFFICE VISIT (OUTPATIENT)
Dept: URGENT CARE | Facility: CLINIC | Age: 84
End: 2019-12-17
Payer: MEDICARE

## 2019-12-17 VITALS
WEIGHT: 174 LBS | TEMPERATURE: 98 F | DIASTOLIC BLOOD PRESSURE: 78 MMHG | HEART RATE: 89 BPM | OXYGEN SATURATION: 99 % | SYSTOLIC BLOOD PRESSURE: 134 MMHG | BODY MASS INDEX: 23.6 KG/M2

## 2019-12-17 DIAGNOSIS — S61.002A AVULSION OF SKIN OF LEFT THUMB, INITIAL ENCOUNTER: Primary | ICD-10-CM

## 2019-12-17 PROCEDURE — 99214 OFFICE O/P EST MOD 30 MIN: CPT | Mod: 25,S$GLB,, | Performed by: NURSE PRACTITIONER

## 2019-12-17 PROCEDURE — 12001 RPR S/N/AX/GEN/TRNK 2.5CM/<: CPT | Mod: S$GLB,,, | Performed by: NURSE PRACTITIONER

## 2019-12-17 PROCEDURE — 12001 LACERATION REPAIR: ICD-10-PCS | Mod: S$GLB,,, | Performed by: NURSE PRACTITIONER

## 2019-12-17 PROCEDURE — 99214 PR OFFICE/OUTPT VISIT, EST, LEVL IV, 30-39 MIN: ICD-10-PCS | Mod: 25,S$GLB,, | Performed by: NURSE PRACTITIONER

## 2019-12-17 NOTE — PATIENT INSTRUCTIONS
Follow up with her primary care provider as needed.      Skin Tear (Skin Avulsion)  A skin avulsion is a tearing of the top layer of skin. This commonly happens after a fall or other injury. It also tends to be more common in older people, or those taking blood thinners or steroids for long periods of time.  Home care  These guidelines will help you care for your wound at home:  · Keep the wound clean and dry for the first 24 to 48 hours, or as your healthcare provider advises.  · If there is a dressing or bandage, change it when it gets wet or dirty. Otherwise, leave it on for the first 24 hours, then change it once a day or as often as the doctor says.  · If stitches or staples were used, check the wound every day.  · After taking off the dressing, wash the area gently with soap and water. Clean as close to the stitches as you can. Avoid washing or rubbing the stitches directly.  · After 3 days you can keep the bandages off the wound, unless told otherwise, or there is continued drainage.  Allow the wound to be open to the air.  · Keep a thin layer of antibiotic ointment on the cut. This will keep the wound clean, make it easier to remove the stitches, and reduce scarring.  · If your wound is oozing, you can put a nonstick dressing over it. Then, reapply the bandage or dressing as you were told.  · You can shower as usual after the first 24 hours, but don't soak the area in water (no baths or swimming) until the stitches or staples are taken out.  · If surgical tape was used, keep the area clean and dry. If it becomes wet, blot it dry with a clean towel.  · If skin glue was used, don't put any creams, lotions, or antibiotic ointments on it. These can dissolve the glue. Usually the glue will flake off in about 5 to 10 days by itself. Try to resist picking it off before that so the wound doesn't open up. When it gets wet, pat it dry.  Here is some information about medicine:  · You may use over-the-counter medicine  such as acetaminophen or ibuprofen to control pain, unless another pain medicine was given. If you have chronic liver or kidney disease or ever had a stomach ulcer or gastrointestinal bleeding, talk with your doctor before using these medicines.  · If you were given antibiotics, take them until they are all used up. It is important to finish the antibiotics even if the wound looks better. This will ensure that the infection has cleared.  Follow-up care  Follow up with your healthcare provider, or as advised.  · Watch for any signs of infection, such as increasing redness, swelling, or pus coming out. If this happens, don't wait for your scheduled visit. Instead, see a doctor sooner.  · Stitches or staples are usually taken out within 5 to 14 days. This varies depending on what part of your body they are on, and the type of wound. The doctor will tell you how long stitches should be left in.   · If surgical tape was used, it is usually left on for 7 to 10 days. You can remove surgical tape after that unless you were told otherwise. If you try to remove it, and it is too hard, soaking can help. Surgical tape strips will eventually fall off on their own. If the edges of the cut pull apart, stop removing the tape or strips and follow up with your doctor  · As mentioned above, skin glue will flake off by itself in 5 to 10 days, so you don't need to pull it off.  If any X-rays were done, you will be notified of any changes that may affect your care.  When to seek medical advice  Call your healthcare provider right away if any of these occur:  · Increasing pain in the wound  · Redness, swelling, or pus coming from the wound  · Fever of 100.4ºF (38ºC) or higher, or as directed by your healthcare provider  · Sutures or staples come apart or fall out before your next appointment and the wound edges look as if they will re-open  · Surgical tape closures fall off before 7 days, and the wound edges look as if they will  re-open  · Bleeding not controlled by direct pressure  Date Last Reviewed: 9/1/2016  © 1994-7733 [employer] [address] [place] [address], [place] [address]. All rights reserved. This information is not intended as a substitute for professional medical care. Always follow your healthcare professional's instructions.        Laceration: Skin Adhesive  A laceration is a cut through the skin. You have a laceration that your healthcare provider has closed with skin adhesive, a type of skin glue.  Home care  You may take acetaminophen or ibuprofen for pain, unless your provider prescribed another pain medicine.  If you have chronic liver or kidney disease or ever had a stomach ulcer or gastrointestinal bleeding, talk with your healthcare provider before using these medicines.  General care  · Keep the wound clean and dry. You may shower or bathe as usual, but do not use soaps, lotions, or ointments on the wound area. Do not scrub the wound. After bathing, pat the wound dry with a soft towel.  · Do not scratch, rub, or pick at the adhesive film. Do not place tape directly over the film.  · Do not apply liquids (such as peroxide), ointments, or creams to the wound while the film is in place.  · Most skin wounds heal without problems. However, an infection sometimes occurs despite proper treatment. Therefore, watch for the signs of infection listed below.  Follow-up care  Follow up with your healthcare provider, or as advised. The adhesive film will fall off in 5 to 7 days.  When to seek medical advice  Call your healthcare provider right away if any of these occur:  · Signs of infection:  ¨ Fever of 100.4ºF (38ºC) or higher, or as advised by your healthcare provider  ¨ Increasing pain in the wound  ¨ Increasing redness or swelling  ¨ Pus coming from the wound  · Wound bleeds more than a small amount or bleeding doesnt stop  · Glue comes off earlier than expected and the wound edges come apart  · You feel numbness  or weakness in the wound area that doesnt go away  Date Last Reviewed: 6/1/2016  © 5189-0674 [employer] [address] [place] [address], [place] [address]. All rights reserved. This information is not intended as a substitute for professional medical care. Always follow your healthcare professional's instructions.

## 2019-12-17 NOTE — PROGRESS NOTES
Subjective:       Patient ID: Norm Mendoza is a 89 y.o. male.    Vitals:  weight is 78.9 kg (174 lb). His temperature is 98 °F (36.7 °C). His blood pressure is 134/78 and his pulse is 89. His oxygen saturation is 99%.     Chief Complaint: Laceration    Pt was cutting a lemon last night and cut his left thumb. Pt is UTD on vaccines    Laceration    The incident occurred 12 to 24 hours ago. The laceration is located on the left hand. The laceration is 1 cm in size. The laceration mechanism was a clean knife. The pain is mild. He reports no foreign bodies present. His tetanus status is UTD.       Constitution: Negative for chills, fatigue and fever.   HENT: Negative for congestion and sore throat.    Neck: Negative for painful lymph nodes.   Cardiovascular: Negative for chest pain and leg swelling.   Eyes: Negative for double vision and blurred vision.   Respiratory: Negative for cough and shortness of breath.    Gastrointestinal: Negative for nausea, vomiting and diarrhea.   Genitourinary: Negative for dysuria, frequency and urgency.   Musculoskeletal: Negative for joint pain, joint swelling, muscle cramps and muscle ache.   Skin: Positive for laceration. Negative for color change, pale and rash.   Allergic/Immunologic: Negative for seasonal allergies.   Neurological: Negative for dizziness, history of vertigo, light-headedness, passing out and headaches.   Hematologic/Lymphatic: Negative for swollen lymph nodes, easy bruising/bleeding and history of blood clots. Does not bruise/bleed easily.   Psychiatric/Behavioral: Negative for nervous/anxious, sleep disturbance and depression. The patient is not nervous/anxious.        Objective:       Physical Exam   Constitutional: He is oriented to person, place, and time. He appears well-developed and well-nourished.   HENT:   Head: Normocephalic and atraumatic.   Eyes: Pupils are equal, round, and reactive to light.   Neck: Normal range of motion. Neck supple.    Pulmonary/Chest: Effort normal.   Neurological: He is alert and oriented to person, place, and time.   Skin: Skin is warm and dry. Capillary refill takes less than 2 seconds.   Nursing note and vitals reviewed.        Assessment:       1. Avulsion of skin of left thumb, initial encounter        Plan:         Avulsion of skin of left thumb, initial encounter    Laceration Repair  Date/Time: 12/17/2019 10:05 AM  Performed by: Vincent Burnette DNP  Authorized by: Vincent Burnette DNP   Body area: upper extremity  Location details: left thumb  Laceration length: 1 cm  Skin closure: glue  Patient tolerance: Patient tolerated the procedure well with no immediate complications      Symptomatic therapies and return precautions on AVS.   Medication choices were made after reviewing allergies, medications, history, available laboratories.

## 2019-12-20 ENCOUNTER — PES CALL (OUTPATIENT)
Dept: ADMINISTRATIVE | Facility: CLINIC | Age: 84
End: 2019-12-20

## 2019-12-23 ENCOUNTER — PATIENT OUTREACH (OUTPATIENT)
Dept: ADMINISTRATIVE | Facility: HOSPITAL | Age: 84
End: 2019-12-23

## 2019-12-30 ENCOUNTER — PATIENT OUTREACH (OUTPATIENT)
Dept: OTHER | Facility: OTHER | Age: 84
End: 2019-12-30

## 2019-12-30 NOTE — PROGRESS NOTES
Digital Medicine: Clinician Follow-Up    Called patient to address HTN alert. Patient endorses adherence to medication regimen. Patient denies hypotensive s/sx (lightheadedness, dizziness, nausea, fatigue); patient denies hypertensive s/sx (SOB, CP, severe headaches, changes in vision). Instructed patient to seek medical care if BP > 180/110 and is accompanied by hypertensive s/sx associated, patient confirms understanding. He reports that he is feeling fine and denies any changes in his diet.       The history is provided by the patient. No  was used.     Follow Up  Follow-up reason(s): reading review and routine education      Alert received.   Care Team received high BP alert.  Patient is not experiencing symptoms.    Last 5 Patient Entered Readings                                      Current 30 Day Average: 161/77     Recent Readings 12/28/2019 12/27/2019 12/26/2019 12/25/2019 12/24/2019    SBP (mmHg) 185 136 174 158 144    DBP (mmHg) 81 79 64 69 67    Pulse 50 54 59 53 52          INTERVENTION(S)  reviewed appropriate dose schedule, encouragement/support and goal setting    PLAN  patient verbalizes understanding and continue monitoring    Assessment:  Reviewed recent readings. Per 2017 ACC/ AHA HTN guidelines (goal of BP < 130/80), current 30-day average need to be addressed more throroughly today.     Plan:  Continue current medication regimen. Reviewed proper BP measurement techniques and encouraged patient to charge the base of his BP cuff this evening. I will continue to monitor regularly and will follow-up in ~4 weeks, sooner if blood pressure begins to trend upward or downward. Patient denies having questions or concerns. Patient has my contact information and knows to call with any concerns or clinical changes.      There are no preventive care reminders to display for this patient.    Hypertension Medications             furosemide (LASIX) 20 MG tablet TAKE 1 TABLET BY MOUTH ONCE  DAILY AS NEEDED LEG EDEMA    irbesartan (AVAPRO) 150 MG tablet TAKE 1 TABLET BY MOUTH  DAILY    metoprolol succinate (TOPROL-XL) 25 MG 24 hr tablet TAKE 1 TABLET BY MOUTH DAILY                             Medication Adherence Screening   He misses doses: never    Patient is not selectively taking diuretics.    He does not wonder if medications are working.  He knows purpose of medications.      Patient identified the following reasons for non-compliance: none    Adherence tools used: pill box

## 2020-01-03 ENCOUNTER — LAB VISIT (OUTPATIENT)
Dept: LAB | Facility: HOSPITAL | Age: 85
End: 2020-01-03
Attending: INTERNAL MEDICINE
Payer: MEDICARE

## 2020-01-03 DIAGNOSIS — E61.1 IRON DEFICIENCY: ICD-10-CM

## 2020-01-03 DIAGNOSIS — I10 ESSENTIAL HYPERTENSION: ICD-10-CM

## 2020-01-03 LAB
ALBUMIN SERPL BCP-MCNC: 3.4 G/DL (ref 3.5–5.2)
ALP SERPL-CCNC: 57 U/L (ref 55–135)
ALT SERPL W/O P-5'-P-CCNC: 15 U/L (ref 10–44)
ANION GAP SERPL CALC-SCNC: 5 MMOL/L (ref 8–16)
AST SERPL-CCNC: 19 U/L (ref 10–40)
BILIRUB SERPL-MCNC: 0.3 MG/DL (ref 0.1–1)
BUN SERPL-MCNC: 22 MG/DL (ref 10–30)
CALCIUM SERPL-MCNC: 8.8 MG/DL (ref 8.7–10.5)
CHLORIDE SERPL-SCNC: 108 MMOL/L (ref 95–110)
CHOLEST SERPL-MCNC: 212 MG/DL (ref 120–199)
CHOLEST/HDLC SERPL: 3.1 {RATIO} (ref 2–5)
CO2 SERPL-SCNC: 27 MMOL/L (ref 23–29)
CREAT SERPL-MCNC: 1.2 MG/DL (ref 0.5–1.4)
ERYTHROCYTE [DISTWIDTH] IN BLOOD BY AUTOMATED COUNT: 14.1 % (ref 11.5–14.5)
EST. GFR  (AFRICAN AMERICAN): 59.9 ML/MIN/1.73 M^2
EST. GFR  (NON AFRICAN AMERICAN): 51.8 ML/MIN/1.73 M^2
FERRITIN SERPL-MCNC: 57 NG/ML (ref 20–300)
GLUCOSE SERPL-MCNC: 96 MG/DL (ref 70–110)
HCT VFR BLD AUTO: 38.7 % (ref 40–54)
HDLC SERPL-MCNC: 69 MG/DL (ref 40–75)
HDLC SERPL: 32.5 % (ref 20–50)
HGB BLD-MCNC: 12.3 G/DL (ref 14–18)
LDLC SERPL CALC-MCNC: 132.2 MG/DL (ref 63–159)
MCH RBC QN AUTO: 32.2 PG (ref 27–31)
MCHC RBC AUTO-ENTMCNC: 31.8 G/DL (ref 32–36)
MCV RBC AUTO: 101 FL (ref 82–98)
NONHDLC SERPL-MCNC: 143 MG/DL
PLATELET # BLD AUTO: 181 K/UL (ref 150–350)
PMV BLD AUTO: 9.5 FL (ref 9.2–12.9)
POTASSIUM SERPL-SCNC: 4.4 MMOL/L (ref 3.5–5.1)
PROT SERPL-MCNC: 6.5 G/DL (ref 6–8.4)
RBC # BLD AUTO: 3.82 M/UL (ref 4.6–6.2)
SODIUM SERPL-SCNC: 140 MMOL/L (ref 136–145)
T4 FREE SERPL-MCNC: 1.09 NG/DL (ref 0.71–1.51)
TRIGL SERPL-MCNC: 54 MG/DL (ref 30–150)
TSH SERPL DL<=0.005 MIU/L-ACNC: 4.04 UIU/ML (ref 0.4–4)
WBC # BLD AUTO: 4.06 K/UL (ref 3.9–12.7)

## 2020-01-03 PROCEDURE — 36415 COLL VENOUS BLD VENIPUNCTURE: CPT | Mod: HCNC

## 2020-01-03 PROCEDURE — 82728 ASSAY OF FERRITIN: CPT | Mod: HCNC

## 2020-01-03 PROCEDURE — 84443 ASSAY THYROID STIM HORMONE: CPT | Mod: HCNC

## 2020-01-03 PROCEDURE — 84439 ASSAY OF FREE THYROXINE: CPT | Mod: HCNC

## 2020-01-03 PROCEDURE — 85027 COMPLETE CBC AUTOMATED: CPT | Mod: HCNC

## 2020-01-03 PROCEDURE — 80053 COMPREHEN METABOLIC PANEL: CPT | Mod: HCNC

## 2020-01-03 PROCEDURE — 80061 LIPID PANEL: CPT | Mod: HCNC

## 2020-01-08 ENCOUNTER — OFFICE VISIT (OUTPATIENT)
Dept: INTERNAL MEDICINE | Facility: CLINIC | Age: 85
End: 2020-01-08
Payer: MEDICARE

## 2020-01-08 VITALS
BODY MASS INDEX: 24.69 KG/M2 | OXYGEN SATURATION: 99 % | HEART RATE: 54 BPM | WEIGHT: 182.31 LBS | SYSTOLIC BLOOD PRESSURE: 140 MMHG | DIASTOLIC BLOOD PRESSURE: 78 MMHG | HEIGHT: 72 IN

## 2020-01-08 DIAGNOSIS — C88.4 MALT LYMPHOMA: ICD-10-CM

## 2020-01-08 DIAGNOSIS — I48.0 PAROXYSMAL ATRIAL FIBRILLATION: ICD-10-CM

## 2020-01-08 DIAGNOSIS — I10 ESSENTIAL HYPERTENSION: Primary | ICD-10-CM

## 2020-01-08 PROCEDURE — 1101F PT FALLS ASSESS-DOCD LE1/YR: CPT | Mod: HCNC,CPTII,S$GLB, | Performed by: INTERNAL MEDICINE

## 2020-01-08 PROCEDURE — 99999 PR PBB SHADOW E&M-EST. PATIENT-LVL IV: ICD-10-PCS | Mod: PBBFAC,HCNC,, | Performed by: INTERNAL MEDICINE

## 2020-01-08 PROCEDURE — 1159F MED LIST DOCD IN RCRD: CPT | Mod: HCNC,S$GLB,, | Performed by: INTERNAL MEDICINE

## 2020-01-08 PROCEDURE — 1159F PR MEDICATION LIST DOCUMENTED IN MEDICAL RECORD: ICD-10-PCS | Mod: HCNC,S$GLB,, | Performed by: INTERNAL MEDICINE

## 2020-01-08 PROCEDURE — 99214 OFFICE O/P EST MOD 30 MIN: CPT | Mod: HCNC,S$GLB,, | Performed by: INTERNAL MEDICINE

## 2020-01-08 PROCEDURE — 1126F PR PAIN SEVERITY QUANTIFIED, NO PAIN PRESENT: ICD-10-PCS | Mod: HCNC,S$GLB,, | Performed by: INTERNAL MEDICINE

## 2020-01-08 PROCEDURE — 1126F AMNT PAIN NOTED NONE PRSNT: CPT | Mod: HCNC,S$GLB,, | Performed by: INTERNAL MEDICINE

## 2020-01-08 PROCEDURE — 1101F PR PT FALLS ASSESS DOC 0-1 FALLS W/OUT INJ PAST YR: ICD-10-PCS | Mod: HCNC,CPTII,S$GLB, | Performed by: INTERNAL MEDICINE

## 2020-01-08 PROCEDURE — 99214 PR OFFICE/OUTPT VISIT, EST, LEVL IV, 30-39 MIN: ICD-10-PCS | Mod: HCNC,S$GLB,, | Performed by: INTERNAL MEDICINE

## 2020-01-08 PROCEDURE — 99499 UNLISTED E&M SERVICE: CPT | Mod: HCNC,S$GLB,, | Performed by: INTERNAL MEDICINE

## 2020-01-08 PROCEDURE — 99499 RISK ADDL DX/OHS AUDIT: ICD-10-PCS | Mod: HCNC,S$GLB,, | Performed by: INTERNAL MEDICINE

## 2020-01-08 PROCEDURE — 99999 PR PBB SHADOW E&M-EST. PATIENT-LVL IV: CPT | Mod: PBBFAC,HCNC,, | Performed by: INTERNAL MEDICINE

## 2020-01-08 NOTE — PROGRESS NOTES
Subjective:       Patient ID: Norm Mendoza is a 89 y.o. male.    Chief Complaint:   Follow-up and Hypertension    HPI: Mr Mendoza today for f/u HTN. He is s/p the Watchman procedure in 10/2019 and is now on Amiodarone.  He is doing well on these and has been in NSR-last EKG was 11/19/19. His xaralto has been changed to plavix.  He has MALT Lymphoma of stomach and is s/p 4 months of Rituximab-his ulcers have healed but he still has the MALT Lymphoma.  He sees Heme-Onc Dr Beckwith regularly and following conservatively for now as tumor is slow growing-he has no N/V or abdomen pain.  He has gained 5lbs and worries it may be fluid-his BPs are high at 185/80s ; he takes fluid pills prn-about 3-4 tabs(lasix 20mg) once weekly  He     Past Medical, Surgical, Social History: Please see as stated in Epic chart which has been reviewed.    Current Outpatient Medications   Medication Sig Dispense Refill    amiodarone (PACERONE) 200 MG Tab Take 1 tablet (200 mg total) by mouth once daily. 30 tablet 11    clopidogrel (PLAVIX) 75 mg tablet Take 1 tablet (75 mg total) by mouth once daily. 30 tablet 11    furosemide (LASIX) 20 MG tablet TAKE 1 TABLET BY MOUTH ONCE DAILY AS NEEDED LEG EDEMA 30 tablet 6    irbesartan (AVAPRO) 150 MG tablet TAKE 1 TABLET BY MOUTH  DAILY 90 tablet 1    metoprolol succinate (TOPROL-XL) 25 MG 24 hr tablet TAKE 1 TABLET BY MOUTH DAILY 30 tablet 6    psyllium (METAMUCIL) packet Take 1 packet by mouth once daily.      UNABLE TO FIND Take 1 Dose by mouth once daily. Hydroperoxide. Pt stated he put it in his water pick to cleanse his teeth       No current facility-administered medications for this visit.        Review of Systems   Constitutional: Negative.    HENT: Negative.    Respiratory: Negative for chest tightness, shortness of breath and wheezing.    Cardiovascular: Negative for chest pain, palpitations and leg swelling.   Gastrointestinal: Negative for abdominal pain, blood in stool,  constipation, diarrhea, nausea and vomiting.   Genitourinary: Negative.    Neurological: Negative for dizziness, syncope, weakness, numbness and headaches.   Psychiatric/Behavioral: Negative.    All other systems reviewed and are negative.      Objective:      Lab Results   Component Value Date    WBC 4.06 01/03/2020    HGB 12.3 (L) 01/03/2020    HCT 38.7 (L) 01/03/2020     01/03/2020    CHOL 212 (H) 01/03/2020    TRIG 54 01/03/2020    HDL 69 01/03/2020    ALT 15 01/03/2020    AST 19 01/03/2020     01/03/2020    K 4.4 01/03/2020     01/03/2020    CREATININE 1.2 01/03/2020    BUN 22 01/03/2020    CO2 27 01/03/2020    TSH 4.040 (H) 01/03/2020    PSA 2.5 09/10/2015    INR 1.1 10/01/2019     Physical Exam   Constitutional: He appears well-developed and well-nourished.   Neck: Normal range of motion. Neck supple. No thyromegaly present.   Cardiovascular: Normal rate, regular rhythm and normal heart sounds.   RRR with VR at 54   Pulmonary/Chest: Effort normal and breath sounds normal. He has no wheezes. He exhibits no tenderness.   Abdominal: Soft. Bowel sounds are normal. He exhibits no mass. There is no tenderness.   Musculoskeletal: He exhibits no edema, tenderness or deformity.   Lymphadenopathy:     He has no cervical adenopathy.   Skin: Skin is warm and dry.   Psychiatric: He has a normal mood and affect. His behavior is normal. Judgment and thought content normal.   Vitals reviewed.        Vital Signs  Pulse: (!) 54  SpO2: 99 %  BP: (!) 140/78  Pain Score: 0-No pain  Height and Weight  Height: 6' (182.9 cm)  Weight: 82.7 kg (182 lb 5.1 oz)  BSA (Calculated - sq m): 2.05 sq meters  BMI (Calculated): 24.7  Weight in (lb) to have BMI = 25: 183.9]    Assessment:       1. Essential hypertension    2. MALT lymphoma    3. Paroxysmal atrial fibrillation        Plan:     Health Maintenance   Topic Date Due    Lipid Panel  01/03/2025    TETANUS VACCINE  01/19/2027    Pneumococcal Vaccine (65+  High/Highest Risk)  Completed        Norm was seen today for follow-up and hypertension.    Diagnoses and all orders for this visit:    Essential hypertension/controlled        -     Continue Avapro 150mgQD and Toprol XL25mg QD        -     Lasix at most 40-60 mg prn fluid retention/elevated BP              Note: Pt will likely not need now that in NSR  -     Comprehensive metabolic panel; Future  -     CBC auto differential; Future    MALT lymphoma        -     F/U as per Heme-Onc/Dr Beckwith  -     CBC auto differential; Future    Paroxysmal atrial fibrillation s/p Watchman procedure/conversion to NSR        -     Continue Plavix 75mg QD or as per Arrhythmia Cardiology/Dr Crowder        -     Continue Amiodarone 20omg QD    Health Maintenance        -     RTC x 6 months with 1 week prior fasting lab

## 2020-01-14 ENCOUNTER — PATIENT OUTREACH (OUTPATIENT)
Dept: OTHER | Facility: OTHER | Age: 85
End: 2020-01-14

## 2020-01-14 NOTE — PROGRESS NOTES
Digital Medicine: Clinician Follow-Up    Called patient to follow up. Patient endorses adherence to medication regimen. Patient denies hypotensive s/sx (lightheadedness, dizziness, nausea, fatigue); patient denies hypertensive s/sx (SOB, CP, severe headaches, changes in vision). Instructed patient to seek medical care if BP > 180/110 and is accompanied by hypertensive s/sx associated, patient confirms understanding. He reports that he is doing well at this time. He had an annual physical last week where is blood pressure was near goal (140/78 mmHg).     The history is provided by the patient. No  was used.     Follow Up  Follow-up reason(s): routine education        Last 5 Patient Entered Readings                                      Current 30 Day Average: 160/78     Recent Readings 1/12/2020 1/7/2020 1/7/2020 1/2/2020 1/2/2020    SBP (mmHg) 167 173 195 142 172    DBP (mmHg) 86 86 106 68 71    Pulse 81 52 53 56 61        INTERVENTION(S)  reviewed appropriate dose schedule, reviewed monitoring technique, encouragement/support and goal setting    PLAN  patient verbalizes understanding and continue monitoring    Assessment:  Reviewed recent readings. Per 2017 ACC/ AHA HTN guidelines (goal of BP < 130/80), current 30-day average need to be addressed more throroughly today.     Plan:  Continue current medication regimen. Informed patient to continue to use proper technique to check his BP. Informed patient to charge the base of his BP cuff at least once monthly. I will continue to monitor regularly and will follow-up in ~5 weeks, sooner if blood pressure begins to trend upward or downward. Patient denies having questions or concerns. Patient has my contact information and knows to call with any concerns or clinical changes.      There are no preventive care reminders to display for this patient.    Hypertension Medications             furosemide (LASIX) 20 MG tablet TAKE 1 TABLET BY MOUTH ONCE DAILY  AS NEEDED LEG EDEMA    irbesartan (AVAPRO) 150 MG tablet TAKE 1 TABLET BY MOUTH  DAILY    metoprolol succinate (TOPROL-XL) 25 MG 24 hr tablet TAKE 1 TABLET BY MOUTH DAILY

## 2020-01-18 ENCOUNTER — OFFICE VISIT (OUTPATIENT)
Dept: INTERNAL MEDICINE | Facility: CLINIC | Age: 85
End: 2020-01-18
Payer: MEDICARE

## 2020-01-18 VITALS
HEIGHT: 72 IN | SYSTOLIC BLOOD PRESSURE: 100 MMHG | WEIGHT: 177.94 LBS | HEART RATE: 75 BPM | DIASTOLIC BLOOD PRESSURE: 56 MMHG | BODY MASS INDEX: 24.1 KG/M2 | OXYGEN SATURATION: 97 %

## 2020-01-18 DIAGNOSIS — C88.4 MALT LYMPHOMA: ICD-10-CM

## 2020-01-18 DIAGNOSIS — I51.89 LEFT VENTRICULAR DIASTOLIC DYSFUNCTION WITH PRESERVED SYSTOLIC FUNCTION: ICD-10-CM

## 2020-01-18 DIAGNOSIS — K27.9 PUD (PEPTIC ULCER DISEASE): ICD-10-CM

## 2020-01-18 DIAGNOSIS — I77.810 AORTIC ROOT DILATION: ICD-10-CM

## 2020-01-18 DIAGNOSIS — E78.2 MIXED HYPERLIPIDEMIA: ICD-10-CM

## 2020-01-18 DIAGNOSIS — I42.0 DILATED CARDIOMYOPATHY: ICD-10-CM

## 2020-01-18 DIAGNOSIS — Z92.89 HISTORY OF CARDIOVERSION: ICD-10-CM

## 2020-01-18 DIAGNOSIS — I77.1 TORTUOUS AORTA: ICD-10-CM

## 2020-01-18 DIAGNOSIS — I49.3 PVC'S (PREMATURE VENTRICULAR CONTRACTIONS): ICD-10-CM

## 2020-01-18 DIAGNOSIS — I48.91 ATRIAL FIBRILLATION, UNSPECIFIED TYPE: ICD-10-CM

## 2020-01-18 DIAGNOSIS — I10 ESSENTIAL HYPERTENSION: ICD-10-CM

## 2020-01-18 DIAGNOSIS — Z86.018 HISTORY OF BENIGN BLADDER TUMOR: ICD-10-CM

## 2020-01-18 DIAGNOSIS — F10.20 ALCOHOL DEPENDENCE, DAILY USE: ICD-10-CM

## 2020-01-18 DIAGNOSIS — I70.0 AORTIC ATHEROSCLEROSIS: ICD-10-CM

## 2020-01-18 DIAGNOSIS — I35.0 NONRHEUMATIC AORTIC VALVE STENOSIS: ICD-10-CM

## 2020-01-18 DIAGNOSIS — N39.0 URINARY TRACT INFECTION WITHOUT HEMATURIA, SITE UNSPECIFIED: Primary | ICD-10-CM

## 2020-01-18 DIAGNOSIS — L57.0 AK (ACTINIC KERATOSIS): ICD-10-CM

## 2020-01-18 DIAGNOSIS — I48.19 PERSISTENT ATRIAL FIBRILLATION: ICD-10-CM

## 2020-01-18 PROBLEM — Z79.01 ON CONTINUOUS ORAL ANTICOAGULATION: Status: RESOLVED | Noted: 2019-07-30 | Resolved: 2020-01-18

## 2020-01-18 PROBLEM — E78.00 HYPERCHOLESTEROLEMIA: Status: RESOLVED | Noted: 2018-05-03 | Resolved: 2020-01-18

## 2020-01-18 PROCEDURE — 1159F PR MEDICATION LIST DOCUMENTED IN MEDICAL RECORD: ICD-10-PCS | Mod: HCNC,S$GLB,, | Performed by: FAMILY MEDICINE

## 2020-01-18 PROCEDURE — 99999 PR PBB SHADOW E&M-EST. PATIENT-LVL III: CPT | Mod: PBBFAC,HCNC,, | Performed by: FAMILY MEDICINE

## 2020-01-18 PROCEDURE — 99499 RISK ADDL DX/OHS AUDIT: ICD-10-PCS | Mod: HCNC,S$GLB,, | Performed by: FAMILY MEDICINE

## 2020-01-18 PROCEDURE — 1101F PT FALLS ASSESS-DOCD LE1/YR: CPT | Mod: HCNC,CPTII,S$GLB, | Performed by: FAMILY MEDICINE

## 2020-01-18 PROCEDURE — 99499 UNLISTED E&M SERVICE: CPT | Mod: HCNC,S$GLB,, | Performed by: FAMILY MEDICINE

## 2020-01-18 PROCEDURE — 1101F PR PT FALLS ASSESS DOC 0-1 FALLS W/OUT INJ PAST YR: ICD-10-PCS | Mod: HCNC,CPTII,S$GLB, | Performed by: FAMILY MEDICINE

## 2020-01-18 PROCEDURE — 99213 OFFICE O/P EST LOW 20 MIN: CPT | Mod: HCNC,S$GLB,, | Performed by: FAMILY MEDICINE

## 2020-01-18 PROCEDURE — 1159F MED LIST DOCD IN RCRD: CPT | Mod: HCNC,S$GLB,, | Performed by: FAMILY MEDICINE

## 2020-01-18 PROCEDURE — 99999 PR PBB SHADOW E&M-EST. PATIENT-LVL III: ICD-10-PCS | Mod: PBBFAC,HCNC,, | Performed by: FAMILY MEDICINE

## 2020-01-18 PROCEDURE — 99213 PR OFFICE/OUTPT VISIT, EST, LEVL III, 20-29 MIN: ICD-10-PCS | Mod: HCNC,S$GLB,, | Performed by: FAMILY MEDICINE

## 2020-01-18 RX ORDER — PHENAZOPYRIDINE HYDROCHLORIDE 200 MG/1
200 TABLET, FILM COATED ORAL 3 TIMES DAILY PRN
Qty: 6 TABLET | Refills: 0 | Status: SHIPPED | OUTPATIENT
Start: 2020-01-18 | End: 2020-01-20

## 2020-01-18 RX ORDER — NITROFURANTOIN 25; 75 MG/1; MG/1
100 CAPSULE ORAL 2 TIMES DAILY
Qty: 14 CAPSULE | Refills: 0 | Status: SHIPPED | OUTPATIENT
Start: 2020-01-18 | End: 2020-03-05

## 2020-01-18 NOTE — PROGRESS NOTES
Subjective:   Patient ID: Norm Mendoza is a 89 y.o. male.    Chief Complaint: Urinary Tract Infection (since last wednesday ) and Urinary Frequency (stinging/burning  feeling, bladder not completly empty )      Urinary Tract Infection    This is a new problem. The current episode started in the past 7 days. The problem occurs every urination. The problem has been unchanged. The quality of the pain is described as burning. The pain is at a severity of 7/10. The pain is severe. There has been no fever. He is not sexually active. There is a history of pyelonephritis. Associated symptoms include frequency, hesitancy and urgency. Pertinent negatives include no behavior changes, chills, discharge, flank pain, hematuria, nausea (anorexia without nausea), vomiting, bubble bath use, constipation or rash. He has tried nothing for the symptoms. The treatment provided no relief.       Patient queried and denies any further complaints.    LOCATION  DURATION  SEVERITY  QUALITY  TIMING  CAUSE  ASSOCIATED SYMPTOMS  MODIFIERS.    ALLERGIES AND MEDICATIONS: updated and reviewed.  Review of patient's allergies indicates:  No Known Allergies    Current Outpatient Medications:     amiodarone (PACERONE) 200 MG Tab, Take 1 tablet (200 mg total) by mouth once daily., Disp: 30 tablet, Rfl: 11    clopidogrel (PLAVIX) 75 mg tablet, Take 1 tablet (75 mg total) by mouth once daily., Disp: 30 tablet, Rfl: 11    furosemide (LASIX) 20 MG tablet, TAKE 1 TABLET BY MOUTH ONCE DAILY AS NEEDED LEG EDEMA, Disp: 30 tablet, Rfl: 6    irbesartan (AVAPRO) 150 MG tablet, TAKE 1 TABLET BY MOUTH  DAILY, Disp: 90 tablet, Rfl: 1    metoprolol succinate (TOPROL-XL) 25 MG 24 hr tablet, TAKE 1 TABLET BY MOUTH DAILY, Disp: 30 tablet, Rfl: 6    psyllium (METAMUCIL) packet, Take 1 packet by mouth once daily., Disp: , Rfl:     nitrofurantoin, macrocrystal-monohydrate, (MACROBID) 100 MG capsule, Take 1 capsule (100 mg total) by mouth 2 (two) times daily.,  Disp: 14 capsule, Rfl: 0    phenazopyridine (PYRIDIUM) 200 MG tablet, Take 1 tablet (200 mg total) by mouth 3 (three) times daily as needed for Pain., Disp: 6 tablet, Rfl: 0    Review of Systems   Constitutional: Negative for activity change, appetite change, chills, diaphoresis, fatigue, fever and unexpected weight change.   HENT: Negative for congestion, ear discharge, ear pain, postnasal drip, rhinorrhea, sneezing and sore throat.    Eyes: Negative for photophobia and discharge.   Respiratory: Negative for cough, chest tightness, shortness of breath and wheezing.    Cardiovascular: Negative for chest pain and palpitations.   Gastrointestinal: Negative for abdominal distention, abdominal pain, constipation, diarrhea, nausea (anorexia without nausea) and vomiting.   Genitourinary: Positive for frequency, hesitancy and urgency. Negative for dysuria, flank pain and hematuria.   Musculoskeletal: Negative for arthralgias and neck pain.   Skin: Negative for rash.   Neurological: Negative for headaches.       Objective:     Vitals:    01/18/20 0938   BP: (!) 100/56   Pulse: 75   SpO2: 97%   Weight: 80.7 kg (177 lb 14.6 oz)   Height: 6' (1.829 m)     Body mass index is 24.13 kg/m².    Physical Exam   Constitutional: He is oriented to person, place, and time. He appears well-developed and well-nourished.   HENT:   Head: Normocephalic and atraumatic.   Cardiovascular: Normal rate, regular rhythm and normal heart sounds.   Pulmonary/Chest: Effort normal and breath sounds normal.   Abdominal: There is no CVA tenderness.   Neurological: He is alert and oriented to person, place, and time.   Skin: Skin is warm and dry.   Psychiatric: He has a normal mood and affect. His behavior is normal.   Nursing note and vitals reviewed.      Assessment and Plan:   Norm was seen today for urinary tract infection and urinary frequency.    Diagnoses and all orders for this visit:    Urinary tract infection without hematuria, site  unspecified    Alcohol dependence, daily use    AK (actinic keratosis)    Aortic atherosclerosis    Essential hypertension    Left ventricular diastolic dysfunction with preserved systolic function    Nonrheumatic aortic valve stenosis    Mixed hyperlipidemia    Aortic root dilation    PVC's (premature ventricular contractions)    Tortuous aorta    Persistent atrial fibrillation    Dilated cardiomyopathy    History of cardioversion    Atrial fibrillation, unspecified type    History of benign bladder tumor    MALT lymphoma    PUD (peptic ulcer disease)    Other orders  -     nitrofurantoin, macrocrystal-monohydrate, (MACROBID) 100 MG capsule; Take 1 capsule (100 mg total) by mouth 2 (two) times daily.  -     phenazopyridine (PYRIDIUM) 200 MG tablet; Take 1 tablet (200 mg total) by mouth 3 (three) times daily as needed for Pain.    cannot give urine sample. Treat empirically based on symptoms and age. Hydrate.  Avoid lasix other than as rx'ed. He took 60mg (three 20mg tabs) 3 nights ago.    Fu w pcp in 2-5 days    Follow up in about 3 days (around 1/21/2020).    THIS NOTE WILL BE SHARED WITH THE PATIENT.

## 2020-01-20 ENCOUNTER — TELEPHONE (OUTPATIENT)
Dept: PRIMARY CARE CLINIC | Facility: CLINIC | Age: 85
End: 2020-01-20

## 2020-01-20 NOTE — TELEPHONE ENCOUNTER
----- Message from Tatum Ruvalcaba sent at 1/20/2020  8:53 AM CST -----  Prescription Alternative Needed:     The pharmacy needs alternative on the following RX:    phenazopyridine (PYRIDIUM) 200 MG tablet    Reason: Drug not covered. Preferred alternative Methenaminehippurate.    Pharmacy: [id number] [email or web address]  & [facility]    Please advise.    Thank You

## 2020-01-20 NOTE — TELEPHONE ENCOUNTER
Patient states he is unsure why pharmacy said prescription wasn't covered, he has picked up prescription and completed dosage.

## 2020-01-22 ENCOUNTER — OFFICE VISIT (OUTPATIENT)
Dept: UROLOGY | Facility: CLINIC | Age: 85
End: 2020-01-22
Payer: MEDICARE

## 2020-01-22 VITALS
HEIGHT: 72 IN | WEIGHT: 180 LBS | DIASTOLIC BLOOD PRESSURE: 58 MMHG | BODY MASS INDEX: 24.38 KG/M2 | SYSTOLIC BLOOD PRESSURE: 97 MMHG | HEART RATE: 62 BPM

## 2020-01-22 DIAGNOSIS — R35.1 NOCTURIA: ICD-10-CM

## 2020-01-22 DIAGNOSIS — R35.0 URINARY FREQUENCY: ICD-10-CM

## 2020-01-22 DIAGNOSIS — R30.0 DYSURIA: Primary | ICD-10-CM

## 2020-01-22 LAB — POC RESIDUAL URINE VOLUME: 4 ML (ref 0–100)

## 2020-01-22 PROCEDURE — 1101F PT FALLS ASSESS-DOCD LE1/YR: CPT | Mod: HCNC,CPTII,S$GLB, | Performed by: NURSE PRACTITIONER

## 2020-01-22 PROCEDURE — 87086 URINE CULTURE/COLONY COUNT: CPT | Mod: HCNC

## 2020-01-22 PROCEDURE — 99999 PR PBB SHADOW E&M-EST. PATIENT-LVL III: ICD-10-PCS | Mod: PBBFAC,HCNC,, | Performed by: NURSE PRACTITIONER

## 2020-01-22 PROCEDURE — 1125F AMNT PAIN NOTED PAIN PRSNT: CPT | Mod: HCNC,S$GLB,, | Performed by: NURSE PRACTITIONER

## 2020-01-22 PROCEDURE — 1159F PR MEDICATION LIST DOCUMENTED IN MEDICAL RECORD: ICD-10-PCS | Mod: HCNC,S$GLB,, | Performed by: NURSE PRACTITIONER

## 2020-01-22 PROCEDURE — 1101F PR PT FALLS ASSESS DOC 0-1 FALLS W/OUT INJ PAST YR: ICD-10-PCS | Mod: HCNC,CPTII,S$GLB, | Performed by: NURSE PRACTITIONER

## 2020-01-22 PROCEDURE — 1159F MED LIST DOCD IN RCRD: CPT | Mod: HCNC,S$GLB,, | Performed by: NURSE PRACTITIONER

## 2020-01-22 PROCEDURE — 51798 PR MEAS,POST-VOID RES,US,NON-IMAGING: ICD-10-PCS | Mod: HCNC,S$GLB,, | Performed by: NURSE PRACTITIONER

## 2020-01-22 PROCEDURE — 51798 US URINE CAPACITY MEASURE: CPT | Mod: HCNC,S$GLB,, | Performed by: NURSE PRACTITIONER

## 2020-01-22 PROCEDURE — 1125F PR PAIN SEVERITY QUANTIFIED, PAIN PRESENT: ICD-10-PCS | Mod: HCNC,S$GLB,, | Performed by: NURSE PRACTITIONER

## 2020-01-22 PROCEDURE — 81002 URINALYSIS NONAUTO W/O SCOPE: CPT | Mod: HCNC,S$GLB,, | Performed by: NURSE PRACTITIONER

## 2020-01-22 PROCEDURE — 99214 PR OFFICE/OUTPT VISIT, EST, LEVL IV, 30-39 MIN: ICD-10-PCS | Mod: HCNC,25,S$GLB, | Performed by: NURSE PRACTITIONER

## 2020-01-22 PROCEDURE — 81002 PR URINALYSIS NONAUTO W/O SCOPE: ICD-10-PCS | Mod: HCNC,S$GLB,, | Performed by: NURSE PRACTITIONER

## 2020-01-22 PROCEDURE — 99999 PR PBB SHADOW E&M-EST. PATIENT-LVL III: CPT | Mod: PBBFAC,HCNC,, | Performed by: NURSE PRACTITIONER

## 2020-01-22 PROCEDURE — 99214 OFFICE O/P EST MOD 30 MIN: CPT | Mod: HCNC,25,S$GLB, | Performed by: NURSE PRACTITIONER

## 2020-01-22 RX ORDER — LIDOCAINE HYDROCHLORIDE 20 MG/ML
JELLY TOPICAL ONCE
Status: CANCELLED | OUTPATIENT
Start: 2020-01-22 | End: 2020-01-22

## 2020-01-22 NOTE — PROGRESS NOTES
CHIEF COMPLAINT:    Mr. Mendoza is a 89 y.o. male presenting for possible UTI.  PRESENTING ILLNESS:    Norm Mendoza is a 89 y.o. male who presents for possible UTI. His last clinic visit was 3/2/17 by Cyn Hernandez NP.    [facility] s/p TURBT w/ benign path report (3/2014)  5/16/16- cysto with Dr. Barbosa. Urethra normal: Yes Prostate normal: trilobar hyperplasia. Bladder neck normal: Bladder neck normal Bladder normal: Yes     Today patient presents to clinic for possible UTI. He was seen by family medicine 1/18/2020 for urinary frequency every 30 minutes and dysuria and treated for UTI with Macrobid, but UA or urine culture not obtained. He was treated based on symptoms and age. Patient reports he is on day 5 of Macrobid and still experiencing frequency, hesitancy, and mild dysuria when he voids small amounts. He reports nocturia every 1 hour and continues to void every 30 minutes to 1 hour. Last week he was having increased fatigue which has improved. Denies hematuria or flank pain. Denies f/c/n/v. Denies constipation or diarrhea.   He is a  for [employer] and unable to work d/t frequency.    Lab Results   Component Value Date    LABURIN No growth 03/02/2017       REVIEW OF SYSTEMS:    Review of Systems    Constitutional: Negative for fever and chills.   HENT: Negative for hearing loss.   Eyes: Negative for visual disturbance.   Respiratory: Negative for shortness of breath.   Cardiovascular: Negative for chest pain.   Gastrointestinal: Negative for nausea, vomiting, and constipation.   Genitourinary: See HPI  Neurological: Negative for dizziness.   Hematological: Does not bruise/bleed easily.   Psychiatric/Behavioral: Negative for confusion.       PATIENT HISTORY:    Past Medical History:   Diagnosis Date    Alcohol dependence, daily use 7/13/2017    Allergy     Bladder cancer     tumor that was benign     Cataract     H/O nonmelanoma skin cancer 2/4/2016    Hematuria     Hypertension     MALT  lymphoma 12/20/2018    Mixed hyperlipidemia 5/2/2018    On continuous oral anticoagulation 7/30/2019    Paroxysmal atrial fibrillation 11/30/2018    Skin cancer     x3, had mohs on nose    Squamous cell carcinoma excised 12/5/14    R lower back    Symptomatic anemia 12/6/2018    Urinary tract infection     x4       Past Surgical History:   Procedure Laterality Date    ACHILLES TENDON SURGERY      CATARACT EXTRACTION W/  INTRAOCULAR LENS IMPLANT Bilateral 2013    CLOSURE OF LEFT ATRIAL APPENDAGE USING DEVICE N/A 10/11/2019    Procedure: Left atrial appendage closure device;  Surgeon: Hi Crowder MD;  Location: [facility] EP LAB;  Service: Cardiology;  Laterality: N/A;  AF, JACINTO, Watchman Implant, BSci, Gen, MB, 3 Prep    CYSTOSCOPY      bladder tumor    ESOPHAGOGASTRODUODENOSCOPY N/A 12/7/2018    Procedure: EGD (ESOPHAGOGASTRODUODENOSCOPY);  Surgeon: Austin Garcia MD;  Location: [facility] ENDO (2ND FLR);  Service: Endoscopy;  Laterality: N/A;    ESOPHAGOGASTRODUODENOSCOPY N/A 4/12/2019    Procedure: EGD (ESOPHAGOGASTRODUODENOSCOPY);  Surgeon: Austin Garcia MD;  Location: [facility] ENDO (4TH FLR);  Service: Endoscopy;  Laterality: N/A;  please schedule within 4 weeks    SKIN CANCER EXCISION      TREATMENT OF CARDIAC ARRHYTHMIA N/A 7/12/2019    Procedure: Cardioversion or Defibrillation;  Surgeon: Hi Crowder MD;  Location: [facility] EP LAB;  Service: Cardiology;  Laterality: N/A;  AF, JACINTO, DCCV, MAC, MB, 3 Prep       Family History   Problem Relation Age of Onset    Stroke Father     Hypertension Father     Stroke Brother     Anesthesia problems Neg Hx     Malignant hypertension Neg Hx     Hypotension Neg Hx     Malignant hyperthermia Neg Hx     Pseudochol deficiency Neg Hx     Melanoma Neg Hx     Heart attack Neg Hx     Heart disease Neg Hx     Heart failure Neg Hx     Cataracts Neg Hx     Glaucoma Neg Hx     Macular degeneration Neg Hx        Social History     Socioeconomic History     Marital status:      Spouse name: Not on file    Number of children: 3    Years of education: Not on file    Highest education level: Not on file   Occupational History    Occupation: Financial Work/     Employer: retired    Occupation: actor    Occupation:    Social Needs    Financial resource strain: Not on file    Food insecurity:     Worry: Not on file     Inability: Not on file    Transportation needs:     Medical: Not on file     Non-medical: Not on file   Tobacco Use    Smoking status: Former Smoker     Packs/day: 1.00     Years: 25.00     Pack years: 25.00     Types: Cigarettes     Last attempt to quit: 9/3/1970     Years since quittin.4    Smokeless tobacco: Never Used   Substance and Sexual Activity    Alcohol use: Yes     Alcohol/week: 3.0 standard drinks     Types: 3 Shots of liquor per week     Comment: 3 bourbons daily - 12 beer on weekends     Drug use: No    Sexual activity: Yes     Partners: Female   Lifestyle    Physical activity:     Days per week: Not on file     Minutes per session: Not on file    Stress: Not on file   Relationships    Social connections:     Talks on phone: Not on file     Gets together: Not on file     Attends Worship service: Not on file     Active member of club or organization: Not on file     Attends meetings of clubs or organizations: Not on file     Relationship status: Not on file   Other Topics Concern    Not on file   Social History Narrative    He is  with 2/3 kids living(2 sons/1 daughter who passed away); He has 6 Grandkids(5 under 10 y/o); He has 1 son here in [place] /1 grandson at [facility]; His other son(3 kids) lives in [place]; His Grand-daughter(Her Mother passed away) with her 2 kids lives in [place]    He had worked for [employer] which has dissolved but now works for [employer]       Allergies:  Patient has no known allergies.    Medications:    Current Outpatient  Medications:     amiodarone (PACERONE) 200 MG Tab, Take 1 tablet (200 mg total) by mouth once daily., Disp: 30 tablet, Rfl: 11    clopidogrel (PLAVIX) 75 mg tablet, Take 1 tablet (75 mg total) by mouth once daily., Disp: 30 tablet, Rfl: 11    furosemide (LASIX) 20 MG tablet, TAKE 1 TABLET BY MOUTH ONCE DAILY AS NEEDED LEG EDEMA, Disp: 30 tablet, Rfl: 6    irbesartan (AVAPRO) 150 MG tablet, TAKE 1 TABLET BY MOUTH  DAILY, Disp: 90 tablet, Rfl: 1    metoprolol succinate (TOPROL-XL) 25 MG 24 hr tablet, TAKE 1 TABLET BY MOUTH DAILY, Disp: 30 tablet, Rfl: 6    nitrofurantoin, macrocrystal-monohydrate, (MACROBID) 100 MG capsule, Take 1 capsule (100 mg total) by mouth 2 (two) times daily., Disp: 14 capsule, Rfl: 0    psyllium (METAMUCIL) packet, Take 1 packet by mouth once daily., Disp: , Rfl:     PHYSICAL EXAMINATION:    Constitutional: He is oriented to person, place, and time. He appears well-developed and well-nourished.  He is in no apparent distress.    Neck: Normal ROM.     Cardiovascular: Normal rate.      Pulmonary/Chest: Effort normal. No respiratory distress.     Abdominal:  He exhibits no distension.  There is no CVA tenderness.     Lymphadenopathy:        Right: No supraclavicular adenopathy present.        Left: No supraclavicular adenopathy present.     Neurological: He is alert and oriented to person, place, and time.     Skin: Skin is warm and dry.     Extremities: Normal ROM    Psych: Cooperative with normal affect.      Physical Exam      LABS:    U/a: sp grav 1.010, pH 5, trace leukocytes, trace blood, otherwise negative    PVR: done in clinic with bladder scanner by Nurse Yolanda was 4 ml.    Lab Results   Component Value Date    PSA 2.5 09/10/2015     Lab Results   Component Value Date    CREATININE 1.2 01/03/2020         IMPRESSION:    Encounter Diagnoses   Name Primary?    Dysuria Yes    Urinary frequency     Nocturia          PLAN:  -Urine culture   -Renal US  -Cysto  -Avoid Bladder  Irritants: Tea, coffee, caffeine, alcohol, artificial sweeteners, citrus, spicy foods, acidic foods,chocolate, tomato-based foods, smoking  -Good water intake for adequate hydration  -Discussed BP today in clinic. BP was similar at urgent care. Pt symptomatic. Encouraged to monitor BP and follow up with PCP.  -RTC as scheduled for cysto    I spent 25 minutes with the patient of which more than half was spent in coordinating the patient's care as well as in direct consultation with the patient in regards to our treatment and plan.

## 2020-01-22 NOTE — PATIENT INSTRUCTIONS
Cystoscopy    Cystoscopy is a procedure that lets your doctor look directly inside your urethra and bladder. It can be used to:  · Help diagnose a problem with your urethra, bladder, or kidneys.  · Take a sample (biopsy) of bladder or urethral tissue.  · Treat certain problems (such as removing kidney stones).  · Place a stent to bypass an obstruction.  · Take special X-rays of the kidneys.  Based on the findings, your doctor may recommend other tests or treatments.  What is a cystoscope?  A cystoscope is a telescope-like instrument that contains lenses and fiberoptics (small glass wires that make bright light). The cystoscope may be straight and rigid, or flexible to bend around curves in the urethra. The doctor may look directly into the cystoscope, or project the image onto a monitor.  Getting ready  · Ask your doctor if you should stop taking any medicines before the procedure.  · Ask whether you should avoid eating or drinking anything after midnight before the procedure.  · Follow any other instructions your doctor gives you.  Tell your doctor before the exam if you:  · Take any medicines, such as aspirin or blood thinners  · Have allergies to any medicines  · Are pregnant   The procedure  Cystoscopy is done in the doctors office, surgery center, or hospital. The doctor and a nurse are present during the procedure. It takes only a few minutes, longer if a biopsy, X-ray, or treatment needs to be done.  During the procedure:  · You lie on an exam table on your back, knees bent and legs apart. You are covered with a drape.  · Your urethra and the area around it are washed. Anesthetic jelly may be applied to numb the urethra. Other pain medicine is usually not needed. In some cases, you may be offered a mild sedative to help you relax. If a more extensive procedure is to be done, such as a biopsy or kidney stone removal, general anesthesia may be needed.  · The cystoscope is inserted. A sterile fluid is put  into the bladder to expand it. You may feel pressure from this fluid.  · When the procedure is done, the cystoscope is removed.  After the procedure  If you had a sedative, general anesthesia, or spinal anesthesia, you must have someone drive you home. Once youre home:  · Drink plenty of fluids.  · You may have burning or light bleeding when you urinate--this is normal.  · Medicines may be prescribed to ease any discomfort or prevent infection. Take these as directed.  · Call your doctor if you have heavy bleeding or blood clots, burning that lasts more than a day, a fever over 100°F  (38° C), or trouble urinating.  Date Last Reviewed: 1/1/2017  © 9269-1542 [employer]. [address] [place] [address], [place] [address]. All rights reserved. This information is not intended as a substitute for professional medical care. Always follow your healthcare professional's instructions.

## 2020-01-22 NOTE — LETTER
January 22, 2020      Amber Jenkins MD  [address]  [facility] [address]           [facility] 4th Floor  [address]  [facility] [address]  Phone: [phone]          Patient: Norm Mendoza   MR Number: [id number]   YOB: 1926   Date of Visit: 1/22/2020       Dear Dr. Amber Jenkins:    Thank you for referring Norm Mendoza to me for evaluation. Attached you will find relevant portions of my assessment and plan of care.    If you have questions, please do not hesitate to call me. I look forward to following Norm Mendoza along with you.    Sincerely,    Erica Ayoub, NP    Enclosure  CC:  No Recipients    If you would like to receive this communication electronically, please contact [email or web address] or [phone] to request more information on [employer] access.    For providers and/or their staff who would like to refer a patient to [facility], please contact us through our one-stop-shop provider referral line, [facility], at [phone].    If you feel you have received this communication in error or would no longer like to receive these types of communications, please e-mail [email or web address]

## 2020-01-23 ENCOUNTER — HOSPITAL ENCOUNTER (OUTPATIENT)
Dept: RADIOLOGY | Facility: HOSPITAL | Age: 85
Discharge: HOME | End: 2020-01-23
Attending: NURSE PRACTITIONER
Payer: MEDICARE

## 2020-01-23 DIAGNOSIS — R35.0 URINARY FREQUENCY: ICD-10-CM

## 2020-01-23 DIAGNOSIS — R30.0 DYSURIA: ICD-10-CM

## 2020-01-23 DIAGNOSIS — R35.1 NOCTURIA: ICD-10-CM

## 2020-01-23 LAB — BACTERIA UR CULT: NO GROWTH

## 2020-01-23 PROCEDURE — 76770 US EXAM ABDO BACK WALL COMP: CPT | Mod: TC,HCNC

## 2020-01-23 PROCEDURE — 76770 US RETROPERITONEAL COMPLETE: ICD-10-PCS | Mod: 26,HCNC,, | Performed by: RADIOLOGY

## 2020-01-23 PROCEDURE — 76770 US EXAM ABDO BACK WALL COMP: CPT | Mod: 26,HCNC,, | Performed by: RADIOLOGY

## 2020-01-24 ENCOUNTER — TELEPHONE (OUTPATIENT)
Dept: PEDIATRIC UROLOGY | Facility: CLINIC | Age: 85
End: 2020-01-24

## 2020-01-24 NOTE — TELEPHONE ENCOUNTER
Notified pt that his urine cx was negative for infection. His  renal US resulted enlarged prostate. No mass, stone or hydronephrosis seen on US. Proceed with cysto as ordered. States he would like cysto sooner that March due to frequent urination. Rescheduled with Dr. Ochoa 1/28/2019. Pt voiced understanding to test results and agreed to date and time of cysto. Reminder mailed      ----- Message from Erica Ayoub NP sent at 1/24/2020  8:11 AM CST -----  Please notify patient his urine culture resulted no growth.

## 2020-01-27 ENCOUNTER — PATIENT OUTREACH (OUTPATIENT)
Dept: OTHER | Facility: OTHER | Age: 85
End: 2020-01-27

## 2020-01-28 ENCOUNTER — HOSPITAL ENCOUNTER (OUTPATIENT)
Dept: UROLOGY | Facility: HOSPITAL | Age: 85
Discharge: HOME | End: 2020-01-28
Attending: UROLOGY
Payer: MEDICARE

## 2020-01-28 VITALS
HEIGHT: 72 IN | WEIGHT: 178.13 LBS | TEMPERATURE: 98 F | SYSTOLIC BLOOD PRESSURE: 116 MMHG | HEART RATE: 64 BPM | DIASTOLIC BLOOD PRESSURE: 62 MMHG | RESPIRATION RATE: 18 BRPM | BODY MASS INDEX: 24.13 KG/M2

## 2020-01-28 DIAGNOSIS — R35.1 NOCTURIA: ICD-10-CM

## 2020-01-28 DIAGNOSIS — R35.0 URINARY FREQUENCY: ICD-10-CM

## 2020-01-28 DIAGNOSIS — N30.00 ACUTE CYSTITIS WITHOUT HEMATURIA: Primary | ICD-10-CM

## 2020-01-28 DIAGNOSIS — R30.0 DYSURIA: ICD-10-CM

## 2020-01-28 PROCEDURE — 87086 URINE CULTURE/COLONY COUNT: CPT | Mod: HCNC

## 2020-01-28 PROCEDURE — 99499 NO LOS: ICD-10-PCS | Mod: HCNC,,, | Performed by: UROLOGY

## 2020-01-28 PROCEDURE — 87077 CULTURE AEROBIC IDENTIFY: CPT | Mod: HCNC

## 2020-01-28 PROCEDURE — 87186 SC STD MICRODIL/AGAR DIL: CPT | Mod: HCNC

## 2020-01-28 PROCEDURE — 52000 CYSTOURETHROSCOPY: CPT | Mod: HCNC

## 2020-01-28 PROCEDURE — 99499 UNLISTED E&M SERVICE: CPT | Mod: HCNC,,, | Performed by: UROLOGY

## 2020-01-28 PROCEDURE — 87088 URINE BACTERIA CULTURE: CPT | Mod: HCNC

## 2020-01-28 RX ORDER — LIDOCAINE HYDROCHLORIDE 20 MG/ML
JELLY TOPICAL ONCE
Status: ACTIVE | OUTPATIENT
Start: 2020-01-28

## 2020-01-28 RX ORDER — CIPROFLOXACIN 500 MG/1
500 TABLET ORAL ONCE
Status: DISCONTINUED | OUTPATIENT
Start: 2020-01-28 | End: 2020-03-05

## 2020-01-28 NOTE — PATIENT INSTRUCTIONS
What to Expect After a Cystoscopy  For the next 24-48 hours, you may feel a mild burning when you urinate. This burning is normal and expected. Drink plenty of water to dilute the urine to help relieve the burning sensation. You may also see a small amount of blood in your urine after the procedure.    Unless you are already taking antibiotics, you may be given an antibiotic after the test to prevent infection.    Signs and Symptoms to Report  Call the [facility] at [phone] if you develop any of the following:  · Fever of 101 degrees or higher  · Chills or persistent bleeding  · Inability to urinate

## 2020-01-30 LAB — BACTERIA UR CULT: ABNORMAL

## 2020-01-31 ENCOUNTER — TELEPHONE (OUTPATIENT)
Dept: UROLOGY | Facility: CLINIC | Age: 85
End: 2020-01-31

## 2020-01-31 RX ORDER — AMOXICILLIN AND CLAVULANATE POTASSIUM 875; 125 MG/1; MG/1
1 TABLET, FILM COATED ORAL 2 TIMES DAILY
Qty: 20 TABLET | Refills: 1 | Status: SHIPPED | OUTPATIENT
Start: 2020-01-31 | End: 2020-03-05

## 2020-02-12 ENCOUNTER — OFFICE VISIT (OUTPATIENT)
Dept: URGENT CARE | Facility: CLINIC | Age: 85
End: 2020-02-12
Payer: MEDICARE

## 2020-02-12 VITALS
TEMPERATURE: 97 F | RESPIRATION RATE: 18 BRPM | HEART RATE: 80 BPM | OXYGEN SATURATION: 96 % | SYSTOLIC BLOOD PRESSURE: 145 MMHG | DIASTOLIC BLOOD PRESSURE: 75 MMHG

## 2020-02-12 DIAGNOSIS — R05.9 COUGH: Primary | ICD-10-CM

## 2020-02-12 PROCEDURE — 71046 XR CHEST PA AND LATERAL: ICD-10-PCS | Mod: FY,S$GLB,, | Performed by: RADIOLOGY

## 2020-02-12 PROCEDURE — 99214 PR OFFICE/OUTPT VISIT, EST, LEVL IV, 30-39 MIN: ICD-10-PCS | Mod: S$GLB,,, | Performed by: NURSE PRACTITIONER

## 2020-02-12 PROCEDURE — 71046 X-RAY EXAM CHEST 2 VIEWS: CPT | Mod: FY,S$GLB,, | Performed by: RADIOLOGY

## 2020-02-12 PROCEDURE — 99214 OFFICE O/P EST MOD 30 MIN: CPT | Mod: S$GLB,,, | Performed by: NURSE PRACTITIONER

## 2020-02-12 RX ORDER — BENZONATATE 100 MG/1
100 CAPSULE ORAL 3 TIMES DAILY PRN
Qty: 20 CAPSULE | Refills: 0 | Status: SHIPPED | OUTPATIENT
Start: 2020-02-12 | End: 2020-03-05

## 2020-02-12 NOTE — PATIENT INSTRUCTIONS

## 2020-02-12 NOTE — PROGRESS NOTES
Subjective:       Patient ID: Norm Mendoza is a 89 y.o. male.    Vitals:  oral temperature is 97.1 °F (36.2 °C). His blood pressure is 145/75 (abnormal) and his pulse is 80. His respiration is 18 and oxygen saturation is 96%.     Chief Complaint: Cough    Patient presents with c/o cough for two weeks. Cough is productive. Denies fever, wheezing, or sob. No hx of pneumonia. Patient states the cough is intermittent and seems to be worse at night.   Provider note begins below  Pt reports being treated for a UTI with Macrobid and developing a cough after 2 days of being on the antibiotics.  Pt reports a recent history of non-hodgkins lymphoma and a fib that he states has been treated with ablation. Reports he does not feel ill.  Denies SOB. Pt reports a mild post nasal drip.     Cough   This is a new problem. Episode onset: 2 weeks. The problem has been unchanged. The problem occurs every few minutes. The cough is productive of sputum. Associated symptoms include postnasal drip. Pertinent negatives include no chills, ear pain, eye redness, fever, hemoptysis, myalgias, rash, sore throat, shortness of breath or wheezing. The symptoms are aggravated by lying down. Treatments tried: robitussin. The treatment provided mild relief. There is no history of pneumonia.       Constitution: Negative for chills, sweating, fatigue and fever.   HENT: Positive for postnasal drip. Negative for ear pain, congestion, sinus pain, sinus pressure, sore throat and voice change.    Neck: Negative for painful lymph nodes.   Eyes: Negative for eye redness.   Respiratory: Positive for cough and sputum production. Negative for chest tightness, bloody sputum, COPD, shortness of breath, stridor, wheezing and asthma.    Gastrointestinal: Negative for nausea and vomiting.   Musculoskeletal: Negative for muscle ache.   Skin: Negative for rash.   Allergic/Immunologic: Negative for seasonal allergies and asthma.   Hematologic/Lymphatic: Negative  for swollen lymph nodes.       Objective:      Physical Exam   Constitutional: He is oriented to person, place, and time. He appears well-developed and well-nourished. He is cooperative.  Non-toxic appearance. He does not have a sickly appearance. He does not appear ill. No distress.   HENT:   Head: Normocephalic and atraumatic.   Right Ear: Hearing, tympanic membrane, external ear and ear canal normal.   Left Ear: Hearing, tympanic membrane, external ear and ear canal normal.   Nose: Nose normal. No mucosal edema, rhinorrhea or nasal deformity. No epistaxis. Right sinus exhibits no maxillary sinus tenderness and no frontal sinus tenderness. Left sinus exhibits no maxillary sinus tenderness and no frontal sinus tenderness.   Mouth/Throat: Uvula is midline, oropharynx is clear and moist and mucous membranes are normal. No trismus in the jaw. Normal dentition. No uvula swelling. No oropharyngeal exudate, posterior oropharyngeal edema or posterior oropharyngeal erythema.   Eyes: Conjunctivae and lids are normal. No scleral icterus.   Neck: Trachea normal, full passive range of motion without pain and phonation normal. Neck supple. No neck rigidity. No edema and no erythema present.   Cardiovascular: Normal rate, regular rhythm, intact distal pulses and normal pulses.   Murmur heard.   Systolic murmur is present with a grade of 4/6.  Pulmonary/Chest: Effort normal. No respiratory distress. He has no decreased breath sounds. He has rhonchi in the right upper field.   Abdominal: Normal appearance.   Musculoskeletal: Normal range of motion. He exhibits no edema or deformity.   Neurological: He is alert and oriented to person, place, and time. He exhibits normal muscle tone. Coordination normal.   Skin: Skin is warm, dry, intact, not diaphoretic and not pale.   Psychiatric: He has a normal mood and affect. His speech is normal and behavior is normal. Judgment and thought content normal. Cognition and memory are normal.    Nursing note and vitals reviewed.          CXR  The trachea is unchanged in position.  There is stable appearance of the cardiomediastinal shadow including atherosclerotic calcifications and uncoiling of the thoracic aorta.  There remains no hilar enlargement and there is no radiographic indication of pulmonary edema.    The lungs appear fully expanded and clear.  Both hemidiaphragms are sharply outlined.  The costophrenic angles are acute and there is no evidence of pleural effusion.  The included osseous structures are stable radiographically.          Assessment:       1. Cough        Plan:       CXR clear.  Possibly from post nasal drip.  Declines nasal spray.  RTC if fever develops or symptoms.      Cough  -     X-Ray Chest PA And Lateral; Future; Expected date: 02/12/2020  -     benzonatate (TESSALON PERLES) 100 MG capsule; Take 1 capsule (100 mg total) by mouth 3 (three) times daily as needed.  Dispense: 20 capsule; Refill: 0         Patient Instructions     Allergic Rhinitis  Allergic rhinitis is an allergic reaction that affects the nose, and often the eyes. Its often known as nasal allergies. Nasal allergies are often due to things in the environment that are breathed in. Depending what you are sensitive to, nasal allergies may occur only during certain seasons. Or they may occur year round. Common indoor allergens include house dust mites, mold, cockroaches, and pet dander. Outdoor allergens include pollen from trees, grasses, and weeds.   Symptoms include a drippy, stuffy, and itchy nose. They also include sneezing and red and itchy eyes. You may feel tired more often. Severe allergies may also affect your breathing and trigger a condition called asthma.   Tests can be done to see what allergens are affecting you. You may be referred to an allergy specialist for testing and further evaluation.  Home care  Your healthcare provider may prescribe medicines to help relieve allergy symptoms. These may  include oral medicines, nasal sprays, or eye drops.  Ask your provider for advice on how to avoid substances that you are allergic to. Below are a few tips for each type of allergen.  Pet dander:  · Do not have pets with fur and feathers.  · If you can't avoid having a pet, keep it out of your bedroom and off upholstered furniture.  Pollen:  · When pollen counts are high, keep windows of your car and home closed. If possible, use an air conditioner instead.  · Wear a filter mask when mowing or doing yard work.  House dust mites:  · Wash bedding every week in warm water and detergent and dry on a hot setting.  · Cover the mattress, box spring, and pillows with allergy covers.   · If possible, sleep in a room with no carpet, curtains, or upholstered furniture.  Cockroaches:  · Store food in sealed containers.  · Remove garbage from the home promptly.  · Fix water leaks  Mold:  · Keep humidity low by using a dehumidifier or air conditioner. Keep the dehumidifier and air conditioner clean and free of mold.  · Clean moldy areas with bleach and water.  In general:  · Vacuum once or twice a week. If possible, use a vacuum with a high-efficiency particulate air (HEPA) filter.  · Do not smoke. Avoid cigarette smoke. Cigarette smoke is an irritant that can make symptoms worse.  Follow-up care  Follow up as advised by the healthcare provider or our staff. If you were referred to an allergy specialist, make this appointment promptly.  When to seek medical advice  Call your healthcare provider right away if the following occur:  · Coughing or wheezing  · Fever greater than 100.4°F (38°C)  · Hives (raised red bumps)  · Continuing symptoms, new symptoms, or worsening symptoms  Call 911 right away if you have:  · Trouble breathing  · Severe swelling of the face or severe itching of the eyes or mouth  Date Last Reviewed: 3/1/2017  © 5526-3961 [employer]. [address] [place] [address]. All rights reserved.  This information is not intended as a substitute for professional medical care. Always follow your healthcare professional's instructions.

## 2020-02-17 ENCOUNTER — HOSPITAL ENCOUNTER (OUTPATIENT)
Dept: UROLOGY | Facility: HOSPITAL | Age: 85
Discharge: HOME | End: 2020-02-17
Attending: UROLOGY
Payer: MEDICARE

## 2020-02-17 VITALS
BODY MASS INDEX: 24.4 KG/M2 | HEART RATE: 49 BPM | WEIGHT: 180.13 LBS | SYSTOLIC BLOOD PRESSURE: 143 MMHG | DIASTOLIC BLOOD PRESSURE: 68 MMHG | RESPIRATION RATE: 17 BRPM | HEIGHT: 72 IN | TEMPERATURE: 98 F

## 2020-02-17 DIAGNOSIS — N30.00 ACUTE CYSTITIS WITHOUT HEMATURIA: ICD-10-CM

## 2020-02-17 PROCEDURE — 52000 CYSTOURETHROSCOPY: CPT | Mod: HCNC

## 2020-02-17 RX ORDER — CIPROFLOXACIN 500 MG/1
500 TABLET ORAL ONCE
Status: COMPLETED | OUTPATIENT
Start: 2020-02-17 | End: 2020-02-17

## 2020-02-17 RX ORDER — LIDOCAINE HYDROCHLORIDE 20 MG/ML
JELLY TOPICAL ONCE
Status: COMPLETED | OUTPATIENT
Start: 2020-02-17 | End: 2020-02-17

## 2020-02-17 RX ADMIN — LIDOCAINE HYDROCHLORIDE: 20 JELLY TOPICAL at 12:02

## 2020-02-17 RX ADMIN — CIPROFLOXACIN 500 MG: 500 TABLET ORAL at 01:02

## 2020-02-17 NOTE — PATIENT INSTRUCTIONS
What to Expect After a Cystoscopy  For the next 24-48 hours, you may feel a mild burning when you urinate. This burning is normal and expected. Drink plenty of water to dilute the urine to help relieve the burning sensation. You may also see a small amount of blood in your urine after the procedure.    Unless you are already taking antibiotics, you may be given an antibiotic after the test to prevent infection.    Signs and Symptoms to Report  Call the [facility] at [phone] if you develop any of the following:  · Fever of 101 degrees or higher  · Chills or persistent bleeding  · Inability to urinate

## 2020-02-17 NOTE — PROCEDURES
* No surgery found *     Procedures: Flexible cystourethroscopy      Pre Procedure Diagnosis: bph, uti       Post Procedure Diagnosis: BPH with OO    Surgeon: Grover Ochoa MD    Anesthesia: 2% uro-jet lidocaine jelly for local analgesia     Flexible cysto-urethroscopy was performed after consent was obtained.  The risks and benefits were explained.    2% lidocaine urojet was used for local analgesia.  The genitalia was prepped and draped in the sterile fashion.  The flexible scope was advanced into the urethra and into the bladder.  Bilateral ureteral orifice were evaluated and noted to be normal with clear efflux.  The bladder was completely surveyed in a systematic fashion.   No bladder tumors or lesions were seen.  No strictures were noted.  The prostate showed significant hypertrophy.  There was Mild median lobe present.Gr 4 trab    The patient tolerated the procedure well without complication.    They will follow up in 5 month(s)  Start flomax, do trus prn  Pt happy w voiding pattern now

## 2020-03-03 ENCOUNTER — PATIENT OUTREACH (OUTPATIENT)
Dept: OTHER | Facility: OTHER | Age: 85
End: 2020-03-03

## 2020-03-03 NOTE — PROGRESS NOTES
"Digital Medicine: Clinician Follow-Up    Called patient for BP follow up and to address high BP alert on 2/29/2020. Patient is doing well today with no complaints. He says "I feel alright. My BP machine was a little goofy, I think" He says that he brought his BP machine to [facility] about a week ago. He says that "software needed to updated".     The history is provided by the patient.     Follow Up  Follow-up reason(s): reading review      Alert received.   Care Team received high BP alert.  Patient is not experiencing symptoms.Patient's 30-day BP average is above goal of <130/<80 mmHg.       INTERVENTION(S)  encouragement/support    PLAN  patient verbalizes understanding and continue monitoring    Continue current medications.       There are no preventive care reminders to display for this patient.    Last 5 Patient Entered Readings                                      Current 30 Day Average: 154/85     Recent Readings 2/29/2020 2/29/2020 1/27/2020 1/25/2020 1/17/2020    SBP (mmHg) 154 225 130 182 109    DBP (mmHg) 74 96 78 69 65    Pulse 48 52 53 61 81          Hypertension Medications     furosemide (LASIX) 20 MG tablet TAKE 1 TABLET BY MOUTH ONCE DAILY AS NEEDED LEG EDEMA    irbesartan (AVAPRO) 150 MG tablet TAKE 1 TABLET BY MOUTH  DAILY    metoprolol succinate (TOPROL-XL) 25 MG 24 hr tablet TAKE 1 TABLET BY MOUTH DAILY                 Screenings  "

## 2020-03-05 ENCOUNTER — OFFICE VISIT (OUTPATIENT)
Dept: URGENT CARE | Facility: CLINIC | Age: 85
End: 2020-03-05
Payer: MEDICARE

## 2020-03-05 VITALS
RESPIRATION RATE: 18 BRPM | BODY MASS INDEX: 24.38 KG/M2 | WEIGHT: 180 LBS | HEIGHT: 72 IN | OXYGEN SATURATION: 98 % | DIASTOLIC BLOOD PRESSURE: 89 MMHG | HEART RATE: 60 BPM | TEMPERATURE: 99 F | SYSTOLIC BLOOD PRESSURE: 169 MMHG

## 2020-03-05 DIAGNOSIS — Z79.01 ON CONTINUOUS ORAL ANTICOAGULATION: ICD-10-CM

## 2020-03-05 DIAGNOSIS — S61.412A LACERATION OF LEFT HAND WITHOUT FOREIGN BODY, INITIAL ENCOUNTER: Primary | ICD-10-CM

## 2020-03-05 PROCEDURE — 99214 PR OFFICE/OUTPT VISIT, EST, LEVL IV, 30-39 MIN: ICD-10-PCS | Mod: S$GLB,,, | Performed by: FAMILY MEDICINE

## 2020-03-05 PROCEDURE — 99214 OFFICE O/P EST MOD 30 MIN: CPT | Mod: S$GLB,,, | Performed by: FAMILY MEDICINE

## 2020-03-05 RX ORDER — MUPIROCIN 20 MG/G
OINTMENT TOPICAL
Qty: 22 G | Refills: 1 | Status: SHIPPED | OUTPATIENT
Start: 2020-03-05 | End: 2020-11-17

## 2020-03-05 RX ORDER — VALSARTAN 160 MG/1
160 TABLET ORAL DAILY
COMMUNITY
End: 2020-03-31

## 2020-03-05 NOTE — PROGRESS NOTES
Subjective:       Patient ID: Norm Mendoza is a 89 y.o. male.    Vitals:  height is 6' (1.829 m) and weight is 81.6 kg (180 lb). His temperature is 98.6 °F (37 °C). His blood pressure is 169/89 (abnormal) and his pulse is 60. His respiration is 18 and oxygen saturation is 98%.     Chief Complaint: Laceration    Pt has a laceration to left wrist that occurred 1 week ago. Pt says he was cut by  plastic garbage can lid. Pt is concerned about infection.    Laceration    The incident occurred 5 to 7 days ago. The laceration is located on the left hand. The laceration is 5 cm in size. Injury mechanism: garbage can lid. The pain is at a severity of 4/10. The pain is mild. The pain has been constant since onset. He reports no foreign bodies present. His tetanus status is UTD.       Constitution: Negative for chills, fatigue and fever.   HENT: Negative for congestion and sore throat.    Neck: Negative for painful lymph nodes.   Cardiovascular: Negative for chest pain and leg swelling.   Eyes: Negative for double vision and blurred vision.   Respiratory: Negative for cough and shortness of breath.    Gastrointestinal: Negative for nausea, vomiting and diarrhea.   Genitourinary: Negative for dysuria, frequency and urgency.   Musculoskeletal: Negative for joint pain, joint swelling, muscle cramps and muscle ache.   Skin: Positive for laceration. Negative for color change, pale and rash.   Allergic/Immunologic: Negative for seasonal allergies.   Neurological: Negative for dizziness, history of vertigo, light-headedness, passing out and headaches.   Hematologic/Lymphatic: Negative for swollen lymph nodes, easy bruising/bleeding and history of blood clots. Does not bruise/bleed easily.   Psychiatric/Behavioral: Negative for nervous/anxious, sleep disturbance and depression. The patient is not nervous/anxious.        Objective:      Physical Exam   Constitutional: He is oriented to person, place, and time. He appears  well-developed and well-nourished.   Musculoskeletal: Normal range of motion. He exhibits no edema, tenderness or deformity.   Neurological: He is alert and oriented to person, place, and time.   Skin: Skin is warm and dry.   2 vertical old lacerations on the dorsum of the rt hand (approx 5 cm each.)   1 smaller open abrasion that was actively bleeding after pt removed his bandaid and pulled the scab off the healing wound( approx 1 cm)    Psychiatric: He has a normal mood and affect. His behavior is normal. Judgment and thought content normal.   Nursing note and vitals reviewed.        Assessment:       1. Laceration of left hand without foreign body, initial encounter        Plan:         Laceration of left hand without foreign body, initial encounter  Open wound cleaned and bactroban applied  -     mupirocin (BACTROBAN) 2 % ointment; Apply to affected area 3 times daily  Dispense: 22 g; Refill: 1    Keep wound moist. rtc for any signs or symptoms of infection  Patient Instructions     Old Laceration: Not Sutured  A laceration is a cut through the skin. This will usually require stitches if it is deep. However, if a laceration remains open for too long, the risk of infection increases. In your case, too much time has passed before coming for treatment. The danger of infection from suturing at this time is too high. That is why your wound was not sutured.  If the wound is spread open, it will heal by filling in from the bottom and sides. A wound that is not sutured may take 1 to 4 weeks to heal, depending on the size of the opening. A visible scar will probably occur. You can discuss revision of the scar with your healthcare provider at a later time.  Home care  The following guidelines will help you care for your laceration at home:  · Keep the wound clean and dry. If a bandage was applied and it becomes wet or dirty, replace it. Otherwise, leave it in place for the first 24 hours, then change it once a day or as  directed.  · Clean the wound daily:  ¨ After removing any bandage, wash the area with soap and water. Use a wet cotton swab to loosen and remove any blood or crust that forms.  ¨ Talk with your doctor before applying any antibiotic ointment to the wound. Reapply a fresh bandage.  ¨ You may remove the bandage to shower as usual after the first 24 hours, but do not soak the area in water (no tub baths or swimming) for the next five days. Avoid activities that may reinjure your wound.  · The healthcare provider may prescribe an antibiotic cream or ointment to prevent infection. Do not stop using this medication until you have finished the prescribed course or the provider tells you to stop.  · The healthcare provider may also prescribe medications for pain. Follow instructions for taking these medications.  · Check the wound daily for signs of infection listed below.  Follow-up care  Follow up with your healthcare provider as advised.  When to seek medical advice  Call your healthcare provider right away if any of these occur:  · Wound bleeding not controlled by direct pressure  · Signs of infection, including increasing pain in the wound, increasing wound redness or swelling, or pus or bad odor coming from the wound  · Fever of 100.4°F (38.ºC) or higher or as directed by your healthcare provider  · Wound edges re-open  · Wound changes colors  · Numbness around the wound   · Decreased movement around the injured area  Date Last Reviewed: 6/10/2015  © 6285-3882 [employer]. [address] [place] [address], [place] [address]. All rights reserved. This information is not intended as a substitute for professional medical care. Always follow your healthcare professional's instructions.

## 2020-03-05 NOTE — PATIENT INSTRUCTIONS
Old Laceration: Not Sutured  A laceration is a cut through the skin. This will usually require stitches if it is deep. However, if a laceration remains open for too long, the risk of infection increases. In your case, too much time has passed before coming for treatment. The danger of infection from suturing at this time is too high. That is why your wound was not sutured.  If the wound is spread open, it will heal by filling in from the bottom and sides. A wound that is not sutured may take 1 to 4 weeks to heal, depending on the size of the opening. A visible scar will probably occur. You can discuss revision of the scar with your healthcare provider at a later time.  Home care  The following guidelines will help you care for your laceration at home:  · Keep the wound clean and dry. If a bandage was applied and it becomes wet or dirty, replace it. Otherwise, leave it in place for the first 24 hours, then change it once a day or as directed.  · Clean the wound daily:  ¨ After removing any bandage, wash the area with soap and water. Use a wet cotton swab to loosen and remove any blood or crust that forms.  ¨ Talk with your doctor before applying any antibiotic ointment to the wound. Reapply a fresh bandage.  ¨ You may remove the bandage to shower as usual after the first 24 hours, but do not soak the area in water (no tub baths or swimming) for the next five days. Avoid activities that may reinjure your wound.  · The healthcare provider may prescribe an antibiotic cream or ointment to prevent infection. Do not stop using this medication until you have finished the prescribed course or the provider tells you to stop.  · The healthcare provider may also prescribe medications for pain. Follow instructions for taking these medications.  · Check the wound daily for signs of infection listed below.  Follow-up care  Follow up with your healthcare provider as advised.  When to seek medical advice  Call your healthcare  provider right away if any of these occur:  · Wound bleeding not controlled by direct pressure  · Signs of infection, including increasing pain in the wound, increasing wound redness or swelling, or pus or bad odor coming from the wound  · Fever of 100.4°F (38.ºC) or higher or as directed by your healthcare provider  · Wound edges re-open  · Wound changes colors  · Numbness around the wound   · Decreased movement around the injured area  Date Last Reviewed: 6/10/2015  © 8156-0232 [employer]. [address] [place], [place] [id number]. All rights reserved. This information is not intended as a substitute for professional medical care. Always follow your healthcare professional's instructions.

## 2020-03-24 ENCOUNTER — PATIENT OUTREACH (OUTPATIENT)
Dept: OTHER | Facility: OTHER | Age: 85
End: 2020-03-24

## 2020-03-25 ENCOUNTER — TELEPHONE (OUTPATIENT)
Dept: ELECTROPHYSIOLOGY | Facility: CLINIC | Age: 85
End: 2020-03-25

## 2020-03-31 ENCOUNTER — PATIENT OUTREACH (OUTPATIENT)
Dept: OTHER | Facility: OTHER | Age: 85
End: 2020-03-31

## 2020-03-31 DIAGNOSIS — I48.19 PERSISTENT ATRIAL FIBRILLATION: Primary | ICD-10-CM

## 2020-03-31 RX ORDER — AMIODARONE HYDROCHLORIDE 200 MG/1
200 TABLET ORAL DAILY
Qty: 30 TABLET | Refills: 11 | Status: SHIPPED | OUTPATIENT
Start: 2020-03-31 | End: 2020-10-16

## 2020-03-31 NOTE — PROGRESS NOTES
"Digital Medicine: Clinician Follow-Up    Called patient for BP follow up and to address high BP alert. Patient is doing well today with no complaints. He denies symptoms of hypertension. Patient says he doesn't know why BP is high. He declines medication changes and says "I don't think so. I think I want to stay just where I am"    The history is provided by the patient.     Follow Up  Follow-up reason(s): reading review and routine education    Patient's 30-day BP average is above goal of <130/<80 mmHg.       INTERVENTION(S)  reviewed appropriate dose schedule, recommended diet modifications and recommended med change - patient refuses    PLAN  patient verbalizes understanding and continue monitoring    Continue current medications. Recommended increasing irbesartan. Patient refuses.     Reviewed s/sx of hypertension and when to seek urgent medical attention.     Patient knows to contact me with any non-urgent clinical changes or concerns. Go to ED or call [facility] for emergencies.         There are no preventive care reminders to display for this patient.    Last 5 Patient Entered Readings                                      Current 30 Day Average: 172/79     Recent Readings 3/30/2020 3/28/2020 3/27/2020 3/11/2020 2/29/2020    SBP (mmHg) 167 167 187 167 154    DBP (mmHg) 73 69 98 76 74    Pulse 51 49 50 47 48          Hypertension Medications     furosemide (LASIX) 20 MG tablet TAKE 1 TABLET BY MOUTH ONCE DAILY AS NEEDED LEG EDEMA    irbesartan (AVAPRO) 150 MG tablet TAKE 1 TABLET BY MOUTH  DAILY    metoprolol succinate (TOPROL-XL) 25 MG 24 hr tablet TAKE 1 TABLET BY MOUTH DAILY                 Screenings  "

## 2020-04-13 ENCOUNTER — PATIENT OUTREACH (OUTPATIENT)
Dept: OTHER | Facility: OTHER | Age: 85
End: 2020-04-13

## 2020-04-13 NOTE — PROGRESS NOTES
Digital Medicine: Clinician Follow-Up    Called patient to address high BP alert. He denies symptoms of hypertension. Patient says that he eats about a dozen ritz crackers at night with his cocktail and he eats ice cream. He thinks that the access salt and his weight are contributing to elevated BP. Patient says that he is on enough medications and he doesn't want to increase his medications. He prefers to work on his lifestyle. Patient reports that he started walking every day at [facility]    The history is provided by the patient.     Follow Up  Follow-up reason(s): reading review and routine education      Alert received.   Care Team received high BP alert.  Patient is not experiencing symptoms.  Routine Education Topics: eating patterns  Patient's 30-day BP average is above goal of <130/<80 mmHg.       INTERVENTION(S)  recommended diet modifications, recommended med change and encouragement/support    PLAN  patient verbalizes understanding, patient refuses additional therapy and continue monitoring    Continue current medications.     Per patient preference, lifestyle changes to control HTN.     Discussed foods that are high in sodium and recommended that patient avoid these.       There are no preventive care reminders to display for this patient.    Last 5 Patient Entered Readings                                      Current 30 Day Average: 159/73     Recent Readings 4/12/2020 4/12/2020 4/11/2020 4/11/2020 4/11/2020    SBP (mmHg) 177 182 152 165 166    DBP (mmHg) 84 77 74 78 73    Pulse 44 44 46 57 47          Hypertension Medications     furosemide (LASIX) 20 MG tablet TAKE 1 TABLET BY MOUTH ONCE DAILY AS NEEDED LEG EDEMA    irbesartan (AVAPRO) 150 MG tablet TAKE 1 TABLET BY MOUTH  DAILY    metoprolol succinate (TOPROL-XL) 25 MG 24 hr tablet TAKE 1 TABLET BY MOUTH DAILY                 Screenings

## 2020-04-21 ENCOUNTER — PATIENT OUTREACH (OUTPATIENT)
Dept: OTHER | Facility: OTHER | Age: 85
End: 2020-04-21

## 2020-04-21 NOTE — PROGRESS NOTES
Digital Medicine: Clinician Follow-Up    Called patient to address high BP alert. He is doing well today with no complaints. He denies symptoms of hypertension. He says that he ate 3 hot dogs and 2 liver sausage sandwiches.     The history is provided by the patient.     Follow Up  Follow-up reason(s): reading review and routine education      Routine Education Topics: eating patterns  Patient's 30-day BP average is above goal of <130/<80 mmHg.     Spikes in BP likely related to excess sodium      INTERVENTION(S)  recommended diet modifications    PLAN  patient verbalizes understanding, Health  follow up and continue monitoring    Continue current medications. At previous encounter, patient refused medication adjustments.     Placed task for health  to discuss sodium monitoring      There are no preventive care reminders to display for this patient.    Last 5 Patient Entered Readings                                      Current 30 Day Average: 159/74     Recent Readings 4/21/2020 4/15/2020 4/14/2020 4/13/2020 4/13/2020    SBP (mmHg) 154 158 188 132 133    DBP (mmHg) 111 72 82 68 63    Pulse 42 43 43 48 48             Hypertension Medications     furosemide (LASIX) 20 MG tablet TAKE 1 TABLET BY MOUTH ONCE DAILY AS NEEDED LEG EDEMA    irbesartan (AVAPRO) 150 MG tablet TAKE 1 TABLET BY MOUTH  DAILY    metoprolol succinate (TOPROL-XL) 25 MG 24 hr tablet TAKE 1 TABLET BY MOUTH DAILY                 Screenings

## 2020-04-21 NOTE — PROGRESS NOTES
"Digital Medicine: Health  Follow-Up    Patient reports he is well, no complaints. Denies symptoms of hypertension- HA, chest pains and SOB.     Discussed COVID-19 and importance of proper hand hygiene and social distancing. Reports he is being diligent with this.     Discussed fluctuations in BP readings, denies experiencing symptoms of hyper/hypotension. Reports he does "not have very much confidence" in [employer] device.     The history is provided by the patient.     Follow Up  Follow-up reason(s): reading review and routine education          INTERVENTION(S)  recommended diet modifications, recommend physical activity, encouragement/support, denied further coaching, denied resources and denied questions    PLAN  patient verbalizes understanding, demonstrates understanding via teach back, patient amenable to changes and continue monitoring    Patient consistently maintains healthy lifestyle habits.      There are no preventive care reminders to display for this patient.    Last 5 Patient Entered Readings                                      Current 30 Day Average: 159/73     Recent Readings 4/15/2020 4/14/2020 4/13/2020 4/13/2020 4/12/2020    SBP (mmHg) 158 188 132 133 177    DBP (mmHg) 72 82 68 63 84    Pulse 43 43 48 48 44                      Diet Screening   No change to diet.      Patient reports his son has been grocery shopping for him, lives nearby. Reports he "occasionally sneaks into" [employer] to purchase a few items, wears a mask and gloves.     Physical Activity Screening   When asked if exercising, patient responded: yes7 day(s) a week.      Patient participates in the following activities: walking    Reports he has been walking 2 miles per day in the park.     Medication Adherence Screening   He did not miss a dose this month.      SDOH  "

## 2020-04-24 NOTE — PROGRESS NOTES
Received high BP alert. /57 mmHg at 12:30 am and 186/92 mmHg today. Yesterday BP was 88-95/ 56-61 mmHg. I suspect inaccurate monitoring technique. Placed tasked for health  to address.

## 2020-04-27 ENCOUNTER — PATIENT OUTREACH (OUTPATIENT)
Dept: OTHER | Facility: OTHER | Age: 85
End: 2020-04-27

## 2020-04-27 NOTE — PROGRESS NOTES
"Digital Medicine: Health  Follow-Up    Called to address BP alert. Patient believes elevation in BP readings may be related to "issues with his blood pressure". Patient reports he is well, no complaints. Denies symptoms of hypertension- HA, chest pains and SOB.       The history is provided by the patient.     Follow Up  Follow-up reason(s): reading review and routine education      Alert received.   Care Team received high BP alert.  Patient is not experiencing symptoms.      INTERVENTION(S)  recommended diet modifications, encouragement/support, denied further coaching, denied resources and denied questions    PLAN  patient verbalizes understanding, demonstrates understanding via teach back, patient amenable to changes and continue monitoring      There are no preventive care reminders to display for this patient.    Last 5 Patient Entered Readings                                      Current 30 Day Average: 149/70     Recent Readings 4/26/2020 4/26/2020 4/25/2020 4/25/2020 4/25/2020    SBP (mmHg) 172 163 110 111 118    DBP (mmHg) 77 74 54 53 59    Pulse 46 42 45 44 45                      Diet Screening   Breakfast is typically between. English muffins, butter and jelly; 3 soft boiled eggs this morning.  Lunch is typically between. Skipped .    Dinner is typically between. Tomato baked- homemade; Sweet potatoes, fruit salad.    Patient reports eating or drinking the following: fruit, caffeine and packaged/processed foods    Patient admits to eating 4 hot dog last night. Reports he does not have any more and not intending on doing this again. Advised against this due to elevated sodium levels.    3 soft boiled eggs, toast with butter and jelly     Patient reports his friend cooks for him, son goes grocery shopping for him.     Reports drinking about 6 cups of coffee per day, and not any water. Drinks tonic water with his medication. Reports drinking a couple martinis in the evening. Reports he plans to start " "drinking more water and less coffee. Will assess this next call.     Physical Activity Screening   No change to exercise routine.        Medication Adherence Screening   He did not miss a dose this month.    Patient reports he "periodically" takes dietetic, taking it every other day now.         SDOH  "

## 2020-04-30 NOTE — PROGRESS NOTES
Received high BP alert. Health  spoke with patient on 4/27/2020. Asked that she continue to work with patient on BP monitoring technique and sodium intake.     I am hesitant to adjust BP medications due to patient's age and fluctuations in BP readings.

## 2020-05-01 DIAGNOSIS — I10 ESSENTIAL HYPERTENSION: ICD-10-CM

## 2020-05-01 RX ORDER — METOPROLOL SUCCINATE 25 MG/1
TABLET, EXTENDED RELEASE ORAL
Qty: 30 TABLET | Refills: 6 | Status: SHIPPED | OUTPATIENT
Start: 2020-05-01 | End: 2020-12-04 | Stop reason: SDUPTHER

## 2020-05-31 PROCEDURE — 99454 PR REMOTE MNTR, PHYS PARAM, INITIAL, EA 30 DAYS: ICD-10-PCS | Mod: S$GLB,,, | Performed by: INTERNAL MEDICINE

## 2020-05-31 PROCEDURE — 99454 REM MNTR PHYSIOL PARAM 16-30: CPT | Mod: S$GLB,,, | Performed by: INTERNAL MEDICINE

## 2020-06-01 ENCOUNTER — PATIENT OUTREACH (OUTPATIENT)
Dept: OTHER | Facility: OTHER | Age: 85
End: 2020-06-01

## 2020-06-01 NOTE — PROGRESS NOTES
"Digital Medicine: Health  Follow-Up    Patient reports he is well, no complaints. Denies symptoms of hypertension- HA, chest pains and SOB.     Discussed COVID-19 and importance of proper hand hygiene and social distancing. Reports he has been staying home during this time, his friend is staying with him.     Denies needs from the program today, BP approaching goal, 141/69.    The history is provided by the patient.     Follow Up  Follow-up reason(s): reading review and routine education      Readings are trending down due to walking 1 mile in the park every day.        INTERVENTION(S)  recommended diet modifications, recommend physical activity, encouragement/support, denied further coaching, denied resources and denied questions    PLAN  patient verbalizes understanding, demonstrates understanding via teach back, patient amenable to changes and continue monitoring      There are no preventive care reminders to display for this patient.    Last 5 Patient Entered Readings                                      Current 30 Day Average: 141/69     Recent Readings 5/31/2020 5/31/2020 5/31/2020 5/31/2020 5/30/2020    SBP (mmHg) 133 138 101 120 136    DBP (mmHg) 61 63 53 62 68    Pulse 43 45 47 49 44                      Diet Screening   No change to diet.  Patient reports eating or drinking the following: water    Reports his friend has been cooking for him, cannot recall what she has been cooking but stated "it's really good". Reports his son has been going grocery shopping for him. Reports he does not drink much water, "mainly beer, martinis and coffee". Advised patient to increase water intake.     Reports he is planning to "starve" himself one day per week, planning to drink water "all day".    Physical Activity Screening   When asked if exercising, patient responded: yes    Patient participates in the following activities: walking    Reports he has been walking about 1 mile per day, in the park.     Medication " Adherence Screening   He did not miss a dose this month.      SDOH

## 2020-06-19 ENCOUNTER — PES CALL (OUTPATIENT)
Dept: ADMINISTRATIVE | Facility: CLINIC | Age: 85
End: 2020-06-19

## 2020-06-22 ENCOUNTER — TELEPHONE (OUTPATIENT)
Dept: ELECTROPHYSIOLOGY | Facility: CLINIC | Age: 85
End: 2020-06-22

## 2020-07-13 ENCOUNTER — PATIENT OUTREACH (OUTPATIENT)
Dept: OTHER | Facility: OTHER | Age: 85
End: 2020-07-13

## 2020-07-13 DIAGNOSIS — I10 ESSENTIAL HYPERTENSION: ICD-10-CM

## 2020-07-13 RX ORDER — IRBESARTAN 150 MG/1
TABLET ORAL
Qty: 90 TABLET | Refills: 1 | Status: SHIPPED | OUTPATIENT
Start: 2020-07-13 | End: 2020-07-14 | Stop reason: DRUGHIGH

## 2020-07-14 ENCOUNTER — PATIENT OUTREACH (OUTPATIENT)
Dept: OTHER | Facility: OTHER | Age: 85
End: 2020-07-14

## 2020-07-14 DIAGNOSIS — I10 ESSENTIAL HYPERTENSION: Primary | ICD-10-CM

## 2020-07-14 RX ORDER — IRBESARTAN 150 MG/1
225 TABLET ORAL NIGHTLY
Qty: 135 TABLET | Refills: 1
Start: 2020-07-14 | End: 2020-09-04 | Stop reason: SDUPTHER

## 2020-07-14 NOTE — PROGRESS NOTES
"Digital Medicine: Clinician Follow-Up    Called patient for follow up. Patient contributes elevated BP to "eating too much salt". Patient's cook, who describes as a friend, says in the background that Mr. Mendoza doesn't eat too much salt because all of her meals are homemade. Mr. Mendoza says he is trying to lose weight and he also contributes his weight to elevated BP.     The history is provided by the patient.   Follow Up  Follow-up reason(s): reading review, medication change and routine education      Routine Education Topics: eating patterns  Patient's 30-day BP average is above goal of <130/<80 mmHg.       INTERVENTION(S)  reviewed appropriate dose schedule, recommended med change and encouragement/support    PLAN  patient verbalizes understanding, demonstrates understanding via teach back, patient amenable to changes and continue monitoring    Continue current medications. Recommended increasing irbesartan from 150 mg to 300 mg but patient prefers to increase to 225 mg QD.       There are no preventive care reminders to display for this patient.    Last 5 Patient Entered Readings                                      Current 30 Day Average: 170/75     Recent Readings 7/10/2020 7/10/2020 7/8/2020 7/6/2020 7/5/2020    SBP (mmHg) 190 140 157 195 164    DBP (mmHg) 82 66 76 79 69    Pulse 49 50 46 46 45             Hypertension Medications     furosemide (LASIX) 20 MG tablet TAKE 1 TABLET BY MOUTH ONCE DAILY AS NEEDED LEG EDEMA    irbesartan (AVAPRO) 150 MG tablet TAKE 1 TABLET BY MOUTH  DAILY    metoprolol succinate (TOPROL-XL) 25 MG 24 hr tablet TAKE 1 TABLET BY MOUTH DAILY                   "

## 2020-07-20 NOTE — PROGRESS NOTES
"Digital Medicine: Health  Follow-Up    Patient reports he is well, no complaints. Denies symptoms of hypertension- HA, chest pains and SOB.     Discussed COVID-19 and importance of proper hand hygiene and social distancing. Reports he wears a mask when leaving the house.     Denies needs from the program today.     The history is provided by the patient.             Reason for review: Blood pressure not at goal        Topics Covered on Call: physical activity and Diet          Diet-Change    Patient reports eating or drinking the following: Reports he had salty sausage last night for dinner, noticed BP was elevated today. Reports "occasionally" his cook friend forgets and will serve him a salty dish. Reviewed importance of low-sodium diet and effects on blood pressure.     Patient reports he is drinking "mostly just coffee", limited water intake. Discussed idea to increase water intake. Patient verbalized intent to do so.    Dietary Improvements:Planning to increase water intake.     Dietary Indiscretions:        Additional diet details:    Physical Activity-Change      Additional physical activity details: Reports he could use more activity. Considering purchasing a bicycle, son is going to help him find one.       Medication Adherence-Medication adherence was asssessed.  Patient continue taking medication as prescribed.          PLAN  Additional monitoring needed:  Continue current diet/physical activity routine:    Patient verbalizes understanding. Patient did not express questions or concerns and patient has contact information if needed.    Explained the importance of self-monitoring and medication adherence. Encouraged the patient to communicate with their health  for lifestyle modifications to help improve or maintain a healthy lifestyle.        There are no preventive care reminders to display for this patient.    Last 5 Patient Entered Readings                                      Current 30 Day " Average: 165/74     Recent Readings 7/20/2020 7/18/2020 7/16/2020 7/16/2020 7/12/2020    SBP (mmHg) 156 131 128 124 192    DBP (mmHg) 85 59 56 55 81    Pulse 45 48 45 45 43

## 2020-07-28 ENCOUNTER — PATIENT OUTREACH (OUTPATIENT)
Dept: OTHER | Facility: OTHER | Age: 85
End: 2020-07-28

## 2020-07-29 NOTE — PROGRESS NOTES
Digital Medicine: Clinician Follow-Up    Called patient for follow up after increasing irbesartan from 150 mg to 225 mg QD. Patient is doing well with no complaints. He says he also started reading food labels to look for sodium content and he is trying to purchase low sodium options    The history is provided by the patient.   Follow-up reason(s): medication change follow-up.     Readings are trending down   due to medication adherence.       Patient is not experiencing signs/symptoms of hypotension.  Patient is not experiencing signs/symptoms of hypertension.    Patient did make medication change.    Is patient tolerating med change?: yes          Last 5 Patient Entered Readings                                      Current 30 Day Average: 163/74     Recent Readings 7/29/2020 7/29/2020 7/28/2020 7/28/2020 7/28/2020    SBP (mmHg) 141 113 101 122 163    DBP (mmHg) 73 65 52 65 71    Pulse 42 44 42 44 44               Depression Screening  Did not address depression screening.    Sleep Apnea Screening    Did not address sleep apnea screening.     Medication Affordability Screening  Did not address medication affordability screening.     Medication Adherence-Medication adherence was asssessed.    Patient reported missing medication: Taking irbesartan 300 mg one day and 150 mg the next day and not as prescribed.          ASSESSMENT(S)  Patients BP average is 163/74 mmHg, which is above goal. Patient's BP goal is less than or equal to 130/80 per 2017 ACC/AHA Hypertension Guidelines.       PLAN  Continue current therapy: Try using a pill cutter to cut irbesartan in half. Take 225 mg QD  Continue current diet/physical activity routine:    Patient verbalizes understanding. Patient did not express questions or concerns and patient has contact information if needed.          There are no preventive care reminders to display for this patient.      Hypertension Medications     furosemide (LASIX) 20 MG tablet TAKE 1 TABLET BY  MOUTH ONCE DAILY AS NEEDED LEG EDEMA    irbesartan (AVAPRO) 150 MG tablet Take 1.5 tablets (225 mg total) by mouth every evening.    metoprolol succinate (TOPROL-XL) 25 MG 24 hr tablet TAKE 1 TABLET BY MOUTH DAILY

## 2020-07-31 PROCEDURE — 99454 REM MNTR PHYSIOL PARAM 16-30: CPT | Mod: S$GLB,,, | Performed by: INTERNAL MEDICINE

## 2020-07-31 PROCEDURE — 99454 PR REMOTE MNTR, PHYS PARAM, INITIAL, EA 30 DAYS: ICD-10-PCS | Mod: S$GLB,,, | Performed by: INTERNAL MEDICINE

## 2020-08-06 ENCOUNTER — PES CALL (OUTPATIENT)
Dept: ADMINISTRATIVE | Facility: CLINIC | Age: 85
End: 2020-08-06

## 2020-08-12 ENCOUNTER — PATIENT OUTREACH (OUTPATIENT)
Dept: OTHER | Facility: OTHER | Age: 85
End: 2020-08-12

## 2020-08-12 NOTE — PROGRESS NOTES
Digital Medicine: Clinician Follow-Up    Called patient for follow up. He says he is doing well. He confirms taking 1.5 tablets of irbesartan (225 mg). He mostly denies symptoms of hypotension, but says he is light-headed occasionally when transitioning from sitting to standing. He reports drink approximately 6 cups of regular coffee per day and 1-2 glasses of water.     The history is provided by the patient.   Follow-up reason(s): medication change follow-up.     Hypertension    Readings are trending down   Patient did make medication change.    Is patient tolerating med change? yes          Last 5 Patient Entered Readings                                      Current 30 Day Average: 150/68     Recent Readings 8/11/2020 8/10/2020 8/9/2020 8/8/2020 8/7/2020    SBP (mmHg) 117 133 146 156 139    DBP (mmHg) 52 59 72 68 58    Pulse 45 40 42 44 41               Depression Screening  Did not address depression screening.    Sleep Apnea Screening    Did not address sleep apnea screening.     Medication Affordability Screening  Did not address medication affordability screening.     Medication Adherence-Medication adherence was asssessed.  Patient continue taking medication as prescribed.            ASSESSMENT(S)  Patients BP average is 150/68 mmHg, which is above goal. Patient's BP goal is less than or equal to 130/80 per 2017 ACC/AHA Hypertension Guidelines.   Trending down    Hypertension Plan  Continue current therapy.  Continue current diet/physical activity routine.  Provided patient education.  Try to limit regular coffee to 2 cups per day and drink at least one more glass of water per day. Advised patient to discuss daily fluid recommendations with cardiology given CHF        There are no preventive care reminders to display for this patient.      Hypertension Medications     furosemide (LASIX) 20 MG tablet TAKE 1 TABLET BY MOUTH ONCE DAILY AS NEEDED LEG EDEMA    irbesartan (AVAPRO) 150 MG tablet Take 1.5 tablets  (225 mg total) by mouth every evening.    metoprolol succinate (TOPROL-XL) 25 MG 24 hr tablet TAKE 1 TABLET BY MOUTH DAILY

## 2020-08-18 ENCOUNTER — HOSPITAL ENCOUNTER (OUTPATIENT)
Dept: CARDIOLOGY | Facility: CLINIC | Age: 85
Discharge: HOME | End: 2020-08-18
Payer: MEDICARE

## 2020-08-18 ENCOUNTER — OFFICE VISIT (OUTPATIENT)
Dept: ELECTROPHYSIOLOGY | Facility: CLINIC | Age: 85
End: 2020-08-18
Payer: MEDICARE

## 2020-08-18 VITALS
BODY MASS INDEX: 24.18 KG/M2 | WEIGHT: 178.56 LBS | HEART RATE: 46 BPM | HEIGHT: 72 IN | DIASTOLIC BLOOD PRESSURE: 72 MMHG | SYSTOLIC BLOOD PRESSURE: 148 MMHG

## 2020-08-18 DIAGNOSIS — I48.19 PERSISTENT ATRIAL FIBRILLATION: Primary | ICD-10-CM

## 2020-08-18 DIAGNOSIS — I48.19 PERSISTENT ATRIAL FIBRILLATION: ICD-10-CM

## 2020-08-18 DIAGNOSIS — I42.0 DILATED CARDIOMYOPATHY: ICD-10-CM

## 2020-08-18 PROCEDURE — 1159F PR MEDICATION LIST DOCUMENTED IN MEDICAL RECORD: ICD-10-PCS | Mod: HCNC,S$GLB,, | Performed by: INTERNAL MEDICINE

## 2020-08-18 PROCEDURE — 93005 ELECTROCARDIOGRAM TRACING: CPT | Mod: HCNC,S$GLB,, | Performed by: INTERNAL MEDICINE

## 2020-08-18 PROCEDURE — 1159F MED LIST DOCD IN RCRD: CPT | Mod: HCNC,S$GLB,, | Performed by: INTERNAL MEDICINE

## 2020-08-18 PROCEDURE — 93010 RHYTHM STRIP: ICD-10-PCS | Mod: HCNC,S$GLB,, | Performed by: INTERNAL MEDICINE

## 2020-08-18 PROCEDURE — 99499 RISK ADDL DX/OHS AUDIT: ICD-10-PCS | Mod: HCNC,S$GLB,, | Performed by: INTERNAL MEDICINE

## 2020-08-18 PROCEDURE — 1101F PT FALLS ASSESS-DOCD LE1/YR: CPT | Mod: HCNC,CPTII,S$GLB, | Performed by: INTERNAL MEDICINE

## 2020-08-18 PROCEDURE — 1126F PR PAIN SEVERITY QUANTIFIED, NO PAIN PRESENT: ICD-10-PCS | Mod: HCNC,S$GLB,, | Performed by: INTERNAL MEDICINE

## 2020-08-18 PROCEDURE — 99214 OFFICE O/P EST MOD 30 MIN: CPT | Mod: HCNC,S$GLB,, | Performed by: INTERNAL MEDICINE

## 2020-08-18 PROCEDURE — 1101F PR PT FALLS ASSESS DOC 0-1 FALLS W/OUT INJ PAST YR: ICD-10-PCS | Mod: HCNC,CPTII,S$GLB, | Performed by: INTERNAL MEDICINE

## 2020-08-18 PROCEDURE — 99999 PR PBB SHADOW E&M-EST. PATIENT-LVL III: ICD-10-PCS | Mod: PBBFAC,HCNC,, | Performed by: INTERNAL MEDICINE

## 2020-08-18 PROCEDURE — 1126F AMNT PAIN NOTED NONE PRSNT: CPT | Mod: HCNC,S$GLB,, | Performed by: INTERNAL MEDICINE

## 2020-08-18 PROCEDURE — 99499 UNLISTED E&M SERVICE: CPT | Mod: HCNC,S$GLB,, | Performed by: INTERNAL MEDICINE

## 2020-08-18 PROCEDURE — 93005 RHYTHM STRIP: ICD-10-PCS | Mod: HCNC,S$GLB,, | Performed by: INTERNAL MEDICINE

## 2020-08-18 PROCEDURE — 99214 PR OFFICE/OUTPT VISIT, EST, LEVL IV, 30-39 MIN: ICD-10-PCS | Mod: HCNC,S$GLB,, | Performed by: INTERNAL MEDICINE

## 2020-08-18 PROCEDURE — 99999 PR PBB SHADOW E&M-EST. PATIENT-LVL III: CPT | Mod: PBBFAC,HCNC,, | Performed by: INTERNAL MEDICINE

## 2020-08-18 PROCEDURE — 93010 ELECTROCARDIOGRAM REPORT: CPT | Mod: HCNC,S$GLB,, | Performed by: INTERNAL MEDICINE

## 2020-08-18 NOTE — PROGRESS NOTES
Subjective:    Patient ID:  Norm Mendoza is a 89 y.o. male who presents for follow-up of Atrial Fibrillation      89 yoM here for LAAO consideration.     6/18/19: He has had development of HF as well as persistent AF. He was in NSR 2014. The next ECG 11/18 showed AF. EF 5/18 was normal in NSR. He was started on OAC 12/18 for AF. 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 He underwent Rituximab therapy and had resolution of his ulcers 4/19 but had residual MALT. He did not receive radiation.  He has mild HF symptoms, treated with lasix. Stress echo 5/19 showed EF 40%. He was prescribed xarelto 5/30/19 but he has not taken it.    10/19: He was cardioverted 7/12/19 with mild improvement in symptoms. EF 45% in NSR. He asks about Watchman.     Interval history: LAAO sealed 12/19, EF normal. Remains on amiodarone 200 mg qd. HRs lower than normal, also with fatigue and some weight gain. He takes lasix PRN.     Echo 5/18:  CONCLUSIONS     1 - Normal left ventricular systolic function (EF 55-60%).     2 - Indeterminate LV diastolic function.     3 - Biatrial enlargement.     4 - No wall motion abnormalities.     5 - Normal right ventricular systolic function .     6 - Mildly enlarged ascending aorta.     7 - Trivial to mild aortic regurgitation.     8 - Mild mitral regurgitation.     9 - Mild tricuspid regurgitation.     10 - Trivial to mild pulmonic regurgitation.     11 - The estimated PA systolic pressure is 33 mmHg.     Stress echo 5/19:  · THE PATIENT IS IN ATRIAL FIBRILLATION FOR THE ENTIRETY OF THE TEST  · The patient reported no symptoms during the stress test.  · The test was stopped secondary to fatigue.  · There were no arrhythmias during stress.  · Mildly decreased left ventricular systolic function. The estimated ejection fraction is 40%, 2D quantitative LVEF 33%.  · Grade II (moderate) left ventricular diastolic dysfunction consistent with  pseudonormalization.  · Elevated left atrial pressure.  · Severe left atrial enlargement.  · The ascending aorta is moderately dilated.  · Mild tricuspid regurgitation.  · Low normal right ventricular systolic function.  · Moderate right atrial enlargement.  · Concentric left ventricular remodeling.  · Overall, the patient's exercise capacity was normal.  · The EKG portion of this study is negative for myocardial ischemia.  · No obvious wall motion abnormalities at stress. LV function augments but still not normal at stress.    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  5/2/2018: Mixed hyperlipidemia  7/30/2019: On continuous oral anticoagulation  11/30/2018: Paroxysmal atrial fibrillation  No date: Skin cancer      Comment:  x3, had mohs on nose  excised 12/5/14: Squamous cell carcinoma      Comment:  R lower back  12/6/2018: Symptomatic anemia  No date: Urinary tract infection      Comment:  x4    Past Surgical History:  No date: ACHILLES TENDON SURGERY  2013: CATARACT EXTRACTION W/  INTRAOCULAR LENS IMPLANT; Bilateral  10/11/2019: CLOSURE OF LEFT ATRIAL APPENDAGE USING DEVICE; N/A      Comment:  Procedure: Left atrial appendage closure device;                 Surgeon: Hi Crowder MD;  Location: [facility] EP LAB;                Service: Cardiology;  Laterality: N/A;  AF, JACINTO, Watchman               Implant, BSci, Gen, MB, 3 Prep  No date: CYSTOSCOPY      Comment:  bladder tumor  12/7/2018: ESOPHAGOGASTRODUODENOSCOPY; N/A      Comment:  Procedure: EGD (ESOPHAGOGASTRODUODENOSCOPY);  Surgeon:                Austin Garcia MD;  Location: [facility] ENDO (2ND FLR);                 Service: Endoscopy;  Laterality: N/A;  4/12/2019: ESOPHAGOGASTRODUODENOSCOPY; N/A      Comment:  Procedure: EGD  (ESOPHAGOGASTRODUODENOSCOPY);  Surgeon:                Austin Garcia MD;  Location: [facility] ENDO ([address] [place] [address]);                 Service: Endoscopy;  Laterality: N/A;  please schedule                within 4 weeks  No date: SKIN CANCER EXCISION  2019: TREATMENT OF CARDIAC ARRHYTHMIA; N/A      Comment:  Procedure: Cardioversion or Defibrillation;  Surgeon:                Hi Crowder MD;  Location: [facility] EP LAB;  Service:               Cardiology;  Laterality: N/A;  AF, JACINTO, DCCV, MAC, MB, 3                Prep    Social History    Socioeconomic History      Marital status:       Spouse name: Not on file      Number of children: 3      Years of education: Not on file      Highest education level: Not on file    Occupational History      Occupation: Financial Work/        Employer: retired      Occupation: actor      Occupation:     Social Needs      Financial resource strain: Not on file      Food insecurity        Worry: Not on file        Inability: Not on file      Transportation needs        Medical: Not on file        Non-medical: Not on file    Tobacco Use      Smoking status: Former Smoker        Packs/day: 1.00        Years: 25.00        Pack years: 25        Types: Cigarettes        Quit date: 9/3/1970        Years since quittin.9      Smokeless tobacco: Never Used    Substance and Sexual Activity      Alcohol use: Yes        Alcohol/week: 3.0 standard drinks        Types: 3 Shots of liquor per week        Comment: 3 bourbons daily - 12 beer on weekends       Drug use: No      Sexual activity: Yes        Partners: Female    Lifestyle      Physical activity        Days per week: Not on file        Minutes per session: Not on file      Stress: Not on file    Relationships      Social connections        Talks on phone: Not on file        Gets together: Not on file        Attends [employer]ism service: Not on file        Active member of club or organization: Not on file         Attends meetings of clubs or organizations: Not on file        Relationship status: Not on file    Other Topics      Concerns:        Not on file    Social History Narrative      He is  with 2/3 kids living(2 sons/1 daughter who passed away); He has 6 Grandkids(5 under 10 y/o); He has 1 son here in [place] /1 grandson at [facility]; His other son(3 kids) lives in [place]; His Grand-daughter(Her Mother passed away) with her 2 kids lives in [place]      He had worked for [employer] which has dissolved but now works for [employer]      Review of patient's family history indicates:  Problem: Stroke      Relation: Father          Age of Onset: (Not Specified)  Problem: Hypertension      Relation: Father          Age of Onset: (Not Specified)  Problem: Stroke      Relation: Brother          Age of Onset: (Not Specified)  Problem: Anesthesia problems      Relation: Neg Hx          Age of Onset: (Not Specified)  Problem: Malignant hypertension      Relation: Neg Hx          Age of Onset: (Not Specified)  Problem: Hypotension      Relation: Neg Hx          Age of Onset: (Not Specified)  Problem: Malignant hyperthermia      Relation: Neg Hx          Age of Onset: (Not Specified)  Problem: Pseudochol deficiency      Relation: Neg Hx          Age of Onset: (Not Specified)  Problem: Melanoma      Relation: Neg Hx          Age of Onset: (Not Specified)  Problem: Heart attack      Relation: Neg Hx          Age of Onset: (Not Specified)  Problem: Heart disease      Relation: Neg Hx          Age of Onset: (Not Specified)  Problem: Heart failure      Relation: Neg Hx          Age of Onset: (Not Specified)  Problem: Cataracts      Relation: Neg Hx          Age of Onset: (Not Specified)  Problem: Glaucoma      Relation: Neg Hx          Age of Onset: (Not Specified)  Problem: Macular degeneration      Relation: Neg Hx          Age of Onset: (Not Specified)        Review of Systems   Constitution: Negative.    HENT: Negative.    Eyes: Negative.    Cardiovascular: Positive for leg swelling. Negative for chest pain, dyspnea on exertion, irregular heartbeat, near-syncope, palpitations and syncope.   Respiratory: Negative.  Negative for shortness of breath.    Endocrine: Negative.    Hematologic/Lymphatic: Negative.    Skin: Negative.    Musculoskeletal: Negative.    Gastrointestinal: Negative.    Genitourinary: Negative.    Neurological: Negative.  Negative for dizziness and light-headedness.   Psychiatric/Behavioral: Negative.    Allergic/Immunologic: Negative.         Objective:    Physical Exam   Constitutional: He is oriented to person, place, and time. He appears well-developed and well-nourished.   HENT:   Head: Normocephalic.   Nose: Nose normal.   Eyes: Pupils are equal, round, and reactive to light.   Cardiovascular: Regular rhythm, S1 normal and S2 normal. Bradycardia present.   No murmur heard.  Pulses:       Radial pulses are 2+ on the right side and 2+ on the left side.   Pulmonary/Chest: Breath sounds normal. No respiratory distress.   Abdominal: Normal appearance.   Musculoskeletal: Normal range of motion.         General: No edema.   Neurological: He is alert and oriented to person, place, and time.   Skin: Skin is warm and dry. No erythema.   Psychiatric: He has a normal mood and affect. His speech is normal and behavior is normal.   Nursing note and vitals reviewed.    ECG: SBr 46, , LBBB 142        Assessment:       1. Persistent atrial fibrillation    2. Dilated cardiomyopathy         Plan:       89 yoM persistent AF s/p CV, s/p LAAO via Watchman here for follow up. He can stop plavix and return to aspirin 81 mg qd. Drop amiodarone to 100 mg qd (he will break 200 mg in half). If no improvement in bradycardic symptoms, will get holter. RTC

## 2020-08-18 NOTE — PATIENT INSTRUCTIONS
Stop plavix (clopidogrel) and start aspirin 81 mg once a day  Drop amiodarone to one-half tablet a day  Let us know if your fatigue improves

## 2020-08-24 ENCOUNTER — PATIENT OUTREACH (OUTPATIENT)
Dept: OTHER | Facility: OTHER | Age: 85
End: 2020-08-24

## 2020-08-31 ENCOUNTER — PATIENT OUTREACH (OUTPATIENT)
Dept: OTHER | Facility: OTHER | Age: 85
End: 2020-08-31

## 2020-08-31 PROCEDURE — 99454 REM MNTR PHYSIOL PARAM 16-30: CPT | Mod: S$GLB,,, | Performed by: INTERNAL MEDICINE

## 2020-08-31 PROCEDURE — 99454 PR REMOTE MNTR, PHYS PARAM, INITIAL, EA 30 DAYS: ICD-10-PCS | Mod: S$GLB,,, | Performed by: INTERNAL MEDICINE

## 2020-08-31 NOTE — PROGRESS NOTES
"Digital Medicine: Health  Follow-Up    The history is provided by the patient.             Reason for review: Blood pressure not at goal  Care Team received high BP alert.          Topics Covered on Call: physical activity and Diet    Additional Follow-up details: Called to follow up with patient. Patient reports he is well, no complaints. Denies symptoms of hypertension- HA, chest pains and SOB.     Discussed COVID-19 and importance of proper hand hygiene and social distancing. Reports he has just been staying home, not leaving his house very often.     Discussed elevated BP readings and alerts, patient reports he is unable to identify cause of elevations. Reports breathing and yoga help to lower his BP. Recently saw cardiologist, who adjusted his medication.         Diet-Change    Patient reports eating or drinking the following: Reports his friend has been cooking for him, not adding sodium to meals. Reports eating fried rice last night, no added salt. Recently had pasta with sauce and a few greek dishes. Endorsed continued efforts to increase water intake, drinking at least 32 oz per day but "probably" 45 oz. Reports he has been eating a lot of vegetables. Using a vegetarian cook book.         Physical Activity-Change      Additional physical activity details: Reports he has been reading, watching TV, playing on the computer. Reports he was walking, but weather too hot now. Reports he has been doing some piliates at home, as well as obdulia exercises and breathing exercise. Reports he just read a book, Breath, on importance of breathing/breathing exercises.             Continue current diet/physical activity routine.  Provided patient education.  Reviewed Device Techniques. Reminded patient importance of resting prior to each reading.     Addressed any questions or concerns and patient has my contact information if needed prior to next outreach. Patient verbalizes understanding.      Explained the importance of " self-monitoring and medication adherence. Encouraged the patient to communicate with their health  for lifestyle modifications to help improve or maintain a healthy lifestyle.            There are no preventive care reminders to display for this patient.    Last 5 Patient Entered Readings                                      Current 30 Day Average: 155/69     Recent Readings 8/30/2020 8/30/2020 8/30/2020 8/30/2020 8/30/2020    SBP (mmHg) 178 155 142 171 188    DBP (mmHg) 54 67 66 75 79    Pulse 42 40 40 40 45

## 2020-09-04 ENCOUNTER — TELEPHONE (OUTPATIENT)
Dept: INTERNAL MEDICINE | Facility: CLINIC | Age: 85
End: 2020-09-04

## 2020-09-04 DIAGNOSIS — I10 ESSENTIAL HYPERTENSION: ICD-10-CM

## 2020-09-04 RX ORDER — IRBESARTAN 150 MG/1
225 TABLET ORAL NIGHTLY
Qty: 135 TABLET | Refills: 1 | Status: SHIPPED | OUTPATIENT
Start: 2020-09-04 | End: 2020-09-28 | Stop reason: SDUPTHER

## 2020-09-14 NOTE — PROGRESS NOTES
"Digital Medicine: Clinician Follow-Up    Called patient to address high BP alert. Patient says he has been running low on irbesartan and has been unable to get the irbesartan. Patient says that he cut the irbesartan tablet in half. Patient says that the irbesartan is a "russian or chinese fake".   Patient says that he discovered a new technique to lower his BP and the technique is "breathing". He says that if his BP is high, he "breathes" and it comes down and it "low". Patient reports drinking regular coffee while taking his BP.       The history is provided by the patient.      Review of patient's allergies indicates:  No Known Allergies  Follow-up reason(s): Alert received.   Care Team received high BP alert.      Hypertension    Readings are trending down due to improved technique.       Patient is not experiencing signs/symptoms of hypotension.  Patient is not experiencing signs/symptoms of hypertension.          Last 5 Patient Entered Readings                                      Current 30 Day Average: 155/71     Recent Readings 9/14/2020 9/14/2020 9/14/2020 9/14/2020 9/14/2020    SBP (mmHg) 191 112 137 167 167    DBP (mmHg) 96 69 67 79 77    Pulse 58 49 48 49 51               Depression Screening  Did not address depression screening.    Sleep Apnea Screening    Did not address sleep apnea screening.     Medication Affordability Screening  Did not address medication affordability screening.     Medication Adherence-Medication adherence was asssessed.    Patient reported missing medication: more than once a week and Patient decreased irbesartan dose on his own from 225 mg to 75 mg to conserve his supply.          ASSESSMENT(S)  Patients BP average is 155/71 mmHg, which is above goal. Patient's BP goal is less than or equal to 130/80 per 2017 ACC/AHA Hypertension Guidelines.   Taking BP while drinking caffeine. BP comes down with breathing. Patient does not like taking medications  Hypertension Plan  Continue " current therapy.  Continue current diet/physical activity routine.  Provided patient education.  Relax at least 5 min with no distractions before taking BP reading.   Take BP reading at least an hour after drinking caffeine.      Addressed patient questions and patient has my contact information if needed prior to next outreach. Patient verbalizes understanding.            There are no preventive care reminders to display for this patient.      Hypertension Medications     furosemide (LASIX) 20 MG tablet TAKE 1 TABLET BY MOUTH ONCE DAILY AS NEEDED LEG EDEMA    irbesartan (AVAPRO) 150 MG tablet Take 1.5 tablets (225 mg total) by mouth every evening.    metoprolol succinate (TOPROL-XL) 25 MG 24 hr tablet TAKE 1 TABLET BY MOUTH DAILY

## 2020-09-15 DIAGNOSIS — I10 ESSENTIAL HYPERTENSION: ICD-10-CM

## 2020-09-15 RX ORDER — FUROSEMIDE 20 MG/1
TABLET ORAL
Qty: 30 TABLET | Refills: 6 | Status: SHIPPED | OUTPATIENT
Start: 2020-09-15 | End: 2021-07-15

## 2020-09-21 ENCOUNTER — PATIENT OUTREACH (OUTPATIENT)
Dept: OTHER | Facility: OTHER | Age: 85
End: 2020-09-21

## 2020-09-21 NOTE — PROGRESS NOTES
Digital Medicine: Clinician Follow-Up    Called patient to address high BP alert. Patient denies symptoms of hypertension. He says he feels a little weak. Patient says that he picked up irbesartan 150 mg tablets two days ago. He started taking 1.5 tablets again a day or two ago. He says that his BP was elevated yesterday because he was watching football.     The history is provided by the patient.   Follow-up reason(s): Alert received.   Care Team received high BP alert.      Hypertension    Readings are trending down Patient is not experiencing signs/symptoms of hypertension.          Last 5 Patient Entered Readings                                      Current 30 Day Average: 149/71     Recent Readings 9/20/2020 9/20/2020 9/20/2020 9/20/2020 9/20/2020    SBP (mmHg) 174 166 165 182 165    DBP (mmHg) 77 72 79 91 92    Pulse 41 41 43 41 43           ASSESSMENT(S)  Patients BP average is 147/71 mmHg, which is above goal. Patient's BP goal is less than or equal to 130/80 per 2017 ACC/AHA Hypertension Guidelines.     Hypertension Plan  Continue current therapy. Resume irbesartan 225 mg. If BP remains elevated, will increase to 300 mg.   Provided patient education.  Take BP when in a relaxed state.      Addressed patient questions and patient has my contact information if needed prior to next outreach. Patient verbalizes understanding.            There are no preventive care reminders to display for this patient.  There are no preventive care reminders to display for this patient.    Hypertension Medications     irbesartan (AVAPRO) 150 MG tablet Take 1.5 tablets (225 mg total) by mouth every evening.    furosemide (LASIX) 20 MG tablet TAKE 1 TABLET BY MOUTH ONCE DAILY AS NEEDED LEG EDEMA    metoprolol succinate (TOPROL-XL) 25 MG 24 hr tablet TAKE 1 TABLET BY MOUTH DAILY

## 2020-09-28 ENCOUNTER — PATIENT OUTREACH (OUTPATIENT)
Dept: OTHER | Facility: OTHER | Age: 85
End: 2020-09-28

## 2020-09-28 DIAGNOSIS — I10 ESSENTIAL HYPERTENSION: ICD-10-CM

## 2020-09-28 RX ORDER — IRBESARTAN 300 MG/1
300 TABLET ORAL NIGHTLY
Qty: 90 TABLET | Refills: 1 | Status: SHIPPED | OUTPATIENT
Start: 2020-09-28 | End: 2021-02-17 | Stop reason: SDUPTHER

## 2020-10-05 ENCOUNTER — PATIENT OUTREACH (OUTPATIENT)
Dept: OTHER | Facility: OTHER | Age: 85
End: 2020-10-05

## 2020-10-05 NOTE — PROGRESS NOTES
"Digital Medicine: Clinician Follow-Up    Called patient to address high BP alert. Patient denies symptoms of hypertension. He says that his weight "was building up" and last night he took a fluid pill. He says that he lost 3-4 lb and his BP will be lower the next time he takes it. Patient reports that he ate 1/2 lb of chinese fried rice last night. He says that he hasn't always taken BP readings at least an hour after taking medications and he can't guarantee that he doesn't take BP readings right after drinking coffee.     The history is provided by the patient.   Follow-up reason(s): Alert received.   Care Team received high BP alert.      Hypertension    Readings are trending up Patient is not experiencing signs/symptoms of hypertension.          Last 5 Patient Entered Readings                                      Current 30 Day Average: 147/71     Recent Readings 10/4/2020 10/4/2020 10/4/2020 10/4/2020 10/4/2020    SBP (mmHg) 176 177 182 195 196    DBP (mmHg) 75 83 79 78 87    Pulse 42 42 42 43 44                 Depression Screening  Did not address depression screening.    Sleep Apnea Screening    Did not address sleep apnea screening.     Medication Affordability Screening  Did not address medication affordability screening.     Medication Adherence-Medication adherence was asssessed.  Patient continue taking medication as prescribed.            ASSESSMENT(S)  Patients BP average is 147/71 mmHg, which is above goal. Patient's BP goal is less than or equal to 130/80.     Hypertension Plan  Continue current therapy.  Provided patient education.  Take BP readings at least an hour after medications and caffeine  Strongly encouraged sodium restriction and advised patient o limit salty foods like chinese food       Addressed patient questions and patient has my contact information if needed prior to next outreach. Patient verbalizes understanding.            There are no preventive care reminders to display for " this patient.  There are no preventive care reminders to display for this patient.    Hypertension Medications     furosemide (LASIX) 20 MG tablet TAKE 1 TABLET BY MOUTH ONCE DAILY AS NEEDED LEG EDEMA    irbesartan (AVAPRO) 300 MG tablet Take 1 tablet (300 mg total) by mouth every evening.    metoprolol succinate (TOPROL-XL) 25 MG 24 hr tablet TAKE 1 TABLET BY MOUTH DAILY

## 2020-10-12 ENCOUNTER — PATIENT OUTREACH (OUTPATIENT)
Dept: OTHER | Facility: OTHER | Age: 85
End: 2020-10-12

## 2020-10-12 NOTE — PROGRESS NOTES
High BP alert on 10/10/2020. Health  called to address and has open encounter.  Will push back outreach date for follow up.

## 2020-10-13 NOTE — PROGRESS NOTES
Digital Medicine: Health  Follow-Up    The history is provided by the patient.               Care Team received high BP alert.        Additional Follow-up details: Called to address elevated BP readings yesterday and ALERT received. Patient reports he is well, no complaints. Denies symptoms of hypertension- HA, chest pains and SOB. Endorsed medication adherence. Patient reports he is unable to identify cause of elevated reading.    Patient denies further needs from the program today.               Diet-Not assessedno change to diet    No change to diet.  Patient reports eating or drinking the following: Patient denies changes to diet since last call. Endorsed continued low sodium diet.       Physical Activity-Not assessed    Medication Adherence-Medication adherence was assessed.      Substance, Sleep, Stress-Not assessed      Continue current diet/physical activity routine.  Provided patient education.       Addressed patient questions and patient has my contact information if needed prior to next outreach. Patient verbalizes understanding.      Explained the importance of self-monitoring and medication adherence. Encouraged the patient to communicate with their health  for lifestyle modifications to help improve or maintain a healthy lifestyle.        Explained to the patient that the Digital Medicine team is not available for emergencies. Advised patient call [facility] ([phone] or [phone]) or [phone] if needed.            There are no preventive care reminders to display for this patient.      Last 5 Patient Entered Readings                                      Current 30 Day Average: 153/73     Recent Readings 10/12/2020 10/12/2020 10/12/2020 10/11/2020 10/11/2020    SBP (mmHg) 167 181 198 189 181    DBP (mmHg) 56 75 83 82 77    Pulse 40 43 42 37 37

## 2020-10-15 ENCOUNTER — IMMUNIZATION (OUTPATIENT)
Dept: PHARMACY | Facility: CLINIC | Age: 85
End: 2020-10-15
Payer: MEDICARE

## 2020-10-16 ENCOUNTER — TELEPHONE (OUTPATIENT)
Dept: HEMATOLOGY/ONCOLOGY | Facility: CLINIC | Age: 85
End: 2020-10-16

## 2020-10-16 ENCOUNTER — LAB VISIT (OUTPATIENT)
Dept: LAB | Facility: HOSPITAL | Age: 85
End: 2020-10-16
Attending: INTERNAL MEDICINE
Payer: MEDICARE

## 2020-10-16 ENCOUNTER — OFFICE VISIT (OUTPATIENT)
Dept: INTERNAL MEDICINE | Facility: CLINIC | Age: 85
End: 2020-10-16
Payer: MEDICARE

## 2020-10-16 VITALS
SYSTOLIC BLOOD PRESSURE: 148 MMHG | HEART RATE: 71 BPM | HEIGHT: 72 IN | OXYGEN SATURATION: 98 % | TEMPERATURE: 98 F | BODY MASS INDEX: 24.13 KG/M2 | DIASTOLIC BLOOD PRESSURE: 70 MMHG | WEIGHT: 178.13 LBS

## 2020-10-16 DIAGNOSIS — Z79.899 HIGH RISK MEDICATIONS (NOT ANTICOAGULANTS) LONG-TERM USE: ICD-10-CM

## 2020-10-16 DIAGNOSIS — I48.19 PERSISTENT ATRIAL FIBRILLATION: ICD-10-CM

## 2020-10-16 DIAGNOSIS — E78.2 MIXED HYPERLIPIDEMIA: ICD-10-CM

## 2020-10-16 DIAGNOSIS — L98.9 SKIN LESION OF LEFT ARM: ICD-10-CM

## 2020-10-16 DIAGNOSIS — C88.4 MALT LYMPHOMA: ICD-10-CM

## 2020-10-16 DIAGNOSIS — I10 ESSENTIAL HYPERTENSION: ICD-10-CM

## 2020-10-16 DIAGNOSIS — I10 ESSENTIAL HYPERTENSION: Primary | ICD-10-CM

## 2020-10-16 DIAGNOSIS — R10.9 RIGHT FLANK PAIN: ICD-10-CM

## 2020-10-16 DIAGNOSIS — I48.0 PAROXYSMAL ATRIAL FIBRILLATION: ICD-10-CM

## 2020-10-16 LAB
25(OH)D3+25(OH)D2 SERPL-MCNC: 27 NG/ML (ref 30–96)
ALBUMIN SERPL BCP-MCNC: 3.9 G/DL (ref 3.5–5.2)
ALP SERPL-CCNC: 43 U/L (ref 55–135)
ALT SERPL W/O P-5'-P-CCNC: 13 U/L (ref 10–44)
ANION GAP SERPL CALC-SCNC: 7 MMOL/L (ref 8–16)
AST SERPL-CCNC: 16 U/L (ref 10–40)
BASOPHILS # BLD AUTO: 0.06 K/UL (ref 0–0.2)
BASOPHILS NFR BLD: 1.1 % (ref 0–1.9)
BILIRUB SERPL-MCNC: 0.7 MG/DL (ref 0.1–1)
BILIRUB UR QL STRIP: NEGATIVE
BUN SERPL-MCNC: 19 MG/DL (ref 10–30)
CALCIUM SERPL-MCNC: 9.3 MG/DL (ref 8.7–10.5)
CHLORIDE SERPL-SCNC: 105 MMOL/L (ref 95–110)
CHOLEST SERPL-MCNC: 232 MG/DL (ref 120–199)
CHOLEST/HDLC SERPL: 2.9 {RATIO} (ref 2–5)
CLARITY UR REFRACT.AUTO: CLEAR
CO2 SERPL-SCNC: 27 MMOL/L (ref 23–29)
COLOR UR AUTO: YELLOW
CREAT SERPL-MCNC: 1.1 MG/DL (ref 0.5–1.4)
DIFFERENTIAL METHOD: ABNORMAL
EOSINOPHIL # BLD AUTO: 0 K/UL (ref 0–0.5)
EOSINOPHIL NFR BLD: 0 % (ref 0–8)
ERYTHROCYTE [DISTWIDTH] IN BLOOD BY AUTOMATED COUNT: 13.9 % (ref 11.5–14.5)
EST. GFR  (AFRICAN AMERICAN): >60 ML/MIN/1.73 M^2
EST. GFR  (NON AFRICAN AMERICAN): 57.2 ML/MIN/1.73 M^2
GLUCOSE SERPL-MCNC: 100 MG/DL (ref 70–110)
GLUCOSE UR QL STRIP: NEGATIVE
HCT VFR BLD AUTO: 39.5 % (ref 40–54)
HDLC SERPL-MCNC: 79 MG/DL (ref 40–75)
HDLC SERPL: 34.1 % (ref 20–50)
HGB BLD-MCNC: 12.8 G/DL (ref 14–18)
HGB UR QL STRIP: NEGATIVE
IMM GRANULOCYTES # BLD AUTO: 0.06 K/UL (ref 0–0.04)
IMM GRANULOCYTES NFR BLD AUTO: 1.1 % (ref 0–0.5)
KETONES UR QL STRIP: NEGATIVE
LDLC SERPL CALC-MCNC: 137.2 MG/DL (ref 63–159)
LEUKOCYTE ESTERASE UR QL STRIP: NEGATIVE
LYMPHOCYTES # BLD AUTO: 0.6 K/UL (ref 1–4.8)
LYMPHOCYTES NFR BLD: 12 % (ref 18–48)
MCH RBC QN AUTO: 33.5 PG (ref 27–31)
MCHC RBC AUTO-ENTMCNC: 32.4 G/DL (ref 32–36)
MCV RBC AUTO: 103 FL (ref 82–98)
MONOCYTES # BLD AUTO: 0.6 K/UL (ref 0.3–1)
MONOCYTES NFR BLD: 12 % (ref 4–15)
NEUTROPHILS # BLD AUTO: 4 K/UL (ref 1.8–7.7)
NEUTROPHILS NFR BLD: 73.8 % (ref 38–73)
NITRITE UR QL STRIP: NEGATIVE
NONHDLC SERPL-MCNC: 153 MG/DL
NRBC BLD-RTO: 0 /100 WBC
PH UR STRIP: 5 [PH] (ref 5–8)
PLATELET # BLD AUTO: 216 K/UL (ref 150–350)
PMV BLD AUTO: 9.7 FL (ref 9.2–12.9)
POTASSIUM SERPL-SCNC: 4.5 MMOL/L (ref 3.5–5.1)
PROT SERPL-MCNC: 7.2 G/DL (ref 6–8.4)
PROT UR QL STRIP: NEGATIVE
RBC # BLD AUTO: 3.82 M/UL (ref 4.6–6.2)
SODIUM SERPL-SCNC: 139 MMOL/L (ref 136–145)
SP GR UR STRIP: 1.02 (ref 1–1.03)
TRIGL SERPL-MCNC: 79 MG/DL (ref 30–150)
TSH SERPL DL<=0.005 MIU/L-ACNC: 1.78 UIU/ML (ref 0.4–4)
URN SPEC COLLECT METH UR: NORMAL
VIT B12 SERPL-MCNC: 290 PG/ML (ref 210–950)
WBC # BLD AUTO: 5.35 K/UL (ref 3.9–12.7)

## 2020-10-16 PROCEDURE — 82607 VITAMIN B-12: CPT | Mod: HCNC

## 2020-10-16 PROCEDURE — 1126F AMNT PAIN NOTED NONE PRSNT: CPT | Mod: HCNC,S$GLB,, | Performed by: INTERNAL MEDICINE

## 2020-10-16 PROCEDURE — 99999 PR PBB SHADOW E&M-EST. PATIENT-LVL V: CPT | Mod: PBBFAC,HCNC,, | Performed by: INTERNAL MEDICINE

## 2020-10-16 PROCEDURE — 80061 LIPID PANEL: CPT | Mod: HCNC

## 2020-10-16 PROCEDURE — 1126F PR PAIN SEVERITY QUANTIFIED, NO PAIN PRESENT: ICD-10-PCS | Mod: HCNC,S$GLB,, | Performed by: INTERNAL MEDICINE

## 2020-10-16 PROCEDURE — 80053 COMPREHEN METABOLIC PANEL: CPT | Mod: HCNC

## 2020-10-16 PROCEDURE — 1101F PT FALLS ASSESS-DOCD LE1/YR: CPT | Mod: HCNC,CPTII,S$GLB, | Performed by: INTERNAL MEDICINE

## 2020-10-16 PROCEDURE — 82306 VITAMIN D 25 HYDROXY: CPT | Mod: HCNC

## 2020-10-16 PROCEDURE — 1159F MED LIST DOCD IN RCRD: CPT | Mod: HCNC,S$GLB,, | Performed by: INTERNAL MEDICINE

## 2020-10-16 PROCEDURE — 36415 COLL VENOUS BLD VENIPUNCTURE: CPT | Mod: HCNC

## 2020-10-16 PROCEDURE — 85025 COMPLETE CBC W/AUTO DIFF WBC: CPT | Mod: HCNC

## 2020-10-16 PROCEDURE — 99999 PR PBB SHADOW E&M-EST. PATIENT-LVL V: ICD-10-PCS | Mod: PBBFAC,HCNC,, | Performed by: INTERNAL MEDICINE

## 2020-10-16 PROCEDURE — 84443 ASSAY THYROID STIM HORMONE: CPT | Mod: HCNC

## 2020-10-16 PROCEDURE — 99214 OFFICE O/P EST MOD 30 MIN: CPT | Mod: HCNC,S$GLB,, | Performed by: INTERNAL MEDICINE

## 2020-10-16 PROCEDURE — 1101F PR PT FALLS ASSESS DOC 0-1 FALLS W/OUT INJ PAST YR: ICD-10-PCS | Mod: HCNC,CPTII,S$GLB, | Performed by: INTERNAL MEDICINE

## 2020-10-16 PROCEDURE — 81003 URINALYSIS AUTO W/O SCOPE: CPT | Mod: HCNC

## 2020-10-16 PROCEDURE — 99214 PR OFFICE/OUTPT VISIT, EST, LEVL IV, 30-39 MIN: ICD-10-PCS | Mod: HCNC,S$GLB,, | Performed by: INTERNAL MEDICINE

## 2020-10-16 PROCEDURE — 1159F PR MEDICATION LIST DOCUMENTED IN MEDICAL RECORD: ICD-10-PCS | Mod: HCNC,S$GLB,, | Performed by: INTERNAL MEDICINE

## 2020-10-16 RX ORDER — AMIODARONE HYDROCHLORIDE 200 MG/1
TABLET ORAL
Qty: 45 TABLET | Refills: 0
Start: 2020-10-16 | End: 2020-11-17 | Stop reason: SINTOL

## 2020-10-16 RX ORDER — METAXALONE 800 MG/1
TABLET ORAL
Qty: 45 TABLET | Refills: 0 | Status: SHIPPED | OUTPATIENT
Start: 2020-10-16 | End: 2020-11-17

## 2020-10-16 RX ORDER — CLOPIDOGREL BISULFATE 75 MG/1
75 TABLET ORAL DAILY
COMMUNITY
End: 2020-10-16

## 2020-10-16 NOTE — TELEPHONE ENCOUNTER
----- Message from Tatum Ruvalcaba sent at 10/16/2020 12:26 PM CDT -----  Regarding: follow up  Pt needs to be scheduled for a follow up appointment. Please call pt to schedule at [phone].    Thank you

## 2020-10-16 NOTE — PROGRESS NOTES
Subjective:       Patient ID: Norm Mendoza is a 89 y.o. male.    Chief Complaint:   Follow-up, Hyperlipidemia, and Hypertension    HPI: Mr Mendoza for f/u the above  He has done ok during COVID pandemic-no exposures/symptoms  A-Fib: He has hx PAF dx'd 6/19 and is s/p DCCV and Watchman done 12/2019; he follows Arrhythmia/Dr Crowder and has stayed in sinus bradycardia  HTN: Home BPs 140-185/60-80  He does c/o more fatigued but sleeps well. He is not presently working-was working for [employer] and this stopped in March. No CP/SOB He is doing pilates daily x 15 minutes and walks 1/2 mile    Past Medical, Surgical, Social History: Please see as stated in Epic chart which has been reviewed.    Current Outpatient Medications   Medication Sig Dispense Refill    amiodarone (PACERONE) 200 MG Tab 1/2 tab daily 45 tablet 0    furosemide (LASIX) 20 MG tablet TAKE 1 TABLET BY MOUTH ONCE DAILY AS NEEDED LEG EDEMA 30 tablet 6    irbesartan (AVAPRO) 300 MG tablet Take 1 tablet (300 mg total) by mouth every evening. 90 tablet 1    metoprolol succinate (TOPROL-XL) 25 MG 24 hr tablet TAKE 1 TABLET BY MOUTH DAILY 30 tablet 6    metaxalone (SKELAXIN) 800 MG tablet 1 tab Q8 hours as needed for muscle spasm 45 tablet 0    mupirocin (BACTROBAN) 2 % ointment Apply to affected area 3 times daily (Patient not taking: Reported on 10/16/2020) 22 g 1     Current Facility-Administered Medications   Medication Dose Route Frequency Provider Last Rate Last Dose    lidocaine HCl 2% urojet   Urethral Once Grover Ochoa Jr., MD           Review of Systems   Constitutional: Negative for fever.   Respiratory: Negative for chest tightness and shortness of breath.    Cardiovascular: Negative for chest pain, palpitations and leg swelling.   Gastrointestinal: Negative for abdominal pain, blood in stool, constipation, diarrhea, nausea and vomiting.   Genitourinary: Negative.         + Stable nocturia/2-3x   Musculoskeletal: Positive for back  pain.        Right flank muscle tightness with walking/working in yard   Skin:        +Lesion left arm   Neurological: Negative for dizziness, syncope and headaches.       Objective:      Lab Results   Component Value Date    WBC 5.35 10/16/2020    HGB 12.8 (L) 10/16/2020    HCT 39.5 (L) 10/16/2020     10/16/2020    CHOL 232 (H) 10/16/2020    TRIG 79 10/16/2020    HDL 79 (H) 10/16/2020    ALT 13 10/16/2020    AST 16 10/16/2020     10/16/2020    K 4.5 10/16/2020     10/16/2020    CREATININE 1.1 10/16/2020    BUN 19 10/16/2020    CO2 27 10/16/2020    TSH 1.778 10/16/2020    PSA 2.5 09/10/2015    INR 1.1 10/01/2019     Physical Exam  Constitutional:       Appearance: Normal appearance. He is normal weight.   HENT:      Head: Normocephalic and atraumatic.   Neck:      Musculoskeletal: Normal range of motion and neck supple. No muscular tenderness.   Cardiovascular:      Rate and Rhythm: Regular rhythm.      Comments: VHR regular at 42 and 48  Pulmonary:      Effort: Pulmonary effort is normal.      Breath sounds: Normal breath sounds. No wheezing.   Abdominal:      General: Abdomen is flat. Bowel sounds are normal.      Palpations: Abdomen is soft. There is no mass.      Tenderness: There is no abdominal tenderness.   Musculoskeletal:         General: No swelling or tenderness.      Right lower leg: No edema.      Left lower leg: No edema.   Lymphadenopathy:      Cervical: No cervical adenopathy.   Skin:     Findings: Lesion present.      Comments: Left forearm shows an irregular papular growth(1/2 cm in diameter) with evidence of local bleeding from trauma   Neurological:      General: No focal deficit present.      Mental Status: He is alert.   Psychiatric:         Mood and Affect: Mood normal.           Vital Signs  Temp: 97.6 °F (36.4 °C)  Temp src: Oral  Pulse: 71  SpO2: 98 %  BP: (!) 148/70  Pain Score: 0-No pain  Height and Weight  Height: 6' (182.9 cm)  Weight: 80.8 kg (178 lb 2.1 oz)  BSA  (Calculated - sq m): 2.03 sq meters  BMI (Calculated): 24.2  Weight in (lb) to have BMI = 25: 183.9]    Assessment:       1. Essential hypertension    2. Mixed hyperlipidemia    3. Paroxysmal atrial fibrillation    4. MALT lymphoma    5. Persistent atrial fibrillation    6. Right flank pain    7. High risk medications (not anticoagulants) long-term use    8. Skin lesion of left arm        Plan:     Health Maintenance   Topic Date Due    Lipid Panel  10/16/2025    TETANUS VACCINE  01/19/2027    Pneumococcal Vaccine (65+ High/Highest Risk)  Completed        Norm was seen today for follow-up, hyperlipidemia and hypertension.    Diagnoses and all orders for this visit:    Essential hypertension/mild elevation in SBP        -    Continue Toprol XL 25mg QD and Irbesartan 300mg QD        -     Increase Lasix 20mg to 1 tab weekly  -     CBC auto differential; Future  -     Comprehensive Metabolic Panel; Future  -     TSH; Future    Mixed hyperlipidemia  -     Comprehensive Metabolic Panel; Future  -     Lipid Panel; Future    Paroxysmal atrial fibrillation/Rate controlled and on ASA QDay        -      Clarify amiodarone (PACERONE) 200 MG Tab; 1/2 tab daily        -      RTC to Arrhythmia/Dr Crowder as scheduled    MALT lymphoma        -      Schedule RTC to Heme-Onc/Dr Beckwith    Right flank pain  -     Urinalysis, Reflex to Urine Culture Urine, Clean Catch  -     metaxalone (SKELAXIN) 800 MG tablet; 1 tab Q8 hours as needed for muscle spasm    High risk medications (not anticoagulants) long-term use  -     Vitamin B12; Future  -     Vitamin D; Future    Skin lesion of left arm  -     Ambulatory referral/consult to Dermatology; Future    Health Maintenance        -     Pt to do full lab: CBC/CMP/Lipid/TSH/B-12/Vit D         -     RTC x 4 months

## 2020-10-19 ENCOUNTER — PATIENT OUTREACH (OUTPATIENT)
Dept: OTHER | Facility: OTHER | Age: 85
End: 2020-10-19

## 2020-10-20 ENCOUNTER — TELEPHONE (OUTPATIENT)
Dept: INTERNAL MEDICINE | Facility: CLINIC | Age: 85
End: 2020-10-20

## 2020-10-26 ENCOUNTER — TELEPHONE (OUTPATIENT)
Dept: HEMATOLOGY/ONCOLOGY | Facility: CLINIC | Age: 85
End: 2020-10-26

## 2020-10-26 DIAGNOSIS — C88.4 MALT LYMPHOMA: Primary | ICD-10-CM

## 2020-10-26 NOTE — TELEPHONE ENCOUNTER
"----- Message from Sis Rowland sent at 10/26/2020  8:49 AM CDT -----  Patient Assist    Name of caller:  Norm  Contact Preference:  [phone]  Does Patient feel the need to see the MD today? No   Physician:  Tang Mendoza MD   What is the nature of the call?    - pt called to r/s the 10/29 appts . Please call and assist.   Additional Notes:   "Thank you for all that you do for our patients'"          "

## 2020-10-30 ENCOUNTER — PATIENT OUTREACH (OUTPATIENT)
Dept: OTHER | Facility: OTHER | Age: 85
End: 2020-10-30

## 2020-10-30 DIAGNOSIS — I10 ESSENTIAL HYPERTENSION: Primary | ICD-10-CM

## 2020-11-03 ENCOUNTER — TELEPHONE (OUTPATIENT)
Dept: INTERNAL MEDICINE | Facility: CLINIC | Age: 85
End: 2020-11-03

## 2020-11-03 DIAGNOSIS — E53.8 VITAMIN B12 DEFICIENCY: ICD-10-CM

## 2020-11-03 DIAGNOSIS — E55.9 VITAMIN D DEFICIENCY: Primary | ICD-10-CM

## 2020-11-03 DIAGNOSIS — E78.5 HYPERLIPIDEMIA, UNSPECIFIED HYPERLIPIDEMIA TYPE: ICD-10-CM

## 2020-11-03 NOTE — TELEPHONE ENCOUNTER
Spoke with Mr Kelly ie his Lab(10/16)-showed B-12 low at 290, Vit D at 27, TSH/Lipid/CMP/CBC all ok. He notes that he has stopped eating red meat x last 5-6 months but will restart such.  I've recommended he start a daily Multivitamin containing B-12 and D.  He will also start back on modest intake of red meats and try to get 15-20 minutes of sun exposure daily.  Victor Hugo,Please schedule lab 1 week prior to pt's RTC appt with me.  Thanks

## 2020-11-05 ENCOUNTER — TELEPHONE (OUTPATIENT)
Dept: HEMATOLOGY/ONCOLOGY | Facility: CLINIC | Age: 85
End: 2020-11-05

## 2020-11-05 NOTE — TELEPHONE ENCOUNTER
Left VM for patient regarding late for appointment today, instructed to call back if needs to reschedule.

## 2020-11-06 ENCOUNTER — TELEPHONE (OUTPATIENT)
Dept: HEMATOLOGY/ONCOLOGY | Facility: CLINIC | Age: 85
End: 2020-11-06

## 2020-11-06 NOTE — TELEPHONE ENCOUNTER
----- Message from Kelly Beckwith MD sent at 11/5/2020  4:23 PM CST -----  This patient missed his visit today, can he be rescheduled with labs prior (CBC, CMP, LDH)? Thank you

## 2020-11-16 ENCOUNTER — OFFICE VISIT (OUTPATIENT)
Dept: DERMATOLOGY | Facility: CLINIC | Age: 85
End: 2020-11-16
Payer: MEDICARE

## 2020-11-16 DIAGNOSIS — L57.0 AK (ACTINIC KERATOSIS): Primary | ICD-10-CM

## 2020-11-16 DIAGNOSIS — L82.1 SK (SEBORRHEIC KERATOSIS): ICD-10-CM

## 2020-11-16 DIAGNOSIS — Z85.828 HISTORY OF NONMELANOMA SKIN CANCER: ICD-10-CM

## 2020-11-16 PROCEDURE — 1101F PR PT FALLS ASSESS DOC 0-1 FALLS W/OUT INJ PAST YR: ICD-10-PCS | Mod: HCNC,CPTII,S$GLB, | Performed by: DERMATOLOGY

## 2020-11-16 PROCEDURE — 17003 DESTRUCTION, PREMALIGNANT LESIONS; SECOND THROUGH 14 LESIONS: ICD-10-PCS | Mod: HCNC,S$GLB,, | Performed by: DERMATOLOGY

## 2020-11-16 PROCEDURE — 17000 PR DESTRUCTION(LASER SURGERY,CRYOSURGERY,CHEMOSURGERY),PREMALIGNANT LESIONS,FIRST LESION: ICD-10-PCS | Mod: HCNC,S$GLB,, | Performed by: DERMATOLOGY

## 2020-11-16 PROCEDURE — 99999 PR PBB SHADOW E&M-EST. PATIENT-LVL III: CPT | Mod: PBBFAC,HCNC,, | Performed by: DERMATOLOGY

## 2020-11-16 PROCEDURE — 99999 PR PBB SHADOW E&M-EST. PATIENT-LVL III: ICD-10-PCS | Mod: PBBFAC,HCNC,, | Performed by: DERMATOLOGY

## 2020-11-16 PROCEDURE — 99213 OFFICE O/P EST LOW 20 MIN: CPT | Mod: 25,HCNC,S$GLB, | Performed by: DERMATOLOGY

## 2020-11-16 PROCEDURE — 99213 PR OFFICE/OUTPT VISIT, EST, LEVL III, 20-29 MIN: ICD-10-PCS | Mod: 25,HCNC,S$GLB, | Performed by: DERMATOLOGY

## 2020-11-16 PROCEDURE — 3288F FALL RISK ASSESSMENT DOCD: CPT | Mod: HCNC,CPTII,S$GLB, | Performed by: DERMATOLOGY

## 2020-11-16 PROCEDURE — 1126F AMNT PAIN NOTED NONE PRSNT: CPT | Mod: HCNC,S$GLB,, | Performed by: DERMATOLOGY

## 2020-11-16 PROCEDURE — 3288F PR FALLS RISK ASSESSMENT DOCUMENTED: ICD-10-PCS | Mod: HCNC,CPTII,S$GLB, | Performed by: DERMATOLOGY

## 2020-11-16 PROCEDURE — 17000 DESTRUCT PREMALG LESION: CPT | Mod: HCNC,S$GLB,, | Performed by: DERMATOLOGY

## 2020-11-16 PROCEDURE — 1159F MED LIST DOCD IN RCRD: CPT | Mod: HCNC,S$GLB,, | Performed by: DERMATOLOGY

## 2020-11-16 PROCEDURE — 1159F PR MEDICATION LIST DOCUMENTED IN MEDICAL RECORD: ICD-10-PCS | Mod: HCNC,S$GLB,, | Performed by: DERMATOLOGY

## 2020-11-16 PROCEDURE — 17003 DESTRUCT PREMALG LES 2-14: CPT | Mod: HCNC,S$GLB,, | Performed by: DERMATOLOGY

## 2020-11-16 PROCEDURE — 1101F PT FALLS ASSESS-DOCD LE1/YR: CPT | Mod: HCNC,CPTII,S$GLB, | Performed by: DERMATOLOGY

## 2020-11-16 PROCEDURE — 1126F PR PAIN SEVERITY QUANTIFIED, NO PAIN PRESENT: ICD-10-PCS | Mod: HCNC,S$GLB,, | Performed by: DERMATOLOGY

## 2020-11-16 RX ORDER — ASPIRIN 81 MG/1
81 TABLET ORAL DAILY
COMMUNITY
End: 2023-11-09

## 2020-11-16 NOTE — ASSESSMENT & PLAN NOTE
Today's Plan:      Cryosurgery Procedure Note    Verbal consent from the patient is obtained including, but not limited to, risk of hypopigmentation/hyperpigmentation, scar, recurrence of lesion. The patient is aware of the precancerous quality and need for treatment of these lesions. Liquid nitrogen cryosurgery is applied to the 6 actinic keratoses, as detailed in the physical exam, to produce a freeze injury. The patient is aware that blisters may form and is instructed on wound care with gentle cleansing and use of vaseline ointment to keep moist until healed. The patient is supplied a handout on cryosurgery and is instructed to call if lesions do not completely resolve.

## 2020-11-16 NOTE — LETTER
November 16, 2020      Amber Jenkins MD  [address]  [facility] [address]           [facility] [address] [address]  [address]  [facility] [address]  Phone: [phone]  Fax: [phone]          Patient: Norm Mendoza   MR Number: [id number]   YOB: 1926   Date of Visit: 11/16/2020       Dear Dr. Amber Jenkins:    Thank you for referring Norm Mendoza to me for evaluation. Attached you will find relevant portions of my assessment and plan of care.    If you have questions, please do not hesitate to call me. I look forward to following Norm Mendoza along with you.    Sincerely,    Shante Kerr MD    Enclosure  CC:  No Recipients    If you would like to receive this communication electronically, please contact [email or web address] or [phone] to request more information on [facility] access.    For providers and/or their staff who would like to refer a patient to [facility], please contact us through our one-stop-shop provider referral line, [facility], at [phone].    If you feel you have received this communication in error or would no longer like to receive these types of communications, please e-mail [email or web address]

## 2020-11-16 NOTE — PROGRESS NOTES
Subjective:       Patient ID:  Norm Mendoza is a 89 y.o. male who presents for   Chief Complaint   Patient presents with    Skin Check     ubse    Skin Discoloration     hands and arms     History of Present Illness: The patient presents for follow up of skin check.    The patient was last seen on: 06/15/2018 for UBSE         This is a high risk patient here to check for the development of new lesions.      Skin Discoloration - Initial  Affected locations: right hand, left hand, left arm and right arm  Duration: 1 year  Signs / symptoms: dryness and bruising  Severity: moderate  Timing: intermittent  Exacerbated by: bumping self.  Relieving factors/Treatments tried: nothing  Improvement on treatment: no relief        Review of Systems   Skin: Positive for activity-related sunscreen use and wears hat (sometimes). Negative for daily sunscreen use and recent sunburn.   Hematologic/Lymphatic: Bruises/bleeds easily.        Objective:    Physical Exam   Constitutional: He appears well-developed and well-nourished. No distress.   HENT:   Mouth/Throat: Lips normal.    Eyes: Lids are normal.    Neurological: He is alert and oriented to person, place, and time. He is not disoriented.   Psychiatric: He has a normal mood and affect.   Skin:   Areas Examined (abnormalities noted in diagram):   Scalp / Hair Palpated and Inspected  Head / Face Inspection Performed  Neck Inspection Performed  Chest / Axilla Inspection Performed  Back Inspection Performed  RUE Inspected  LUE Inspection Performed  Nails and Digits Inspection Performed                   Diagram Legend     Erythematous scaling macule/papule c/w actinic keratosis       Vascular papule c/w angioma      Pigmented verrucoid papule/plaque c/w seborrheic keratosis      Yellow umbilicated papule c/w sebaceous hyperplasia      Irregularly shaped tan macule c/w lentigo     1-2 mm smooth white papules consistent with Milia      Movable subcutaneous cyst with punctum  c/w epidermal inclusion cyst      Subcutaneous movable cyst c/w pilar cyst      Firm pink to brown papule c/w dermatofibroma      Pedunculated fleshy papule(s) c/w skin tag(s)      Evenly pigmented macule c/w junctional nevus     Mildly variegated pigmented, slightly irregular-bordered macule c/w mildly atypical nevus      Flesh colored to evenly pigmented papule c/w intradermal nevus       Pink pearly papule/plaque c/w basal cell carcinoma      Erythematous hyperkeratotic cursted plaque c/w SCC      Surgical scar with no sign of skin cancer recurrence      Open and closed comedones      Inflammatory papules and pustules      Verrucoid papule consistent consistent with wart     Erythematous eczematous patches and plaques     Dystrophic onycholytic nail with subungual debris c/w onychomycosis     Umbilicated papule    Erythematous-base heme-crusted tan verrucoid plaque consistent with inflamed seborrheic keratosis     Erythematous Silvery Scaling Plaque c/w Psoriasis     See annotation      Assessment / Plan:        AK (actinic keratosis)  Cryosurgery Procedure Note    Verbal consent from the patient is obtained including, but not limited to, risk of hypopigmentation/hyperpigmentation, scar, recurrence of lesion. The patient is aware of the precancerous quality and need for treatment of these lesions. Liquid nitrogen cryosurgery is applied to the 6 actinic keratoses, as detailed in the physical exam, to produce a freeze injury. The patient is aware that blisters may form and is instructed on wound care with gentle cleansing and use of vaseline ointment to keep moist until healed. The patient is supplied a handout on cryosurgery and is instructed to call if lesions do not completely resolve.    SK (seborrheic keratosis)  These are benign inherited growths without a malignant potential. Reassurance given to patient. No treatment is necessary.     History of nonmelanoma skin cancer  -     Ambulatory referral/consult to  Dermatology  Area(s) of previous NMSC evaluated with no signs of recurrence.    Upper body skin examination performed today including at least 6 points as noted in physical examination. No lesions suspicious for malignancy noted.      AK (actinic keratosis)  Today's Plan:      Cryosurgery Procedure Note    Verbal consent from the patient is obtained including, but not limited to, risk of hypopigmentation/hyperpigmentation, scar, recurrence of lesion. The patient is aware of the precancerous quality and need for treatment of these lesions. Liquid nitrogen cryosurgery is applied to the 6 actinic keratoses, as detailed in the physical exam, to produce a freeze injury. The patient is aware that blisters may form and is instructed on wound care with gentle cleansing and use of vaseline ointment to keep moist until healed. The patient is supplied a handout on cryosurgery and is instructed to call if lesions do not completely resolve.        Follow up in about 1 year (around 11/16/2021).

## 2020-11-16 NOTE — PATIENT INSTRUCTIONS

## 2020-11-17 ENCOUNTER — HOSPITAL ENCOUNTER (OUTPATIENT)
Dept: CARDIOLOGY | Facility: CLINIC | Age: 85
Discharge: HOME | End: 2020-11-17
Payer: MEDICARE

## 2020-11-17 ENCOUNTER — OFFICE VISIT (OUTPATIENT)
Dept: ELECTROPHYSIOLOGY | Facility: CLINIC | Age: 85
End: 2020-11-17
Payer: MEDICARE

## 2020-11-17 VITALS
SYSTOLIC BLOOD PRESSURE: 180 MMHG | WEIGHT: 179.25 LBS | HEART RATE: 49 BPM | HEIGHT: 72 IN | DIASTOLIC BLOOD PRESSURE: 88 MMHG | BODY MASS INDEX: 24.28 KG/M2

## 2020-11-17 DIAGNOSIS — Z95.818 PRESENCE OF WATCHMAN LEFT ATRIAL APPENDAGE CLOSURE DEVICE: ICD-10-CM

## 2020-11-17 DIAGNOSIS — I48.19 PERSISTENT ATRIAL FIBRILLATION: ICD-10-CM

## 2020-11-17 DIAGNOSIS — I48.19 PERSISTENT ATRIAL FIBRILLATION: Primary | ICD-10-CM

## 2020-11-17 PROCEDURE — 99499 RISK ADDL DX/OHS AUDIT: ICD-10-PCS | Mod: S$GLB,,, | Performed by: INTERNAL MEDICINE

## 2020-11-17 PROCEDURE — 1126F PR PAIN SEVERITY QUANTIFIED, NO PAIN PRESENT: ICD-10-PCS | Mod: HCNC,S$GLB,, | Performed by: INTERNAL MEDICINE

## 2020-11-17 PROCEDURE — 3288F FALL RISK ASSESSMENT DOCD: CPT | Mod: HCNC,CPTII,S$GLB, | Performed by: INTERNAL MEDICINE

## 2020-11-17 PROCEDURE — 93010 ELECTROCARDIOGRAM REPORT: CPT | Mod: HCNC,S$GLB,, | Performed by: INTERNAL MEDICINE

## 2020-11-17 PROCEDURE — 99214 OFFICE O/P EST MOD 30 MIN: CPT | Mod: HCNC,S$GLB,, | Performed by: INTERNAL MEDICINE

## 2020-11-17 PROCEDURE — 93005 ELECTROCARDIOGRAM TRACING: CPT | Mod: HCNC,S$GLB,, | Performed by: INTERNAL MEDICINE

## 2020-11-17 PROCEDURE — 3288F PR FALLS RISK ASSESSMENT DOCUMENTED: ICD-10-PCS | Mod: HCNC,CPTII,S$GLB, | Performed by: INTERNAL MEDICINE

## 2020-11-17 PROCEDURE — 1159F MED LIST DOCD IN RCRD: CPT | Mod: HCNC,S$GLB,, | Performed by: INTERNAL MEDICINE

## 2020-11-17 PROCEDURE — 1159F PR MEDICATION LIST DOCUMENTED IN MEDICAL RECORD: ICD-10-PCS | Mod: HCNC,S$GLB,, | Performed by: INTERNAL MEDICINE

## 2020-11-17 PROCEDURE — 1101F PT FALLS ASSESS-DOCD LE1/YR: CPT | Mod: HCNC,CPTII,S$GLB, | Performed by: INTERNAL MEDICINE

## 2020-11-17 PROCEDURE — 99999 PR PBB SHADOW E&M-EST. PATIENT-LVL III: CPT | Mod: PBBFAC,HCNC,, | Performed by: INTERNAL MEDICINE

## 2020-11-17 PROCEDURE — 99214 PR OFFICE/OUTPT VISIT, EST, LEVL IV, 30-39 MIN: ICD-10-PCS | Mod: HCNC,S$GLB,, | Performed by: INTERNAL MEDICINE

## 2020-11-17 PROCEDURE — 1126F AMNT PAIN NOTED NONE PRSNT: CPT | Mod: HCNC,S$GLB,, | Performed by: INTERNAL MEDICINE

## 2020-11-17 PROCEDURE — 93005 RHYTHM STRIP: ICD-10-PCS | Mod: HCNC,S$GLB,, | Performed by: INTERNAL MEDICINE

## 2020-11-17 PROCEDURE — 99999 PR PBB SHADOW E&M-EST. PATIENT-LVL III: ICD-10-PCS | Mod: PBBFAC,HCNC,, | Performed by: INTERNAL MEDICINE

## 2020-11-17 PROCEDURE — 99499 UNLISTED E&M SERVICE: CPT | Mod: S$GLB,,, | Performed by: INTERNAL MEDICINE

## 2020-11-17 PROCEDURE — 1101F PR PT FALLS ASSESS DOC 0-1 FALLS W/OUT INJ PAST YR: ICD-10-PCS | Mod: HCNC,CPTII,S$GLB, | Performed by: INTERNAL MEDICINE

## 2020-11-17 PROCEDURE — 93010 RHYTHM STRIP: ICD-10-PCS | Mod: HCNC,S$GLB,, | Performed by: INTERNAL MEDICINE

## 2020-11-17 RX ORDER — CLOPIDOGREL BISULFATE 75 MG/1
75 TABLET ORAL DAILY
COMMUNITY
End: 2020-11-17 | Stop reason: ALTCHOICE

## 2020-11-17 RX ORDER — MULTIVITAMIN
1 TABLET ORAL DAILY
COMMUNITY

## 2020-11-17 RX ORDER — AMLODIPINE BESYLATE 2.5 MG/1
2.5 TABLET ORAL NIGHTLY
Qty: 30 TABLET | Refills: 5 | Status: SHIPPED | OUTPATIENT
Start: 2020-11-17 | End: 2020-12-16 | Stop reason: SDUPTHER

## 2020-11-17 NOTE — PROGRESS NOTES
Subjective:    Patient ID:  Norm Mendoza is a 89 y.o. male who presents for follow-up of Atrial Fibrillation      89 yoM here for LAAO consideration.     6/18/19: He has had development of HF as well as persistent AF. He was in NSR 2014. The next ECG 11/18 showed AF. EF 5/18 was normal in NSR. He was started on OAC 12/18 for AF. 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 He underwent Rituximab therapy and had resolution of his ulcers 4/19 but had residual MALT. He did not receive radiation.  He has mild HF symptoms, treated with lasix. Stress echo 5/19 showed EF 40%. He was prescribed xarelto 5/30/19 but he has not taken it.    10/19: He was cardioverted 7/12/19 with mild improvement in symptoms. EF 45% in NSR. He asks about Watchman.     8/2020: Watchman 10/2019, LAAO sealed 12/19, EF normal. Remains on amiodarone 200 mg qd. HRs lower than normal, also with fatigue and some weight gain. He takes lasix PRN.     Interval history: some confusion on his meds. He should be off amiodarone and off clopidogrel. BP elevated today.     Echo 5/18:  CONCLUSIONS     1 - Normal left ventricular systolic function (EF 55-60%).     2 - Indeterminate LV diastolic function.     3 - Biatrial enlargement.     4 - No wall motion abnormalities.     5 - Normal right ventricular systolic function .     6 - Mildly enlarged ascending aorta.     7 - Trivial to mild aortic regurgitation.     8 - Mild mitral regurgitation.     9 - Mild tricuspid regurgitation.     10 - Trivial to mild pulmonic regurgitation.     11 - The estimated PA systolic pressure is 33 mmHg.     Stress echo 5/19:  · THE PATIENT IS IN ATRIAL FIBRILLATION FOR THE ENTIRETY OF THE TEST  · The patient reported no symptoms during the stress test.  · The test was stopped secondary to fatigue.  · There were no arrhythmias during stress.  · Mildly decreased left ventricular systolic function. The estimated ejection fraction is 40%, 2D  quantitative LVEF 33%.  · Grade II (moderate) left ventricular diastolic dysfunction consistent with pseudonormalization.  · Elevated left atrial pressure.  · Severe left atrial enlargement.  · The ascending aorta is moderately dilated.  · Mild tricuspid regurgitation.  · Low normal right ventricular systolic function.  · Moderate right atrial enlargement.  · Concentric left ventricular remodeling.  · Overall, the patient's exercise capacity was normal.  · The EKG portion of this study is negative for myocardial ischemia.  · No obvious wall motion abnormalities at stress. LV function augments but still not normal at stress.    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  5/2/2018: Mixed hyperlipidemia  7/30/2019: On continuous oral anticoagulation  11/30/2018: Paroxysmal atrial fibrillation  No date: Skin cancer      Comment:  x3, had mohs on nose  excised 12/5/14: Squamous cell carcinoma      Comment:  R lower back  12/6/2018: Symptomatic anemia  No date: Urinary tract infection      Comment:  x4    Past Surgical History:  No date: ACHILLES TENDON SURGERY  2013: CATARACT EXTRACTION W/  INTRAOCULAR LENS IMPLANT; Bilateral  10/11/2019: CLOSURE OF LEFT ATRIAL APPENDAGE USING DEVICE; N/A      Comment:  Procedure: Left atrial appendage closure device;                 Surgeon: Hi Crowder MD;  Location: [facility];                Service: Cardiology;  Laterality: N/A;  AF, JACINTO, Watchman               Implant, BSci, Gen, MB, 3 Prep  No date: CYSTOSCOPY      Comment:  bladder tumor  12/7/2018: ESOPHAGOGASTRODUODENOSCOPY; N/A      Comment:  Procedure: EGD (ESOPHAGOGASTRODUODENOSCOPY);  Surgeon:                Austin Garcia MD;  [facility]                 Service:  Endoscopy;  Laterality: N/A;  2019: ESOPHAGOGASTRODUODENOSCOPY; N/A      Comment:  Procedure: EGD (ESOPHAGOGASTRODUODENOSCOPY);  Surgeon:                Austin Garcia MD;  [facility]                 Service: Endoscopy;  Laterality: N/A;  please schedule                within 4 weeks  No date: SKIN CANCER EXCISION  2019: TREATMENT OF CARDIAC ARRHYTHMIA; N/A      Comment:  Procedure: Cardioversion or Defibrillation;  Surgeon:                Hi Crowder MD;  Location: [facility];  Service:               Cardiology;  Laterality: N/A;  AF, JAICNTO, DCCV, MAC, MB, 3                Prep    Social History    Socioeconomic History      Marital status:       Spouse name: Not on file      Number of children: 3      Years of education: Not on file      Highest education level: Not on file    Occupational History      Occupation: Financial Work/        Employer: retired      Occupation: actor      Occupation:     Social Needs      Financial resource strain: Not on file      Food insecurity        Worry: Not on file        Inability: Not on file      Transportation needs        Medical: Not on file        Non-medical: Not on file    Tobacco Use      Smoking status: Former Smoker        Packs/day: 1.00        Years: 25.00        Pack years: 25        Types: Cigarettes        Quit date: 9/3/1970        Years since quittin.2      Smokeless tobacco: Never Used    Substance and Sexual Activity      Alcohol use: Yes        Alcohol/week: 3.0 standard drinks        Types: 3 Shots of liquor per week        Comment: 3 bourbons daily - 12 beer on weekends       Drug use: No      Sexual activity: Yes        Partners: Female    Lifestyle      Physical activity        Days per week: Not on file        Minutes per session: Not on file      Stress: Not on file    Relationships      Social connections        Talks on phone: Not on file        Gets together: Not on file         Attends [employer] service: Not on file        Active member of club or organization: Not on file        Attends meetings of clubs or organizations: Not on file        Relationship status: Not on file    Other Topics      Concerns:        Not on file    Social History Narrative      He is  with 2/3 kids living(2 sons/1 daughter who passed away); He has 6 Grandkids(5 under 10 y/o); He has 1 son here in [place] /1 grandson at [facility]; His other son(3 kids) lives in [place]; His Grand-daughter(Her Mother passed away) with her 2 kids lives in [place]      He had worked for [employer] which has dissolved but now works for [employer]      Review of patient's family history indicates:  Problem: Stroke      Relation: Father          Age of Onset: (Not Specified)  Problem: Hypertension      Relation: Father          Age of Onset: (Not Specified)  Problem: Stroke      Relation: Brother          Age of Onset: (Not Specified)  Problem: Anesthesia problems      Relation: Neg Hx          Age of Onset: (Not Specified)  Problem: Malignant hypertension      Relation: Neg Hx          Age of Onset: (Not Specified)  Problem: Hypotension      Relation: Neg Hx          Age of Onset: (Not Specified)  Problem: Malignant hyperthermia      Relation: Neg Hx          Age of Onset: (Not Specified)  Problem: Pseudochol deficiency      Relation: Neg Hx          Age of Onset: (Not Specified)  Problem: Melanoma      Relation: Neg Hx          Age of Onset: (Not Specified)  Problem: Heart attack      Relation: Neg Hx          Age of Onset: (Not Specified)  Problem: Heart disease      Relation: Neg Hx          Age of Onset: (Not Specified)  Problem: Heart failure      Relation: Neg Hx          Age of Onset: (Not Specified)  Problem: Cataracts      Relation: Neg Hx          Age of Onset: (Not Specified)  Problem: Glaucoma      Relation: Neg Hx          Age of Onset: (Not Specified)  Problem: Macular degeneration       Relation: Neg Hx          Age of Onset: (Not Specified)        Review of Systems   Constitution: Negative.   HENT: Negative.    Eyes: Negative.    Cardiovascular: Positive for leg swelling. Negative for chest pain, dyspnea on exertion, irregular heartbeat, near-syncope, palpitations and syncope.   Respiratory: Negative.  Negative for shortness of breath.    Endocrine: Negative.    Hematologic/Lymphatic: Negative.    Skin: Negative.    Musculoskeletal: Negative.    Gastrointestinal: Negative.    Genitourinary: Negative.    Neurological: Negative.  Negative for dizziness and light-headedness.   Psychiatric/Behavioral: Negative.    Allergic/Immunologic: Negative.         Objective:    Physical Exam   Constitutional: He is oriented to person, place, and time. He appears well-developed and well-nourished.   HENT:   Head: Normocephalic.   Nose: Nose normal.   Eyes: Pupils are equal, round, and reactive to light.   Cardiovascular: Regular rhythm, S1 normal and S2 normal. Bradycardia present.   No murmur heard.  Pulses:       Radial pulses are 2+ on the right side and 2+ on the left side.   Pulmonary/Chest: Breath sounds normal. No respiratory distress.   Abdominal: Normal appearance.   Musculoskeletal: Normal range of motion.         General: No edema.   Neurological: He is alert and oriented to person, place, and time.   Skin: Skin is warm and dry. No erythema.   Psychiatric: He has a normal mood and affect. His speech is normal and behavior is normal.   Nursing note and vitals reviewed.    ECG SBr , IVCD 136        Assessment:       1. Persistent atrial fibrillation    2. Presence of Watchman left atrial appendage closure device         Plan:       89 yoM AF s/p LAAO via Watchman here for follow up. He can stop plavix. He can stop amiodarone. Continue metoprolol. If he has recurrent increased BP, I would recommend vasodilator therapy.

## 2020-11-17 NOTE — Clinical Note
I saw Mr Mendoza today. I see that you follow him through the digital medicine HTN program. His BP is running high as you know. I offered to start amlodipine 2.5 mg qd but he wanted you to be involved with the decision.     Hi Crowder  Electrophysiology

## 2020-11-18 NOTE — PROGRESS NOTES
Digital Medicine: Clinician Follow-Up    Called patient to start amlodipine 2.5 mg once daily as recommended by Dr. Crowder. Patient says he is doing well with no complaints.     The history is provided by the patient.      Review of patient's allergies indicates:  No Known Allergies  Follow-up reason(s): routine follow up.     Hypertension    Patient's blood pressure is stable.         Last 5 Patient Entered Readings                                      Current 30 Day Average: 149/73     Recent Readings 11/13/2020 11/11/2020 11/1/2020 11/1/2020 10/31/2020    SBP (mmHg) 144 180 141 158 123    DBP (mmHg) 63 92 67 73 63    Pulse 45 48 47 48 44                 Depression Screening  Did not address depression screening.    Sleep Apnea Screening    Did not address sleep apnea screening.     Medication Affordability Screening  Did not address medication affordability screening.     Medication Adherence-Medication adherence was asssessed.  Patient continue taking medication as prescribed.            ASSESSMENT(S)  Patients BP average is 149/73 mmHg, which is above goal. Patient's BP goal is less than or equal to 130/80.     Hypertension Plan  Hypertension Medication Change. Start amlodipine 2.5 mg once daily in the evening       Addressed patient questions and patient has my contact information if needed prior to next outreach. Patient verbalizes understanding.             There are no preventive care reminders to display for this patient.  There are no preventive care reminders to display for this patient.      Hypertension Medications     amLODIPine (NORVASC) 2.5 MG tablet Take 1 tablet (2.5 mg total) by mouth every evening.    furosemide (LASIX) 20 MG tablet TAKE 1 TABLET BY MOUTH ONCE DAILY AS NEEDED LEG EDEMA    irbesartan (AVAPRO) 300 MG tablet Take 1 tablet (300 mg total) by mouth every evening.    metoprolol succinate (TOPROL-XL) 25 MG 24 hr tablet TAKE 1 TABLET BY MOUTH DAILY

## 2020-11-19 ENCOUNTER — LAB VISIT (OUTPATIENT)
Dept: LAB | Facility: HOSPITAL | Age: 85
End: 2020-11-19
Payer: MEDICARE

## 2020-11-19 ENCOUNTER — OFFICE VISIT (OUTPATIENT)
Dept: HEMATOLOGY/ONCOLOGY | Facility: CLINIC | Age: 85
End: 2020-11-19
Payer: MEDICARE

## 2020-11-19 VITALS
HEART RATE: 47 BPM | WEIGHT: 182.44 LBS | TEMPERATURE: 98 F | SYSTOLIC BLOOD PRESSURE: 209 MMHG | OXYGEN SATURATION: 96 % | RESPIRATION RATE: 20 BRPM | BODY MASS INDEX: 24.71 KG/M2 | DIASTOLIC BLOOD PRESSURE: 97 MMHG | HEIGHT: 72 IN

## 2020-11-19 DIAGNOSIS — I10 ESSENTIAL HYPERTENSION: ICD-10-CM

## 2020-11-19 DIAGNOSIS — D53.9 MACROCYTIC ANEMIA: ICD-10-CM

## 2020-11-19 DIAGNOSIS — C88.4 MALT LYMPHOMA: Primary | ICD-10-CM

## 2020-11-19 DIAGNOSIS — I48.19 PERSISTENT ATRIAL FIBRILLATION: ICD-10-CM

## 2020-11-19 DIAGNOSIS — C88.4 MALT LYMPHOMA: ICD-10-CM

## 2020-11-19 DIAGNOSIS — E78.2 MIXED HYPERLIPIDEMIA: ICD-10-CM

## 2020-11-19 DIAGNOSIS — I51.89 LEFT VENTRICULAR DIASTOLIC DYSFUNCTION WITH PRESERVED SYSTOLIC FUNCTION: ICD-10-CM

## 2020-11-19 LAB
ALBUMIN SERPL BCP-MCNC: 3.6 G/DL (ref 3.5–5.2)
ALP SERPL-CCNC: 51 U/L (ref 55–135)
ALT SERPL W/O P-5'-P-CCNC: 16 U/L (ref 10–44)
ANION GAP SERPL CALC-SCNC: 8 MMOL/L (ref 8–16)
AST SERPL-CCNC: 21 U/L (ref 10–40)
BASOPHILS # BLD AUTO: 0.07 K/UL (ref 0–0.2)
BASOPHILS NFR BLD: 1.3 % (ref 0–1.9)
BILIRUB SERPL-MCNC: 0.5 MG/DL (ref 0.1–1)
BUN SERPL-MCNC: 14 MG/DL (ref 10–30)
CALCIUM SERPL-MCNC: 8.8 MG/DL (ref 8.7–10.5)
CHLORIDE SERPL-SCNC: 107 MMOL/L (ref 95–110)
CO2 SERPL-SCNC: 27 MMOL/L (ref 23–29)
CREAT SERPL-MCNC: 1 MG/DL (ref 0.5–1.4)
DIFFERENTIAL METHOD: ABNORMAL
EOSINOPHIL # BLD AUTO: 0 K/UL (ref 0–0.5)
EOSINOPHIL NFR BLD: 0.2 % (ref 0–8)
ERYTHROCYTE [DISTWIDTH] IN BLOOD BY AUTOMATED COUNT: 14.3 % (ref 11.5–14.5)
EST. GFR  (AFRICAN AMERICAN): >60 ML/MIN/1.73 M^2
EST. GFR  (NON AFRICAN AMERICAN): >60 ML/MIN/1.73 M^2
GLUCOSE SERPL-MCNC: 98 MG/DL (ref 70–110)
HCT VFR BLD AUTO: 39.3 % (ref 40–54)
HGB BLD-MCNC: 12.2 G/DL (ref 14–18)
IMM GRANULOCYTES # BLD AUTO: 0.06 K/UL (ref 0–0.04)
IMM GRANULOCYTES NFR BLD AUTO: 1.1 % (ref 0–0.5)
LDH SERPL L TO P-CCNC: 217 U/L (ref 110–260)
LYMPHOCYTES # BLD AUTO: 0.7 K/UL (ref 1–4.8)
LYMPHOCYTES NFR BLD: 14 % (ref 18–48)
MCH RBC QN AUTO: 32.6 PG (ref 27–31)
MCHC RBC AUTO-ENTMCNC: 31 G/DL (ref 32–36)
MCV RBC AUTO: 105 FL (ref 82–98)
MONOCYTES # BLD AUTO: 0.6 K/UL (ref 0.3–1)
MONOCYTES NFR BLD: 11 % (ref 4–15)
NEUTROPHILS # BLD AUTO: 3.8 K/UL (ref 1.8–7.7)
NEUTROPHILS NFR BLD: 72.4 % (ref 38–73)
NRBC BLD-RTO: 0 /100 WBC
PLATELET # BLD AUTO: 197 K/UL (ref 150–350)
PMV BLD AUTO: 9.6 FL (ref 9.2–12.9)
POTASSIUM SERPL-SCNC: 4.7 MMOL/L (ref 3.5–5.1)
PROT SERPL-MCNC: 6.8 G/DL (ref 6–8.4)
RBC # BLD AUTO: 3.74 M/UL (ref 4.6–6.2)
SODIUM SERPL-SCNC: 142 MMOL/L (ref 136–145)
WBC # BLD AUTO: 5.28 K/UL (ref 3.9–12.7)

## 2020-11-19 PROCEDURE — 99999 PR PBB SHADOW E&M-EST. PATIENT-LVL III: ICD-10-PCS | Mod: PBBFAC,HCNC,GC, | Performed by: STUDENT IN AN ORGANIZED HEALTH CARE EDUCATION/TRAINING PROGRAM

## 2020-11-19 PROCEDURE — 99999 PR PBB SHADOW E&M-EST. PATIENT-LVL III: CPT | Mod: PBBFAC,HCNC,GC, | Performed by: STUDENT IN AN ORGANIZED HEALTH CARE EDUCATION/TRAINING PROGRAM

## 2020-11-19 PROCEDURE — 1159F PR MEDICATION LIST DOCUMENTED IN MEDICAL RECORD: ICD-10-PCS | Mod: HCNC,GC,S$GLB, | Performed by: STUDENT IN AN ORGANIZED HEALTH CARE EDUCATION/TRAINING PROGRAM

## 2020-11-19 PROCEDURE — 1125F AMNT PAIN NOTED PAIN PRSNT: CPT | Mod: HCNC,GC,S$GLB, | Performed by: STUDENT IN AN ORGANIZED HEALTH CARE EDUCATION/TRAINING PROGRAM

## 2020-11-19 PROCEDURE — 3288F PR FALLS RISK ASSESSMENT DOCUMENTED: ICD-10-PCS | Mod: HCNC,CPTII,GC,S$GLB | Performed by: STUDENT IN AN ORGANIZED HEALTH CARE EDUCATION/TRAINING PROGRAM

## 2020-11-19 PROCEDURE — 1101F PT FALLS ASSESS-DOCD LE1/YR: CPT | Mod: HCNC,CPTII,GC,S$GLB | Performed by: STUDENT IN AN ORGANIZED HEALTH CARE EDUCATION/TRAINING PROGRAM

## 2020-11-19 PROCEDURE — 3288F FALL RISK ASSESSMENT DOCD: CPT | Mod: HCNC,CPTII,GC,S$GLB | Performed by: STUDENT IN AN ORGANIZED HEALTH CARE EDUCATION/TRAINING PROGRAM

## 2020-11-19 PROCEDURE — 80053 COMPREHEN METABOLIC PANEL: CPT | Mod: HCNC

## 2020-11-19 PROCEDURE — 85025 COMPLETE CBC W/AUTO DIFF WBC: CPT | Mod: HCNC

## 2020-11-19 PROCEDURE — 83615 LACTATE (LD) (LDH) ENZYME: CPT | Mod: HCNC

## 2020-11-19 PROCEDURE — 36415 COLL VENOUS BLD VENIPUNCTURE: CPT | Mod: HCNC

## 2020-11-19 PROCEDURE — 99215 OFFICE O/P EST HI 40 MIN: CPT | Mod: HCNC,GC,S$GLB, | Performed by: STUDENT IN AN ORGANIZED HEALTH CARE EDUCATION/TRAINING PROGRAM

## 2020-11-19 PROCEDURE — 1159F MED LIST DOCD IN RCRD: CPT | Mod: HCNC,GC,S$GLB, | Performed by: STUDENT IN AN ORGANIZED HEALTH CARE EDUCATION/TRAINING PROGRAM

## 2020-11-19 PROCEDURE — 1101F PR PT FALLS ASSESS DOC 0-1 FALLS W/OUT INJ PAST YR: ICD-10-PCS | Mod: HCNC,CPTII,GC,S$GLB | Performed by: STUDENT IN AN ORGANIZED HEALTH CARE EDUCATION/TRAINING PROGRAM

## 2020-11-19 PROCEDURE — 99215 PR OFFICE/OUTPT VISIT, EST, LEVL V, 40-54 MIN: ICD-10-PCS | Mod: HCNC,GC,S$GLB, | Performed by: STUDENT IN AN ORGANIZED HEALTH CARE EDUCATION/TRAINING PROGRAM

## 2020-11-19 PROCEDURE — 1125F PR PAIN SEVERITY QUANTIFIED, PAIN PRESENT: ICD-10-PCS | Mod: HCNC,GC,S$GLB, | Performed by: STUDENT IN AN ORGANIZED HEALTH CARE EDUCATION/TRAINING PROGRAM

## 2020-11-19 NOTE — PROGRESS NOTES
PATIENT: Norm Mendoza  MRN: [id number]  DATE: 11/19/2020      Diagnosis:   1. MALT lymphoma    2. Persistent atrial fibrillation    3. Left ventricular diastolic dysfunction with preserved systolic function    4. Mixed hyperlipidemia    5. Essential hypertension        Chief Complaint: No chief complaint on file.    Subjective:     Mr. Norm Mendoza is a 89 y.o. 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    Oncologic History  -The patient was recently discharged on 12/9/2018 following a 3 day admission for a GIB. Patient was on Eliquis at the time and had been complaining of 1 week of black, tarry stools. EGD at the time was significant for nonbleeding gastric ulcers. Biopsies were taken and the patient was instructed to start protonix and hold eliquis at discharge. He requiring IV fluids and blood transfusion during the hospitalization. Biopsy results returned 12/18/2018 concerning for possible MALT-Lymphoma with molecular studies pending.  -PET confirmed stage 1 MALT lymphoma  -Completed Rituximab x4 on 3/7/19  -EGD: Erythematous, granular and scarred mucosa in the lesser curvature of the gastric body. Biopsied, + MALT lymphoma. Ulcers no longer present., PET was also done 4/20/19, no other site of disease    Interval History  Patient presents alone. Doing well overall. No melena, no hematochezia. No abdominal pain. Forced to retire d/t COVID. Denies weight loss, night sweats, lymphadenopathy. Walking 0.5 miles per day. ECOG 0.    Past Medical History:   Past Medical History:   Diagnosis Date    Alcohol dependence, daily use 7/13/2017    Allergy     Bladder cancer     tumor that was benign     Cataract     H/O nonmelanoma skin cancer 2/4/2016    Hematuria     Hypertension     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    Mixed hyperlipidemia 5/2/2018    On continuous oral anticoagulation 7/30/2019    Paroxysmal atrial  fibrillation 11/30/2018    Skin cancer     x3, had mohs on nose    Squamous cell carcinoma excised 12/5/14    R lower back    Symptomatic anemia 12/6/2018    Urinary tract infection     x4       Past Surgical HIstory:   Past Surgical History:   Procedure Laterality Date    ACHILLES TENDON SURGERY      CATARACT EXTRACTION W/  INTRAOCULAR LENS IMPLANT Bilateral 2013    CLOSURE OF LEFT ATRIAL APPENDAGE USING DEVICE N/A 10/11/2019    Procedure: Left atrial appendage closure device;  Surgeon: Hi Crowder MD;  Location: [facility] EP LAB;  Service: Cardiology;  Laterality: N/A;  AF, JACINTO, Watchman Implant, BSci, Gen, MB, 3 Prep    CYSTOSCOPY      bladder tumor    ESOPHAGOGASTRODUODENOSCOPY N/A 12/7/2018    Procedure: EGD (ESOPHAGOGASTRODUODENOSCOPY);  Surgeon: Austin Garcia MD;  Location: [facility] ENDO (2ND FLR);  Service: Endoscopy;  Laterality: N/A;    ESOPHAGOGASTRODUODENOSCOPY N/A 4/12/2019    Procedure: EGD (ESOPHAGOGASTRODUODENOSCOPY);  Surgeon: Austin Garcia MD;  Location: [facility] (4TH FLR);  Service: Endoscopy;  Laterality: N/A;  please schedule within 4 weeks    SKIN CANCER EXCISION      TREATMENT OF CARDIAC ARRHYTHMIA N/A 7/12/2019    Procedure: Cardioversion or Defibrillation;  Surgeon: Hi Crowder MD;  Location: [facility] EP LAB;  Service: Cardiology;  Laterality: N/A;  AF, JACINTO, DCCV, MAC, MB, 3 Prep       Family History:   Family History   Problem Relation Age of Onset    Stroke Father     Hypertension Father     Stroke Brother     Anesthesia problems Neg Hx     Malignant hypertension Neg Hx     Hypotension Neg Hx     Malignant hyperthermia Neg Hx     Pseudochol deficiency Neg Hx     Melanoma Neg Hx     Heart attack Neg Hx     Heart disease Neg Hx     Heart failure Neg Hx     Cataracts Neg Hx     Glaucoma Neg Hx     Macular degeneration Neg Hx        Social History:  reports that he quit smoking about 50 years ago. His smoking use included cigarettes. He has a 25.00  pack-year smoking history. He has never used smokeless tobacco. He reports current alcohol use of about 3.0 standard drinks of alcohol per week. He reports that he does not use drugs.  Very functional 89-year-old. He works 3 days a week for a company called [employer]. Lives alone, has a girlfriend. Has one son who lives here, one in [place], one grand-daughter who lives in [place]. Does pilates once a week. No family history of cancer.    Allergies:  Review of patient's allergies indicates:  No Known Allergies    Medications:  Current Outpatient Medications   Medication Sig Dispense Refill    amLODIPine (NORVASC) 2.5 MG tablet Take 1 tablet (2.5 mg total) by mouth every evening. 30 tablet 5    aspirin (ECOTRIN) 81 MG EC tablet Take 81 mg by mouth once daily.      furosemide (LASIX) 20 MG tablet TAKE 1 TABLET BY MOUTH ONCE DAILY AS NEEDED LEG EDEMA (Patient not taking: Reported on 11/17/2020) 30 tablet 6    irbesartan (AVAPRO) 300 MG tablet Take 1 tablet (300 mg total) by mouth every evening. 90 tablet 1    metoprolol succinate (TOPROL-XL) 25 MG 24 hr tablet TAKE 1 TABLET BY MOUTH DAILY 30 tablet 6    multivitamin (ONE DAILY MULTIVITAMIN) per tablet Take 1 tablet by mouth once daily.       Current Facility-Administered Medications   Medication Dose Route Frequency Provider Last Rate Last Dose    lidocaine HCl 2% urojet   Urethral Once Grover Ochoa Jr., MD           Review of Systems   Constitutional: Negative for chills, fatigue, fever and unexpected weight change.   HENT: Negative for nosebleeds and sore throat.    Respiratory: Negative for cough and shortness of breath.    Cardiovascular: Negative for chest pain and leg swelling.   Gastrointestinal: Negative for abdominal pain, blood in stool, nausea and vomiting.   Genitourinary: Negative for dysuria and hematuria.   Musculoskeletal: Negative for arthralgias and back pain.   Skin: Negative for rash.   Neurological: Negative for  light-headedness and headaches.   Hematological: Negative for adenopathy. Does not bruise/bleed easily.       ECOG Performance Status: 0  Objective:      Vitals:   There were no vitals filed for this visit.  BMI: There is no height or weight on file to calculate BMI.    Physical Exam  Constitutional:       Appearance: He is well-developed.   HENT:      Head: Normocephalic and atraumatic.   Eyes:      General: No scleral icterus.     Pupils: Pupils are equal, round, and reactive to light.   Neck:      Musculoskeletal: Neck supple.   Cardiovascular:      Rate and Rhythm: Normal rate and regular rhythm.   Pulmonary:      Effort: Pulmonary effort is normal. No respiratory distress.      Breath sounds: Normal breath sounds.   Abdominal:      General: There is no distension.      Palpations: Abdomen is soft.      Tenderness: There is no abdominal tenderness.   Musculoskeletal: Normal range of motion.   Lymphadenopathy:      Cervical: No cervical adenopathy.   Skin:     General: Skin is warm and dry.   Neurological:      Mental Status: He is alert.   Psychiatric:         Behavior: Behavior normal.         Laboratory Data:  Lab Visit on 11/19/2020   Component Date Value Ref Range Status    WBC 11/19/2020 5.28  3.90 - 12.70 K/uL Final    RBC 11/19/2020 3.74* 4.60 - 6.20 M/uL Final    Hemoglobin 11/19/2020 12.2* 14.0 - 18.0 g/dL Final    Hematocrit 11/19/2020 39.3* 40.0 - 54.0 % Final    MCV 11/19/2020 105* 82 - 98 fL Final    MCH 11/19/2020 32.6* 27.0 - 31.0 pg Final    MCHC 11/19/2020 31.0* 32.0 - 36.0 g/dL Final    RDW 11/19/2020 14.3  11.5 - 14.5 % Final    Platelets 11/19/2020 197  150 - 350 K/uL Final    MPV 11/19/2020 9.6  9.2 - 12.9 fL Final    Immature Granulocytes 11/19/2020 1.1* 0.0 - 0.5 % Final    Gran # (ANC) 11/19/2020 3.8  1.8 - 7.7 K/uL Final    Immature Grans (Abs) 11/19/2020 0.06* 0.00 - 0.04 K/uL Final    Comment: Mild elevation in immature granulocytes is non specific and   can be seen in a  variety of conditions including stress response,   acute inflammation, trauma and pregnancy. Correlation with other   laboratory and clinical findings is essential.      Lymph # 11/19/2020 0.7* 1.0 - 4.8 K/uL Final    Mono # 11/19/2020 0.6  0.3 - 1.0 K/uL Final    Eos # 11/19/2020 0.0  0.0 - 0.5 K/uL Final    Baso # 11/19/2020 0.07  0.00 - 0.20 K/uL Final    nRBC 11/19/2020 0  0 /100 WBC Final    Gran % 11/19/2020 72.4  38.0 - 73.0 % Final    Lymph % 11/19/2020 14.0* 18.0 - 48.0 % Final    Mono % 11/19/2020 11.0  4.0 - 15.0 % Final    Eosinophil % 11/19/2020 0.2  0.0 - 8.0 % Final    Basophil % 11/19/2020 1.3  0.0 - 1.9 % Final    Differential Method 11/19/2020 Automated   Final     FINAL PATHOLOGIC DIAGNOSIS  1. ULCER LESSER CURVATURE, GASTRIC BODY, BIOPSY-:  -ATYPICAL LYMPHOCYTIC INFILTRATION, PENDING MOLECULAR STUDY. SEE COMMENT  -RARE FOCUS OF ACTIVE INFLAMMATION.  -HELICOBACTER IS NEGATIVE.  COMMENT: Fragments of gastric mucosa infiltrated with small to medium-sized atypical lymphocytes.  Lymphoepithelial lesions are also evident.  Flow cytometric analysis of tissue was not submitted.  Immunohistochemical studies were performed on paraffin block with adequate positive and negative controls . The  small atypical lymphocytes are positive for CD20, low KI -67, and are negative for CD10, CD23, cyclin D1. The  reactive T cells are CD3 positive, CD5 positive, and BCL 2 positive. Immunohistochemical study for Helicobacter is  negative.  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 Molecular study is pending to confirm the diagnosis. A supplemental report will follow.    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  After an approximately 60 min distribution time, PET/CT images were acquired from the skull base to the mid thigh. Transmission images were acquired to correct for attenuation using a whole body low-dose CT scan without contrast with the arms positioned above the head. Glycemia at the time of injection was 115 mg/dL.    COMPARISON:  NM PET CT routine 01/03/2019    FINDINGS:  Quality of the study: Adequate.    In this patient with biopsy proven Gastric MALT lymphoma, the gastric wall appears mildly thickened, however less conspicuous when compared to PET CT 01/03/2019.  There is no increased tracer uptake within the gastric body or other discrete lesion identified; however, MALT type lymphoma is typically difficult to differentiate from normal GI uptake.    Physiologic uptake of the tracer is present within the brain, salivary glands, myocardium, GI and  tracts.    Incidental CT findings:    Bilateral maxillary sinus mucosal thickening with near complete opacification of the partially visualized right maxillary sinus.    Coronary artery and atherosclerotic calcification.  Aortic valve calcification.  Fusiform dilatation of the mid ascending thoracic aorta to 4.4 cm, stable from prior.    Bibasilar atelectasis.  0.5 cm nodule in the left upper lobe abutting the pleura, stable from prior.  Interval resolution of bilateral pleural effusions.    There is a large calcified gallstone in the gallbladder lumen without evidence of acute cholelithiasis.    Prostatomegaly and diffuse bladder wall thickening, similar to prior.      Impression       No definite evidence of active marginal zone MALT.  The stomach is decompressed with mild wall thickening; however is less conspicuous metabollically when compared to PET CT 01/03/2019.    No increased tracer uptake identified.    Cholelithiasis without evidence of acute cholecystitis.    Prostatomegaly with bladder wall thickening likely secondary to outlet  obstruction.    Interval resolution of bilateral pleural effusions.       Assessment:       1. MALT lymphoma    2. Persistent atrial fibrillation    3. Left ventricular diastolic dysfunction with preserved systolic function    4. Mixed hyperlipidemia    5. Essential hypertension           Plan:   1) Limited (Stage 1) MALT lymphoma  -H pylori negative  -Recent h/o GIB while was on DOAC for a-fib  -PET negative for distant disease or lymphadenopathy  -Hepatitis studies negative  -Patient recently seen by cardiology for a-fib, off anti-coagulation, off of beta-blocker given AEs  -Patient completed Rituximab at this time: 375mg x4 weeks on 3/7/19  -Persistent MALT lymphoma on EGD, PET was also done, no other site of disease  -Was seen again by radiation oncology, preferred to pursue observation at this time  -Repeat labs with MD visit Q3mo (CBC, CMP, LDH) for first 1-2 years  -Plan repeat EGD    2) New systolic CHF, A-fib  -ECHO showed Left ventricular systolic function. The estimated ejection fraction is 43%. Global hypokinetic wall motion. Septal wall has abnormal motion.   -S/p cardioversion via JACINTO.   -S/p Watchman  -Patient now off Xarelto.   -On lasix PRN  -To follow-up with cardiology    Follow-up: visit in 3 months with labs prior (CBC, CMP, LDH), msgd Dr. Garcia for repeat EGD    The following was staffed and discussed with supervising physician Dr. Prather.    Kelly Beckwith MD  Hematology/Oncology Fellow

## 2020-11-20 DIAGNOSIS — Z01.818 PRE-OP TESTING: Primary | ICD-10-CM

## 2020-11-23 ENCOUNTER — PATIENT OUTREACH (OUTPATIENT)
Dept: OTHER | Facility: OTHER | Age: 85
End: 2020-11-23

## 2020-11-23 NOTE — PROGRESS NOTES
Digital Medicine: Clinician Follow-Up    Called patient to address high BP alert on 11/19/20 and to follow up after starting amlodipine. Patient says that he chose not to start the amlodipine because of the potential side effects. He says that his BP was elevated in clinic on 11/19/20 because he just drank a strong cup of coffee right before his BP was checked.     The history is provided by the patient.   Follow-up reason(s): Alert received.   Care Team received high BP alert.      Hypertension    Readings are trending up Patient is not experiencing signs/symptoms of hypertension.      Patient did not make medication change.          Last 5 Patient Entered Readings                                      Current 30 Day Average: 149/73     Recent Readings 11/19/2020 11/19/2020 11/19/2020 11/19/2020 11/13/2020    SBP (mmHg) 187 159 173 184 144    DBP (mmHg) 81 71 77 78 63    Pulse 43 44 45 52 45                 Depression Screening  Did not address depression screening.    Sleep Apnea Screening    Did not address sleep apnea screening.     Medication Affordability Screening  Did not address medication affordability screening.     Medication Adherence-Medication adherence was asssessed.    Patient reported missing medication: Patient prescribed amlodipine at last encounter and he is not taking it.    Patient identified the following reasons for non-adherence:  Side effects from medication.            ASSESSMENT(S)  Patients BP average is 148/72 mmHg, which is above goal. Patient's BP goal is less than or equal to 130/80.     Hypertension Plan  Continue current therapy.  Provided patient education.  Encouraged patient to start amlodipine 2.5 mg as directed  Low sodium diet  Take BP an hour after drinking a strong cup of coffee     Addressed patient questions and patient has my contact information if needed prior to next outreach. Patient verbalizes understanding.             There are no preventive care reminders to  display for this patient.  There are no preventive care reminders to display for this patient.      Hypertension Medications     amLODIPine (NORVASC) 2.5 MG tablet Take 1 tablet (2.5 mg total) by mouth every evening.    furosemide (LASIX) 20 MG tablet TAKE 1 TABLET BY MOUTH ONCE DAILY AS NEEDED LEG EDEMA    irbesartan (AVAPRO) 300 MG tablet Take 1 tablet (300 mg total) by mouth every evening.    metoprolol succinate (TOPROL-XL) 25 MG 24 hr tablet TAKE 1 TABLET BY MOUTH DAILY

## 2020-11-24 DIAGNOSIS — Z01.818 PRE-OP TESTING: Primary | ICD-10-CM

## 2020-12-01 ENCOUNTER — PATIENT OUTREACH (OUTPATIENT)
Dept: OTHER | Facility: OTHER | Age: 85
End: 2020-12-01

## 2020-12-01 NOTE — PROGRESS NOTES
"Digital Medicine: Clinician Follow-Up    Called patient to address high BP alert. Patient says he is doing well with no complaints. He says that he started amlodipine 2.5 mg three days ago. He says that he has been "eating good" and his "cook doesn't cook with a lot of salt". He then says that he eats a lot of sausages.     The history is provided by the patient.   Follow-up reason(s): Alert received.   Care Team received high BP alert.      Hypertension    Readings are trending up   Patient did make medication change.    Is patient tolerating med change? yes            Last 5 Patient Entered Readings                                      Current 30 Day Average: 164/74     Recent Readings 11/29/2020 11/29/2020 11/27/2020 11/27/2020 11/23/2020    SBP (mmHg) 173 174 180 182 144    DBP (mmHg) 74 76 84 79 64    Pulse 40 41 44 49 41                 Depression Screening  Did not address depression screening.    Sleep Apnea Screening    Did not address sleep apnea screening.     Medication Affordability Screening  Did not address medication affordability screening.     Medication Adherence-Medication adherence was asssessed.  Patient continue taking medication as prescribed.            ASSESSMENT(S)  Patients BP average is 164/74 mmHg, which is above goal. Patient's BP goal is less than or equal to 130/80.     Hypertension Plan  Continue current therapy.  Provided patient education.  Recommended increasing amlodipine to 5 mg. Patient says that he prefers to wait because he is afraid of the side effects.     Low sodium diet. Limit sausage and processed meat.      Addressed patient questions and patient has my contact information if needed prior to next outreach. Patient verbalizes understanding.             There are no preventive care reminders to display for this patient.  There are no preventive care reminders to display for this patient.      Hypertension Medications     amLODIPine (NORVASC) 2.5 MG tablet Take 1 tablet " (2.5 mg total) by mouth every evening.    furosemide (LASIX) 20 MG tablet TAKE 1 TABLET BY MOUTH ONCE DAILY AS NEEDED LEG EDEMA    irbesartan (AVAPRO) 300 MG tablet Take 1 tablet (300 mg total) by mouth every evening.    metoprolol succinate (TOPROL-XL) 25 MG 24 hr tablet TAKE 1 TABLET BY MOUTH DAILY

## 2020-12-04 DIAGNOSIS — I10 ESSENTIAL HYPERTENSION: ICD-10-CM

## 2020-12-04 RX ORDER — METOPROLOL SUCCINATE 25 MG/1
25 TABLET, EXTENDED RELEASE ORAL DAILY
Qty: 30 TABLET | Refills: 1 | Status: SHIPPED | OUTPATIENT
Start: 2020-12-04 | End: 2021-02-26 | Stop reason: SDUPTHER

## 2020-12-07 ENCOUNTER — PATIENT OUTREACH (OUTPATIENT)
Dept: OTHER | Facility: OTHER | Age: 85
End: 2020-12-07

## 2020-12-07 ENCOUNTER — TELEPHONE (OUTPATIENT)
Dept: HEMATOLOGY/ONCOLOGY | Facility: CLINIC | Age: 85
End: 2020-12-07

## 2020-12-07 NOTE — TELEPHONE ENCOUNTER
"----- Message from Christa Yu sent at 12/7/2020  7:55 AM CST -----   Consult/Advisory:    Name Of Caller: Norm  Contact Preference?: [phone]    Does patient feel the need to be seen today? No    What is the nature of the call?:  -pt called to report cancellation of upper endoscopy which was scheduled for 12/21 @130 PM     Additional Notes:  "Thank you for all that you do for our patients'"       "

## 2020-12-07 NOTE — PROGRESS NOTES
"Digital Medicine: Health  Follow-Up    The history is provided by the patient.             Reason for review: Blood pressure not at goal  Care Team received high BP alert.          Topics Covered on Call: Diet    Additional Follow-up details: Discussed elevated BP readings. Reports he has "no idea" why his BP was so high today. Patient reports he is well, no complaints. Denies symptoms of hypertension- HA, chest pains and SOB.     Discussed COVID-19 and importance of proper hand hygiene and social distancing. Reports he is scared to death about COVID-19, taking extra precautions and very limited leaving his house. Reports he has some family coming to [place] to visit for [place].     Patient denies further needs from the program today.                   Diet-Change    Patient reports eating or drinking the following: Reports his cook does not add any salt to the dishes he eats.     Reports he "fasts" on Mondays and Thursdays, does drink coffee and eats s[place]uma and drinks martinis with Ritz crackers.     Dietary Improvements:Reports he thinks he would like to lose some weight, by eating smaller portions. Reports he will probably eat a light breakfast, skip lunch and eat a large dinner. Reports last night he ate parmesan cheese meal. Reports he is planning to stop eating sausage in the morning for breakfast, planning to eat cereal and milk. Reports he never eats snacks, eats cheese and crackers with his martinis.     Set a goal to eliminate sausage and Ritz crackers, and work on portion control. Will assess this next call.               Physical Activity-no change to routine  No change to exercise routine.     Medication Adherence-Medication adherence was assessed.        Substance, Sleep, Stress-No change  stress-  Details:  Intervention(s):    Sleep-  Details:  Intervention(s):    Alcohol -  Details:  Intervention(s):    Tobacco-  Details:  Intervention(s):          Continue current diet/physical activity " routine.  Provided patient education.       Addressed patient questions and patient has my contact information if needed prior to next outreach. Patient verbalizes understanding.      Explained the importance of self-monitoring and medication adherence. Encouraged the patient to communicate with their health  for lifestyle modifications to help improve or maintain a healthy lifestyle.               There are no preventive care reminders to display for this patient.      Last 5 Patient Entered Readings                                      Current 30 Day Average: 174/76     Recent Readings 12/7/2020 11/29/2020 11/29/2020 11/27/2020 11/27/2020    SBP (mmHg) 208 173 174 180 182    DBP (mmHg) 93 74 76 84 79    Pulse 47 40 41 44 49

## 2020-12-07 NOTE — TELEPHONE ENCOUNTER
Patient cancelled scope due to Covid concerns. Stated he would be open to possibly rescheduling in the spring

## 2020-12-07 NOTE — TELEPHONE ENCOUNTER
"----- Message from Kelly Beckwith MD sent at 12/7/2020  3:21 PM CST -----  Patient should reschedule his endoscopy then.  ----- Message -----  From: Christa Yu  Sent: 12/7/2020   7:55 AM CST  To: Tang Mendoza Staff     Consult/Advisory:    Name Of Caller: Norm  Contact Preference?: [phone]    Does patient feel the need to be seen today? No    What is the nature of the call?:  -pt called to report cancellation of upper endoscopy which was scheduled for 12/21 @130 PM     Additional Notes:  "Thank you for all that you do for our patients'"         "

## 2020-12-14 ENCOUNTER — PATIENT OUTREACH (OUTPATIENT)
Dept: OTHER | Facility: OTHER | Age: 85
End: 2020-12-14

## 2020-12-14 DIAGNOSIS — I10 ESSENTIAL HYPERTENSION: ICD-10-CM

## 2020-12-16 RX ORDER — AMLODIPINE BESYLATE 5 MG/1
5 TABLET ORAL DAILY
Qty: 30 TABLET | Refills: 5 | Status: SHIPPED | OUTPATIENT
Start: 2020-12-16 | End: 2021-02-27

## 2020-12-16 NOTE — PROGRESS NOTES
Digital Medicine: Clinician Follow-Up    Called patient to follow up after starting amlodipine 2.5 mg and to address high BP alert. He says he feels fine but he is concerned about the BP.   Patient reports taking amlodipine, metoprolol succinate, and irbesartan around 12-1 pm and takes BP around 4 pm. He says that he goes to bed around 11-12 am and wakes up around 8-9 am.         The history is provided by the patient.      Review of patient's allergies indicates:  No Known Allergies  Follow-up reason(s): medication change follow-up and Alert received.   Care Team received high BP alert.      Patient did make medication change.    Is patient tolerating med change? yes            Last 5 Patient Entered Readings                                      Current 30 Day Average: 152/71     Recent Readings 12/15/2020 12/14/2020 12/13/2020 12/13/2020 12/13/2020    SBP (mmHg) 183 181 134 136 164    DBP (mmHg) 79 83 64 68 74    Pulse 46 64 42 44 45                 Depression Screening  Did not address depression screening.    Sleep Apnea Screening    Did not address sleep apnea screening.     Medication Affordability Screening  Did not address medication affordability screening.     Medication Adherence-Medication adherence was asssessed.  Patient continue taking medication as prescribed.            ASSESSMENT(S)  Patients BP average is 152/71 mmHg, which is above goal. Patient's BP goal is less than or equal to 130/80.     Hypertension Plan  Hypertension Medication Change. Increase amlodipine to 5 mg once dialy       Addressed patient questions and patient has my contact information if needed prior to next outreach. Patient verbalizes understanding.             There are no preventive care reminders to display for this patient.  There are no preventive care reminders to display for this patient.      Hypertension Medications     amLODIPine (NORVASC) 2.5 MG tablet Take 1 tablet (2.5 mg total) by mouth every evening.     furosemide (LASIX) 20 MG tablet TAKE 1 TABLET BY MOUTH ONCE DAILY AS NEEDED LEG EDEMA    irbesartan (AVAPRO) 300 MG tablet Take 1 tablet (300 mg total) by mouth every evening.    metoprolol succinate (TOPROL-XL) 25 MG 24 hr tablet Take 1 tablet (25 mg total) by mouth once daily.

## 2020-12-30 ENCOUNTER — PATIENT OUTREACH (OUTPATIENT)
Dept: OTHER | Facility: OTHER | Age: 85
End: 2020-12-30

## 2020-12-30 NOTE — PROGRESS NOTES
Digital Medicine: Clinician Follow-Up    Called patient for follow up after increasing amlodipine and to address low BP alert. He says that he didn't realize that the new prescriptions was for amlodipine 5 mg and he had been taking two tablets (10 mg) up until yesterday. He contributes the drop in BP to taking amlodipine 10 mg. He says he feels fine.     The history is provided by the patient.   Follow-up reason(s): medication change follow-up and Alert received.   Care Team received low BP alert.      Hypertension    Readings are trending down due to medication adherence.       Patient is not experiencing signs/symptoms of hypotension.      Patient did make medication change.    Is patient tolerating med change? yes            Last 5 Patient Entered Readings                                      Current 30 Day Average: 136/66     Recent Readings 12/28/2020 12/26/2020 12/25/2020 12/25/2020 12/25/2020    SBP (mmHg) 155 131 115 114 117    DBP (mmHg) 67 63 59 68 69    Pulse 47 51 46 46 47                 Depression Screening  Did not address depression screening.    Sleep Apnea Screening    Did not address sleep apnea screening.     Medication Affordability Screening  Did not address medication affordability screening.     Medication Adherence-Medication adherence was asssessed.  Patient continue taking medication as prescribed.            ASSESSMENT(S)  Patients BP average is 136/66 mmHg, which is above goal. Patient's BP goal is less than or equal to 130/80.     Hypertension Plan  Continue current therapy.       Addressed patient questions and patient has my contact information if needed prior to next outreach. Patient verbalizes understanding.             There are no preventive care reminders to display for this patient.  There are no preventive care reminders to display for this patient.      Hypertension Medications     amLODIPine (NORVASC) 5 MG tablet Take 1 tablet (5 mg total) by mouth once daily.    furosemide  (LASIX) 20 MG tablet TAKE 1 TABLET BY MOUTH ONCE DAILY AS NEEDED LEG EDEMA    irbesartan (AVAPRO) 300 MG tablet Take 1 tablet (300 mg total) by mouth every evening.    metoprolol succinate (TOPROL-XL) 25 MG 24 hr tablet Take 1 tablet (25 mg total) by mouth once daily.

## 2020-12-31 PROCEDURE — 99457 RPM TX MGMT 1ST 20 MIN: CPT | Mod: S$GLB,,, | Performed by: INTERNAL MEDICINE

## 2020-12-31 PROCEDURE — 99457 PR MONITORING, PHYSIOL PARAM, REMOTE, 1ST 20 MINS, PER MONTH: ICD-10-PCS | Mod: S$GLB,,, | Performed by: INTERNAL MEDICINE

## 2021-01-17 ENCOUNTER — IMMUNIZATION (OUTPATIENT)
Dept: INTERNAL MEDICINE | Facility: CLINIC | Age: 86
End: 2021-01-17
Payer: MEDICARE

## 2021-01-17 DIAGNOSIS — Z23 NEED FOR VACCINATION: Primary | ICD-10-CM

## 2021-01-17 PROCEDURE — 91300 COVID-19, MRNA, LNP-S, PF, 30 MCG/0.3 ML DOSE VACCINE: CPT | Mod: PBBFAC | Performed by: FAMILY MEDICINE

## 2021-02-07 ENCOUNTER — IMMUNIZATION (OUTPATIENT)
Dept: INTERNAL MEDICINE | Facility: CLINIC | Age: 86
End: 2021-02-07
Payer: MEDICARE

## 2021-02-07 DIAGNOSIS — Z23 NEED FOR VACCINATION: Primary | ICD-10-CM

## 2021-02-07 PROCEDURE — 0002A COVID-19, MRNA, LNP-S, PF, 30 MCG/0.3 ML DOSE VACCINE: CPT | Mod: PBBFAC | Performed by: FAMILY MEDICINE

## 2021-02-07 PROCEDURE — 91300 COVID-19, MRNA, LNP-S, PF, 30 MCG/0.3 ML DOSE VACCINE: CPT | Mod: PBBFAC | Performed by: FAMILY MEDICINE

## 2021-02-17 ENCOUNTER — LAB VISIT (OUTPATIENT)
Dept: LAB | Facility: HOSPITAL | Age: 86
End: 2021-02-17
Attending: INTERNAL MEDICINE
Payer: MEDICARE

## 2021-02-17 DIAGNOSIS — E55.9 VITAMIN D DEFICIENCY: ICD-10-CM

## 2021-02-17 DIAGNOSIS — I10 ESSENTIAL HYPERTENSION: ICD-10-CM

## 2021-02-17 DIAGNOSIS — E78.5 HYPERLIPIDEMIA, UNSPECIFIED HYPERLIPIDEMIA TYPE: ICD-10-CM

## 2021-02-17 DIAGNOSIS — E53.8 VITAMIN B12 DEFICIENCY: ICD-10-CM

## 2021-02-17 LAB
25(OH)D3+25(OH)D2 SERPL-MCNC: 35 NG/ML (ref 30–96)
CHOLEST SERPL-MCNC: 231 MG/DL (ref 120–199)
CHOLEST/HDLC SERPL: 3.6 {RATIO} (ref 2–5)
HDLC SERPL-MCNC: 65 MG/DL (ref 40–75)
HDLC SERPL: 28.1 % (ref 20–50)
LDLC SERPL CALC-MCNC: 146.6 MG/DL (ref 63–159)
NONHDLC SERPL-MCNC: 166 MG/DL
TRIGL SERPL-MCNC: 97 MG/DL (ref 30–150)
VIT B12 SERPL-MCNC: 297 PG/ML (ref 210–950)

## 2021-02-17 PROCEDURE — 82607 VITAMIN B-12: CPT

## 2021-02-17 PROCEDURE — 80061 LIPID PANEL: CPT

## 2021-02-17 PROCEDURE — 82306 VITAMIN D 25 HYDROXY: CPT

## 2021-02-17 PROCEDURE — 36415 COLL VENOUS BLD VENIPUNCTURE: CPT

## 2021-02-18 RX ORDER — IRBESARTAN 300 MG/1
300 TABLET ORAL NIGHTLY
Qty: 90 TABLET | Refills: 1 | Status: SHIPPED | OUTPATIENT
Start: 2021-02-18 | End: 2021-08-12

## 2021-02-25 ENCOUNTER — LAB VISIT (OUTPATIENT)
Dept: LAB | Facility: HOSPITAL | Age: 86
End: 2021-02-25
Payer: MEDICARE

## 2021-02-25 ENCOUNTER — PATIENT OUTREACH (OUTPATIENT)
Dept: ADMINISTRATIVE | Facility: HOSPITAL | Age: 86
End: 2021-02-25

## 2021-02-25 ENCOUNTER — OFFICE VISIT (OUTPATIENT)
Dept: HEMATOLOGY/ONCOLOGY | Facility: CLINIC | Age: 86
End: 2021-02-25
Payer: MEDICARE

## 2021-02-25 VITALS
WEIGHT: 178.88 LBS | HEART RATE: 69 BPM | TEMPERATURE: 98 F | HEIGHT: 72 IN | RESPIRATION RATE: 16 BRPM | DIASTOLIC BLOOD PRESSURE: 57 MMHG | OXYGEN SATURATION: 98 % | SYSTOLIC BLOOD PRESSURE: 116 MMHG | BODY MASS INDEX: 24.23 KG/M2

## 2021-02-25 DIAGNOSIS — C88.4 MALT LYMPHOMA: Primary | ICD-10-CM

## 2021-02-25 DIAGNOSIS — C88.4 MALT LYMPHOMA: ICD-10-CM

## 2021-02-25 DIAGNOSIS — I42.0 DILATED CARDIOMYOPATHY: ICD-10-CM

## 2021-02-25 DIAGNOSIS — K27.9 PUD (PEPTIC ULCER DISEASE): ICD-10-CM

## 2021-02-25 DIAGNOSIS — I48.19 PERSISTENT ATRIAL FIBRILLATION: ICD-10-CM

## 2021-02-25 LAB
ALBUMIN SERPL BCP-MCNC: 3.8 G/DL (ref 3.5–5.2)
ALP SERPL-CCNC: 49 U/L (ref 55–135)
ALT SERPL W/O P-5'-P-CCNC: 18 U/L (ref 10–44)
ANION GAP SERPL CALC-SCNC: 8 MMOL/L (ref 8–16)
AST SERPL-CCNC: 22 U/L (ref 10–40)
BASOPHILS # BLD AUTO: 0.07 K/UL (ref 0–0.2)
BASOPHILS NFR BLD: 1.2 % (ref 0–1.9)
BILIRUB SERPL-MCNC: 0.6 MG/DL (ref 0.1–1)
BUN SERPL-MCNC: 22 MG/DL (ref 10–30)
CALCIUM SERPL-MCNC: 9.2 MG/DL (ref 8.7–10.5)
CHLORIDE SERPL-SCNC: 107 MMOL/L (ref 95–110)
CO2 SERPL-SCNC: 26 MMOL/L (ref 23–29)
CREAT SERPL-MCNC: 1.1 MG/DL (ref 0.5–1.4)
DIFFERENTIAL METHOD: ABNORMAL
EOSINOPHIL # BLD AUTO: 0 K/UL (ref 0–0.5)
EOSINOPHIL NFR BLD: 0 % (ref 0–8)
ERYTHROCYTE [DISTWIDTH] IN BLOOD BY AUTOMATED COUNT: 14.3 % (ref 11.5–14.5)
EST. GFR  (AFRICAN AMERICAN): >60 ML/MIN/1.73 M^2
EST. GFR  (NON AFRICAN AMERICAN): 57.2 ML/MIN/1.73 M^2
GLUCOSE SERPL-MCNC: 148 MG/DL (ref 70–110)
HCT VFR BLD AUTO: 39.2 % (ref 40–54)
HGB BLD-MCNC: 12.8 G/DL (ref 14–18)
IMM GRANULOCYTES # BLD AUTO: 0.05 K/UL (ref 0–0.04)
IMM GRANULOCYTES NFR BLD AUTO: 0.9 % (ref 0–0.5)
LDH SERPL L TO P-CCNC: 207 U/L (ref 110–260)
LYMPHOCYTES # BLD AUTO: 0.8 K/UL (ref 1–4.8)
LYMPHOCYTES NFR BLD: 13.9 % (ref 18–48)
MCH RBC QN AUTO: 32.7 PG (ref 27–31)
MCHC RBC AUTO-ENTMCNC: 32.7 G/DL (ref 32–36)
MCV RBC AUTO: 100 FL (ref 82–98)
MONOCYTES # BLD AUTO: 0.5 K/UL (ref 0.3–1)
MONOCYTES NFR BLD: 9.2 % (ref 4–15)
NEUTROPHILS # BLD AUTO: 4.4 K/UL (ref 1.8–7.7)
NEUTROPHILS NFR BLD: 74.8 % (ref 38–73)
NRBC BLD-RTO: 0 /100 WBC
PLATELET # BLD AUTO: 226 K/UL (ref 150–350)
PMV BLD AUTO: 9.6 FL (ref 9.2–12.9)
POTASSIUM SERPL-SCNC: 4.3 MMOL/L (ref 3.5–5.1)
PROT SERPL-MCNC: 7.3 G/DL (ref 6–8.4)
RBC # BLD AUTO: 3.92 M/UL (ref 4.6–6.2)
SODIUM SERPL-SCNC: 141 MMOL/L (ref 136–145)
WBC # BLD AUTO: 5.88 K/UL (ref 3.9–12.7)

## 2021-02-25 PROCEDURE — 99999 PR PBB SHADOW E&M-EST. PATIENT-LVL III: ICD-10-PCS | Mod: PBBFAC,GC,, | Performed by: STUDENT IN AN ORGANIZED HEALTH CARE EDUCATION/TRAINING PROGRAM

## 2021-02-25 PROCEDURE — 1101F PR PT FALLS ASSESS DOC 0-1 FALLS W/OUT INJ PAST YR: ICD-10-PCS | Mod: CPTII,GC,S$GLB, | Performed by: STUDENT IN AN ORGANIZED HEALTH CARE EDUCATION/TRAINING PROGRAM

## 2021-02-25 PROCEDURE — 99999 PR PBB SHADOW E&M-EST. PATIENT-LVL III: CPT | Mod: PBBFAC,GC,, | Performed by: STUDENT IN AN ORGANIZED HEALTH CARE EDUCATION/TRAINING PROGRAM

## 2021-02-25 PROCEDURE — 1159F MED LIST DOCD IN RCRD: CPT | Mod: GC,S$GLB,, | Performed by: STUDENT IN AN ORGANIZED HEALTH CARE EDUCATION/TRAINING PROGRAM

## 2021-02-25 PROCEDURE — 1126F PR PAIN SEVERITY QUANTIFIED, NO PAIN PRESENT: ICD-10-PCS | Mod: GC,S$GLB,, | Performed by: STUDENT IN AN ORGANIZED HEALTH CARE EDUCATION/TRAINING PROGRAM

## 2021-02-25 PROCEDURE — 1126F AMNT PAIN NOTED NONE PRSNT: CPT | Mod: GC,S$GLB,, | Performed by: STUDENT IN AN ORGANIZED HEALTH CARE EDUCATION/TRAINING PROGRAM

## 2021-02-25 PROCEDURE — 1101F PT FALLS ASSESS-DOCD LE1/YR: CPT | Mod: CPTII,GC,S$GLB, | Performed by: STUDENT IN AN ORGANIZED HEALTH CARE EDUCATION/TRAINING PROGRAM

## 2021-02-25 PROCEDURE — 85025 COMPLETE CBC W/AUTO DIFF WBC: CPT

## 2021-02-25 PROCEDURE — 3288F PR FALLS RISK ASSESSMENT DOCUMENTED: ICD-10-PCS | Mod: CPTII,GC,S$GLB, | Performed by: STUDENT IN AN ORGANIZED HEALTH CARE EDUCATION/TRAINING PROGRAM

## 2021-02-25 PROCEDURE — 1159F PR MEDICATION LIST DOCUMENTED IN MEDICAL RECORD: ICD-10-PCS | Mod: GC,S$GLB,, | Performed by: STUDENT IN AN ORGANIZED HEALTH CARE EDUCATION/TRAINING PROGRAM

## 2021-02-25 PROCEDURE — 83615 LACTATE (LD) (LDH) ENZYME: CPT

## 2021-02-25 PROCEDURE — 99215 PR OFFICE/OUTPT VISIT, EST, LEVL V, 40-54 MIN: ICD-10-PCS | Mod: GC,S$GLB,, | Performed by: STUDENT IN AN ORGANIZED HEALTH CARE EDUCATION/TRAINING PROGRAM

## 2021-02-25 PROCEDURE — 99215 OFFICE O/P EST HI 40 MIN: CPT | Mod: GC,S$GLB,, | Performed by: STUDENT IN AN ORGANIZED HEALTH CARE EDUCATION/TRAINING PROGRAM

## 2021-02-25 PROCEDURE — 80053 COMPREHEN METABOLIC PANEL: CPT

## 2021-02-25 PROCEDURE — 3288F FALL RISK ASSESSMENT DOCD: CPT | Mod: CPTII,GC,S$GLB, | Performed by: STUDENT IN AN ORGANIZED HEALTH CARE EDUCATION/TRAINING PROGRAM

## 2021-02-25 PROCEDURE — 36415 COLL VENOUS BLD VENIPUNCTURE: CPT

## 2021-02-26 ENCOUNTER — OFFICE VISIT (OUTPATIENT)
Dept: INTERNAL MEDICINE | Facility: CLINIC | Age: 86
End: 2021-02-26
Payer: MEDICARE

## 2021-02-26 ENCOUNTER — HOSPITAL ENCOUNTER (OUTPATIENT)
Dept: RADIOLOGY | Facility: HOSPITAL | Age: 86
Discharge: HOME | End: 2021-02-26
Attending: INTERNAL MEDICINE
Payer: MEDICARE

## 2021-02-26 DIAGNOSIS — M54.6 CHRONIC RIGHT-SIDED THORACIC BACK PAIN: ICD-10-CM

## 2021-02-26 DIAGNOSIS — R73.9 HYPERGLYCEMIA: ICD-10-CM

## 2021-02-26 DIAGNOSIS — G89.29 CHRONIC RIGHT-SIDED THORACIC BACK PAIN: ICD-10-CM

## 2021-02-26 DIAGNOSIS — I48.91 ATRIAL FIBRILLATION, UNSPECIFIED TYPE: ICD-10-CM

## 2021-02-26 DIAGNOSIS — C88.4 MALT LYMPHOMA: ICD-10-CM

## 2021-02-26 DIAGNOSIS — E78.5 HYPERLIPIDEMIA, UNSPECIFIED HYPERLIPIDEMIA TYPE: ICD-10-CM

## 2021-02-26 DIAGNOSIS — E53.8 B12 DEFICIENCY: ICD-10-CM

## 2021-02-26 DIAGNOSIS — M77.8 TENDONITIS OF FINGER: ICD-10-CM

## 2021-02-26 DIAGNOSIS — Z29.9 PREVENTIVE MEASURE: ICD-10-CM

## 2021-02-26 DIAGNOSIS — I10 ESSENTIAL HYPERTENSION: Primary | ICD-10-CM

## 2021-02-26 PROCEDURE — 72080 XR THORACOLUMBAR SPINE AP LATERAL: ICD-10-PCS | Mod: 26,,, | Performed by: RADIOLOGY

## 2021-02-26 PROCEDURE — 1159F MED LIST DOCD IN RCRD: CPT | Mod: S$GLB,,, | Performed by: INTERNAL MEDICINE

## 2021-02-26 PROCEDURE — 1101F PR PT FALLS ASSESS DOC 0-1 FALLS W/OUT INJ PAST YR: ICD-10-PCS | Mod: CPTII,S$GLB,, | Performed by: INTERNAL MEDICINE

## 2021-02-26 PROCEDURE — 3288F PR FALLS RISK ASSESSMENT DOCUMENTED: ICD-10-PCS | Mod: CPTII,S$GLB,, | Performed by: INTERNAL MEDICINE

## 2021-02-26 PROCEDURE — 90732 PPSV23 VACC 2 YRS+ SUBQ/IM: CPT | Mod: S$GLB,,, | Performed by: INTERNAL MEDICINE

## 2021-02-26 PROCEDURE — 99999 PR PBB SHADOW E&M-EST. PATIENT-LVL IV: ICD-10-PCS | Mod: PBBFAC,,, | Performed by: INTERNAL MEDICINE

## 2021-02-26 PROCEDURE — G0009 PNEUMOCOCCAL POLYSACCHARIDE VACCINE 23-VALENT =>2YO SQ IM: ICD-10-PCS | Mod: S$GLB,,, | Performed by: INTERNAL MEDICINE

## 2021-02-26 PROCEDURE — 99499 UNLISTED E&M SERVICE: CPT | Mod: HCNC,S$GLB,, | Performed by: INTERNAL MEDICINE

## 2021-02-26 PROCEDURE — 99214 OFFICE O/P EST MOD 30 MIN: CPT | Mod: 25,S$GLB,, | Performed by: INTERNAL MEDICINE

## 2021-02-26 PROCEDURE — 72080 X-RAY EXAM THORACOLMB 2/> VW: CPT | Mod: 26,,, | Performed by: RADIOLOGY

## 2021-02-26 PROCEDURE — 1101F PT FALLS ASSESS-DOCD LE1/YR: CPT | Mod: CPTII,S$GLB,, | Performed by: INTERNAL MEDICINE

## 2021-02-26 PROCEDURE — 99499 RISK ADDL DX/OHS AUDIT: ICD-10-PCS | Mod: HCNC,S$GLB,, | Performed by: INTERNAL MEDICINE

## 2021-02-26 PROCEDURE — G0009 ADMIN PNEUMOCOCCAL VACCINE: HCPCS | Mod: S$GLB,,, | Performed by: INTERNAL MEDICINE

## 2021-02-26 PROCEDURE — 99214 PR OFFICE/OUTPT VISIT, EST, LEVL IV, 30-39 MIN: ICD-10-PCS | Mod: 25,S$GLB,, | Performed by: INTERNAL MEDICINE

## 2021-02-26 PROCEDURE — 72080 X-RAY EXAM THORACOLMB 2/> VW: CPT | Mod: TC

## 2021-02-26 PROCEDURE — 1159F PR MEDICATION LIST DOCUMENTED IN MEDICAL RECORD: ICD-10-PCS | Mod: S$GLB,,, | Performed by: INTERNAL MEDICINE

## 2021-02-26 PROCEDURE — 90732 PNEUMOCOCCAL POLYSACCHARIDE VACCINE 23-VALENT =>2YO SQ IM: ICD-10-PCS | Mod: S$GLB,,, | Performed by: INTERNAL MEDICINE

## 2021-02-26 PROCEDURE — 3288F FALL RISK ASSESSMENT DOCD: CPT | Mod: CPTII,S$GLB,, | Performed by: INTERNAL MEDICINE

## 2021-02-26 PROCEDURE — 99999 PR PBB SHADOW E&M-EST. PATIENT-LVL IV: CPT | Mod: PBBFAC,,, | Performed by: INTERNAL MEDICINE

## 2021-02-26 RX ORDER — METOPROLOL SUCCINATE 25 MG/1
25 TABLET, EXTENDED RELEASE ORAL DAILY
Qty: 30 TABLET | Refills: 6 | Status: SHIPPED | OUTPATIENT
Start: 2021-02-26 | End: 2021-07-15

## 2021-02-26 RX ORDER — ACETAMINOPHEN, DEXTROMETHORPHAN HBR, DOXYLAMINE SUCCINATE, PHENYLEPHRINE HCL 650; 20; 12.5; 1 MG/30ML; MG/30ML; MG/30ML; MG/30ML
1 SOLUTION ORAL DAILY
Qty: 30 EACH | Refills: 12 | Status: SHIPPED | OUTPATIENT
Start: 2021-02-26 | End: 2021-06-17

## 2021-02-26 RX ORDER — DICLOFENAC SODIUM 10 MG/G
GEL TOPICAL
Qty: 100 G | Refills: 1 | Status: SHIPPED | OUTPATIENT
Start: 2021-02-26 | End: 2023-02-14 | Stop reason: SDUPTHER

## 2021-02-27 VITALS
SYSTOLIC BLOOD PRESSURE: 120 MMHG | HEIGHT: 72 IN | WEIGHT: 181 LBS | DIASTOLIC BLOOD PRESSURE: 60 MMHG | OXYGEN SATURATION: 98 % | HEART RATE: 58 BPM | BODY MASS INDEX: 24.52 KG/M2

## 2021-02-27 RX ORDER — AMLODIPINE BESYLATE 2.5 MG/1
2.5 TABLET ORAL DAILY
Qty: 30 TABLET | Refills: 0
Start: 2021-02-27 | End: 2021-03-26 | Stop reason: SDUPTHER

## 2021-03-01 ENCOUNTER — TELEPHONE (OUTPATIENT)
Dept: INTERNAL MEDICINE | Facility: CLINIC | Age: 86
End: 2021-03-01

## 2021-03-09 ENCOUNTER — PES CALL (OUTPATIENT)
Dept: ADMINISTRATIVE | Facility: CLINIC | Age: 86
End: 2021-03-09

## 2021-03-23 ENCOUNTER — CLINICAL SUPPORT (OUTPATIENT)
Dept: REHABILITATION | Facility: OTHER | Age: 86
End: 2021-03-23
Payer: MEDICARE

## 2021-03-23 DIAGNOSIS — G89.29 CHRONIC RIGHT-SIDED THORACIC BACK PAIN: ICD-10-CM

## 2021-03-23 DIAGNOSIS — M54.6 CHRONIC RIGHT-SIDED THORACIC BACK PAIN: ICD-10-CM

## 2021-03-23 PROCEDURE — 97161 PT EVAL LOW COMPLEX 20 MIN: CPT | Mod: PN | Performed by: PHYSICAL THERAPIST

## 2021-03-26 DIAGNOSIS — I10 ESSENTIAL HYPERTENSION: ICD-10-CM

## 2021-03-26 RX ORDER — AMLODIPINE BESYLATE 2.5 MG/1
2.5 TABLET ORAL DAILY
Qty: 90 TABLET | Refills: 1 | Status: SHIPPED | OUTPATIENT
Start: 2021-03-26 | End: 2021-11-10 | Stop reason: SDUPTHER

## 2021-04-23 ENCOUNTER — TELEPHONE (OUTPATIENT)
Dept: ENDOSCOPY | Facility: HOSPITAL | Age: 86
End: 2021-04-23

## 2021-04-27 ENCOUNTER — PES CALL (OUTPATIENT)
Dept: ADMINISTRATIVE | Facility: CLINIC | Age: 86
End: 2021-04-27

## 2021-05-17 NOTE — CONSULTS
[facility]  Gastroenterology  Consult Note    Patient Name: Norm Mendoza  MRN: [id number]  Admission Date: 12/6/2018  Hospital Length of Stay: 1 days  Code Status: Full Code   Attending Provider: Cy Amador MD   Consulting Provider: Ian James MD  Primary Care Physician: Amber Jenkins MD  Principal Problem:Symptomatic anemia    Inpatient consult to Gastroenterology  Consult performed by: Ian James MD  Consult ordered by: Cy Amador MD        Subjective:     HPI:  Mr. Mendoza is a 89 y/o male with past medical history of HTN, HLD, on ASA 81mg, recently diagnosed with pAfib and started on Eliquis on 12/1, who presented to the ED with 1 weeks history of melena and symptomatic anemia. GI was consulted for evaluation of GI bleeding.    The patient states that he was started on Eliquis and metoprolol on 12/1 after he was found to have afib in clinic, and a referral was made to cardiology. He states that the same day he started the Eliquis, he started to feel tired with dizziness, and collapsed multiple times. Since then he noticed that his stools have been black and tarry. He continued to feel progressively more weak and short of breath. He was seen by Cardiology 2 days ago. Labs obtained at that time showed Hb of 8 from a baseline of 12-14, and he was contacted to refer to the ED.    The patient presented hemodynamically stable, with Hb of ~7-8, and dropped gradually to 6.5.    The patient denies abdominal pain, change in bowel habits, change in appetite, or weight loss. Denies hematemesis/coffee ground emesis. Denies history of GI bleeding or having an EGD before. Had a colonoscopy ~7 years ago that he reports was normal. Drinks ~4shots of hard liquor daily. Last dose of apixaban taken ~36 hours ago.    Past Medical History:   Diagnosis Date    Alcohol dependence, daily use 7/13/2017    Allergy     Bladder cancer     tumor that was benign     Hematuria     Hypertension      Mixed hyperlipidemia 2018    Paroxysmal atrial fibrillation 2018    Skin cancer     x3, had mohs on nose    Squamous cell carcinoma excised 14    R lower back    Symptomatic anemia 2018    Urinary tract infection     x4       Past Surgical History:   Procedure Laterality Date    ACHILLES TENDON SURGERY      cataracts      CYSTOSCOPY      bladder tumor    CYSTOSCOPY N/A 3/28/2014    [facility] OR [facility]    DILATION, URETHRA N/A 3/28/2014    [facility] at [facility] OR [facility]    EXCISION-BLADDER TUMOR-TRANSURETHRAL (TURBT) N/A 3/28/2014    [facility] at [facility] OR [facility]    EYE SURGERY      SKIN CANCER EXCISION         Review of patient's allergies indicates:  No Known Allergies  Family History     Problem Relation (Age of Onset)    Stroke Father, Brother        Tobacco Use    Smoking status: Former Smoker     Packs/day: 1.00     Years: 25.00     Pack years: 25.00     Types: Cigarettes     Last attempt to quit: 9/3/1970     Years since quittin.2    Smokeless tobacco: Never Used   Substance and Sexual Activity    Alcohol use: Yes     Alcohol/week: 1.8 oz     Types: 3 Shots of liquor per week     Comment: 3 bourbons daily - 12 beer on weekends     Drug use: No    Sexual activity: Yes     Partners: Female     Review of Systems   Constitutional: Positive for activity change. Negative for appetite change and fever.   HENT: Negative for trouble swallowing.    Eyes: Negative for visual disturbance.   Respiratory: Positive for shortness of breath. Negative for cough.    Cardiovascular: Negative for chest pain.   Gastrointestinal: Positive for blood in stool. Negative for abdominal distention, abdominal pain, anal bleeding, constipation, diarrhea, nausea, rectal pain and vomiting.   Genitourinary: Negative for flank pain.   Musculoskeletal: Negative for arthralgias and back pain.   Skin: Negative for color  change.   Allergic/Immunologic: Negative for immunocompromised state.   Neurological: Positive for dizziness, syncope and light-headedness.   Psychiatric/Behavioral: Negative for confusion.     Objective:     Vital Signs (Most Recent):  Temp: 98 °F (36.7 °C) (12/07/18 1012)  Pulse: 78 (12/07/18 1012)  Resp: 18 (12/07/18 1012)  BP: 138/79 (12/07/18 1012)  SpO2: 99 % (12/07/18 1012) Vital Signs (24h Range):  Temp:  [97.4 °F (36.3 °C)-98.4 °F (36.9 °C)] 98 °F (36.7 °C)  Pulse:  [] 78  Resp:  [13-25] 18  SpO2:  [92 %-100 %] 99 %  BP: (113-165)/(66-95) 138/79     Weight: 81 kg (178 lb 9.2 oz) (12/06/18 2000)  Body mass index is 24.22 kg/m².      Intake/Output Summary (Last 24 hours) at 12/7/2018 1032  Last data filed at 12/7/2018 0400  Gross per 24 hour   Intake 1357 ml   Output --   Net 1357 ml       Lines/Drains/Airways     Peripheral Intravenous Line                 Peripheral IV - Single Lumen -- days         Peripheral IV - Single Lumen 12/06/18 1355 Left Forearm less than 1 day         Peripheral IV - Single Lumen 12/06/18 1801 Left Hand less than 1 day                Physical Exam   Constitutional: He is oriented to person, place, and time. He appears well-developed and well-nourished. No distress.   HENT:   Head: Normocephalic.   Eyes: Conjunctivae are normal. No scleral icterus.   Neck: Normal range of motion. Neck supple.   Cardiovascular: Normal rate and regular rhythm.   Pulmonary/Chest: Effort normal and breath sounds normal.   Abdominal: Soft. Bowel sounds are normal. He exhibits no distension and no mass. There is no tenderness. There is no guarding.   Musculoskeletal: Normal range of motion.   Neurological: He is alert and oriented to person, place, and time.   Skin: Skin is warm and dry. There is pallor.   Psychiatric: He has a normal mood and affect.       Significant Labs:  CBC:   Recent Labs   Lab 12/06/18  1315 12/06/18  1936 12/07/18  0012 12/07/18  0856   WBC 8.62  --   --  6.79   HGB 7.8*  7.1* 6.5* 8.7*  8.6*   HCT 24.5*  --   --  27.0*     --   --  217     BMP:   Recent Labs   Lab 12/07/18  0856         K 4.1   *   CO2 23   BUN 26   CREATININE 0.8   CALCIUM 8.4*     CMP:   Recent Labs   Lab 12/06/18  1315 12/07/18  0856   * 108   CALCIUM 8.6* 8.4*   ALBUMIN 3.0*  --    PROT 5.8*  --     141   K 4.1 4.1   CO2 22* 23    111*   BUN 39* 26   CREATININE 0.9 0.8   ALKPHOS 40*  --    ALT 19  --    AST 18  --    BILITOT 0.2  --      Coagulation:   Recent Labs   Lab 12/06/18  1315   INR 1.0     Lipase: No results for input(s): LIPASE in the last 48 hours.    Significant Imaging:  Imaging results within the past 24 hours have been reviewed.    Assessment/Plan:     GIB (gastrointestinal bleeding)    Patient is a 89 y/o male who presented with melena and severe symptomatic anema after starting Eliquis with an elevated BUN/Cr ratio suggestive of UGIB. Patient is hemodynamically stable. Will plan for an EGD for evaluation today.    Protonix IV BID.  Serial H/H's transfuse as needed.  Discontinue all NSAIDs and Heparin products  Maintain IV access with 2 large bore IVs  Keep NPO. Plan for EGD today           Thank you for your consult. I will follow-up with patient. Please contact us if you have any additional questions.    Ru James MD  Gastroenterology  [facility]   oriented to person, place and time

## 2021-05-20 DIAGNOSIS — I49.8 OTHER SPECIFIED CARDIAC ARRHYTHMIAS: Primary | ICD-10-CM

## 2021-05-24 ENCOUNTER — LAB VISIT (OUTPATIENT)
Dept: INTERNAL MEDICINE | Facility: CLINIC | Age: 86
End: 2021-05-24
Payer: MEDICARE

## 2021-05-24 DIAGNOSIS — Z01.818 PRE-OP TESTING: ICD-10-CM

## 2021-05-24 PROCEDURE — U0003 INFECTIOUS AGENT DETECTION BY NUCLEIC ACID (DNA OR RNA); SEVERE ACUTE RESPIRATORY SYNDROME CORONAVIRUS 2 (SARS-COV-2) (CORONAVIRUS DISEASE [COVID-19]), AMPLIFIED PROBE TECHNIQUE, MAKING USE OF HIGH THROUGHPUT TECHNOLOGIES AS DESCRIBED BY CMS-2020-01-R: HCPCS | Performed by: FAMILY MEDICINE

## 2021-05-24 PROCEDURE — U0005 INFEC AGEN DETEC AMPLI PROBE: HCPCS | Performed by: FAMILY MEDICINE

## 2021-05-25 LAB — SARS-COV-2 RNA RESP QL NAA+PROBE: NOT DETECTED

## 2021-05-27 ENCOUNTER — HOSPITAL ENCOUNTER (OUTPATIENT)
Facility: HOSPITAL | Age: 86
Discharge: HOME | End: 2021-05-27
Attending: INTERNAL MEDICINE | Admitting: INTERNAL MEDICINE
Payer: MEDICARE

## 2021-05-27 ENCOUNTER — ANESTHESIA EVENT (OUTPATIENT)
Dept: ENDOSCOPY | Facility: HOSPITAL | Age: 86
End: 2021-05-27
Payer: MEDICARE

## 2021-05-27 ENCOUNTER — ANESTHESIA (OUTPATIENT)
Dept: ENDOSCOPY | Facility: HOSPITAL | Age: 86
End: 2021-05-27
Payer: MEDICARE

## 2021-05-27 VITALS
SYSTOLIC BLOOD PRESSURE: 151 MMHG | HEART RATE: 44 BPM | TEMPERATURE: 98 F | RESPIRATION RATE: 20 BRPM | BODY MASS INDEX: 23.7 KG/M2 | OXYGEN SATURATION: 100 % | DIASTOLIC BLOOD PRESSURE: 70 MMHG | HEIGHT: 72 IN | WEIGHT: 175 LBS

## 2021-05-27 DIAGNOSIS — C88.4 MALT LYMPHOMA: Primary | ICD-10-CM

## 2021-05-27 PROBLEM — C88.40 MALT (MUCOSA ASSOCIATED LYMPHOID TISSUE): Status: ACTIVE | Noted: 2021-05-27

## 2021-05-27 PROCEDURE — 27201012 HC FORCEPS, HOT/COLD, DISP: Performed by: INTERNAL MEDICINE

## 2021-05-27 PROCEDURE — D9220A PRA ANESTHESIA: ICD-10-PCS | Mod: CRNA,,, | Performed by: NURSE ANESTHETIST, CERTIFIED REGISTERED

## 2021-05-27 PROCEDURE — D9220A PRA ANESTHESIA: Mod: CRNA,,, | Performed by: NURSE ANESTHETIST, CERTIFIED REGISTERED

## 2021-05-27 PROCEDURE — 88341 IMHCHEM/IMCYTCHM EA ADD ANTB: CPT | Mod: 26,,, | Performed by: PATHOLOGY

## 2021-05-27 PROCEDURE — 88365 INSITU HYBRIDIZATION (FISH): CPT | Mod: 26,,, | Performed by: PATHOLOGY

## 2021-05-27 PROCEDURE — 88364 INSITU HYBRIDIZATION (FISH): CPT | Performed by: PATHOLOGY

## 2021-05-27 PROCEDURE — 43239 EGD BIOPSY SINGLE/MULTIPLE: CPT | Performed by: INTERNAL MEDICINE

## 2021-05-27 PROCEDURE — 25000003 PHARM REV CODE 250: Performed by: NURSE ANESTHETIST, CERTIFIED REGISTERED

## 2021-05-27 PROCEDURE — 88365 PR  TISSUE HYBRIDIZATION: ICD-10-PCS | Mod: 26,,, | Performed by: PATHOLOGY

## 2021-05-27 PROCEDURE — 88342 CHG IMMUNOCYTOCHEMISTRY: ICD-10-PCS | Mod: 26,,, | Performed by: PATHOLOGY

## 2021-05-27 PROCEDURE — 88341 PR IHC OR ICC EACH ADD'L SINGLE ANTIBODY  STAINPR: ICD-10-PCS | Mod: 26,,, | Performed by: PATHOLOGY

## 2021-05-27 PROCEDURE — D9220A PRA ANESTHESIA: ICD-10-PCS | Mod: ANES,,, | Performed by: ANESTHESIOLOGY

## 2021-05-27 PROCEDURE — 43239 PR EGD, FLEX, W/BIOPSY, SGL/MULTI: ICD-10-PCS | Mod: GC,,, | Performed by: INTERNAL MEDICINE

## 2021-05-27 PROCEDURE — 37000009 HC ANESTHESIA EA ADD 15 MINS: Performed by: INTERNAL MEDICINE

## 2021-05-27 PROCEDURE — 88305 TISSUE EXAM BY PATHOLOGIST: ICD-10-PCS | Mod: 26,,, | Performed by: PATHOLOGY

## 2021-05-27 PROCEDURE — 43239 EGD BIOPSY SINGLE/MULTIPLE: CPT | Mod: GC,,, | Performed by: INTERNAL MEDICINE

## 2021-05-27 PROCEDURE — D9220A PRA ANESTHESIA: Mod: ANES,,, | Performed by: ANESTHESIOLOGY

## 2021-05-27 PROCEDURE — 88305 TISSUE EXAM BY PATHOLOGIST: CPT | Performed by: PATHOLOGY

## 2021-05-27 PROCEDURE — 88342 IMHCHEM/IMCYTCHM 1ST ANTB: CPT | Performed by: PATHOLOGY

## 2021-05-27 PROCEDURE — 88365 INSITU HYBRIDIZATION (FISH): CPT | Performed by: PATHOLOGY

## 2021-05-27 PROCEDURE — 37000008 HC ANESTHESIA 1ST 15 MINUTES: Performed by: INTERNAL MEDICINE

## 2021-05-27 PROCEDURE — 88341 IMHCHEM/IMCYTCHM EA ADD ANTB: CPT | Performed by: PATHOLOGY

## 2021-05-27 PROCEDURE — 88342 IMHCHEM/IMCYTCHM 1ST ANTB: CPT | Mod: 26,,, | Performed by: PATHOLOGY

## 2021-05-27 PROCEDURE — 88305 TISSUE EXAM BY PATHOLOGIST: CPT | Mod: 26,,, | Performed by: PATHOLOGY

## 2021-05-27 PROCEDURE — 63600175 PHARM REV CODE 636 W HCPCS: Performed by: NURSE ANESTHETIST, CERTIFIED REGISTERED

## 2021-05-27 RX ORDER — PROPOFOL 10 MG/ML
VIAL (ML) INTRAVENOUS
Status: DISCONTINUED | OUTPATIENT
Start: 2021-05-27 | End: 2021-05-27

## 2021-05-27 RX ORDER — SODIUM CHLORIDE 9 MG/ML
INJECTION, SOLUTION INTRAVENOUS CONTINUOUS
Status: DISCONTINUED | OUTPATIENT
Start: 2021-05-27 | End: 2021-05-27 | Stop reason: HOSPADM

## 2021-05-27 RX ORDER — PROPOFOL 10 MG/ML
VIAL (ML) INTRAVENOUS CONTINUOUS PRN
Status: DISCONTINUED | OUTPATIENT
Start: 2021-05-27 | End: 2021-05-27

## 2021-05-27 RX ORDER — ONDANSETRON 2 MG/ML
4 INJECTION INTRAMUSCULAR; INTRAVENOUS DAILY PRN
Status: DISCONTINUED | OUTPATIENT
Start: 2021-05-27 | End: 2021-05-27 | Stop reason: HOSPADM

## 2021-05-27 RX ORDER — LIDOCAINE HYDROCHLORIDE 20 MG/ML
INJECTION INTRAVENOUS
Status: DISCONTINUED | OUTPATIENT
Start: 2021-05-27 | End: 2021-05-27

## 2021-05-27 RX ORDER — SODIUM CHLORIDE 9 MG/ML
INJECTION, SOLUTION INTRAVENOUS CONTINUOUS PRN
Status: DISCONTINUED | OUTPATIENT
Start: 2021-05-27 | End: 2021-05-27

## 2021-05-27 RX ORDER — HYDROMORPHONE HYDROCHLORIDE 1 MG/ML
0.2 INJECTION, SOLUTION INTRAMUSCULAR; INTRAVENOUS; SUBCUTANEOUS EVERY 5 MIN PRN
Status: DISCONTINUED | OUTPATIENT
Start: 2021-05-27 | End: 2021-05-27 | Stop reason: HOSPADM

## 2021-05-27 RX ORDER — MEPERIDINE HYDROCHLORIDE 50 MG/ML
12.5 INJECTION INTRAMUSCULAR; INTRAVENOUS; SUBCUTANEOUS ONCE AS NEEDED
Status: DISCONTINUED | OUTPATIENT
Start: 2021-05-27 | End: 2021-05-27 | Stop reason: HOSPADM

## 2021-05-27 RX ORDER — FENTANYL CITRATE 50 UG/ML
INJECTION, SOLUTION INTRAMUSCULAR; INTRAVENOUS
Status: DISCONTINUED | OUTPATIENT
Start: 2021-05-27 | End: 2021-05-27

## 2021-05-27 RX ORDER — SODIUM CHLORIDE 0.9 % (FLUSH) 0.9 %
3 SYRINGE (ML) INJECTION
Status: DISCONTINUED | OUTPATIENT
Start: 2021-05-27 | End: 2021-05-27 | Stop reason: HOSPADM

## 2021-05-27 RX ADMIN — PROPOFOL 50 MG: 10 INJECTION, EMULSION INTRAVENOUS at 10:05

## 2021-05-27 RX ADMIN — LIDOCAINE HYDROCHLORIDE 80 MG: 20 INJECTION, SOLUTION INTRAVENOUS at 10:05

## 2021-05-27 RX ADMIN — FENTANYL CITRATE 25 MCG: 50 INJECTION, SOLUTION INTRAMUSCULAR; INTRAVENOUS at 10:05

## 2021-05-27 RX ADMIN — PROPOFOL 20 MG: 10 INJECTION, EMULSION INTRAVENOUS at 10:05

## 2021-05-27 RX ADMIN — PROPOFOL 50 MCG/KG/MIN: 10 INJECTION, EMULSION INTRAVENOUS at 10:05

## 2021-05-27 RX ADMIN — SODIUM CHLORIDE: 9 INJECTION, SOLUTION INTRAVENOUS at 10:05

## 2021-05-27 RX ADMIN — GLYCOPYRROLATE 0.2 MG: 0.2 INJECTION, SOLUTION INTRAMUSCULAR; INTRAVITREAL at 10:05

## 2021-06-01 ENCOUNTER — OFFICE VISIT (OUTPATIENT)
Dept: INTERNAL MEDICINE | Facility: CLINIC | Age: 86
End: 2021-06-01
Payer: MEDICARE

## 2021-06-01 VITALS
HEIGHT: 72 IN | OXYGEN SATURATION: 98 % | BODY MASS INDEX: 24.63 KG/M2 | WEIGHT: 181.88 LBS | SYSTOLIC BLOOD PRESSURE: 142 MMHG | DIASTOLIC BLOOD PRESSURE: 68 MMHG | HEART RATE: 69 BPM

## 2021-06-01 DIAGNOSIS — I77.1 TORTUOUS AORTA: ICD-10-CM

## 2021-06-01 DIAGNOSIS — C88.4 MALT LYMPHOMA: ICD-10-CM

## 2021-06-01 DIAGNOSIS — I51.89 LEFT VENTRICULAR DIASTOLIC DYSFUNCTION WITH PRESERVED SYSTOLIC FUNCTION: ICD-10-CM

## 2021-06-01 DIAGNOSIS — I77.810 AORTIC ROOT DILATION: ICD-10-CM

## 2021-06-01 DIAGNOSIS — I70.0 AORTIC ATHEROSCLEROSIS: ICD-10-CM

## 2021-06-01 DIAGNOSIS — Z00.00 ENCOUNTER FOR PREVENTIVE HEALTH EXAMINATION: Primary | ICD-10-CM

## 2021-06-01 DIAGNOSIS — E78.2 MIXED HYPERLIPIDEMIA: ICD-10-CM

## 2021-06-01 DIAGNOSIS — I42.0 DILATED CARDIOMYOPATHY: ICD-10-CM

## 2021-06-01 DIAGNOSIS — C88.4 MALT (MUCOSA ASSOCIATED LYMPHOID TISSUE): ICD-10-CM

## 2021-06-01 DIAGNOSIS — I48.19 PERSISTENT ATRIAL FIBRILLATION: ICD-10-CM

## 2021-06-01 PROCEDURE — G0439 PPPS, SUBSEQ VISIT: HCPCS | Mod: S$GLB,,, | Performed by: NURSE PRACTITIONER

## 2021-06-01 PROCEDURE — 1158F ADVNC CARE PLAN TLK DOCD: CPT | Mod: S$GLB,,, | Performed by: NURSE PRACTITIONER

## 2021-06-01 PROCEDURE — 99999 PR PBB SHADOW E&M-EST. PATIENT-LVL III: ICD-10-PCS | Mod: PBBFAC,,, | Performed by: NURSE PRACTITIONER

## 2021-06-01 PROCEDURE — 1126F PR PAIN SEVERITY QUANTIFIED, NO PAIN PRESENT: ICD-10-PCS | Mod: S$GLB,,, | Performed by: NURSE PRACTITIONER

## 2021-06-01 PROCEDURE — 1126F AMNT PAIN NOTED NONE PRSNT: CPT | Mod: S$GLB,,, | Performed by: NURSE PRACTITIONER

## 2021-06-01 PROCEDURE — 1158F PR ADVANCE CARE PLANNING DISCUSS DOCUMENTED IN MEDICAL RECORD: ICD-10-PCS | Mod: S$GLB,,, | Performed by: NURSE PRACTITIONER

## 2021-06-01 PROCEDURE — G0439 PR MEDICARE ANNUAL WELLNESS SUBSEQUENT VISIT: ICD-10-PCS | Mod: S$GLB,,, | Performed by: NURSE PRACTITIONER

## 2021-06-01 PROCEDURE — 99999 PR PBB SHADOW E&M-EST. PATIENT-LVL III: CPT | Mod: PBBFAC,,, | Performed by: NURSE PRACTITIONER

## 2021-06-01 RX ORDER — PNEUMOCOCCAL VACCINE POLYVALENT 25; 25; 25; 25; 25; 25; 25; 25; 25; 25; 25; 25; 25; 25; 25; 25; 25; 25; 25; 25; 25; 25; 25 UG/.5ML; UG/.5ML; UG/.5ML; UG/.5ML; UG/.5ML; UG/.5ML; UG/.5ML; UG/.5ML; UG/.5ML; UG/.5ML; UG/.5ML; UG/.5ML; UG/.5ML; UG/.5ML; UG/.5ML; UG/.5ML; UG/.5ML; UG/.5ML; UG/.5ML; UG/.5ML; UG/.5ML; UG/.5ML; UG/.5ML
INJECTION, SOLUTION INTRAMUSCULAR; SUBCUTANEOUS
COMMUNITY
Start: 2021-02-26 | End: 2021-06-01

## 2021-06-08 ENCOUNTER — OFFICE VISIT (OUTPATIENT)
Dept: ELECTROPHYSIOLOGY | Facility: CLINIC | Age: 86
End: 2021-06-08
Payer: MEDICARE

## 2021-06-08 ENCOUNTER — HOSPITAL ENCOUNTER (OUTPATIENT)
Dept: CARDIOLOGY | Facility: CLINIC | Age: 86
Discharge: HOME | End: 2021-06-08
Payer: MEDICARE

## 2021-06-08 VITALS
DIASTOLIC BLOOD PRESSURE: 74 MMHG | WEIGHT: 180.56 LBS | BODY MASS INDEX: 24.46 KG/M2 | HEIGHT: 72 IN | SYSTOLIC BLOOD PRESSURE: 161 MMHG | HEART RATE: 60 BPM

## 2021-06-08 DIAGNOSIS — I49.8 OTHER SPECIFIED CARDIAC ARRHYTHMIAS: ICD-10-CM

## 2021-06-08 DIAGNOSIS — I42.0 DILATED CARDIOMYOPATHY: ICD-10-CM

## 2021-06-08 DIAGNOSIS — Z95.818 PRESENCE OF WATCHMAN LEFT ATRIAL APPENDAGE CLOSURE DEVICE: Primary | ICD-10-CM

## 2021-06-08 PROCEDURE — 3288F FALL RISK ASSESSMENT DOCD: CPT | Mod: CPTII,S$GLB,, | Performed by: INTERNAL MEDICINE

## 2021-06-08 PROCEDURE — 3288F PR FALLS RISK ASSESSMENT DOCUMENTED: ICD-10-PCS | Mod: CPTII,S$GLB,, | Performed by: INTERNAL MEDICINE

## 2021-06-08 PROCEDURE — 93005 ELECTROCARDIOGRAM TRACING: CPT | Mod: S$GLB,,, | Performed by: INTERNAL MEDICINE

## 2021-06-08 PROCEDURE — 93010 ELECTROCARDIOGRAM REPORT: CPT | Mod: S$GLB,,, | Performed by: INTERNAL MEDICINE

## 2021-06-08 PROCEDURE — 99999 PR PBB SHADOW E&M-EST. PATIENT-LVL III: CPT | Mod: PBBFAC,,, | Performed by: INTERNAL MEDICINE

## 2021-06-08 PROCEDURE — 99214 PR OFFICE/OUTPT VISIT, EST, LEVL IV, 30-39 MIN: ICD-10-PCS | Mod: S$GLB,,, | Performed by: INTERNAL MEDICINE

## 2021-06-08 PROCEDURE — 93010 RHYTHM STRIP: ICD-10-PCS | Mod: S$GLB,,, | Performed by: INTERNAL MEDICINE

## 2021-06-08 PROCEDURE — 1101F PR PT FALLS ASSESS DOC 0-1 FALLS W/OUT INJ PAST YR: ICD-10-PCS | Mod: CPTII,S$GLB,, | Performed by: INTERNAL MEDICINE

## 2021-06-08 PROCEDURE — 93005 RHYTHM STRIP: ICD-10-PCS | Mod: S$GLB,,, | Performed by: INTERNAL MEDICINE

## 2021-06-08 PROCEDURE — 99999 PR PBB SHADOW E&M-EST. PATIENT-LVL III: ICD-10-PCS | Mod: PBBFAC,,, | Performed by: INTERNAL MEDICINE

## 2021-06-08 PROCEDURE — 99214 OFFICE O/P EST MOD 30 MIN: CPT | Mod: S$GLB,,, | Performed by: INTERNAL MEDICINE

## 2021-06-08 PROCEDURE — 99499 UNLISTED E&M SERVICE: CPT | Mod: S$GLB,,, | Performed by: INTERNAL MEDICINE

## 2021-06-08 PROCEDURE — 99499 RISK ADDL DX/OHS AUDIT: ICD-10-PCS | Mod: S$GLB,,, | Performed by: INTERNAL MEDICINE

## 2021-06-08 PROCEDURE — 1101F PT FALLS ASSESS-DOCD LE1/YR: CPT | Mod: CPTII,S$GLB,, | Performed by: INTERNAL MEDICINE

## 2021-06-08 PROCEDURE — 1159F PR MEDICATION LIST DOCUMENTED IN MEDICAL RECORD: ICD-10-PCS | Mod: S$GLB,,, | Performed by: INTERNAL MEDICINE

## 2021-06-08 PROCEDURE — 1159F MED LIST DOCD IN RCRD: CPT | Mod: S$GLB,,, | Performed by: INTERNAL MEDICINE

## 2021-06-11 LAB
COMMENT: NORMAL
FINAL PATHOLOGIC DIAGNOSIS: NORMAL
GROSS: NORMAL
Lab: NORMAL
MICROSCOPIC EXAM: NORMAL

## 2021-06-17 ENCOUNTER — OFFICE VISIT (OUTPATIENT)
Dept: HEMATOLOGY/ONCOLOGY | Facility: CLINIC | Age: 86
End: 2021-06-17
Payer: MEDICARE

## 2021-06-17 ENCOUNTER — LAB VISIT (OUTPATIENT)
Dept: LAB | Facility: HOSPITAL | Age: 86
End: 2021-06-17
Payer: MEDICARE

## 2021-06-17 VITALS
TEMPERATURE: 98 F | SYSTOLIC BLOOD PRESSURE: 178 MMHG | OXYGEN SATURATION: 99 % | BODY MASS INDEX: 24.16 KG/M2 | WEIGHT: 178.38 LBS | DIASTOLIC BLOOD PRESSURE: 77 MMHG | RESPIRATION RATE: 18 BRPM | HEIGHT: 72 IN | HEART RATE: 55 BPM

## 2021-06-17 DIAGNOSIS — I10 ESSENTIAL HYPERTENSION: ICD-10-CM

## 2021-06-17 DIAGNOSIS — K27.9 PUD (PEPTIC ULCER DISEASE): ICD-10-CM

## 2021-06-17 DIAGNOSIS — C88.4 MALT LYMPHOMA: ICD-10-CM

## 2021-06-17 DIAGNOSIS — C88.4 MALT LYMPHOMA: Primary | ICD-10-CM

## 2021-06-17 DIAGNOSIS — I48.91 ATRIAL FIBRILLATION, UNSPECIFIED TYPE: ICD-10-CM

## 2021-06-17 DIAGNOSIS — Z95.818 PRESENCE OF WATCHMAN LEFT ATRIAL APPENDAGE CLOSURE DEVICE: ICD-10-CM

## 2021-06-17 DIAGNOSIS — K25.9 GASTRIC ULCER, UNSPECIFIED CHRONICITY, UNSPECIFIED WHETHER GASTRIC ULCER HEMORRHAGE OR PERFORATION PRESENT: ICD-10-CM

## 2021-06-17 DIAGNOSIS — I51.89 LEFT VENTRICULAR DIASTOLIC DYSFUNCTION WITH PRESERVED SYSTOLIC FUNCTION: ICD-10-CM

## 2021-06-17 LAB
ALBUMIN SERPL BCP-MCNC: 3.8 G/DL (ref 3.5–5.2)
ALP SERPL-CCNC: 49 U/L (ref 55–135)
ALT SERPL W/O P-5'-P-CCNC: 20 U/L (ref 10–44)
ANION GAP SERPL CALC-SCNC: 8 MMOL/L (ref 8–16)
AST SERPL-CCNC: 23 U/L (ref 10–40)
BASOPHILS # BLD AUTO: 0.08 K/UL (ref 0–0.2)
BASOPHILS NFR BLD: 1.6 % (ref 0–1.9)
BILIRUB SERPL-MCNC: 0.8 MG/DL (ref 0.1–1)
BUN SERPL-MCNC: 18 MG/DL (ref 10–30)
CALCIUM SERPL-MCNC: 9.6 MG/DL (ref 8.7–10.5)
CHLORIDE SERPL-SCNC: 106 MMOL/L (ref 95–110)
CO2 SERPL-SCNC: 26 MMOL/L (ref 23–29)
CREAT SERPL-MCNC: 1.1 MG/DL (ref 0.5–1.4)
DIFFERENTIAL METHOD: ABNORMAL
EOSINOPHIL # BLD AUTO: 0.1 K/UL (ref 0–0.5)
EOSINOPHIL NFR BLD: 1.8 % (ref 0–8)
ERYTHROCYTE [DISTWIDTH] IN BLOOD BY AUTOMATED COUNT: 14.3 % (ref 11.5–14.5)
EST. GFR  (AFRICAN AMERICAN): >60 ML/MIN/1.73 M^2
EST. GFR  (NON AFRICAN AMERICAN): 56.8 ML/MIN/1.73 M^2
GLUCOSE SERPL-MCNC: 121 MG/DL (ref 70–110)
HCT VFR BLD AUTO: 40.1 % (ref 40–54)
HGB BLD-MCNC: 13.2 G/DL (ref 14–18)
IMM GRANULOCYTES # BLD AUTO: 0.03 K/UL (ref 0–0.04)
IMM GRANULOCYTES NFR BLD AUTO: 0.6 % (ref 0–0.5)
LDH SERPL L TO P-CCNC: 199 U/L (ref 110–260)
LYMPHOCYTES # BLD AUTO: 0.7 K/UL (ref 1–4.8)
LYMPHOCYTES NFR BLD: 13.3 % (ref 18–48)
MCH RBC QN AUTO: 32.9 PG (ref 27–31)
MCHC RBC AUTO-ENTMCNC: 32.9 G/DL (ref 32–36)
MCV RBC AUTO: 100 FL (ref 82–98)
MONOCYTES # BLD AUTO: 0.5 K/UL (ref 0.3–1)
MONOCYTES NFR BLD: 10.3 % (ref 4–15)
NEUTROPHILS # BLD AUTO: 3.6 K/UL (ref 1.8–7.7)
NEUTROPHILS NFR BLD: 72.4 % (ref 38–73)
NRBC BLD-RTO: 0 /100 WBC
PLATELET # BLD AUTO: 193 K/UL (ref 150–450)
PMV BLD AUTO: 9.5 FL (ref 9.2–12.9)
POTASSIUM SERPL-SCNC: 4.5 MMOL/L (ref 3.5–5.1)
PROT SERPL-MCNC: 7.3 G/DL (ref 6–8.4)
RBC # BLD AUTO: 4.01 M/UL (ref 4.6–6.2)
SODIUM SERPL-SCNC: 140 MMOL/L (ref 136–145)
WBC # BLD AUTO: 4.95 K/UL (ref 3.9–12.7)

## 2021-06-17 PROCEDURE — 1101F PT FALLS ASSESS-DOCD LE1/YR: CPT | Mod: CPTII,GC,S$GLB, | Performed by: STUDENT IN AN ORGANIZED HEALTH CARE EDUCATION/TRAINING PROGRAM

## 2021-06-17 PROCEDURE — 80053 COMPREHEN METABOLIC PANEL: CPT | Performed by: STUDENT IN AN ORGANIZED HEALTH CARE EDUCATION/TRAINING PROGRAM

## 2021-06-17 PROCEDURE — 3288F PR FALLS RISK ASSESSMENT DOCUMENTED: ICD-10-PCS | Mod: CPTII,GC,S$GLB, | Performed by: STUDENT IN AN ORGANIZED HEALTH CARE EDUCATION/TRAINING PROGRAM

## 2021-06-17 PROCEDURE — 1159F MED LIST DOCD IN RCRD: CPT | Mod: GC,S$GLB,, | Performed by: STUDENT IN AN ORGANIZED HEALTH CARE EDUCATION/TRAINING PROGRAM

## 2021-06-17 PROCEDURE — 99215 PR OFFICE/OUTPT VISIT, EST, LEVL V, 40-54 MIN: ICD-10-PCS | Mod: GC,S$GLB,, | Performed by: STUDENT IN AN ORGANIZED HEALTH CARE EDUCATION/TRAINING PROGRAM

## 2021-06-17 PROCEDURE — 36415 COLL VENOUS BLD VENIPUNCTURE: CPT | Performed by: STUDENT IN AN ORGANIZED HEALTH CARE EDUCATION/TRAINING PROGRAM

## 2021-06-17 PROCEDURE — 99999 PR PBB SHADOW E&M-EST. PATIENT-LVL III: ICD-10-PCS | Mod: PBBFAC,GC,, | Performed by: STUDENT IN AN ORGANIZED HEALTH CARE EDUCATION/TRAINING PROGRAM

## 2021-06-17 PROCEDURE — 99999 PR PBB SHADOW E&M-EST. PATIENT-LVL III: CPT | Mod: PBBFAC,GC,, | Performed by: STUDENT IN AN ORGANIZED HEALTH CARE EDUCATION/TRAINING PROGRAM

## 2021-06-17 PROCEDURE — 85025 COMPLETE CBC W/AUTO DIFF WBC: CPT | Performed by: STUDENT IN AN ORGANIZED HEALTH CARE EDUCATION/TRAINING PROGRAM

## 2021-06-17 PROCEDURE — 1126F AMNT PAIN NOTED NONE PRSNT: CPT | Mod: GC,S$GLB,, | Performed by: STUDENT IN AN ORGANIZED HEALTH CARE EDUCATION/TRAINING PROGRAM

## 2021-06-17 PROCEDURE — 1101F PR PT FALLS ASSESS DOC 0-1 FALLS W/OUT INJ PAST YR: ICD-10-PCS | Mod: CPTII,GC,S$GLB, | Performed by: STUDENT IN AN ORGANIZED HEALTH CARE EDUCATION/TRAINING PROGRAM

## 2021-06-17 PROCEDURE — 3288F FALL RISK ASSESSMENT DOCD: CPT | Mod: CPTII,GC,S$GLB, | Performed by: STUDENT IN AN ORGANIZED HEALTH CARE EDUCATION/TRAINING PROGRAM

## 2021-06-17 PROCEDURE — 1159F PR MEDICATION LIST DOCUMENTED IN MEDICAL RECORD: ICD-10-PCS | Mod: GC,S$GLB,, | Performed by: STUDENT IN AN ORGANIZED HEALTH CARE EDUCATION/TRAINING PROGRAM

## 2021-06-17 PROCEDURE — 1126F PR PAIN SEVERITY QUANTIFIED, NO PAIN PRESENT: ICD-10-PCS | Mod: GC,S$GLB,, | Performed by: STUDENT IN AN ORGANIZED HEALTH CARE EDUCATION/TRAINING PROGRAM

## 2021-06-17 PROCEDURE — 99215 OFFICE O/P EST HI 40 MIN: CPT | Mod: GC,S$GLB,, | Performed by: STUDENT IN AN ORGANIZED HEALTH CARE EDUCATION/TRAINING PROGRAM

## 2021-06-17 PROCEDURE — 83615 LACTATE (LD) (LDH) ENZYME: CPT | Performed by: STUDENT IN AN ORGANIZED HEALTH CARE EDUCATION/TRAINING PROGRAM

## 2021-06-17 RX ORDER — LANOLIN ALCOHOL/MO/W.PET/CERES
1000 CREAM (GRAM) TOPICAL DAILY
COMMUNITY
Start: 2021-06-07 | End: 2023-11-09

## 2021-07-21 ENCOUNTER — TELEPHONE (OUTPATIENT)
Dept: ELECTROPHYSIOLOGY | Facility: CLINIC | Age: 86
End: 2021-07-21

## 2021-07-21 ENCOUNTER — TELEPHONE (OUTPATIENT)
Dept: INTERNAL MEDICINE | Facility: CLINIC | Age: 86
End: 2021-07-21

## 2021-07-22 ENCOUNTER — TELEPHONE (OUTPATIENT)
Dept: INTERNAL MEDICINE | Facility: CLINIC | Age: 86
End: 2021-07-22

## 2021-07-23 ENCOUNTER — OFFICE VISIT (OUTPATIENT)
Dept: INTERNAL MEDICINE | Facility: CLINIC | Age: 86
End: 2021-07-23
Payer: MEDICARE

## 2021-07-23 VITALS
BODY MASS INDEX: 24.61 KG/M2 | DIASTOLIC BLOOD PRESSURE: 72 MMHG | SYSTOLIC BLOOD PRESSURE: 118 MMHG | HEART RATE: 95 BPM | OXYGEN SATURATION: 100 % | HEIGHT: 72 IN | WEIGHT: 181.69 LBS | TEMPERATURE: 98 F

## 2021-07-23 DIAGNOSIS — I48.91 ATRIAL FIBRILLATION, UNSPECIFIED TYPE: ICD-10-CM

## 2021-07-23 DIAGNOSIS — I10 ESSENTIAL HYPERTENSION: Primary | ICD-10-CM

## 2021-07-23 PROCEDURE — 1101F PR PT FALLS ASSESS DOC 0-1 FALLS W/OUT INJ PAST YR: ICD-10-PCS | Mod: CPTII,S$GLB,, | Performed by: INTERNAL MEDICINE

## 2021-07-23 PROCEDURE — 99999 PR PBB SHADOW E&M-EST. PATIENT-LVL III: ICD-10-PCS | Mod: PBBFAC,,, | Performed by: INTERNAL MEDICINE

## 2021-07-23 PROCEDURE — 1126F PR PAIN SEVERITY QUANTIFIED, NO PAIN PRESENT: ICD-10-PCS | Mod: CPTII,S$GLB,, | Performed by: INTERNAL MEDICINE

## 2021-07-23 PROCEDURE — 99499 RISK ADDL DX/OHS AUDIT: ICD-10-PCS | Mod: HCNC,S$GLB,, | Performed by: INTERNAL MEDICINE

## 2021-07-23 PROCEDURE — 99999 PR PBB SHADOW E&M-EST. PATIENT-LVL III: CPT | Mod: PBBFAC,,, | Performed by: INTERNAL MEDICINE

## 2021-07-23 PROCEDURE — 1159F PR MEDICATION LIST DOCUMENTED IN MEDICAL RECORD: ICD-10-PCS | Mod: CPTII,S$GLB,, | Performed by: INTERNAL MEDICINE

## 2021-07-23 PROCEDURE — 1101F PT FALLS ASSESS-DOCD LE1/YR: CPT | Mod: CPTII,S$GLB,, | Performed by: INTERNAL MEDICINE

## 2021-07-23 PROCEDURE — 1126F AMNT PAIN NOTED NONE PRSNT: CPT | Mod: CPTII,S$GLB,, | Performed by: INTERNAL MEDICINE

## 2021-07-23 PROCEDURE — 99213 PR OFFICE/OUTPT VISIT, EST, LEVL III, 20-29 MIN: ICD-10-PCS | Mod: S$GLB,,, | Performed by: INTERNAL MEDICINE

## 2021-07-23 PROCEDURE — 99213 OFFICE O/P EST LOW 20 MIN: CPT | Mod: S$GLB,,, | Performed by: INTERNAL MEDICINE

## 2021-07-23 PROCEDURE — 3288F FALL RISK ASSESSMENT DOCD: CPT | Mod: CPTII,S$GLB,, | Performed by: INTERNAL MEDICINE

## 2021-07-23 PROCEDURE — 99499 UNLISTED E&M SERVICE: CPT | Mod: HCNC,S$GLB,, | Performed by: INTERNAL MEDICINE

## 2021-07-23 PROCEDURE — 3288F PR FALLS RISK ASSESSMENT DOCUMENTED: ICD-10-PCS | Mod: CPTII,S$GLB,, | Performed by: INTERNAL MEDICINE

## 2021-07-23 PROCEDURE — 1159F MED LIST DOCD IN RCRD: CPT | Mod: CPTII,S$GLB,, | Performed by: INTERNAL MEDICINE

## 2021-07-31 PROCEDURE — 99457 PR MONITORING, PHYSIOL PARAM, REMOTE, 1ST 20 MINS, PER MONTH: ICD-10-PCS | Mod: S$GLB,,, | Performed by: INTERNAL MEDICINE

## 2021-07-31 PROCEDURE — 99457 RPM TX MGMT 1ST 20 MIN: CPT | Mod: S$GLB,,, | Performed by: INTERNAL MEDICINE

## 2021-08-12 ENCOUNTER — PATIENT OUTREACH (OUTPATIENT)
Dept: ADMINISTRATIVE | Facility: HOSPITAL | Age: 86
End: 2021-08-12

## 2021-08-19 ENCOUNTER — LAB VISIT (OUTPATIENT)
Dept: LAB | Facility: HOSPITAL | Age: 86
End: 2021-08-19
Attending: INTERNAL MEDICINE
Payer: MEDICARE

## 2021-08-19 DIAGNOSIS — R73.9 HYPERGLYCEMIA: ICD-10-CM

## 2021-08-19 DIAGNOSIS — E53.8 B12 DEFICIENCY: ICD-10-CM

## 2021-08-19 DIAGNOSIS — E78.5 HYPERLIPIDEMIA, UNSPECIFIED HYPERLIPIDEMIA TYPE: ICD-10-CM

## 2021-08-19 DIAGNOSIS — I10 ESSENTIAL HYPERTENSION: ICD-10-CM

## 2021-08-19 PROCEDURE — 80053 COMPREHEN METABOLIC PANEL: CPT | Performed by: INTERNAL MEDICINE

## 2021-08-19 PROCEDURE — 80061 LIPID PANEL: CPT | Performed by: INTERNAL MEDICINE

## 2021-08-19 PROCEDURE — 36415 COLL VENOUS BLD VENIPUNCTURE: CPT | Performed by: INTERNAL MEDICINE

## 2021-08-19 PROCEDURE — 85025 COMPLETE CBC W/AUTO DIFF WBC: CPT | Performed by: INTERNAL MEDICINE

## 2021-08-19 PROCEDURE — 83036 HEMOGLOBIN GLYCOSYLATED A1C: CPT | Performed by: INTERNAL MEDICINE

## 2021-08-19 PROCEDURE — 82607 VITAMIN B-12: CPT | Performed by: INTERNAL MEDICINE

## 2021-08-20 LAB
ALBUMIN SERPL BCP-MCNC: 3.5 G/DL (ref 3.5–5.2)
ALP SERPL-CCNC: 49 U/L (ref 55–135)
ALT SERPL W/O P-5'-P-CCNC: 17 U/L (ref 10–44)
ANION GAP SERPL CALC-SCNC: 10 MMOL/L (ref 8–16)
AST SERPL-CCNC: 18 U/L (ref 10–40)
BASOPHILS # BLD AUTO: 0.06 K/UL (ref 0–0.2)
BASOPHILS NFR BLD: 1.4 % (ref 0–1.9)
BILIRUB SERPL-MCNC: 0.7 MG/DL (ref 0.1–1)
BUN SERPL-MCNC: 18 MG/DL (ref 10–30)
CALCIUM SERPL-MCNC: 9.4 MG/DL (ref 8.7–10.5)
CHLORIDE SERPL-SCNC: 104 MMOL/L (ref 95–110)
CHOLEST SERPL-MCNC: 207 MG/DL (ref 120–199)
CHOLEST/HDLC SERPL: 3.2 {RATIO} (ref 2–5)
CO2 SERPL-SCNC: 23 MMOL/L (ref 23–29)
CREAT SERPL-MCNC: 1 MG/DL (ref 0.5–1.4)
DIFFERENTIAL METHOD: ABNORMAL
EOSINOPHIL # BLD AUTO: 0.1 K/UL (ref 0–0.5)
EOSINOPHIL NFR BLD: 1.4 % (ref 0–8)
ERYTHROCYTE [DISTWIDTH] IN BLOOD BY AUTOMATED COUNT: 14.6 % (ref 11.5–14.5)
EST. GFR  (AFRICAN AMERICAN): >60 ML/MIN/1.73 M^2
EST. GFR  (NON AFRICAN AMERICAN): >60 ML/MIN/1.73 M^2
ESTIMATED AVG GLUCOSE: 103 MG/DL (ref 68–131)
GLUCOSE SERPL-MCNC: 84 MG/DL (ref 70–110)
HBA1C MFR BLD: 5.2 % (ref 4–5.6)
HCT VFR BLD AUTO: 40.1 % (ref 40–54)
HDLC SERPL-MCNC: 64 MG/DL (ref 40–75)
HDLC SERPL: 30.9 % (ref 20–50)
HGB BLD-MCNC: 12.8 G/DL (ref 14–18)
IMM GRANULOCYTES # BLD AUTO: 0.03 K/UL (ref 0–0.04)
IMM GRANULOCYTES NFR BLD AUTO: 0.7 % (ref 0–0.5)
LDLC SERPL CALC-MCNC: 127.2 MG/DL (ref 63–159)
LYMPHOCYTES # BLD AUTO: 0.8 K/UL (ref 1–4.8)
LYMPHOCYTES NFR BLD: 17.4 % (ref 18–48)
MCH RBC QN AUTO: 32.6 PG (ref 27–31)
MCHC RBC AUTO-ENTMCNC: 31.9 G/DL (ref 32–36)
MCV RBC AUTO: 102 FL (ref 82–98)
MONOCYTES # BLD AUTO: 0.6 K/UL (ref 0.3–1)
MONOCYTES NFR BLD: 12.8 % (ref 4–15)
NEUTROPHILS # BLD AUTO: 2.9 K/UL (ref 1.8–7.7)
NEUTROPHILS NFR BLD: 66.3 % (ref 38–73)
NONHDLC SERPL-MCNC: 143 MG/DL
NRBC BLD-RTO: 0 /100 WBC
PLATELET # BLD AUTO: 214 K/UL (ref 150–450)
PMV BLD AUTO: 9.9 FL (ref 9.2–12.9)
POTASSIUM SERPL-SCNC: 4.4 MMOL/L (ref 3.5–5.1)
PROT SERPL-MCNC: 6.6 G/DL (ref 6–8.4)
RBC # BLD AUTO: 3.93 M/UL (ref 4.6–6.2)
SODIUM SERPL-SCNC: 137 MMOL/L (ref 136–145)
TRIGL SERPL-MCNC: 79 MG/DL (ref 30–150)
VIT B12 SERPL-MCNC: 613 PG/ML (ref 210–950)
WBC # BLD AUTO: 4.38 K/UL (ref 3.9–12.7)

## 2021-08-26 ENCOUNTER — OFFICE VISIT (OUTPATIENT)
Dept: INTERNAL MEDICINE | Facility: CLINIC | Age: 86
End: 2021-08-26
Payer: MEDICARE

## 2021-08-26 VITALS
OXYGEN SATURATION: 98 % | HEART RATE: 79 BPM | BODY MASS INDEX: 24.48 KG/M2 | DIASTOLIC BLOOD PRESSURE: 84 MMHG | WEIGHT: 180.75 LBS | HEIGHT: 72 IN | SYSTOLIC BLOOD PRESSURE: 138 MMHG

## 2021-08-26 DIAGNOSIS — I10 ESSENTIAL HYPERTENSION: Primary | ICD-10-CM

## 2021-08-26 DIAGNOSIS — C88.4 MALT (MUCOSA ASSOCIATED LYMPHOID TISSUE): ICD-10-CM

## 2021-08-26 DIAGNOSIS — E53.8 B12 DEFICIENCY: ICD-10-CM

## 2021-08-26 DIAGNOSIS — I48.91 ATRIAL FIBRILLATION, UNSPECIFIED TYPE: ICD-10-CM

## 2021-08-26 PROCEDURE — 3288F FALL RISK ASSESSMENT DOCD: CPT | Mod: CPTII,S$GLB,, | Performed by: INTERNAL MEDICINE

## 2021-08-26 PROCEDURE — 99999 PR PBB SHADOW E&M-EST. PATIENT-LVL III: ICD-10-PCS | Mod: PBBFAC,,, | Performed by: INTERNAL MEDICINE

## 2021-08-26 PROCEDURE — 1101F PT FALLS ASSESS-DOCD LE1/YR: CPT | Mod: CPTII,S$GLB,, | Performed by: INTERNAL MEDICINE

## 2021-08-26 PROCEDURE — 1101F PR PT FALLS ASSESS DOC 0-1 FALLS W/OUT INJ PAST YR: ICD-10-PCS | Mod: CPTII,S$GLB,, | Performed by: INTERNAL MEDICINE

## 2021-08-26 PROCEDURE — 93010 ELECTROCARDIOGRAM REPORT: CPT | Mod: S$GLB,,, | Performed by: INTERNAL MEDICINE

## 2021-08-26 PROCEDURE — 93010 EKG 12-LEAD: ICD-10-PCS | Mod: S$GLB,,, | Performed by: INTERNAL MEDICINE

## 2021-08-26 PROCEDURE — 99214 OFFICE O/P EST MOD 30 MIN: CPT | Mod: S$GLB,,, | Performed by: INTERNAL MEDICINE

## 2021-08-26 PROCEDURE — 99214 PR OFFICE/OUTPT VISIT, EST, LEVL IV, 30-39 MIN: ICD-10-PCS | Mod: S$GLB,,, | Performed by: INTERNAL MEDICINE

## 2021-08-26 PROCEDURE — 3288F PR FALLS RISK ASSESSMENT DOCUMENTED: ICD-10-PCS | Mod: CPTII,S$GLB,, | Performed by: INTERNAL MEDICINE

## 2021-08-26 PROCEDURE — 1126F AMNT PAIN NOTED NONE PRSNT: CPT | Mod: CPTII,S$GLB,, | Performed by: INTERNAL MEDICINE

## 2021-08-26 PROCEDURE — 93005 EKG 12-LEAD: ICD-10-PCS | Mod: S$GLB,,, | Performed by: INTERNAL MEDICINE

## 2021-08-26 PROCEDURE — 93005 ELECTROCARDIOGRAM TRACING: CPT | Mod: S$GLB,,, | Performed by: INTERNAL MEDICINE

## 2021-08-26 PROCEDURE — 99999 PR PBB SHADOW E&M-EST. PATIENT-LVL III: CPT | Mod: PBBFAC,,, | Performed by: INTERNAL MEDICINE

## 2021-08-26 PROCEDURE — 1126F PR PAIN SEVERITY QUANTIFIED, NO PAIN PRESENT: ICD-10-PCS | Mod: CPTII,S$GLB,, | Performed by: INTERNAL MEDICINE

## 2021-08-28 ENCOUNTER — IMMUNIZATION (OUTPATIENT)
Dept: INTERNAL MEDICINE | Facility: CLINIC | Age: 86
End: 2021-08-28
Payer: MEDICARE

## 2021-08-28 DIAGNOSIS — Z23 NEED FOR VACCINATION: Primary | ICD-10-CM

## 2021-08-28 PROCEDURE — 91300 COVID-19, MRNA, LNP-S, PF, 30 MCG/0.3 ML DOSE VACCINE: ICD-10-PCS | Mod: ,,, | Performed by: INTERNAL MEDICINE

## 2021-08-28 PROCEDURE — 0003A COVID-19, MRNA, LNP-S, PF, 30 MCG/0.3 ML DOSE VACCINE: ICD-10-PCS | Mod: CV19,,, | Performed by: INTERNAL MEDICINE

## 2021-08-28 PROCEDURE — 91300 COVID-19, MRNA, LNP-S, PF, 30 MCG/0.3 ML DOSE VACCINE: CPT | Mod: ,,, | Performed by: INTERNAL MEDICINE

## 2021-08-28 PROCEDURE — 0003A COVID-19, MRNA, LNP-S, PF, 30 MCG/0.3 ML DOSE VACCINE: CPT | Mod: CV19,,, | Performed by: INTERNAL MEDICINE

## 2021-08-29 ENCOUNTER — TELEPHONE (OUTPATIENT)
Dept: INTERNAL MEDICINE | Facility: CLINIC | Age: 86
End: 2021-08-29

## 2021-09-09 ENCOUNTER — TELEPHONE (OUTPATIENT)
Dept: ELECTROPHYSIOLOGY | Facility: CLINIC | Age: 86
End: 2021-09-09

## 2021-09-09 ENCOUNTER — TELEPHONE (OUTPATIENT)
Dept: INTERNAL MEDICINE | Facility: CLINIC | Age: 86
End: 2021-09-09

## 2021-10-08 ENCOUNTER — HOSPITAL ENCOUNTER (OUTPATIENT)
Dept: CARDIOLOGY | Facility: CLINIC | Age: 86
Discharge: HOME | End: 2021-10-08
Payer: MEDICARE

## 2021-10-08 ENCOUNTER — TELEPHONE (OUTPATIENT)
Dept: ELECTROPHYSIOLOGY | Facility: CLINIC | Age: 86
End: 2021-10-08

## 2021-10-08 ENCOUNTER — OFFICE VISIT (OUTPATIENT)
Dept: ELECTROPHYSIOLOGY | Facility: CLINIC | Age: 86
End: 2021-10-08
Payer: MEDICARE

## 2021-10-08 ENCOUNTER — LAB VISIT (OUTPATIENT)
Dept: LAB | Facility: HOSPITAL | Age: 86
End: 2021-10-08
Payer: MEDICARE

## 2021-10-08 VITALS
BODY MASS INDEX: 24.48 KG/M2 | HEIGHT: 72 IN | WEIGHT: 180.75 LBS | DIASTOLIC BLOOD PRESSURE: 75 MMHG | HEART RATE: 82 BPM | SYSTOLIC BLOOD PRESSURE: 162 MMHG

## 2021-10-08 DIAGNOSIS — Z95.818 PRESENCE OF WATCHMAN LEFT ATRIAL APPENDAGE CLOSURE DEVICE: ICD-10-CM

## 2021-10-08 DIAGNOSIS — Z92.89 HISTORY OF CARDIOVERSION: ICD-10-CM

## 2021-10-08 DIAGNOSIS — I48.91 ATRIAL FIBRILLATION, UNSPECIFIED TYPE: ICD-10-CM

## 2021-10-08 DIAGNOSIS — I48.91 ATRIAL FIBRILLATION, UNSPECIFIED TYPE: Primary | ICD-10-CM

## 2021-10-08 DIAGNOSIS — I49.3 PVC'S (PREMATURE VENTRICULAR CONTRACTIONS): ICD-10-CM

## 2021-10-08 DIAGNOSIS — I10 ESSENTIAL HYPERTENSION: ICD-10-CM

## 2021-10-08 DIAGNOSIS — I49.8 OTHER SPECIFIED CARDIAC ARRHYTHMIAS: ICD-10-CM

## 2021-10-08 LAB — TSH SERPL DL<=0.005 MIU/L-ACNC: 1.79 UIU/ML (ref 0.4–4)

## 2021-10-08 PROCEDURE — 99999 PR PBB SHADOW E&M-EST. PATIENT-LVL III: ICD-10-PCS | Mod: PBBFAC,HCNC,, | Performed by: NURSE PRACTITIONER

## 2021-10-08 PROCEDURE — 1125F AMNT PAIN NOTED PAIN PRSNT: CPT | Mod: HCNC,CPTII,S$GLB, | Performed by: NURSE PRACTITIONER

## 2021-10-08 PROCEDURE — 1160F RVW MEDS BY RX/DR IN RCRD: CPT | Mod: HCNC,CPTII,S$GLB, | Performed by: NURSE PRACTITIONER

## 2021-10-08 PROCEDURE — 36415 COLL VENOUS BLD VENIPUNCTURE: CPT | Mod: HCNC | Performed by: NURSE PRACTITIONER

## 2021-10-08 PROCEDURE — 93005 ELECTROCARDIOGRAM TRACING: CPT | Mod: HCNC,S$GLB,, | Performed by: INTERNAL MEDICINE

## 2021-10-08 PROCEDURE — 1125F PR PAIN SEVERITY QUANTIFIED, PAIN PRESENT: ICD-10-PCS | Mod: HCNC,CPTII,S$GLB, | Performed by: NURSE PRACTITIONER

## 2021-10-08 PROCEDURE — 1160F PR REVIEW ALL MEDS BY PRESCRIBER/CLIN PHARMACIST DOCUMENTED: ICD-10-PCS | Mod: HCNC,CPTII,S$GLB, | Performed by: NURSE PRACTITIONER

## 2021-10-08 PROCEDURE — 99999 PR PBB SHADOW E&M-EST. PATIENT-LVL III: CPT | Mod: PBBFAC,HCNC,, | Performed by: NURSE PRACTITIONER

## 2021-10-08 PROCEDURE — 99214 PR OFFICE/OUTPT VISIT, EST, LEVL IV, 30-39 MIN: ICD-10-PCS | Mod: HCNC,S$GLB,, | Performed by: NURSE PRACTITIONER

## 2021-10-08 PROCEDURE — 93010 ELECTROCARDIOGRAM REPORT: CPT | Mod: HCNC,S$GLB,, | Performed by: INTERNAL MEDICINE

## 2021-10-08 PROCEDURE — 1159F MED LIST DOCD IN RCRD: CPT | Mod: HCNC,CPTII,S$GLB, | Performed by: NURSE PRACTITIONER

## 2021-10-08 PROCEDURE — 1159F PR MEDICATION LIST DOCUMENTED IN MEDICAL RECORD: ICD-10-PCS | Mod: HCNC,CPTII,S$GLB, | Performed by: NURSE PRACTITIONER

## 2021-10-08 PROCEDURE — 93010 RHYTHM STRIP: ICD-10-PCS | Mod: HCNC,S$GLB,, | Performed by: INTERNAL MEDICINE

## 2021-10-08 PROCEDURE — 99499 UNLISTED E&M SERVICE: CPT | Mod: HCNC,S$GLB,, | Performed by: NURSE PRACTITIONER

## 2021-10-08 PROCEDURE — 99214 OFFICE O/P EST MOD 30 MIN: CPT | Mod: HCNC,S$GLB,, | Performed by: NURSE PRACTITIONER

## 2021-10-08 PROCEDURE — 84443 ASSAY THYROID STIM HORMONE: CPT | Mod: HCNC | Performed by: NURSE PRACTITIONER

## 2021-10-08 PROCEDURE — 93005 RHYTHM STRIP: ICD-10-PCS | Mod: HCNC,S$GLB,, | Performed by: INTERNAL MEDICINE

## 2021-10-08 PROCEDURE — 99499 RISK ADDL DX/OHS AUDIT: ICD-10-PCS | Mod: HCNC,S$GLB,, | Performed by: NURSE PRACTITIONER

## 2021-10-08 RX ORDER — METOPROLOL SUCCINATE 25 MG/1
50 TABLET, EXTENDED RELEASE ORAL DAILY
Qty: 180 TABLET | Refills: 1
Start: 2021-10-08 | End: 2021-12-20 | Stop reason: SDUPTHER

## 2021-10-15 ENCOUNTER — TELEPHONE (OUTPATIENT)
Dept: RESEARCH | Facility: HOSPITAL | Age: 86
End: 2021-10-15

## 2021-10-15 ENCOUNTER — CLINICAL SUPPORT (OUTPATIENT)
Dept: CARDIOLOGY | Facility: HOSPITAL | Age: 86
End: 2021-10-15
Attending: NURSE PRACTITIONER
Payer: MEDICARE

## 2021-10-15 DIAGNOSIS — I48.91 ATRIAL FIBRILLATION, UNSPECIFIED TYPE: ICD-10-CM

## 2021-10-15 PROCEDURE — 93225 XTRNL ECG REC<48 HRS REC: CPT | Mod: HCNC

## 2021-10-15 PROCEDURE — 93227 XTRNL ECG REC<48 HR R&I: CPT | Mod: HCNC,,, | Performed by: INTERNAL MEDICINE

## 2021-10-15 PROCEDURE — 93227 HOLTER MONITOR - 24 HOUR (CUPID ONLY): ICD-10-PCS | Mod: HCNC,,, | Performed by: INTERNAL MEDICINE

## 2021-10-18 ENCOUNTER — HOSPITAL ENCOUNTER (OUTPATIENT)
Dept: CARDIOLOGY | Facility: HOSPITAL | Age: 86
Discharge: HOME | End: 2021-10-18
Attending: NURSE PRACTITIONER
Payer: MEDICARE

## 2021-10-18 ENCOUNTER — RESEARCH ENCOUNTER (OUTPATIENT)
Dept: RESEARCH | Facility: HOSPITAL | Age: 86
End: 2021-10-18

## 2021-10-18 VITALS
DIASTOLIC BLOOD PRESSURE: 68 MMHG | HEART RATE: 70 BPM | BODY MASS INDEX: 24.52 KG/M2 | WEIGHT: 181 LBS | HEIGHT: 72 IN | SYSTOLIC BLOOD PRESSURE: 142 MMHG

## 2021-10-18 DIAGNOSIS — I48.91 ATRIAL FIBRILLATION, UNSPECIFIED TYPE: ICD-10-CM

## 2021-10-18 LAB
ASCENDING AORTA: 4.34 CM
AV INDEX (PROSTH): 0.2
AV MEAN GRADIENT: 30 MMHG
AV PEAK GRADIENT: 40 MMHG
AV VALVE AREA: 1.03 CM2
AV VELOCITY RATIO: 0.2
BSA FOR ECHO PROCEDURE: 2.04 M2
CV ECHO LV RWT: 0.39 CM
DOP CALC AO PEAK VEL: 3.16 M/S
DOP CALC AO VTI: 70 CM
DOP CALC LVOT AREA: 5.2 CM2
DOP CALC LVOT DIAMETER: 2.58 CM
DOP CALC LVOT PEAK VEL: 0.63 M/S
DOP CALC LVOT STROKE VOLUME: 72.06 CM3
DOP CALCLVOT PEAK VEL VTI: 13.79 CM
E/E' RATIO: 12.5 M/S
ECHO LV POSTERIOR WALL: 0.94 CM (ref 0.6–1.1)
EJECTION FRACTION: 45 %
FRACTIONAL SHORTENING: 20 % (ref 28–44)
INTERVENTRICULAR SEPTUM: 0.95 CM (ref 0.6–1.1)
IVRT: 82.78 MSEC
LA MAJOR: 6.62 CM
LA MINOR: 6.53 CM
LA WIDTH: 5.01 CM
LEFT ATRIUM SIZE: 4.48 CM
LEFT ATRIUM VOLUME INDEX MOD: 54.3 ML/M2
LEFT ATRIUM VOLUME INDEX: 61.5 ML/M2
LEFT ATRIUM VOLUME MOD: 110.82 CM3
LEFT ATRIUM VOLUME: 125.43 CM3
LEFT INTERNAL DIMENSION IN SYSTOLE: 3.84 CM (ref 2.1–4)
LEFT VENTRICLE DIASTOLIC VOLUME INDEX: 52.86 ML/M2
LEFT VENTRICLE DIASTOLIC VOLUME: 107.84 ML
LEFT VENTRICLE MASS INDEX: 78 G/M2
LEFT VENTRICLE SYSTOLIC VOLUME INDEX: 31.2 ML/M2
LEFT VENTRICLE SYSTOLIC VOLUME: 63.63 ML
LEFT VENTRICULAR INTERNAL DIMENSION IN DIASTOLE: 4.81 CM (ref 3.5–6)
LEFT VENTRICULAR MASS: 158.25 G
LV LATERAL E/E' RATIO: 11.11 M/S
LV SEPTAL E/E' RATIO: 14.29 M/S
MV PEAK E VEL: 1 M/S
PISA TR MAX VEL: 2.78 M/S
PULM VEIN S/D RATIO: 0.64
PV PEAK D VEL: 0.44 M/S
PV PEAK S VEL: 0.28 M/S
QEF: 43 %
RA MAJOR: 6.18 CM
RA PRESSURE: 8 MMHG
RA WIDTH: 4.43 CM
RIGHT VENTRICULAR END-DIASTOLIC DIMENSION: 4.49 CM
RV TISSUE DOPPLER FREE WALL SYSTOLIC VELOCITY 1 (APICAL 4 CHAMBER VIEW): 10.62 CM/S
SINUS: 3.62 CM
STJ: 3.02 CM
TDI LATERAL: 0.09 M/S
TDI SEPTAL: 0.07 M/S
TDI: 0.08 M/S
TR MAX PG: 31 MMHG
TRICUSPID ANNULAR PLANE SYSTOLIC EXCURSION: 1.92 CM
TV REST PULMONARY ARTERY PRESSURE: 39 MMHG

## 2021-10-18 PROCEDURE — 93306 ECHO (CUPID ONLY): ICD-10-PCS | Mod: 26,HCNC,, | Performed by: INTERNAL MEDICINE

## 2021-10-18 PROCEDURE — 93306 TTE W/DOPPLER COMPLETE: CPT | Mod: HCNC

## 2021-10-18 PROCEDURE — 93306 TTE W/DOPPLER COMPLETE: CPT | Mod: 26,HCNC,, | Performed by: INTERNAL MEDICINE

## 2021-10-19 LAB
OHS CV EVENT MONITOR DAY: 0
OHS CV HOLTER LENGTH DECIMAL HOURS: 24
OHS CV HOLTER LENGTH HOURS: 24
OHS CV HOLTER LENGTH MINUTES: 0

## 2021-10-26 DIAGNOSIS — I48.91 ATRIAL FIBRILLATION, UNSPECIFIED TYPE: Primary | ICD-10-CM

## 2021-10-29 ENCOUNTER — OFFICE VISIT (OUTPATIENT)
Dept: DERMATOLOGY | Facility: CLINIC | Age: 86
End: 2021-10-29
Payer: MEDICARE

## 2021-10-29 DIAGNOSIS — L82.1 SK (SEBORRHEIC KERATOSIS): ICD-10-CM

## 2021-10-29 DIAGNOSIS — D18.01 CHERRY ANGIOMA: ICD-10-CM

## 2021-10-29 DIAGNOSIS — D48.5 NEOPLASM OF UNCERTAIN BEHAVIOR OF SKIN: ICD-10-CM

## 2021-10-29 DIAGNOSIS — D22.9 MULTIPLE BENIGN NEVI: Primary | ICD-10-CM

## 2021-10-29 DIAGNOSIS — L82.0 INFLAMED SEBORRHEIC KERATOSIS: ICD-10-CM

## 2021-10-29 DIAGNOSIS — L57.0 AK (ACTINIC KERATOSIS): ICD-10-CM

## 2021-10-29 DIAGNOSIS — Z85.828 HISTORY OF NONMELANOMA SKIN CANCER: ICD-10-CM

## 2021-10-29 DIAGNOSIS — Z12.83 SCREENING FOR SKIN CANCER: ICD-10-CM

## 2021-10-29 PROCEDURE — 88305 TISSUE EXAM BY PATHOLOGIST: CPT | Mod: 26,,, | Performed by: PATHOLOGY

## 2021-10-29 PROCEDURE — 17003 DESTRUCTION, PREMALIGNANT LESIONS; SECOND THROUGH 14 LESIONS: ICD-10-PCS | Mod: 59,HCNC,S$GLB, | Performed by: DERMATOLOGY

## 2021-10-29 PROCEDURE — 3288F PR FALLS RISK ASSESSMENT DOCUMENTED: ICD-10-PCS | Mod: HCNC,CPTII,S$GLB, | Performed by: DERMATOLOGY

## 2021-10-29 PROCEDURE — 17003 DESTRUCT PREMALG LES 2-14: CPT | Mod: 59,HCNC,S$GLB, | Performed by: DERMATOLOGY

## 2021-10-29 PROCEDURE — 1159F PR MEDICATION LIST DOCUMENTED IN MEDICAL RECORD: ICD-10-PCS | Mod: HCNC,CPTII,S$GLB, | Performed by: DERMATOLOGY

## 2021-10-29 PROCEDURE — 99213 PR OFFICE/OUTPT VISIT, EST, LEVL III, 20-29 MIN: ICD-10-PCS | Mod: 25,HCNC,S$GLB, | Performed by: DERMATOLOGY

## 2021-10-29 PROCEDURE — 99454 REM MNTR PHYSIOL PARAM 16-30: CPT | Mod: S$GLB,,, | Performed by: INTERNAL MEDICINE

## 2021-10-29 PROCEDURE — 99999 PR PBB SHADOW E&M-EST. PATIENT-LVL III: ICD-10-PCS | Mod: PBBFAC,HCNC,, | Performed by: DERMATOLOGY

## 2021-10-29 PROCEDURE — 17000 PR DESTRUCTION(LASER SURGERY,CRYOSURGERY,CHEMOSURGERY),PREMALIGNANT LESIONS,FIRST LESION: ICD-10-PCS | Mod: 59,HCNC,S$GLB, | Performed by: DERMATOLOGY

## 2021-10-29 PROCEDURE — 11102 TANGNTL BX SKIN SINGLE LES: CPT | Mod: 59,HCNC,S$GLB, | Performed by: DERMATOLOGY

## 2021-10-29 PROCEDURE — 17000 DESTRUCT PREMALG LESION: CPT | Mod: 59,HCNC,S$GLB, | Performed by: DERMATOLOGY

## 2021-10-29 PROCEDURE — 99454 PR REMOTE MNTR, PHYS PARAM, INITIAL, EA 30 DAYS: ICD-10-PCS | Mod: S$GLB,,, | Performed by: INTERNAL MEDICINE

## 2021-10-29 PROCEDURE — 88305 TISSUE EXAM BY PATHOLOGIST: CPT | Performed by: PATHOLOGY

## 2021-10-29 PROCEDURE — 3288F FALL RISK ASSESSMENT DOCD: CPT | Mod: HCNC,CPTII,S$GLB, | Performed by: DERMATOLOGY

## 2021-10-29 PROCEDURE — 11102 PR TANGENTIAL BIOPSY, SKIN, SINGLE LESION: ICD-10-PCS | Mod: 59,HCNC,S$GLB, | Performed by: DERMATOLOGY

## 2021-10-29 PROCEDURE — 1126F PR PAIN SEVERITY QUANTIFIED, NO PAIN PRESENT: ICD-10-PCS | Mod: HCNC,CPTII,S$GLB, | Performed by: DERMATOLOGY

## 2021-10-29 PROCEDURE — 17110 PR DESTRUCTION BENIGN LESIONS UP TO 14: ICD-10-PCS | Mod: HCNC,S$GLB,, | Performed by: DERMATOLOGY

## 2021-10-29 PROCEDURE — 1126F AMNT PAIN NOTED NONE PRSNT: CPT | Mod: HCNC,CPTII,S$GLB, | Performed by: DERMATOLOGY

## 2021-10-29 PROCEDURE — 1159F MED LIST DOCD IN RCRD: CPT | Mod: HCNC,CPTII,S$GLB, | Performed by: DERMATOLOGY

## 2021-10-29 PROCEDURE — 99213 OFFICE O/P EST LOW 20 MIN: CPT | Mod: 25,HCNC,S$GLB, | Performed by: DERMATOLOGY

## 2021-10-29 PROCEDURE — 99999 PR PBB SHADOW E&M-EST. PATIENT-LVL III: CPT | Mod: PBBFAC,HCNC,, | Performed by: DERMATOLOGY

## 2021-10-29 PROCEDURE — 17110 DESTRUCTION B9 LES UP TO 14: CPT | Mod: HCNC,S$GLB,, | Performed by: DERMATOLOGY

## 2021-10-29 PROCEDURE — 1101F PR PT FALLS ASSESS DOC 0-1 FALLS W/OUT INJ PAST YR: ICD-10-PCS | Mod: HCNC,CPTII,S$GLB, | Performed by: DERMATOLOGY

## 2021-10-29 PROCEDURE — 88305 TISSUE EXAM BY PATHOLOGIST: ICD-10-PCS | Mod: 26,,, | Performed by: PATHOLOGY

## 2021-10-29 PROCEDURE — 1101F PT FALLS ASSESS-DOCD LE1/YR: CPT | Mod: HCNC,CPTII,S$GLB, | Performed by: DERMATOLOGY

## 2021-11-04 ENCOUNTER — TELEPHONE (OUTPATIENT)
Dept: ELECTROPHYSIOLOGY | Facility: CLINIC | Age: 86
End: 2021-11-04
Payer: MEDICARE

## 2021-11-04 LAB
FINAL PATHOLOGIC DIAGNOSIS: NORMAL
GROSS: NORMAL
Lab: NORMAL
MICROSCOPIC EXAM: NORMAL

## 2021-11-10 ENCOUNTER — OFFICE VISIT (OUTPATIENT)
Dept: DERMATOLOGY | Facility: CLINIC | Age: 86
End: 2021-11-10
Payer: MEDICARE

## 2021-11-10 DIAGNOSIS — I10 ESSENTIAL HYPERTENSION: ICD-10-CM

## 2021-11-10 DIAGNOSIS — C44.41 BASAL CELL CARCINOMA (BCC) OF SKIN OF NECK: Primary | ICD-10-CM

## 2021-11-10 PROCEDURE — 1101F PR PT FALLS ASSESS DOC 0-1 FALLS W/OUT INJ PAST YR: ICD-10-PCS | Mod: CPTII,S$GLB,, | Performed by: DERMATOLOGY

## 2021-11-10 PROCEDURE — 3288F FALL RISK ASSESSMENT DOCD: CPT | Mod: CPTII,S$GLB,, | Performed by: DERMATOLOGY

## 2021-11-10 PROCEDURE — 1126F AMNT PAIN NOTED NONE PRSNT: CPT | Mod: CPTII,S$GLB,, | Performed by: DERMATOLOGY

## 2021-11-10 PROCEDURE — 1101F PT FALLS ASSESS-DOCD LE1/YR: CPT | Mod: CPTII,S$GLB,, | Performed by: DERMATOLOGY

## 2021-11-10 PROCEDURE — 1159F MED LIST DOCD IN RCRD: CPT | Mod: CPTII,S$GLB,, | Performed by: DERMATOLOGY

## 2021-11-10 PROCEDURE — 17272 PR DESTR MALIG SCAL,NCK,HAND 1.1-2 CM: ICD-10-PCS | Mod: S$GLB,,, | Performed by: DERMATOLOGY

## 2021-11-10 PROCEDURE — 99499 NO LOS: ICD-10-PCS | Mod: S$GLB,,, | Performed by: DERMATOLOGY

## 2021-11-10 PROCEDURE — 99999 PR PBB SHADOW E&M-EST. PATIENT-LVL II: CPT | Mod: PBBFAC,,, | Performed by: DERMATOLOGY

## 2021-11-10 PROCEDURE — 1159F PR MEDICATION LIST DOCUMENTED IN MEDICAL RECORD: ICD-10-PCS | Mod: CPTII,S$GLB,, | Performed by: DERMATOLOGY

## 2021-11-10 PROCEDURE — 1126F PR PAIN SEVERITY QUANTIFIED, NO PAIN PRESENT: ICD-10-PCS | Mod: CPTII,S$GLB,, | Performed by: DERMATOLOGY

## 2021-11-10 PROCEDURE — 3288F PR FALLS RISK ASSESSMENT DOCUMENTED: ICD-10-PCS | Mod: CPTII,S$GLB,, | Performed by: DERMATOLOGY

## 2021-11-10 PROCEDURE — 17272 DSTR MAL LES S/N/H/F/G 1.1-2: CPT | Mod: S$GLB,,, | Performed by: DERMATOLOGY

## 2021-11-10 PROCEDURE — 99499 UNLISTED E&M SERVICE: CPT | Mod: S$GLB,,, | Performed by: DERMATOLOGY

## 2021-11-10 PROCEDURE — 99999 PR PBB SHADOW E&M-EST. PATIENT-LVL II: ICD-10-PCS | Mod: PBBFAC,,, | Performed by: DERMATOLOGY

## 2021-11-11 ENCOUNTER — IMMUNIZATION (OUTPATIENT)
Dept: PHARMACY | Facility: CLINIC | Age: 86
End: 2021-11-11
Payer: MEDICARE

## 2021-11-11 RX ORDER — AMLODIPINE BESYLATE 2.5 MG/1
2.5 TABLET ORAL DAILY
Qty: 90 TABLET | Refills: 1 | Status: SHIPPED | OUTPATIENT
Start: 2021-11-11 | End: 2022-05-09

## 2021-11-12 ENCOUNTER — TELEPHONE (OUTPATIENT)
Dept: HEMATOLOGY/ONCOLOGY | Facility: CLINIC | Age: 86
End: 2021-11-12
Payer: MEDICARE

## 2021-12-06 ENCOUNTER — TELEPHONE (OUTPATIENT)
Dept: HEMATOLOGY/ONCOLOGY | Facility: CLINIC | Age: 86
End: 2021-12-06
Payer: MEDICARE

## 2021-12-13 ENCOUNTER — CLINICAL SUPPORT (OUTPATIENT)
Dept: URGENT CARE | Facility: CLINIC | Age: 86
End: 2021-12-13
Payer: MEDICARE

## 2021-12-13 DIAGNOSIS — Z11.59 ENCOUNTER FOR SCREENING FOR OTHER VIRAL DISEASES: Primary | ICD-10-CM

## 2021-12-13 LAB
CTP QC/QA: YES
SARS-COV-2 RDRP RESP QL NAA+PROBE: NEGATIVE

## 2021-12-13 PROCEDURE — 99211 PR OFFICE/OUTPT VISIT, EST, LEVL I: ICD-10-PCS | Mod: S$GLB,,, | Performed by: PHYSICIAN ASSISTANT

## 2021-12-13 PROCEDURE — U0002 COVID-19 LAB TEST NON-CDC: HCPCS | Mod: QW,S$GLB,, | Performed by: PHYSICIAN ASSISTANT

## 2021-12-13 PROCEDURE — U0002: ICD-10-PCS | Mod: QW,S$GLB,, | Performed by: PHYSICIAN ASSISTANT

## 2021-12-13 PROCEDURE — 99211 OFF/OP EST MAY X REQ PHY/QHP: CPT | Mod: S$GLB,,, | Performed by: PHYSICIAN ASSISTANT

## 2021-12-20 DIAGNOSIS — I10 ESSENTIAL HYPERTENSION: ICD-10-CM

## 2021-12-20 DIAGNOSIS — I48.91 ATRIAL FIBRILLATION, UNSPECIFIED TYPE: ICD-10-CM

## 2021-12-20 RX ORDER — METOPROLOL SUCCINATE 25 MG/1
50 TABLET, EXTENDED RELEASE ORAL DAILY
Qty: 180 TABLET | Refills: 1
Start: 2021-12-20 | End: 2021-12-22 | Stop reason: SDUPTHER

## 2021-12-22 DIAGNOSIS — I48.91 ATRIAL FIBRILLATION, UNSPECIFIED TYPE: ICD-10-CM

## 2021-12-22 DIAGNOSIS — I10 ESSENTIAL HYPERTENSION: ICD-10-CM

## 2021-12-22 RX ORDER — METOPROLOL SUCCINATE 25 MG/1
50 TABLET, EXTENDED RELEASE ORAL DAILY
Qty: 180 TABLET | Refills: 1 | Status: ON HOLD
Start: 2021-12-22 | End: 2022-02-08 | Stop reason: SDUPTHER

## 2021-12-23 ENCOUNTER — TELEPHONE (OUTPATIENT)
Dept: INTERNAL MEDICINE | Facility: CLINIC | Age: 86
End: 2021-12-23
Payer: MEDICARE

## 2021-12-28 ENCOUNTER — TELEPHONE (OUTPATIENT)
Dept: HEMATOLOGY/ONCOLOGY | Facility: CLINIC | Age: 86
End: 2021-12-28
Payer: MEDICARE

## 2022-02-02 ENCOUNTER — TELEPHONE (OUTPATIENT)
Dept: ELECTROPHYSIOLOGY | Facility: CLINIC | Age: 87
End: 2022-02-02
Payer: MEDICARE

## 2022-02-03 ENCOUNTER — OFFICE VISIT (OUTPATIENT)
Dept: ELECTROPHYSIOLOGY | Facility: CLINIC | Age: 87
End: 2022-02-03
Payer: MEDICARE

## 2022-02-03 ENCOUNTER — LAB VISIT (OUTPATIENT)
Dept: LAB | Facility: HOSPITAL | Age: 87
End: 2022-02-03
Attending: INTERNAL MEDICINE
Payer: MEDICARE

## 2022-02-03 ENCOUNTER — HOSPITAL ENCOUNTER (OUTPATIENT)
Dept: CARDIOLOGY | Facility: CLINIC | Age: 87
Discharge: HOME | End: 2022-02-03
Payer: MEDICARE

## 2022-02-03 ENCOUNTER — PATIENT MESSAGE (OUTPATIENT)
Dept: ELECTROPHYSIOLOGY | Facility: CLINIC | Age: 87
End: 2022-02-03

## 2022-02-03 VITALS
HEIGHT: 72 IN | BODY MASS INDEX: 25.32 KG/M2 | SYSTOLIC BLOOD PRESSURE: 144 MMHG | WEIGHT: 186.94 LBS | HEART RATE: 79 BPM | DIASTOLIC BLOOD PRESSURE: 90 MMHG

## 2022-02-03 DIAGNOSIS — I48.91 ATRIAL FIBRILLATION, UNSPECIFIED TYPE: Primary | ICD-10-CM

## 2022-02-03 DIAGNOSIS — I48.19 PERSISTENT ATRIAL FIBRILLATION: Primary | ICD-10-CM

## 2022-02-03 DIAGNOSIS — I48.91 ATRIAL FIBRILLATION, UNSPECIFIED TYPE: ICD-10-CM

## 2022-02-03 LAB
ANION GAP SERPL CALC-SCNC: 7 MMOL/L (ref 8–16)
APTT BLDCRRT: 28.2 SEC (ref 21–32)
BUN SERPL-MCNC: 25 MG/DL (ref 10–30)
CALCIUM SERPL-MCNC: 9.5 MG/DL (ref 8.7–10.5)
CHLORIDE SERPL-SCNC: 106 MMOL/L (ref 95–110)
CO2 SERPL-SCNC: 27 MMOL/L (ref 23–29)
CREAT SERPL-MCNC: 1.1 MG/DL (ref 0.5–1.4)
ERYTHROCYTE [DISTWIDTH] IN BLOOD BY AUTOMATED COUNT: 14.6 % (ref 11.5–14.5)
EST. GFR  (AFRICAN AMERICAN): >60 ML/MIN/1.73 M^2
EST. GFR  (NON AFRICAN AMERICAN): 56.8 ML/MIN/1.73 M^2
GLUCOSE SERPL-MCNC: 79 MG/DL (ref 70–110)
HCT VFR BLD AUTO: 42 % (ref 40–54)
HGB BLD-MCNC: 13.7 G/DL (ref 14–18)
INR PPP: 1 (ref 0.8–1.2)
MCH RBC QN AUTO: 33.1 PG (ref 27–31)
MCHC RBC AUTO-ENTMCNC: 32.6 G/DL (ref 32–36)
MCV RBC AUTO: 101 FL (ref 82–98)
PLATELET # BLD AUTO: 209 K/UL (ref 150–450)
PMV BLD AUTO: 10 FL (ref 9.2–12.9)
POTASSIUM SERPL-SCNC: 5 MMOL/L (ref 3.5–5.1)
PROTHROMBIN TIME: 10.6 SEC (ref 9–12.5)
RBC # BLD AUTO: 4.14 M/UL (ref 4.6–6.2)
SODIUM SERPL-SCNC: 140 MMOL/L (ref 136–145)
WBC # BLD AUTO: 5.75 K/UL (ref 3.9–12.7)

## 2022-02-03 PROCEDURE — 99214 OFFICE O/P EST MOD 30 MIN: CPT | Mod: HCNC,S$GLB,, | Performed by: INTERNAL MEDICINE

## 2022-02-03 PROCEDURE — 85610 PROTHROMBIN TIME: CPT | Mod: HCNC | Performed by: INTERNAL MEDICINE

## 2022-02-03 PROCEDURE — 99999 PR PBB SHADOW E&M-EST. PATIENT-LVL III: CPT | Mod: PBBFAC,HCNC,, | Performed by: INTERNAL MEDICINE

## 2022-02-03 PROCEDURE — 93010 EKG 12-LEAD: ICD-10-PCS | Mod: HCNC,S$GLB,, | Performed by: INTERNAL MEDICINE

## 2022-02-03 PROCEDURE — 93005 ELECTROCARDIOGRAM TRACING: CPT | Mod: HCNC,S$GLB,, | Performed by: INTERNAL MEDICINE

## 2022-02-03 PROCEDURE — 80048 BASIC METABOLIC PNL TOTAL CA: CPT | Mod: HCNC | Performed by: INTERNAL MEDICINE

## 2022-02-03 PROCEDURE — 1126F AMNT PAIN NOTED NONE PRSNT: CPT | Mod: HCNC,CPTII,S$GLB, | Performed by: INTERNAL MEDICINE

## 2022-02-03 PROCEDURE — 99214 PR OFFICE/OUTPT VISIT, EST, LEVL IV, 30-39 MIN: ICD-10-PCS | Mod: HCNC,S$GLB,, | Performed by: INTERNAL MEDICINE

## 2022-02-03 PROCEDURE — 93005 EKG 12-LEAD: ICD-10-PCS | Mod: HCNC,S$GLB,, | Performed by: INTERNAL MEDICINE

## 2022-02-03 PROCEDURE — 3288F PR FALLS RISK ASSESSMENT DOCUMENTED: ICD-10-PCS | Mod: HCNC,CPTII,S$GLB, | Performed by: INTERNAL MEDICINE

## 2022-02-03 PROCEDURE — 3288F FALL RISK ASSESSMENT DOCD: CPT | Mod: HCNC,CPTII,S$GLB, | Performed by: INTERNAL MEDICINE

## 2022-02-03 PROCEDURE — 85730 THROMBOPLASTIN TIME PARTIAL: CPT | Mod: HCNC | Performed by: INTERNAL MEDICINE

## 2022-02-03 PROCEDURE — 1101F PT FALLS ASSESS-DOCD LE1/YR: CPT | Mod: HCNC,CPTII,S$GLB, | Performed by: INTERNAL MEDICINE

## 2022-02-03 PROCEDURE — 1159F PR MEDICATION LIST DOCUMENTED IN MEDICAL RECORD: ICD-10-PCS | Mod: HCNC,CPTII,S$GLB, | Performed by: INTERNAL MEDICINE

## 2022-02-03 PROCEDURE — 99999 PR PBB SHADOW E&M-EST. PATIENT-LVL III: ICD-10-PCS | Mod: PBBFAC,HCNC,, | Performed by: INTERNAL MEDICINE

## 2022-02-03 PROCEDURE — 99499 UNLISTED E&M SERVICE: CPT | Mod: S$GLB,,, | Performed by: INTERNAL MEDICINE

## 2022-02-03 PROCEDURE — 85027 COMPLETE CBC AUTOMATED: CPT | Mod: HCNC | Performed by: INTERNAL MEDICINE

## 2022-02-03 PROCEDURE — 99499 RISK ADDL DX/OHS AUDIT: ICD-10-PCS | Mod: S$GLB,,, | Performed by: INTERNAL MEDICINE

## 2022-02-03 PROCEDURE — 36415 COLL VENOUS BLD VENIPUNCTURE: CPT | Mod: HCNC | Performed by: INTERNAL MEDICINE

## 2022-02-03 PROCEDURE — 1101F PR PT FALLS ASSESS DOC 0-1 FALLS W/OUT INJ PAST YR: ICD-10-PCS | Mod: HCNC,CPTII,S$GLB, | Performed by: INTERNAL MEDICINE

## 2022-02-03 PROCEDURE — 1159F MED LIST DOCD IN RCRD: CPT | Mod: HCNC,CPTII,S$GLB, | Performed by: INTERNAL MEDICINE

## 2022-02-03 PROCEDURE — 93010 ELECTROCARDIOGRAM REPORT: CPT | Mod: HCNC,S$GLB,, | Performed by: INTERNAL MEDICINE

## 2022-02-03 PROCEDURE — 1126F PR PAIN SEVERITY QUANTIFIED, NO PAIN PRESENT: ICD-10-PCS | Mod: HCNC,CPTII,S$GLB, | Performed by: INTERNAL MEDICINE

## 2022-02-03 NOTE — PROGRESS NOTES
Subjective:    Patient ID:  Norm Mendoza is a 89 y.o. male who presents for follow-up of No chief complaint on file.      89 yoM here for LAAO consideration.     6/18/19: He has had development of HF as well as persistent AF. He was in NSR 2014. The next ECG 11/18 showed AF. EF 5/18 was normal in NSR. He was started on OAC 12/18 for AF. 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 He underwent Rituximab therapy and had resolution of his ulcers 4/19 but had residual MALT. He did not receive radiation.  He has mild HF symptoms, treated with lasix. Stress echo 5/19 showed EF 40%. He was prescribed xarelto 5/30/19 but he has not taken it.    10/19: He was cardioverted 7/12/19 with mild improvement in symptoms. EF 45% in NSR. He asks about Watchman.     8/2020: Watchman 10/2019, LAAO sealed 12/19, EF normal. Remains on amiodarone 200 mg qd. HRs lower than normal, also with fatigue and some weight gain. He takes lasix PRN.     11/2020: some confusion on his meds. He should be off amiodarone and off clopidogrel. BP elevated today.     6/21: Started amlodipine 2.5 bid for HTN last visit. BP improved. Now takes 2.5 once a day. Remains on metoprolol.     Interval history: Persistent AF since the summer with some LE edema and DUGAN.     10/21 echo:  · There is moderate aortic valve stenosis.  · Aortic valve area is 1.03 cm2; peak velocity is 3.16 m/s; mean gradient is 30 mmHg.  · The left ventricle is normal in size with mildly decreased systolic function.  · The estimated ejection fraction is 45%.  · Left ventricular diastolic dysfunction.  · Moderate to severe left atrial enlargement.  · Mild mitral regurgitation.  · Mild tricuspid regurgitation.  · There is abnormal septal wall motion consistent with left bundle branch block.  · Mild right ventricular enlargement with normal right ventricular systolic function.  · The quantitatively derived ejection fraction is 43%.  · Mild aortic  regurgitation.  · The estimated PA systolic pressure is 39 mmHg.  · Intermediate central venous pressure (8 mmHg).     Echo 5/18:  CONCLUSIONS     1 - Normal left ventricular systolic function (EF 55-60%).     2 - Indeterminate LV diastolic function.     3 - Biatrial enlargement.     4 - No wall motion abnormalities.     5 - Normal right ventricular systolic function .     6 - Mildly enlarged ascending aorta.     7 - Trivial to mild aortic regurgitation.     8 - Mild mitral regurgitation.     9 - Mild tricuspid regurgitation.     10 - Trivial to mild pulmonic regurgitation.     11 - The estimated PA systolic pressure is 33 mmHg.     Stress echo 5/19:  · THE PATIENT IS IN ATRIAL FIBRILLATION FOR THE ENTIRETY OF THE TEST  · The patient reported no symptoms during the stress test.  · The test was stopped secondary to fatigue.  · There were no arrhythmias during stress.  · Mildly decreased left ventricular systolic function. The estimated ejection fraction is 40%, 2D quantitative LVEF 33%.  · Grade II (moderate) left ventricular diastolic dysfunction consistent with pseudonormalization.  · Elevated left atrial pressure.  · Severe left atrial enlargement.  · The ascending aorta is moderately dilated.  · Mild tricuspid regurgitation.  · Low normal right ventricular systolic function.  · Moderate right atrial enlargement.  · Concentric left ventricular remodeling.  · Overall, the patient's exercise capacity was normal.  · The EKG portion of this study is negative for myocardial ischemia.  · No obvious wall motion abnormalities at stress. LV function augments but still not normal at stress.    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  5/2/2018: Mixed hyperlipidemia  7/30/2019: On continuous oral  anticoagulation  11/30/2018: Paroxysmal atrial fibrillation      Comment:  S/P D/C Cardioversion 7/2019, S/P Watchman device 8/2020  No date: PUD (peptic ulcer disease)      Comment:  GI Bleeding 11/2018: Anticoagulant D/S'd and Gastric                MALT tumor Dx'd  No date: Skin cancer      Comment:  x3, had mohs on nose  excised 12/5/14: Squamous cell carcinoma      Comment:  R lower back  12/6/2018: Symptomatic anemia  No date: Urinary tract infection      Comment:  x4    Past Surgical History:  No date: ACHILLES TENDON SURGERY  2013: CATARACT EXTRACTION W/  INTRAOCULAR LENS IMPLANT; Bilateral  10/11/2019: CLOSURE OF LEFT ATRIAL APPENDAGE USING DEVICE; N/A      Comment:  Procedure: Left atrial appendage closure device;                 Surgeon: Hi Crowder MD;  Location: [facility] EP LAB;                Service: Cardiology;  Laterality: N/A;  AF, JACINTO, Watchman               Implant, BSci, Gen, MB, 3 Prep  No date: CYSTOSCOPY      Comment:  bladder tumor  12/7/2018: ESOPHAGOGASTRODUODENOSCOPY; N/A      Comment:  Procedure: EGD (ESOPHAGOGASTRODUODENOSCOPY);  Surgeon:                Austin Garcia MD;  Location: [facility] ([facility]);                 Service: Endoscopy;  Laterality: N/A;  4/12/2019: ESOPHAGOGASTRODUODENOSCOPY; N/A      Comment:  Procedure: EGD (ESOPHAGOGASTRODUODENOSCOPY);  Surgeon:                Austin Garcia MD;  Location: [facility] (4TH FLR);                 Service: Endoscopy;  Laterality: N/A;  please schedule                within 4 weeks  5/27/2021: ESOPHAGOGASTRODUODENOSCOPY; N/A      Comment:  Procedure: EGD (ESOPHAGOGASTRODUODENOSCOPY);  Surgeon:                Austin Garcia MD;  Location: [facility] ([facility]);                 Service: Endoscopy;  Laterality: N/A;  per Dr. Garcia done                within the month with any provider/ ordered by                [id number] floor due to comorbiditiesWatchman                [facility]  No date: SKIN CANCER  EXCISION  2019: TREATMENT OF CARDIAC ARRHYTHMIA; N/A      Comment:  Procedure: Cardioversion or Defibrillation;  Surgeon:                Hi Crowder MD;  Location: [facility];  Service:               Cardiology;  Laterality: N/A;  AF, JACINTO, DCCV, MAC, MB, 3                Prep    Social History    Socioeconomic History      Marital status:       Number of children: 3    Occupational History      Occupation: Financial Work/        Employer: retired      Occupation: actor      Occupation:     Tobacco Use      Smoking status: Former Smoker        Packs/day: 1.00        Years: 25.00        Pack years: 25        Types: Cigarettes        Quit date: 9/3/1970        Years since quittin.4      Smokeless tobacco: Never Used    Substance and Sexual Activity      Alcohol use: Yes        Alcohol/week: 3.0 standard drinks        Types: 3 Shots of liquor per week        Comment: 3 bourbons daily - 12 beer on weekends       Drug use: No      Sexual activity: Yes        Partners: Female    Social History Narrative      He is  with 2/3 kids living(2 sons/1 daughter who passed away); He has 6 Grandkids(5 under 10 y/o); He has 1 son here in [place] /1 grandson at [facility]; His other son(3 kids) lives in [place]; His Grand-daughter(Her Mother passed away) with her 2 kids lives in [place]      He had worked for [employer] which has dissolved but now works for [employer]    Social Determinants of Health  Financial Resource Strain: Low Risk       Difficulty of Paying Living Expenses: Not hard at all  Food Insecurity: No Food Insecurity      Worried About Running Out of Food in the Last Year: Never true      Ran Out of Food in the Last Year: Never true  Transportation Needs: No Transportation Needs      Lack of Transportation (Medical): No      Lack of Transportation (Non-Medical): No  Physical Activity: Insufficiently Active      Days of Exercise per Week: 5 days       Minutes of Exercise per Session: 20 min  Stress: No Stress Concern Present      Feeling of Stress : Not at all  Social Connections: Unknown      Frequency of Communication with Friends and Family: More than three times a week      Frequency of Social Gatherings with Friends and Family: More than three times a week      Attends [employer] Services: More than 4 times per year      Active Member of Clubs or Organizations: No      Attends Club or Organization Meetings: Never  Housing Stability: Unknown      Unable to Pay for Housing in the Last Year: No      Unstable Housing in the Last Year: No    Review of patient's family history indicates:  Problem: Stroke      Relation: Father          Age of Onset: (Not Specified)  Problem: Hypertension      Relation: Father          Age of Onset: (Not Specified)  Problem: Stroke      Relation: Brother          Age of Onset: (Not Specified)  Problem: Anesthesia problems      Relation: Neg Hx          Age of Onset: (Not Specified)  Problem: Malignant hypertension      Relation: Neg Hx          Age of Onset: (Not Specified)  Problem: Hypotension      Relation: Neg Hx          Age of Onset: (Not Specified)  Problem: Malignant hyperthermia      Relation: Neg Hx          Age of Onset: (Not Specified)  Problem: Pseudochol deficiency      Relation: Neg Hx          Age of Onset: (Not Specified)  Problem: Melanoma      Relation: Neg Hx          Age of Onset: (Not Specified)  Problem: Heart attack      Relation: Neg Hx          Age of Onset: (Not Specified)  Problem: Heart disease      Relation: Neg Hx          Age of Onset: (Not Specified)  Problem: Heart failure      Relation: Neg Hx          Age of Onset: (Not Specified)  Problem: Cataracts      Relation: Neg Hx          Age of Onset: (Not Specified)  Problem: Glaucoma      Relation: Neg Hx          Age of Onset: (Not Specified)  Problem: Macular degeneration      Relation: Neg Hx          Age of Onset: (Not Specified)      Current  Outpatient Medications:  amLODIPine (NORVASC) 2.5 MG tablet, Take 1 tablet (2.5 mg total) by mouth once daily., Disp: 90 tablet, Rfl: 1  aspirin (ECOTRIN) 81 MG EC tablet, Take 81 mg by mouth once daily., Disp: , Rfl:   cyanocobalamin (VITAMIN B-12) 1000 MCG tablet, Take 1,000 mcg by mouth once daily., Disp: , Rfl:   irbesartan (AVAPRO) 300 MG tablet, TAKE 1 TABLET(300 MG) BY MOUTH EVERY EVENING (Patient taking differently: 150 mg.), Disp: 90 tablet, Rfl: 1  metoprolol succinate (TOPROL-XL) 25 MG 24 hr tablet, Take 2 tablets (50 mg total) by mouth once daily., Disp: 180 tablet, Rfl: 1  multivitamin (THERAGRAN) per tablet, Take 1 tablet by mouth once daily., Disp: , Rfl:   psyllium husk (METAMUCIL ORAL), Take by mouth., Disp: , Rfl:   diclofenac sodium (VOLTAREN ARTHRITIS PAIN) 1 % Gel, Apply 2 Grams to painful left hand joint 2-3x/day as needed (Patient not taking: No sig reported), Disp: 100 g, Rfl: 1    Current Facility-Administered Medications:  lidocaine HCl 2% urojet, , Urethral, Once, Grover Ochoa Jr., MD            Review of Systems   Constitutional: Negative for malaise/fatigue.   Cardiovascular: Positive for dyspnea on exertion. Negative for chest pain, irregular heartbeat, leg swelling and palpitations.   Respiratory: Positive for shortness of breath.    Hematologic/Lymphatic: Negative for bleeding problem.   Skin: Negative for rash.   Musculoskeletal: Negative for myalgias.   Gastrointestinal: Negative for hematemesis, hematochezia and nausea.   Genitourinary: Negative for hematuria.   Neurological: Positive for weakness. Negative for light-headedness.   Psychiatric/Behavioral: Negative for altered mental status.   Allergic/Immunologic: Negative for persistent infections.        Objective:    Physical Exam  Vitals and nursing note reviewed.   Constitutional:       Appearance: Normal appearance. He is well-developed.   HENT:      Head: Normocephalic.      Nose: Nose normal.   Eyes:      Pupils: Pupils  are equal, round, and reactive to light.   Cardiovascular:      Rate and Rhythm: Normal rate. Rhythm irregularly irregular.   Pulmonary:      Effort: No respiratory distress.      Breath sounds: Normal breath sounds.   Musculoskeletal:         General: Normal range of motion.   Skin:     General: Skin is warm and dry.      Findings: No erythema.   Neurological:      Mental Status: He is alert and oriented to person, place, and time.   Psychiatric:         Speech: Speech normal.         Behavior: Behavior normal.           Assessment:       1. Persistent atrial fibrillation         Plan:       89 yoF persistent AF here for follow up. He has had worsening HF symptoms since he went back into AF summer 2021. I offered CV. Extensive discussion regarding risks, benefits, and alternative approaches to the procedure was had with the patient.  The patient voices understanding and all questions have been answered.  The patient would like to proceed as planned. OAC 2w post Cv.

## 2022-02-07 ENCOUNTER — TELEPHONE (OUTPATIENT)
Dept: ELECTROPHYSIOLOGY | Facility: CLINIC | Age: 87
End: 2022-02-07
Payer: MEDICARE

## 2022-02-07 NOTE — TELEPHONE ENCOUNTER
Attempted to contact patient to confirm procedure details. No answer. Left detailed voicemail including: arrival time, NPO after midnight and medication instructions, along with callback number.     Labs completed and reviewed, no alerts.    Covid will be collected on arrival.

## 2022-02-07 NOTE — TELEPHONE ENCOUNTER
Spoke to Norm Mendoza    CONFIRMED procedure arrival time of 0930am tomorrow morning    Reiterated instructions including:  -Directions to check in desk  -NPO after midnight night prior to procedure  --Pre-procedure LABS completed  -COVID test will test on arrival   -Confirmed no fever, cough, or shortness of breath in the past 30 days  --Reviewed current visitor policy    Patient verbalizes understanding of above and appreciates call.

## 2022-02-08 ENCOUNTER — HOSPITAL ENCOUNTER (OUTPATIENT)
Facility: HOSPITAL | Age: 87
Discharge: HOME | End: 2022-02-08
Attending: INTERNAL MEDICINE | Admitting: INTERNAL MEDICINE
Payer: MEDICARE

## 2022-02-08 ENCOUNTER — TELEPHONE (OUTPATIENT)
Dept: DERMATOLOGY | Facility: CLINIC | Age: 87
End: 2022-02-08
Payer: MEDICARE

## 2022-02-08 ENCOUNTER — ANESTHESIA (OUTPATIENT)
Dept: MEDSURG UNIT | Facility: HOSPITAL | Age: 87
End: 2022-02-08
Payer: MEDICARE

## 2022-02-08 ENCOUNTER — ANESTHESIA EVENT (OUTPATIENT)
Dept: MEDSURG UNIT | Facility: HOSPITAL | Age: 87
End: 2022-02-08
Payer: MEDICARE

## 2022-02-08 VITALS
DIASTOLIC BLOOD PRESSURE: 69 MMHG | RESPIRATION RATE: 19 BRPM | HEART RATE: 44 BPM | OXYGEN SATURATION: 99 % | SYSTOLIC BLOOD PRESSURE: 127 MMHG | TEMPERATURE: 98 F

## 2022-02-08 DIAGNOSIS — I48.91 ATRIAL FIBRILLATION: ICD-10-CM

## 2022-02-08 DIAGNOSIS — I10 ESSENTIAL HYPERTENSION: ICD-10-CM

## 2022-02-08 DIAGNOSIS — I48.19 PERSISTENT ATRIAL FIBRILLATION: Primary | ICD-10-CM

## 2022-02-08 DIAGNOSIS — I48.91 ATRIAL FIBRILLATION, UNSPECIFIED TYPE: ICD-10-CM

## 2022-02-08 LAB
CTP QC/QA: YES
SARS-COV-2 AG RESP QL IA.RAPID: NEGATIVE

## 2022-02-08 PROCEDURE — 37000009 HC ANESTHESIA EA ADD 15 MINS: Mod: HCNC | Performed by: NURSE PRACTITIONER

## 2022-02-08 PROCEDURE — 93005 ELECTROCARDIOGRAM TRACING: CPT | Mod: HCNC,59

## 2022-02-08 PROCEDURE — 25000003 PHARM REV CODE 250: Mod: HCNC | Performed by: NURSE PRACTITIONER

## 2022-02-08 PROCEDURE — 92960 CARDIOVERSION ELECTRIC EXT: CPT | Mod: HCNC | Performed by: NURSE PRACTITIONER

## 2022-02-08 PROCEDURE — 25000003 PHARM REV CODE 250: Mod: HCNC | Performed by: NURSE ANESTHETIST, CERTIFIED REGISTERED

## 2022-02-08 PROCEDURE — 92960 CARDIOVERSION ELECTRIC EXT: CPT | Mod: ,,, | Performed by: NURSE PRACTITIONER

## 2022-02-08 PROCEDURE — 37000008 HC ANESTHESIA 1ST 15 MINUTES: Mod: HCNC | Performed by: NURSE PRACTITIONER

## 2022-02-08 PROCEDURE — 93010 ELECTROCARDIOGRAM REPORT: CPT | Mod: HCNC,,, | Performed by: STUDENT IN AN ORGANIZED HEALTH CARE EDUCATION/TRAINING PROGRAM

## 2022-02-08 PROCEDURE — D9220A PRA ANESTHESIA: ICD-10-PCS | Mod: HCNC,ANES,, | Performed by: ANESTHESIOLOGY

## 2022-02-08 PROCEDURE — 93010 EKG 12-LEAD: ICD-10-PCS | Mod: HCNC,76,, | Performed by: STUDENT IN AN ORGANIZED HEALTH CARE EDUCATION/TRAINING PROGRAM

## 2022-02-08 PROCEDURE — 63600175 PHARM REV CODE 636 W HCPCS: Mod: HCNC | Performed by: NURSE ANESTHETIST, CERTIFIED REGISTERED

## 2022-02-08 PROCEDURE — D9220A PRA ANESTHESIA: Mod: HCNC,CRNA,, | Performed by: NURSE ANESTHETIST, CERTIFIED REGISTERED

## 2022-02-08 PROCEDURE — D9220A PRA ANESTHESIA: Mod: HCNC,ANES,, | Performed by: ANESTHESIOLOGY

## 2022-02-08 PROCEDURE — D9220A PRA ANESTHESIA: ICD-10-PCS | Mod: HCNC,CRNA,, | Performed by: NURSE ANESTHETIST, CERTIFIED REGISTERED

## 2022-02-08 PROCEDURE — 93005 ELECTROCARDIOGRAM TRACING: CPT | Mod: HCNC

## 2022-02-08 PROCEDURE — 92960 PR CARDIOVERSION, ELECTIVE;EXTERN: ICD-10-PCS | Mod: ,,, | Performed by: NURSE PRACTITIONER

## 2022-02-08 RX ORDER — HYDROMORPHONE HYDROCHLORIDE 1 MG/ML
0.2 INJECTION, SOLUTION INTRAMUSCULAR; INTRAVENOUS; SUBCUTANEOUS EVERY 5 MIN PRN
Status: DISCONTINUED | OUTPATIENT
Start: 2022-02-08 | End: 2022-02-08 | Stop reason: HOSPADM

## 2022-02-08 RX ORDER — HALOPERIDOL 5 MG/ML
0.5 INJECTION INTRAMUSCULAR EVERY 10 MIN PRN
Status: DISCONTINUED | OUTPATIENT
Start: 2022-02-08 | End: 2022-02-08 | Stop reason: HOSPADM

## 2022-02-08 RX ORDER — METOPROLOL SUCCINATE 25 MG/1
12.5 TABLET, EXTENDED RELEASE ORAL DAILY
Qty: 90 TABLET | Refills: 1
Start: 2022-02-08 | End: 2022-02-08 | Stop reason: SDUPTHER

## 2022-02-08 RX ORDER — PROPOFOL 10 MG/ML
VIAL (ML) INTRAVENOUS
Status: DISCONTINUED | OUTPATIENT
Start: 2022-02-08 | End: 2022-02-08

## 2022-02-08 RX ORDER — METOPROLOL SUCCINATE 25 MG/1
12.5 TABLET, EXTENDED RELEASE ORAL DAILY
Qty: 90 TABLET | Refills: 1 | Status: SHIPPED | OUTPATIENT
Start: 2022-02-08 | End: 2023-04-25 | Stop reason: SDUPTHER

## 2022-02-08 RX ORDER — LIDOCAINE HYDROCHLORIDE 20 MG/ML
INJECTION INTRAVENOUS
Status: DISCONTINUED | OUTPATIENT
Start: 2022-02-08 | End: 2022-02-08

## 2022-02-08 RX ADMIN — SODIUM CHLORIDE: 0.9 INJECTION, SOLUTION INTRAVENOUS at 12:02

## 2022-02-08 RX ADMIN — PROPOFOL 20 MG: 10 INJECTION, EMULSION INTRAVENOUS at 12:02

## 2022-02-08 RX ADMIN — PROPOFOL 70 MG: 10 INJECTION, EMULSION INTRAVENOUS at 12:02

## 2022-02-08 RX ADMIN — LIDOCAINE HYDROCHLORIDE 100 MG: 20 INJECTION, SOLUTION INTRAVENOUS at 12:02

## 2022-02-08 NOTE — H&P
[facility] - Short Stay Cardiac Unit  Cardiac Electrophysiology  History and Physical     Admission Date: 2/8/2022  Code Status: Prior   Attending Provider: Hi Crowder MD   Principal Problem:<principal problem not specified>    Subjective:     Chief Complaint:  Persistent AF     HPI:  Norm Mendoza presents to short stay cardiac unit on 02/08/2022 for planned cardioversion with Dr. Crowder.     Mr. Mendoza is a 89 y.o. male with history of CHF, persistent AF, MALT lymphoma s/p chemotherapy, and Watchman (2019). With regards to his arrhythmia history, Mr. Mendoza has been treated with amiodarone, as well as cardioversion. Most recent echo from 10/2021 showed EF 45%. At follow up with Dr. Crowder in 2/2022 he remained in persistent AF (had recurrence over the summer) and reported lower extremity edema and DUGAN. Cardioversion offered in an effort to establish NSR. Mr. Mendoza elected to proceed. He is currently treated with toprol-xl 50 mg BID for rate control. With Watchman, he is not currently on OAC. Review of recent labs show normal renal functio with Cr 1.1, CBC and coags WNL.    EKG:  My independent visualization of most recent EKG is AF at 66 bpm     Past Medical History:   Diagnosis Date    Alcohol dependence, daily use 7/13/2017    Allergy     Bladder cancer     tumor that was benign     Cataract     H/O nonmelanoma skin cancer 2/4/2016    Hematuria     Hypertension     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    Mixed hyperlipidemia 5/2/2018    On continuous oral anticoagulation 7/30/2019    Paroxysmal atrial fibrillation 11/30/2018    S/P D/C Cardioversion 7/2019, S/P Watchman device 8/2020    PUD (peptic ulcer disease)     GI Bleeding 11/2018: Anticoagulant D/S'd and Gastric MALT tumor Dx'd    Skin cancer     x3, had mohs on nose    Squamous cell carcinoma excised 12/5/14    R lower back    Symptomatic anemia 12/6/2018    Urinary tract infection     x4       Past Surgical History:    Procedure Laterality Date    ACHILLES TENDON SURGERY      CATARACT EXTRACTION W/  INTRAOCULAR LENS IMPLANT Bilateral 2013    CLOSURE OF LEFT ATRIAL APPENDAGE USING DEVICE N/A 10/11/2019    Procedure: Left atrial appendage closure device;  Surgeon: Hi Crowder MD;  Location: [facility] EP LAB;  Service: Cardiology;  Laterality: N/A;  AF, JACINTO, Watchman Implant, BSci, Gen, MB, 3 Prep    CYSTOSCOPY      bladder tumor    ESOPHAGOGASTRODUODENOSCOPY N/A 12/7/2018    Procedure: EGD (ESOPHAGOGASTRODUODENOSCOPY);  Surgeon: Austin Garcia MD;  Location: [facility] ENDO (2ND FLR);  Service: Endoscopy;  Laterality: N/A;    ESOPHAGOGASTRODUODENOSCOPY N/A 4/12/2019    Procedure: EGD (ESOPHAGOGASTRODUODENOSCOPY);  Surgeon: Austin Garcia MD;  Location: [facility] ENDO (4TH FLR);  Service: Endoscopy;  Laterality: N/A;  please schedule within 4 weeks    ESOPHAGOGASTRODUODENOSCOPY N/A 5/27/2021    Procedure: EGD (ESOPHAGOGASTRODUODENOSCOPY);  Surgeon: Austin Garcia MD;  Location: [facility] END[facility] (2ND FLR);  Service: Endoscopy;  Laterality: N/A;  per Dr. Garcia done within the month with any provider/ ordered by oncology  2nd floor due to comorbidities  Watchman device  C[facility] at [facility] on 5/24-GT    SKIN CANCER EXCISION      TREATMENT OF CARDIAC ARRHYTHMIA N/A 7/12/2019    Procedure: Cardioversion or Defibrillation;  Surgeon: Hi Crowder MD;  Location: [facility] EP LAB;  Service: Cardiology;  Laterality: N/A;  AF, JACINTO, DCCV, MAC, MB, 3 Prep       Review of patient's allergies indicates:  No Known Allergies    No current facility-administered medications on file prior to encounter.     Current Outpatient Medications on File Prior to Encounter   Medication Sig    amLODIPine (NORVASC) 2.5 MG tablet Take 1 tablet (2.5 mg total) by mouth once daily.    aspirin (ECOTRIN) 81 MG EC tablet Take 81 mg by mouth once daily.    cyanocobalamin (VITAMIN B-12) 1000 MCG tablet Take 1,000 mcg by mouth once daily.    irbesartan (AVAPRO) 300 MG  tablet TAKE 1 TABLET(300 MG) BY MOUTH EVERY EVENING (Patient taking differently: 150 mg.)    metoprolol succinate (TOPROL-XL) 25 MG 24 hr tablet Take 2 tablets (50 mg total) by mouth once daily.    multivitamin (THERAGRAN) per tablet Take 1 tablet by mouth once daily.    psyllium husk (METAMUCIL ORAL) Take by mouth.    diclofenac sodium (VOLTAREN ARTHRITIS PAIN) 1 % Gel Apply 2 Grams to painful left hand joint 2-3x/day as needed (Patient not taking: No sig reported)     Family History     Problem Relation (Age of Onset)    Hypertension Father    Stroke Father, Brother        Tobacco Use    Smoking status: Former Smoker     Packs/day: 1.00     Years: 25.00     Pack years: 25.00     Types: Cigarettes     Quit date: 9/3/1970     Years since quittin.4    Smokeless tobacco: Never Used   Substance and Sexual Activity    Alcohol use: Yes     Alcohol/week: 3.0 standard drinks     Types: 3 Shots of liquor per week     Comment: 3 bourbons daily - 12 beer on weekends     Drug use: No    Sexual activity: Yes     Partners: Female     Review of Systems   Constitutional: Positive for malaise/fatigue.   Cardiovascular: Negative for chest pain, dyspnea on exertion, irregular heartbeat, leg swelling, palpitations and syncope.   Hematologic/Lymphatic: Negative for bleeding problem.   Neurological: Negative for light-headedness.   Psychiatric/Behavioral: Negative for altered mental status. The patient is not nervous/anxious.      Objective:     Vital Signs (Most Recent):    Vital Signs (24h Range):             There is no height or weight on file to calculate BMI.            Physical Exam  Vitals reviewed.   Constitutional:       General: Vital signs are normal.      Appearance: He is well-developed and well-nourished.   Cardiovascular:      Rate and Rhythm: Normal rate. Rhythm irregularly irregular.      Pulses: Intact distal pulses.      Heart sounds: Normal heart sounds, S1 normal and S2 normal.   Pulmonary:       Effort: Pulmonary effort is normal.      Breath sounds: Normal breath sounds.   Musculoskeletal:         General: No edema.   Skin:     General: Skin is warm, dry and intact.   Neurological:      Mental Status: He is alert and oriented to person, place, and time.      Deep Tendon Reflexes: Strength normal.         Assessment and Plan:     Persistent atrial fibrillation  - s/p Watchman (10/2019, sealed 12/2019), defer JACINTO  - Proceed with DCCV only    - Anesthesia for sedation prior to procedure  - Extensive discussion with patient regarding risks and benefits of DCCV, and patient would like to proceed.  The patient voices understanding and all questions have been answered. No further questions/concerns voiced at this time.                Susan Orozco, GAMA  Cardiac Electrophysiology  [facility] - Short Stay Cardiac Unit

## 2022-02-08 NOTE — TRANSFER OF CARE
Anesthesia Transfer of Care Note    Patient: Norm Mendoza    Procedure(s) Performed: Procedure(s) (LRB):  Cardioversion or Defibrillation (N/A)    Patient location: Cath Lab    Anesthesia Type: general    Transport from OR: Transported from OR on 6-10 L/min O2 by face mask with adequate spontaneous ventilation    Post pain: adequate analgesia    Post assessment: no apparent anesthetic complications and tolerated procedure well    Post vital signs: stable    Level of consciousness: sedated and responds to stimulation    Nausea/Vomiting: no nausea/vomiting    Complications: none    Transfer of care protocol was followed      Last vitals: There were no vitals taken for this visit.

## 2022-02-08 NOTE — SUBJECTIVE & OBJECTIVE
Past Medical History:   Diagnosis Date    Alcohol dependence, daily use 7/13/2017    Allergy     Bladder cancer     tumor that was benign     Cataract     H/O nonmelanoma skin cancer 2/4/2016    Hematuria     Hypertension     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    Mixed hyperlipidemia 5/2/2018    On continuous oral anticoagulation 7/30/2019    Paroxysmal atrial fibrillation 11/30/2018    S/P D/C Cardioversion 7/2019, S/P Watchman device 8/2020    PUD (peptic ulcer disease)     GI Bleeding 11/2018: Anticoagulant D/S'd and Gastric MALT tumor Dx'd    Skin cancer     x3, had mohs on nose    Squamous cell carcinoma excised 12/5/14    R lower back    Symptomatic anemia 12/6/2018    Urinary tract infection     x4       Past Surgical History:   Procedure Laterality Date    ACHILLES TENDON SURGERY      CATARACT EXTRACTION W/  INTRAOCULAR LENS IMPLANT Bilateral 2013    CLOSURE OF LEFT ATRIAL APPENDAGE USING DEVICE N/A 10/11/2019    Procedure: Left atrial appendage closure device;  Surgeon: Hi Crowder MD;  Location: [facility] EP LAB;  Service: Cardiology;  Laterality: N/A;  AF, JACINTO, Watchman Implant, BSci, Gen, MB, 3 Prep    CYSTOSCOPY      bladder tumor    ESOPHAGOGASTRODUODENOSCOPY N/A 12/7/2018    Procedure: EGD (ESOPHAGOGASTRODUODENOSCOPY);  Surgeon: Austin Garcia MD;  Location: [facility] (2ND FLR);  Service: Endoscopy;  Laterality: N/A;    ESOPHAGOGASTRODUODENOSCOPY N/A 4/12/2019    Procedure: EGD (ESOPHAGOGASTRODUODENOSCOPY);  Surgeon: Austin Garcia MD;  Location: [facility] (4TH FLR);  Service: Endoscopy;  Laterality: N/A;  please schedule within 4 weeks    ESOPHAGOGASTRODUODENOSCOPY N/A 5/27/2021    Procedure: EGD (ESOPHAGOGASTRODUODENOSCOPY);  Surgeon: Austin Garcia MD;  Location: [facility] (2ND FLR);  Service: Endoscopy;  Laterality: N/A;  per Dr. Garcia done within the month with any provider/ ordered by oncology  2nd floor due to comorbidities  Watchman device  COVID test at [facility] on 5/24-GT     SKIN CANCER EXCISION      TREATMENT OF CARDIAC ARRHYTHMIA N/A 2019    Procedure: Cardioversion or Defibrillation;  Surgeon: Hi Crowder MD;  Location: [facility];  Service: Cardiology;  Laterality: N/A;  AF, JACINTO, DCCV, MAC, MB, 3 Prep       Review of patient's allergies indicates:  No Known Allergies    No current facility-administered medications on file prior to encounter.     Current Outpatient Medications on File Prior to Encounter   Medication Sig    amLODIPine (NORVASC) 2.5 MG tablet Take 1 tablet (2.5 mg total) by mouth once daily.    aspirin (ECOTRIN) 81 MG EC tablet Take 81 mg by mouth once daily.    cyanocobalamin (VITAMIN B-12) 1000 MCG tablet Take 1,000 mcg by mouth once daily.    irbesartan (AVAPRO) 300 MG tablet TAKE 1 TABLET(300 MG) BY MOUTH EVERY EVENING (Patient taking differently: 150 mg.)    metoprolol succinate (TOPROL-XL) 25 MG 24 hr tablet Take 2 tablets (50 mg total) by mouth once daily.    multivitamin (THERAGRAN) per tablet Take 1 tablet by mouth once daily.    psyllium husk (METAMUCIL ORAL) Take by mouth.    diclofenac sodium (VOLTAREN ARTHRITIS PAIN) 1 % Gel Apply 2 Grams to painful left hand joint 2-3x/day as needed (Patient not taking: No sig reported)     Family History     Problem Relation (Age of Onset)    Hypertension Father    Stroke Father, Brother        Tobacco Use    Smoking status: Former Smoker     Packs/day: 1.00     Years: 25.00     Pack years: 25.00     Types: Cigarettes     Quit date: 9/3/1970     Years since quittin.4    Smokeless tobacco: Never Used   Substance and Sexual Activity    Alcohol use: Yes     Alcohol/week: 3.0 standard drinks     Types: 3 Shots of liquor per week     Comment: 3 bourbons daily - 12 beer on weekends     Drug use: No    Sexual activity: Yes     Partners: Female     Review of Systems   Constitutional: Positive for malaise/fatigue.   Cardiovascular: Negative for chest pain, dyspnea on exertion, irregular heartbeat,  leg swelling, palpitations and syncope.   Hematologic/Lymphatic: Negative for bleeding problem.   Neurological: Negative for light-headedness.   Psychiatric/Behavioral: Negative for altered mental status. The patient is not nervous/anxious.      Objective:     Vital Signs (Most Recent):    Vital Signs (24h Range):             There is no height or weight on file to calculate BMI.            Physical Exam  Vitals reviewed.   Constitutional:       General: Vital signs are normal.      Appearance: He is well-developed and well-nourished.   Cardiovascular:      Rate and Rhythm: Normal rate. Rhythm irregularly irregular.      Pulses: Intact distal pulses.      Heart sounds: Normal heart sounds, S1 normal and S2 normal.   Pulmonary:      Effort: Pulmonary effort is normal.      Breath sounds: Normal breath sounds.   Musculoskeletal:         General: No edema.   Skin:     General: Skin is warm, dry and intact.   Neurological:      Mental Status: He is alert and oriented to person, place, and time.      Deep Tendon Reflexes: Strength normal.

## 2022-02-08 NOTE — ASSESSMENT & PLAN NOTE
- s/p Luba (10/2019, sealed 12/2019), defer JACINTO  - Proceed with DCCV only    - Anesthesia for sedation prior to procedure  - Extensive discussion with patient regarding risks and benefits of DCCV, and patient would like to proceed.  The patient voices understanding and all questions have been answered. No further questions/concerns voiced at this time.

## 2022-02-08 NOTE — HOSPITAL COURSE
Mr. Mendoza underwent DCCV 200 J x 3 shock, converting from AF to sinus rhythm. He tolerated the procedure without any acute complications.  Post-DCCV ECG revealing of sinus bradycardia with first degree AVB,  ms,  ms. Start eliquis 5 mg BID x 2 weeks post-DCCV. Reduce metoprolol from 50 mg daily to 12.5 mg daily. Instructed to return in 1 week for ECG and in 4 weeks for clinic follow up.   Mr. Mendoza was assessed at bedside prior to discharge. He reported feeling well and denied chest discomfort, shortness of breath, palpitations, lightheadedness, or any other acute symptoms. Discharge instructions were discussed and all questions were answered. Mr. Mendoza was discharged home in stable condition.

## 2022-02-08 NOTE — HPI
Norm Mendoza presents to short stay cardiac unit on 02/08/2022 for planned cardioversion with Dr. Crowder.     Mr. Mendoza is a 89 y.o. male with history of CHF, persistent AF, MALT lymphoma s/p chemotherapy, and Watchman (2019). With regards to his arrhythmia history, Mr. Mendoza has been treated with amiodarone, as well as cardioversion. Most recent echo from 10/2021 showed EF 45%. At follow up with Dr. Crodwer in 2/2022 he remained in persistent AF (had recurrence over the summer) and reported lower extremity edema and DUGAN. Cardioversion offered in an effort to establish NSR. Mr. Mendoza elected to proceed. He is currently treated with toprol-xl 50 mg BID for rate control. With Watchman, he is not currently on OAC. Review of recent labs show normal renal functio with Cr 1.1, CBC and coags WNL.    EKG:  My independent visualization of most recent EKG is AF at 66 bpm

## 2022-02-08 NOTE — DISCHARGE SUMMARY
[facility] - Short Stay Cardiac Unit  Cardiac Electrophysiology  Discharge Summary      Patient Name: Norm Mendoza  MRN: [id number]  Admission Date: 2/8/2022  Hospital Length of Stay: 0 days  Discharge Date and Time:  02/08/2022 1:53 PM  Attending Physician: Hi Crowder MD    Discharging Provider: Susan Orozco NP  Primary Care Physician: Amber Jenkins MD    HPI:   oNrm Mendoza presents to short stay cardiac unit on 02/08/2022 for planned cardioversion with Dr. Crowder.     Mr. Mendoza is a 89 y.o. male with history of CHF, persistent AF, MALT lymphoma s/p chemotherapy, and Watchman (2019). With regards to his arrhythmia history, Mr. Mendoza has been treated with amiodarone, as well as cardioversion. Most recent echo from 10/2021 showed EF 45%. At follow up with Dr. Crowder in 2/2022 he remained in persistent AF (had recurrence over the summer) and reported lower extremity edema and DUGAN. Cardioversion offered in an effort to establish NSR. Mr. Mendoza elected to proceed. He is currently treated with toprol-xl 50 mg BID for rate control. With Watchman, he is not currently on OAC. Review of recent labs show normal renal functio with Cr 1.1, CBC and coags WNL.    EKG:  My independent visualization of most recent EKG is AF at 66 bpm       Procedure(s) (LRB):  Cardioversion or Defibrillation (N/A)     Indwelling Lines/Drains at time of discharge:  Lines/Drains/Airways     None             Hospital Course:  Mr. Mendoza underwent DCCV 200 J x 3 shock, converting from AF to sinus rhythm. He tolerated the procedure without any acute complications.  Post-DCCV ECG revealing of sinus bradycardia with first degree AVB,  ms,  ms. Of note, baseline -136ms. Start eliquis 5 mg BID x 2 weeks post-DCCV. Reduce metoprolol from 50 mg daily to 12.5 mg daily. Instructed to return in 1 week for ECG and in 4 weeks for clinic follow up.   Mr. Mendoza was assessed at bedside prior to discharge.  He reported feeling well and denied chest discomfort, shortness of breath, palpitations, lightheadedness, or any other acute symptoms. Discharge instructions were discussed and all questions were answered. Mr. Mendoza was discharged home in stable condition.      Pending Diagnostic Studies:     None          Final Active Diagnoses:    Diagnosis Date Noted POA    Persistent atrial fibrillation [I48.19] 11/30/2018 Yes      Problems Resolved During this Admission:     Persistent atrial fibrillation  s/p DCCV with restoration of sinus rhythm    Plan:  - Continue PTA medications; reduce toprol-xl from 50 mg daily to 12.5 mg daily  - Start eliquis 5 mg BID x 2 weeks  - Follow up with ECG in 1 week (2/15/22)  - Follow up with Sheila Fernandez NP  - Discharge plans/instructions discussed with patient who verbalized understanding and agreement of plans of care. No further questions or concerns  voiced at this time.       Discharged Condition: good    Disposition: Home or Self Care    Follow Up:   Follow-up Information     Sheila Fernandez NP In 4 weeks.    Specialty: Cardiology  Why: s/p DCCV  Contact information:  [address] [place] [address] [id number] [phone]                       Patient Instructions:      Other restrictions (specify):   Order Comments: Because you have received sedation for this procedure:  -Limit activity for the remainder of the day.  -Do not smoke for at least 6 hours and until you are fully awake and alert.  -Do not drink alcoholic beverage for 24 hours.  -Do not drive for 24 hours.  -Defer important decision making until the following day.     Go to the Emergency Department if you develop:   -Bleeding  -Weakness or numbness  -Visual, gait or speech disturbance  -New chest pain, palpitations, shortness of breath, rapid heart beat, or fainting  -Fever    Any need to reschedule or cancel procedures, or any questions regarding your procedures should be addressed directly with the  Arrhythmia Department Nurses at the following phone number: [phone].     Notify your health care provider if you experience any of the following:  difficulty breathing or increased cough     Notify your health care provider if you experience any of the following:  persistent dizziness, light-headedness, or visual disturbances     Activity as tolerated     Medications:  Reconciled Home Medications:      Medication List      START taking these medications    ELIQUIS 5 mg Tab  Generic drug: apixaban  Take 1 tablet (5 mg total) by mouth 2 (two) times daily. for 15 days        CHANGE how you take these medications    irbesartan 300 MG tablet  Commonly known as: AVAPRO  TAKE 1 TABLET(300 MG) BY MOUTH EVERY EVENING  What changed:   · how much to take  · how to take this  · when to take this     metoprolol succinate 25 MG 24 hr tablet  Commonly known as: TOPROL-XL  Take 0.5 tablets (12.5 mg total) by mouth once daily.  What changed: how much to take        CONTINUE taking these medications    amLODIPine 2.5 MG tablet  Commonly known as: NORVASC  Take 1 tablet (2.5 mg total) by mouth once daily.     aspirin 81 MG EC tablet  Commonly known as: ECOTRIN  Take 81 mg by mouth once daily.     cyanocobalamin 1000 MCG tablet  Commonly known as: VITAMIN B-12  Take 1,000 mcg by mouth once daily.     diclofenac sodium 1 % Gel  Commonly known as: VOLTAREN ARTHRITIS PAIN  Apply 2 Grams to painful left hand joint 2-3x/day as needed     METAMUCIL ORAL  Take by mouth.     multivitamin per tablet  Commonly known as: THERAGRAN  Take 1 tablet by mouth once daily.            Time spent on the discharge of patient: 15 minutes    Susan Orozco NP  Cardiac Electrophysiology  [facility] Short Stay Cardiac Unit

## 2022-02-08 NOTE — NURSING
Received report from Stef CHIU, DCCV RN. Patient s/p DCCV, moderate sedation, oral airway in place. VSS, no s/s of pain or discomfort at this time, resp even and unlabored. Post procedure protocol reviewed with patient and patient's family. RN at bedside.

## 2022-02-08 NOTE — NURSING
Oral airway removed. Patient HOB 45 and A&Ox3. Pt denies any complaints or pain. Susan Orozco at bedside. EKG done and in chart.

## 2022-02-08 NOTE — NURSING
Patient discharged per MD orders. Instructions given on medications, wound care, activity, signs of infection, when to call MD, and follow up appointments. Pt verbalized understanding.  Patient AAOx3, VSS, no c/o pain or discomfort at this time. Telemetry and PIV removed. Patient left unit via wheelchair with transport and family. Bedside pharmacy called, to deliver blood thinner.

## 2022-02-08 NOTE — ASSESSMENT & PLAN NOTE
s/p DCCV with restoration of sinus rhythm    Plan:  - Continue PTA medications; reduce toprol-xl from 50 mg daily to 12.5 mg daily  - Start eliquis 5 mg BID x 2 weeks  - Follow up with ECG in 1 week (2/15/22)  - Follow up with Sheila Fernandez NP  - Discharge plans/instructions discussed with patient who verbalized understanding and agreement of plans of care. No further questions or concerns  voiced at this time.

## 2022-02-08 NOTE — ANESTHESIA PREPROCEDURE EVALUATION
02/08/2022    Norm Mendoza is a 89 y.o. male EF 40% mil-mod MR, HTN, , a fib here for cardioversion.    Patient Active Problem List   Diagnosis    AK (actinic keratosis)    History of benign bladder tumor    Aortic atherosclerosis    Essential hypertension    Left ventricular diastolic dysfunction with preserved systolic function    Nonrheumatic aortic valve stenosis    Alcohol dependence, daily use    Mixed hyperlipidemia    Aortic root dilation    PVC's (premature ventricular contractions)    Tortuous aorta    Persistent atrial fibrillation    MALT lymphoma    PUD (peptic ulcer disease)    Dilated cardiomyopathy    History of cardioversion    Atrial fibrillation    Presence of Watchman left atrial appendage closure device    MALT (mucosa associated lymphoid tissue)       Review of patient's allergies indicates:  No Known Allergies    Current Facility-Administered Medications on File Prior to Visit   Medication Dose Route Frequency Provider Last Rate Last Admin    sodium chloride 0.9% bolus 1,000 mL  1,000 mL Intravenous Once Susan Orozco NP         Current Outpatient Medications on File Prior to Visit   Medication Sig Dispense Refill    amLODIPine (NORVASC) 2.5 MG tablet Take 1 tablet (2.5 mg total) by mouth once daily. 90 tablet 1    aspirin (ECOTRIN) 81 MG EC tablet Take 81 mg by mouth once daily.      cyanocobalamin (VITAMIN B-12) 1000 MCG tablet Take 1,000 mcg by mouth once daily.      diclofenac sodium (VOLTAREN ARTHRITIS PAIN) 1 % Gel Apply 2 Grams to painful left hand joint 2-3x/day as needed (Patient not taking: No sig reported) 100 g 1    irbesartan (AVAPRO) 300 MG tablet TAKE 1 TABLET(300 MG) BY MOUTH EVERY EVENING (Patient taking differently: 150 mg.) 90 tablet 1    metoprolol succinate (TOPROL-XL) 25 MG 24 hr tablet Take 2 tablets (50 mg total) by mouth once  daily. 180 tablet 1    multivitamin (THERAGRAN) per tablet Take 1 tablet by mouth once daily.      psyllium husk (METAMUCIL ORAL) Take by mouth.         Past Surgical History:   Procedure Laterality Date    ACHILLES TENDON SURGERY      CATARACT EXTRACTION W/  INTRAOCULAR LENS IMPLANT Bilateral 2013    CLOSURE OF LEFT ATRIAL APPENDAGE USING DEVICE N/A 10/11/2019    Procedure: Left atrial appendage closure device;  Surgeon: Hi Crowder MD;  Location: [facility] EP LAB;  Service: Cardiology;  Laterality: N/A;  AF, JACINTO, Watchman Implant, BSci, Gen, MB, 3 Prep    CYSTOSCOPY      bladder tumor    ESOPHAGOGASTRODUODENOSCOPY N/A 12/7/2018    Procedure: EGD (ESOPHAGOGASTRODUODENOSCOPY);  Surgeon: Austin Garcia MD;  Location: [facility] (2ND FLR);  Service: Endoscopy;  Laterality: N/A;    ESOPHAGOGASTRODUODENOSCOPY N/A 4/12/2019    Procedure: EGD (ESOPHAGOGASTRODUODENOSCOPY);  Surgeon: Austin Garcia MD;  Location: [facility] (4TH FLR);  Service: Endoscopy;  Laterality: N/A;  please schedule within 4 weeks    ESOPHAGOGASTRODUODENOSCOPY N/A 5/27/2021    Procedure: EGD (ESOPHAGOGASTRODUODENOSCOPY);  Surgeon: Austin Garcia MD;  Location: [facility] (2ND FLR);  Service: Endoscopy;  Laterality: N/A;  per Dr. Garcia done within the month with any provider/ ordered by oncology  2nd floor due to comorbidities  Watchman device  COVID test at [facility] on 5/24-GT    SKIN CANCER EXCISION      TREATMENT OF CARDIAC ARRHYTHMIA N/A 7/12/2019    Procedure: Cardioversion or Defibrillation;  Surgeon: Hi Crowder MD;  Location: [facility] EP LAB;  Service: Cardiology;  Laterality: N/A;  AF, JACINTO, DCCV, MAC, MB, 3 Prep       Social History     Socioeconomic History    Marital status:     Number of children: 3   Occupational History    Occupation: Financial Work/     Employer: retired    Occupation: actor    Occupation:    Tobacco Use    Smoking status: Former Smoker     Packs/day: 1.00     Years:  25.00     Pack years: 25.00     Types: Cigarettes     Quit date: 9/3/1970     Years since quittin.4    Smokeless tobacco: Never Used   Substance and Sexual Activity    Alcohol use: Yes     Alcohol/week: 3.0 standard drinks     Types: 3 Shots of liquor per week     Comment: 3 bourbons daily - 12 beer on weekends     Drug use: No    Sexual activity: Yes     Partners: Female   Social History Narrative    He is  with 2/3 kids living(2 sons/1 daughter who passed away); He has 6 Grandkids(5 under 10 y/o); He has 1 son here in [place] /1 grandson at [facility]; His other son(3 kids) lives in [place]; His Grand-daughter(Her Mother passed away) with her 2 kids lives in [place]    He had worked for [employer] which has dissolved but now works for [employer]     Social Determinants of Health     Financial Resource Strain: Low Risk     Difficulty of Paying Living Expenses: Not hard at all   Food Insecurity: No Food Insecurity    Worried About Running Out of Food in the Last Year: Never true    Ran Out of Food in the Last Year: Never true   Transportation Needs: No Transportation Needs    Lack of Transportation (Medical): No    Lack of Transportation (Non-Medical): No   Physical Activity: Insufficiently Active    Days of Exercise per Week: 5 days    Minutes of Exercise per Session: 20 min   Stress: No Stress Concern Present    Feeling of Stress : Not at all   Social Connections: Unknown    Frequency of Communication with Friends and Family: More than three times a week    Frequency of Social Gatherings with Friends and Family: More than three times a week    Attends Jehovah's witness Services: More than 4 times per year    Active Member of Clubs or Organizations: No    Attends Club or Organization Meetings: Never   Housing Stability: Unknown    Unable to Pay for Housing in the Last Year: No    Unstable Housing in the Last Year: No         CBC: No results for input(s): WBC, RBC, HGB, HCT,  PLT, MCV, MCH, MCHC in the last 72 hours.    CMP: No results for input(s): NA, K, CL, CO2, BUN, CREATININE, GLU, MG, PHOS, CALCIUM, ALBUMIN, PROT, ALKPHOS, ALT, AST, BILITOT in the last 72 hours.    INR  No results for input(s): PT, INR, PROTIME, APTT in the last 72 hours.        Diagnostic Studies:      EKD Echo:  Results for orders placed or performed during the hospital encounter of 05/10/18   2D echo with color flow doppler   Result Value Ref Range    EF + QEF 58 55 - 65    Mitral Valve Regurgitation MILD     Aortic Valve Regurgitation TRIVIAL TO MILD     Est. PA Systolic Pressure 32.81     Tricuspid Valve Regurgitation MILD          Pre-op Assessment    I have reviewed the Patient Summary Reports.       I have reviewed the Medications.     Review of Systems  Anesthesia Hx:  History of prior surgery of interest to airway management or planning: Denies Family Hx of Anesthesia complications.   Denies Personal Hx of Anesthesia complications.       Physical Exam  General:  Well nourished    Airway/Jaw/Neck:  Airway Findings: Mouth Opening: Normal Tongue: Normal  General Airway Assessment: Adult  Mallampati: III  Improves to II with phonation.  TM Distance: Normal, at least 6 cm  Jaw/Neck Findings:  Neck ROM: Extension Decreased, Mod.      Dental:  Dental Findings: In tact   Chest/Lungs:  Chest/Lungs Findings: Clear to auscultation, Normal Respiratory Rate         Mental Status:  Mental Status Findings:  Cooperative, Alert and Oriented         Anesthesia Plan  Type of Anesthesia, risks & benefits discussed:  Anesthesia Type:  general    Patient's Preference:   Plan Factors:          Intra-op Monitoring Plan: standard ASA monitors  Intra-op Monitoring Plan Comments:   Post Op Pain Control Plan: multimodal analgesia  Post Op Pain Control Plan Comments:     Induction:   IV  Beta Blocker:         Informed Consent: Patient understands risks and agrees with Anesthesia plan.  Questions answered. Anesthesia consent  signed with patient.  ASA Score: 3     Day of Surgery Review of History & Physical:    H&P update referred to the provider.     Anesthesia Plan Notes: Did well with propofol for recent EGD        Ready For Surgery From Anesthesia Perspective.

## 2022-02-08 NOTE — ASSESSMENT & PLAN NOTE
s/p DCCV with restoration of sinus rhythm    Plan:  - Continue PTA medications; reduce toprol-xl from 50 mg daily to 12.5 mg daily  - Start eliquis 5 mg BID x 2 weeks only  - Follow up with ECG in 1 week (2/15/22)  - Follow up with Sheila Fernandez NP in 4 weeks  - Discharge plans/instructions discussed with patient who verbalized understanding and agreement of plans of care. No further questions or concerns  voiced at this time.

## 2022-02-09 NOTE — ANESTHESIA POSTPROCEDURE EVALUATION
Anesthesia Post Evaluation    Patient: Norm Mendoza    Procedure(s) Performed: Procedure(s) (LRB):  Cardioversion or Defibrillation (N/A)    Final Anesthesia Type: general      Patient location during evaluation: PACU  Patient participation: Yes- Able to Participate  Level of consciousness: awake and alert  Post-procedure vital signs: reviewed and stable  Pain management: adequate  Airway patency: patent    PONV status at discharge: No PONV  Anesthetic complications: no      Cardiovascular status: blood pressure returned to baseline  Respiratory status: unassisted  Hydration status: euvolemic  Follow-up not needed.          Vitals Value Taken Time   /68 02/08/22 1331   Temp 36.6 °C (97.8 °F) 02/08/22 1245   Pulse 46 02/08/22 1347   Resp 16 02/08/22 1347   SpO2 98 % 02/08/22 1345   Vitals shown include unvalidated device data.      No case tracking events are documented in the log.      Pain/Sergio Score: No data recorded

## 2022-02-09 NOTE — ANESTHESIA POSTPROCEDURE EVALUATION
Anesthesia Post Evaluation    Patient: Norm Mendoza    Procedure(s) Performed: Procedure(s) (LRB):  Cardioversion or Defibrillation (N/A)    [facility] Anesthesia Post Op Evaluation      Vitals Value Taken Time   /68 02/08/22 1331   Temp 36.6 °C (97.8 °F) 02/08/22 1245   Pulse 46 02/08/22 1347   Resp 16 02/08/22 1347   SpO2 98 % 02/08/22 1345   Vitals shown include unvalidated device data.      No case tracking events are documented in the log.      Pain/Sergio Score: No data recorded

## 2022-02-15 ENCOUNTER — HOSPITAL ENCOUNTER (OUTPATIENT)
Dept: CARDIOLOGY | Facility: CLINIC | Age: 87
Discharge: HOME | End: 2022-02-15
Payer: MEDICARE

## 2022-02-15 ENCOUNTER — TELEPHONE (OUTPATIENT)
Dept: DERMATOLOGY | Facility: CLINIC | Age: 87
End: 2022-02-15
Payer: MEDICARE

## 2022-02-15 DIAGNOSIS — I48.19 PERSISTENT ATRIAL FIBRILLATION: ICD-10-CM

## 2022-02-15 PROCEDURE — 93010 ELECTROCARDIOGRAM REPORT: CPT | Mod: HCNC,S$GLB,, | Performed by: INTERNAL MEDICINE

## 2022-02-15 PROCEDURE — 93005 ELECTROCARDIOGRAM TRACING: CPT | Mod: HCNC,S$GLB,, | Performed by: INTERNAL MEDICINE

## 2022-02-15 PROCEDURE — 93005 RHYTHM STRIP: ICD-10-PCS | Mod: HCNC,S$GLB,, | Performed by: INTERNAL MEDICINE

## 2022-02-15 PROCEDURE — 93010 RHYTHM STRIP: ICD-10-PCS | Mod: HCNC,S$GLB,, | Performed by: INTERNAL MEDICINE

## 2022-02-17 ENCOUNTER — OFFICE VISIT (OUTPATIENT)
Dept: DERMATOLOGY | Facility: CLINIC | Age: 87
End: 2022-02-17
Payer: MEDICARE

## 2022-02-17 DIAGNOSIS — Z85.828 HISTORY OF NONMELANOMA SKIN CANCER: ICD-10-CM

## 2022-02-17 DIAGNOSIS — D48.5 NEOPLASM OF UNCERTAIN BEHAVIOR OF SKIN: ICD-10-CM

## 2022-02-17 DIAGNOSIS — L82.1 SK (SEBORRHEIC KERATOSIS): ICD-10-CM

## 2022-02-17 DIAGNOSIS — D18.01 CHERRY ANGIOMA: ICD-10-CM

## 2022-02-17 DIAGNOSIS — Z12.83 SCREENING FOR SKIN CANCER: ICD-10-CM

## 2022-02-17 DIAGNOSIS — L30.9 DERMATITIS: Primary | ICD-10-CM

## 2022-02-17 DIAGNOSIS — L57.0 AK (ACTINIC KERATOSIS): ICD-10-CM

## 2022-02-17 DIAGNOSIS — D22.9 MULTIPLE BENIGN NEVI: ICD-10-CM

## 2022-02-17 PROCEDURE — 1101F PR PT FALLS ASSESS DOC 0-1 FALLS W/OUT INJ PAST YR: ICD-10-PCS | Mod: HCNC,CPTII,S$GLB, | Performed by: DERMATOLOGY

## 2022-02-17 PROCEDURE — 17004 PR DESTRUCTION, PREMALIGNANT LESIONS; 15 OR MORE LESIONS: ICD-10-PCS | Mod: HCNC,S$GLB,, | Performed by: DERMATOLOGY

## 2022-02-17 PROCEDURE — 1159F PR MEDICATION LIST DOCUMENTED IN MEDICAL RECORD: ICD-10-PCS | Mod: HCNC,CPTII,S$GLB, | Performed by: DERMATOLOGY

## 2022-02-17 PROCEDURE — 88305 TISSUE EXAM BY PATHOLOGIST: ICD-10-PCS | Mod: 26,HCNC,, | Performed by: PATHOLOGY

## 2022-02-17 PROCEDURE — 11103 TANGNTL BX SKIN EA SEP/ADDL: CPT | Mod: HCNC,S$GLB,, | Performed by: DERMATOLOGY

## 2022-02-17 PROCEDURE — 3288F PR FALLS RISK ASSESSMENT DOCUMENTED: ICD-10-PCS | Mod: HCNC,CPTII,S$GLB, | Performed by: DERMATOLOGY

## 2022-02-17 PROCEDURE — 88305 TISSUE EXAM BY PATHOLOGIST: CPT | Mod: HCNC | Performed by: PATHOLOGY

## 2022-02-17 PROCEDURE — 1126F AMNT PAIN NOTED NONE PRSNT: CPT | Mod: HCNC,CPTII,S$GLB, | Performed by: DERMATOLOGY

## 2022-02-17 PROCEDURE — 88305 TISSUE EXAM BY PATHOLOGIST: CPT | Mod: 26,HCNC,, | Performed by: PATHOLOGY

## 2022-02-17 PROCEDURE — 1159F MED LIST DOCD IN RCRD: CPT | Mod: HCNC,CPTII,S$GLB, | Performed by: DERMATOLOGY

## 2022-02-17 PROCEDURE — 1101F PT FALLS ASSESS-DOCD LE1/YR: CPT | Mod: HCNC,CPTII,S$GLB, | Performed by: DERMATOLOGY

## 2022-02-17 PROCEDURE — 99999 PR PBB SHADOW E&M-EST. PATIENT-LVL III: CPT | Mod: PBBFAC,HCNC,, | Performed by: DERMATOLOGY

## 2022-02-17 PROCEDURE — 1126F PR PAIN SEVERITY QUANTIFIED, NO PAIN PRESENT: ICD-10-PCS | Mod: HCNC,CPTII,S$GLB, | Performed by: DERMATOLOGY

## 2022-02-17 PROCEDURE — 99999 PR PBB SHADOW E&M-EST. PATIENT-LVL III: ICD-10-PCS | Mod: PBBFAC,HCNC,, | Performed by: DERMATOLOGY

## 2022-02-17 PROCEDURE — 11102 TANGNTL BX SKIN SINGLE LES: CPT | Mod: 59,HCNC,S$GLB, | Performed by: DERMATOLOGY

## 2022-02-17 PROCEDURE — 17004 DESTROY PREMAL LESIONS 15/>: CPT | Mod: HCNC,S$GLB,, | Performed by: DERMATOLOGY

## 2022-02-17 PROCEDURE — 11103 PR TANGENTIAL BIOPSY, SKIN, EA ADDTL LESION: ICD-10-PCS | Mod: HCNC,S$GLB,, | Performed by: DERMATOLOGY

## 2022-02-17 PROCEDURE — 11102 PR TANGENTIAL BIOPSY, SKIN, SINGLE LESION: ICD-10-PCS | Mod: 59,HCNC,S$GLB, | Performed by: DERMATOLOGY

## 2022-02-17 PROCEDURE — 99214 OFFICE O/P EST MOD 30 MIN: CPT | Mod: 25,HCNC,S$GLB, | Performed by: DERMATOLOGY

## 2022-02-17 PROCEDURE — 3288F FALL RISK ASSESSMENT DOCD: CPT | Mod: HCNC,CPTII,S$GLB, | Performed by: DERMATOLOGY

## 2022-02-17 PROCEDURE — 99214 PR OFFICE/OUTPT VISIT, EST, LEVL IV, 30-39 MIN: ICD-10-PCS | Mod: 25,HCNC,S$GLB, | Performed by: DERMATOLOGY

## 2022-02-17 RX ORDER — CLOBETASOL PROPIONATE 0.46 MG/ML
SOLUTION TOPICAL
Qty: 50 ML | Refills: 3 | Status: SHIPPED | OUTPATIENT
Start: 2022-02-17

## 2022-02-17 NOTE — PROGRESS NOTES
Subjective:       Patient ID:  Norm Mendoza is a 89 y.o. male who presents for   Chief Complaint   Patient presents with    Skin Check     tbse    Lesion     R side     Lesion      Established patient. Here today for total body skin exam.  Hx of BCC dx'd at [facility] in 10/2021, s/p EDC in 11/2021. Reports site healed well. Hx AKs, other NMSC.  Today presenting for f/u skin check. No lesions of concern. C/o itchy rash at trunk, extremities.   No further complaints today.     Review of Systems   Skin: Positive for activity-related sunscreen use. Negative for daily sunscreen use and tendency to form keloidal scars.   Hematologic/Lymphatic: Bruises/bleeds easily.        Objective:    Physical Exam   Constitutional: He appears well-developed and well-nourished. No distress.   HENT:   Mouth/Throat: Lips normal.    Eyes: Lids are normal.    Neurological: He is alert and oriented to person, place, and time. He is not disoriented.   Psychiatric: He has a normal mood and affect.   Skin:   Areas Examined (abnormalities noted in diagram):   Scalp / Hair Palpated and Inspected  Head / Face Inspection Performed  Neck Inspection Performed  Chest / Axilla Inspection Performed  Abdomen Inspection Performed  Genitals / Buttocks / Groin Inspection Performed  Back Inspection Performed  RUE Inspected  LUE Inspection Performed  RLE Inspected  LLE Inspection Performed  Nails and Digits Inspection Performed                   Diagram Legend     Erythematous scaling macule/papule c/w actinic keratosis       Vascular papule c/w angioma      Pigmented verrucoid papule/plaque c/w seborrheic keratosis      Yellow umbilicated papule c/w sebaceous hyperplasia      Irregularly shaped tan macule c/w lentigo     1-2 mm smooth white papules consistent with Milia      Movable subcutaneous cyst with punctum c/w epidermal inclusion cyst      Subcutaneous movable cyst c/w pilar cyst      Firm pink to brown papule c/w dermatofibroma      Pedunculated  fleshy papule(s) c/w skin tag(s)      Evenly pigmented macule c/w junctional nevus     Mildly variegated pigmented, slightly irregular-bordered macule c/w mildly atypical nevus      Flesh colored to evenly pigmented papule c/w intradermal nevus       Pink pearly papule/plaque c/w basal cell carcinoma      Erythematous hyperkeratotic cursted plaque c/w SCC      Surgical scar with no sign of skin cancer recurrence      Open and closed comedones      Inflammatory papules and pustules      Verrucoid papule consistent consistent with wart     Erythematous eczematous patches and plaques     Dystrophic onycholytic nail with subungual debris c/w onychomycosis     Umbilicated papule    Erythematous-base heme-crusted tan verrucoid plaque consistent with inflamed seborrheic keratosis     Erythematous Silvery Scaling Plaque c/w Psoriasis     See annotation                Assessment / Plan:      Pathology Orders:     Normal Orders This Visit    Specimen to Pathology, Dermatology     Comments:    Number of Specimens:->2  ------------------------->-------------------------  Spec 1 Procedure:->Biopsy  Spec 1 Clinical Impression:->macular sk r/o lm  Spec 1 Source:->R upper inner arm  ------------------------->-------------------------  Spec 2 Procedure:->Biopsy  Spec 2 Clinical Impression:->r/o bcc vs ak  Spec 2 Source:->R posterolateral neck    Questions:    Procedure Type: Dermatology and skin neoplasms    Number of Specimens: 2    ------------------------: -------------------------    Spec 1 Procedure: Biopsy    Spec 1 Clinical Impression: macular sk r/o lm    Spec 1 Source: R upper inner arm    ------------------------: -------------------------    Spec 2 Procedure: Biopsy    Spec 2 Clinical Impression: r/o bcc vs ak    Spec 2 Source: R posterolateral neck    Release to patient:         Neoplasm of uncertain behavior of skin  -     Specimen to Pathology, Dermatology  - Discussed diagnosis with patient and explained uncertain  nature of condition, including ddx.   - Discussed treatment options (biopsy, close monitoring) with patient, including the risks and benefits of each. Patient opted to pursue biopsy.  - Shave Biopsy Procedure Note: Discussed procedure with patient/patient's guardian including risks and benefits as well as treatment alternatives. Risks of procedure include pain, bleeding, infection, post-inflammatory pigmentary alteration, scar, recurrence. Patient informed that the purpose of a biopsy is sampling of condition in question rather than removal in entirety; further treatment may be necessary. Verbal consent obtained. Area to be biopsied marked and cleansed with alcohol. Local anesthesia achieved by injecting approximately 1 cc of 1% lidocaine with epinephrine. Two shave biopsies performed using a double edge razor blade; specimens submitted to pathology. Hemostasis achieved with aluminum chloride. Petroleum jelly and bandage applied to wounds. Patient tolerated procedures well. After-visit wound care instructions reviewed and provided in writing.     AK (actinic keratosis)  - Discussed diagnosis, etiology, and precancerous nature of condition.   - Cryosurgery Procedure Note: Discussed procedure with patient/patient's guardian including risks and benefits as well as treatment alternatives. Risks of procedure include pain, itching, swelling, redness, blistering, crusting, wound formation, post-inflammatory pigmentary alteration, scar, recurrence. Verbal consent obtained. LN2 cryosurgery performed to 15 lesion(s). Patient tolerated procedure well. After-visit wound care instructions reviewed and provided in writing.     Multiple benign nevi  - Discussed diagnosis, etiology, and benign-nature of condition.  - Reassured; no lesions suspicious for malignancy noted on exam today.   - Recommended routine self examination of skin. Discussed the ABCDEs of melanoma and ugly duckling sign.   - Recommended daily sun protection,  including the use of OTC broad-spectrum sunscreen (SPF 30 or greater) and sun-protective clothing.      SK (seborrheic keratosis)  Cherry angioma  - Benign; reassured treatment not necessary.     History of nonmelanoma skin cancer  Screening for skin cancer  - Upper body skin examination performed today.  - Findings listed above.   - Recommended routine self examination of skin.    - Recommended daily sun protection, including the use of OTC broad-spectrum sunscreen (SPF 30 or greater) and sun-protective clothing.      Dermatitis  -     clobetasoL (TEMOVATE) 0.05 % external solution; Pt to mix in 1 jar of cerave cream and apply to affected areas bid  Dispense: 50 mL; Refill: 3  - Discussed diagnosis, etiology, and treatment options.  - Counseled on gentle, dry skin care and avoidance of common allergens/irritants.   - Clobetasol in cerave as above.   - Counseled on potential SE of medication(s) and instructed on use.             Follow up in about 1 month (around 3/17/2022) for f/u rash, ear, pending pathology.

## 2022-02-17 NOTE — PATIENT INSTRUCTIONS
No more than 1 shower or bath a day.   Out of shower or bath in less than 10 minutes.   Use only warm water, NOT hot.   Soap only where needed - areas that make body or are visibly dirty.  Use gentle soaps only (eg, Cetaphil gentle body wash).   After shower or bath, pat dry - do not scrub or rub aggressively.   Apply Clobetasol solution in Cerave cream to all areas of rash, itching (except at face, body folds, groin) once after shower/bath and then again once more during the day.   Avoid common allergens/irritants - fragrances, botanicals, etc.

## 2022-02-23 LAB
FINAL PATHOLOGIC DIAGNOSIS: NORMAL
GROSS: NORMAL
Lab: NORMAL
MICROSCOPIC EXAM: NORMAL

## 2022-02-24 ENCOUNTER — TELEPHONE (OUTPATIENT)
Dept: ELECTROPHYSIOLOGY | Facility: CLINIC | Age: 87
End: 2022-02-24
Payer: MEDICARE

## 2022-02-24 NOTE — PROGRESS NOTES
1.  Skin, right upper inner arm, shave biopsy:   - SOLAR LENTIGO.   This lesion is benign.   2.  Skin, right posterolateral neck, shave biopsy:   - ACTINIC KERATOSIS, INFLAMED.    Pt informed of results via portal.   To f/u on #2 at scheduled visit on 3/31/22.

## 2022-03-22 ENCOUNTER — HOSPITAL ENCOUNTER (OUTPATIENT)
Dept: CARDIOLOGY | Facility: CLINIC | Age: 87
Discharge: HOME | End: 2022-03-22
Payer: MEDICARE

## 2022-03-22 ENCOUNTER — OFFICE VISIT (OUTPATIENT)
Dept: ELECTROPHYSIOLOGY | Facility: CLINIC | Age: 87
End: 2022-03-22
Payer: MEDICARE

## 2022-03-22 VITALS
HEIGHT: 72 IN | HEART RATE: 74 BPM | BODY MASS INDEX: 24.93 KG/M2 | DIASTOLIC BLOOD PRESSURE: 80 MMHG | SYSTOLIC BLOOD PRESSURE: 118 MMHG | WEIGHT: 184.06 LBS

## 2022-03-22 DIAGNOSIS — I49.8 OTHER SPECIFIED CARDIAC ARRHYTHMIAS: ICD-10-CM

## 2022-03-22 DIAGNOSIS — Z95.818 PRESENCE OF WATCHMAN LEFT ATRIAL APPENDAGE CLOSURE DEVICE: ICD-10-CM

## 2022-03-22 DIAGNOSIS — I48.19 PERSISTENT ATRIAL FIBRILLATION: Primary | ICD-10-CM

## 2022-03-22 PROCEDURE — 1160F RVW MEDS BY RX/DR IN RCRD: CPT | Mod: CPTII,S$GLB,, | Performed by: INTERNAL MEDICINE

## 2022-03-22 PROCEDURE — 1160F PR REVIEW ALL MEDS BY PRESCRIBER/CLIN PHARMACIST DOCUMENTED: ICD-10-PCS | Mod: CPTII,S$GLB,, | Performed by: INTERNAL MEDICINE

## 2022-03-22 PROCEDURE — 99999 PR PBB SHADOW E&M-EST. PATIENT-LVL III: ICD-10-PCS | Mod: PBBFAC,,, | Performed by: INTERNAL MEDICINE

## 2022-03-22 PROCEDURE — 1101F PR PT FALLS ASSESS DOC 0-1 FALLS W/OUT INJ PAST YR: ICD-10-PCS | Mod: CPTII,S$GLB,, | Performed by: INTERNAL MEDICINE

## 2022-03-22 PROCEDURE — 99499 RISK ADDL DX/OHS AUDIT: ICD-10-PCS | Mod: S$GLB,,, | Performed by: INTERNAL MEDICINE

## 2022-03-22 PROCEDURE — 1126F PR PAIN SEVERITY QUANTIFIED, NO PAIN PRESENT: ICD-10-PCS | Mod: CPTII,S$GLB,, | Performed by: INTERNAL MEDICINE

## 2022-03-22 PROCEDURE — 3288F PR FALLS RISK ASSESSMENT DOCUMENTED: ICD-10-PCS | Mod: CPTII,S$GLB,, | Performed by: INTERNAL MEDICINE

## 2022-03-22 PROCEDURE — 1126F AMNT PAIN NOTED NONE PRSNT: CPT | Mod: CPTII,S$GLB,, | Performed by: INTERNAL MEDICINE

## 2022-03-22 PROCEDURE — 99213 OFFICE O/P EST LOW 20 MIN: CPT | Mod: S$GLB,,, | Performed by: INTERNAL MEDICINE

## 2022-03-22 PROCEDURE — 99999 PR PBB SHADOW E&M-EST. PATIENT-LVL III: CPT | Mod: PBBFAC,,, | Performed by: INTERNAL MEDICINE

## 2022-03-22 PROCEDURE — 93005 RHYTHM STRIP: ICD-10-PCS | Mod: S$GLB,,, | Performed by: INTERNAL MEDICINE

## 2022-03-22 PROCEDURE — 99499 UNLISTED E&M SERVICE: CPT | Mod: S$GLB,,, | Performed by: INTERNAL MEDICINE

## 2022-03-22 PROCEDURE — 93010 RHYTHM STRIP: ICD-10-PCS | Mod: S$GLB,,, | Performed by: INTERNAL MEDICINE

## 2022-03-22 PROCEDURE — 3288F FALL RISK ASSESSMENT DOCD: CPT | Mod: CPTII,S$GLB,, | Performed by: INTERNAL MEDICINE

## 2022-03-22 PROCEDURE — 1159F PR MEDICATION LIST DOCUMENTED IN MEDICAL RECORD: ICD-10-PCS | Mod: CPTII,S$GLB,, | Performed by: INTERNAL MEDICINE

## 2022-03-22 PROCEDURE — 1101F PT FALLS ASSESS-DOCD LE1/YR: CPT | Mod: CPTII,S$GLB,, | Performed by: INTERNAL MEDICINE

## 2022-03-22 PROCEDURE — 99213 PR OFFICE/OUTPT VISIT, EST, LEVL III, 20-29 MIN: ICD-10-PCS | Mod: S$GLB,,, | Performed by: INTERNAL MEDICINE

## 2022-03-22 PROCEDURE — 93005 ELECTROCARDIOGRAM TRACING: CPT | Mod: S$GLB,,, | Performed by: INTERNAL MEDICINE

## 2022-03-22 PROCEDURE — 1159F MED LIST DOCD IN RCRD: CPT | Mod: CPTII,S$GLB,, | Performed by: INTERNAL MEDICINE

## 2022-03-22 PROCEDURE — 93010 ELECTROCARDIOGRAM REPORT: CPT | Mod: S$GLB,,, | Performed by: INTERNAL MEDICINE

## 2022-03-22 RX ORDER — METOPROLOL SUCCINATE 50 MG/1
50 TABLET, EXTENDED RELEASE ORAL DAILY
COMMUNITY
Start: 2021-12-23 | End: 2022-10-28

## 2022-03-22 RX ORDER — IRBESARTAN 150 MG/1
1 TABLET ORAL DAILY
COMMUNITY
Start: 2021-12-23 | End: 2022-06-23 | Stop reason: SDUPTHER

## 2022-03-22 NOTE — PROGRESS NOTES
Subjective:    Patient ID:  Norm Mendoza is a 89 y.o. male who presents for follow-up of Atrial Fibrillation      89 yoM here for LAAO consideration.     6/18/19: He has had development of HF as well as persistent AF. He was in NSR 2014. The next ECG 11/18 showed AF. EF 5/18 was normal in NSR. He was started on OAC 12/18 for AF. 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 He underwent Rituximab therapy and had resolution of his ulcers 4/19 but had residual MALT. He did not receive radiation.  He has mild HF symptoms, treated with lasix. Stress echo 5/19 showed EF 40%. He was prescribed xarelto 5/30/19 but he has not taken it.    10/19: He was cardioverted 7/12/19 with mild improvement in symptoms. EF 45% in NSR. He asks about Watchman.     8/2020: Watchman 10/2019, LAAO sealed 12/19, EF normal. Remains on amiodarone 200 mg qd. HRs lower than normal, also with fatigue and some weight gain. He takes lasix PRN.     11/2020: some confusion on his meds. He should be off amiodarone and off clopidogrel. BP elevated today.     6/21: Started amlodipine 2.5 bid for HTN last visit. BP improved. Now takes 2.5 once a day. Remains on metoprolol.     2/22: Persistent AF since the summer with some LE edema and DUGAN.     Interval history: JACINTO/CV 2/8/22 with NSR 2/15/22. In AF today    10/21 echo:  · There is moderate aortic valve stenosis.  · Aortic valve area is 1.03 cm2; peak velocity is 3.16 m/s; mean gradient is 30 mmHg.  · The left ventricle is normal in size with mildly decreased systolic function.  · The estimated ejection fraction is 45%.  · Left ventricular diastolic dysfunction.  · Moderate to severe left atrial enlargement.  · Mild mitral regurgitation.  · Mild tricuspid regurgitation.  · There is abnormal septal wall motion consistent with left bundle branch block.  · Mild right ventricular enlargement with normal right ventricular systolic function.  · The quantitatively  derived ejection fraction is 43%.  · Mild aortic regurgitation.  · The estimated PA systolic pressure is 39 mmHg.  · Intermediate central venous pressure (8 mmHg).     Echo 5/18:  CONCLUSIONS     1 - Normal left ventricular systolic function (EF 55-60%).     2 - Indeterminate LV diastolic function.     3 - Biatrial enlargement.     4 - No wall motion abnormalities.     5 - Normal right ventricular systolic function .     6 - Mildly enlarged ascending aorta.     7 - Trivial to mild aortic regurgitation.     8 - Mild mitral regurgitation.     9 - Mild tricuspid regurgitation.     10 - Trivial to mild pulmonic regurgitation.     11 - The estimated PA systolic pressure is 33 mmHg.     Stress echo 5/19:  · THE PATIENT IS IN ATRIAL FIBRILLATION FOR THE ENTIRETY OF THE TEST  · The patient reported no symptoms during the stress test.  · The test was stopped secondary to fatigue.  · There were no arrhythmias during stress.  · Mildly decreased left ventricular systolic function. The estimated ejection fraction is 40%, 2D quantitative LVEF 33%.  · Grade II (moderate) left ventricular diastolic dysfunction consistent with pseudonormalization.  · Elevated left atrial pressure.  · Severe left atrial enlargement.  · The ascending aorta is moderately dilated.  · Mild tricuspid regurgitation.  · Low normal right ventricular systolic function.  · Moderate right atrial enlargement.  · Concentric left ventricular remodeling.  · Overall, the patient's exercise capacity was normal.  · The EKG portion of this study is negative for myocardial ischemia.  · No obvious wall motion abnormalities at stress. LV function augments but still not normal at stress.    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  5/2/2018:  Mixed hyperlipidemia  7/30/2019: On continuous oral anticoagulation  11/30/2018: Paroxysmal atrial fibrillation      Comment:  S/P D/C Cardioversion 7/2019, S/P Watchman device 8/2020  No date: PUD (peptic ulcer disease)      Comment:  GI Bleeding 11/2018: Anticoagulant D/S'd and Gastric                MALT tumor Dx'd  No date: Skin cancer      Comment:  x3, had mohs on nose  excised 12/5/14: Squamous cell carcinoma      Comment:  R lower back  12/6/2018: Symptomatic anemia  No date: Urinary tract infection      Comment:  x4    Past Surgical History:  No date: ACHILLES TENDON SURGERY  2013: CATARACT EXTRACTION W/  INTRAOCULAR LENS IMPLANT; Bilateral  10/11/2019: CLOSURE OF LEFT ATRIAL APPENDAGE USING DEVICE; N/A      Comment:  Procedure: Left atrial appendage closure device;                 Surgeon: Hi Crowder MD;  Location: [facility] EP LAB;                Service: Cardiology;  Laterality: N/A;  AF, JACINTO, Watchman               Implant, BSci, Gen, MB, 3 Prep  No date: CYSTOSCOPY      Comment:  bladder tumor  12/7/2018: ESOPHAGOGASTRODUODENOSCOPY; N/A      Comment:  Procedure: EGD (ESOPHAGOGASTRODUODENOSCOPY);  Surgeon:                Austin Garcia MD;  Location: [facility] (2ND FLR);                 Service: Endoscopy;  Laterality: N/A;  4/12/2019: ESOPHAGOGASTRODUODENOSCOPY; N/A      Comment:  Procedure: EGD (ESOPHAGOGASTRODUODENOSCOPY);  Surgeon:                Austin Garcia MD;  Location: [facility] (4TH FLR);                 Service: Endoscopy;  Laterality: N/A;  please schedule                within 4 weeks  5/27/2021: ESOPHAGOGASTRODUODENOSCOPY; N/A      Comment:  Procedure: EGD (ESOPHAGOGASTRODUODENOSCOPY);  Surgeon:                Austin Garcia MD;  Location: [facility] (2ND FLR);                 Service: Endoscopy;  Laterality: N/A;  per Dr. Garcia done                within the month with any provider/ ordered by                [id number] floor due to comorbiditiesWatchman                 [facility]  No date: SKIN CANCER EXCISION  2019: TREATMENT OF CARDIAC ARRHYTHMIA; N/A      Comment:  Procedure: Cardioversion or Defibrillation;  Surgeon:                Hi Crowder MD;  Location: [facility] EP LAB;  Service:               Cardiology;  Laterality: N/A;  AF, JACINTO, DCCV, MAC, MB, 3                Prep  2022: TREATMENT OF CARDIAC ARRHYTHMIA; N/A      Comment:  Procedure: Cardioversion or Defibrillation;  Surgeon:                Susan Orozco NP;  Location: [facility] EP LAB;  Service:                Cardiology;  Laterality: N/A;  afib, DCCV, anes, MB, 3                Prep    Social History    Socioeconomic History      Marital status:       Number of children: 3    Occupational History      Occupation: Financial Work/        Employer: retired      Occupation: actor      Occupation:     Tobacco Use      Smoking status: Former Smoker        Packs/day: 1.00        Years: 25.00        Pack years: 25        Types: Cigarettes        Quit date: 9/3/1970        Years since quittin.5      Smokeless tobacco: Never Used    Substance and Sexual Activity      Alcohol use: Yes        Alcohol/week: 3.0 standard drinks        Types: 3 Shots of liquor per week        Comment: 3 bourbons daily - 12 beer on weekends       Drug use: No      Sexual activity: Yes        Partners: Female    Social History Narrative      He is  with 2/3 kids living(2 sons/1 daughter who passed away); He has 6 Grandkids(5 under 10 y/o); He has 1 son here in [place] /1 grandson at [facility]; His other son(3 kids) lives in [place]; His Grand-daughter(Her Mother passed away) with her 2 kids lives in [place]      He had worked for [employer] which has dissolved but now works for [employer]    Social Determinants of Health  Financial Resource Strain: Low Risk       Difficulty of Paying Living Expenses: Not hard at all  Food Insecurity: No Food Insecurity       Worried About Running Out of Food in the Last Year: Never true      Ran Out of Food in the Last Year: Never true  Transportation Needs: No Transportation Needs      Lack of Transportation (Medical): No      Lack of Transportation (Non-Medical): No  Physical Activity: Insufficiently Active      Days of Exercise per Week: 5 days      Minutes of Exercise per Session: 20 min  Stress: No Stress Concern Present      Feeling of Stress : Not at all  Social Connections: Unknown      Frequency of Communication with Friends and Family: More than three times a week      Frequency of Social Gatherings with Friends and Family: More than three times a week      Attends Rastafari Services: More than 4 times per year      Active Member of Clubs or Organizations: No      Attends Club or Organization Meetings: Never  Housing Stability: Unknown      Unable to Pay for Housing in the Last Year: No      Unstable Housing in the Last Year: No    Review of patient's family history indicates:  Problem: Stroke      Relation: Father          Age of Onset: (Not Specified)  Problem: Hypertension      Relation: Father          Age of Onset: (Not Specified)  Problem: Stroke      Relation: Brother          Age of Onset: (Not Specified)  Problem: Anesthesia problems      Relation: Neg Hx          Age of Onset: (Not Specified)  Problem: Malignant hypertension      Relation: Neg Hx          Age of Onset: (Not Specified)  Problem: Hypotension      Relation: Neg Hx          Age of Onset: (Not Specified)  Problem: Malignant hyperthermia      Relation: Neg Hx          Age of Onset: (Not Specified)  Problem: Pseudochol deficiency      Relation: Neg Hx          Age of Onset: (Not Specified)  Problem: Melanoma      Relation: Neg Hx          Age of Onset: (Not Specified)  Problem: Heart attack      Relation: Neg Hx          Age of Onset: (Not Specified)  Problem: Heart disease      Relation: Neg Hx          Age of Onset: (Not Specified)  Problem: Heart  failure      Relation: Neg Hx          Age of Onset: (Not Specified)  Problem: Cataracts      Relation: Neg Hx          Age of Onset: (Not Specified)  Problem: Glaucoma      Relation: Neg Hx          Age of Onset: (Not Specified)  Problem: Macular degeneration      Relation: Neg Hx          Age of Onset: (Not Specified)      Current Outpatient Medications:  amLODIPine (NORVASC) 2.5 MG tablet, Take 1 tablet (2.5 mg total) by mouth once daily., Disp: 90 tablet, Rfl: 1  aspirin (ECOTRIN) 81 MG EC tablet, Take 81 mg by mouth once daily., Disp: , Rfl:   clobetasoL (TEMOVATE) 0.05 % external solution, Pt to mix in 1 jar of cerave cream and apply to affected areas bid, Disp: 50 mL, Rfl: 3  cyanocobalamin (VITAMIN B-12) 1000 MCG tablet, Take 1,000 mcg by mouth once daily., Disp: , Rfl:   irbesartan (AVAPRO) 150 MG tablet, TAKE 1.5 TABLETS BY MOUTH EVERY EVENING, Disp: , Rfl:   metoprolol succinate (TOPROL-XL) 25 MG 24 hr tablet, Take 0.5 tablets (12.5 mg total) by mouth once daily., Disp: 90 tablet, Rfl: 1  multivitamin (THERAGRAN) per tablet, Take 1 tablet by mouth once daily., Disp: , Rfl:   psyllium husk (METAMUCIL ORAL), Take by mouth., Disp: , Rfl:   diclofenac sodium (VOLTAREN ARTHRITIS PAIN) 1 % Gel, Apply 2 Grams to painful left hand joint 2-3x/day as needed (Patient not taking: Reported on 3/22/2022), Disp: 100 g, Rfl: 1  irbesartan (AVAPRO) 300 MG tablet, TAKE 1 TABLET(300 MG) BY MOUTH EVERY EVENING (Patient not taking: Reported on 3/22/2022), Disp: 90 tablet, Rfl: 1  metoprolol succinate (TOPROL-XL) 50 MG 24 hr tablet, Take 50 mg by mouth once daily., Disp: , Rfl:     Current Facility-Administered Medications:  lidocaine HCl 2% urojet, , Urethral, Once, Grover Ochoa Jr., MD              Review of Systems   Constitutional: Negative for malaise/fatigue.   Cardiovascular: Positive for dyspnea on exertion. Negative for chest pain, irregular heartbeat, leg swelling and palpitations.   Respiratory: Positive for  shortness of breath.    Hematologic/Lymphatic: Negative for bleeding problem.   Skin: Negative for rash.   Musculoskeletal: Negative for myalgias.   Gastrointestinal: Negative for hematemesis, hematochezia and nausea.   Genitourinary: Negative for hematuria.   Neurological: Positive for weakness. Negative for light-headedness.   Psychiatric/Behavioral: Negative for altered mental status.   Allergic/Immunologic: Negative for persistent infections.        Objective:    Physical Exam  Vitals and nursing note reviewed.   Constitutional:       Appearance: Normal appearance. He is well-developed.   HENT:      Head: Normocephalic.      Nose: Nose normal.   Eyes:      Pupils: Pupils are equal, round, and reactive to light.   Cardiovascular:      Rate and Rhythm: Normal rate. Rhythm irregularly irregular.   Pulmonary:      Effort: No respiratory distress.      Breath sounds: Normal breath sounds.   Musculoskeletal:         General: Normal range of motion.   Skin:     General: Skin is warm and dry.      Findings: No erythema.   Neurological:      Mental Status: He is alert and oriented to person, place, and time.   Psychiatric:         Speech: Speech normal.         Behavior: Behavior normal.       ECG: AF with controlled  V rate        Assessment:       1. Persistent atrial fibrillation    2. Presence of Watchman left atrial appendage closure device         Plan:       89 yoM with recurrence of AF 1m after CV. He will cut his EtOH use down and come back in 3m to revisit attempts at CV.

## 2022-03-31 ENCOUNTER — OFFICE VISIT (OUTPATIENT)
Dept: HEMATOLOGY/ONCOLOGY | Facility: CLINIC | Age: 87
End: 2022-03-31
Payer: MEDICARE

## 2022-03-31 ENCOUNTER — LAB VISIT (OUTPATIENT)
Dept: LAB | Facility: HOSPITAL | Age: 87
End: 2022-03-31
Payer: MEDICARE

## 2022-03-31 VITALS
OXYGEN SATURATION: 95 % | DIASTOLIC BLOOD PRESSURE: 80 MMHG | HEART RATE: 109 BPM | WEIGHT: 185.75 LBS | HEIGHT: 72 IN | SYSTOLIC BLOOD PRESSURE: 128 MMHG | RESPIRATION RATE: 20 BRPM | TEMPERATURE: 98 F | BODY MASS INDEX: 25.16 KG/M2

## 2022-03-31 DIAGNOSIS — C88.4 MALT LYMPHOMA: Primary | ICD-10-CM

## 2022-03-31 DIAGNOSIS — I42.0 DILATED CARDIOMYOPATHY: ICD-10-CM

## 2022-03-31 DIAGNOSIS — C88.4 MALT LYMPHOMA: ICD-10-CM

## 2022-03-31 LAB
ALBUMIN SERPL BCP-MCNC: 3.8 G/DL (ref 3.5–5.2)
ALP SERPL-CCNC: 60 U/L (ref 55–135)
ALT SERPL W/O P-5'-P-CCNC: 17 U/L (ref 10–44)
ANION GAP SERPL CALC-SCNC: 8 MMOL/L (ref 8–16)
AST SERPL-CCNC: 18 U/L (ref 10–40)
BASOPHILS # BLD AUTO: 0.09 K/UL (ref 0–0.2)
BASOPHILS NFR BLD: 1.6 % (ref 0–1.9)
BILIRUB SERPL-MCNC: 0.6 MG/DL (ref 0.1–1)
BUN SERPL-MCNC: 21 MG/DL (ref 10–30)
CALCIUM SERPL-MCNC: 9.8 MG/DL (ref 8.7–10.5)
CHLORIDE SERPL-SCNC: 104 MMOL/L (ref 95–110)
CO2 SERPL-SCNC: 26 MMOL/L (ref 23–29)
CREAT SERPL-MCNC: 1 MG/DL (ref 0.5–1.4)
DIFFERENTIAL METHOD: ABNORMAL
EOSINOPHIL # BLD AUTO: 0.1 K/UL (ref 0–0.5)
EOSINOPHIL NFR BLD: 1.1 % (ref 0–8)
ERYTHROCYTE [DISTWIDTH] IN BLOOD BY AUTOMATED COUNT: 14.1 % (ref 11.5–14.5)
EST. GFR  (AFRICAN AMERICAN): >60 ML/MIN/1.73 M^2
EST. GFR  (NON AFRICAN AMERICAN): >60 ML/MIN/1.73 M^2
GLUCOSE SERPL-MCNC: 93 MG/DL (ref 70–110)
HCT VFR BLD AUTO: 43.1 % (ref 40–54)
HGB BLD-MCNC: 14.2 G/DL (ref 14–18)
IMM GRANULOCYTES # BLD AUTO: 0.04 K/UL (ref 0–0.04)
IMM GRANULOCYTES NFR BLD AUTO: 0.7 % (ref 0–0.5)
LDH SERPL L TO P-CCNC: 272 U/L (ref 110–260)
LYMPHOCYTES # BLD AUTO: 0.8 K/UL (ref 1–4.8)
LYMPHOCYTES NFR BLD: 14.8 % (ref 18–48)
MCH RBC QN AUTO: 32.5 PG (ref 27–31)
MCHC RBC AUTO-ENTMCNC: 32.9 G/DL (ref 32–36)
MCV RBC AUTO: 99 FL (ref 82–98)
MONOCYTES # BLD AUTO: 0.7 K/UL (ref 0.3–1)
MONOCYTES NFR BLD: 11.6 % (ref 4–15)
NEUTROPHILS # BLD AUTO: 3.9 K/UL (ref 1.8–7.7)
NEUTROPHILS NFR BLD: 70.2 % (ref 38–73)
NRBC BLD-RTO: 0 /100 WBC
PLATELET # BLD AUTO: 236 K/UL (ref 150–450)
PMV BLD AUTO: 9.5 FL (ref 9.2–12.9)
POTASSIUM SERPL-SCNC: 4.8 MMOL/L (ref 3.5–5.1)
PROT SERPL-MCNC: 7.4 G/DL (ref 6–8.4)
RBC # BLD AUTO: 4.37 M/UL (ref 4.6–6.2)
SODIUM SERPL-SCNC: 138 MMOL/L (ref 136–145)
WBC # BLD AUTO: 5.61 K/UL (ref 3.9–12.7)

## 2022-03-31 PROCEDURE — 1160F PR REVIEW ALL MEDS BY PRESCRIBER/CLIN PHARMACIST DOCUMENTED: ICD-10-PCS | Mod: CPTII,GC,S$GLB, | Performed by: INTERNAL MEDICINE

## 2022-03-31 PROCEDURE — 83615 LACTATE (LD) (LDH) ENZYME: CPT | Performed by: STUDENT IN AN ORGANIZED HEALTH CARE EDUCATION/TRAINING PROGRAM

## 2022-03-31 PROCEDURE — 1126F PR PAIN SEVERITY QUANTIFIED, NO PAIN PRESENT: ICD-10-PCS | Mod: CPTII,GC,S$GLB, | Performed by: INTERNAL MEDICINE

## 2022-03-31 PROCEDURE — 85025 COMPLETE CBC W/AUTO DIFF WBC: CPT | Performed by: STUDENT IN AN ORGANIZED HEALTH CARE EDUCATION/TRAINING PROGRAM

## 2022-03-31 PROCEDURE — 99999 PR PBB SHADOW E&M-EST. PATIENT-LVL III: ICD-10-PCS | Mod: PBBFAC,GC,, | Performed by: INTERNAL MEDICINE

## 2022-03-31 PROCEDURE — 1160F RVW MEDS BY RX/DR IN RCRD: CPT | Mod: CPTII,GC,S$GLB, | Performed by: INTERNAL MEDICINE

## 2022-03-31 PROCEDURE — 3288F PR FALLS RISK ASSESSMENT DOCUMENTED: ICD-10-PCS | Mod: CPTII,GC,S$GLB, | Performed by: INTERNAL MEDICINE

## 2022-03-31 PROCEDURE — 99214 OFFICE O/P EST MOD 30 MIN: CPT | Mod: GC,S$GLB,, | Performed by: INTERNAL MEDICINE

## 2022-03-31 PROCEDURE — 1101F PR PT FALLS ASSESS DOC 0-1 FALLS W/OUT INJ PAST YR: ICD-10-PCS | Mod: CPTII,GC,S$GLB, | Performed by: INTERNAL MEDICINE

## 2022-03-31 PROCEDURE — 1126F AMNT PAIN NOTED NONE PRSNT: CPT | Mod: CPTII,GC,S$GLB, | Performed by: INTERNAL MEDICINE

## 2022-03-31 PROCEDURE — 1159F MED LIST DOCD IN RCRD: CPT | Mod: CPTII,GC,S$GLB, | Performed by: INTERNAL MEDICINE

## 2022-03-31 PROCEDURE — 1159F PR MEDICATION LIST DOCUMENTED IN MEDICAL RECORD: ICD-10-PCS | Mod: CPTII,GC,S$GLB, | Performed by: INTERNAL MEDICINE

## 2022-03-31 PROCEDURE — 99999 PR PBB SHADOW E&M-EST. PATIENT-LVL III: CPT | Mod: PBBFAC,GC,, | Performed by: INTERNAL MEDICINE

## 2022-03-31 PROCEDURE — 3288F FALL RISK ASSESSMENT DOCD: CPT | Mod: CPTII,GC,S$GLB, | Performed by: INTERNAL MEDICINE

## 2022-03-31 PROCEDURE — 99214 PR OFFICE/OUTPT VISIT, EST, LEVL IV, 30-39 MIN: ICD-10-PCS | Mod: GC,S$GLB,, | Performed by: INTERNAL MEDICINE

## 2022-03-31 PROCEDURE — 1101F PT FALLS ASSESS-DOCD LE1/YR: CPT | Mod: CPTII,GC,S$GLB, | Performed by: INTERNAL MEDICINE

## 2022-03-31 PROCEDURE — 36415 COLL VENOUS BLD VENIPUNCTURE: CPT | Performed by: STUDENT IN AN ORGANIZED HEALTH CARE EDUCATION/TRAINING PROGRAM

## 2022-03-31 PROCEDURE — 80053 COMPREHEN METABOLIC PANEL: CPT | Performed by: STUDENT IN AN ORGANIZED HEALTH CARE EDUCATION/TRAINING PROGRAM

## 2022-03-31 NOTE — PROGRESS NOTES
[facility]  [facility]  Hematology/Medical Oncology Clinic       PATIENT: Norm Mendoza  MRN: [id number]  DATE: 3/31/2022    Reason for referral: MALT lymphoma    Initial History  Mr. Norm Mendoza is a 89 y.o. 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     Oncologic History  -The patient was discharged on 12/9/2018 following a 3 day admission for a GIB. Patient was on Eliquis at the time and had been complaining of 1 week of black, tarry stools. EGD at the time was significant for nonbleeding gastric ulcers. Biopsies were taken and the patient was instructed to start protonix and hold eliquis at discharge. He requiring IV fluids and blood transfusion during the hospitalization. Biopsy results returned 12/18/2018 concerning for possible MALT-Lymphoma with molecular studies pending.  -PET confirmed stage 1 MALT lymphoma  -Completed Rituximab x4 on 3/7/19  -EGD: Erythematous, granular and scarred mucosa in the lesser curvature of the gastric body. Biopsied, + MALT lymphoma. Ulcers no longer present., PET was also done 4/20/19, no other site of disease  -Repeat EGD 5/27/21: --PERSISTENT EXTRANODAL MARGINAL ZONE LYMPHOMA OF MUCOSA-ASSOCIATED LYMPHOID   TISSUE (MALT LYMPHOMA). As patient is asymptomatic, will continue observation.     Interval History  Patient presents alone. Doing well overall. More fatigued, feels this is due to CHF. No melena, no hematochezia. No abdominal pain. Denies weight loss, night sweats, lymphadenopathy. Admitted same day on 2/8/22 for cardioversion.     CBC normal . Remainder of labs pending at the time of visit.     Past Medical History:   Past Medical History:   Diagnosis Date    Alcohol dependence, daily use 7/13/2017    Allergy     Bladder cancer     tumor that was benign     Cataract     H/O nonmelanoma skin cancer 2/4/2016    Hematuria      Hypertension     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    Mixed hyperlipidemia 5/2/2018    On continuous oral anticoagulation 7/30/2019    Paroxysmal atrial fibrillation 11/30/2018    S/P D/C Cardioversion 7/2019, S/P Watchman device 8/2020    PUD (peptic ulcer disease)     GI Bleeding 11/2018: Anticoagulant D/S'd and Gastric MALT tumor Dx'd    Skin cancer     x3, had mohs on nose    Squamous cell carcinoma excised 12/5/14    R lower back    Symptomatic anemia 12/6/2018    Urinary tract infection     x4       Past Surgical HIstory:   Past Surgical History:   Procedure Laterality Date    ACHILLES TENDON SURGERY      CATARACT EXTRACTION W/  INTRAOCULAR LENS IMPLANT Bilateral 2013    CLOSURE OF LEFT ATRIAL APPENDAGE USING DEVICE N/A 10/11/2019    Procedure: Left atrial appendage closure device;  Surgeon: Hi Crowder MD;  Location: [facility] EP LAB;  Service: Cardiology;  Laterality: N/A;  AF, JACINTO, Watchman Implant, BSci, Gen, MB, 3 Prep    CYSTOSCOPY      bladder tumor    ESOPHAGOGASTRODUODENOSCOPY N/A 12/7/2018    Procedure: EGD (ESOPHAGOGASTRODUODENOSCOPY);  Surgeon: Austin Garcia MD;  Location: [facility] (2ND FLR);  Service: Endoscopy;  Laterality: N/A;    ESOPHAGOGASTRODUODENOSCOPY N/A 4/12/2019    Procedure: EGD (ESOPHAGOGASTRODUODENOSCOPY);  Surgeon: Austin Garcia MD;  Location: [facility] (4TH FLR);  Service: Endoscopy;  Laterality: N/A;  please schedule within 4 weeks    ESOPHAGOGASTRODUODENOSCOPY N/A 5/27/2021    Procedure: EGD (ESOPHAGOGASTRODUODENOSCOPY);  Surgeon: Austin Garcia MD;  Location: [facility] (2ND FLR);  Service: Endoscopy;  Laterality: N/A;  per Dr. Garcia done within the month with any provider/ ordered by oncology  2nd floor due to comorbidities  Watchman device  COVID test at [facility] on 5/24-GT    SKIN CANCER EXCISION      TREATMENT OF CARDIAC ARRHYTHMIA N/A 7/12/2019    Procedure: Cardioversion or Defibrillation;  Surgeon: Hi Crowder MD;  Location: [facility] EP LAB;  Service:  Cardiology;  Laterality: N/A;  AF, JACINTO, DCCV, MAC, MB, 3 Prep    TREATMENT OF CARDIAC ARRHYTHMIA N/A 2/8/2022    Procedure: Cardioversion or Defibrillation;  Surgeon: Susan Orozco NP;  Location: [facility] EP LAB;  Service: Cardiology;  Laterality: N/A;  afib, DCCV, anes, MB, 3 Prep       Family History:   Family History   Problem Relation Age of Onset    Stroke Father     Hypertension Father     Stroke Brother     Anesthesia problems Neg Hx     Malignant hypertension Neg Hx     Hypotension Neg Hx     Malignant hyperthermia Neg Hx     Pseudochol deficiency Neg Hx     Melanoma Neg Hx     Heart attack Neg Hx     Heart disease Neg Hx     Heart failure Neg Hx     Cataracts Neg Hx     Glaucoma Neg Hx     Macular degeneration Neg Hx        Social History:  reports that he quit smoking about 51 years ago. His smoking use included cigarettes. He has a 25.00 pack-year smoking history. He has never used smokeless tobacco. He reports current alcohol use of about 3.0 standard drinks of alcohol per week. He reports that he does not use drugs.    Allergies:  Review of patient's allergies indicates:  No Known Allergies    Medications:  Current Outpatient Medications   Medication Sig Dispense Refill    amLODIPine (NORVASC) 2.5 MG tablet Take 1 tablet (2.5 mg total) by mouth once daily. 90 tablet 1    aspirin (ECOTRIN) 81 MG EC tablet Take 81 mg by mouth once daily.      clobetasoL (TEMOVATE) 0.05 % external solution Pt to mix in 1 jar of cerave cream and apply to affected areas bid 50 mL 3    cyanocobalamin (VITAMIN B-12) 1000 MCG tablet Take 1,000 mcg by mouth once daily.      diclofenac sodium (VOLTAREN ARTHRITIS PAIN) 1 % Gel Apply 2 Grams to painful left hand joint 2-3x/day as needed (Patient not taking: Reported on 3/22/2022) 100 g 1    irbesartan (AVAPRO) 150 MG tablet TAKE 1.5 TABLETS BY MOUTH EVERY EVENING      irbesartan (AVAPRO) 300 MG tablet TAKE 1 TABLET(300 MG) BY MOUTH EVERY EVENING (Patient not  taking: Reported on 3/22/2022) 90 tablet 1    metoprolol succinate (TOPROL-XL) 25 MG 24 hr tablet Take 0.5 tablets (12.5 mg total) by mouth once daily. 90 tablet 1    metoprolol succinate (TOPROL-XL) 50 MG 24 hr tablet Take 50 mg by mouth once daily.      multivitamin (THERAGRAN) per tablet Take 1 tablet by mouth once daily.      psyllium husk (METAMUCIL ORAL) Take by mouth.       Current Facility-Administered Medications   Medication Dose Route Frequency Provider Last Rate Last Admin    lidocaine HCl 2% urojet   Urethral Once Grover Ochoa Jr., MD           ROS:  Constitutional: Reports some fatigue. Negative for activity change, appetite change, chills, fever and unexpected weight change.   HENT: Negative for nosebleeds and sore throat.    Respiratory: Negative for cough, chest tightness, shortness of breath and wheezing.    CardiovasculaEndocrine: Negative for cold intolerance r: Negative for leg swelling. Negative for palpitations.   Gastrointestinal: Negative for constipation. Negative for abdominal pain, diarrhea, nausea and vomiting.   and heat intolerance.     ECOG Performance Status: 2   Objective:      Vitals:   Vitals:    03/31/22 1322   BP: 128/80   BP Location: Left arm   Patient Position: Sitting   BP Method: Medium (Automatic)   Pulse: 109   Resp: 20   Temp: 97.9 °F (36.6 °C)   TempSrc: Oral   SpO2: 95%   Weight: 84.2 kg (185 lb 11.8 oz)   Height: 6' (1.829 m)       Physical Exam  Constitutional:       Appearance: Normal appearance.   HENT:      Head: Normocephalic and atraumatic.      Mouth/Throat:      Mouth: Mucous membranes are moist.   Eyes:      Extraocular Movements: Extraocular movements intact.   Cardiovascular:      Rate and Rhythm: Normal rate and regular rhythm.      Pulses: Normal pulses.      Heart sounds: Normal heart sounds.   Pulmonary:      Effort: Pulmonary effort is normal.      Breath sounds: Normal breath sounds.   Abdominal:      Palpations: Abdomen is soft.  "  Lymphadenopathy:      Cervical: No cervical adenopathy.      Upper Body:      Right upper body: No supraclavicular adenopathy.      Left upper body: No supraclavicular adenopathy.   Skin:     General: Skin is warm.   Neurological:      General: No focal deficit present.      Mental Status: He is alert.    Laboratory Data:  No visits with results within 1 Week(s) from this visit.   Latest known visit with results is:   Office Visit on 02/17/2022   Component Date Value Ref Range Status    Final Pathologic Diagnosis 02/17/2022    Final                    Value:1.  Skin, right upper inner arm, shave biopsy:  - SOLAR LENTIGO.  This lesion is benign.  2.  Skin, right posterolateral neck, shave biopsy:  - ACTINIC KERATOSIS, INFLAMED.  This lesion is atypical and further treatment may be required. You will be  contacted by your provider's office.      Interp By Jenny Molina M.D., Signed on 02/23/2022 at 18:29    Gross 02/17/2022    Final                    Value:Patient ID/Pathology ID:  [id number]  Received in 2 parts  Part 1  Received in formalin labeled "right upper inner arm" is a shave of gray-tan  skin measuring 11 x 13 mm with a ill-defined 8 x 10 mm brown-gray lesion on  the skin surface.  Inked blue on the deep surface, trisected, and submitted  entirely in cassette WKM--1-A  Part 2  Received in formalin labeled "right posterolateral neck" is a shave of  hair-bearing white-tan skin measuring 5 x 3 mm.  Inked blue on the deep  surface, bisected, and submitted entirely in cassette FEE--2-A  Grossed by ADALI Lopez      Microscopic Exam 02/17/2022    Final                    Value:1.  Sections show an elongation of the rete ridges in association with  increased pigmentation of the basal cell layer. The underlying dermis  demonstrates extensive solar elastosis.  2.  Sections show atypia and maturation disarray within the lowermost  epidermal layers associated with hyper- and parakeratosis and " solar  elastosis. An inflammatory infiltrate is additionally seen.      Disclaimer 02/17/2022    Final                    Value:Unless the case is a 'gross only' or additional testing only, the final  diagnosis for each specimen is based on a microscopic examination of  appropriate tissue sections.           Imaging: All pertinent imaging reviewed    Assessment and Plan        No diagnosis found.      1) Limited (Stage 1) MALT lymphoma  -H pylori negative  -Recent h/o GIB while was on DOAC for a-fib  -PET negative for distant disease or lymphadenopathy  -Hepatitis studies negative  -Patient recently seen by cardiology for a-fib, off anti-coagulation, off of beta-blocker given AEs  -Patient completed Rituximab 375mg x4 weeks on 3/7/19  -Persistent MALT lymphoma on EGD, PET was also done in 2019, no other site of disease  -Was seen again by radiation oncology, preferred to pursue observation at this time  -Repeat EGD 5/27/21 showed persistent MALT lymphoma, discussed option of repeat Rituxan vs ISRT vs observation. Patient favors observation, will continue to check labs and see patient every 6 months     2) Systolic CHF, A-fib  -ECHO showed Left ventricular systolic function. The estimated ejection fraction is 43%. Global hypokinetic wall motion. Septal wall has abnormal motion.   -S/p cardioversion via JACINTO.   -S/p Watchman  -Patient now off Xarelto.   -On lasix PRN  -Per cardiology     Follow-up: visit in 6 months with labs prior (CBC, CMP, LDH)      Michelle Harper MD  Hematology/Oncology Fellow PGY IV  [facility]

## 2022-04-11 ENCOUNTER — PATIENT MESSAGE (OUTPATIENT)
Dept: ADMINISTRATIVE | Facility: OTHER | Age: 87
End: 2022-04-11
Payer: MEDICARE

## 2022-04-21 ENCOUNTER — IMMUNIZATION (OUTPATIENT)
Dept: INTERNAL MEDICINE | Facility: CLINIC | Age: 87
End: 2022-04-21
Payer: MEDICARE

## 2022-04-21 DIAGNOSIS — Z23 NEED FOR VACCINATION: Primary | ICD-10-CM

## 2022-04-21 PROCEDURE — 91305 COVID-19, MRNA, LNP-S, PF, 30 MCG/0.3 ML DOSE VACCINE (PFIZER): CPT | Mod: PBBFAC | Performed by: INTERNAL MEDICINE

## 2022-05-06 ENCOUNTER — OFFICE VISIT (OUTPATIENT)
Dept: INTERNAL MEDICINE | Facility: CLINIC | Age: 87
End: 2022-05-06
Payer: MEDICARE

## 2022-05-06 VITALS
HEART RATE: 77 BPM | SYSTOLIC BLOOD PRESSURE: 90 MMHG | OXYGEN SATURATION: 98 % | BODY MASS INDEX: 25.33 KG/M2 | DIASTOLIC BLOOD PRESSURE: 58 MMHG | WEIGHT: 187 LBS | HEIGHT: 72 IN

## 2022-05-06 DIAGNOSIS — E53.8 B12 DEFICIENCY: ICD-10-CM

## 2022-05-06 DIAGNOSIS — I77.810 THORACIC AORTIC ECTASIA: ICD-10-CM

## 2022-05-06 DIAGNOSIS — I48.19 PERSISTENT ATRIAL FIBRILLATION: ICD-10-CM

## 2022-05-06 DIAGNOSIS — B34.9 VIRAL SYNDROME: Primary | ICD-10-CM

## 2022-05-06 DIAGNOSIS — I10 ESSENTIAL HYPERTENSION: ICD-10-CM

## 2022-05-06 DIAGNOSIS — Z12.5 PROSTATE CANCER SCREENING: ICD-10-CM

## 2022-05-06 PROCEDURE — 99215 PR OFFICE/OUTPT VISIT, EST, LEVL V, 40-54 MIN: ICD-10-PCS | Mod: S$GLB,,, | Performed by: INTERNAL MEDICINE

## 2022-05-06 PROCEDURE — 99499 RISK ADDL DX/OHS AUDIT: ICD-10-PCS | Mod: S$GLB,,, | Performed by: INTERNAL MEDICINE

## 2022-05-06 PROCEDURE — 1125F PR PAIN SEVERITY QUANTIFIED, PAIN PRESENT: ICD-10-PCS | Mod: CPTII,S$GLB,, | Performed by: INTERNAL MEDICINE

## 2022-05-06 PROCEDURE — 99999 PR PBB SHADOW E&M-EST. PATIENT-LVL IV: CPT | Mod: PBBFAC,,, | Performed by: INTERNAL MEDICINE

## 2022-05-06 PROCEDURE — 99499 UNLISTED E&M SERVICE: CPT | Mod: S$GLB,,, | Performed by: INTERNAL MEDICINE

## 2022-05-06 PROCEDURE — 3288F PR FALLS RISK ASSESSMENT DOCUMENTED: ICD-10-PCS | Mod: CPTII,S$GLB,, | Performed by: INTERNAL MEDICINE

## 2022-05-06 PROCEDURE — 1159F MED LIST DOCD IN RCRD: CPT | Mod: CPTII,S$GLB,, | Performed by: INTERNAL MEDICINE

## 2022-05-06 PROCEDURE — 99215 OFFICE O/P EST HI 40 MIN: CPT | Mod: S$GLB,,, | Performed by: INTERNAL MEDICINE

## 2022-05-06 PROCEDURE — 1101F PR PT FALLS ASSESS DOC 0-1 FALLS W/OUT INJ PAST YR: ICD-10-PCS | Mod: CPTII,S$GLB,, | Performed by: INTERNAL MEDICINE

## 2022-05-06 PROCEDURE — 99999 PR PBB SHADOW E&M-EST. PATIENT-LVL IV: ICD-10-PCS | Mod: PBBFAC,,, | Performed by: INTERNAL MEDICINE

## 2022-05-06 PROCEDURE — 3288F FALL RISK ASSESSMENT DOCD: CPT | Mod: CPTII,S$GLB,, | Performed by: INTERNAL MEDICINE

## 2022-05-06 PROCEDURE — 1125F AMNT PAIN NOTED PAIN PRSNT: CPT | Mod: CPTII,S$GLB,, | Performed by: INTERNAL MEDICINE

## 2022-05-06 PROCEDURE — 1101F PT FALLS ASSESS-DOCD LE1/YR: CPT | Mod: CPTII,S$GLB,, | Performed by: INTERNAL MEDICINE

## 2022-05-06 PROCEDURE — 1159F PR MEDICATION LIST DOCUMENTED IN MEDICAL RECORD: ICD-10-PCS | Mod: CPTII,S$GLB,, | Performed by: INTERNAL MEDICINE

## 2022-05-06 RX ORDER — FUROSEMIDE 20 MG/1
20 TABLET ORAL
COMMUNITY
End: 2023-11-09

## 2022-05-06 NOTE — PROGRESS NOTES
Subjective:       Patient ID: Norm Mendoza is a 89 y.o. male.    Chief Complaint:   Migraine and Otalgia    HPI: Mr Mendoza presents for follow up the above  H/A and Ear pain:  He c/o left ear pain x 2 weeks ago/?ear infection, he also had a sore throat. He then started with pain at the left temporal/occipital part of his head. No F/C or cough   Over the last few days , the ear pain/sore throat have resolved. He still has scalp sensitivity but rest of sx have resolved.  He did get his 2nd COVID booster on 4/21/22.   Atrial Fibrillation:  1st dx'd 6/2019; + GI bleed/2ndary to MALT GI tumor resulted in Xaralto being HELD; 7/2019: s/p DCCE; 10/2019: s/p Watchman device/Was on Amiodarone transiently-now off; 2/2022-s/p 2nd  DCCV; Presently-Rate controlled and on ASA QD  Etoh: Alcohol intake much less-75% less per pt: 1-2/day with only 1 bourbon/1beer daily; He had been drinking 2 martinis,1-2 bourbon's daily  MALT Tumor/Stage I Lymphoma: Heme-Onc/Dr Harper/Dr YESSENIA Prather;  Completed Rituximab x4 on 3/7/19; Presently on conservative following    Past Medical, Surgical, Social History: Please see as stated in Epic chart which has been reviewed.    Current Outpatient Medications   Medication Sig Dispense Refill    amLODIPine (NORVASC) 2.5 MG tablet Take 1 tablet (2.5 mg total) by mouth once daily. 90 tablet 1    aspirin (ECOTRIN) 81 MG EC tablet Take 81 mg by mouth once daily.      clobetasoL (TEMOVATE) 0.05 % external solution Pt to mix in 1 jar of cerave cream and apply to affected areas bid 50 mL 3    cyanocobalamin (VITAMIN B-12) 1000 MCG tablet Take 1,000 mcg by mouth once daily.      diclofenac sodium (VOLTAREN ARTHRITIS PAIN) 1 % Gel Apply 2 Grams to painful left hand joint 2-3x/day as needed 100 g 1    furosemide (LASIX) 20 MG tablet Take 20 mg by mouth as needed. Pt takes 5 tabs weekly as needed      irbesartan (AVAPRO) 150 MG tablet Take 1 tablet by mouth once daily.      metoprolol succinate  (TOPROL-XL) 25 MG 24 hr tablet Take 0.5 tablets (12.5 mg total) by mouth once daily. 90 tablet 1    metoprolol succinate (TOPROL-XL) 50 MG 24 hr tablet Take 50 mg by mouth once daily.      multivitamin (THERAGRAN) per tablet Take 1 tablet by mouth once daily.      psyllium husk (METAMUCIL ORAL) Take by mouth.       Current Facility-Administered Medications   Medication Dose Route Frequency Provider Last Rate Last Admin    lidocaine HCl 2% urojet   Urethral Once Grover Ochoa Jr., MD           Review of Systems   HENT: Positive for ear pain and sore throat.    Respiratory: Negative for chest tightness and shortness of breath.    Cardiovascular: Negative for chest pain and leg swelling.   Neurological: Positive for headaches.       Objective:      Lab Results   Component Value Date    WBC 5.61 03/31/2022    HGB 14.2 03/31/2022    HCT 43.1 03/31/2022     03/31/2022    CHOL 207 (H) 08/19/2021    TRIG 79 08/19/2021    HDL 64 08/19/2021    ALT 17 03/31/2022    AST 18 03/31/2022     03/31/2022    K 4.8 03/31/2022     03/31/2022    CREATININE 1.0 03/31/2022    BUN 21 03/31/2022    CO2 26 03/31/2022    TSH 1.787 10/08/2021    PSA 2.5 09/10/2015    INR 1.0 02/03/2022    HGBA1C 5.2 08/19/2021     Physical Exam  Vitals reviewed.   Constitutional:       Appearance: He is well-developed.   HENT:      Head: Normocephalic and atraumatic.      Right Ear: Tympanic membrane normal.      Left Ear: Tympanic membrane normal.      Mouth/Throat:      Mouth: Mucous membranes are dry.      Pharynx: No oropharyngeal exudate or posterior oropharyngeal erythema.   Eyes:      Conjunctiva/sclera: Conjunctivae normal.   Neck:      Thyroid: No thyromegaly.      Vascular: No JVD.      Comments: No masses    Cardiovascular:      Rate and Rhythm: Normal rate. Rhythm irregular.      Heart sounds: No murmur heard.    No gallop.      Comments: VHR=6/Irregular  Pulmonary:      Effort: Pulmonary effort is normal. No respiratory  distress.      Breath sounds: Normal breath sounds. No wheezing.   Chest:      Chest wall: No tenderness.   Abdominal:      General: Bowel sounds are normal. There is no distension.      Palpations: Abdomen is soft. There is no mass.      Tenderness: There is no abdominal tenderness.      Comments: No Organomegaly   Musculoskeletal:         General: No tenderness. Normal range of motion.      Cervical back: Normal range of motion and neck supple.   Lymphadenopathy:      Cervical: No cervical adenopathy.   Skin:     General: Skin is warm and dry.   Neurological:      Mental Status: He is alert and oriented to person, place, and time.      Cranial Nerves: No cranial nerve deficit.   Psychiatric:         Judgment: Judgment normal.           Vital Signs  Pulse: 77  SpO2: 98 %  BP: 108/70  Pain Score:   4  Height and Weight  Height: 6' (182.9 cm)  Weight: 84.8 kg (187 lb)  BSA (Calculated - sq m): 2.08 sq meters  BMI (Calculated): 25.4  Weight in (lb) to have BMI = 25: 183.9    Assessment:       1. Viral syndrome    2. Essential hypertension    3. Persistent atrial fibrillation    4. Thoracic aortic ectasia    5. Prostate cancer screening    6. B12 deficiency        Plan:     Health Maintenance   Topic Date Due    Lipid Panel  08/19/2026    TETANUS VACCINE  01/19/2027        Norm was seen today for migraine and otalgia.    Diagnoses and all orders for this visit:    Viral syndrome/Resolved    Essential hypertension/controlled        -     Continue: Avapro 150mg QD, Norvasc 2.5mg QD, Toprol XL 25mg at 1/2 tab QD  -     Comprehensive Metabolic Panel; Future  -     CBC Auto Differential; Future  -     TSH; Future    Persistent atrial fibrillation/Rate controlled        -     Continue ASA QD        -     Follow up in Arrhythmia Cardiology as scheduled    Thoracic aortic ectasia        -     Control Blood Pressure and Monitor/control Lipids    Prostate cancer screening  -     PSA, Screening; Future    B12 deficiency/On  Cyanocobalamin 1000ug QD  -     Vitamin B12; Future    MALT Tumor        -     Follow up as per Heme-Onc/Dr Banegas and Dr Prather    Health Maintenance        -      RTC x 6 months with 1 week prior fasting lab

## 2022-05-30 ENCOUNTER — OFFICE VISIT (OUTPATIENT)
Dept: DERMATOLOGY | Facility: CLINIC | Age: 87
End: 2022-05-30
Payer: MEDICARE

## 2022-05-30 DIAGNOSIS — D48.5 NEOPLASM OF UNCERTAIN BEHAVIOR OF SKIN: Primary | ICD-10-CM

## 2022-05-30 PROCEDURE — 99999 PR PBB SHADOW E&M-EST. PATIENT-LVL III: ICD-10-PCS | Mod: PBBFAC,,, | Performed by: DERMATOLOGY

## 2022-05-30 PROCEDURE — 11102 TANGNTL BX SKIN SINGLE LES: CPT | Mod: S$GLB,,, | Performed by: DERMATOLOGY

## 2022-05-30 PROCEDURE — 1126F PR PAIN SEVERITY QUANTIFIED, NO PAIN PRESENT: ICD-10-PCS | Mod: CPTII,S$GLB,, | Performed by: DERMATOLOGY

## 2022-05-30 PROCEDURE — 88305 TISSUE EXAM BY PATHOLOGIST: CPT | Performed by: PATHOLOGY

## 2022-05-30 PROCEDURE — 99499 NO LOS: ICD-10-PCS | Mod: S$GLB,,, | Performed by: DERMATOLOGY

## 2022-05-30 PROCEDURE — 1159F PR MEDICATION LIST DOCUMENTED IN MEDICAL RECORD: ICD-10-PCS | Mod: CPTII,S$GLB,, | Performed by: DERMATOLOGY

## 2022-05-30 PROCEDURE — 1126F AMNT PAIN NOTED NONE PRSNT: CPT | Mod: CPTII,S$GLB,, | Performed by: DERMATOLOGY

## 2022-05-30 PROCEDURE — 99499 UNLISTED E&M SERVICE: CPT | Mod: S$GLB,,, | Performed by: DERMATOLOGY

## 2022-05-30 PROCEDURE — 11102 PR TANGENTIAL BIOPSY, SKIN, SINGLE LESION: ICD-10-PCS | Mod: S$GLB,,, | Performed by: DERMATOLOGY

## 2022-05-30 PROCEDURE — 99999 PR PBB SHADOW E&M-EST. PATIENT-LVL III: CPT | Mod: PBBFAC,,, | Performed by: DERMATOLOGY

## 2022-05-30 PROCEDURE — 88305 TISSUE EXAM BY PATHOLOGIST: ICD-10-PCS | Mod: 26,,, | Performed by: PATHOLOGY

## 2022-05-30 PROCEDURE — 1159F MED LIST DOCD IN RCRD: CPT | Mod: CPTII,S$GLB,, | Performed by: DERMATOLOGY

## 2022-05-30 PROCEDURE — 88305 TISSUE EXAM BY PATHOLOGIST: CPT | Mod: 26,,, | Performed by: PATHOLOGY

## 2022-06-01 NOTE — PATIENT INSTRUCTIONS
Shave Biopsy Wound Care    Your doctor has performed a shave biopsy today.  A band aid and vaseline ointment has been placed over the site.  This should remain in place for NO LONGER THAN 48 hours.  It is fine to remove the bandaid after 24 hours, if the area is no longer bleeding. It is recommended that you keep the area dry (do not wet)) for the first 24 hours.  After 24 hours, wash the area with warm soap and water and apply Vaseline jelly.  Many patients prefer to use Neosporin or Bacitracin ointment.  This is acceptable; however, know that you can develop an allergy to this medication even if you have used it safely for years.  It is important to keep the area moist.  Letting it dry out and get air slows healing time, and will worsen the scar.        If you notice increasing redness, tenderness, pain, or yellow drainage at the biopsy site, please notify your doctor.  These are signs of an infection.    If your biopsy site is bleeding, apply firm pressure for 15 minutes straight.  Repeat for another 15 minutes, if it is still bleeding.   If the surgical site continues to bleed, then please contact your doctor.      For [facility] users:   You will receive your biopsy results in [facility] as soon as they are available. Please be assured that your physician/provider will review your results and will then determine what further treatment, evaluation, or planning is required. You should be contacted by your physician's/provider's office within 5 business days of receiving your results; If not, please reach out to directly. This is one more way [employer] is putting you first.     [facility] [place] [address]/ [phone] FAX/ [email or web address]

## 2022-06-01 NOTE — PROGRESS NOTES
Subjective:       Patient ID:  Norm Mendoza is a 89 y.o. male who presents for   Chief Complaint   Patient presents with    Dermatitis     Follow up    Biopsy     Follow up     Lesion    Dermatitis    3 month f/u lesion at R ear, rash. Lesions biopsied at , see below.   Lesion persistent. Rash controlled.   Hx AKs, NMSC.    Review of Systems   Skin: Positive for activity-related sunscreen use. Negative for daily sunscreen use and tendency to form keloidal scars.   Hematologic/Lymphatic: Bruises/bleeds easily.        Objective:    Physical Exam   Constitutional: He appears well-developed and well-nourished. No distress.   HENT:   Mouth/Throat: Lips normal.    Eyes: Lids are normal.    Neurological: He is alert and oriented to person, place, and time. He is not disoriented.   Psychiatric: He has a normal mood and affect.   Skin:   Areas Examined (abnormalities noted in diagram):   Head / Face Inspection Performed                   Diagram Legend     Erythematous scaling macule/papule c/w actinic keratosis       Vascular papule c/w angioma      Pigmented verrucoid papule/plaque c/w seborrheic keratosis      Yellow umbilicated papule c/w sebaceous hyperplasia      Irregularly shaped tan macule c/w lentigo     1-2 mm smooth white papules consistent with Milia      Movable subcutaneous cyst with punctum c/w epidermal inclusion cyst      Subcutaneous movable cyst c/w pilar cyst      Firm pink to brown papule c/w dermatofibroma      Pedunculated fleshy papule(s) c/w skin tag(s)      Evenly pigmented macule c/w junctional nevus     Mildly variegated pigmented, slightly irregular-bordered macule c/w mildly atypical nevus      Flesh colored to evenly pigmented papule c/w intradermal nevus       Pink pearly papule/plaque c/w basal cell carcinoma      Erythematous hyperkeratotic cursted plaque c/w SCC      Surgical scar with no sign of skin cancer recurrence      Open and closed comedones      Inflammatory papules  and pustules      Verrucoid papule consistent consistent with wart     Erythematous eczematous patches and plaques     Dystrophic onycholytic nail with subungual debris c/w onychomycosis     Umbilicated papule    Erythematous-base heme-crusted tan verrucoid plaque consistent with inflamed seborrheic keratosis     Erythematous Silvery Scaling Plaque c/w Psoriasis     See annotation        Assessment / Plan:      Pathology Orders:     Normal Orders This Visit    Specimen to Pathology, Dermatology     Comments:    Number of Specimens:->1  ------------------------->-------------------------  Spec 1 Procedure:->Biopsy  Spec 1 Clinical Impression:->r/o bcc vs scar  Spec 1 Source:->R helix    Questions:    Procedure Type: Dermatology and skin neoplasms    Number of Specimens: 1    ------------------------: -------------------------    Spec 1 Procedure: Biopsy    Spec 1 Clinical Impression: r/o bcc vs scar    Spec 1 Source: R helix    Release to patient:         Neoplasm of uncertain behavior of skin  -     Specimen to Pathology, Dermatology  - Discussed diagnosis with patient and explained uncertain nature of condition, including ddx.   - Discussed treatment options (biopsy, close monitoring) with patient, including the risks and benefits of each. Patient opted to pursue biopsy.  - Shave Biopsy Procedure Note: Discussed procedure with patient/patient's guardian including risks and benefits as well as treatment alternatives. Risks of procedure include pain, bleeding, infection, post-inflammatory pigmentary alteration, scar, recurrence. Patient informed that the purpose of a biopsy is sampling of condition in question rather than removal in entirety; further treatment may be necessary. Verbal consent obtained. Area to be biopsied marked and cleansed with alcohol. Local anesthesia achieved by injecting approximately 1 cc of 1% lidocaine with epinephrine. Two shave biopsies performed using a double edge razor blade; specimens  submitted to pathology. Hemostasis achieved with aluminum chloride. Petroleum jelly and bandage applied to wounds. Patient tolerated procedures well. After-visit wound care instructions reviewed and provided in writing.              Follow up for pending pathology.

## 2022-06-03 LAB
FINAL PATHOLOGIC DIAGNOSIS: NORMAL
GROSS: NORMAL
Lab: NORMAL
MICROSCOPIC EXAM: NORMAL

## 2022-06-06 NOTE — PROGRESS NOTES
1. Skin, right helix, shave biopsy:   - BASAL CELL CARCINOMA.   - THE TUMOR EXTENDS TO THE DEEP AND LATERAL BIOPSY MARGINS.     [facility] referral, path above, photo in chart. Pt informed via portal, awaiting scheduling.

## 2022-06-08 ENCOUNTER — TELEPHONE (OUTPATIENT)
Dept: DERMATOLOGY | Facility: CLINIC | Age: 87
End: 2022-06-08
Payer: MEDICARE

## 2022-06-08 NOTE — TELEPHONE ENCOUNTER
Pt was rescheduled for Mohs sx from 7/28/2022 at 7:30 to 8/2/2022 at 7:30 for bx proven BCC right helix. Pt verbally confirmed appt date and time.

## 2022-06-08 NOTE — TELEPHONE ENCOUNTER
----- Message from Ayesha Jim sent at 6/8/2022 12:22 PM CDT -----      Please contact patient to reschedule Mohs procedure that is scheduled for 7/28, he will be leaving to fly out and wants to reschedule.    Patient can be contacted @# [phone]

## 2022-06-23 RX ORDER — IRBESARTAN 150 MG/1
150 TABLET ORAL DAILY
Qty: 90 TABLET | Refills: 2 | Status: SHIPPED | OUTPATIENT
Start: 2022-06-23 | End: 2022-06-24 | Stop reason: SDUPTHER

## 2022-06-24 RX ORDER — IRBESARTAN 150 MG/1
150 TABLET ORAL DAILY
Qty: 90 TABLET | Refills: 2 | Status: SHIPPED | OUTPATIENT
Start: 2022-06-24 | End: 2023-07-26 | Stop reason: SDUPTHER

## 2022-07-18 DIAGNOSIS — I48.91 ATRIAL FIBRILLATION, UNSPECIFIED TYPE: Primary | ICD-10-CM

## 2022-08-02 ENCOUNTER — PROCEDURE VISIT (OUTPATIENT)
Dept: DERMATOLOGY | Facility: CLINIC | Age: 87
End: 2022-08-02
Payer: MEDICARE

## 2022-08-02 VITALS
BODY MASS INDEX: 25.33 KG/M2 | HEART RATE: 57 BPM | SYSTOLIC BLOOD PRESSURE: 178 MMHG | WEIGHT: 187 LBS | DIASTOLIC BLOOD PRESSURE: 103 MMHG | HEIGHT: 72 IN

## 2022-08-02 DIAGNOSIS — C44.212 BASAL CELL CARCINOMA (BCC) OF SKIN OF RIGHT EAR: Primary | ICD-10-CM

## 2022-08-02 PROCEDURE — 99499 NO LOS: ICD-10-PCS | Mod: S$GLB,,, | Performed by: DERMATOLOGY

## 2022-08-02 PROCEDURE — 13152 PR RECMPL WND LID,NOS,EAR 2.6-7.5 CM: ICD-10-PCS | Mod: 51,S$GLB,, | Performed by: DERMATOLOGY

## 2022-08-02 PROCEDURE — 13152 CMPLX RPR E/N/E/L 2.6-7.5 CM: CPT | Mod: 51,S$GLB,, | Performed by: DERMATOLOGY

## 2022-08-02 PROCEDURE — 99499 UNLISTED E&M SERVICE: CPT | Mod: S$GLB,,, | Performed by: DERMATOLOGY

## 2022-08-02 PROCEDURE — 17311: ICD-10-PCS | Mod: S$GLB,,, | Performed by: DERMATOLOGY

## 2022-08-02 PROCEDURE — 17311 MOHS 1 STAGE H/N/HF/G: CPT | Mod: S$GLB,,, | Performed by: DERMATOLOGY

## 2022-08-02 NOTE — PROGRESS NOTES
PROCEDURE: Mohs' Micrographic Surgery    INDICATION: Location in mask areas of face including central face, nose, eyelids, eyebrows, lips, chin, preauricular, temple, and ear. Biopsy-proven skin cancer of cosmetically and functionally important areas, including head, neck, genital, hand, foot, or areas known for having difficulty in healing, such as the lower anterior legs. Tumor with ill-defined borders.    REFERRING PROVIDER: Rosemarie Simmons MD    CASE NUMBER:     ANESTHETIC: 2.5 cc 0.5% Lidocaine with Epi 1:200,000 mixed 1:1 with 0.5% Bupivacaine    SURGICAL PREP: Betadine    SURGEON: Yogesh Orellana MD    ASSISTANTS: Ayde Alas, Surg Tech    PREOPERATIVE DIAGNOSIS: basal cell carcinoma    POSTOPERATIVE DIAGNOSIS: basal cell carcinoma    PATHOLOGIC DIAGNOSIS: basal cell carcinoma    HISTOLOGY OF SPECIMENS IN FIRST STAGE:   Tumor Type: No tumor seen.    STAGES OF MOHS' SURGERY PERFORMED: 1    TUMOR-FREE PLANE ACHIEVED: Yes    HEMOSTASIS: electrocoagulation     SPECIMENS: 2    LOCATION: right helix (superior). Location verified with Dr. Simmons's clinical photograph. Patient also verified location with hand held mirror.    INITIAL LESION SIZE: 0.4 x 0.4 cm    FINAL DEFECT SIZE: 0.7 x 1.1 cm    WOUND REPAIR/DISPOSITION: The patient tolerated Mohs' Micrographic Surgery for a basal cell carcinoma very well. When the tumor was completely removed, a repair of the surgical defect was undertaken.       PROCEDURE: Complex Linear Repair    INDICATION: Status post Mohs' Micrographic Surgery for basal cell carcinoma.    CASE NUMBER:     SURGEON: Yogesh Orellana MD    ASSISTANTS: Dayana Gordon PA-C and Elana Contreras MA    ANESTHETIC: 1 cc 1% Lidocaine with Epinephrine 1:100,000    SURGICAL PREP: Betadine, prepped by Elana Contreras MA    LOCATION: right helix (superior)    DEFECT SIZE: 0.7 x 1.1 cm    WOUND REPAIR/DISPOSITION:  After the patient's carcinoma had been completely removed with Mohs' Micrographic  Surgery, a repair of the surgical defect was undertaken. The patient was returned to the operating suite where the area of right superior helix was prepped, draped, and anesthetized in the usual sterile fashion. The wound was widely undermined in all directions. The wound was undermined to a distance at least the maximum width of the defect as measured perpendicular to the closure line along at least one entire edge of the defect, in this case 1.5 cm. Then, electrocoagulation was used to obtain meticulous hemostasis. 4-0 Vicryl buried vertical mattress sutures were placed into the subcutaneous and dermal plane to close the wound and les the cutaneous wound edge. Bilateral dog ears were identified and were removed by a standard Burow's triangle technique. The cutaneous wound edges were closed using interrupted 5-0 Prolene suture.    The patient tolerated the procedure well.    The area was cleaned and dressed appropriately and the patient was given wound care instructions, as well as appointment for follow-up evaluation and suture removal in 7 days.    LENGTH OF REPAIR: 2.7 cm    Vitals:    08/02/22 0724 08/02/22 0932   BP: (!) 155/99 (!) 178/103   BP Location: Left arm    Patient Position: Sitting    BP Method: Medium (Automatic)    Pulse: (!) 56 (!) 57   Weight: 84.8 kg (187 lb)    Height: 6' (1.829 m)

## 2022-08-03 DIAGNOSIS — C88.4 MALT (MUCOSA ASSOCIATED LYMPHOID TISSUE): Primary | ICD-10-CM

## 2022-08-09 ENCOUNTER — CLINICAL SUPPORT (OUTPATIENT)
Dept: DERMATOLOGY | Facility: CLINIC | Age: 87
End: 2022-08-09
Payer: MEDICARE

## 2022-08-09 PROCEDURE — 99999 PR PBB SHADOW E&M-EST. PATIENT-LVL II: ICD-10-PCS | Mod: PBBFAC,,,

## 2022-08-09 PROCEDURE — 99999 PR PBB SHADOW E&M-EST. PATIENT-LVL II: CPT | Mod: PBBFAC,,,

## 2022-08-09 NOTE — PROGRESS NOTES
89 y.o. male patient is here for suture removal following Mohs' surgery.    Patient reports no problems.    WOUND PE:  The R helix sutures intact. Wound healing well. Good skin edges. No signs or symptoms of infection.    IMPRESSION:  Healing operative site from Mohs' surgery, BCC R helix s/p Mohs with CLC, postop day #7.    PLAN:  Sutures removed today by Ayde Alas, Surg Tech. Steri-strips applied.  Continue wound care.  Keep moist with Aquaphor.    RTC:  In 3-6 months with Rosemarie Simmnos MD for skin check or sooner if new concern arises.

## 2022-08-18 ENCOUNTER — HOSPITAL ENCOUNTER (OUTPATIENT)
Dept: CARDIOLOGY | Facility: CLINIC | Age: 87
Discharge: HOME | End: 2022-08-18
Payer: MEDICARE

## 2022-08-18 ENCOUNTER — OFFICE VISIT (OUTPATIENT)
Dept: ELECTROPHYSIOLOGY | Facility: CLINIC | Age: 87
End: 2022-08-18
Payer: MEDICARE

## 2022-08-18 VITALS
WEIGHT: 186.94 LBS | DIASTOLIC BLOOD PRESSURE: 90 MMHG | SYSTOLIC BLOOD PRESSURE: 142 MMHG | HEIGHT: 72 IN | HEART RATE: 78 BPM | BODY MASS INDEX: 25.32 KG/M2

## 2022-08-18 DIAGNOSIS — I42.0 DILATED CARDIOMYOPATHY: ICD-10-CM

## 2022-08-18 DIAGNOSIS — Z95.818 PRESENCE OF WATCHMAN LEFT ATRIAL APPENDAGE CLOSURE DEVICE: ICD-10-CM

## 2022-08-18 DIAGNOSIS — I48.19 PERSISTENT ATRIAL FIBRILLATION: Primary | ICD-10-CM

## 2022-08-18 DIAGNOSIS — I48.91 ATRIAL FIBRILLATION, UNSPECIFIED TYPE: ICD-10-CM

## 2022-08-18 DIAGNOSIS — I51.89 LEFT VENTRICULAR DIASTOLIC DYSFUNCTION WITH PRESERVED SYSTOLIC FUNCTION: ICD-10-CM

## 2022-08-18 DIAGNOSIS — I70.0 AORTIC ATHEROSCLEROSIS: ICD-10-CM

## 2022-08-18 DIAGNOSIS — I10 ESSENTIAL HYPERTENSION: ICD-10-CM

## 2022-08-18 PROCEDURE — 93010 RHYTHM STRIP: ICD-10-PCS | Mod: S$GLB,,, | Performed by: INTERNAL MEDICINE

## 2022-08-18 PROCEDURE — 99214 PR OFFICE/OUTPT VISIT, EST, LEVL IV, 30-39 MIN: ICD-10-PCS | Mod: S$GLB,,, | Performed by: INTERNAL MEDICINE

## 2022-08-18 PROCEDURE — 93010 ELECTROCARDIOGRAM REPORT: CPT | Mod: S$GLB,,, | Performed by: INTERNAL MEDICINE

## 2022-08-18 PROCEDURE — 99999 PR PBB SHADOW E&M-EST. PATIENT-LVL III: CPT | Mod: PBBFAC,,, | Performed by: INTERNAL MEDICINE

## 2022-08-18 PROCEDURE — 99214 OFFICE O/P EST MOD 30 MIN: CPT | Mod: S$GLB,,, | Performed by: INTERNAL MEDICINE

## 2022-08-18 PROCEDURE — 99499 UNLISTED E&M SERVICE: CPT | Mod: HCNC,S$GLB,, | Performed by: INTERNAL MEDICINE

## 2022-08-18 PROCEDURE — 3288F FALL RISK ASSESSMENT DOCD: CPT | Mod: CPTII,S$GLB,, | Performed by: INTERNAL MEDICINE

## 2022-08-18 PROCEDURE — 1126F PR PAIN SEVERITY QUANTIFIED, NO PAIN PRESENT: ICD-10-PCS | Mod: CPTII,S$GLB,, | Performed by: INTERNAL MEDICINE

## 2022-08-18 PROCEDURE — 93005 ELECTROCARDIOGRAM TRACING: CPT | Mod: S$GLB,,, | Performed by: INTERNAL MEDICINE

## 2022-08-18 PROCEDURE — 1101F PR PT FALLS ASSESS DOC 0-1 FALLS W/OUT INJ PAST YR: ICD-10-PCS | Mod: CPTII,S$GLB,, | Performed by: INTERNAL MEDICINE

## 2022-08-18 PROCEDURE — 99999 PR PBB SHADOW E&M-EST. PATIENT-LVL III: ICD-10-PCS | Mod: PBBFAC,,, | Performed by: INTERNAL MEDICINE

## 2022-08-18 PROCEDURE — 1159F MED LIST DOCD IN RCRD: CPT | Mod: CPTII,S$GLB,, | Performed by: INTERNAL MEDICINE

## 2022-08-18 PROCEDURE — 1126F AMNT PAIN NOTED NONE PRSNT: CPT | Mod: CPTII,S$GLB,, | Performed by: INTERNAL MEDICINE

## 2022-08-18 PROCEDURE — 1101F PT FALLS ASSESS-DOCD LE1/YR: CPT | Mod: CPTII,S$GLB,, | Performed by: INTERNAL MEDICINE

## 2022-08-18 PROCEDURE — 99499 RISK ADDL DX/OHS AUDIT: ICD-10-PCS | Mod: HCNC,S$GLB,, | Performed by: INTERNAL MEDICINE

## 2022-08-18 PROCEDURE — 1159F PR MEDICATION LIST DOCUMENTED IN MEDICAL RECORD: ICD-10-PCS | Mod: CPTII,S$GLB,, | Performed by: INTERNAL MEDICINE

## 2022-08-18 PROCEDURE — 3288F PR FALLS RISK ASSESSMENT DOCUMENTED: ICD-10-PCS | Mod: CPTII,S$GLB,, | Performed by: INTERNAL MEDICINE

## 2022-08-18 PROCEDURE — 93005 RHYTHM STRIP: ICD-10-PCS | Mod: S$GLB,,, | Performed by: INTERNAL MEDICINE

## 2022-08-18 NOTE — PROGRESS NOTES
Subjective:    Patient ID:  Norm Mendoza is a 89 y.o. male who presents for follow-up of Atrial Fibrillation      89 yoM here for LAAO consideration.     6/18/19: He has had development of HF as well as persistent AF. He was in NSR 2014. The next ECG 11/18 showed AF. EF 5/18 was normal in NSR. He was started on OAC 12/18 for AF. 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 He underwent Rituximab therapy and had resolution of his ulcers 4/19 but had residual MALT. He did not receive radiation.  He has mild HF symptoms, treated with lasix. Stress echo 5/19 showed EF 40%. He was prescribed xarelto 5/30/19 but he has not taken it.    10/19: He was cardioverted 7/12/19 with mild improvement in symptoms. EF 45% in NSR. He asks about Watchman.     8/2020: Watchman 10/2019, LAAO sealed 12/19, EF normal. Remains on amiodarone 200 mg qd. HRs lower than normal, also with fatigue and some weight gain. He takes lasix PRN.     11/2020: some confusion on his meds. He should be off amiodarone and off clopidogrel. BP elevated today.     6/21: Started amlodipine 2.5 bid for HTN last visit. BP improved. Now takes 2.5 once a day. Remains on metoprolol.     2/22: Persistent AF since the summer with some LE edema and DUGAN.     3/22: JACINTO/CV 2/8/22 with NSR 2/15/22. In AF today.     Interval history: He cut back on EtOH. Still able to do his desired activities but has some limitation. Remains in AF today.     10/21 echo:  · There is moderate aortic valve stenosis.  · Aortic valve area is 1.03 cm2; peak velocity is 3.16 m/s; mean gradient is 30 mmHg.  · The left ventricle is normal in size with mildly decreased systolic function.  · The estimated ejection fraction is 45%.  · Left ventricular diastolic dysfunction.  · Moderate to severe left atrial enlargement.  · Mild mitral regurgitation.  · Mild tricuspid regurgitation.  · There is abnormal septal wall motion consistent with left bundle branch  block.  · Mild right ventricular enlargement with normal right ventricular systolic function.  · The quantitatively derived ejection fraction is 43%.  · Mild aortic regurgitation.  · The estimated PA systolic pressure is 39 mmHg.  · Intermediate central venous pressure (8 mmHg).     Echo 5/18:  CONCLUSIONS     1 - Normal left ventricular systolic function (EF 55-60%).     2 - Indeterminate LV diastolic function.     3 - Biatrial enlargement.     4 - No wall motion abnormalities.     5 - Normal right ventricular systolic function .     6 - Mildly enlarged ascending aorta.     7 - Trivial to mild aortic regurgitation.     8 - Mild mitral regurgitation.     9 - Mild tricuspid regurgitation.     10 - Trivial to mild pulmonic regurgitation.     11 - The estimated PA systolic pressure is 33 mmHg.     Stress echo 5/19:  · THE PATIENT IS IN ATRIAL FIBRILLATION FOR THE ENTIRETY OF THE TEST  · The patient reported no symptoms during the stress test.  · The test was stopped secondary to fatigue.  · There were no arrhythmias during stress.  · Mildly decreased left ventricular systolic function. The estimated ejection fraction is 40%, 2D quantitative LVEF 33%.  · Grade II (moderate) left ventricular diastolic dysfunction consistent with pseudonormalization.  · Elevated left atrial pressure.  · Severe left atrial enlargement.  · The ascending aorta is moderately dilated.  · Mild tricuspid regurgitation.  · Low normal right ventricular systolic function.  · Moderate right atrial enlargement.  · Concentric left ventricular remodeling.  · Overall, the patient's exercise capacity was normal.  · The EKG portion of this study is negative for myocardial ischemia.  · No obvious wall motion abnormalities at stress. LV function augments but still not normal at stress.    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  05/02/2018: Mixed hyperlipidemia  07/30/2019: On continuous oral anticoagulation  11/30/2018: Paroxysmal atrial fibrillation      Comment:  S/P D/C Cardioversion 7/2019, S/P Watchman device 8/2020  10/2019: Presence of Watchman left atrial appendage closure device      Comment:  Dr Crowder  No date: PUD (peptic ulcer disease)      Comment:  GI Bleeding 11/2018: Anticoagulant D/S'd and Gastric                MALT tumor Dx'd  No date: Skin cancer      Comment:  x3, had mohs on nose  excised 12/5/14: Squamous cell carcinoma      Comment:  R lower back  12/06/2018: Symptomatic anemia  No date: Urinary tract infection      Comment:  x4    Past Surgical History:  No date: ACHILLES TENDON SURGERY  2013: CATARACT EXTRACTION W/  INTRAOCULAR LENS IMPLANT; Bilateral  10/11/2019: CLOSURE OF LEFT ATRIAL APPENDAGE USING DEVICE; N/A      Comment:  Procedure: Left atrial appendage closure device;                 Surgeon: Hi Crowder MD;  Location: [facility] EP LAB;                Service: Cardiology;  Laterality: N/A;  AF, JACINTO, Watchman               Implant, BSci, Gen, MB, 3 Prep  No date: CYSTOSCOPY      Comment:  bladder tumor  12/7/2018: ESOPHAGOGASTRODUODENOSCOPY; N/A      Comment:  Procedure: EGD (ESOPHAGOGASTRODUODENOSCOPY);  Surgeon:                Austin Garcia MD;  Location: [facility] (2ND FLR);                 Service: Endoscopy;  Laterality: N/A;  4/12/2019: ESOPHAGOGASTRODUODENOSCOPY; N/A      Comment:  Procedure: EGD (ESOPHAGOGASTRODUODENOSCOPY);  Surgeon:                Austin Garcia MD;  Location: [facility] (4TH FLR);                 Service: Endoscopy;  Laterality: N/A;  please schedule                within 4 weeks  5/27/2021: ESOPHAGOGASTRODUODENOSCOPY; N/A      Comment:  Procedure: EGD (ESOPHAGOGASTRODUODENOSCOPY);  Surgeon:                Austin Garcia MD;  Location: [facility]  (2ND FLR);                 Service: Endoscopy;  Laterality: N/A;  per Dr. Garcia done                within the month with any provider/ ordered by                [id number] floor due to comorbiditiesWatchman                [facility]   No date: SKIN CANCER EXCISION  2019: TREATMENT OF CARDIAC ARRHYTHMIA; N/A      Comment:  Procedure: Cardioversion or Defibrillation;  Surgeon:                Hi Crowder MD;  Location: [facility] EP LAB;  Service:               Cardiology;  Laterality: N/A;  AF, JACINTO, DCCV, MAC, MB, 3                Prep  2022: TREATMENT OF CARDIAC ARRHYTHMIA; N/A      Comment:  Procedure: Cardioversion or Defibrillation;  Surgeon:                Susan Orozco NP;  Location: [facility] EP LAB;  Service:                Cardiology;  Laterality: N/A;  afib, DCCV, anes, MB, 3                Prep    Social History    Socioeconomic History      Marital status:       Number of children: 3    Occupational History      Occupation: Financial Work/        Employer: retired      Occupation: actor      Occupation:     Tobacco Use      Smoking status: Former Smoker        Packs/day: 1.00        Years: 25.00        Pack years: 25        Types: Cigarettes        Quit date: 9/3/1970        Years since quittin.9      Smokeless tobacco: Never Used    Substance and Sexual Activity      Alcohol use: Yes        Alcohol/week: 3.0 standard drinks        Types: 3 Shots of liquor per week        Comment: 3 bourbons daily - 12 beer on weekends       Drug use: No      Sexual activity: Yes        Partners: Female    Social History Narrative      He is  with 2/3 kids living(2 sons/1 daughter who passed away); He has 6 Grandkids(5 under 10 y/o); He has 1 son here in [place] /1 grandson at [facility]; His other son(3 kids) lives in [place]; His Grand-daughter(Her Mother passed away) with her 2 kids lives in [place]      He had worked for [employer] which has dissolved but now works for [employer]      Review of patient's family history indicates:  Problem: Stroke      Relation: Father          Age of Onset: (Not Specified)  Problem: Hypertension      Relation: Father          Age of Onset: (Not Specified)  Problem: Stroke      Relation: Brother          Age of Onset: (Not Specified)  Problem: Anesthesia problems      Relation: Neg Hx          Age of Onset: (Not Specified)  Problem: Malignant hypertension      Relation: Neg Hx          Age of Onset: (Not Specified)  Problem: Hypotension      Relation: Neg Hx          Age of Onset: (Not Specified)  Problem: Malignant hyperthermia      Relation: Neg Hx          Age of Onset: (Not Specified)  Problem: Pseudochol deficiency      Relation: Neg Hx          Age of Onset: (Not Specified)  Problem: Melanoma      Relation: Neg Hx          Age of Onset: (Not Specified)  Problem: Heart attack      Relation: Neg Hx          Age of Onset: (Not Specified)  Problem: Heart disease      Relation: Neg Hx          Age of Onset: (Not Specified)  Problem: Heart failure      Relation: Neg Hx          Age of Onset: (Not Specified)  Problem: Cataracts      Relation: Neg Hx          Age of Onset: (Not Specified)  Problem: Glaucoma      Relation: Neg Hx          Age of Onset: (Not Specified)  Problem: Macular degeneration      Relation: Neg Hx          Age of Onset: (Not Specified)    Current Outpatient Medications:  amLODIPine (NORVASC) 2.5 MG tablet, TAKE 1 TABLET(2.5 MG) BY MOUTH EVERY DAY, Disp: 90 tablet, Rfl: 1  aspirin (ECOTRIN) 81 MG EC tablet, Take 81 mg by mouth once daily., Disp: , Rfl:   clobetasoL (TEMOVATE) 0.05 % external solution, Pt to mix in 1 jar of cerave cream and apply to affected areas bid, Disp: 50 mL, Rfl: 3  cyanocobalamin (VITAMIN B-12) 1000 MCG tablet, Take 1,000 mcg by mouth once daily., Disp: , Rfl:   furosemide (LASIX) 20 MG tablet, Take 20 mg by mouth as needed. Pt takes 5 tabs weekly as needed,  Disp: , Rfl:   irbesartan (AVAPRO) 150 MG tablet, Take 1 tablet (150 mg total) by mouth once daily., Disp: 90 tablet, Rfl: 2  metoprolol succinate (TOPROL-XL) 25 MG 24 hr tablet, Take 0.5 tablets (12.5 mg total) by mouth once daily., Disp: 90 tablet, Rfl: 1  multivitamin (THERAGRAN) per tablet, Take 1 tablet by mouth once daily., Disp: , Rfl:   psyllium husk (METAMUCIL ORAL), Take by mouth., Disp: , Rfl:   diclofenac sodium (VOLTAREN ARTHRITIS PAIN) 1 % Gel, Apply 2 Grams to painful left hand joint 2-3x/day as needed (Patient not taking: Reported on 8/18/2022), Disp: 100 g, Rfl: 1  metoprolol succinate (TOPROL-XL) 50 MG 24 hr tablet, Take 50 mg by mouth once daily., Disp: , Rfl:     Current Facility-Administered Medications:  lidocaine HCl 2% urojet, , Urethral, Once, Grover Ochoa Jr., MD            Review of Systems   Constitutional: Negative for malaise/fatigue.   Cardiovascular: Positive for dyspnea on exertion. Negative for chest pain, irregular heartbeat, leg swelling and palpitations.   Respiratory: Positive for shortness of breath.    Hematologic/Lymphatic: Negative for bleeding problem.   Skin: Negative for rash.   Musculoskeletal: Negative for myalgias.   Gastrointestinal: Negative for hematemesis, hematochezia and nausea.   Genitourinary: Negative for hematuria.   Neurological: Positive for weakness. Negative for light-headedness.   Psychiatric/Behavioral: Negative for altered mental status.   Allergic/Immunologic: Negative for persistent infections.        Objective:    Physical Exam  Vitals and nursing note reviewed.   Constitutional:       Appearance: Normal appearance. He is well-developed.   HENT:      Head: Normocephalic.      Nose: Nose normal.   Eyes:      Pupils: Pupils are equal, round, and reactive to light.   Cardiovascular:      Rate and Rhythm: Normal rate. Rhythm irregular.   Pulmonary:      Effort: No respiratory distress.      Breath sounds: Normal breath sounds.   Musculoskeletal:          General: Normal range of motion.   Skin:     General: Skin is warm and dry.      Findings: No erythema.   Neurological:      Mental Status: He is alert and oriented to person, place, and time.   Psychiatric:         Speech: Speech normal.         Behavior: Behavior normal.     ECG: AF with controlled V rate      Assessment:       1. Persistent atrial fibrillation    2. Presence of Watchman left atrial appendage closure device    3. Left ventricular diastolic dysfunction with preserved systolic function    4. Essential hypertension    5. Dilated cardiomyopathy    6. Aortic atherosclerosis         Plan:       89 yoM persistent AF here for follow up. Remains in AF, rate controlled and asymptomatic. Continue current therapy RTC 6m

## 2022-08-25 ENCOUNTER — TELEPHONE (OUTPATIENT)
Dept: INTERNAL MEDICINE | Facility: CLINIC | Age: 87
End: 2022-08-25
Payer: MEDICARE

## 2022-08-25 NOTE — TELEPHONE ENCOUNTER
----- Message from Valentina Rajan sent at 8/25/2022 11:45 AM CDT -----  Contact: pt [phone]  Patient needs to schedule an appt pain on right wrist/slightly swollen and red. Only wants to see you.    Please call and schedule.    Thank You

## 2022-08-26 NOTE — TELEPHONE ENCOUNTER
----- Message from Abby Okeefe sent at 8/26/2022  4:23 PM CDT -----  Contact: [phone]  Pt is calling he states he had an appt today at 2:30 he forgot about the appt bless his heart please give return call

## 2022-09-09 ENCOUNTER — PES CALL (OUTPATIENT)
Dept: ADMINISTRATIVE | Facility: CLINIC | Age: 87
End: 2022-09-09
Payer: MEDICARE

## 2022-09-21 ENCOUNTER — IMMUNIZATION (OUTPATIENT)
Dept: INTERNAL MEDICINE | Facility: CLINIC | Age: 87
End: 2022-09-21
Payer: MEDICARE

## 2022-09-21 DIAGNOSIS — Z23 NEED FOR VACCINATION: Primary | ICD-10-CM

## 2022-09-21 PROCEDURE — 0124A PR IMMUNIZ ADMIN, SARS-COV- 2 COVID-19 VACCINE, 30MCG/0.3ML, BIVALENT, BOOSTER DOSE: CPT | Mod: S$GLB,,, | Performed by: INTERNAL MEDICINE

## 2022-09-21 PROCEDURE — 90694 FLU VACCINE - QUADRIVALENT - ADJUVANTED: ICD-10-PCS | Mod: S$GLB,,, | Performed by: INTERNAL MEDICINE

## 2022-09-21 PROCEDURE — 0124A COVID-19, MRNA, LNP-S, BIVALENT BOOSTER, PF, 30 MCG/0.3 ML DOSE: CPT | Mod: CV19,PBBFAC | Performed by: INTERNAL MEDICINE

## 2022-09-21 PROCEDURE — G0008 FLU VACCINE - QUADRIVALENT - ADJUVANTED: ICD-10-PCS | Mod: S$GLB,,, | Performed by: INTERNAL MEDICINE

## 2022-09-21 PROCEDURE — 91312 COVID-19, MRNA, LNP-S, BIVALENT BOOSTER, PF, 30 MCG/0.3 ML DOSE: ICD-10-PCS | Mod: S$GLB,,, | Performed by: INTERNAL MEDICINE

## 2022-09-21 PROCEDURE — G0008 ADMIN INFLUENZA VIRUS VAC: HCPCS | Mod: S$GLB,,, | Performed by: INTERNAL MEDICINE

## 2022-09-21 PROCEDURE — 91312 COVID-19, MRNA, LNP-S, BIVALENT BOOSTER, PF, 30 MCG/0.3 ML DOSE: CPT | Mod: S$GLB,,, | Performed by: INTERNAL MEDICINE

## 2022-09-21 PROCEDURE — 0124A PR IMMUNIZ ADMIN, SARS-COV- 2 COVID-19 VACCINE, 30MCG/0.3ML, BIVALENT, BOOSTER DOSE: ICD-10-PCS | Mod: S$GLB,,, | Performed by: INTERNAL MEDICINE

## 2022-09-21 PROCEDURE — 90694 VACC AIIV4 NO PRSRV 0.5ML IM: CPT | Mod: S$GLB,,, | Performed by: INTERNAL MEDICINE

## 2022-10-06 ENCOUNTER — LAB VISIT (OUTPATIENT)
Dept: LAB | Facility: HOSPITAL | Age: 87
End: 2022-10-06
Payer: MEDICARE

## 2022-10-06 ENCOUNTER — OFFICE VISIT (OUTPATIENT)
Dept: HEMATOLOGY/ONCOLOGY | Facility: CLINIC | Age: 87
End: 2022-10-06
Payer: MEDICARE

## 2022-10-06 VITALS
HEART RATE: 75 BPM | HEIGHT: 72 IN | TEMPERATURE: 98 F | SYSTOLIC BLOOD PRESSURE: 125 MMHG | DIASTOLIC BLOOD PRESSURE: 75 MMHG | OXYGEN SATURATION: 98 % | WEIGHT: 184.44 LBS | RESPIRATION RATE: 18 BRPM | BODY MASS INDEX: 24.98 KG/M2

## 2022-10-06 DIAGNOSIS — Z86.018 HISTORY OF BENIGN BLADDER TUMOR: ICD-10-CM

## 2022-10-06 DIAGNOSIS — Z95.818 PRESENCE OF WATCHMAN LEFT ATRIAL APPENDAGE CLOSURE DEVICE: ICD-10-CM

## 2022-10-06 DIAGNOSIS — I48.19 PERSISTENT ATRIAL FIBRILLATION: ICD-10-CM

## 2022-10-06 DIAGNOSIS — C88.4 MALT (MUCOSA ASSOCIATED LYMPHOID TISSUE): ICD-10-CM

## 2022-10-06 DIAGNOSIS — K27.9 PUD (PEPTIC ULCER DISEASE): ICD-10-CM

## 2022-10-06 DIAGNOSIS — C88.4 MALT LYMPHOMA: Primary | ICD-10-CM

## 2022-10-06 LAB
ALBUMIN SERPL BCP-MCNC: 3.6 G/DL (ref 3.5–5.2)
ALP SERPL-CCNC: 56 U/L (ref 55–135)
ALT SERPL W/O P-5'-P-CCNC: 16 U/L (ref 10–44)
ANION GAP SERPL CALC-SCNC: 7 MMOL/L (ref 8–16)
AST SERPL-CCNC: 18 U/L (ref 10–40)
BASOPHILS # BLD AUTO: 0.06 K/UL (ref 0–0.2)
BASOPHILS NFR BLD: 1.2 % (ref 0–1.9)
BILIRUB SERPL-MCNC: 0.7 MG/DL (ref 0.1–1)
BUN SERPL-MCNC: 19 MG/DL (ref 10–30)
CALCIUM SERPL-MCNC: 9.3 MG/DL (ref 8.7–10.5)
CHLORIDE SERPL-SCNC: 109 MMOL/L (ref 95–110)
CO2 SERPL-SCNC: 26 MMOL/L (ref 23–29)
CREAT SERPL-MCNC: 1 MG/DL (ref 0.5–1.4)
DIFFERENTIAL METHOD: ABNORMAL
EOSINOPHIL # BLD AUTO: 0.1 K/UL (ref 0–0.5)
EOSINOPHIL NFR BLD: 2.6 % (ref 0–8)
ERYTHROCYTE [DISTWIDTH] IN BLOOD BY AUTOMATED COUNT: 14.5 % (ref 11.5–14.5)
EST. GFR  (NO RACE VARIABLE): >60 ML/MIN/1.73 M^2
GLUCOSE SERPL-MCNC: 143 MG/DL (ref 70–110)
HCT VFR BLD AUTO: 41.2 % (ref 40–54)
HGB BLD-MCNC: 13.8 G/DL (ref 14–18)
IMM GRANULOCYTES # BLD AUTO: 0.03 K/UL (ref 0–0.04)
IMM GRANULOCYTES NFR BLD AUTO: 0.6 % (ref 0–0.5)
LDH SERPL L TO P-CCNC: 183 U/L (ref 110–260)
LYMPHOCYTES # BLD AUTO: 0.8 K/UL (ref 1–4.8)
LYMPHOCYTES NFR BLD: 15.2 % (ref 18–48)
MCH RBC QN AUTO: 32.7 PG (ref 27–31)
MCHC RBC AUTO-ENTMCNC: 33.5 G/DL (ref 32–36)
MCV RBC AUTO: 98 FL (ref 82–98)
MONOCYTES # BLD AUTO: 0.4 K/UL (ref 0.3–1)
MONOCYTES NFR BLD: 8.3 % (ref 4–15)
NEUTROPHILS # BLD AUTO: 3.6 K/UL (ref 1.8–7.7)
NEUTROPHILS NFR BLD: 72.1 % (ref 38–73)
NRBC BLD-RTO: 0 /100 WBC
PLATELET # BLD AUTO: 210 K/UL (ref 150–450)
PMV BLD AUTO: 9.5 FL (ref 9.2–12.9)
POTASSIUM SERPL-SCNC: 4.2 MMOL/L (ref 3.5–5.1)
PROT SERPL-MCNC: 6.8 G/DL (ref 6–8.4)
RBC # BLD AUTO: 4.22 M/UL (ref 4.6–6.2)
SODIUM SERPL-SCNC: 142 MMOL/L (ref 136–145)
WBC # BLD AUTO: 4.93 K/UL (ref 3.9–12.7)

## 2022-10-06 PROCEDURE — 1126F AMNT PAIN NOTED NONE PRSNT: CPT | Mod: CPTII,GC,S$GLB, | Performed by: INTERNAL MEDICINE

## 2022-10-06 PROCEDURE — 99499 UNLISTED E&M SERVICE: CPT | Mod: HCNC,S$GLB,, | Performed by: INTERNAL MEDICINE

## 2022-10-06 PROCEDURE — 1101F PR PT FALLS ASSESS DOC 0-1 FALLS W/OUT INJ PAST YR: ICD-10-PCS | Mod: CPTII,GC,S$GLB, | Performed by: INTERNAL MEDICINE

## 2022-10-06 PROCEDURE — 1126F PR PAIN SEVERITY QUANTIFIED, NO PAIN PRESENT: ICD-10-PCS | Mod: CPTII,GC,S$GLB, | Performed by: INTERNAL MEDICINE

## 2022-10-06 PROCEDURE — 99215 OFFICE O/P EST HI 40 MIN: CPT | Mod: GC,S$GLB,, | Performed by: INTERNAL MEDICINE

## 2022-10-06 PROCEDURE — 83615 LACTATE (LD) (LDH) ENZYME: CPT | Performed by: INTERNAL MEDICINE

## 2022-10-06 PROCEDURE — 99999 PR PBB SHADOW E&M-EST. PATIENT-LVL III: CPT | Mod: PBBFAC,GC,, | Performed by: INTERNAL MEDICINE

## 2022-10-06 PROCEDURE — 80053 COMPREHEN METABOLIC PANEL: CPT | Performed by: INTERNAL MEDICINE

## 2022-10-06 PROCEDURE — 99999 PR PBB SHADOW E&M-EST. PATIENT-LVL III: ICD-10-PCS | Mod: PBBFAC,GC,, | Performed by: INTERNAL MEDICINE

## 2022-10-06 PROCEDURE — 99215 PR OFFICE/OUTPT VISIT, EST, LEVL V, 40-54 MIN: ICD-10-PCS | Mod: GC,S$GLB,, | Performed by: INTERNAL MEDICINE

## 2022-10-06 PROCEDURE — 3288F FALL RISK ASSESSMENT DOCD: CPT | Mod: CPTII,GC,S$GLB, | Performed by: INTERNAL MEDICINE

## 2022-10-06 PROCEDURE — 1101F PT FALLS ASSESS-DOCD LE1/YR: CPT | Mod: CPTII,GC,S$GLB, | Performed by: INTERNAL MEDICINE

## 2022-10-06 PROCEDURE — 36415 COLL VENOUS BLD VENIPUNCTURE: CPT | Performed by: INTERNAL MEDICINE

## 2022-10-06 PROCEDURE — 85025 COMPLETE CBC W/AUTO DIFF WBC: CPT | Performed by: INTERNAL MEDICINE

## 2022-10-06 PROCEDURE — 3288F PR FALLS RISK ASSESSMENT DOCUMENTED: ICD-10-PCS | Mod: CPTII,GC,S$GLB, | Performed by: INTERNAL MEDICINE

## 2022-10-06 PROCEDURE — 99499 RISK ADDL DX/OHS AUDIT: ICD-10-PCS | Mod: HCNC,S$GLB,, | Performed by: INTERNAL MEDICINE

## 2022-10-06 NOTE — PROGRESS NOTES
[facility]  [facility]  Hematology/Medical Oncology Clinic       PATIENT: Norm Mendoza  MRN: [id number]  DATE: 10/6/2022    Reason for referral: MALT lymphoma    Initial History  Mr. Norm Mendoza is a 89 y.o. 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     Oncologic History  -The patient was discharged on 12/9/2018 following a 3 day admission for a GIB. Patient was on Eliquis at the time and had been complaining of 1 week of black, tarry stools. EGD at the time was significant for nonbleeding gastric ulcers. Biopsies were taken and the patient was instructed to start protonix and hold eliquis at discharge. He requiring IV fluids and blood transfusion during the hospitalization. Biopsy results returned 12/18/2018 concerning for possible MALT-Lymphoma with molecular studies pending.  -PET confirmed stage 1 MALT lymphoma  -Completed Rituximab x4 on 3/7/19  -EGD: Erythematous, granular and scarred mucosa in the lesser curvature of the gastric body. Biopsied, + MALT lymphoma. Ulcers no longer present., PET was also done 4/20/19, no other site of disease  -Repeat EGD 5/27/21: --PERSISTENT EXTRANODAL MARGINAL ZONE LYMPHOMA OF MUCOSA-ASSOCIATED LYMPHOID   TISSUE (MALT LYMPHOMA).   As patient is asymptomatic, will continue observation.     Interval History  Patient presents alone. Doing well overall. More fatigued, feels this is due to CHF. No melena, no hematochezia. No abdominal pain. Denies decreased appetite, weight loss, night sweats, and lymphadenopathy.     CBC, CMP, and LDH normal .     Past Medical History:   Past Medical History:   Diagnosis Date    Alcohol dependence, daily use 07/13/2017    Allergy     Bladder cancer     tumor that was benign     Cataract     H/O nonmelanoma skin cancer 02/04/2016    Hematuria     Hypertension     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    Mixed hyperlipidemia  05/02/2018    On continuous oral anticoagulation 07/30/2019    Paroxysmal atrial fibrillation 11/30/2018    S/P D/C Cardioversion 7/2019, S/P Watchman device 8/2020    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    Dr Vel LANE (peptic ulcer disease)     GI Bleeding 11/2018: Anticoagulant D/S'd and Gastric MALT tumor Dx'd    Skin cancer     x3, had mohs on nose    Squamous cell carcinoma excised 12/5/14    R lower back    Symptomatic anemia 12/06/2018    Urinary tract infection     x4       Past Surgical HIstory:   Past Surgical History:   Procedure Laterality Date    ACHILLES TENDON SURGERY      CATARACT EXTRACTION W/  INTRAOCULAR LENS IMPLANT Bilateral 2013    CLOSURE OF LEFT ATRIAL APPENDAGE USING DEVICE N/A 10/11/2019    Procedure: Left atrial appendage closure device;  Surgeon: Hi Crowder MD;  Location: [facility] EP LAB;  Service: Cardiology;  Laterality: N/A;  AF, JACINTO, Watchman Implant, BSci, Gen, MB, 3 Prep    CYSTOSCOPY      bladder tumor    ESOPHAGOGASTRODUODENOSCOPY N/A 12/7/2018    Procedure: EGD (ESOPHAGOGASTRODUODENOSCOPY);  Surgeon: Austin Garcia MD;  Location: [facility] (2ND FLR);  Service: Endoscopy;  Laterality: N/A;    ESOPHAGOGASTRODUODENOSCOPY N/A 4/12/2019    Procedure: EGD (ESOPHAGOGASTRODUODENOSCOPY);  Surgeon: Austin Garcia MD;  Location: [facility] (4TH FLR);  Service: Endoscopy;  Laterality: N/A;  please schedule within 4 weeks    ESOPHAGOGASTRODUODENOSCOPY N/A 5/27/2021    Procedure: EGD (ESOPHAGOGASTRODUODENOSCOPY);  Surgeon: Austin Garcia MD;  Location: [facility] (2ND FLR);  Service: Endoscopy;  Laterality: N/A;  per Dr. Garcia done within the month with any provider/ ordered by oncology  2nd floor due to comorbidities  Watchman device  COVID test at [facility] on 5/24-GT    SKIN CANCER EXCISION      TREATMENT OF CARDIAC ARRHYTHMIA N/A 7/12/2019    Procedure: Cardioversion or Defibrillation;  Surgeon: Hi Crowder MD;  Location: [facility] EP LAB;  Service:  Cardiology;  Laterality: N/A;  AF, JACINTO, DCCV, MAC, MB, 3 Prep    TREATMENT OF CARDIAC ARRHYTHMIA N/A 2/8/2022    Procedure: Cardioversion or Defibrillation;  Surgeon: Susan Orozco NP;  Location: [facility] EP LAB;  Service: Cardiology;  Laterality: N/A;  afib, DCCV, anes, MB, 3 Prep       Family History:   Family History   Problem Relation Age of Onset    Stroke Father     Hypertension Father     Stroke Brother     Anesthesia problems Neg Hx     Malignant hypertension Neg Hx     Hypotension Neg Hx     Malignant hyperthermia Neg Hx     Pseudochol deficiency Neg Hx     Melanoma Neg Hx     Heart attack Neg Hx     Heart disease Neg Hx     Heart failure Neg Hx     Cataracts Neg Hx     Glaucoma Neg Hx     Macular degeneration Neg Hx        Social History:  reports that he quit smoking about 52 years ago. His smoking use included cigarettes. He has a 25.00 pack-year smoking history. He has never used smokeless tobacco. He reports current alcohol use of about 3.0 standard drinks per week. He reports that he does not use drugs.    Allergies:  Review of patient's allergies indicates:  No Known Allergies    Medications:  Current Outpatient Medications   Medication Sig Dispense Refill    amLODIPine (NORVASC) 2.5 MG tablet TAKE 1 TABLET(2.5 MG) BY MOUTH EVERY DAY 90 tablet 1    aspirin (ECOTRIN) 81 MG EC tablet Take 81 mg by mouth once daily.      clobetasoL (TEMOVATE) 0.05 % external solution Pt to mix in 1 jar of cerave cream and apply to affected areas bid 50 mL 3    cyanocobalamin (VITAMIN B-12) 1000 MCG tablet Take 1,000 mcg by mouth once daily.      diclofenac sodium (VOLTAREN ARTHRITIS PAIN) 1 % Gel Apply 2 Grams to painful left hand joint 2-3x/day as needed (Patient not taking: Reported on 8/18/2022) 100 g 1    furosemide (LASIX) 20 MG tablet Take 20 mg by mouth as needed. Pt takes 5 tabs weekly as needed      irbesartan (AVAPRO) 150 MG tablet Take 1 tablet (150 mg total) by mouth once daily. 90 tablet 2    metoprolol  succinate (TOPROL-XL) 25 MG 24 hr tablet Take 0.5 tablets (12.5 mg total) by mouth once daily. 90 tablet 1    metoprolol succinate (TOPROL-XL) 50 MG 24 hr tablet Take 50 mg by mouth once daily.      multivitamin (THERAGRAN) per tablet Take 1 tablet by mouth once daily.      psyllium husk (METAMUCIL ORAL) Take by mouth.       Current Facility-Administered Medications   Medication Dose Route Frequency Provider Last Rate Last Admin    lidocaine HCl 2% urojet   Urethral Once Grover Ochoa Jr., MD           ROS:  Constitutional: Reports some fatigue, worse at rest, better with activity. Denies appetite change, chills, fever and unexpected weight change.   HENT: Negative for nosebleeds and sore throat.    Respiratory: Negative for cough, chest tightness, shortness of breath and wheezing.    Cardiovascular: Negative for leg swelling. Negative for palpitations.   Endocrine: Negative for cold intolerance  Gastrointestinal: Negative for constipation. Negative for abdominal pain, diarrhea, nausea and vomiting.   and heat intolerance.     ECOG Performance Status: 1  Objective:      Vitals:   Vitals:    10/06/22 1308   BP: 125/75   Pulse: 75   Resp: 18   Temp: 97.6 °F (36.4 °C)   SpO2: 98%   Weight: 83.7 kg (184 lb 6.6 oz)   Height: 6' (1.829 m)         Physical Exam  Constitutional:       Appearance: Normal appearance.   HENT:      Head: Normocephalic and atraumatic.      Mouth/Throat:      Mouth: Mucous membranes are moist.   Eyes:      Extraocular Movements: Extraocular movements intact.   Cardiovascular:      Rate and Rhythm: Normal rate and regular rhythm.      Pulses: Normal pulses.      Heart sounds: Normal heart sounds.   Pulmonary:      Effort: Pulmonary effort is normal.      Breath sounds: Normal breath sounds.   Abdominal:      Palpations: Abdomen is soft.   Lymphadenopathy:      Cervical: No cervical adenopathy.      Upper Body:      Right upper body: No supraclavicular adenopathy.      Left upper body: No  "supraclavicular adenopathy.   Skin:     General: Skin is warm.   Neurological:      General: No focal deficit present.      Mental Status: He is alert.    Laboratory Data:  No visits with results within 1 Week(s) from this visit.   Latest known visit with results is:   Office Visit on 05/30/2022   Component Date Value Ref Range Status    Final Pathologic Diagnosis 05/30/2022    Final                    Value:1. Skin, right helix, shave biopsy:  - BASAL CELL CARCINOMA.  - THE TUMOR EXTENDS TO THE DEEP AND LATERAL BIOPSY MARGINS.  This lesion is skin cancer. You will be contacted regarding treatment.      Interp By Jenny Molina M.D., Signed on 06/03/2022 at 14:46    Gross 05/30/2022    Final                    Value:Pathology ID/Patient ID:  [id number]  The specimen is received in formalin labeled "right helix".  The specimen is  a circular shave of yellow skin measuring 0.5 x 0.4 cm .  The specimen is  bisected,  inked blue at the resection margin and submitted entirely in  cassette [id number] Mathru MS      Microscopic Exam 05/30/2022    Final                    Value:Sections show a basaloid tumor within the dermis exhibiting peripheral  palisading, retraction artifact and apoptosis.      Disclaimer 05/30/2022    Final                    Value:Unless the case is a 'gross only' or additional testing only, the final  diagnosis for each specimen is based on a microscopic examination of  appropriate tissue sections.           Imaging: All pertinent imaging reviewed    Assessment and Plan        No diagnosis found.      1) Limited (Stage 1) MALT lymphoma  -H pylori negative  -Recent h/o GIB while was on DOAC for a-fib  -PET negative for distant disease or lymphadenopathy  -Hepatitis studies negative  -Patient recently seen by cardiology for a-fib, off anti-coagulation, off of beta-blocker given AEs  -Patient completed Rituximab 375mg x4 weeks on 3/7/19  -Persistent MALT lymphoma on EGD, PET was also " done in 2019, no other site of disease  -Was seen again by radiation oncology, preferred to pursue observation at this time  -Repeat EGD 5/27/21 showed persistent MALT lymphoma, discussed option of repeat Rituxan vs ISRT vs observation. Patient favors observation, will continue to check labs and see patient every 6 months     2) Systolic CHF, A-fib  -Managed by cardiology  -S/p Luba       Follow-up:  Route Chart for Scheduling    BMT Chart Routing      Follow up with physician 6 months.   Follow up with LAVONNE    Infusion scheduling note    Injection scheduling note    Labs CBC, CMP and LDH   Lab interval:  in 6 months prior to appointment   Imaging    Pharmacy appointment    Other referrals         Michelle Harper MD  Hematology/Oncology Fellow PGY V  [facility]

## 2022-11-04 DIAGNOSIS — I10 ESSENTIAL HYPERTENSION: ICD-10-CM

## 2022-11-04 RX ORDER — AMLODIPINE BESYLATE 2.5 MG/1
2.5 TABLET ORAL DAILY
Qty: 90 TABLET | Refills: 1 | Status: SHIPPED | OUTPATIENT
Start: 2022-11-04 | End: 2023-06-08 | Stop reason: SDUPTHER

## 2022-11-04 NOTE — TELEPHONE ENCOUNTER
No new care gaps identified.  [facility] Embedded Care Gaps. Reference number: [id number]. 11/04/2022   10:41:10 AM ANGELEST

## 2022-11-07 ENCOUNTER — OFFICE VISIT (OUTPATIENT)
Dept: URGENT CARE | Facility: CLINIC | Age: 87
End: 2022-11-07
Payer: MEDICARE

## 2022-11-07 VITALS
HEART RATE: 98 BPM | SYSTOLIC BLOOD PRESSURE: 155 MMHG | DIASTOLIC BLOOD PRESSURE: 94 MMHG | OXYGEN SATURATION: 95 % | BODY MASS INDEX: 24.92 KG/M2 | RESPIRATION RATE: 16 BRPM | WEIGHT: 184 LBS | HEIGHT: 72 IN | TEMPERATURE: 97 F

## 2022-11-07 DIAGNOSIS — R05.9 COUGH, UNSPECIFIED TYPE: ICD-10-CM

## 2022-11-07 DIAGNOSIS — J06.9 UPPER RESPIRATORY TRACT INFECTION, UNSPECIFIED TYPE: Primary | ICD-10-CM

## 2022-11-07 LAB
CTP QC/QA: YES
SARS-COV-2 RDRP RESP QL NAA+PROBE: NEGATIVE

## 2022-11-07 PROCEDURE — 1160F PR REVIEW ALL MEDS BY PRESCRIBER/CLIN PHARMACIST DOCUMENTED: ICD-10-PCS | Mod: CPTII,S$GLB,,

## 2022-11-07 PROCEDURE — 1159F PR MEDICATION LIST DOCUMENTED IN MEDICAL RECORD: ICD-10-PCS | Mod: CPTII,S$GLB,,

## 2022-11-07 PROCEDURE — 1159F MED LIST DOCD IN RCRD: CPT | Mod: CPTII,S$GLB,,

## 2022-11-07 PROCEDURE — 87635 SARS-COV-2 COVID-19 AMP PRB: CPT | Mod: QW,S$GLB,,

## 2022-11-07 PROCEDURE — 1125F AMNT PAIN NOTED PAIN PRSNT: CPT | Mod: CPTII,S$GLB,,

## 2022-11-07 PROCEDURE — 99213 OFFICE O/P EST LOW 20 MIN: CPT | Mod: S$GLB,CS,,

## 2022-11-07 PROCEDURE — 1125F PR PAIN SEVERITY QUANTIFIED, PAIN PRESENT: ICD-10-PCS | Mod: CPTII,S$GLB,,

## 2022-11-07 PROCEDURE — 1160F RVW MEDS BY RX/DR IN RCRD: CPT | Mod: CPTII,S$GLB,,

## 2022-11-07 PROCEDURE — 99213 PR OFFICE/OUTPT VISIT, EST, LEVL III, 20-29 MIN: ICD-10-PCS | Mod: S$GLB,CS,,

## 2022-11-07 PROCEDURE — 87635: ICD-10-PCS | Mod: QW,S$GLB,,

## 2022-11-07 RX ORDER — BENZONATATE 200 MG/1
200 CAPSULE ORAL 3 TIMES DAILY PRN
Qty: 30 CAPSULE | Refills: 0 | Status: SHIPPED | OUTPATIENT
Start: 2022-11-07 | End: 2022-11-17

## 2022-11-07 NOTE — PATIENT INSTRUCTIONS
Please drink plenty of fluids.  Please get plenty of rest.  Please utilize warm tea, honey, and lemon for sore throat comfort.  Please utilize salt water gargle for sore throat comfort.  Please return here or go to the Emergency Department for any concerns or worsening of condition.  If not allergic, please take over the counter Tylenol (Acetaminophen) and/or Motrin (Ibuprofen) as directed for control of pain and/or fever.  There are no indicated drug interactions with Tessalon, if for any reason you start to experience symptoms, please stop taking this medication.   Please follow up with your primary care doctor or specialist as needed.    If you  smoke, please stop smoking.

## 2022-11-07 NOTE — PROGRESS NOTES
Subjective:       Patient ID: Norm Mendoza is a 89 y.o. male.    Vitals:  height is 6' (1.829 m) and weight is 83.5 kg (184 lb). His temperature is 97.3 °F (36.3 °C). His blood pressure is 155/94 (abnormal) and his pulse is 98. His respiration is 16 and oxygen saturation is 95%.     Chief Complaint: URI    89 year old male presents to the urgent care with c/o cough and sore throat. Patient states that he was at a soccer game over the weekend and started to develop a minor sore throat and an intermittent dry cough. He states that he works as an [employer]  and wanted to get checked out for COVID. He denies CP, SOB, fever, chills, nausea, and vomiting. Patient states that he has not tried anything for his symptoms.       URI   This is a new problem. The current episode started in the past 7 days (4 days). The problem has been unchanged. There has been no fever. Associated symptoms include coughing and a sore throat. Pertinent negatives include no abdominal pain, chest pain, congestion, diarrhea, ear pain, headaches, nausea, neck pain, rash, sinus pain, vomiting or wheezing. He has tried nothing for the symptoms.     Constitution: Negative for activity change, chills, sweating, fatigue and fever.   HENT:  Positive for sore throat. Negative for ear pain, facial swelling, congestion, postnasal drip, sinus pain, sinus pressure and trouble swallowing.    Neck: Negative for neck pain and neck stiffness.   Cardiovascular:  Negative for chest pain and sob on exertion.   Eyes:  Negative for eye itching, eye pain and eye redness.   Respiratory:  Positive for cough. Negative for sputum production, bloody sputum, shortness of breath, stridor and wheezing.    Gastrointestinal:  Negative for abdominal pain, nausea, vomiting and diarrhea.   Musculoskeletal:  Negative for muscle cramps and muscle ache.   Skin:  Negative for rash.   Neurological:  Negative for dizziness, light-headedness, passing out and headaches.      Objective:      Physical Exam   Constitutional: He is oriented to person, place, and time. He appears well-developed. He is cooperative.  Non-toxic appearance. He does not appear ill. No distress.   HENT:   Head: Normocephalic and atraumatic.   Ears:   Right Ear: Hearing, tympanic membrane, external ear and ear canal normal.   Left Ear: Hearing, tympanic membrane, external ear and ear canal normal.   Nose: Rhinorrhea present. No mucosal edema, nasal deformity or congestion. No epistaxis. Right sinus exhibits no maxillary sinus tenderness and no frontal sinus tenderness. Left sinus exhibits no maxillary sinus tenderness and no frontal sinus tenderness.   Mouth/Throat: Uvula is midline, oropharynx is clear and moist and mucous membranes are normal. No trismus in the jaw. Normal dentition. No uvula swelling. No posterior oropharyngeal erythema.   Eyes: Conjunctivae and lids are normal. Right eye exhibits no discharge. Left eye exhibits no discharge. No scleral icterus.   Neck: Trachea normal and phonation normal. Neck supple. No neck rigidity present.   Cardiovascular: Normal rate, regular rhythm and normal pulses.   Murmur (adventitious sounds appreciated on the exam near the right upper sternal border. Pt has a history of heart failure.) heard.Exam reveals no friction rub.   Pulmonary/Chest: Effort normal and breath sounds normal. No stridor. No respiratory distress. He has no wheezes. He has no rhonchi. He has no rales. He exhibits no tenderness.   Abdominal: Normal appearance and bowel sounds are normal. He exhibits no distension and no mass. Soft. There is no abdominal tenderness.   Musculoskeletal: Normal range of motion.         General: No deformity. Normal range of motion.      Cervical back: He exhibits no tenderness.   Lymphadenopathy:     He has no cervical adenopathy.   Neurological: He is alert and oriented to person, place, and time. He exhibits normal muscle tone. Coordination normal.   Skin: Skin is  warm, dry, intact, not diaphoretic and not pale.   Psychiatric: His speech is normal and behavior is normal. Judgment and thought content normal.   Nursing note and vitals reviewed.      Results for orders placed or performed in visit on 11/07/22   POCT COVID-19 Rapid Screening   Result Value Ref Range    POC Rapid COVID Negative Negative     Acceptable Yes        Assessment:       1. Upper respiratory tract infection, unspecified type    2. Cough, unspecified type          Plan:       Previous external notes reviewed.  Vital signs reviewed.  Labs ordered. Labs reviewed.  Discussed upper respiratory infection, home care, tx options, and given follow up precautions  Patient involved with the treatment plan and agreed to the plan.  Patient informed that he should stop taking Tessalon if he starts to develop any symptoms.   Patient informed that he should return to the urgent care or go to the ER if any warning symptoms appear.    Patient Instructions   Please drink plenty of fluids.  Please get plenty of rest.  Please utilize warm tea, honey, and lemon for sore throat comfort.  Please utilize salt water gargle for sore throat comfort.  Please return here or go to the Emergency Department for any concerns or worsening of condition.  If not allergic, please take over the counter Tylenol (Acetaminophen) and/or Motrin (Ibuprofen) as directed for control of pain and/or fever.  There are no indicated drug interactions with Tessalon, if for any reason you start to experience symptoms, please stop taking this medication.   Please follow up with your primary care doctor or specialist as needed.    If you  smoke, please stop smoking.      Upper respiratory tract infection, unspecified type  -     POCT COVID-19 Rapid Screening    Cough, unspecified type  -     benzonatate (TESSALON) 200 MG capsule; Take 1 capsule (200 mg total) by mouth 3 (three) times daily as needed for Cough.  Dispense: 30 capsule; Refill:  0       [facility]ri Ceron PA-C

## 2022-11-09 ENCOUNTER — HOSPITAL ENCOUNTER (OUTPATIENT)
Dept: RADIOLOGY | Facility: HOSPITAL | Age: 87
Discharge: HOME | End: 2022-11-09
Attending: INTERNAL MEDICINE
Payer: MEDICARE

## 2022-11-09 ENCOUNTER — OFFICE VISIT (OUTPATIENT)
Dept: INTERNAL MEDICINE | Facility: CLINIC | Age: 87
End: 2022-11-09
Payer: MEDICARE

## 2022-11-09 VITALS
DIASTOLIC BLOOD PRESSURE: 58 MMHG | TEMPERATURE: 98 F | HEIGHT: 72 IN | HEART RATE: 82 BPM | WEIGHT: 183.44 LBS | OXYGEN SATURATION: 98 % | BODY MASS INDEX: 24.85 KG/M2 | SYSTOLIC BLOOD PRESSURE: 110 MMHG

## 2022-11-09 DIAGNOSIS — C88.4 MALT (MUCOSA ASSOCIATED LYMPHOID TISSUE): ICD-10-CM

## 2022-11-09 DIAGNOSIS — B34.9 VIRAL SYNDROME: ICD-10-CM

## 2022-11-09 DIAGNOSIS — I48.91 ATRIAL FIBRILLATION, UNSPECIFIED TYPE: ICD-10-CM

## 2022-11-09 DIAGNOSIS — J40 BRONCHITIS: ICD-10-CM

## 2022-11-09 DIAGNOSIS — I10 ESSENTIAL HYPERTENSION: Primary | ICD-10-CM

## 2022-11-09 DIAGNOSIS — I51.89 LEFT VENTRICULAR DIASTOLIC DYSFUNCTION WITH PRESERVED SYSTOLIC FUNCTION: ICD-10-CM

## 2022-11-09 LAB
INFLUENZA A, MOLECULAR: NEGATIVE
INFLUENZA B, MOLECULAR: NEGATIVE
SARS-COV-2 RNA RESP QL NAA+PROBE: NOT DETECTED
SPECIMEN SOURCE: NORMAL

## 2022-11-09 PROCEDURE — 87502 INFLUENZA DNA AMP PROBE: CPT | Performed by: INTERNAL MEDICINE

## 2022-11-09 PROCEDURE — 1126F AMNT PAIN NOTED NONE PRSNT: CPT | Mod: CPTII,S$GLB,, | Performed by: INTERNAL MEDICINE

## 2022-11-09 PROCEDURE — 99214 PR OFFICE/OUTPT VISIT, EST, LEVL IV, 30-39 MIN: ICD-10-PCS | Mod: S$GLB,,, | Performed by: INTERNAL MEDICINE

## 2022-11-09 PROCEDURE — 1101F PT FALLS ASSESS-DOCD LE1/YR: CPT | Mod: CPTII,S$GLB,, | Performed by: INTERNAL MEDICINE

## 2022-11-09 PROCEDURE — 71046 X-RAY EXAM CHEST 2 VIEWS: CPT | Mod: 26,,, | Performed by: RADIOLOGY

## 2022-11-09 PROCEDURE — 99999 PR PBB SHADOW E&M-EST. PATIENT-LVL III: ICD-10-PCS | Mod: PBBFAC,,, | Performed by: INTERNAL MEDICINE

## 2022-11-09 PROCEDURE — 1126F PR PAIN SEVERITY QUANTIFIED, NO PAIN PRESENT: ICD-10-PCS | Mod: CPTII,S$GLB,, | Performed by: INTERNAL MEDICINE

## 2022-11-09 PROCEDURE — 99999 PR PBB SHADOW E&M-EST. PATIENT-LVL III: CPT | Mod: PBBFAC,,, | Performed by: INTERNAL MEDICINE

## 2022-11-09 PROCEDURE — 1101F PR PT FALLS ASSESS DOC 0-1 FALLS W/OUT INJ PAST YR: ICD-10-PCS | Mod: CPTII,S$GLB,, | Performed by: INTERNAL MEDICINE

## 2022-11-09 PROCEDURE — 3288F PR FALLS RISK ASSESSMENT DOCUMENTED: ICD-10-PCS | Mod: CPTII,S$GLB,, | Performed by: INTERNAL MEDICINE

## 2022-11-09 PROCEDURE — U0003 INFECTIOUS AGENT DETECTION BY NUCLEIC ACID (DNA OR RNA); SEVERE ACUTE RESPIRATORY SYNDROME CORONAVIRUS 2 (SARS-COV-2) (CORONAVIRUS DISEASE [COVID-19]), AMPLIFIED PROBE TECHNIQUE, MAKING USE OF HIGH THROUGHPUT TECHNOLOGIES AS DESCRIBED BY CMS-2020-01-R: HCPCS | Performed by: INTERNAL MEDICINE

## 2022-11-09 PROCEDURE — U0005 INFEC AGEN DETEC AMPLI PROBE: HCPCS | Performed by: INTERNAL MEDICINE

## 2022-11-09 PROCEDURE — 3288F FALL RISK ASSESSMENT DOCD: CPT | Mod: CPTII,S$GLB,, | Performed by: INTERNAL MEDICINE

## 2022-11-09 PROCEDURE — 71046 XR CHEST PA AND LATERAL: ICD-10-PCS | Mod: 26,,, | Performed by: RADIOLOGY

## 2022-11-09 PROCEDURE — 71046 X-RAY EXAM CHEST 2 VIEWS: CPT | Mod: TC

## 2022-11-09 PROCEDURE — 99214 OFFICE O/P EST MOD 30 MIN: CPT | Mod: S$GLB,,, | Performed by: INTERNAL MEDICINE

## 2022-11-09 RX ORDER — PREDNISONE 20 MG/1
TABLET ORAL
Qty: 10 TABLET | Refills: 0 | Status: SHIPPED | OUTPATIENT
Start: 2022-11-09 | End: 2023-04-24 | Stop reason: ALTCHOICE

## 2022-11-09 RX ORDER — BENZONATATE 200 MG/1
CAPSULE ORAL
Qty: 30 CAPSULE | Refills: 0 | Status: SHIPPED | OUTPATIENT
Start: 2022-11-09 | End: 2023-04-24 | Stop reason: ALTCHOICE

## 2022-11-09 NOTE — PROGRESS NOTES
"Subjective:       Patient ID: Norm Mendoza is a 89 y.o. male.    Chief Complaint:   Follow-up and Cough    HPI: Mr Mendoza presents for follow up the above  Cough: He c/o cough x 3 days; He went to  2 days ago and tested -COVID 19.  He has had no known fever, N/V/Diarhhea. He has no CP/SOB but feels weak  He has to sit up in bed 2ndary to the cough;  he has no nocturnal dyspnea or CP .    He also has no SOB or DUGAN during the day when walking about.  While he has had no known COVID/Flu exposures, he's been around a lot of people.  He was at a [employer] game 3 days ago and he has also has been "[employer]" driving-last 5 days ago.  Atrial Fibrillation:  1st dx'd 6/2019; + GI bleed/2ndary to MALT GI tumor resulted in Xaralto being HELD; 7/2019: s/p DCCE; 10/2019: s/p Watchman device/Was on Amiodarone transiently-now off; 2/2022-s/p 2nd  DCCV; Presently-Rate controlled and on ASA QD  Etoh: Alcohol intake much less-75% less per pt: 1-2/day with only 1 bourbon/1beer daily; He had been drinking 2 martinis,1-2 bourbon's daily  MALT Tumor/Stage I Lymphoma: Heme-Onc/Dr Harper/Dr YESSENIA Prather;  Completed Rituximab x4 on 3/7/19; Presently on conservative following  Past Medical, Surgical, Social History: Please see as stated in Epic chart which has been reviewed.    Current Outpatient Medications   Medication Sig Dispense Refill    amLODIPine (NORVASC) 2.5 MG tablet Take 1 tablet (2.5 mg total) by mouth once daily. 90 tablet 1    aspirin (ECOTRIN) 81 MG EC tablet Take 81 mg by mouth once daily.      benzonatate (TESSALON) 200 MG capsule Take 1 capsule (200 mg total) by mouth 3 (three) times daily as needed for Cough. 30 capsule 0    benzonatate (TESSALON) 200 MG capsule 1 cap every 8 hours as needed for cough 30 capsule 0    clobetasoL (TEMOVATE) 0.05 % external solution Pt to mix in 1 jar of cerave cream and apply to affected areas bid 50 mL 3    cyanocobalamin (VITAMIN B-12) 1000 MCG tablet Take 1,000 mcg by mouth once daily.   "    diclofenac sodium (VOLTAREN ARTHRITIS PAIN) 1 % Gel Apply 2 Grams to painful left hand joint 2-3x/day as needed (Patient not taking: Reported on 8/18/2022) 100 g 1    furosemide (LASIX) 20 MG tablet Take 20 mg by mouth as needed. Pt takes 5 tabs weekly as needed      irbesartan (AVAPRO) 150 MG tablet Take 1 tablet (150 mg total) by mouth once daily. 90 tablet 2    metoprolol succinate (TOPROL-XL) 25 MG 24 hr tablet Take 0.5 tablets (12.5 mg total) by mouth once daily. 90 tablet 1    multivitamin (THERAGRAN) per tablet Take 1 tablet by mouth once daily.      predniSONE (DELTASONE) 20 MG tablet 2 tabs daily with food x 5 days 10 tablet 0    psyllium husk (METAMUCIL ORAL) Take by mouth.       Current Facility-Administered Medications   Medication Dose Route Frequency Provider Last Rate Last Admin    lidocaine HCl 2% urojet   Urethral Once Grover Ochoa Jr., MD           Review of Systems   Constitutional:  Negative for chills and fever.   Respiratory:  Positive for cough and wheezing. Negative for chest tightness.    Cardiovascular:  Negative for chest pain, palpitations and leg swelling.   Gastrointestinal:  Negative for abdominal pain, blood in stool, constipation, diarrhea and nausea.   Neurological:  Positive for weakness.   All other systems reviewed and are negative.    Objective:      Lab Results   Component Value Date    WBC 4.93 10/06/2022    HGB 13.8 (L) 10/06/2022    HCT 41.2 10/06/2022     10/06/2022    CHOL 207 (H) 08/19/2021    TRIG 79 08/19/2021    HDL 64 08/19/2021    ALT 16 10/06/2022    AST 18 10/06/2022     10/06/2022    K 4.2 10/06/2022     10/06/2022    CREATININE 1.0 10/06/2022    BUN 19 10/06/2022    CO2 26 10/06/2022    TSH 1.787 10/08/2021    PSA 2.5 09/10/2015    INR 1.0 02/03/2022    HGBA1C 5.2 08/19/2021     Physical Exam  Vitals reviewed.   Constitutional:       Appearance: Normal appearance.   HENT:      Head: Normocephalic and atraumatic.      Nose: Nose normal.       Mouth/Throat:      Pharynx: Posterior oropharyngeal erythema present.      Comments: +Retropharyngeal erythema/no exudates  Eyes:      General:         Right eye: No discharge.         Left eye: No discharge.   Cardiovascular:      Rate and Rhythm: Normal rate and regular rhythm.      Comments: VHR=72/Seems regular  Pulmonary:      Effort: Pulmonary effort is normal. No respiratory distress.      Breath sounds: Wheezing present.      Comments: +Upper airway wheezes and mild end expiratory wheezes  Abdominal:      General: Bowel sounds are normal.      Palpations: Abdomen is soft. There is no mass.      Tenderness: There is no abdominal tenderness.   Musculoskeletal:      Cervical back: Neck supple.      Right lower leg: No edema.      Left lower leg: No edema.   Lymphadenopathy:      Cervical: No cervical adenopathy.   Skin:     General: Skin is warm and dry.   Neurological:      Mental Status: He is alert.   Psychiatric:         Mood and Affect: Mood normal.         Vital Signs  Temp: 97.9 °F (36.6 °C)  Pulse: 82  SpO2: 98 %  BP: (!) 110/58  Pain Score: 0-No pain  Height and Weight  Height: 6' (182.9 cm)  Weight: 83.2 kg (183 lb 6.8 oz)  BSA (Calculated - sq m): 2.06 sq meters  BMI (Calculated): 24.9  Weight in (lb) to have BMI = 25: 183.9]    Assessment:       1. Essential hypertension    2. Viral syndrome    3. Bronchitis    4. Left ventricular diastolic dysfunction with preserved systolic function    5. Atrial fibrillation, unspecified type    6. MALT (mucosa associated lymphoid tissue)        Plan:     Health Maintenance   Topic Date Due    Lipid Panel  08/19/2026    TETANUS VACCINE  01/19/2027        Norm was seen today for follow-up and cough.    Diagnoses and all orders for this visit:    Essential hypertension/controlled       -     Continue:  1 Toprol XL 25 mg half tab daily, 2 Avapro 150 mg q.day, 3 Norvasc 2.5 mg q.day    Viral syndrome  -     Influenza A & B by Molecular  -     COVID-19 Routine  Screening    Bronchitis  -     benzonatate (TESSALON) 200 MG capsule; 1 cap every 8 hours as needed for cough  -     X-Ray Chest PA And Lateral; Future  -     predniSONE (DELTASONE) 20 MG tablet; 2 tabs daily with food x 5 days    Left ventricular diastolic dysfunction with preserved systolic function    Atrial fibrillation, unspecified type        -     follow along as per Arrhythmia Cardiology    MALT (mucosa associated lymphoid tissue)        -     follow along as per Hematology-Oncology    Health Maintenance         -    return to clinic at the longest 6 months or see patient sooner if needed with plan to follow up ASAP after results above

## 2022-11-09 NOTE — PATIENT INSTRUCTIONS
For Cough:  #1- Mucinex 1200mg or Tessalon 200mg perleas as needed    For Bronchitis:  #1-Start Steroid 5 day course

## 2022-11-10 ENCOUNTER — TELEPHONE (OUTPATIENT)
Dept: INTERNAL MEDICINE | Facility: CLINIC | Age: 87
End: 2022-11-10
Payer: MEDICARE

## 2022-11-10 DIAGNOSIS — J40 BRONCHITIS: Primary | ICD-10-CM

## 2022-11-10 RX ORDER — AMOXICILLIN AND CLAVULANATE POTASSIUM 875; 125 MG/1; MG/1
1 TABLET, FILM COATED ORAL EVERY 12 HOURS
Qty: 14 TABLET | Refills: 0 | Status: SHIPPED | OUTPATIENT
Start: 2022-11-10 | End: 2023-04-24 | Stop reason: ALTCHOICE

## 2022-11-10 NOTE — TELEPHONE ENCOUNTER
Review of results(11/9)-showed COVID negative, Flu negative, and CXR-no infiltrate but +COPD changes.  He is doing better but coughing up purulent mucous.  'Have erx'd in: Augmentin 875mg BID x 1 week.  He will call prn problems/no better.

## 2022-11-22 ENCOUNTER — TELEPHONE (OUTPATIENT)
Dept: INTERNAL MEDICINE | Facility: CLINIC | Age: 87
End: 2022-11-22
Payer: MEDICARE

## 2022-11-22 DIAGNOSIS — R05.9 COUGH, UNSPECIFIED TYPE: Primary | ICD-10-CM

## 2022-11-22 RX ORDER — BENZONATATE 200 MG/1
CAPSULE ORAL
Qty: 30 CAPSULE | Refills: 0 | Status: SHIPPED | OUTPATIENT
Start: 2022-11-22 | End: 2023-04-24 | Stop reason: ALTCHOICE

## 2022-11-23 NOTE — TELEPHONE ENCOUNTER
Pt calls with residual cough-other sx improved.  'Have erx'd in: Tessalon perles 200ug AD #30/NR.  He will call/RTC prn problems/no better.

## 2022-12-13 ENCOUNTER — OFFICE VISIT (OUTPATIENT)
Dept: URGENT CARE | Facility: CLINIC | Age: 87
End: 2022-12-13
Payer: MEDICARE

## 2022-12-13 VITALS
HEIGHT: 72 IN | WEIGHT: 183 LBS | TEMPERATURE: 98 F | RESPIRATION RATE: 16 BRPM | HEART RATE: 82 BPM | SYSTOLIC BLOOD PRESSURE: 142 MMHG | DIASTOLIC BLOOD PRESSURE: 79 MMHG | BODY MASS INDEX: 24.79 KG/M2 | OXYGEN SATURATION: 98 %

## 2022-12-13 DIAGNOSIS — T14.90XA TRAUMA: Primary | ICD-10-CM

## 2022-12-13 DIAGNOSIS — S51.012A SKIN TEAR OF LEFT ELBOW WITHOUT COMPLICATION, INITIAL ENCOUNTER: ICD-10-CM

## 2022-12-13 PROCEDURE — 99214 OFFICE O/P EST MOD 30 MIN: CPT | Mod: S$GLB,,, | Performed by: FAMILY MEDICINE

## 2022-12-13 PROCEDURE — 73130 XR HAND COMPLETE 3 VIEW LEFT: ICD-10-PCS | Mod: LT,S$GLB,, | Performed by: RADIOLOGY

## 2022-12-13 PROCEDURE — 1159F PR MEDICATION LIST DOCUMENTED IN MEDICAL RECORD: ICD-10-PCS | Mod: CPTII,S$GLB,, | Performed by: FAMILY MEDICINE

## 2022-12-13 PROCEDURE — 99214 PR OFFICE/OUTPT VISIT, EST, LEVL IV, 30-39 MIN: ICD-10-PCS | Mod: S$GLB,,, | Performed by: FAMILY MEDICINE

## 2022-12-13 PROCEDURE — 1159F MED LIST DOCD IN RCRD: CPT | Mod: CPTII,S$GLB,, | Performed by: FAMILY MEDICINE

## 2022-12-13 PROCEDURE — 1125F PR PAIN SEVERITY QUANTIFIED, PAIN PRESENT: ICD-10-PCS | Mod: CPTII,S$GLB,, | Performed by: FAMILY MEDICINE

## 2022-12-13 PROCEDURE — 1125F AMNT PAIN NOTED PAIN PRSNT: CPT | Mod: CPTII,S$GLB,, | Performed by: FAMILY MEDICINE

## 2022-12-13 PROCEDURE — 73130 X-RAY EXAM OF HAND: CPT | Mod: LT,S$GLB,, | Performed by: RADIOLOGY

## 2022-12-13 RX ORDER — MUPIROCIN 20 MG/G
OINTMENT TOPICAL 3 TIMES DAILY
Qty: 22 G | Refills: 0 | Status: SHIPPED | OUTPATIENT
Start: 2022-12-13 | End: 2023-11-09 | Stop reason: SDUPTHER

## 2022-12-13 NOTE — PROGRESS NOTES
Subjective:       Patient ID: Norm Mendoza is a 89 y.o. male.    Vitals:  vitals were not taken for this visit.     Chief Complaint: Fall    Pt coming in from a fall that happened yesterday. Pt says he was walking down stone stairs outside his sound and fell and bruised his left hand and cut his left elbow. Pt says he doesn't have much pain. Pt says he didn't take any medication for pain but he cleaned the cut and put neosporin on it.     Fall  The accident occurred 12 to 24 hours ago. The fall occurred while walking. He fell from a height of 3 to 5 ft. He landed on [place]. The volume of blood lost was moderate. The point of impact was the left elbow (left hand). The pain is present in the left hand and left elbow. The pain is at a severity of 3/10. The pain is mild. He has tried nothing for the symptoms. The treatment provided no relief.     Constitution: Negative.   HENT: Negative.     Eyes: Negative.    Respiratory: Negative.     Gastrointestinal: Negative.    Genitourinary: Negative.    Musculoskeletal: Negative.  Positive for trauma.   Skin:  Positive for bruising and avulsion. Negative for erythema.   Allergic/Immunologic: Negative.    Neurological:  Positive for coordination disturbances.   Psychiatric/Behavioral: Negative.       Objective:      Physical Exam   Constitutional: He is oriented to person, place, and time. He does not appear ill. No distress. normal  HENT:   Head: Normocephalic and atraumatic.   Ears:   Right Ear: impacted cerumen  Left Ear: impacted cerumen  Nose: No rhinorrhea or congestion.   Mouth/Throat: Mucous membranes are moist. No oropharyngeal exudate. Oropharynx is clear.   Eyes: Conjunctivae are normal. Pupils are equal, round, and reactive to light. Extraocular movement intact   Neck: Neck supple. No neck rigidity present.   Cardiovascular: Normal rate, regular rhythm and normal heart sounds.   No murmur heard.  Pulmonary/Chest: Effort normal and breath sounds normal. No  respiratory distress.   Abdominal: Normal appearance and bowel sounds are normal. Soft. flat abdomen   Musculoskeletal:         General: Tenderness and signs of injury present. No swelling.      Right elbow: Normal.He exhibits normal range of motion, no swelling, no effusion, no deformity and no laceration. No tenderness found. No radial head, no medial epicondyle, no lateral epicondyle and no olecranon process tenderness noted.      Left elbow: He exhibits normal range of motion, no swelling, no effusion, no deformity and no laceration. Tenderness found. No radial head, no medial epicondyle, no lateral epicondyle and no olecranon process tenderness noted.      Cervical back: He exhibits no tenderness.        Arms:       Comments: Bruising of left palm near thenar eminence. Full rom  Mild swelling    Skin tear above the elbow    Neurological: no focal deficit. He is alert, oriented to person, place, and time and at baseline.   Skin: Skin is warm and dry. bruising and lesion No erythema   Psychiatric: His behavior is normal. Mood, judgment and thought content normal.       Assessment:Plan:    1. Trauma  - X-Ray Hand 3 view Left; Future    2. Skin tear of left elbow without complication, initial encounter  - mupirocin (BACTROBAN) 2 % ointment; Apply topically 3 (three) times daily.  Dispense: 22 g; Refill: 0   All test reviewed with pt prior to discharge  Pt Td up to date

## 2022-12-30 ENCOUNTER — OFFICE VISIT (OUTPATIENT)
Dept: URGENT CARE | Facility: CLINIC | Age: 87
End: 2022-12-30
Payer: MEDICARE

## 2022-12-30 VITALS
HEART RATE: 85 BPM | TEMPERATURE: 99 F | HEIGHT: 72 IN | BODY MASS INDEX: 24.79 KG/M2 | DIASTOLIC BLOOD PRESSURE: 78 MMHG | WEIGHT: 183 LBS | SYSTOLIC BLOOD PRESSURE: 114 MMHG | OXYGEN SATURATION: 96 %

## 2022-12-30 DIAGNOSIS — R05.9 COUGH, UNSPECIFIED TYPE: ICD-10-CM

## 2022-12-30 DIAGNOSIS — U07.1 COVID-19 VIRUS INFECTION: Primary | ICD-10-CM

## 2022-12-30 LAB
CTP QC/QA: YES
SARS-COV-2 AG RESP QL IA.RAPID: POSITIVE

## 2022-12-30 PROCEDURE — 87811 SARS CORONAVIRUS 2 ANTIGEN POCT, MANUAL READ: ICD-10-PCS | Mod: QW,S$GLB,, | Performed by: NURSE PRACTITIONER

## 2022-12-30 PROCEDURE — 99214 PR OFFICE/OUTPT VISIT, EST, LEVL IV, 30-39 MIN: ICD-10-PCS | Mod: S$GLB,CS,, | Performed by: NURSE PRACTITIONER

## 2022-12-30 PROCEDURE — 99214 OFFICE O/P EST MOD 30 MIN: CPT | Mod: S$GLB,CS,, | Performed by: NURSE PRACTITIONER

## 2022-12-30 PROCEDURE — 1160F PR REVIEW ALL MEDS BY PRESCRIBER/CLIN PHARMACIST DOCUMENTED: ICD-10-PCS | Mod: CPTII,S$GLB,, | Performed by: NURSE PRACTITIONER

## 2022-12-30 PROCEDURE — 1159F PR MEDICATION LIST DOCUMENTED IN MEDICAL RECORD: ICD-10-PCS | Mod: CPTII,S$GLB,, | Performed by: NURSE PRACTITIONER

## 2022-12-30 PROCEDURE — 1160F RVW MEDS BY RX/DR IN RCRD: CPT | Mod: CPTII,S$GLB,, | Performed by: NURSE PRACTITIONER

## 2022-12-30 PROCEDURE — 87811 SARS-COV-2 COVID19 W/OPTIC: CPT | Mod: QW,S$GLB,, | Performed by: NURSE PRACTITIONER

## 2022-12-30 PROCEDURE — 1159F MED LIST DOCD IN RCRD: CPT | Mod: CPTII,S$GLB,, | Performed by: NURSE PRACTITIONER

## 2022-12-30 RX ORDER — CETIRIZINE HYDROCHLORIDE 10 MG/1
10 TABLET ORAL DAILY
Qty: 30 TABLET | Refills: 0 | Status: SHIPPED | OUTPATIENT
Start: 2022-12-30 | End: 2023-11-09

## 2022-12-30 RX ORDER — FLUTICASONE PROPIONATE 50 MCG
2 SPRAY, SUSPENSION (ML) NASAL DAILY
Qty: 18.2 ML | Refills: 0 | Status: SHIPPED | OUTPATIENT
Start: 2022-12-30 | End: 2022-12-30

## 2022-12-30 NOTE — PROGRESS NOTES
Subjective:       Patient ID: Norm Mendoza is a 89 y.o. male.    Vitals:  height is 6' (1.829 m) and weight is 83 kg (183 lb). His oral temperature is 98.5 °F (36.9 °C). His blood pressure is 114/78 and his pulse is 85. His oxygen saturation is 96%.     Chief Complaint: Cough    89yo male pt reports onset of dry cough x3 days, worse at night and disrupting sleep.  Reports that he recently returned from travel, reports staying in cold, non-heated or well-ventilated house during his stay.  Denies fever/chills, denies sore throat, headache, or ear pain, denies n/v/d, denies chest pain or wheezing.  Reports some baseline SOB due to HX of CHF but states that he does not feel like this has changed, denies current SOB.  Reports no improvement with benzonatate prescribed at last appt, denies attempting any other medications or treatments.  Reports receiving both COVID and flu vaccinations.    Cough  This is a new problem. The current episode started 1 to 4 weeks ago. The problem has been unchanged. The problem occurs constantly. The cough is Non-productive. Associated symptoms include nasal congestion and shortness of breath. Pertinent negatives include no chest pain, chills, ear pain, fever, headaches, postnasal drip, sore throat or wheezing. The treatment provided no relief.     Constitution: Negative for chills and fever.   HENT:  Negative for ear pain, congestion, postnasal drip and sore throat.    Cardiovascular:  Positive for sob on exertion (HX of CHF). Negative for chest pain.   Respiratory:  Positive for cough and shortness of breath. Negative for chest tightness, sputum production and wheezing.    Gastrointestinal:  Negative for nausea, vomiting and diarrhea.   Neurological:  Negative for dizziness, light-headedness, coordination disturbances, loss of balance, headaches, disorientation, altered mental status and loss of consciousness.   Psychiatric/Behavioral:  Negative for altered mental status and  disorientation.      Objective:      Physical Exam   Constitutional: He is oriented to person, place, and time. He appears well-developed. He is cooperative.  Non-toxic appearance. He does not appear ill. No distress.   HENT:   Head: Normocephalic and atraumatic.   Ears:   Right Ear: Hearing, external ear and ear canal normal. Tympanic membrane is not erythematous, not retracted and not bulging. A middle ear effusion (clear fluid) is present.   Left Ear: Hearing, external ear and ear canal normal. Tympanic membrane is not erythematous, not retracted and not bulging. A middle ear effusion (clear fluid) is present.   Nose: Mucosal edema (erythema to BL turbinates) and rhinorrhea (clear to BL nares) present. No purulent discharge or nasal deformity. No epistaxis. Right sinus exhibits no maxillary sinus tenderness and no frontal sinus tenderness. Left sinus exhibits no maxillary sinus tenderness and no frontal sinus tenderness.   Mouth/Throat: Uvula is midline and mucous membranes are normal. No trismus in the jaw. Normal dentition. No uvula swelling. Oropharyngeal exudate (clear postnasal drip), posterior oropharyngeal erythema (mild) and cobblestoning present. No posterior oropharyngeal edema. Tonsils are 0 on the right. Tonsils are 0 on the left. No tonsillar exudate.   Eyes: Conjunctivae and lids are normal. No scleral icterus.   Neck: Trachea normal and phonation normal. Neck supple. No edema present. No erythema present. No neck rigidity present.   Cardiovascular: Normal rate, regular rhythm and normal pulses.   Murmur (auscultated on both R and L sternal borders, pt has HX of CHF, previous adventitious sounds noted on last exam) heard.  Pulses:       Radial pulses are 2+ on the right side and 2+ on the left side.   Pulmonary/Chest: Effort normal and breath sounds normal. No accessory muscle usage or stridor. No tachypnea. No respiratory distress. He has no decreased breath sounds. He has no wheezes. He has no  rhonchi. He has no rales.   Dry cough witnessed on exam.         Comments: Dry cough witnessed on exam.    Abdominal: Normal appearance.   Musculoskeletal: Normal range of motion.         General: No deformity. Normal range of motion.      Right lower leg: No edema.      Left lower leg: No edema.   Lymphadenopathy:        Head (right side): No submandibular adenopathy present.        Head (left side): No submandibular adenopathy present.     He has no cervical adenopathy.   Neurological: He is alert and oriented to person, place, and time. He exhibits normal muscle tone. Coordination normal.   Skin: Skin is warm, dry, intact, not diaphoretic and not pale.   Psychiatric: His speech is normal and behavior is normal. Judgment and thought content normal.   Nursing note and vitals reviewed.    Results for orders placed or performed in visit on 12/30/22   SARS Coronavirus 2 Antigen, POCT Manual Read   Result Value Ref Range    SARS Coronavirus 2 Antigen Positive (A) Negative     Acceptable Yes            Assessment:       1. COVID-19 virus infection    2. Cough, unspecified type          Plan:       COVID-19 Risk Score:  6    Provided education on antiviral treatment and molnupiravir, pt not good candidate for Paxlovid due to amlodipine usage, provided printed prescription.    Provided education on OTC symptomatic treatment (Claritin/Zyrtec with Flonase nasal spray for cough and Tylenol/ibuprofen for and elevated temperature, if needed).  Provided education on CDC recommendations for isolation/masking and for return/ER precautions.  Recommended appt with PCP or cardiology as soon as possible to discuss CHF due to being diagnosed with COVID.  Pt verbalized understanding and agreed to treatment plan.      COVID-19 virus infection  -     molnupiravir 200 mg capsule (EUA); Take 4 capsules (800 mg total) by mouth every 12 (twelve) hours. for 5 days  Dispense: 40 capsule; Refill: 0  -     cetirizine (ZYRTEC) 10  "MG tablet; Take 1 tablet (10 mg total) by mouth once daily.  Dispense: 30 tablet; Refill: 0  -     fluticasone propionate (FLONASE) 50 mcg/actuation nasal spray; 2 sprays (100 mcg total) by Each Nostril route once daily.  Dispense: 18.2 mL; Refill: 0    Cough, unspecified type  -     SARS Coronavirus 2 Antigen, POCT Manual Read      Patient Instructions   You have tested positive for COVID-19 today.      ISOLATION  If you tested positive and do not have symptoms, you must isolate for 5 days starting on the day of the positive test.    If you tested positive and have symptoms, you must isolate for 5 days starting on the day of the first symptoms, not the day of the positive test.     This is the most important part, both the CDC and the [facility] emphasize that you do not test out of isolation.     After 5 days, if your symptoms have improved and you have not had fever on day 5, you can return to the community on day 6- NO TESTING REQUIRED!      In fact, we do not re-test if you were positive in the last 90 days.    After your 5 days of isolation are completed, the CDC recommends strict mask use for the first 5 days that you come out of isolation.    -----    If your condition worsens or fails to improve, we recommend that you receive another evaluation at the ER immediately contact your PCP to discuss your concerns, or return here.  You must understand that you've received an urgent care treatment only, and that you may be released before all your medical problems are known or treated.  You, the patient, will arrange for follow-up care as instructed.     If we discussed that I think your illness is viral, it will not respond to antibiotics and will last 10-14 days.  If we discussed "wait and see" antibiotics, and if over the next few days the symptoms worsen, start the antibiotics I have given you.     If you are female and on birth control pills and do take the antibiotics, use additional methods to prevent pregnancy " "while on the antibiotics and for one cycle after.     Flonase (fluticasone) is a nasal spray which is available over the counter and may help with your symptoms.  Zyrtec D, Claritin D, or Allegra D can also help with symptoms of congestion and drainage.  If you have hypertension, avoid using the "D" which is the decongestant formula.    If you just have drainage, you can take plain Zyrtec, Claritin, or Allegra.  If you just have a congested feeling, you can take pseudoephedrine (unless you have high blood pressure), which you have to sign for behind the counter.  Do not buy phenylephrine OTC, as it is not effective.    Rest and fluids are also important.  Tylenol or ibuprofen can also be used as directed for pain, unless you have an allergy to them or medical condition (such as stomach ulcers, kidney or liver disease, or use blood thinners, etc.) for which you should not be taking these type of medications.     If you are flying in the next few days, Afrin nose drops for the airplane flight upon take off and landing may help.  Other than at those times, refrain from using Afrin.     If you were prescribed a narcotic or sedating cough medicine, do not drive or operate heavy machinery while taking these medications.                        "

## 2023-01-05 ENCOUNTER — TELEPHONE (OUTPATIENT)
Dept: URGENT CARE | Facility: CLINIC | Age: 88
End: 2023-01-05
Payer: MEDICARE

## 2023-01-05 ENCOUNTER — TELEPHONE (OUTPATIENT)
Dept: INTERNAL MEDICINE | Facility: CLINIC | Age: 88
End: 2023-01-05
Payer: MEDICARE

## 2023-01-05 ENCOUNTER — OFFICE VISIT (OUTPATIENT)
Dept: URGENT CARE | Facility: CLINIC | Age: 88
End: 2023-01-05
Payer: MEDICARE

## 2023-01-05 VITALS
RESPIRATION RATE: 20 BRPM | TEMPERATURE: 98 F | DIASTOLIC BLOOD PRESSURE: 86 MMHG | HEART RATE: 78 BPM | OXYGEN SATURATION: 97 % | SYSTOLIC BLOOD PRESSURE: 144 MMHG | BODY MASS INDEX: 24.79 KG/M2 | HEIGHT: 72 IN | WEIGHT: 183 LBS

## 2023-01-05 DIAGNOSIS — R05.9 COUGH, UNSPECIFIED TYPE: Primary | ICD-10-CM

## 2023-01-05 DIAGNOSIS — J31.0 RHINITIS, UNSPECIFIED TYPE: ICD-10-CM

## 2023-01-05 DIAGNOSIS — J18.9 WALKING PNEUMONIA: ICD-10-CM

## 2023-01-05 DIAGNOSIS — U07.1 COVID: ICD-10-CM

## 2023-01-05 PROCEDURE — 99214 PR OFFICE/OUTPT VISIT, EST, LEVL IV, 30-39 MIN: ICD-10-PCS | Mod: S$GLB,,, | Performed by: FAMILY MEDICINE

## 2023-01-05 PROCEDURE — 71046 X-RAY EXAM CHEST 2 VIEWS: CPT | Mod: S$GLB,,, | Performed by: RADIOLOGY

## 2023-01-05 PROCEDURE — 71046 XR CHEST PA AND LATERAL: ICD-10-PCS | Mod: S$GLB,,, | Performed by: RADIOLOGY

## 2023-01-05 PROCEDURE — 1159F MED LIST DOCD IN RCRD: CPT | Mod: CPTII,S$GLB,, | Performed by: FAMILY MEDICINE

## 2023-01-05 PROCEDURE — 1125F PR PAIN SEVERITY QUANTIFIED, PAIN PRESENT: ICD-10-PCS | Mod: CPTII,S$GLB,, | Performed by: FAMILY MEDICINE

## 2023-01-05 PROCEDURE — 1159F PR MEDICATION LIST DOCUMENTED IN MEDICAL RECORD: ICD-10-PCS | Mod: CPTII,S$GLB,, | Performed by: FAMILY MEDICINE

## 2023-01-05 PROCEDURE — 1125F AMNT PAIN NOTED PAIN PRSNT: CPT | Mod: CPTII,S$GLB,, | Performed by: FAMILY MEDICINE

## 2023-01-05 PROCEDURE — 99214 OFFICE O/P EST MOD 30 MIN: CPT | Mod: S$GLB,,, | Performed by: FAMILY MEDICINE

## 2023-01-05 RX ORDER — PROMETHAZINE HYDROCHLORIDE AND CODEINE PHOSPHATE 6.25; 1 MG/5ML; MG/5ML
5 SOLUTION ORAL EVERY 12 HOURS PRN
Qty: 240 ML | Refills: 0 | Status: SHIPPED | OUTPATIENT
Start: 2023-01-05 | End: 2023-01-15

## 2023-01-05 RX ORDER — AZITHROMYCIN 250 MG/1
250 TABLET, FILM COATED ORAL DAILY
Qty: 6 TABLET | Refills: 0 | Status: SHIPPED | OUTPATIENT
Start: 2023-01-05 | End: 2023-04-24 | Stop reason: ALTCHOICE

## 2023-01-05 RX ORDER — IPRATROPIUM BROMIDE 21 UG/1
2 SPRAY, METERED NASAL 2 TIMES DAILY PRN
Qty: 30 ML | Refills: 0 | Status: SHIPPED | OUTPATIENT
Start: 2023-01-05 | End: 2023-11-09

## 2023-01-05 NOTE — PROGRESS NOTES
Subjective:       Patient ID: Norm Mendoza is a 89 y.o. male.    Vitals:  height is 6' (1.829 m) and weight is 83 kg (183 lb). His temperature is 98.3 °F (36.8 °C). His blood pressure is 144/86 (abnormal) and his pulse is 78. His respiration is 20 and oxygen saturation is 97%.     Chief Complaint: Cough    Pt presents with complaint of cough.  Pt states he tested positive for covid on Friday and states he has run out of prescribed medications.  Pt states he is still coughing regularly and would like to know if he is still covid positive.  Pt states symptoms have been ongoing for about 10 days    Cough  This is a new problem. The current episode started 1 to 4 weeks ago. The problem has been unchanged. The problem occurs every few minutes. The cough is Non-productive. Nothing aggravates the symptoms. He has tried prescription cough suppressant for the symptoms. The treatment provided no relief.     Respiratory:  Positive for cough.    Skin:  Negative for erythema.     Objective:      Physical Exam   Constitutional: He is oriented to person, place, and time. normal  HENT:   Head: Normocephalic and atraumatic.   Ears:   Right Ear: Tympanic membrane, external ear and ear canal normal.   Left Ear: Tympanic membrane, external ear and ear canal normal.   Nose: Rhinorrhea and congestion present.   Mouth/Throat: Mucous membranes are moist. No oropharyngeal exudate. Oropharynx is clear.   Eyes: Conjunctivae are normal. Pupils are equal, round, and reactive to light. Extraocular movement intact   Neck: Neck supple. No neck rigidity present.   Cardiovascular: Normal rate and regular rhythm.   No murmur heard.  Pulmonary/Chest: Effort normal. No stridor. No respiratory distress. He has wheezes. He has rhonchi. He has no rales.   Abdominal: Normal appearance and bowel sounds are normal. Soft. flat abdomen   Musculoskeletal: Normal range of motion.         General: Normal range of motion.   Neurological: no focal deficit.  He is alert and oriented to person, place, and time.   Skin: Skin is warm and dry. Capillary refill takes less than 2 seconds. No erythema   Psychiatric: His behavior is normal. Mood, judgment and thought content normal.   Nursing note and vitals reviewed.      Assessment:  Plan:        1. Cough, unspecified type  - XR CHEST PA AND LATERAL; Future  - promethazine-codeine 6.25-10 mg/5 ml (PHENERGAN WITH CODEINE) 6.25-10 mg/5 mL syrup; Take 5 mLs by mouth every 12 (twelve) hours as needed for Cough.  Dispense: 240 mL; Refill: 0    2. Walking pneumonia  - azithromycin (Z-ANNI) 250 MG tablet; Take 1 tablet (250 mg total) by mouth once daily.  Dispense: 6 tablet; Refill: 0    3. Rhinitis, unspecified type  - ipratropium (ATROVENT) 21 mcg (0.03 %) nasal spray; 2 sprays by Each Nostril route 2 (two) times daily as needed for Rhinitis.  Dispense: 30 mL; Refill: 0   All results discussed with pt prior to discharge

## 2023-01-05 NOTE — TELEPHONE ENCOUNTER
Pharmacy called changed promethazine with codeine to promethazine with dexamethorphin. Unable to get promethazine with codeine from supplier

## 2023-01-06 ENCOUNTER — TELEPHONE (OUTPATIENT)
Dept: INTERNAL MEDICINE | Facility: CLINIC | Age: 88
End: 2023-01-06
Payer: MEDICARE

## 2023-01-06 NOTE — TELEPHONE ENCOUNTER
----- Message from Brissa Mckeon LPN sent at 1/5/2023 11:57 AM CST -----  Contact: [phone] Patient  Pt states he was Pneumonia. Requesting medical advice. Please advise.  ----- Message -----  From: Katerina Francois  Sent: 1/5/2023  11:14 AM CST  To: [facility] Staff    Pt is calling in regards to having walking pneumonia. Pt stated he needs to speak with Dr Owens about a plan of action on what to do about the walking pneumonia. Please call and advise.

## 2023-01-07 NOTE — PROGRESS NOTES
Digital Medicine: Clinician Follow-Up    Called patient to address high BP alert. He is doing well with no complaints and denies symptoms of hypertension. He had a PCP appointment on 10/16/20 and he reports that PCP directed him to have his BP machine evaluated to make sure it is accurate. He says that sometimes he still drinks coffee soon before taking his BP reading    The history is provided by the patient.      Review of patient's allergies indicates:  No Known Allergies    Hypertension    Patient's blood pressure is stable.         Last 5 Patient Entered Readings                                      Current 30 Day Average: 160/75     Recent Readings 10/18/2020 10/18/2020 10/18/2020 10/15/2020 10/15/2020    SBP (mmHg) 199 181 180 147 156    DBP (mmHg) 91 75 76 65 80    Pulse 50 49 50 42 50                 Depression Screening  Did not address depression screening.    Sleep Apnea Screening    Did not address sleep apnea screening.     Medication Affordability Screening  Did not address medication affordability screening.     Medication Adherence-Medication adherence was asssessed.  Patient continue taking medication as prescribed.            ASSESSMENT(S)  Patients BP average is 158/75 mmHg, which is above goal. Patient's BP goal is less than or equal to 130/80.   /70 mmHg in 10/16/2020 in clinic.   Hypertension Plan  Continue current therapy. Discussed changing metoprolol to carvedilol. Patient prefers not to at this time because he has a large suply of metoprolol. Will not increase metoprolol secondary to HR 40-50s.  Provided patient education.  Patient to bring ihealth BP machine to the [facility] this week for validation  Wait an hour after drinking caffeine to take BP     Addressed patient questions and patient has my contact information if needed prior to next outreach. Patient verbalizes understanding.             There are no preventive care reminders to display for this patient.  There are no preventive  From: Pebbles Mireles  To: Dr. Ryann Harman: 1/7/2023 10:12 AM EST  Subject: Prescription refill     I need my pravastatin 40mg prescription sent to [facility] on [address] for refill. My other primary care doctor that I had Chance Milligan will not fill it. I need Doctor Chris Katz to do it now because hes my primary care doctor now, and Im out of this prescription at the moment.  Thank you care reminders to display for this patient.      Hypertension Medications     furosemide (LASIX) 20 MG tablet TAKE 1 TABLET BY MOUTH ONCE DAILY AS NEEDED LEG EDEMA    irbesartan (AVAPRO) 300 MG tablet Take 1 tablet (300 mg total) by mouth every evening.    metoprolol succinate (TOPROL-XL) 25 MG 24 hr tablet TAKE 1 TABLET BY MOUTH DAILY

## 2023-01-07 NOTE — TELEPHONE ENCOUNTER
Brissa, can you please call Mr Mendoza to schedule an appt for follow up COVID/pneumonia with Susanna or me 2/14th?  Thanks so much.

## 2023-01-20 ENCOUNTER — PES CALL (OUTPATIENT)
Dept: ADMINISTRATIVE | Facility: OTHER | Age: 88
End: 2023-01-20
Payer: MEDICARE

## 2023-02-07 DIAGNOSIS — Z00.00 ENCOUNTER FOR MEDICARE ANNUAL WELLNESS EXAM: ICD-10-CM

## 2023-02-09 DIAGNOSIS — Z00.00 ENCOUNTER FOR MEDICARE ANNUAL WELLNESS EXAM: ICD-10-CM

## 2023-02-13 ENCOUNTER — TELEPHONE (OUTPATIENT)
Dept: INTERNAL MEDICINE | Facility: CLINIC | Age: 88
End: 2023-02-13
Payer: MEDICARE

## 2023-02-14 ENCOUNTER — OFFICE VISIT (OUTPATIENT)
Dept: INTERNAL MEDICINE | Facility: CLINIC | Age: 88
End: 2023-02-14
Payer: MEDICARE

## 2023-02-14 VITALS
SYSTOLIC BLOOD PRESSURE: 130 MMHG | HEIGHT: 72 IN | TEMPERATURE: 98 F | BODY MASS INDEX: 25.08 KG/M2 | WEIGHT: 185.19 LBS | OXYGEN SATURATION: 98 % | DIASTOLIC BLOOD PRESSURE: 78 MMHG | HEART RATE: 80 BPM

## 2023-02-14 DIAGNOSIS — M79.641 RIGHT HAND PAIN: ICD-10-CM

## 2023-02-14 DIAGNOSIS — Z09 FOLLOW-UP EXAM AFTER TREATMENT: Primary | ICD-10-CM

## 2023-02-14 PROCEDURE — 3288F PR FALLS RISK ASSESSMENT DOCUMENTED: ICD-10-PCS | Mod: CPTII,S$GLB,,

## 2023-02-14 PROCEDURE — 1101F PT FALLS ASSESS-DOCD LE1/YR: CPT | Mod: CPTII,S$GLB,,

## 2023-02-14 PROCEDURE — 1101F PR PT FALLS ASSESS DOC 0-1 FALLS W/OUT INJ PAST YR: ICD-10-PCS | Mod: CPTII,S$GLB,,

## 2023-02-14 PROCEDURE — 99999 PR PBB SHADOW E&M-EST. PATIENT-LVL V: ICD-10-PCS | Mod: PBBFAC,,,

## 2023-02-14 PROCEDURE — 99213 OFFICE O/P EST LOW 20 MIN: CPT | Mod: S$GLB,,,

## 2023-02-14 PROCEDURE — 99999 PR PBB SHADOW E&M-EST. PATIENT-LVL V: CPT | Mod: PBBFAC,,,

## 2023-02-14 PROCEDURE — 1126F AMNT PAIN NOTED NONE PRSNT: CPT | Mod: CPTII,S$GLB,,

## 2023-02-14 PROCEDURE — 3288F FALL RISK ASSESSMENT DOCD: CPT | Mod: CPTII,S$GLB,,

## 2023-02-14 PROCEDURE — 1126F PR PAIN SEVERITY QUANTIFIED, NO PAIN PRESENT: ICD-10-PCS | Mod: CPTII,S$GLB,,

## 2023-02-14 PROCEDURE — 99213 PR OFFICE/OUTPT VISIT, EST, LEVL III, 20-29 MIN: ICD-10-PCS | Mod: S$GLB,,,

## 2023-02-14 RX ORDER — DICLOFENAC SODIUM 10 MG/G
GEL TOPICAL
Qty: 100 G | Refills: 1 | Status: SHIPPED | OUTPATIENT
Start: 2023-02-14

## 2023-02-14 NOTE — PROGRESS NOTES
[facility] Note    Chief Complaint      Chief Complaint   Patient presents with    Follow-up     History of Present Illness      Norm Mendoza is a 89 y.o. male patient of Dr. Joy who presents today for right hand pain x1 month.  Pt stated he is unsure how he hurt his hand, but the pain has increased over the past month. Discomfort and swelling is found mostly in the right pointer finger.  Pt opens and closes his hand to keep fingers moving.    Time is the only thing that seems to make it worse.  Pt denies any radiating up the arm or any arm or wrist involvement.  Pt denies any numbness/tingling in right hand.  Current pain level 0/10.    Health Maintenance   Topic Date Due    Lipid Panel  08/19/2026    TETANUS VACCINE  01/19/2027       Past Medical History:   Diagnosis Date    Alcohol dependence, daily use 07/13/2017    Allergy     Bladder cancer     tumor that was benign     Cataract     H/O nonmelanoma skin cancer 02/04/2016    Hematuria     Hypertension     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    Mixed hyperlipidemia 05/02/2018    On continuous oral anticoagulation 07/30/2019    Paroxysmal atrial fibrillation 11/30/2018    S/P D/C Cardioversion 7/2019, S/P Watchman device 8/2020    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    Dr Vel LANE (peptic ulcer disease)     GI Bleeding 11/2018: Anticoagulant D/S'd and Gastric MALT tumor Dx'd    Skin cancer     x3, had mohs on nose    Squamous cell carcinoma excised 12/5/14    R lower back    Symptomatic anemia 12/06/2018    Urinary tract infection     x4       Past Surgical History:   Procedure Laterality Date    ACHILLES TENDON SURGERY      CATARACT EXTRACTION W/  INTRAOCULAR LENS IMPLANT Bilateral 2013    CLOSURE OF LEFT ATRIAL APPENDAGE USING DEVICE N/A 10/11/2019    Procedure: Left atrial appendage closure device;  Surgeon: Hi Crowder MD;  Location: [facility];  Service: Cardiology;  Laterality: N/A;  AF, JACINTO,  Watchman Implant, BSci, Gen, MB, 3 Prep    CYSTOSCOPY      bladder tumor    ESOPHAGOGASTRODUODENOSCOPY N/A 2018    Procedure: EGD (ESOPHAGOGASTRODUODENOSCOPY);  Surgeon: Austin Garcia MD;  Location: [facility] (2ND FLR);  Service: Endoscopy;  Laterality: N/A;    ESOPHAGOGASTRODUODENOSCOPY N/A 2019    Procedure: EGD (ESOPHAGOGASTRODUODENOSCOPY);  Surgeon: Austin Garcia MD;  Location: [facility] (4TH FLR);  Service: Endoscopy;  Laterality: N/A;  please schedule within 4 weeks    ESOPHAGOGASTRODUODENOSCOPY N/A 2021    Procedure: EGD (ESOPHAGOGASTRODUODENOSCOPY);  Surgeon: Austin Garcia MD;  Location: [facility] (2ND FLR);  Service: Endoscopy;  Laterality: N/A;  per Dr. Garcia done within the month with any provider/ ordered by oncology  2nd floor due to comorbidities  Watchman device  COVID test at [facility] on -GT    SKIN CANCER EXCISION      TREATMENT OF CARDIAC ARRHYTHMIA N/A 2019    Procedure: Cardioversion or Defibrillation;  Surgeon: Hi Crowder MD;  Location: [facility] EP LAB;  Service: Cardiology;  Laterality: N/A;  AF, JACINTO, DCCV, MAC, MB, 3 Prep    TREATMENT OF CARDIAC ARRHYTHMIA N/A 2022    Procedure: Cardioversion or Defibrillation;  Surgeon: Susan Orozco NP;  Location: [facility] EP LAB;  Service: Cardiology;  Laterality: N/A;  afib, DCCV, anes, MB, 3 Prep       family history includes Hypertension in his father; Stroke in his brother and father.     Social History     Tobacco Use    Smoking status: Former     Packs/day: 1.00     Years: 25.00     Pack years: 25.00     Types: Cigarettes     Quit date: 9/3/1970     Years since quittin.7    Smokeless tobacco: Never   Substance Use Topics    Alcohol use: Yes     Alcohol/week: 3.0 standard drinks     Types: 3 Shots of liquor per week     Comment: 3 bourbons daily - 12 beer on weekends     Drug use: No       Review of Systems   Constitutional:  Negative for chills and fever.   HENT:  Negative for congestion, sinus pain and sore  throat.    Respiratory:  Negative for cough, shortness of breath and wheezing.    Cardiovascular:  Negative for chest pain.   Gastrointestinal:  Negative for constipation, diarrhea, heartburn, nausea and vomiting.   Musculoskeletal:  Positive for joint pain.        Right hand   Neurological:  Negative for dizziness and headaches.      Outpatient Encounter Medications as of 2/14/2023   Medication Sig Dispense Refill    amLODIPine (NORVASC) 2.5 MG tablet Take 1 tablet (2.5 mg total) by mouth once daily. 90 tablet 1    aspirin (ECOTRIN) 81 MG EC tablet Take 81 mg by mouth once daily.      clobetasoL (TEMOVATE) 0.05 % external solution Pt to mix in 1 jar of cerave cream and apply to affected areas bid 50 mL 3    cyanocobalamin (VITAMIN B-12) 1000 MCG tablet Take 1,000 mcg by mouth once daily.      irbesartan (AVAPRO) 150 MG tablet Take 1 tablet (150 mg total) by mouth once daily. 90 tablet 2    multivitamin (THERAGRAN) per tablet Take 1 tablet by mouth once daily.      psyllium husk (METAMUCIL ORAL) Take by mouth.      cetirizine (ZYRTEC) 10 MG tablet Take 1 tablet (10 mg total) by mouth once daily. 30 tablet 0    diclofenac sodium (VOLTAREN ARTHRITIS PAIN) 1 % Gel Apply 2 Grams to painful right hand joint 2-3x/day as needed 100 g 1    fluticasone propionate (FLONASE) 50 mcg/actuation nasal spray SHAKE LIQUID AND USE 2 SPRAYS(100 MCG) IN EACH NOSTRIL EVERY DAY 48 g 0    furosemide (LASIX) 20 MG tablet Take 20 mg by mouth as needed. Pt takes 5 tabs weekly as needed      ipratropium (ATROVENT) 21 mcg (0.03 %) nasal spray 2 sprays by Each Nostril route 2 (two) times daily as needed for Rhinitis. 30 mL 0    mupirocin (BACTROBAN) 2 % ointment Apply topically 3 (three) times daily. 22 g 0    [DISCONTINUED] amoxicillin-clavulanate 875-125mg (AUGMENTIN) 875-125 mg per tablet Take 1 tablet by mouth every 12 (twelve) hours. (Patient not taking: Reported on 3/11/2023) 14 tablet 0    [DISCONTINUED] azithromycin (Z-ANNI) 250 MG  tablet Take 1 tablet (250 mg total) by mouth once daily. (Patient not taking: Reported on 3/11/2023) 6 tablet 0    [DISCONTINUED] benzonatate (TESSALON) 200 MG capsule 1 cap every 8 hours as needed for cough (Patient not taking: Reported on 3/11/2023) 30 capsule 0    [DISCONTINUED] benzonatate (TESSALON) 200 MG capsule 1 every 8 hours as needed for cough (Patient not taking: Reported on 3/11/2023) 30 capsule 0    [DISCONTINUED] diclofenac sodium (VOLTAREN ARTHRITIS PAIN) 1 % Gel Apply 2 Grams to painful left hand joint 2-3x/day as needed (Patient not taking: Reported on 8/18/2022) 100 g 1    [DISCONTINUED] metoprolol succinate (TOPROL-XL) 25 MG 24 hr tablet Take 0.5 tablets (12.5 mg total) by mouth once daily. 90 tablet 1    [DISCONTINUED] predniSONE (DELTASONE) 20 MG tablet 2 tabs daily with food x 5 days (Patient not taking: Reported on 3/11/2023) 10 tablet 0     Facility-Administered Encounter Medications as of 2/14/2023   Medication Dose Route Frequency Provider Last Rate Last Admin    lidocaine HCl 2% urojet   Urethral Once Grover Ochoa Jr., MD           Review of patient's allergies indicates:  No Known Allergies    Physical Exam      Vital Signs  Temp: 97.5 °F (36.4 °C)  Pulse: 80  SpO2: 98 %  BP: 130/78  Pain Score: 0-No pain  Height and Weight  Height: 6' (182.9 cm)  Weight: 84 kg (185 lb 3 oz)  BSA (Calculated - sq m): 2.07 sq meters  BMI (Calculated): 25.1  Weight in (lb) to have BMI = 25: 183.9    Physical Exam  Constitutional:       Appearance: Normal appearance.   HENT:      Head: Normocephalic and atraumatic.   Cardiovascular:      Pulses: Normal pulses.      Heart sounds: Normal heart sounds.   Pulmonary:      Effort: Pulmonary effort is normal.      Breath sounds: Normal breath sounds.   Musculoskeletal:      Left hand: Swelling and tenderness present. Decreased range of motion. Normal capillary refill. Normal pulse.        Hands:       Comments: Right pointer finger   Neurological:      Mental  Status: He is alert.        Laboratory:  CBC:  Lab Results   Component Value Date    WBC 5.39 05/04/2023    RBC 4.27 (L) 05/04/2023    HGB 13.4 (L) 05/04/2023    HCT 41.4 05/04/2023     05/04/2023    MCV 97 05/04/2023    MCH 31.4 (H) 05/04/2023    MCHC 32.4 05/04/2023    MCHC 33.5 10/06/2022    MCHC 32.9 03/31/2022     CMP:  Lab Results   Component Value Date     (H) 05/04/2023    CALCIUM 9.4 05/04/2023    ALBUMIN 3.5 05/04/2023    PROT 6.8 05/04/2023     05/04/2023    K 4.5 05/04/2023    CO2 27 05/04/2023     05/04/2023    BUN 23 05/04/2023    ALKPHOS 70 05/04/2023    ALT 20 05/04/2023    AST 19 05/04/2023    BILITOT 0.5 05/04/2023    BILITOT 0.7 10/06/2022    BILITOT 0.6 03/31/2022     URINALYSIS:  Lab Results   Component Value Date    COLORU Yellow 10/16/2020    SPECGRAV 1.020 10/16/2020    PHUR 5.0 10/16/2020    PROTEINUA Negative 10/16/2020    BACTERIA Many (A) 09/03/2014    NITRITE Negative 10/16/2020    LEUKOCYTESUR Negative 10/16/2020    UROBILINOGEN n 03/02/2017      LIPIDS:  Lab Results   Component Value Date    TSH 1.787 10/08/2021    TSH 1.778 10/16/2020    TSH 4.040 (H) 01/03/2020    HDL 64 08/19/2021    HDL 65 02/17/2021    HDL 79 (H) 10/16/2020    CHOL 207 (H) 08/19/2021    CHOL 231 (H) 02/17/2021    CHOL 232 (H) 10/16/2020    TRIG 79 08/19/2021    TRIG 97 02/17/2021    TRIG 79 10/16/2020    LDLCALC 127.2 08/19/2021    LDLCALC 146.6 02/17/2021    LDLCALC 137.2 10/16/2020    CHOLHDL 30.9 08/19/2021    CHOLHDL 28.1 02/17/2021    CHOLHDL 34.1 10/16/2020    NONHDLCHOL 143 08/19/2021    NONHDLCHOL 166 02/17/2021    NONHDLCHOL 153 10/16/2020    TOTALCHOLEST 3.2 08/19/2021    TOTALCHOLEST 3.6 02/17/2021    TOTALCHOLEST 2.9 10/16/2020     TSH:  Lab Results   Component Value Date    TSH 1.787 10/08/2021    TSH 1.778 10/16/2020    TSH 4.040 (H) 01/03/2020     A1C:  Lab Results   Component Value Date    HGBA1C 5.2 08/19/2021         Assessment/Plan     Norm Mendoza is a 89  y.o.male with:    Follow-up exam after treatment    Right hand pain  -     diclofenac sodium (VOLTAREN ARTHRITIS PAIN) 1 % Gel; Apply 2 Grams to painful right hand joint 2-3x/day as needed  Dispense: 100 g; Refill: 1         I spent 25 minutes on the day of this encounter for preparing for, evaluating, treating, and managing this patient.        -Continue current medications and maintain follow up with specialists.  Return to clinic in Follow up in about 2 weeks (around 2/28/2023), or if symptoms worsen or fail to improve.        Dawn R McCloskey, NP [facility]

## 2023-02-27 ENCOUNTER — TELEPHONE (OUTPATIENT)
Dept: DERMATOLOGY | Facility: CLINIC | Age: 88
End: 2023-02-27
Payer: MEDICARE

## 2023-02-27 NOTE — TELEPHONE ENCOUNTER
----- Message from Corie Carballo sent at 2/27/2023 10:57 AM CST -----  Regarding: pt called  Name of Who is Calling: LUIS M COLLAZO [[id number]]      What is the request in detail: pt has a concern with his left arm . He states it reminds him of a bite it red and circular and hasn't gone away. Please advise       Can the clinic reply by [facility]: No      What Number to Call Back if not in [facility]: [phone]

## 2023-02-28 ENCOUNTER — OFFICE VISIT (OUTPATIENT)
Dept: ELECTROPHYSIOLOGY | Facility: CLINIC | Age: 88
End: 2023-02-28
Payer: MEDICARE

## 2023-02-28 ENCOUNTER — HOSPITAL ENCOUNTER (OUTPATIENT)
Dept: CARDIOLOGY | Facility: CLINIC | Age: 88
Discharge: HOME | End: 2023-02-28
Payer: MEDICARE

## 2023-02-28 VITALS
BODY MASS INDEX: 24.87 KG/M2 | HEART RATE: 72 BPM | HEIGHT: 72 IN | WEIGHT: 183.63 LBS | DIASTOLIC BLOOD PRESSURE: 60 MMHG | SYSTOLIC BLOOD PRESSURE: 90 MMHG

## 2023-02-28 DIAGNOSIS — I48.91 ATRIAL FIBRILLATION, UNSPECIFIED TYPE: ICD-10-CM

## 2023-02-28 DIAGNOSIS — I70.0 AORTIC ATHEROSCLEROSIS: ICD-10-CM

## 2023-02-28 DIAGNOSIS — I11.0 HYPERTENSIVE HEART DISEASE WITH HEART FAILURE: ICD-10-CM

## 2023-02-28 PROCEDURE — 99214 OFFICE O/P EST MOD 30 MIN: CPT | Mod: HCNC,S$GLB,, | Performed by: INTERNAL MEDICINE

## 2023-02-28 PROCEDURE — 1100F PR PT FALLS ASSESS DOC 2+ FALLS/FALL W/INJURY/YR: ICD-10-PCS | Mod: HCNC,CPTII,S$GLB, | Performed by: INTERNAL MEDICINE

## 2023-02-28 PROCEDURE — 93005 ELECTROCARDIOGRAM TRACING: CPT | Mod: HCNC,S$GLB,, | Performed by: INTERNAL MEDICINE

## 2023-02-28 PROCEDURE — 1125F PR PAIN SEVERITY QUANTIFIED, PAIN PRESENT: ICD-10-PCS | Mod: HCNC,CPTII,S$GLB, | Performed by: INTERNAL MEDICINE

## 2023-02-28 PROCEDURE — 99999 PR PBB SHADOW E&M-EST. PATIENT-LVL III: ICD-10-PCS | Mod: PBBFAC,HCNC,, | Performed by: INTERNAL MEDICINE

## 2023-02-28 PROCEDURE — 1125F AMNT PAIN NOTED PAIN PRSNT: CPT | Mod: HCNC,CPTII,S$GLB, | Performed by: INTERNAL MEDICINE

## 2023-02-28 PROCEDURE — 3288F PR FALLS RISK ASSESSMENT DOCUMENTED: ICD-10-PCS | Mod: HCNC,CPTII,S$GLB, | Performed by: INTERNAL MEDICINE

## 2023-02-28 PROCEDURE — 99214 PR OFFICE/OUTPT VISIT, EST, LEVL IV, 30-39 MIN: ICD-10-PCS | Mod: HCNC,S$GLB,, | Performed by: INTERNAL MEDICINE

## 2023-02-28 PROCEDURE — 99499 UNLISTED E&M SERVICE: CPT | Mod: HCNC,S$GLB,, | Performed by: INTERNAL MEDICINE

## 2023-02-28 PROCEDURE — 99999 PR PBB SHADOW E&M-EST. PATIENT-LVL III: CPT | Mod: PBBFAC,HCNC,, | Performed by: INTERNAL MEDICINE

## 2023-02-28 PROCEDURE — 1159F MED LIST DOCD IN RCRD: CPT | Mod: HCNC,CPTII,S$GLB, | Performed by: INTERNAL MEDICINE

## 2023-02-28 PROCEDURE — 1100F PTFALLS ASSESS-DOCD GE2>/YR: CPT | Mod: HCNC,CPTII,S$GLB, | Performed by: INTERNAL MEDICINE

## 2023-02-28 PROCEDURE — 93005 RHYTHM STRIP: ICD-10-PCS | Mod: HCNC,S$GLB,, | Performed by: INTERNAL MEDICINE

## 2023-02-28 PROCEDURE — 3288F FALL RISK ASSESSMENT DOCD: CPT | Mod: HCNC,CPTII,S$GLB, | Performed by: INTERNAL MEDICINE

## 2023-02-28 PROCEDURE — 1159F PR MEDICATION LIST DOCUMENTED IN MEDICAL RECORD: ICD-10-PCS | Mod: HCNC,CPTII,S$GLB, | Performed by: INTERNAL MEDICINE

## 2023-02-28 PROCEDURE — 99499 RISK ADDL DX/OHS AUDIT: ICD-10-PCS | Mod: HCNC,S$GLB,, | Performed by: INTERNAL MEDICINE

## 2023-02-28 PROCEDURE — 93010 ELECTROCARDIOGRAM REPORT: CPT | Mod: HCNC,S$GLB,, | Performed by: INTERNAL MEDICINE

## 2023-02-28 PROCEDURE — 93010 RHYTHM STRIP: ICD-10-PCS | Mod: HCNC,S$GLB,, | Performed by: INTERNAL MEDICINE

## 2023-02-28 NOTE — PROGRESS NOTES
Subjective:    Patient ID:  Norm Mendoza is a 89 y.o. male who presents for follow-up of Atrial Fibrillation      89 yoM here for AF management.     6/18/19: He has had development of HF as well as persistent AF. He was in NSR 2014. The next ECG 11/18 showed AF. EF 5/18 was normal in NSR. He was started on OAC 12/18 for AF. 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 He underwent Rituximab therapy and had resolution of his ulcers 4/19 but had residual MALT. He did not receive radiation.  He has mild HF symptoms, treated with lasix. Stress echo 5/19 showed EF 40%. He was prescribed xarelto 5/30/19 but he has not taken it.    10/19: He was cardioverted 7/12/19 with mild improvement in symptoms. EF 45% in NSR. He asks about Watchman.     8/2020: Watchman 10/2019, LAAO sealed 12/19, EF normal. Remains on amiodarone 200 mg qd. HRs lower than normal, also with fatigue and some weight gain. He takes lasix PRN.     11/2020: some confusion on his meds. He should be off amiodarone and off clopidogrel. BP elevated today.     6/21: Started amlodipine 2.5 bid for HTN last visit. BP improved. Now takes 2.5 once a day. Remains on metoprolol.     2/22: Persistent AF since the summer with some LE edema and DUGAN.     3/22: JACINTO/CV 2/8/22 with NSR 2/15/22. In AF today.     8/22: He cut back on EtOH. Still able to do his desired activities but has some limitation. Remains in AF today.     Interval history: Covid December 2022 with no need for hospitalization. Stable symptoms on no AVN agents. He is active, able to work out without limitation    10/21 echo:  · There is moderate aortic valve stenosis.  · Aortic valve area is 1.03 cm2; peak velocity is 3.16 m/s; mean gradient is 30 mmHg.  · The left ventricle is normal in size with mildly decreased systolic function.  · The estimated ejection fraction is 45%.  · Left ventricular diastolic dysfunction.  · Moderate to severe left atrial  enlargement.  · Mild mitral regurgitation.  · Mild tricuspid regurgitation.  · There is abnormal septal wall motion consistent with left bundle branch block.  · Mild right ventricular enlargement with normal right ventricular systolic function.  · The quantitatively derived ejection fraction is 43%.  · Mild aortic regurgitation.  · The estimated PA systolic pressure is 39 mmHg.  · Intermediate central venous pressure (8 mmHg).     Echo 5/18:  CONCLUSIONS     1 - Normal left ventricular systolic function (EF 55-60%).     2 - Indeterminate LV diastolic function.     3 - Biatrial enlargement.     4 - No wall motion abnormalities.     5 - Normal right ventricular systolic function .     6 - Mildly enlarged ascending aorta.     7 - Trivial to mild aortic regurgitation.     8 - Mild mitral regurgitation.     9 - Mild tricuspid regurgitation.     10 - Trivial to mild pulmonic regurgitation.     11 - The estimated PA systolic pressure is 33 mmHg.     Stress echo 5/19:  · THE PATIENT IS IN ATRIAL FIBRILLATION FOR THE ENTIRETY OF THE TEST  · The patient reported no symptoms during the stress test.  · The test was stopped secondary to fatigue.  · There were no arrhythmias during stress.  · Mildly decreased left ventricular systolic function. The estimated ejection fraction is 40%, 2D quantitative LVEF 33%.  · Grade II (moderate) left ventricular diastolic dysfunction consistent with pseudonormalization.  · Elevated left atrial pressure.  · Severe left atrial enlargement.  · The ascending aorta is moderately dilated.  · Mild tricuspid regurgitation.  · Low normal right ventricular systolic function.  · Moderate right atrial enlargement.  · Concentric left ventricular remodeling.  · Overall, the patient's exercise capacity was normal.  · The EKG portion of this study is negative for myocardial ischemia.  · No obvious wall motion abnormalities at stress. LV function augments but still not normal at stress.    Past Medical  History:  07/13/2017: Alcohol dependence, daily use  No date: Allergy  No date: Bladder cancer      Comment:  tumor that was benign   No date: Cataract  02/04/2016: H/O nonmelanoma skin cancer  No date: Hematuria  No date: Hypertension  12/20/2018: MALT lymphoma  05/02/2018: Mixed hyperlipidemia  07/30/2019: On continuous oral anticoagulation  11/30/2018: Paroxysmal atrial fibrillation      Comment:  S/P D/C Cardioversion 7/2019, S/P Watchman device 8/2020  10/2019: Presence of Watchman left atrial appendage closure device      Comment:  Dr Crowder  No date: PUD (peptic ulcer disease)      Comment:  GI Bleeding 11/2018: Anticoagulant D/S'd and Gastric                MALT tumor Dx'd  No date: Skin cancer      Comment:  x3, had mohs on nose  excised 12/5/14: Squamous cell carcinoma      Comment:  R lower back  12/06/2018: Symptomatic anemia  No date: Urinary tract infection      Comment:  x4    Past Surgical History:  No date: ACHILLES TENDON SURGERY  2013: CATARACT EXTRACTION W/  INTRAOCULAR LENS IMPLANT; Bilateral  10/11/2019: CLOSURE OF LEFT ATRIAL APPENDAGE USING DEVICE; N/A      Comment:  Procedure: Left atrial appendage closure device;                 Surgeon: Hi Crowder MD;  Location: [facility] EP LAB;                Service: Cardiology;  Laterality: N/A;  AF, JACINTO, Watchman               Implant, BSci, Gen, MB, 3 Prep  No date: CYSTOSCOPY      Comment:  bladder tumor  12/7/2018: ESOPHAGOGASTRODUODENOSCOPY; N/A      Comment:  Procedure: EGD (ESOPHAGOGASTRODUODENOSCOPY);  Surgeon:                Austin Garcia MD;  Location: [facility] (2ND FLR);                 Service: Endoscopy;  Laterality: N/A;  4/12/2019: ESOPHAGOGASTRODUODENOSCOPY; N/A      Comment:  Procedure: EGD (ESOPHAGOGASTRODUODENOSCOPY);  Surgeon:                Austin Garcia MD;  Location: [facility] (4TH FLR);                 Service: Endoscopy;  Laterality: N/A;  please schedule                within 4 weeks  5/27/2021:  ESOPHAGOGASTRODUODENOSCOPY; N/A      Comment:  Procedure: EGD (ESOPHAGOGASTRODUODENOSCOPY);  Surgeon:                Austin Garcia MD;  Location: [facility] ([address] [place] [address]);                 Service: Endoscopy;  Laterality: N/A;  per Dr. Garcia done                within the month with any provider/ ordered by                [email or web address] floor due to comorbiditiesWatchman                [facility]   No date: SKIN CANCER EXCISION  2019: TREATMENT OF CARDIAC ARRHYTHMIA; N/A      Comment:  Procedure: Cardioversion or Defibrillation;  Surgeon:                Hi Crowder MD;  Location: [facility] EP LAB;  Service:               Cardiology;  Laterality: N/A;  AF, JACINTO, DCCV, MAC, MB, 3                Prep  2022: TREATMENT OF CARDIAC ARRHYTHMIA; N/A      Comment:  Procedure: Cardioversion or Defibrillation;  Surgeon:                Susan Orozco NP;  Location: [facility] EP LAB;  Service:                Cardiology;  Laterality: N/A;  afib, DCCV, anes, MB, 3                Prep    Social History    Socioeconomic History      Marital status:       Number of children: 3    Occupational History      Occupation: Financial Work/        Employer: retired      Occupation: actor      Occupation:     Tobacco Use      Smoking status: Former        Packs/day: 1.00        Years: 25.00        Pack years: 25        Types: Cigarettes        Quit date: 9/3/1970        Years since quittin.5      Smokeless tobacco: Never    Substance and Sexual Activity      Alcohol use: Yes        Alcohol/week: 3.0 standard drinks        Types: 3 Shots of liquor per week        Comment: 3 bourbons daily - 12 beer on weekends       Drug use: No      Sexual activity: Yes        Partners: Female    Social History Narrative      He is  with 2/3 kids living(2 sons/1 daughter who passed away); He has 6 Grandkids(5 under 10 y/o); He has 1 son here in [place] /1 grandson at [facility]; His other son(3  kids) lives in [place]; His Grand-daughter(Her Mother passed away) with her 2 kids lives in [place]      He had worked for [employer] which has dissolved but now works for [employer]    Review of patient's family history indicates:    Current Outpatient Medications:  amLODIPine (NORVASC) 2.5 MG tablet, Take 1 tablet (2.5 mg total) by mouth once daily., Disp: 90 tablet, Rfl: 1  aspirin (ECOTRIN) 81 MG EC tablet, Take 81 mg by mouth once daily., Disp: , Rfl:   clobetasoL (TEMOVATE) 0.05 % external solution, Pt to mix in 1 jar of cerave cream and apply to affected areas bid, Disp: 50 mL, Rfl: 3  cyanocobalamin (VITAMIN B-12) 1000 MCG tablet, Take 1,000 mcg by mouth once daily., Disp: , Rfl:   diclofenac sodium (VOLTAREN ARTHRITIS PAIN) 1 % Gel, Apply 2 Grams to painful right hand joint 2-3x/day as needed, Disp: 100 g, Rfl: 1  furosemide (LASIX) 20 MG tablet, Take 20 mg by mouth as needed. Pt takes 5 tabs weekly as needed, Disp: , Rfl:   irbesartan (AVAPRO) 150 MG tablet, Take 1 tablet (150 mg total) by mouth once daily., Disp: 90 tablet, Rfl: 2  metoprolol succinate (TOPROL-XL) 25 MG 24 hr tablet, Take 0.5 tablets (12.5 mg total) by mouth once daily., Disp: 90 tablet, Rfl: 1  multivitamin (THERAGRAN) per tablet, Take 1 tablet by mouth once daily., Disp: , Rfl:   mupirocin (BACTROBAN) 2 % ointment, Apply topically 3 (three) times daily., Disp: 22 g, Rfl: 0  psyllium husk (METAMUCIL ORAL), Take by mouth., Disp: , Rfl:   amoxicillin-clavulanate 875-125mg (AUGMENTIN) 875-125 mg per tablet, Take 1 tablet by mouth every 12 (twelve) hours., Disp: 14 tablet, Rfl: 0  azithromycin (Z-ANNI) 250 MG tablet, Take 1 tablet (250 mg total) by mouth once daily., Disp: 6 tablet, Rfl: 0  benzonatate (TESSALON) 200 MG capsule, 1 cap every 8 hours as needed for cough, Disp: 30 capsule, Rfl: 0  benzonatate (TESSALON) 200 MG capsule, 1 every 8 hours as needed for cough, Disp: 30 capsule, Rfl: 0  cetirizine (ZYRTEC) 10 MG  tablet, Take 1 tablet (10 mg total) by mouth once daily., Disp: 30 tablet, Rfl: 0  fluticasone propionate (FLONASE) 50 mcg/actuation nasal spray, SHAKE LIQUID AND USE 2 SPRAYS(100 MCG) IN EACH NOSTRIL EVERY DAY (Patient not taking: Reported on 2/28/2023), Disp: 48 g, Rfl: 0  ipratropium (ATROVENT) 21 mcg (0.03 %) nasal spray, 2 sprays by Each Nostril route 2 (two) times daily as needed for Rhinitis. (Patient not taking: Reported on 2/14/2023), Disp: 30 mL, Rfl: 0  predniSONE (DELTASONE) 20 MG tablet, 2 tabs daily with food x 5 days, Disp: 10 tablet, Rfl: 0    Current Facility-Administered Medications:  lidocaine HCl 2% urojet, , Urethral, Once, Grover Ochoa Jr., MD        Review of Systems   Constitutional: Negative for malaise/fatigue.   Cardiovascular:  Positive for dyspnea on exertion. Negative for chest pain, irregular heartbeat, leg swelling and palpitations.   Respiratory:  Positive for shortness of breath.    Hematologic/Lymphatic: Negative for bleeding problem.   Skin:  Negative for rash.   Musculoskeletal:  Negative for myalgias.   Gastrointestinal:  Negative for hematemesis, hematochezia and nausea.   Genitourinary:  Negative for hematuria.   Neurological:  Positive for weakness. Negative for light-headedness.   Psychiatric/Behavioral:  Negative for altered mental status.    Allergic/Immunologic: Negative for persistent infections.      Objective:    Physical Exam  Vitals and nursing note reviewed.   Constitutional:       Appearance: Normal appearance. He is well-developed.   HENT:      Head: Normocephalic.      Nose: Nose normal.   Eyes:      Pupils: Pupils are equal, round, and reactive to light.   Cardiovascular:      Rate and Rhythm: Normal rate. Rhythm irregular.   Pulmonary:      Effort: No respiratory distress.      Breath sounds: Normal breath sounds.   Musculoskeletal:         General: Normal range of motion.   Skin:     General: Skin is warm and dry.      Findings: No erythema.   Neurological:       Mental Status: He is alert and oriented to person, place, and time.   Psychiatric:         Speech: Speech normal.         Behavior: Behavior normal.     ECg: AF with controlled V rate nl QRS        Assessment:       1. Hypertensive heart disease with heart failure    2. Atrial fibrillation, unspecified type    3. Aortic atherosclerosis         Plan:       89 yoM here for AF management. Stable, persistent AF. Not on AVN agents or OAC (s/p LAAO). Continue current therapy. RTC 1y

## 2023-03-06 DIAGNOSIS — L98.9 SKIN LESION OF LEFT UPPER EXTREMITY: Primary | ICD-10-CM

## 2023-03-11 ENCOUNTER — OFFICE VISIT (OUTPATIENT)
Dept: URGENT CARE | Facility: CLINIC | Age: 88
End: 2023-03-11
Payer: MEDICARE

## 2023-03-11 VITALS
RESPIRATION RATE: 16 BRPM | TEMPERATURE: 98 F | DIASTOLIC BLOOD PRESSURE: 84 MMHG | SYSTOLIC BLOOD PRESSURE: 146 MMHG | OXYGEN SATURATION: 96 % | HEIGHT: 72 IN | HEART RATE: 62 BPM | WEIGHT: 183.63 LBS | BODY MASS INDEX: 24.87 KG/M2

## 2023-03-11 DIAGNOSIS — D49.2 ABNORMAL SKIN GROWTH: Primary | ICD-10-CM

## 2023-03-11 PROCEDURE — 99214 PR OFFICE/OUTPT VISIT, EST, LEVL IV, 30-39 MIN: ICD-10-PCS | Mod: S$GLB,,, | Performed by: EMERGENCY MEDICINE

## 2023-03-11 PROCEDURE — 99214 OFFICE O/P EST MOD 30 MIN: CPT | Mod: S$GLB,,, | Performed by: EMERGENCY MEDICINE

## 2023-03-11 RX ORDER — MUPIROCIN 20 MG/G
OINTMENT TOPICAL
Qty: 22 G | Refills: 1 | Status: SHIPPED | OUTPATIENT
Start: 2023-03-11

## 2023-03-11 NOTE — PROGRESS NOTES
Subjective:       Patient ID: Norm Mendoza is a 89 y.o. male.    Vitals:  height is 6' (1.829 m) and weight is 83.3 kg (183 lb 10.3 oz). His oral temperature is 97.7 °F (36.5 °C). His blood pressure is 146/84 (abnormal) and his pulse is 62. His respiration is 16 and oxygen saturation is 96%.     Chief Complaint: No chief complaint on file.    Patient present today with abscess to the left forearm starting about two months ago. Patient states that it is painful and he does have an appointment with derm in June.     Patient presents with a mildly painful, nonhealer wound to the left forearm for 2 months. Has dermatology appointment for June, however asking to be expedited. Exam shows raised, mildly tender, scaling lesion, concerning for abnormal growth. Placed on Bactroban ointment for local inflammation, and have referred to Dermatology as urgent to help expedite. Informned that was concerned about basal cell or squamous cell and that he needs removal/biopsy.    Rash  This is a new problem. The current episode started more than 1 month ago. The problem is unchanged. The affected locations include the left arm. The rash is characterized by pain and redness. He was exposed to nothing. Pertinent negatives include no cough, fever, shortness of breath or sore throat.   ros    Constitution: Negative for chills and fever.   HENT:  Negative for postnasal drip, sinus pain and sore throat.    Neck: Negative for neck pain and neck stiffness.   Cardiovascular:  Negative for chest pain and palpitations.   Respiratory:  Negative for cough and shortness of breath.    Genitourinary:  Negative for dysuria and hematuria.   Musculoskeletal:  Negative for joint pain.   Skin:  Positive for wound, lesion and skin thickening/induration. Negative for rash, laceration and erythema.   Neurological:  Negative for altered mental status, numbness and tingling.   Psychiatric/Behavioral:  Negative for altered mental status.      Objective:       Physical Exam   Constitutional: He is oriented to person, place, and time. He appears well-developed.   HENT:   Head: Normocephalic and atraumatic. Head is without abrasion, without contusion and without laceration.   Ears:   Right Ear: External ear normal.   Left Ear: External ear normal.   Nose: Nose normal.   Mouth/Throat: Oropharynx is clear and moist and mucous membranes are normal.   Eyes: Conjunctivae, EOM and lids are normal. Pupils are equal, round, and reactive to light.   Neck: Trachea normal and phonation normal. Neck supple.   Cardiovascular: Normal rate, regular rhythm and normal heart sounds.   Pulmonary/Chest: Effort normal and breath sounds normal. No stridor. No respiratory distress.   Musculoskeletal: Normal range of motion.         General: Normal range of motion.   Neurological: He is alert and oriented to person, place, and time.   Skin: Skin is warm, dry, intact and no rash. Capillary refill takes less than 2 seconds. lesion No abrasion, No burn, No bruising, No erythema and No ecchymosis         Comments: Irritation left colar forearm, 1 cm by 1 cm lesions, mildly painful, scaling, raised and ddx includes ca, granuloma, among other. Needs derm for removal/biopsy   Psychiatric: His speech is normal and behavior is normal. Judgment and thought content normal.   Nursing note and vitals reviewed.       Assessment:       1. Abnormal skin growth          Plan:         Abnormal skin growth  -     Cancel: Ambulatory referral/consult to Dermatology  -     Ambulatory referral/consult to Dermatology    Other orders  -     mupirocin (BACTROBAN) 2 % ointment; Apply to affected area 3 times daily  Dispense: 22 g; Refill: 1      Patient Instructions   URGENT REFERRAL TO DERM MADE TO EXPEDITE APPOINTMENT   [facility]  RETURN FOR ANY CONCERNS OR PROBLEMS  CALL [phone] TO ASK ABOUT APPOINTMENT NEXT WEEK  YOU NEED REMOVAL AND/OR BIOPSY OF THE LEFT FOREARM LESION TO RULE OUT SKIN CANCER,  AND THIS IS TO BE DONE BY DERMATOLOGY.   ALSO FOLLOW UP WITH YOUR PRIMARY CARE MD

## 2023-03-11 NOTE — PATIENT INSTRUCTIONS
URGENT REFERRAL TO DERM MADE TO EXPEDITE APPOINTMENT   [facility]  RETURN FOR ANY CONCERNS OR PROBLEMS  CALL [phone] TO ASK ABOUT APPOINTMENT NEXT WEEK  YOU NEED REMOVAL AND/OR BIOPSY OF THE LEFT FOREARM LESION TO RULE OUT SKIN CANCER, AND THIS IS TO BE DONE BY DERMATOLOGY.   ALSO FOLLOW UP WITH YOUR PRIMARY CARE MD

## 2023-03-14 ENCOUNTER — TELEPHONE (OUTPATIENT)
Dept: DERMATOLOGY | Facility: CLINIC | Age: 88
End: 2023-03-14
Payer: MEDICARE

## 2023-03-14 NOTE — TELEPHONE ENCOUNTER
----- Message from Deandra Petersen sent at 3/14/2023 10:44 AM CDT -----  Regarding: Sooner appt  Contact: NORM COLLAZO [[id number]]  Type:  Sooner Apoointment Request    Caller is requesting a sooner appointment.  Caller declined first available appointment listed below.  Caller will not accept being placed on the waitlist and is requesting a message be sent to doctor.  Name of Caller:Norm Collazo    When is the first available appointment?5/11  Symptoms:ER F/u appt   Would the patient rather a call back or a response via [facility]? Call back   Best Call Back Number: Home Phone      [phone]  Work Phone      Not on file.  Mobile          [phone]      Additional Information:

## 2023-04-03 ENCOUNTER — OFFICE VISIT (OUTPATIENT)
Dept: DERMATOLOGY | Facility: CLINIC | Age: 88
End: 2023-04-03
Payer: MEDICARE

## 2023-04-03 DIAGNOSIS — Z12.83 SCREENING EXAM FOR SKIN CANCER: ICD-10-CM

## 2023-04-03 DIAGNOSIS — Z85.828 HISTORY OF NONMELANOMA SKIN CANCER: ICD-10-CM

## 2023-04-03 DIAGNOSIS — L81.4 LENTIGO: ICD-10-CM

## 2023-04-03 DIAGNOSIS — L82.1 SEBORRHEIC KERATOSES: ICD-10-CM

## 2023-04-03 DIAGNOSIS — D48.5 NEOPLASM OF UNCERTAIN BEHAVIOR OF SKIN: Primary | ICD-10-CM

## 2023-04-03 PROCEDURE — 11102 TANGNTL BX SKIN SINGLE LES: CPT | Mod: HCNC,S$GLB,, | Performed by: STUDENT IN AN ORGANIZED HEALTH CARE EDUCATION/TRAINING PROGRAM

## 2023-04-03 PROCEDURE — 99999 PR PBB SHADOW E&M-EST. PATIENT-LVL IV: CPT | Mod: PBBFAC,HCNC,, | Performed by: STUDENT IN AN ORGANIZED HEALTH CARE EDUCATION/TRAINING PROGRAM

## 2023-04-03 PROCEDURE — 1101F PT FALLS ASSESS-DOCD LE1/YR: CPT | Mod: HCNC,CPTII,S$GLB, | Performed by: STUDENT IN AN ORGANIZED HEALTH CARE EDUCATION/TRAINING PROGRAM

## 2023-04-03 PROCEDURE — 3288F FALL RISK ASSESSMENT DOCD: CPT | Mod: HCNC,CPTII,S$GLB, | Performed by: STUDENT IN AN ORGANIZED HEALTH CARE EDUCATION/TRAINING PROGRAM

## 2023-04-03 PROCEDURE — 99999 PR PBB SHADOW E&M-EST. PATIENT-LVL IV: ICD-10-PCS | Mod: PBBFAC,HCNC,, | Performed by: STUDENT IN AN ORGANIZED HEALTH CARE EDUCATION/TRAINING PROGRAM

## 2023-04-03 PROCEDURE — 99213 OFFICE O/P EST LOW 20 MIN: CPT | Mod: 25,HCNC,S$GLB, | Performed by: STUDENT IN AN ORGANIZED HEALTH CARE EDUCATION/TRAINING PROGRAM

## 2023-04-03 PROCEDURE — 1160F PR REVIEW ALL MEDS BY PRESCRIBER/CLIN PHARMACIST DOCUMENTED: ICD-10-PCS | Mod: HCNC,CPTII,S$GLB, | Performed by: STUDENT IN AN ORGANIZED HEALTH CARE EDUCATION/TRAINING PROGRAM

## 2023-04-03 PROCEDURE — 1159F PR MEDICATION LIST DOCUMENTED IN MEDICAL RECORD: ICD-10-PCS | Mod: HCNC,CPTII,S$GLB, | Performed by: STUDENT IN AN ORGANIZED HEALTH CARE EDUCATION/TRAINING PROGRAM

## 2023-04-03 PROCEDURE — 1101F PR PT FALLS ASSESS DOC 0-1 FALLS W/OUT INJ PAST YR: ICD-10-PCS | Mod: HCNC,CPTII,S$GLB, | Performed by: STUDENT IN AN ORGANIZED HEALTH CARE EDUCATION/TRAINING PROGRAM

## 2023-04-03 PROCEDURE — 88305 TISSUE EXAM BY PATHOLOGIST: ICD-10-PCS | Mod: 26,HCNC,, | Performed by: PATHOLOGY

## 2023-04-03 PROCEDURE — 1159F MED LIST DOCD IN RCRD: CPT | Mod: HCNC,CPTII,S$GLB, | Performed by: STUDENT IN AN ORGANIZED HEALTH CARE EDUCATION/TRAINING PROGRAM

## 2023-04-03 PROCEDURE — 88305 TISSUE EXAM BY PATHOLOGIST: CPT | Mod: 26,HCNC,, | Performed by: PATHOLOGY

## 2023-04-03 PROCEDURE — 88305 TISSUE EXAM BY PATHOLOGIST: CPT | Mod: HCNC | Performed by: PATHOLOGY

## 2023-04-03 PROCEDURE — 1160F RVW MEDS BY RX/DR IN RCRD: CPT | Mod: HCNC,CPTII,S$GLB, | Performed by: STUDENT IN AN ORGANIZED HEALTH CARE EDUCATION/TRAINING PROGRAM

## 2023-04-03 PROCEDURE — 1126F PR PAIN SEVERITY QUANTIFIED, NO PAIN PRESENT: ICD-10-PCS | Mod: HCNC,CPTII,S$GLB, | Performed by: STUDENT IN AN ORGANIZED HEALTH CARE EDUCATION/TRAINING PROGRAM

## 2023-04-03 PROCEDURE — 11102 PR TANGENTIAL BIOPSY, SKIN, SINGLE LESION: ICD-10-PCS | Mod: HCNC,S$GLB,, | Performed by: STUDENT IN AN ORGANIZED HEALTH CARE EDUCATION/TRAINING PROGRAM

## 2023-04-03 PROCEDURE — 99213 PR OFFICE/OUTPT VISIT, EST, LEVL III, 20-29 MIN: ICD-10-PCS | Mod: 25,HCNC,S$GLB, | Performed by: STUDENT IN AN ORGANIZED HEALTH CARE EDUCATION/TRAINING PROGRAM

## 2023-04-03 PROCEDURE — 1126F AMNT PAIN NOTED NONE PRSNT: CPT | Mod: HCNC,CPTII,S$GLB, | Performed by: STUDENT IN AN ORGANIZED HEALTH CARE EDUCATION/TRAINING PROGRAM

## 2023-04-03 PROCEDURE — 3288F PR FALLS RISK ASSESSMENT DOCUMENTED: ICD-10-PCS | Mod: HCNC,CPTII,S$GLB, | Performed by: STUDENT IN AN ORGANIZED HEALTH CARE EDUCATION/TRAINING PROGRAM

## 2023-04-03 NOTE — PATIENT INSTRUCTIONS
Shave Biopsy Wound Care    Your doctor has performed a shave biopsy today.  A band aid and vaseline ointment has been placed over the site.  This should remain in place for NO LONGER THAN 48 hours.  It is fine to remove the bandaid after 24 hours, if the area is no longer bleeding. It is recommended that you keep the area dry (do not wet)) for the first 24 hours.  After 24 hours, wash the area with warm soap and water and apply Vaseline jelly.  Many patients prefer to use Neosporin or Bacitracin ointment.  This is acceptable; however, know that you can develop an allergy to this medication even if you have used it safely for years.  It is important to keep the area moist.  Letting it dry out and get air slows healing time, and will worsen the scar.        If you notice increasing redness, tenderness, pain, or yellow drainage at the biopsy site, please notify your doctor.  These are signs of an infection.    If your biopsy site is bleeding, apply firm pressure for 15 minutes straight.  Repeat for another 15 minutes, if it is still bleeding.   If the surgical site continues to bleed, then please contact your doctor.      For [facility] users:   You will receive your biopsy results in [facility] as soon as they are available. Please be assured that your physician/provider will review your results and will then determine what further treatment, evaluation, or planning is required. You should be contacted by your physician's/provider's office within 5 business days of receiving your results; If not, please reach out to directly. This is one more way [employer] is putting you first.     [facility] [place] [address]/ [phone] FAX/ [email or web address]

## 2023-04-03 NOTE — PROGRESS NOTES
Subjective:      Patient ID:  Norm Mendoza is a 89 y.o. male who presents for   Chief Complaint   Patient presents with    Lesion     L arm    Mole     Behind R ear     Lesion    Mole    Patient with new complaint of lesion(s)  Location: L arm  Duration: 3 months  Symptoms: scaly; growing; painful  Relieving factors/Previous treatments: none    Patient with new complaint of lesion(s)  Location: behind R ear  Duration: 1 yr  Symptoms: large  Relieving factors/Previous treatments: none      Review of Systems   Skin:  Positive for activity-related sunscreen use. Negative for daily sunscreen use, tendency to form keloidal scars and recent sunburn.   Hematologic/Lymphatic: Bruises/bleeds easily.     Objective:   Physical Exam   Constitutional: He appears well-developed and well-nourished. No distress.   HENT:   Mouth/Throat: Lips normal.    Eyes: Lids are normal.    Neurological: He is alert and oriented to person, place, and time. He is not disoriented.   Psychiatric: He has a normal mood and affect.   Skin:   Areas Examined (abnormalities noted in diagram):   Scalp / Hair Palpated and Inspected  Head / Face Inspection Performed  Neck Inspection Performed  RUE Inspected  LUE Inspection Performed               Diagram Legend     Erythematous scaling macule/papule c/w actinic keratosis       Vascular papule c/w angioma      Pigmented verrucoid papule/plaque c/w seborrheic keratosis      Yellow umbilicated papule c/w sebaceous hyperplasia      Irregularly shaped tan macule c/w lentigo     1-2 mm smooth white papules consistent with Milia      Movable subcutaneous cyst with punctum c/w epidermal inclusion cyst      Subcutaneous movable cyst c/w pilar cyst      Firm pink to brown papule c/w dermatofibroma      Pedunculated fleshy papule(s) c/w skin tag(s)      Evenly pigmented macule c/w junctional nevus     Mildly variegated pigmented, slightly irregular-bordered macule c/w mildly atypical nevus      Flesh  colored to evenly pigmented papule c/w intradermal nevus       Pink pearly papule/plaque c/w basal cell carcinoma      Erythematous hyperkeratotic cursted plaque c/w SCC      Surgical scar with no sign of skin cancer recurrence      Open and closed comedones      Inflammatory papules and pustules      Verrucoid papule consistent consistent with wart     Erythematous eczematous patches and plaques     Dystrophic onycholytic nail with subungual debris c/w onychomycosis     Umbilicated papule    Erythematous-base heme-crusted tan verrucoid plaque consistent with inflamed seborrheic keratosis     Erythematous Silvery Scaling Plaque c/w Psoriasis     See annotation            Assessment / Plan:      Pathology Orders:       Normal Orders This Visit    Specimen to Pathology, Dermatology     Questions:    Procedure Type: Dermatology and skin neoplasms    Number of Specimens: 1    ------------------------: -------------------------    Spec 1 Procedure: Biopsy    Spec 1 Clinical Impression: keratoacanthoma    Spec 1 Source: left ventral forearm    Release to patient: Immediate          Neoplasm of uncertain behavior of skin  -     Ambulatory referral/consult to Dermatology  -     Specimen to Pathology, Dermatology    Shave biopsy procedure note:    Shave biopsy performed after verbal consent including risk of infection, scar, recurrence, need for additional treatment of site. Area prepped with alcohol, anesthetized with approximately 1.0cc of 1% lidocaine with epinephrine. Lesional tissue shaved with razor blade. Hemostasis achieved with application of aluminum chloride followed by hyfrecation. No complications. Dressing applied. Wound care explained.    Base was treated with ed&c. If c/w KA, would still recommend re-excision    Seborrheic keratoses  Lentigo  Reassurance given to patient. No treatment is necessary.   Treatment of benign, asymptomatic lesions may be considered cosmetic.    History of nonmelanoma skin  cancer  Screening exam for skin cancer  Area(s) of previous NMSC evaluated with no signs of recurrence.    Upper body skin examination performed today including at least 6 points as noted in physical examination. Suspicious lesions noted.    Recommend daily sun protection/avoidance and use of at least SPF 30, broad spectrum sunscreen (OTC drug).              Follow up in about 6 months (around 10/3/2023).

## 2023-04-10 LAB
FINAL PATHOLOGIC DIAGNOSIS: NORMAL
GROSS: NORMAL
Lab: NORMAL
MICROSCOPIC EXAM: NORMAL

## 2023-04-11 ENCOUNTER — PES CALL (OUTPATIENT)
Dept: ADMINISTRATIVE | Facility: CLINIC | Age: 88
End: 2023-04-11
Payer: MEDICARE

## 2023-04-21 ENCOUNTER — TELEPHONE (OUTPATIENT)
Dept: ADMINISTRATIVE | Facility: CLINIC | Age: 88
End: 2023-04-21
Payer: MEDICARE

## 2023-04-24 ENCOUNTER — OFFICE VISIT (OUTPATIENT)
Dept: INTERNAL MEDICINE | Facility: CLINIC | Age: 88
End: 2023-04-24
Payer: MEDICARE

## 2023-04-24 VITALS
WEIGHT: 186.75 LBS | DIASTOLIC BLOOD PRESSURE: 70 MMHG | HEIGHT: 72 IN | HEART RATE: 64 BPM | BODY MASS INDEX: 25.29 KG/M2 | SYSTOLIC BLOOD PRESSURE: 132 MMHG

## 2023-04-24 DIAGNOSIS — F10.20 ALCOHOL DEPENDENCE, DAILY USE: ICD-10-CM

## 2023-04-24 DIAGNOSIS — I10 ESSENTIAL HYPERTENSION: ICD-10-CM

## 2023-04-24 DIAGNOSIS — I48.19 PERSISTENT ATRIAL FIBRILLATION: ICD-10-CM

## 2023-04-24 DIAGNOSIS — I11.0 HYPERTENSIVE HEART DISEASE WITH HEART FAILURE: ICD-10-CM

## 2023-04-24 DIAGNOSIS — C88.4 MALT (MUCOSA ASSOCIATED LYMPHOID TISSUE): ICD-10-CM

## 2023-04-24 DIAGNOSIS — Z91.81 AT RISK FOR FALLS: ICD-10-CM

## 2023-04-24 DIAGNOSIS — Z95.818 PRESENCE OF WATCHMAN LEFT ATRIAL APPENDAGE CLOSURE DEVICE: ICD-10-CM

## 2023-04-24 DIAGNOSIS — E78.2 MIXED HYPERLIPIDEMIA: ICD-10-CM

## 2023-04-24 DIAGNOSIS — Z00.00 ENCOUNTER FOR MEDICARE ANNUAL WELLNESS EXAM: Primary | ICD-10-CM

## 2023-04-24 DIAGNOSIS — I70.0 AORTIC ATHEROSCLEROSIS: ICD-10-CM

## 2023-04-24 PROCEDURE — 1159F PR MEDICATION LIST DOCUMENTED IN MEDICAL RECORD: ICD-10-PCS | Mod: HCNC,CPTII,S$GLB, | Performed by: NURSE PRACTITIONER

## 2023-04-24 PROCEDURE — 1100F PR PT FALLS ASSESS DOC 2+ FALLS/FALL W/INJURY/YR: ICD-10-PCS | Mod: HCNC,CPTII,S$GLB, | Performed by: NURSE PRACTITIONER

## 2023-04-24 PROCEDURE — G0439 PR MEDICARE ANNUAL WELLNESS SUBSEQUENT VISIT: ICD-10-PCS | Mod: HCNC,S$GLB,, | Performed by: NURSE PRACTITIONER

## 2023-04-24 PROCEDURE — 1100F PTFALLS ASSESS-DOCD GE2>/YR: CPT | Mod: HCNC,CPTII,S$GLB, | Performed by: NURSE PRACTITIONER

## 2023-04-24 PROCEDURE — G0439 PPPS, SUBSEQ VISIT: HCPCS | Mod: HCNC,S$GLB,, | Performed by: NURSE PRACTITIONER

## 2023-04-24 PROCEDURE — 1170F FXNL STATUS ASSESSED: CPT | Mod: HCNC,CPTII,S$GLB, | Performed by: NURSE PRACTITIONER

## 2023-04-24 PROCEDURE — 1160F PR REVIEW ALL MEDS BY PRESCRIBER/CLIN PHARMACIST DOCUMENTED: ICD-10-PCS | Mod: HCNC,CPTII,S$GLB, | Performed by: NURSE PRACTITIONER

## 2023-04-24 PROCEDURE — 99999 PR PBB SHADOW E&M-EST. PATIENT-LVL V: ICD-10-PCS | Mod: PBBFAC,HCNC,, | Performed by: NURSE PRACTITIONER

## 2023-04-24 PROCEDURE — 3288F PR FALLS RISK ASSESSMENT DOCUMENTED: ICD-10-PCS | Mod: HCNC,CPTII,S$GLB, | Performed by: NURSE PRACTITIONER

## 2023-04-24 PROCEDURE — 1170F PR FUNCTIONAL STATUS ASSESSED: ICD-10-PCS | Mod: HCNC,CPTII,S$GLB, | Performed by: NURSE PRACTITIONER

## 2023-04-24 PROCEDURE — 99999 PR PBB SHADOW E&M-EST. PATIENT-LVL V: CPT | Mod: PBBFAC,HCNC,, | Performed by: NURSE PRACTITIONER

## 2023-04-24 PROCEDURE — 1159F MED LIST DOCD IN RCRD: CPT | Mod: HCNC,CPTII,S$GLB, | Performed by: NURSE PRACTITIONER

## 2023-04-24 PROCEDURE — 3288F FALL RISK ASSESSMENT DOCD: CPT | Mod: HCNC,CPTII,S$GLB, | Performed by: NURSE PRACTITIONER

## 2023-04-24 PROCEDURE — 1160F RVW MEDS BY RX/DR IN RCRD: CPT | Mod: HCNC,CPTII,S$GLB, | Performed by: NURSE PRACTITIONER

## 2023-04-24 NOTE — PROGRESS NOTES
Norm Mendoza presented for a  Medicare AWV and comprehensive Health Risk Assessment today. The following components were reviewed and updated:    Medical history  Family History  Social history  Allergies and Current Medications  Health Risk Assessment  Health Maintenance  Care Team         ** See Completed Assessments for Annual Wellness Visit within the encounter summary.**         The following assessments were completed:  Living Situation  CAGE  Depression Screening  Timed Get Up and Go  Whisper Test  Cognitive Function Screening      Nutrition Screening  ADL Screening  PAQ Screening        Vitals:    04/24/23 0913   BP: 132/70   Pulse: 64   Weight: 84.7 kg (186 lb 11.7 oz)   Height: 6' (1.829 m)   Pain 0/10      Body mass index is 25.33 kg/m².  Physical Exam  Vitals reviewed.   Constitutional:       General: He is not in acute distress.     Appearance: Normal appearance.   HENT:      Head: Normocephalic and atraumatic.   Cardiovascular:      Rate and Rhythm: Normal rate and regular rhythm.      Pulses: Normal pulses.      Heart sounds: Normal heart sounds. No murmur heard.  Pulmonary:      Effort: Pulmonary effort is normal. No respiratory distress.      Breath sounds: Normal breath sounds.   Abdominal:      General: Bowel sounds are normal. There is no distension.      Palpations: Abdomen is soft.      Tenderness: There is no abdominal tenderness.   Musculoskeletal:      Right lower leg: No edema.      Left lower leg: No edema.   Skin:     General: Skin is warm and dry.      Capillary Refill: Capillary refill takes less than 2 seconds.   Neurological:      Mental Status: He is alert.      Coordination: Coordination normal.      Gait: Gait normal.   Psychiatric:         Mood and Affect: Mood normal.         Behavior: Behavior normal.         Cognition and Memory: Cognition and memory normal.             Diagnoses and health risks identified today and associated recommendations/orders:    1. Encounter for  Medicare annual wellness exam  - Chart reviewed. Problem list updated. Discussed current medical diagnosis, current medications, medical/surgical/family/social history; updated provider list; documented vital signs; identified any cognitive impairment; and updated risk factor list. Addressed any outstanding health maintenance. Provided patient with personalized health advice. Continue to follow up with PCP and any specialists.     2. Hypertensive heart disease with heart failure  Chronic; stable. Follow up with cardiology.    3. Persistent atrial fibrillation  Chronic; stable on medication. Follow up with EP.    4. Aortic atherosclerosis  Chronic; stable on medication. Follow up with cardiology.    5. 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 Continue to follow up with oncology.    6. Alcohol dependence, daily use  Chronic. Has decreased alcohol consumption.     7. Essential hypertension  Chronic; stable on medication. Follow up with PCP.    8. Mixed hyperlipidemia  Chronic; stable on medication. Follow up with PCP.        Provided Norm with a 5-10 year written screening schedule and personal prevention plan. Recommendations were developed using the USPSTF age appropriate recommendations. Education, counseling, and referrals were provided as needed. After Visit Summary printed and given to patient which includes a list of additional screenings\tests needed.    Follow up for your next annual wellness visit.    Cheri Lopez NP    I offered to discuss advanced care planning, including how to pick a person who would make decisions for you if you were unable to make them for yourself, called a health care power of , and what kind of decisions you might make such as use of life sustaining treatments such as ventilators and tube feeding when faced with a life limiting illness recorded on a living will that they will need to know. (How you want to be cared for as you near the end of  your natural life)     X Patient is interested in learning more about how to make advanced directives.  I provided them paperwork and offered to discuss this with them.

## 2023-04-24 NOTE — PATIENT INSTRUCTIONS
Counseling and Referral of Other Preventative  (Italic type indicates deductible and co-insurance are waived)    Patient Name: Norm Mendoza  Today's Date: 4/24/2023    Health Maintenance       Date Due Completion Date    Lipid Panel 08/19/2026 8/19/2021    TETANUS VACCINE 01/19/2027 1/19/2017        No orders of the defined types were placed in this encounter.      The following information is provided to all patients.  This information is to help you find resources for any of the problems found today that may be affecting your health:                Living healthy guide: www.[facility].[place].[facility]      Understanding Diabetes: www.diabetes.org      Eating healthy: www.cdc.gov/healthyweight      CDC home safety checklist: www.cdc.gov/steadi/patient.html      Agency on Aging: www.goea.[place].gov      Alcoholics anonymous (AA): www.aa.org      Physical Activity: www.elis.nih.gov/ab5helf      Tobacco use: www.quitwithusla.org

## 2023-04-25 DIAGNOSIS — I48.91 ATRIAL FIBRILLATION, UNSPECIFIED TYPE: ICD-10-CM

## 2023-04-25 DIAGNOSIS — I10 ESSENTIAL HYPERTENSION: ICD-10-CM

## 2023-04-25 RX ORDER — METOPROLOL SUCCINATE 25 MG/1
12.5 TABLET, EXTENDED RELEASE ORAL DAILY
Qty: 90 TABLET | Refills: 1 | Status: SHIPPED | OUTPATIENT
Start: 2023-04-25 | End: 2024-04-19

## 2023-05-04 ENCOUNTER — PROCEDURE VISIT (OUTPATIENT)
Dept: DERMATOLOGY | Facility: CLINIC | Age: 88
End: 2023-05-04
Payer: MEDICARE

## 2023-05-04 ENCOUNTER — LAB VISIT (OUTPATIENT)
Dept: LAB | Facility: HOSPITAL | Age: 88
End: 2023-05-04
Payer: MEDICARE

## 2023-05-04 ENCOUNTER — OFFICE VISIT (OUTPATIENT)
Dept: HEMATOLOGY/ONCOLOGY | Facility: CLINIC | Age: 88
End: 2023-05-04
Payer: MEDICARE

## 2023-05-04 VITALS
BODY MASS INDEX: 24.85 KG/M2 | WEIGHT: 183.44 LBS | HEART RATE: 62 BPM | TEMPERATURE: 98 F | SYSTOLIC BLOOD PRESSURE: 111 MMHG | OXYGEN SATURATION: 97 % | HEIGHT: 72 IN | DIASTOLIC BLOOD PRESSURE: 69 MMHG | RESPIRATION RATE: 22 BRPM

## 2023-05-04 DIAGNOSIS — K27.9 PUD (PEPTIC ULCER DISEASE): ICD-10-CM

## 2023-05-04 DIAGNOSIS — C88.4 MALT LYMPHOMA: Primary | ICD-10-CM

## 2023-05-04 DIAGNOSIS — C88.4 MALT LYMPHOMA: ICD-10-CM

## 2023-05-04 DIAGNOSIS — Z85.828 HISTORY OF SKIN CANCER: ICD-10-CM

## 2023-05-04 DIAGNOSIS — C88.4 MALT (MUCOSA ASSOCIATED LYMPHOID TISSUE): Primary | ICD-10-CM

## 2023-05-04 DIAGNOSIS — C44.92 SCCA (SQUAMOUS CELL CARCINOMA) OF SKIN: Primary | ICD-10-CM

## 2023-05-04 LAB
ALBUMIN SERPL BCP-MCNC: 3.5 G/DL (ref 3.5–5.2)
ALP SERPL-CCNC: 70 U/L (ref 55–135)
ALT SERPL W/O P-5'-P-CCNC: 20 U/L (ref 10–44)
ANION GAP SERPL CALC-SCNC: 6 MMOL/L (ref 8–16)
AST SERPL-CCNC: 19 U/L (ref 10–40)
BASOPHILS # BLD AUTO: 0.05 K/UL (ref 0–0.2)
BASOPHILS NFR BLD: 0.9 % (ref 0–1.9)
BILIRUB SERPL-MCNC: 0.5 MG/DL (ref 0.1–1)
BUN SERPL-MCNC: 23 MG/DL (ref 10–30)
CALCIUM SERPL-MCNC: 9.4 MG/DL (ref 8.7–10.5)
CHLORIDE SERPL-SCNC: 107 MMOL/L (ref 95–110)
CO2 SERPL-SCNC: 27 MMOL/L (ref 23–29)
CREAT SERPL-MCNC: 1.1 MG/DL (ref 0.5–1.4)
DIFFERENTIAL METHOD: ABNORMAL
EOSINOPHIL # BLD AUTO: 0 K/UL (ref 0–0.5)
EOSINOPHIL NFR BLD: 0.6 % (ref 0–8)
ERYTHROCYTE [DISTWIDTH] IN BLOOD BY AUTOMATED COUNT: 14.2 % (ref 11.5–14.5)
EST. GFR  (NO RACE VARIABLE): >60 ML/MIN/1.73 M^2
GLUCOSE SERPL-MCNC: 144 MG/DL (ref 70–110)
HCT VFR BLD AUTO: 41.4 % (ref 40–54)
HGB BLD-MCNC: 13.4 G/DL (ref 14–18)
IMM GRANULOCYTES # BLD AUTO: 0.03 K/UL (ref 0–0.04)
IMM GRANULOCYTES NFR BLD AUTO: 0.6 % (ref 0–0.5)
LDH SERPL L TO P-CCNC: 241 U/L (ref 110–260)
LYMPHOCYTES # BLD AUTO: 0.7 K/UL (ref 1–4.8)
LYMPHOCYTES NFR BLD: 13.5 % (ref 18–48)
MCH RBC QN AUTO: 31.4 PG (ref 27–31)
MCHC RBC AUTO-ENTMCNC: 32.4 G/DL (ref 32–36)
MCV RBC AUTO: 97 FL (ref 82–98)
MONOCYTES # BLD AUTO: 0.6 K/UL (ref 0.3–1)
MONOCYTES NFR BLD: 10.6 % (ref 4–15)
NEUTROPHILS # BLD AUTO: 4 K/UL (ref 1.8–7.7)
NEUTROPHILS NFR BLD: 73.8 % (ref 38–73)
NRBC BLD-RTO: 0 /100 WBC
PLATELET # BLD AUTO: 233 K/UL (ref 150–450)
PMV BLD AUTO: 9.3 FL (ref 9.2–12.9)
POTASSIUM SERPL-SCNC: 4.5 MMOL/L (ref 3.5–5.1)
PROT SERPL-MCNC: 6.8 G/DL (ref 6–8.4)
RBC # BLD AUTO: 4.27 M/UL (ref 4.6–6.2)
SODIUM SERPL-SCNC: 140 MMOL/L (ref 136–145)
WBC # BLD AUTO: 5.39 K/UL (ref 3.9–12.7)

## 2023-05-04 PROCEDURE — 80053 COMPREHEN METABOLIC PANEL: CPT | Mod: HCNC | Performed by: INTERNAL MEDICINE

## 2023-05-04 PROCEDURE — 3288F PR FALLS RISK ASSESSMENT DOCUMENTED: ICD-10-PCS | Mod: CPTII,GC,S$GLB, | Performed by: INTERNAL MEDICINE

## 2023-05-04 PROCEDURE — 88305 TISSUE EXAM BY PATHOLOGIST: ICD-10-PCS | Mod: 26,,, | Performed by: PATHOLOGY

## 2023-05-04 PROCEDURE — 99215 OFFICE O/P EST HI 40 MIN: CPT | Mod: GC,S$GLB,, | Performed by: INTERNAL MEDICINE

## 2023-05-04 PROCEDURE — 99499 UNLISTED E&M SERVICE: CPT | Mod: HCNC,S$GLB,, | Performed by: STUDENT IN AN ORGANIZED HEALTH CARE EDUCATION/TRAINING PROGRAM

## 2023-05-04 PROCEDURE — 85025 COMPLETE CBC W/AUTO DIFF WBC: CPT | Mod: HCNC | Performed by: INTERNAL MEDICINE

## 2023-05-04 PROCEDURE — 1126F PR PAIN SEVERITY QUANTIFIED, NO PAIN PRESENT: ICD-10-PCS | Mod: CPTII,GC,S$GLB, | Performed by: INTERNAL MEDICINE

## 2023-05-04 PROCEDURE — 99999 PR PBB SHADOW E&M-EST. PATIENT-LVL IV: CPT | Mod: PBBFAC,GC,, | Performed by: INTERNAL MEDICINE

## 2023-05-04 PROCEDURE — 12032 INTMD RPR S/A/T/EXT 2.6-7.5: CPT | Mod: HCNC,51,S$GLB, | Performed by: STUDENT IN AN ORGANIZED HEALTH CARE EDUCATION/TRAINING PROGRAM

## 2023-05-04 PROCEDURE — 88305 TISSUE EXAM BY PATHOLOGIST: CPT | Mod: 26,,, | Performed by: PATHOLOGY

## 2023-05-04 PROCEDURE — 1159F PR MEDICATION LIST DOCUMENTED IN MEDICAL RECORD: ICD-10-PCS | Mod: CPTII,GC,S$GLB, | Performed by: INTERNAL MEDICINE

## 2023-05-04 PROCEDURE — 99999 PR PBB SHADOW E&M-EST. PATIENT-LVL IV: ICD-10-PCS | Mod: PBBFAC,GC,, | Performed by: INTERNAL MEDICINE

## 2023-05-04 PROCEDURE — 1126F AMNT PAIN NOTED NONE PRSNT: CPT | Mod: CPTII,GC,S$GLB, | Performed by: INTERNAL MEDICINE

## 2023-05-04 PROCEDURE — 11602 PR EXC SKIN MALIG 1.1-2 CM TRUNK,ARM,LEG: ICD-10-PCS | Mod: HCNC,S$GLB,, | Performed by: STUDENT IN AN ORGANIZED HEALTH CARE EDUCATION/TRAINING PROGRAM

## 2023-05-04 PROCEDURE — 11602 EXC TR-EXT MAL+MARG 1.1-2 CM: CPT | Mod: HCNC,S$GLB,, | Performed by: STUDENT IN AN ORGANIZED HEALTH CARE EDUCATION/TRAINING PROGRAM

## 2023-05-04 PROCEDURE — 1100F PTFALLS ASSESS-DOCD GE2>/YR: CPT | Mod: CPTII,GC,S$GLB, | Performed by: INTERNAL MEDICINE

## 2023-05-04 PROCEDURE — 12032 PR LAYR CLOS WND TRUNK,ARM,LEG 2.6-7.5 CM: ICD-10-PCS | Mod: HCNC,51,S$GLB, | Performed by: STUDENT IN AN ORGANIZED HEALTH CARE EDUCATION/TRAINING PROGRAM

## 2023-05-04 PROCEDURE — 36415 COLL VENOUS BLD VENIPUNCTURE: CPT | Mod: HCNC | Performed by: INTERNAL MEDICINE

## 2023-05-04 PROCEDURE — 1159F MED LIST DOCD IN RCRD: CPT | Mod: CPTII,GC,S$GLB, | Performed by: INTERNAL MEDICINE

## 2023-05-04 PROCEDURE — 88305 TISSUE EXAM BY PATHOLOGIST: CPT | Performed by: PATHOLOGY

## 2023-05-04 PROCEDURE — 3288F FALL RISK ASSESSMENT DOCD: CPT | Mod: CPTII,GC,S$GLB, | Performed by: INTERNAL MEDICINE

## 2023-05-04 PROCEDURE — 99499 NO LOS: ICD-10-PCS | Mod: HCNC,S$GLB,, | Performed by: STUDENT IN AN ORGANIZED HEALTH CARE EDUCATION/TRAINING PROGRAM

## 2023-05-04 PROCEDURE — 83615 LACTATE (LD) (LDH) ENZYME: CPT | Mod: HCNC | Performed by: INTERNAL MEDICINE

## 2023-05-04 PROCEDURE — 1100F PR PT FALLS ASSESS DOC 2+ FALLS/FALL W/INJURY/YR: ICD-10-PCS | Mod: CPTII,GC,S$GLB, | Performed by: INTERNAL MEDICINE

## 2023-05-04 PROCEDURE — 99215 PR OFFICE/OUTPT VISIT, EST, LEVL V, 40-54 MIN: ICD-10-PCS | Mod: GC,S$GLB,, | Performed by: INTERNAL MEDICINE

## 2023-05-04 NOTE — PATIENT INSTRUCTIONS

## 2023-05-04 NOTE — PROGRESS NOTES
PROCEDURE: Elliptical excision with intermediate layered repair in order to decrease dead space and decrease tension.    ANESTHETIC: 12 cc 1% Xylocaine with Epinephrine 1:100,000, buffered    SURGEON: Fredo Sierra M.D.    ASSISTANTS: Zeynep Bustillos MA    PREOPERATIVE DIAGNOSIS:  Squamous Cell Carcinoma    POSTOPERATIVE DIAGNOSIS:  Same as preoperative diagnosis    PATHOLOGIC DIAGNOSIS: Pending    LOCATION: left ventral forearm    INITIAL LESION SIZE: 1 cm    EXCISED DIAMETER: 1.8 cm    PREPARATION: The diagnosis, procedure, alternatives, benefits and risks, including but not limited to: infection, bleeding/bruising, drug reactions, pain, scar or cosmetic defect, local sensation disturbances, wound dehiscence (separation of wound edges after sutures removed) and/or recurrence of present condition were explained to the patient. The patient elected to proceed.  Patient's identity was verified using 2 patient identifiers and the side and site was verified.  Time out period with surgeon, assistant and patient in surgical suite was taken.    PROCEDURE: The location noted above was prepped, draped, and anesthetized in the usual sterile fashion per Zeynep Bustillos MA. Lesional tissue was carefully marked with at least 4 mm margins of clinically normal skin in all directions. A fusiform elliptical excision was done with #15 blade carried down completely through the dermis into the deep subcutaneous tissues to the level of the non-muscle fascia, and dissection was carried out in that plane.  Electrocoagulation was used to obtain hemostasis. Blood loss was minimal. The wound was then approximated in a layered fashion with subcutaneous and intradermal sutures of 4.0 Monocryl, approximately 5 in number, and the wound was then superficially closed with simple interrupted sutures of 4.0 Prolene.    The patient tolerated the procedure well.    The area was cleaned and dressed appropriately and the patient was given wound care  instructions, as well as an appointment for follow-up evaluation.    LENGTH OF REPAIR: 6 cm

## 2023-05-04 NOTE — PROGRESS NOTES
[facility]  [facility]  Hematology/Medical Oncology Clinic       PATIENT: Norm Mendoza  MRN: [id number]  DATE: 5/4/2023    Reason for referral: MALT lymphoma    Initial History  Mr. Norm Mendoza is a 89 y.o. 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     Oncologic History  -The patient was discharged on 12/9/2018 following a 3 day admission for a GIB. Patient was on Eliquis at the time and had been complaining of 1 week of black, tarry stools. EGD at the time was significant for nonbleeding gastric ulcers. Biopsies were taken and the patient was instructed to start protonix and hold eliquis at discharge. He requiring IV fluids and blood transfusion during the hospitalization. Biopsy results returned 12/18/2018 concerning for possible MALT-Lymphoma with molecular studies pending.  -PET confirmed stage 1 MALT lymphoma  -Completed Rituximab x4 on 3/7/19  -EGD: Erythematous, granular and scarred mucosa in the lesser curvature of the gastric body. Biopsied, + MALT lymphoma. Ulcers no longer present., PET was also done 4/20/19, no other site of disease  -Repeat EGD 5/27/21: --PERSISTENT EXTRANODAL MARGINAL ZONE LYMPHOMA OF MUCOSA-ASSOCIATED LYMPHOID   TISSUE (MALT LYMPHOMA).   As patient is asymptomatic, will continue observation.     Interval History  Patient presents alone. Doing well overall. Reports fatigue, feels this is due to CHF. No melena, no hematochezia. No abdominal pain. Denies decreased appetite, weight loss, night sweats, and lymphadenopathy.     CBC, CMP, and LDH normal .     Past Medical History:   Past Medical History:   Diagnosis Date    Alcohol dependence, daily use 07/13/2017    Allergy     Bladder cancer     tumor that was benign     Cataract     H/O nonmelanoma skin cancer 02/04/2016    Hematuria     Hypertension     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    Mixed hyperlipidemia  05/02/2018    On continuous oral anticoagulation 07/30/2019    Paroxysmal atrial fibrillation 11/30/2018    S/P D/C Cardioversion 7/2019, S/P Watchman device 8/2020    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    Dr Vel LANE (peptic ulcer disease)     GI Bleeding 11/2018: Anticoagulant D/S'd and Gastric MALT tumor Dx'd    Skin cancer     x3, had mohs on nose    Squamous cell carcinoma excised 12/5/14    R lower back    Symptomatic anemia 12/06/2018    Urinary tract infection     x4       Past Surgical HIstory:   Past Surgical History:   Procedure Laterality Date    ACHILLES TENDON SURGERY      CATARACT EXTRACTION W/  INTRAOCULAR LENS IMPLANT Bilateral 2013    CLOSURE OF LEFT ATRIAL APPENDAGE USING DEVICE N/A 10/11/2019    Procedure: Left atrial appendage closure device;  Surgeon: Hi Crowder MD;  Location: [facility] EP LAB;  Service: Cardiology;  Laterality: N/A;  AF, JACINTO, Watchman Implant, BSci, Gen, MB, 3 Prep    CYSTOSCOPY      bladder tumor    ESOPHAGOGASTRODUODENOSCOPY N/A 12/7/2018    Procedure: EGD (ESOPHAGOGASTRODUODENOSCOPY);  Surgeon: Austin Garcia MD;  Location: [facility] (2ND FLR);  Service: Endoscopy;  Laterality: N/A;    ESOPHAGOGASTRODUODENOSCOPY N/A 4/12/2019    Procedure: EGD (ESOPHAGOGASTRODUODENOSCOPY);  Surgeon: Austin Garcia MD;  Location: [facility] (4TH FLR);  Service: Endoscopy;  Laterality: N/A;  please schedule within 4 weeks    ESOPHAGOGASTRODUODENOSCOPY N/A 5/27/2021    Procedure: EGD (ESOPHAGOGASTRODUODENOSCOPY);  Surgeon: Austin Garcia MD;  Location: [facility] (2ND FLR);  Service: Endoscopy;  Laterality: N/A;  per Dr. Garcia done within the month with any provider/ ordered by oncology  2nd floor due to comorbidities  Watchman device  COVID test at [facility] on 5/24-GT    SKIN CANCER EXCISION      TREATMENT OF CARDIAC ARRHYTHMIA N/A 7/12/2019    Procedure: Cardioversion or Defibrillation;  Surgeon: Hi Crowder MD;  Location: [facility] EP LAB;  Service:  Cardiology;  Laterality: N/A;  AF, JACINTO, DCCV, MAC, MB, 3 Prep    TREATMENT OF CARDIAC ARRHYTHMIA N/A 2/8/2022    Procedure: Cardioversion or Defibrillation;  Surgeon: Susan Orozco NP;  Location: [facility] EP LAB;  Service: Cardiology;  Laterality: N/A;  afib, DCCV, anes, MB, 3 Prep       Family History:   Family History   Problem Relation Age of Onset    Stroke Father     Hypertension Father     Stroke Brother     Anesthesia problems Neg Hx     Malignant hypertension Neg Hx     Hypotension Neg Hx     Malignant hyperthermia Neg Hx     Pseudochol deficiency Neg Hx     Melanoma Neg Hx     Heart attack Neg Hx     Heart disease Neg Hx     Heart failure Neg Hx     Cataracts Neg Hx     Glaucoma Neg Hx     Macular degeneration Neg Hx        Social History:  reports that he quit smoking about 52 years ago. His smoking use included cigarettes. He has a 25.00 pack-year smoking history. He has never used smokeless tobacco. He reports current alcohol use of about 3.0 standard drinks per week. He reports that he does not use drugs.    Allergies:  Review of patient's allergies indicates:  No Known Allergies    Medications:  Current Outpatient Medications   Medication Sig Dispense Refill    amLODIPine (NORVASC) 2.5 MG tablet Take 1 tablet (2.5 mg total) by mouth once daily. 90 tablet 1    aspirin (ECOTRIN) 81 MG EC tablet Take 81 mg by mouth once daily.      cetirizine (ZYRTEC) 10 MG tablet Take 1 tablet (10 mg total) by mouth once daily. 30 tablet 0    clobetasoL (TEMOVATE) 0.05 % external solution Pt to mix in 1 jar of cerave cream and apply to affected areas bid 50 mL 3    cyanocobalamin (VITAMIN B-12) 1000 MCG tablet Take 1,000 mcg by mouth once daily.      diclofenac sodium (VOLTAREN ARTHRITIS PAIN) 1 % Gel Apply 2 Grams to painful right hand joint 2-3x/day as needed 100 g 1    fluticasone propionate (FLONASE) 50 mcg/actuation nasal spray SHAKE LIQUID AND USE 2 SPRAYS(100 MCG) IN EACH NOSTRIL EVERY DAY (Patient not taking:  Reported on 2/28/2023) 48 g 0    furosemide (LASIX) 20 MG tablet Take 20 mg by mouth as needed. Pt takes 5 tabs weekly as needed      ipratropium (ATROVENT) 21 mcg (0.03 %) nasal spray 2 sprays by Each Nostril route 2 (two) times daily as needed for Rhinitis. (Patient not taking: Reported on 2/14/2023) 30 mL 0    irbesartan (AVAPRO) 150 MG tablet Take 1 tablet (150 mg total) by mouth once daily. 90 tablet 2    metoprolol succinate (TOPROL-XL) 25 MG 24 hr tablet Take 0.5 tablets (12.5 mg total) by mouth once daily. 90 tablet 1    multivitamin (THERAGRAN) per tablet Take 1 tablet by mouth once daily.      mupirocin (BACTROBAN) 2 % ointment Apply topically 3 (three) times daily. (Patient not taking: Reported on 3/11/2023) 22 g 0    mupirocin (BACTROBAN) 2 % ointment Apply to affected area 3 times daily 22 g 1    psyllium husk (METAMUCIL ORAL) Take by mouth.       Current Facility-Administered Medications   Medication Dose Route Frequency Provider Last Rate Last Admin    lidocaine HCl 2% urojet   Urethral Once Grover Ochoa Jr., MD           ROS:  Constitutional: Reports some fatigue, worse at rest, better with activity. Denies appetite change, chills, fever and unexpected weight change.   HENT: Negative for nosebleeds and sore throat.    Respiratory: Negative for cough, chest tightness, shortness of breath and wheezing.    Cardiovascular: Negative for leg swelling. Negative for palpitations.   Endocrine: Negative for cold intolerance  Gastrointestinal: Negative for constipation. Negative for abdominal pain, diarrhea, nausea and vomiting.   and heat intolerance.     ECOG Performance Status: 1  Objective:      Vitals:   Vitals:    05/04/23 1418   BP: 111/69   BP Location: Left arm   Patient Position: Sitting   BP Method: Medium (Automatic)   Pulse: 62   Resp: (!) 22   Temp: 97.9 °F (36.6 °C)   TempSrc: Oral   SpO2: 97%   Weight: 83.2 kg (183 lb 6.8 oz)   Height: 6' (1.829 m)       Physical Exam  Constitutional:        "Appearance: Normal appearance.   HENT:      Head: Normocephalic and atraumatic.      Mouth/Throat:      Mouth: Mucous membranes are moist.   Eyes:      Extraocular Movements: Extraocular movements intact.   Cardiovascular:      Rate and Rhythm: Normal rate and regular rhythm.      Pulses: Normal pulses.      Heart sounds: Normal heart sounds.   Pulmonary:      Effort: Pulmonary effort is normal.      Breath sounds: Normal breath sounds.   Abdominal:      Palpations: Abdomen is soft.   Lymphadenopathy:      Cervical: No cervical adenopathy.      Upper Body:      Right upper body: No supraclavicular adenopathy.      Left upper body: No supraclavicular adenopathy.   Skin:     General: Skin is warm.   Neurological:      General: No focal deficit present.      Mental Status: He is alert.    Laboratory Data:  No visits with results within 1 Week(s) from this visit.   Latest known visit with results is:   Office Visit on 04/03/2023   Component Date Value Ref Range Status    Final Pathologic Diagnosis 04/03/2023    Final                    Value:Skin, left ventral forearm, shave biopsy:  -SQUAMOUS CELL CARCINOMA, KERATOACANTHOMA TYPE, EXTENDING TO THE DEEP BIOPSY  EDGE  This lesion is skin cancer. You will be contacted regarding treatment.      Interp By Ray Mays M.D., Signed on 04/10/2023 at 14:03    Gross 04/03/2023    Final                    Value:Pathology ID:  [id number]  Patient ID:  [id number]  The specimen is received in formalin labeled "left ventral forearm".  The  specimen is a circular shave of gray-tan skin measuring 1.3 x 1.1 cm .  The  specimen is trisected,  inked blue at the resection margin and submitted  entirely in cassette [id number] Vences      Microscopic Exam 04/03/2023    Final                    Value:Sections show a relatively symmetric proliferation of lobules, nests, and  cords of pleomorphic keratocytes within the dermis. There is some degree of  "glassy" hyalinization of the " keratocytes in the center portions of the  lobules.      Disclaimer 04/03/2023    Final                    Value:Unless the case is a 'gross only' or additional testing only, the final  diagnosis for each specimen is based on a microscopic examination of  appropriate tissue sections.           Imaging: All pertinent imaging reviewed    Assessment and Plan        1. MALT (mucosa associated lymphoid tissue)        1) Limited (Stage 1) MALT lymphoma  -H pylori negative  -Recent h/o GIB while was on DOAC for a-fib  -PET negative for distant disease or lymphadenopathy  -Hepatitis studies negative  -Patient recently seen by cardiology for a-fib, off anti-coagulation, off of beta-blocker given AEs  -Patient completed Rituximab 375mg x4 weeks on 3/7/19  -Persistent MALT lymphoma on EGD, PET was also done in 2019, no other site of disease  -Was seen again by radiation oncology, preferred to pursue observation at this time  -Repeat EGD 5/27/21 showed persistent MALT lymphoma, discussed option of repeat Rituxan vs ISRT vs observation. Patient currently on observation. He initally reported he would like to repeat EGD and consider therapy if he has persistent disease, however, after discussing further he would like to continue with observation.      2) Systolic CHF, A-fib  -Managed by cardiology  -S/p Watchman       Follow-up:  Route Chart for Scheduling    BMT Chart Routing      Follow up with physician 6 months.   Follow up with LAVONNE    Provider visit type    Infusion scheduling note    Injection scheduling note    Labs CBC, CMP and LDH   Scheduling:  Preferred lab:  Lab interval:  in 6 months prior to MD visit   Imaging    Pharmacy appointment    Other referrals             Michelle Harper MD  Hematology/Oncology Fellow PGY V  [facility]

## 2023-05-11 LAB
FINAL PATHOLOGIC DIAGNOSIS: NORMAL
GROSS: NORMAL
Lab: NORMAL
MICROSCOPIC EXAM: NORMAL

## 2023-05-15 ENCOUNTER — TELEPHONE (OUTPATIENT)
Dept: INTERNAL MEDICINE | Facility: CLINIC | Age: 88
End: 2023-05-15
Payer: MEDICARE

## 2023-05-15 DIAGNOSIS — M25.569 ACUTE KNEE PAIN, UNSPECIFIED LATERALITY: Primary | ICD-10-CM

## 2023-05-15 NOTE — TELEPHONE ENCOUNTER
Pt calls s/p hyper-extending knee last week and requests Orthopedics evaluation; referral placed.  Brissa would you please call Mr Mendoza to schedule him an appt in Orthopedics-maybe Juanita Joyce if she's available. Thanks

## 2023-05-16 ENCOUNTER — TELEPHONE (OUTPATIENT)
Dept: ORTHOPEDICS | Facility: CLINIC | Age: 88
End: 2023-05-16
Payer: MEDICARE

## 2023-05-16 DIAGNOSIS — R52 PAIN: Primary | ICD-10-CM

## 2023-05-17 ENCOUNTER — HOSPITAL ENCOUNTER (OUTPATIENT)
Dept: RADIOLOGY | Facility: HOSPITAL | Age: 88
Discharge: HOME | End: 2023-05-17
Attending: ORTHOPAEDIC SURGERY
Payer: MEDICARE

## 2023-05-17 ENCOUNTER — OFFICE VISIT (OUTPATIENT)
Dept: ORTHOPEDICS | Facility: CLINIC | Age: 88
End: 2023-05-17
Payer: MEDICARE

## 2023-05-17 VITALS
DIASTOLIC BLOOD PRESSURE: 80 MMHG | BODY MASS INDEX: 25.05 KG/M2 | HEIGHT: 72 IN | HEART RATE: 75 BPM | SYSTOLIC BLOOD PRESSURE: 136 MMHG | WEIGHT: 184.94 LBS

## 2023-05-17 DIAGNOSIS — R52 PAIN: ICD-10-CM

## 2023-05-17 DIAGNOSIS — M17.12 PRIMARY OSTEOARTHRITIS OF LEFT KNEE: Primary | ICD-10-CM

## 2023-05-17 PROCEDURE — 1101F PR PT FALLS ASSESS DOC 0-1 FALLS W/OUT INJ PAST YR: ICD-10-PCS | Mod: CPTII,,, | Performed by: ORTHOPAEDIC SURGERY

## 2023-05-17 PROCEDURE — 73564 X-RAY EXAM KNEE 4 OR MORE: CPT | Mod: 26,RT,, | Performed by: RADIOLOGY

## 2023-05-17 PROCEDURE — 1125F PR PAIN SEVERITY QUANTIFIED, PAIN PRESENT: ICD-10-PCS | Mod: CPTII,,, | Performed by: ORTHOPAEDIC SURGERY

## 2023-05-17 PROCEDURE — 99999 PR PBB SHADOW E&M-EST. PATIENT-LVL IV: CPT | Mod: PBBFAC,,, | Performed by: ORTHOPAEDIC SURGERY

## 2023-05-17 PROCEDURE — 1125F AMNT PAIN NOTED PAIN PRSNT: CPT | Mod: CPTII,,, | Performed by: ORTHOPAEDIC SURGERY

## 2023-05-17 PROCEDURE — 1159F MED LIST DOCD IN RCRD: CPT | Mod: CPTII,,, | Performed by: ORTHOPAEDIC SURGERY

## 2023-05-17 PROCEDURE — 73564 PR  X-RAY KNEE 4+ VIEW: ICD-10-PCS | Mod: 26,LT,, | Performed by: RADIOLOGY

## 2023-05-17 PROCEDURE — 73564 X-RAY EXAM KNEE 4 OR MORE: CPT | Mod: 26,LT,, | Performed by: RADIOLOGY

## 2023-05-17 PROCEDURE — 99999 PR PBB SHADOW E&M-EST. PATIENT-LVL IV: ICD-10-PCS | Mod: PBBFAC,,, | Performed by: ORTHOPAEDIC SURGERY

## 2023-05-17 PROCEDURE — 99214 OFFICE O/P EST MOD 30 MIN: CPT | Mod: PN | Performed by: ORTHOPAEDIC SURGERY

## 2023-05-17 PROCEDURE — 20610 LARGE JOINT ASPIRATION/INJECTION: L KNEE: ICD-10-PCS | Mod: LT,,, | Performed by: ORTHOPAEDIC SURGERY

## 2023-05-17 PROCEDURE — 1160F PR REVIEW ALL MEDS BY PRESCRIBER/CLIN PHARMACIST DOCUMENTED: ICD-10-PCS | Mod: CPTII,,, | Performed by: ORTHOPAEDIC SURGERY

## 2023-05-17 PROCEDURE — 1101F PT FALLS ASSESS-DOCD LE1/YR: CPT | Mod: CPTII,,, | Performed by: ORTHOPAEDIC SURGERY

## 2023-05-17 PROCEDURE — 3288F FALL RISK ASSESSMENT DOCD: CPT | Mod: CPTII,,, | Performed by: ORTHOPAEDIC SURGERY

## 2023-05-17 PROCEDURE — 1159F PR MEDICATION LIST DOCUMENTED IN MEDICAL RECORD: ICD-10-PCS | Mod: CPTII,,, | Performed by: ORTHOPAEDIC SURGERY

## 2023-05-17 PROCEDURE — 20610 DRAIN/INJ JOINT/BURSA W/O US: CPT | Mod: LT,,, | Performed by: ORTHOPAEDIC SURGERY

## 2023-05-17 PROCEDURE — 3288F PR FALLS RISK ASSESSMENT DOCUMENTED: ICD-10-PCS | Mod: CPTII,,, | Performed by: ORTHOPAEDIC SURGERY

## 2023-05-17 PROCEDURE — 1160F RVW MEDS BY RX/DR IN RCRD: CPT | Mod: CPTII,,, | Performed by: ORTHOPAEDIC SURGERY

## 2023-05-17 PROCEDURE — 73564 X-RAY EXAM KNEE 4 OR MORE: CPT | Mod: TC,50,PN

## 2023-05-17 PROCEDURE — 99204 PR OFFICE/OUTPT VISIT, NEW, LEVL IV, 45-59 MIN: ICD-10-PCS | Mod: 25,,, | Performed by: ORTHOPAEDIC SURGERY

## 2023-05-17 PROCEDURE — 99204 OFFICE O/P NEW MOD 45 MIN: CPT | Mod: 25,,, | Performed by: ORTHOPAEDIC SURGERY

## 2023-05-17 RX ORDER — DICLOFENAC SODIUM 10 MG/G
2 GEL TOPICAL 2 TIMES DAILY
Qty: 200 G | Refills: 0 | Status: SHIPPED | OUTPATIENT
Start: 2023-05-17 | End: 2023-08-15

## 2023-05-17 RX ORDER — TRIAMCINOLONE ACETONIDE 40 MG/ML
40 INJECTION, SUSPENSION INTRA-ARTICULAR; INTRAMUSCULAR
Status: DISCONTINUED | OUTPATIENT
Start: 2023-05-17 | End: 2023-05-17 | Stop reason: HOSPADM

## 2023-05-17 RX ADMIN — TRIAMCINOLONE ACETONIDE 40 MG: 40 INJECTION, SUSPENSION INTRA-ARTICULAR; INTRAMUSCULAR at 01:05

## 2023-05-17 NOTE — PROGRESS NOTES
[facility], Orthopedics and Sports Medicine  [facility]    Established Patient Knee Office Visit  05/17/2023       Subjective:      Norm Mendoza is a 89 y.o. male who returns for evaluation and treatment of the left knee. Drives for [employer] and after an extended 3-4 hour ride, he got out at the gas station and felt his left knee buckle. Denies falling or injury. The next day he felt pain and swelling over the medial aspect of the left knee. Reports never having any prior knee problems.     The patient has the following symptoms: giving out, pain located medial, stiffness, and swelling. The symptoms are worsening. Taking Tylenol. Ambulates with cane, here in wheelchair.     Past Medical History:   Diagnosis Date    Alcohol dependence, daily use 07/13/2017    Allergy     Bladder cancer     tumor that was benign     Cataract     H/O nonmelanoma skin cancer 02/04/2016    Hematuria     Hypertension     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    Mixed hyperlipidemia 05/02/2018    On continuous oral anticoagulation 07/30/2019    Paroxysmal atrial fibrillation 11/30/2018    S/P D/C Cardioversion 7/2019, S/P Watchman device 8/2020    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    Dr Vel LANE (peptic ulcer disease)     GI Bleeding 11/2018: Anticoagulant D/S'd and Gastric MALT tumor Dx'd    Skin cancer     x3, had mohs on nose    Squamous cell carcinoma excised 12/5/14    R lower back    Symptomatic anemia 12/06/2018    Urinary tract infection     x4       Patient Active Problem List   Diagnosis    AK (actinic keratosis)    History of benign bladder tumor    Aortic atherosclerosis    Essential hypertension    Left ventricular diastolic dysfunction with preserved systolic function    Nonrheumatic aortic valve stenosis    Alcohol dependence, daily use    Mixed hyperlipidemia    Aortic root dilation    PVC's (premature ventricular contractions)    Tortuous aorta    Persistent atrial  fibrillation    MALT lymphoma    PUD (peptic ulcer disease)    Dilated cardiomyopathy    History of cardioversion    Atrial fibrillation    Presence of Watchman left atrial appendage closure device    MALT (mucosa associated lymphoid tissue)    Skin tear of left elbow without complication    Trauma    Cough    Walking pneumonia    Rhinitis    COVID    At risk for falls    History of skin cancer       Past Surgical History:   Procedure Laterality Date    ACHILLES TENDON SURGERY      CATARACT EXTRACTION W/  INTRAOCULAR LENS IMPLANT Bilateral 2013    CLOSURE OF LEFT ATRIAL APPENDAGE USING DEVICE N/A 10/11/2019    Procedure: Left atrial appendage closure device;  Surgeon: Hi Crowder MD;  Location: [facility] EP [facility];  Service: Cardiology;  Laterality: N/A;  AF, JACINTO, Watchman Implant, BSci, Gen, MB, 3 Prep    CYSTOSCOPY      bladder tumor    ESOPHAGOGASTRODUODENOSCOPY N/A 12/7/2018    Procedure: EGD (ESOPHAGOGASTRODUODENOSCOPY);  Surgeon: Austin Garcia MD;  Location: [facility] (2ND FLR);  Service: Endoscopy;  Laterality: N/A;    ESOPHAGOGASTRODUODENOSCOPY N/A 4/12/2019    Procedure: EGD (ESOPHAGOGASTRODUODENOSCOPY);  Surgeon: Austin Garcia MD;  Location: [facility] (4TH FLR);  Service: Endoscopy;  Laterality: N/A;  please schedule within 4 weeks    ESOPHAGOGASTRODUODENOSCOPY N/A 5/27/2021    Procedure: EGD (ESOPHAGOGASTRODUODENOSCOPY);  Surgeon: Austin Garcia MD;  Location: [facility] (2ND FLR);  Service: Endoscopy;  Laterality: N/A;  per Dr. Garcia done within the month with any provider/ ordered by oncology  2nd floor due to comorbidities  Watchman device  COVID test at [facility] on 5/24-GT    SKIN CANCER EXCISION      TREATMENT OF CARDIAC ARRHYTHMIA N/A 7/12/2019    Procedure: Cardioversion or Defibrillation;  Surgeon: Hi Crwoder MD;  Location: [facility];  Service: Cardiology;  Laterality: N/A;  AF, JACINTO, DCCV, MAC, MB, 3 Prep    TREATMENT OF CARDIAC ARRHYTHMIA N/A 2/8/2022    Procedure: Cardioversion or  Defibrillation;  Surgeon: Susan Orozco NP;  Location: [facility] EP [facility];  Service: Cardiology;  Laterality: N/A;  afib, DCCV, anes, MB, 3 Prep        Current Outpatient Medications   Medication Instructions    amLODIPine (NORVASC) 2.5 mg, Oral, Daily    aspirin (ECOTRIN) 81 mg, Oral, Daily    cetirizine (ZYRTEC) 10 mg, Oral, Daily    clobetasoL (TEMOVATE) 0.05 % external solution Pt to mix in 1 jar of cerave cream and apply to affected areas bid    cyanocobalamin (VITAMIN B-12) 1,000 mcg, Oral, Daily    diclofenac sodium (VOLTAREN ARTHRITIS PAIN) 1 % Gel Apply 2 Grams to painful right hand joint 2-3x/day as needed    diclofenac sodium (VOLTAREN) 2 g, Topical (Top), 2 times daily    fluticasone propionate (FLONASE) 50 mcg/actuation nasal spray SHAKE LIQUID AND USE 2 SPRAYS(100 MCG) IN EACH NOSTRIL EVERY DAY    furosemide (LASIX) 20 mg, Oral, As needed (PRN), Pt takes 5 tabs weekly as needed    ipratropium (ATROVENT) 21 mcg (0.03 %) nasal spray 2 sprays, Each Nostril, 2 times daily PRN    irbesartan (AVAPRO) 150 mg, Oral, Daily    metoprolol succinate (TOPROL-XL) 12.5 mg, Oral, Daily    multivitamin (THERAGRAN) per tablet 1 tablet, Oral, Daily    mupirocin (BACTROBAN) 2 % ointment Topical (Top), 3 times daily    mupirocin (BACTROBAN) 2 % ointment Apply to affected area 3 times daily    psyllium husk (METAMUCIL ORAL) Oral        Review of patient's allergies indicates:  No Known Allergies    Social History     Socioeconomic History    Marital status:     Number of children: 3   Occupational History    Occupation: Financial Work/     Employer: retired    Occupation: actor    Occupation:    Tobacco Use    Smoking status: Former     Packs/day: 1.00     Years: 25.00     Pack years: 25.00     Types: Cigarettes     Quit date: 9/3/1970     Years since quittin.7    Smokeless tobacco: Never   Substance and Sexual Activity    Alcohol use: Yes     Alcohol/week: 3.0 standard drinks     Types: 3  Shots of liquor per week     Comment: 3 bourbons daily - 12 beer on weekends     Drug use: No    Sexual activity: Yes     Partners: Female   Social History Narrative    He is  with 2/3 kids living(2 sons/1 daughter who passed away); He has 6 Grandkids(5 under 10 y/o); He has 1 son here in [place] /1 grandson at [facility]; His other son(3 kids) lives in [place]; His Grand-daughter(Her Mother passed away) with her 2 kids lives in [place]    He had worked for [employer] which has dissolved but now works for [employer]     Social Determinants of Health     Food Insecurity: No Food Insecurity    Worried About Running Out of Food in the Last Year: Never true    Ran Out of Food in the Last Year: Never true   Transportation Needs: No Transportation Needs    Lack of Transportation (Medical): No    Lack of Transportation (Non-Medical): No   Physical Activity: Inactive    Days of Exercise per Week: 0 days    Minutes of Exercise per Session: 0 min   Stress: No Stress Concern Present    Feeling of Stress : Not at all   Social Connections: Moderately Isolated    Frequency of Communication with Friends and Family: More than three times a week    Frequency of Social Gatherings with Friends and Family: More than three times a week    Attends Denominational Services: More than 4 times per year    Active Member of Clubs or Organizations: No    Attends Club or Organization Meetings: Never    Marital Status:    Housing Stability: Low Risk     Unable to Pay for Housing in the Last Year: No    Number of Places Lived in the Last Year: 1    Unstable Housing in the Last Year: No       Family History   Problem Relation Age of Onset    Stroke Father     Hypertension Father     Stroke Brother     Anesthesia problems Neg Hx     Malignant hypertension Neg Hx     Hypotension Neg Hx     Malignant hyperthermia Neg Hx     Pseudochol deficiency Neg Hx     Melanoma Neg Hx     Heart attack Neg Hx     Heart disease Neg Hx      Heart failure Neg Hx     Cataracts Neg Hx     Glaucoma Neg Hx     Macular degeneration Neg Hx      Review of Systems     10 point review of systems was conducted and only the pertinent positives and pertinent negatives are noted above in the HPI section.       Objective:      General    Constitutional: He is oriented to person, place, and time. He appears well-developed and well-nourished.   HENT:   Head: Normocephalic and atraumatic.   Eyes: EOM are normal.   Cardiovascular:  Normal rate.            Pulmonary/Chest: Effort normal.   Neurological: He is alert and oriented to person, place, and time.   Psychiatric: He has a normal mood and affect.           Right Knee Exam     Inspection   Erythema: absent  Swelling: absent  Effusion: absent    Tenderness   The patient is experiencing no tenderness.     Range of Motion   Extension:  0   Flexion:  130     Tests   Meniscus   Christiana:  Medial - negative Lateral - negative  Ligament Examination   Lachman: normal (-1 to 2mm)   PCL-Posterior Drawer: normal (0 to 2mm)     MCL - Valgus: normal (0 to 2mm)  LCL - Varus: normal  Patella   Patellar apprehension: negative    Other   Sensation: normal    Left Knee Exam     Inspection   Erythema: absent  Swelling: absent  Effusion: absent    Tenderness   The patient tender to palpation of the medial joint line and medial retinaculum.    Range of Motion   Extension:  10   Flexion:  100     Tests   Stability   Lachman: normal (-1 to 2mm)   PCL-Posterior Drawer: normal (0 to 2mm)  MCL - Valgus: normal (0 to 2mm)  LCL - Varus: normal (0 to 2mm)  Patella   Patellar apprehension: negative    Other   Sensation: normal    Muscle Strength   Right Lower Extremity   Quadriceps:  5/5   Hamstrin/5   Left Lower Extremity   Quadriceps:  5/5   Hamstrin/5     Vascular Exam     Right Pulses    Posterior Tibial:      2+        Left Pulses    Posterior Tibial:      2+        Imaging:  Radiographs of the bilateral knee taken taken 2023  were personally reviewed from the [facility] EMR.  Multiple views of the knee are available today for review, including a standing AP, a standing notch view, lateral view, and a merchant view.  The tibiofemoral compartment demonstrates moderate degenerative changes.  The patellofemoral compartment demonstrates moderate degenerative changes.  No acute fractures or dislocations are noted in these images.          Large Joint Aspiration/Injection: L knee    Date/Time: 5/17/2023 1:30 PM  Performed by: Matthew Wu IV, MD  Authorized by: Matthew Wu IV, MD     Consent Done?:  Yes (Verbal)  Indications:  Pain  Site marked: the procedure site was marked    Timeout: prior to procedure the correct patient, procedure, and site was verified    Prep: patient was prepped and draped in usual sterile fashion      Local anesthesia used?: Yes    Local anesthetic:  Lidocaine 1% without epinephrine    Details:  Needle Size:  22 G  Ultrasonic Guidance for needle placement?: No    Approach:  Anterolateral  Location:  Knee  Site:  L knee  Medications:  40 mg triamcinolone acetonide 40 mg/mL  Patient tolerance:  Patient tolerated the procedure well with no immediate complications        Assessment:       Norm Mendoza is a 89 y.o. male seen in the office today. The encounter diagnosis was Primary osteoarthritis of left knee.  Non-operative treatment is recommended at this time. Conservative management and physical therapy.  The natural history and expected course discussed with patient. Various treatment options were discussed, including their risks and benefits. All of the patient's questions were answered.       Plan:      Physical therapy and rehabilitation treatment.  Tylenol 650mg TID, PRN pain.  Voltaren 1% topical gel, apply to affected area TID, PRN pain.  Follow up as needed if symptoms worsen.  Corticosteroid injection given today (left knee).         Shruti Bravo MD, [facility]  PGY-3  05/17/2023    Matthew Wu IV, MD   of Clinical Orthopedics  Department of Orthopedic Surgery  [facility]  Office: [phone]  Website: [email or web address]    ---------------------------------------  Orders Placed This Encounter   Procedures    Large Joint Aspiration/Injection    Ambulatory referral/consult to Physical/Occupational Therapy        Orders Placed This Encounter    Large Joint Aspiration/Injection    Ambulatory referral/consult to Physical/Occupational Therapy    diclofenac sodium (VOLTAREN) 1 % Gel

## 2023-05-18 ENCOUNTER — CLINICAL SUPPORT (OUTPATIENT)
Dept: DERMATOLOGY | Facility: CLINIC | Age: 88
End: 2023-05-18
Payer: MEDICARE

## 2023-05-18 DIAGNOSIS — Z48.02 ENCOUNTER FOR REMOVAL OF SUTURES: Primary | ICD-10-CM

## 2023-05-18 PROCEDURE — 99024 POSTOP FOLLOW-UP VISIT: CPT | Mod: ,,, | Performed by: STUDENT IN AN ORGANIZED HEALTH CARE EDUCATION/TRAINING PROGRAM

## 2023-05-18 PROCEDURE — 99024 PR POST-OP FOLLOW-UP VISIT: ICD-10-PCS | Mod: ,,, | Performed by: STUDENT IN AN ORGANIZED HEALTH CARE EDUCATION/TRAINING PROGRAM

## 2023-05-18 NOTE — PROGRESS NOTES
Suture Removal note:  CC: 89 y.o. male patient is here for suture removal.         HPI: Patient is s/p excision of SCC from the left forearm on 5/4/23.  Patient reports no problems.    WOUND PE:  Sutures intact.  Wound healing well.  Good approximation of skin edges.  No signs or symptoms of infection.    IMPRESSION:  Skin, left ventral forearm, excision:   -FOCAL RESIDUAL SQUAMOUS CELL CARCINOMA, COMPLETELY EXCISED   -SURGICAL MARGINS ARE NEGATIVE FOR SQUAMOUS CELL CARCINOMA   -SCAR (POST-SURGICAL) - margins clear.    PLAN:  Sutures removed today.  Continue wound care.    RTC: In 6 months.

## 2023-06-08 DIAGNOSIS — I10 ESSENTIAL HYPERTENSION: ICD-10-CM

## 2023-06-08 RX ORDER — AMLODIPINE BESYLATE 2.5 MG/1
2.5 TABLET ORAL DAILY
Qty: 90 TABLET | Refills: 2 | Status: SHIPPED | OUTPATIENT
Start: 2023-06-08 | End: 2024-02-15

## 2023-07-26 ENCOUNTER — TELEPHONE (OUTPATIENT)
Dept: INTERNAL MEDICINE | Facility: CLINIC | Age: 88
End: 2023-07-26
Payer: MEDICARE

## 2023-07-26 RX ORDER — IRBESARTAN 150 MG/1
150 TABLET ORAL DAILY
Qty: 90 TABLET | Refills: 2 | Status: SHIPPED | OUTPATIENT
Start: 2023-07-26

## 2023-08-20 NOTE — MR AVS SNAPSHOT
[facility] [address]  [address]  [address]  [facility] [address]  Phone: [phone]                  Norm Mendoza   2017 11:00 AM   Office Visit    Description:  Male : 1926   Provider:  Amber Jenkins MD   Department[facility]  [facility] [address]           Reason for Visit     Annual Exam           Diagnoses this Visit        Comments    AK (actinic keratosis)    -  Primary     Annual physical exam         Essential hypertension         Systolic ejection murmur                To Do List           Goals (5 Years of Data)     None       These Medications        Disp Refills Start End    valsartan (DIOVAN) 80 MG tablet 45 tablet 6 2017     Take 1 tablet (80 mg total) by mouth once daily. 1 tab every other day alternating with 2 tabs on alternate days - Oral    Pharmacy: [facility] [address] [place] [address] [address]  [facility] #: [phone]         [facility] On Call     [facility] On Call Nurse Care Line -  Assistance  Registered nurses in the [facility] On Call Center provide clinical advisement, health education, appointment booking, and other advisory services.  Call for this free service at [phone].             Medications           Message regarding Medications     Verify the changes and/or additions to your medication regime listed below are the same as discussed with your clinician today.  If any of these changes or additions are incorrect, please notify your healthcare provider.        CHANGE how you are taking these medications     Start Taking Instead of    valsartan (DIOVAN) 80 MG tablet valsartan (DIOVAN) 80 MG tablet    Dosage:  Take 1 tablet (80 mg total) by mouth once daily. 1 tab every other day alternating with 2 tabs on alternate days Dosage:  TAKE 2 TABLETS BY MOUTH EVERY DAY    Reason for Change:  Reorder            Verify that the below list of medications is an accurate    Problem: Adult Inpatient Plan of Care  Goal: Plan of Care Review  Outcome: Ongoing, Progressing  Goal: Optimal Comfort and Wellbeing  Outcome: Ongoing, Progressing      representation of the medications you are currently taking.  If none reported, the list may be blank. If incorrect, please contact your healthcare provider. Carry this list with you in case of emergency.           Current Medications     ACIDOPHILUS/PECTIN, CITRUS (ACIDOPHILUS PROBIOTIC ORAL) Take 1 capsule by mouth once daily.    aspirin 81 MG Chew Take 81 mg by mouth once daily.    psyllium (METAMUCIL) packet Take 1 packet by mouth once daily.    valsartan (DIOVAN) 80 MG tablet Take 1 tablet (80 mg total) by mouth once daily. 1 tab every other day alternating with 2 tabs on alternate days           Clinical Reference Information           Vital Signs - Last Recorded  Most recent update: 1/19/2017 11:43 AM by Cristino Acosta MA    BP Pulse Ht Wt SpO2 BMI    136/64 (!) 52 6' (1.829 m) 81.1 kg (178 lb 11.2 oz) 97% 24.24 kg/m2      Blood Pressure          Most Recent Value    BP  136/64      Allergies as of 1/19/2017     No Known Allergies      Immunizations Administered on Date of Encounter - 1/19/2017     Name Date Dose VIS Date Route    TDAP  Incomplete 0.5 mL 2/24/2015 Intramuscular      Orders Placed During Today's Visit      Normal Orders This Visit    Ambulatory referral to Dermatology     Tdap Vaccine (Adult)     Future Labs/Procedures Expected by Expires    CBC auto differential  1/19/2017 1/19/2018    Comprehensive metabolic panel  1/19/2017 1/19/2018    Lipid panel  1/19/2017 1/19/2018    TSH  1/19/2017 1/19/2018    2D echo with color flow doppler  As directed 4/19/2017      [facility] Sign-Up     Activating your [facility] account is as easy as 1-2-3!     1) Visit [email or web address], select Sign Up Now, enter this activation code and your date of birth, then select Next.  [email or web address]  Expires: 1/22/2017  5:12 PM      2) Create a username and password to use when you visit [facility] in the future and select a security question in case you lose your password and select Next.    3) Enter your e-mail  address and click Sign Up!    Additional Information  If you have questions, please e-mail [email or web address] or call [phone] to talk to our [facility] staff. Remember, [facility] is NOT to be used for urgent needs. For medical emergencies, dial 911.

## 2023-10-05 ENCOUNTER — TELEPHONE (OUTPATIENT)
Dept: HEMATOLOGY/ONCOLOGY | Facility: CLINIC | Age: 88
End: 2023-10-05
Payer: MEDICARE

## 2023-10-05 NOTE — TELEPHONE ENCOUNTER
Spoke to patient and informed him of Dr. Harper only having availability on Thursday afternoons. Patient stated he will keep appt on 11/9.

## 2023-11-09 ENCOUNTER — LAB VISIT (OUTPATIENT)
Dept: LAB | Facility: HOSPITAL | Age: 88
End: 2023-11-09
Payer: MEDICARE

## 2023-11-09 ENCOUNTER — OFFICE VISIT (OUTPATIENT)
Dept: HEMATOLOGY/ONCOLOGY | Facility: CLINIC | Age: 88
End: 2023-11-09
Payer: MEDICARE

## 2023-11-09 VITALS
HEART RATE: 89 BPM | BODY MASS INDEX: 25.24 KG/M2 | HEIGHT: 72 IN | SYSTOLIC BLOOD PRESSURE: 161 MMHG | OXYGEN SATURATION: 97 % | DIASTOLIC BLOOD PRESSURE: 95 MMHG | TEMPERATURE: 98 F | RESPIRATION RATE: 22 BRPM | WEIGHT: 186.38 LBS

## 2023-11-09 DIAGNOSIS — R19.8 ABDOMINAL FULLNESS: ICD-10-CM

## 2023-11-09 DIAGNOSIS — C88.4 MALT LYMPHOMA: ICD-10-CM

## 2023-11-09 DIAGNOSIS — C88.4 MALT (MUCOSA ASSOCIATED LYMPHOID TISSUE): Primary | ICD-10-CM

## 2023-11-09 DIAGNOSIS — F10.20 ALCOHOL DEPENDENCE, DAILY USE: ICD-10-CM

## 2023-11-09 LAB
ALBUMIN SERPL BCP-MCNC: 3.6 G/DL (ref 3.5–5.2)
ALP SERPL-CCNC: 76 U/L (ref 55–135)
ALT SERPL W/O P-5'-P-CCNC: 21 U/L (ref 10–44)
ANION GAP SERPL CALC-SCNC: 7 MMOL/L (ref 8–16)
AST SERPL-CCNC: 26 U/L (ref 10–40)
BASOPHILS # BLD AUTO: 0.05 K/UL (ref 0–0.2)
BASOPHILS NFR BLD: 0.8 % (ref 0–1.9)
BILIRUB SERPL-MCNC: 0.5 MG/DL (ref 0.1–1)
BUN SERPL-MCNC: 22 MG/DL (ref 10–30)
CALCIUM SERPL-MCNC: 9.2 MG/DL (ref 8.7–10.5)
CHLORIDE SERPL-SCNC: 109 MMOL/L (ref 95–110)
CO2 SERPL-SCNC: 26 MMOL/L (ref 23–29)
CREAT SERPL-MCNC: 1.2 MG/DL (ref 0.5–1.4)
DIFFERENTIAL METHOD: ABNORMAL
EOSINOPHIL # BLD AUTO: 0.2 K/UL (ref 0–0.5)
EOSINOPHIL NFR BLD: 3.1 % (ref 0–8)
ERYTHROCYTE [DISTWIDTH] IN BLOOD BY AUTOMATED COUNT: 13.9 % (ref 11.5–14.5)
EST. GFR  (NO RACE VARIABLE): 55 ML/MIN/1.73 M^2
GLUCOSE SERPL-MCNC: 105 MG/DL (ref 70–110)
HCT VFR BLD AUTO: 41 % (ref 40–54)
HGB BLD-MCNC: 13.5 G/DL (ref 14–18)
IMM GRANULOCYTES # BLD AUTO: 0.04 K/UL (ref 0–0.04)
IMM GRANULOCYTES NFR BLD AUTO: 0.7 % (ref 0–0.5)
LDH SERPL L TO P-CCNC: 203 U/L (ref 110–260)
LYMPHOCYTES # BLD AUTO: 0.6 K/UL (ref 1–4.8)
LYMPHOCYTES NFR BLD: 9.5 % (ref 18–48)
MCH RBC QN AUTO: 32.5 PG (ref 27–31)
MCHC RBC AUTO-ENTMCNC: 32.9 G/DL (ref 32–36)
MCV RBC AUTO: 99 FL (ref 82–98)
MONOCYTES # BLD AUTO: 0.7 K/UL (ref 0.3–1)
MONOCYTES NFR BLD: 11.4 % (ref 4–15)
NEUTROPHILS # BLD AUTO: 4.6 K/UL (ref 1.8–7.7)
NEUTROPHILS NFR BLD: 74.5 % (ref 38–73)
NRBC BLD-RTO: 0 /100 WBC
PLATELET # BLD AUTO: 204 K/UL (ref 150–450)
PMV BLD AUTO: 9.4 FL (ref 9.2–12.9)
POTASSIUM SERPL-SCNC: 4.4 MMOL/L (ref 3.5–5.1)
PROT SERPL-MCNC: 6.9 G/DL (ref 6–8.4)
RBC # BLD AUTO: 4.16 M/UL (ref 4.6–6.2)
SODIUM SERPL-SCNC: 142 MMOL/L (ref 136–145)
WBC # BLD AUTO: 6.13 K/UL (ref 3.9–12.7)

## 2023-11-09 PROCEDURE — 1159F MED LIST DOCD IN RCRD: CPT | Mod: HCNC,CPTII,GC,S$GLB | Performed by: INTERNAL MEDICINE

## 2023-11-09 PROCEDURE — 1126F AMNT PAIN NOTED NONE PRSNT: CPT | Mod: HCNC,CPTII,GC,S$GLB | Performed by: INTERNAL MEDICINE

## 2023-11-09 PROCEDURE — 1101F PR PT FALLS ASSESS DOC 0-1 FALLS W/OUT INJ PAST YR: ICD-10-PCS | Mod: HCNC,CPTII,GC,S$GLB | Performed by: INTERNAL MEDICINE

## 2023-11-09 PROCEDURE — 3288F FALL RISK ASSESSMENT DOCD: CPT | Mod: HCNC,CPTII,GC,S$GLB | Performed by: INTERNAL MEDICINE

## 2023-11-09 PROCEDURE — 3288F PR FALLS RISK ASSESSMENT DOCUMENTED: ICD-10-PCS | Mod: HCNC,CPTII,GC,S$GLB | Performed by: INTERNAL MEDICINE

## 2023-11-09 PROCEDURE — 1159F PR MEDICATION LIST DOCUMENTED IN MEDICAL RECORD: ICD-10-PCS | Mod: HCNC,CPTII,GC,S$GLB | Performed by: INTERNAL MEDICINE

## 2023-11-09 PROCEDURE — 83615 LACTATE (LD) (LDH) ENZYME: CPT | Mod: HCNC | Performed by: INTERNAL MEDICINE

## 2023-11-09 PROCEDURE — 1126F PR PAIN SEVERITY QUANTIFIED, NO PAIN PRESENT: ICD-10-PCS | Mod: HCNC,CPTII,GC,S$GLB | Performed by: INTERNAL MEDICINE

## 2023-11-09 PROCEDURE — 1101F PT FALLS ASSESS-DOCD LE1/YR: CPT | Mod: HCNC,CPTII,GC,S$GLB | Performed by: INTERNAL MEDICINE

## 2023-11-09 PROCEDURE — 1160F PR REVIEW ALL MEDS BY PRESCRIBER/CLIN PHARMACIST DOCUMENTED: ICD-10-PCS | Mod: HCNC,CPTII,GC,S$GLB | Performed by: INTERNAL MEDICINE

## 2023-11-09 PROCEDURE — 1160F RVW MEDS BY RX/DR IN RCRD: CPT | Mod: HCNC,CPTII,GC,S$GLB | Performed by: INTERNAL MEDICINE

## 2023-11-09 PROCEDURE — 99214 PR OFFICE/OUTPT VISIT, EST, LEVL IV, 30-39 MIN: ICD-10-PCS | Mod: HCNC,GC,S$GLB, | Performed by: INTERNAL MEDICINE

## 2023-11-09 PROCEDURE — 99214 OFFICE O/P EST MOD 30 MIN: CPT | Mod: HCNC,GC,S$GLB, | Performed by: INTERNAL MEDICINE

## 2023-11-09 PROCEDURE — 99999 PR PBB SHADOW E&M-EST. PATIENT-LVL III: CPT | Mod: PBBFAC,HCNC,GC, | Performed by: INTERNAL MEDICINE

## 2023-11-09 PROCEDURE — 85025 COMPLETE CBC W/AUTO DIFF WBC: CPT | Mod: HCNC | Performed by: INTERNAL MEDICINE

## 2023-11-09 PROCEDURE — 80053 COMPREHEN METABOLIC PANEL: CPT | Mod: HCNC | Performed by: INTERNAL MEDICINE

## 2023-11-09 PROCEDURE — 99999 PR PBB SHADOW E&M-EST. PATIENT-LVL III: ICD-10-PCS | Mod: PBBFAC,HCNC,GC, | Performed by: INTERNAL MEDICINE

## 2023-11-09 PROCEDURE — 36415 COLL VENOUS BLD VENIPUNCTURE: CPT | Mod: HCNC | Performed by: INTERNAL MEDICINE

## 2023-11-09 RX ORDER — BNT162B2 ORIGINAL AND OMICRON BA.4/BA.5 .1125; .1125 MG/2.25ML; MG/2.25ML
INJECTION, SUSPENSION INTRAMUSCULAR
COMMUNITY
Start: 2023-06-08 | End: 2023-11-09 | Stop reason: ALTCHOICE

## 2023-11-09 NOTE — PROGRESS NOTES
[facility]  [facility]  Hematology/Medical Oncology Clinic       PATIENT: Norm Mendoza  MRN: [id number]  DATE: 11/9/2023    Reason for referral: MALT lymphoma    Initial History  Mr. Norm Mendoza is a 89 y.o. 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     Oncologic History  -The patient was discharged on 12/9/2018 following a 3 day admission for a GIB. Patient was on Eliquis at the time and had been complaining of 1 week of black, tarry stools. EGD at the time was significant for nonbleeding gastric ulcers. Biopsies were taken and the patient was instructed to start protonix and hold eliquis at discharge. He requiring IV fluids and blood transfusion during the hospitalization. Biopsy results returned 12/18/2018 concerning for possible MALT-Lymphoma with molecular studies pending.  -PET confirmed stage 1 MALT lymphoma  -Completed Rituximab x4 on 3/7/19  -EGD: Erythematous, granular and scarred mucosa in the lesser curvature of the gastric body. Biopsied, + MALT lymphoma. Ulcers no longer present., PET was also done 4/20/19, no other site of disease  -Repeat EGD 5/27/21: --PERSISTENT EXTRANODAL MARGINAL ZONE LYMPHOMA OF MUCOSA-ASSOCIATED LYMPHOID   TISSUE (MALT LYMPHOMA).   As patient is asymptomatic, will continue observation.     Interval History  Patient presents alone. Doing well overall. Reports fatigue, feels this is due to CHF. No melena, no hematochezia. No abdominal pain although reports abdominal fullness. Denies decreased appetite, weight loss, night sweats, and lymphadenopathy.     Reports drinking alcohol a couple times/night.     CBC and CMP normal.     Past Medical History:   Past Medical History:   Diagnosis Date    Alcohol dependence, daily use 07/13/2017    Allergy     Bladder cancer     tumor that was benign     Cataract     H/O nonmelanoma skin cancer 02/04/2016     Hematuria     Hypertension     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    Mixed hyperlipidemia 05/02/2018    On continuous oral anticoagulation 07/30/2019    Paroxysmal atrial fibrillation 11/30/2018    S/P D/C Cardioversion 7/2019, S/P Watchman device 8/2020    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    Dr Vel LANE (peptic ulcer disease)     GI Bleeding 11/2018: Anticoagulant D/S'd and Gastric MALT tumor Dx'd    Skin cancer     x3, had mohs on nose    Squamous cell carcinoma excised 12/5/14    R lower back    Symptomatic anemia 12/06/2018    Urinary tract infection     x4       Past Surgical HIstory:   Past Surgical History:   Procedure Laterality Date    ACHILLES TENDON SURGERY      CATARACT EXTRACTION W/  INTRAOCULAR LENS IMPLANT Bilateral 2013    CLOSURE OF LEFT ATRIAL APPENDAGE USING DEVICE N/A 10/11/2019    Procedure: Left atrial appendage closure device;  Surgeon: Hi Crowder MD;  Location: [facility] EP LAB;  Service: Cardiology;  Laterality: N/A;  AF, JACINTO, Watchman Implant, BSci, Gen, MB, 3 Prep    CYSTOSCOPY      bladder tumor    ESOPHAGOGASTRODUODENOSCOPY N/A 12/7/2018    Procedure: EGD (ESOPHAGOGASTRODUODENOSCOPY);  Surgeon: Austin Garcia MD;  Location: [facility] (2ND FLR);  Service: Endoscopy;  Laterality: N/A;    ESOPHAGOGASTRODUODENOSCOPY N/A 4/12/2019    Procedure: EGD (ESOPHAGOGASTRODUODENOSCOPY);  Surgeon: Austin Garcia MD;  Location: [facility] (4TH FLR);  Service: Endoscopy;  Laterality: N/A;  please schedule within 4 weeks    ESOPHAGOGASTRODUODENOSCOPY N/A 5/27/2021    Procedure: EGD (ESOPHAGOGASTRODUODENOSCOPY);  Surgeon: Austin Garcia MD;  Location: [facility] (2ND FLR);  Service: Endoscopy;  Laterality: N/A;  per Dr. Garcia done within the month with any provider/ ordered by oncology  2nd floor due to comorbidities  Watchman device  COVID test at [facility] on 5/24-GT    SKIN CANCER EXCISION      TREATMENT OF CARDIAC ARRHYTHMIA N/A 7/12/2019    Procedure: Cardioversion or  Defibrillation;  Surgeon: Hi Crowder MD;  Location: [facility] EP LAB;  Service: Cardiology;  Laterality: N/A;  AF, JACINTO, DCCV, MAC, MB, 3 Prep    TREATMENT OF CARDIAC ARRHYTHMIA N/A 2/8/2022    Procedure: Cardioversion or Defibrillation;  Surgeon: Susan Orozco NP;  Location: [facility] EP LAB;  Service: Cardiology;  Laterality: N/A;  afib, DCCV, anes, MB, 3 Prep       Family History:   Family History   Problem Relation Age of Onset    Stroke Father     Hypertension Father     Stroke Brother     Anesthesia problems Neg Hx     Malignant hypertension Neg Hx     Hypotension Neg Hx     Malignant hyperthermia Neg Hx     Pseudochol deficiency Neg Hx     Melanoma Neg Hx     Heart attack Neg Hx     Heart disease Neg Hx     Heart failure Neg Hx     Cataracts Neg Hx     Glaucoma Neg Hx     Macular degeneration Neg Hx        Social History:  reports that he quit smoking about 53 years ago. His smoking use included cigarettes. He started smoking about 78 years ago. He has a 25.0 pack-year smoking history. He has never used smokeless tobacco. He reports current alcohol use of about 3.0 standard drinks of alcohol per week. He reports that he does not use drugs.    Allergies:  Review of patient's allergies indicates:  No Known Allergies    Medications:  Current Outpatient Medications   Medication Sig Dispense Refill    amLODIPine (NORVASC) 2.5 MG tablet Take 1 tablet (2.5 mg total) by mouth once daily. 90 tablet 2    aspirin (ECOTRIN) 81 MG EC tablet Take 81 mg by mouth once daily.      cetirizine (ZYRTEC) 10 MG tablet Take 1 tablet (10 mg total) by mouth once daily. 30 tablet 0    clobetasoL (TEMOVATE) 0.05 % external solution Pt to mix in 1 jar of cerave cream and apply to affected areas bid 50 mL 3    cyanocobalamin (VITAMIN B-12) 1000 MCG tablet Take 1,000 mcg by mouth once daily.      diclofenac sodium (VOLTAREN ARTHRITIS PAIN) 1 % Gel Apply 2 Grams to painful right hand joint 2-3x/day as needed 100 g 1    fluticasone  propionate (FLONASE) 50 mcg/actuation nasal spray SHAKE LIQUID AND USE 2 SPRAYS(100 MCG) IN EACH NOSTRIL EVERY DAY 48 g 0    furosemide (LASIX) 20 MG tablet Take 20 mg by mouth as needed. Pt takes 5 tabs weekly as needed      ipratropium (ATROVENT) 21 mcg (0.03 %) nasal spray 2 sprays by Each Nostril route 2 (two) times daily as needed for Rhinitis. 30 mL 0    irbesartan (AVAPRO) 150 MG tablet Take 1 tablet (150 mg total) by mouth once daily. 90 tablet 2    metoprolol succinate (TOPROL-XL) 25 MG 24 hr tablet Take 0.5 tablets (12.5 mg total) by mouth once daily. 90 tablet 1    multivitamin (THERAGRAN) per tablet Take 1 tablet by mouth once daily.      mupirocin (BACTROBAN) 2 % ointment Apply topically 3 (three) times daily. 22 g 0    mupirocin (BACTROBAN) 2 % ointment Apply to affected area 3 times daily 22 g 1    psyllium husk (METAMUCIL ORAL) Take by mouth.       Current Facility-Administered Medications   Medication Dose Route Frequency Provider Last Rate Last Admin    lidocaine HCl 2% urojet   Urethral Once Grover Ochoa Jr., MD           ROS:  Constitutional: Reports some fatigue, worse at rest, better with activity. Denies appetite change, chills, fever and unexpected weight change.   HENT: Negative for nosebleeds and sore throat.    Respiratory: Negative for cough, chest tightness, shortness of breath and wheezing.    Cardiovascular: Negative for leg swelling. Negative for palpitations.   Endocrine: Negative for cold intolerance  Gastrointestinal: Negative for constipation. Negative for abdominal pain although reports abdominal fullness. Denies diarrhea, nausea and vomiting.       ECOG Performance Status: 1  Objective:      Vitals:   Vitals:    11/09/23 1404   BP: (!) 161/95   BP Location: Right arm   Patient Position: Sitting   BP Method: Medium (Automatic)   Pulse: 89   Resp: (!) 22   Temp: 97.8 °F (36.6 °C)   TempSrc: Oral   SpO2: 97%   Weight: 84.5 kg (186 lb 6.4 oz)   Height: 6' (1.829 m)        Physical Exam  Constitutional:       Appearance: Normal appearance.   HENT:      Head: Normocephalic and atraumatic.      Mouth/Throat:      Mouth: Mucous membranes are moist.   Eyes:      Extraocular Movements: Extraocular movements intact.   Cardiovascular:      Rate and Rhythm: Normal rate and regular rhythm.      Pulses: Normal pulses.      Heart sounds: Normal heart sounds.   Pulmonary:      Effort: Pulmonary effort is normal.      Breath sounds: Normal breath sounds.   Abdominal:      Palpations: Abdomen is soft although fullness is present. Umbilical hernia.   Lymphadenopathy:      Cervical: No cervical adenopathy.      Upper Body:      Right upper body: No supraclavicular adenopathy.      Left upper body: No supraclavicular adenopathy.   Skin:     General: Skin is warm.   Neurological:      General: No focal deficit present.      Mental Status: He is alert.    Laboratory Data:  No visits with results within 1 Week(s) from this visit.   Latest known visit with results is:   Lab Visit on 05/04/2023   Component Date Value Ref Range Status    WBC 05/04/2023 5.39  3.90 - 12.70 K/uL Final    RBC 05/04/2023 4.27 (L)  4.60 - 6.20 M/uL Final    Hemoglobin 05/04/2023 13.4 (L)  14.0 - 18.0 g/dL Final    Hematocrit 05/04/2023 41.4  40.0 - 54.0 % Final    MCV 05/04/2023 97  82 - 98 fL Final    MCH 05/04/2023 31.4 (H)  27.0 - 31.0 pg Final    MCHC 05/04/2023 32.4  32.0 - 36.0 g/dL Final    RDW 05/04/2023 14.2  11.5 - 14.5 % Final    Platelets 05/04/2023 233  150 - 450 K/uL Final    MPV 05/04/2023 9.3  9.2 - 12.9 fL Final    Immature Granulocytes 05/04/2023 0.6 (H)  0.0 - 0.5 % Final    Gran # (ANC) 05/04/2023 4.0  1.8 - 7.7 K/uL Final    Immature Grans (Abs) 05/04/2023 0.03  0.00 - 0.04 K/uL Final    Comment: Mild elevation in immature granulocytes is non specific and   can be seen in a variety of conditions including stress response,   acute inflammation, trauma and pregnancy. Correlation with other   laboratory  and clinical findings is essential.      Lymph # 05/04/2023 0.7 (L)  1.0 - 4.8 K/uL Final    Mono # 05/04/2023 0.6  0.3 - 1.0 K/uL Final    Eos # 05/04/2023 0.0  0.0 - 0.5 K/uL Final    Baso # 05/04/2023 0.05  0.00 - 0.20 K/uL Final    nRBC 05/04/2023 0  0 /100 WBC Final    Gran % 05/04/2023 73.8 (H)  38.0 - 73.0 % Final    Lymph % 05/04/2023 13.5 (L)  18.0 - 48.0 % Final    Mono % 05/04/2023 10.6  4.0 - 15.0 % Final    Eosinophil % 05/04/2023 0.6  0.0 - 8.0 % Final    Basophil % 05/04/2023 0.9  0.0 - 1.9 % Final    Differential Method 05/04/2023 Automated   Final    Sodium 05/04/2023 140  136 - 145 mmol/L Final    Potassium 05/04/2023 4.5  3.5 - 5.1 mmol/L Final    Chloride 05/04/2023 107  95 - 110 mmol/L Final    CO2 05/04/2023 27  23 - 29 mmol/L Final    Glucose 05/04/2023 144 (H)  70 - 110 mg/dL Final    BUN 05/04/2023 23  10 - 30 mg/dL Final    Creatinine 05/04/2023 1.1  0.5 - 1.4 mg/dL Final    Calcium 05/04/2023 9.4  8.7 - 10.5 mg/dL Final    Total Protein 05/04/2023 6.8  6.0 - 8.4 g/dL Final    Albumin 05/04/2023 3.5  3.5 - 5.2 g/dL Final    Total Bilirubin 05/04/2023 0.5  0.1 - 1.0 mg/dL Final    Comment: For infants and newborns, interpretation of results should be based  on gestational age, weight and in agreement with clinical  observations.    Premature Infant recommended reference ranges:  Up to 24 hours.............<8.0 mg/dL  Up to 48 hours............<12.0 mg/dL  3-5 days..................<15.0 mg/dL  6-29 days.................<15.0 mg/dL      Alkaline Phosphatase 05/04/2023 70  55 - 135 U/L Final    AST 05/04/2023 19  10 - 40 U/L Final    ALT 05/04/2023 20  10 - 44 U/L Final    Anion Gap 05/04/2023 6 (L)  8 - 16 mmol/L Final    eGFR 05/04/2023 >60.0  >60 mL/min/1.73 m^2 Final    LD 05/04/2023 241  110 - 260 U/L Final    Results are increased in hemolyzed samples.         Imaging: All pertinent imaging reviewed    Assessment and Plan        1. MALT (mucosa associated lymphoid tissue)          1)  Limited (Stage 1) MALT lymphoma  -H pylori negative  -Recent h/o GIB while was on DOAC for a-fib  -PET negative for distant disease or lymphadenopathy  -Hepatitis studies negative  -Patient recently seen by cardiology for a-fib, off anti-coagulation, off of beta-blocker given AEs  -Patient completed Rituximab 375mg x4 weeks on 3/7/19  -Persistent MALT lymphoma on EGD, PET was also done in 2019, no other site of disease  -Was seen again by radiation oncology, preferred to pursue observation at this time  -Repeat EGD 5/27/21 showed persistent MALT lymphoma, discussed option of repeat Rituxan vs ISRT vs observation. Patient currently on observation.      2) Systolic CHF, A-fib  -Managed by cardiology  -S/p Watchman    3) Abdominal fullness  -Perhaps related to ETOH use. He plans to cut down.        Follow-up:  Route Chart for Scheduling    BMT Chart Routing      Follow up with physician 4 weeks. Dr Prather   Follow up with LAVONNE    Provider visit type    Infusion scheduling note    Injection scheduling note    Labs CBC and CMP   Scheduling:  Preferred lab:  Lab interval:  prior to MD visit in 6 months   Imaging CT chest abdomen pelvis   as soon as next available.   Pharmacy appointment    Other referrals                Michelle Harper MD  Hematology/Oncology Fellow PGY VI  [facility]

## 2023-12-11 NOTE — PROGRESS NOTES
Subjective:       Patient ID: Norm Mendoza is a 89 y.o. male.    Chief Complaint:   Annual Exam    HPI: Mr Mendoza presents for follow up chronic issues  He is doing ok but has become more fatigued-no CP but +SOB/weak with exertion.  He describes weakness in the legs with walking up the stairs  HTN: Med Tx 1- Toprol XL 25mg at 1/2 tab daily, 2- Avapro 150mg QD, 3- Norvasc 2.5mg QD         Digital Home BPs:101/57- 135/75, 1x up to 155/92  Atrial Fibrillation:  Med Tx 1- ASA QD, 2- Toprol XL 25mg at 12 tab QD  1st dx'd 6/2019; + GI bleed/2ndary to MALT GI tumor resulted in Xaralto being HELD; 7/2019: s/p DCCE; 10/2019: s/p Watchman device/Was on Amiodarone transiently-now off; 2/2022-s/p 2nd  DCCV; Presently-Rate controlled and on ASA QD   MALT Tumor/Stage I Lymphoma: Heme-Onc/Dr Harper/Dr YESSENIA Prather;  Completed Rituximab x4 on 3/7/19; Presently on conservative following   Etoh: He has recently decreased intake to 2 drinks/day(1 martini and 1 beer)    Past Medical, Surgical, Social History: Please see as stated in Epic chart which has been reviewed.    Current Outpatient Medications   Medication Sig Dispense Refill    amLODIPine (NORVASC) 2.5 MG tablet Take 1 tablet (2.5 mg total) by mouth once daily. 90 tablet 2    irbesartan (AVAPRO) 150 MG tablet Take 1 tablet (150 mg total) by mouth once daily. 90 tablet 2    metoprolol succinate (TOPROL-XL) 25 MG 24 hr tablet Take 0.5 tablets (12.5 mg total) by mouth once daily. 90 tablet 1    multivitamin (THERAGRAN) per tablet Take 1 tablet by mouth once daily.      psyllium husk (METAMUCIL ORAL) Take by mouth.      clobetasoL (TEMOVATE) 0.05 % external solution Pt to mix in 1 jar of cerave cream and apply to affected areas bid (Patient not taking: Reported on 12/12/2023) 50 mL 3    diclofenac sodium (VOLTAREN ARTHRITIS PAIN) 1 % Gel Apply 2 Grams to painful right hand joint 2-3x/day as needed (Patient not taking: Reported on 12/12/2023) 100 g 1    mupirocin (BACTROBAN) 2  % ointment Apply to affected area 3 times daily (Patient not taking: Reported on 12/12/2023) 22 g 1     Current Facility-Administered Medications   Medication Dose Route Frequency Provider Last Rate Last Admin    lidocaine HCl 2% urojet   Urethral Once Grover Ochoa Jr., MD           Review of Systems   Constitutional:  Positive for fatigue.   Respiratory:  Positive for shortness of breath.    Musculoskeletal:  Positive for back pain.   Neurological:  Positive for weakness.       Objective:      Lab Results   Component Value Date    WBC 6.13 11/09/2023    HGB 13.5 (L) 11/09/2023    HCT 41.0 11/09/2023     11/09/2023    CHOL 207 (H) 08/19/2021    TRIG 79 08/19/2021    HDL 64 08/19/2021    ALT 21 11/09/2023    AST 26 11/09/2023     11/09/2023    K 4.4 11/09/2023     11/09/2023    CREATININE 1.2 11/09/2023    BUN 22 11/09/2023    CO2 26 11/09/2023    TSH 1.787 10/08/2021    PSA 2.5 09/10/2015    INR 1.0 02/03/2022    HGBA1C 5.2 08/19/2021     Physical Exam  Vitals reviewed.   Constitutional:       Appearance: Normal appearance.   HENT:      Head: Normocephalic and atraumatic.   Cardiovascular:      Rate and Rhythm: Normal rate. Rhythm irregular.      Heart sounds: Murmur heard.      Comments: VHR= 66  + Holosystolic Murmur at Grade 4/6  Pulmonary:      Breath sounds: Normal breath sounds.   Abdominal:      General: Bowel sounds are normal.      Palpations: Abdomen is soft. There is no mass.      Tenderness: There is abdominal tenderness.   Musculoskeletal:      Right lower leg: Edema present.      Left lower leg: Edema present.      Comments: +Trace  to 1+ pre-tibial edema   Skin:     Findings: Lesion present.      Comments: +Raised Seborrheic keratosis behind right ear-nontender/no D/C   Neurological:      Mental Status: He is alert.   Psychiatric:         Mood and Affect: Mood normal.           Vital Signs  Resp: 18  SpO2: 100 %  BP: 110/60  BP Location: Right arm  Patient Position:  Sitting  Height and Weight  Height: 6' (182.9 cm)  Weight: 83.8 kg (184 lb 11.9 oz)  BSA (Calculated - sq m): 2.06 sq meters  BMI (Calculated): 25.1  Weight in (lb) to have BMI = 25: 183.9    Assessment:       1. Essential hypertension    2. Stage 3a chronic kidney disease    3. Aortic valve stenosis, etiology of cardiac valve disease unspecified    4. DUGAN (dyspnea on exertion)    5. PAF (paroxysmal atrial fibrillation)    6. MALT (mucosa associated lymphoid tissue)    7. Alcohol dependence, daily use    8. Hyperlipidemia, unspecified hyperlipidemia type        Plan:     Health Maintenance   Topic Date Due    Lipid Panel  08/19/2026    TETANUS VACCINE  01/19/2027    Shingles Vaccine  Completed        Norm was seen today for annual exam.    Diagnoses and all orders for this visit:    Essential hypertension/controlled        -     Continue: Med Tx 1- Toprol XL 25mg at 1/2 tab daily, 2- Avapro 150mg QD, 3- Norvasc 2.5mg QD  -     TSH; Future    Stage 3a chronic kidney disease        -     Limit NSAIDS/Increase intake of water    Aortic valve stenosis, etiology of cardiac valve disease unspecified  -     Echo Saline Bubble? Yes; Future  -     Ambulatory referral/consult to Cardiology; Future    DUGAN (dyspnea on exertion)  -     TSH; Future  -     BNP; Future  -     Comprehensive Metabolic Panel; Future  -     Ambulatory referral/consult to Cardiology; Future    PAF (paroxysmal atrial fibrillation)/s/p Watchman with heart rate controlled         -     Anticoagulation with ASA optional not necessary        -     Follow along as per Arrhythmia Cardiology/DR Vel TORRES (mucosa associated lymphoid tissue)        -     Follow along per Heme-Onc/Dr Harper    Alcohol dependence, daily use        -     Pt to continue working on decreasing intake of Etoh    Hyperlipidemia, unspecified hyperlipidemia type  -     Lipid Panel; Future    Health Maintenance        -     RTC x 6 months

## 2023-12-12 ENCOUNTER — OFFICE VISIT (OUTPATIENT)
Dept: INTERNAL MEDICINE | Facility: CLINIC | Age: 88
End: 2023-12-12
Payer: MEDICARE

## 2023-12-12 VITALS
WEIGHT: 184.75 LBS | BODY MASS INDEX: 25.02 KG/M2 | OXYGEN SATURATION: 100 % | SYSTOLIC BLOOD PRESSURE: 110 MMHG | RESPIRATION RATE: 18 BRPM | DIASTOLIC BLOOD PRESSURE: 60 MMHG | HEIGHT: 72 IN

## 2023-12-12 DIAGNOSIS — I10 ESSENTIAL HYPERTENSION: Primary | ICD-10-CM

## 2023-12-12 DIAGNOSIS — I48.0 PAF (PAROXYSMAL ATRIAL FIBRILLATION): ICD-10-CM

## 2023-12-12 DIAGNOSIS — R06.09 DOE (DYSPNEA ON EXERTION): ICD-10-CM

## 2023-12-12 DIAGNOSIS — N18.31 STAGE 3A CHRONIC KIDNEY DISEASE: ICD-10-CM

## 2023-12-12 DIAGNOSIS — C88.4 MALT (MUCOSA ASSOCIATED LYMPHOID TISSUE): ICD-10-CM

## 2023-12-12 DIAGNOSIS — I35.0 AORTIC VALVE STENOSIS, ETIOLOGY OF CARDIAC VALVE DISEASE UNSPECIFIED: ICD-10-CM

## 2023-12-12 DIAGNOSIS — F10.20 ALCOHOL DEPENDENCE, DAILY USE: ICD-10-CM

## 2023-12-12 DIAGNOSIS — E78.5 HYPERLIPIDEMIA, UNSPECIFIED HYPERLIPIDEMIA TYPE: ICD-10-CM

## 2023-12-12 PROCEDURE — 99215 OFFICE O/P EST HI 40 MIN: CPT | Mod: HCNC,S$GLB,, | Performed by: INTERNAL MEDICINE

## 2023-12-12 PROCEDURE — 1101F PR PT FALLS ASSESS DOC 0-1 FALLS W/OUT INJ PAST YR: ICD-10-PCS | Mod: HCNC,CPTII,S$GLB, | Performed by: INTERNAL MEDICINE

## 2023-12-12 PROCEDURE — 3288F FALL RISK ASSESSMENT DOCD: CPT | Mod: HCNC,CPTII,S$GLB, | Performed by: INTERNAL MEDICINE

## 2023-12-12 PROCEDURE — 3288F PR FALLS RISK ASSESSMENT DOCUMENTED: ICD-10-PCS | Mod: HCNC,CPTII,S$GLB, | Performed by: INTERNAL MEDICINE

## 2023-12-12 PROCEDURE — 1159F MED LIST DOCD IN RCRD: CPT | Mod: HCNC,CPTII,S$GLB, | Performed by: INTERNAL MEDICINE

## 2023-12-12 PROCEDURE — 99999 PR PBB SHADOW E&M-EST. PATIENT-LVL III: ICD-10-PCS | Mod: PBBFAC,HCNC,, | Performed by: INTERNAL MEDICINE

## 2023-12-12 PROCEDURE — 99999 PR PBB SHADOW E&M-EST. PATIENT-LVL III: CPT | Mod: PBBFAC,HCNC,, | Performed by: INTERNAL MEDICINE

## 2023-12-12 PROCEDURE — 1159F PR MEDICATION LIST DOCUMENTED IN MEDICAL RECORD: ICD-10-PCS | Mod: HCNC,CPTII,S$GLB, | Performed by: INTERNAL MEDICINE

## 2023-12-12 PROCEDURE — 99215 PR OFFICE/OUTPT VISIT, EST, LEVL V, 40-54 MIN: ICD-10-PCS | Mod: HCNC,S$GLB,, | Performed by: INTERNAL MEDICINE

## 2023-12-12 PROCEDURE — 1101F PT FALLS ASSESS-DOCD LE1/YR: CPT | Mod: HCNC,CPTII,S$GLB, | Performed by: INTERNAL MEDICINE

## 2023-12-15 ENCOUNTER — LAB VISIT (OUTPATIENT)
Dept: LAB | Facility: HOSPITAL | Age: 88
End: 2023-12-15
Attending: INTERNAL MEDICINE
Payer: MEDICARE

## 2023-12-15 DIAGNOSIS — I10 ESSENTIAL HYPERTENSION: ICD-10-CM

## 2023-12-15 DIAGNOSIS — R06.09 DOE (DYSPNEA ON EXERTION): ICD-10-CM

## 2023-12-15 DIAGNOSIS — E78.5 HYPERLIPIDEMIA, UNSPECIFIED HYPERLIPIDEMIA TYPE: ICD-10-CM

## 2023-12-15 LAB
ALBUMIN SERPL BCP-MCNC: 3.6 G/DL (ref 3.5–5.2)
ALP SERPL-CCNC: 75 U/L (ref 55–135)
ALT SERPL W/O P-5'-P-CCNC: 23 U/L (ref 10–44)
ANION GAP SERPL CALC-SCNC: 6 MMOL/L (ref 8–16)
AST SERPL-CCNC: 24 U/L (ref 10–40)
BILIRUB SERPL-MCNC: 0.4 MG/DL (ref 0.1–1)
BNP SERPL-MCNC: 216 PG/ML (ref 0–99)
BUN SERPL-MCNC: 27 MG/DL (ref 10–30)
CALCIUM SERPL-MCNC: 9.6 MG/DL (ref 8.7–10.5)
CHLORIDE SERPL-SCNC: 109 MMOL/L (ref 95–110)
CHOLEST SERPL-MCNC: 202 MG/DL (ref 120–199)
CHOLEST/HDLC SERPL: 3.7 {RATIO} (ref 2–5)
CO2 SERPL-SCNC: 25 MMOL/L (ref 23–29)
CREAT SERPL-MCNC: 1 MG/DL (ref 0.5–1.4)
EST. GFR  (NO RACE VARIABLE): >60 ML/MIN/1.73 M^2
GLUCOSE SERPL-MCNC: 104 MG/DL (ref 70–110)
HDLC SERPL-MCNC: 55 MG/DL (ref 40–75)
HDLC SERPL: 27.2 % (ref 20–50)
LDLC SERPL CALC-MCNC: 131.4 MG/DL (ref 63–159)
NONHDLC SERPL-MCNC: 147 MG/DL
POTASSIUM SERPL-SCNC: 4.8 MMOL/L (ref 3.5–5.1)
PROT SERPL-MCNC: 7.1 G/DL (ref 6–8.4)
SODIUM SERPL-SCNC: 140 MMOL/L (ref 136–145)
TRIGL SERPL-MCNC: 78 MG/DL (ref 30–150)
TSH SERPL DL<=0.005 MIU/L-ACNC: 2.53 UIU/ML (ref 0.4–4)

## 2023-12-15 PROCEDURE — 84443 ASSAY THYROID STIM HORMONE: CPT | Mod: HCNC | Performed by: INTERNAL MEDICINE

## 2023-12-15 PROCEDURE — 80061 LIPID PANEL: CPT | Mod: HCNC | Performed by: INTERNAL MEDICINE

## 2023-12-15 PROCEDURE — 83880 ASSAY OF NATRIURETIC PEPTIDE: CPT | Mod: HCNC | Performed by: INTERNAL MEDICINE

## 2023-12-15 PROCEDURE — 80053 COMPREHEN METABOLIC PANEL: CPT | Mod: HCNC | Performed by: INTERNAL MEDICINE

## 2023-12-15 PROCEDURE — 36415 COLL VENOUS BLD VENIPUNCTURE: CPT | Mod: HCNC,PO | Performed by: INTERNAL MEDICINE

## 2024-01-03 ENCOUNTER — TELEPHONE (OUTPATIENT)
Dept: HEMATOLOGY/ONCOLOGY | Facility: CLINIC | Age: 89
End: 2024-01-03
Payer: MEDICARE

## 2024-01-05 ENCOUNTER — LAB VISIT (OUTPATIENT)
Dept: LAB | Facility: HOSPITAL | Age: 89
End: 2024-01-05
Payer: MEDICARE

## 2024-01-05 ENCOUNTER — OFFICE VISIT (OUTPATIENT)
Dept: HEMATOLOGY/ONCOLOGY | Facility: CLINIC | Age: 89
End: 2024-01-05
Payer: MEDICARE

## 2024-01-05 VITALS
BODY MASS INDEX: 25.02 KG/M2 | OXYGEN SATURATION: 99 % | RESPIRATION RATE: 16 BRPM | DIASTOLIC BLOOD PRESSURE: 72 MMHG | HEIGHT: 72 IN | WEIGHT: 184.75 LBS | TEMPERATURE: 98 F | SYSTOLIC BLOOD PRESSURE: 141 MMHG | HEART RATE: 75 BPM

## 2024-01-05 DIAGNOSIS — C88.4 MALT LYMPHOMA: ICD-10-CM

## 2024-01-05 DIAGNOSIS — I48.0 PAF (PAROXYSMAL ATRIAL FIBRILLATION): ICD-10-CM

## 2024-01-05 DIAGNOSIS — N18.32 STAGE 3B CHRONIC KIDNEY DISEASE: ICD-10-CM

## 2024-01-05 DIAGNOSIS — C88.4 MALT (MUCOSA ASSOCIATED LYMPHOID TISSUE): ICD-10-CM

## 2024-01-05 DIAGNOSIS — C88.4 MALT LYMPHOMA: Primary | ICD-10-CM

## 2024-01-05 LAB
ALBUMIN SERPL BCP-MCNC: 3.6 G/DL (ref 3.5–5.2)
ALP SERPL-CCNC: 83 U/L (ref 55–135)
ALT SERPL W/O P-5'-P-CCNC: 23 U/L (ref 10–44)
ANION GAP SERPL CALC-SCNC: 6 MMOL/L (ref 8–16)
AST SERPL-CCNC: 22 U/L (ref 10–40)
BASOPHILS # BLD AUTO: 0.06 K/UL (ref 0–0.2)
BASOPHILS NFR BLD: 0.9 % (ref 0–1.9)
BILIRUB SERPL-MCNC: 0.6 MG/DL (ref 0.1–1)
BUN SERPL-MCNC: 22 MG/DL (ref 10–30)
CALCIUM SERPL-MCNC: 9.3 MG/DL (ref 8.7–10.5)
CHLORIDE SERPL-SCNC: 109 MMOL/L (ref 95–110)
CO2 SERPL-SCNC: 26 MMOL/L (ref 23–29)
CREAT SERPL-MCNC: 1.1 MG/DL (ref 0.5–1.4)
DIFFERENTIAL METHOD BLD: ABNORMAL
EOSINOPHIL # BLD AUTO: 0 K/UL (ref 0–0.5)
EOSINOPHIL NFR BLD: 0.1 % (ref 0–8)
ERYTHROCYTE [DISTWIDTH] IN BLOOD BY AUTOMATED COUNT: 14.6 % (ref 11.5–14.5)
EST. GFR  (NO RACE VARIABLE): >60 ML/MIN/1.73 M^2
GLUCOSE SERPL-MCNC: 102 MG/DL (ref 70–110)
HCT VFR BLD AUTO: 42 % (ref 40–54)
HGB BLD-MCNC: 13.7 G/DL (ref 14–18)
IMM GRANULOCYTES # BLD AUTO: 0.03 K/UL (ref 0–0.04)
IMM GRANULOCYTES NFR BLD AUTO: 0.4 % (ref 0–0.5)
LDH SERPL L TO P-CCNC: 201 U/L (ref 110–260)
LYMPHOCYTES # BLD AUTO: 0.8 K/UL (ref 1–4.8)
LYMPHOCYTES NFR BLD: 11.4 % (ref 18–48)
MCH RBC QN AUTO: 32.2 PG (ref 27–31)
MCHC RBC AUTO-ENTMCNC: 32.6 G/DL (ref 32–36)
MCV RBC AUTO: 99 FL (ref 82–98)
MONOCYTES # BLD AUTO: 0.8 K/UL (ref 0.3–1)
MONOCYTES NFR BLD: 12.1 % (ref 4–15)
NEUTROPHILS # BLD AUTO: 5.1 K/UL (ref 1.8–7.7)
NEUTROPHILS NFR BLD: 75.1 % (ref 38–73)
NRBC BLD-RTO: 0 /100 WBC
PLATELET # BLD AUTO: 200 K/UL (ref 150–450)
PMV BLD AUTO: 9.7 FL (ref 9.2–12.9)
POTASSIUM SERPL-SCNC: 4.6 MMOL/L (ref 3.5–5.1)
PROT SERPL-MCNC: 7.1 G/DL (ref 6–8.4)
RBC # BLD AUTO: 4.26 M/UL (ref 4.6–6.2)
SODIUM SERPL-SCNC: 141 MMOL/L (ref 136–145)
WBC # BLD AUTO: 6.75 K/UL (ref 3.9–12.7)

## 2024-01-05 PROCEDURE — 85025 COMPLETE CBC W/AUTO DIFF WBC: CPT | Mod: HCNC | Performed by: INTERNAL MEDICINE

## 2024-01-05 PROCEDURE — 3288F FALL RISK ASSESSMENT DOCD: CPT | Mod: HCNC,CPTII,S$GLB, | Performed by: INTERNAL MEDICINE

## 2024-01-05 PROCEDURE — 1101F PT FALLS ASSESS-DOCD LE1/YR: CPT | Mod: HCNC,CPTII,S$GLB, | Performed by: INTERNAL MEDICINE

## 2024-01-05 PROCEDURE — 99999 PR PBB SHADOW E&M-EST. PATIENT-LVL III: CPT | Mod: PBBFAC,HCNC,, | Performed by: INTERNAL MEDICINE

## 2024-01-05 PROCEDURE — 99214 OFFICE O/P EST MOD 30 MIN: CPT | Mod: HCNC,S$GLB,, | Performed by: INTERNAL MEDICINE

## 2024-01-05 PROCEDURE — 80053 COMPREHEN METABOLIC PANEL: CPT | Mod: HCNC | Performed by: INTERNAL MEDICINE

## 2024-01-05 PROCEDURE — 1159F MED LIST DOCD IN RCRD: CPT | Mod: HCNC,CPTII,S$GLB, | Performed by: INTERNAL MEDICINE

## 2024-01-05 PROCEDURE — 36415 COLL VENOUS BLD VENIPUNCTURE: CPT | Mod: HCNC | Performed by: INTERNAL MEDICINE

## 2024-01-05 PROCEDURE — 1126F AMNT PAIN NOTED NONE PRSNT: CPT | Mod: HCNC,CPTII,S$GLB, | Performed by: INTERNAL MEDICINE

## 2024-01-05 PROCEDURE — 83615 LACTATE (LD) (LDH) ENZYME: CPT | Mod: HCNC | Performed by: INTERNAL MEDICINE

## 2024-01-05 PROCEDURE — 1160F RVW MEDS BY RX/DR IN RCRD: CPT | Mod: HCNC,CPTII,S$GLB, | Performed by: INTERNAL MEDICINE

## 2024-01-05 RX ORDER — INFLUENZA A VIRUS A/VICTORIA/4897/2022 IVR-238 (H1N1) ANTIGEN (FORMALDEHYDE INACTIVATED), INFLUENZA A VIRUS A/DARWIN/6/2021 IVR-227 (H3N2) ANTIGEN (FORMALDEHYDE INACTIVATED), INFLUENZA B VIRUS B/AUSTRIA/1359417/2021 BVR-26 ANTIGEN (FORMALDEHYDE INACTIVATED), INFLUENZA B VIRUS B/PHUKET/3073/2013 BVR-1B ANTIGEN (FORMALDEHYDE INACTIVATED) 15; 15; 15; 15 UG/.5ML; UG/.5ML; UG/.5ML; UG/.5ML
INJECTION, SUSPENSION INTRAMUSCULAR
COMMUNITY
Start: 2023-09-29 | End: 2024-01-05 | Stop reason: ALTCHOICE

## 2024-01-05 NOTE — PROGRESS NOTES
Route Chart for Scheduling    BMT Chart Routing      Follow up with physician 6 months.   Follow up with LAVONNE    Provider visit type Malignant hem   Infusion scheduling note    Injection scheduling note    Labs CBC, CMP and LDH   Scheduling:  Preferred lab:  Lab interval:  labs at time of return visit   Imaging    Pharmacy appointment    Other referrals

## 2024-01-08 PROBLEM — U07.1 COVID: Status: RESOLVED | Noted: 2023-01-05 | Resolved: 2024-01-08

## 2024-01-08 PROBLEM — N18.31 STAGE 3A CHRONIC KIDNEY DISEASE: Status: ACTIVE | Noted: 2024-01-08

## 2024-01-31 ENCOUNTER — TELEPHONE (OUTPATIENT)
Dept: ADMINISTRATIVE | Facility: CLINIC | Age: 89
End: 2024-01-31
Payer: MEDICARE

## 2024-02-01 ENCOUNTER — OFFICE VISIT (OUTPATIENT)
Dept: INTERNAL MEDICINE | Facility: CLINIC | Age: 89
End: 2024-02-01
Payer: MEDICARE

## 2024-02-01 VITALS
BODY MASS INDEX: 25.18 KG/M2 | DIASTOLIC BLOOD PRESSURE: 70 MMHG | HEART RATE: 79 BPM | OXYGEN SATURATION: 99 % | SYSTOLIC BLOOD PRESSURE: 118 MMHG | WEIGHT: 185.88 LBS | HEIGHT: 72 IN

## 2024-02-01 DIAGNOSIS — N18.31 STAGE 3A CHRONIC KIDNEY DISEASE: ICD-10-CM

## 2024-02-01 DIAGNOSIS — Z95.818 PRESENCE OF WATCHMAN LEFT ATRIAL APPENDAGE CLOSURE DEVICE: ICD-10-CM

## 2024-02-01 DIAGNOSIS — I48.19 PERSISTENT ATRIAL FIBRILLATION: ICD-10-CM

## 2024-02-01 DIAGNOSIS — L57.0 AK (ACTINIC KERATOSIS): ICD-10-CM

## 2024-02-01 DIAGNOSIS — I11.0 HYPERTENSIVE HEART DISEASE WITH HEART FAILURE: ICD-10-CM

## 2024-02-01 DIAGNOSIS — F10.20 ALCOHOL DEPENDENCE, DAILY USE: ICD-10-CM

## 2024-02-01 DIAGNOSIS — E78.2 MIXED HYPERLIPIDEMIA: ICD-10-CM

## 2024-02-01 DIAGNOSIS — Z74.09 OTHER REDUCED MOBILITY: Primary | ICD-10-CM

## 2024-02-01 DIAGNOSIS — I42.0 DILATED CARDIOMYOPATHY: ICD-10-CM

## 2024-02-01 DIAGNOSIS — C88.4 MALT LYMPHOMA: ICD-10-CM

## 2024-02-01 DIAGNOSIS — I10 ESSENTIAL HYPERTENSION: ICD-10-CM

## 2024-02-01 DIAGNOSIS — C88.4 MALT (MUCOSA ASSOCIATED LYMPHOID TISSUE): ICD-10-CM

## 2024-02-01 DIAGNOSIS — Z00.00 ENCOUNTER FOR PREVENTIVE HEALTH EXAMINATION: ICD-10-CM

## 2024-02-01 DIAGNOSIS — I70.0 AORTIC ATHEROSCLEROSIS: ICD-10-CM

## 2024-02-01 DIAGNOSIS — I51.89 LEFT VENTRICULAR DIASTOLIC DYSFUNCTION WITH PRESERVED SYSTOLIC FUNCTION: ICD-10-CM

## 2024-02-01 PROCEDURE — 1160F RVW MEDS BY RX/DR IN RCRD: CPT | Mod: HCNC,CPTII,S$GLB, | Performed by: PHYSICIAN ASSISTANT

## 2024-02-01 PROCEDURE — 99999 PR PBB SHADOW E&M-EST. PATIENT-LVL IV: CPT | Mod: PBBFAC,HCNC,, | Performed by: PHYSICIAN ASSISTANT

## 2024-02-01 PROCEDURE — 1101F PT FALLS ASSESS-DOCD LE1/YR: CPT | Mod: HCNC,CPTII,S$GLB, | Performed by: PHYSICIAN ASSISTANT

## 2024-02-01 PROCEDURE — 3288F FALL RISK ASSESSMENT DOCD: CPT | Mod: HCNC,CPTII,S$GLB, | Performed by: PHYSICIAN ASSISTANT

## 2024-02-01 PROCEDURE — G0439 PPPS, SUBSEQ VISIT: HCPCS | Mod: HCNC,S$GLB,, | Performed by: PHYSICIAN ASSISTANT

## 2024-02-01 PROCEDURE — 1159F MED LIST DOCD IN RCRD: CPT | Mod: HCNC,CPTII,S$GLB, | Performed by: PHYSICIAN ASSISTANT

## 2024-02-01 PROCEDURE — 1170F FXNL STATUS ASSESSED: CPT | Mod: HCNC,CPTII,S$GLB, | Performed by: PHYSICIAN ASSISTANT

## 2024-02-01 PROCEDURE — 1126F AMNT PAIN NOTED NONE PRSNT: CPT | Mod: HCNC,CPTII,S$GLB, | Performed by: PHYSICIAN ASSISTANT

## 2024-02-01 NOTE — PROGRESS NOTES
Norm Mendoza presented for a  Medicare AWV and comprehensive Health Risk Assessment today. The following components were reviewed and updated:    Medical history  Family History  Social history  Allergies and Current Medications  Health Risk Assessment  Health Maintenance  Care Team         ** See Completed Assessments for Annual Wellness Visit within the encounter summary.**         The following assessments were completed:  Living Situation  CAGE  Depression Screening  Timed Get Up and Go  Whisper Test  Cognitive Function Screening  Nutrition Screening  ADL Screening  PAQ Screening        Vitals:    02/01/24 1038   BP: 118/70   BP Location: Right arm   Patient Position: Sitting   BP Method: Medium (Manual)   Pulse: 79   SpO2: 99%   Weight: 84.3 kg (185 lb 13.6 oz)   Height: 6' (1.829 m)     Body mass index is 25.21 kg/m².  Physical Exam  Vitals and nursing note reviewed.   Constitutional:       Appearance: He is well-developed.   HENT:      Head: Normocephalic.      Right Ear: External ear normal.      Left Ear: External ear normal.   Eyes:      Pupils: Pupils are equal, round, and reactive to light.   Cardiovascular:      Rate and Rhythm: Normal rate. Rhythm irregular.      Heart sounds: Normal heart sounds. No murmur heard.     No friction rub. No gallop.   Pulmonary:      Effort: Pulmonary effort is normal. No respiratory distress.      Breath sounds: Normal breath sounds.   Abdominal:      Palpations: Abdomen is soft.      Tenderness: There is no abdominal tenderness.   Musculoskeletal:         General: No swelling.   Skin:     General: Skin is warm and dry.   Neurological:      General: No focal deficit present.      Mental Status: He is alert.   Psychiatric:         Mood and Affect: Mood normal.               Diagnoses and health risks identified today and associated recommendations/orders:    1. Other reduced mobility  Stable, chronic, followed by PCP.    2. Encounter for preventive health  examination  Assessments completed.  Preventative health recommendations reviewed.     3. Hypertensive heart disease with heart failure  Stable, controlled on current medical therapy, followed by PCP.    4. Alcohol dependence, daily use  Reduced to 2 drinks/day, previously drinking more.    5. Aortic atherosclerosis  Stable, controlled on current medical therapy, followed by PCP.    6. AK (actinic keratosis)  Stable, chronic, followed by dermatology.    7. Essential hypertension  Stable, controlled on current medical therapy, followed by PCP.  BP Readings from Last 5 Encounters:   02/01/24 118/70   01/05/24 (!) 141/72   12/12/23 110/60   11/09/23 (!) 161/95   05/17/23 136/80      8. Left ventricular diastolic dysfunction with preserved systolic function  Stable, controlled on current medical therapy, followed by PCP.    9. Mixed hyperlipidemia  Stable, controlled on current medical therapy, followed by PCP.  Lab Results   Component Value Date    CHOL 202 (H) 12/15/2023    TRIG 78 12/15/2023    HDL 55 12/15/2023    LDLCALC 131.4 12/15/2023     10. Persistent atrial fibrillation  Stable, controlled on current medical therapy, followed by PCP and EP.    11. Dilated cardiomyopathy  Stable, controlled on current medical therapy, followed by PCP and cardiology.    12. Presence of Watchman left atrial appendage closure device  Stable, chronic, followed by EP.    13. Stage 3a chronic kidney disease  Stable, controlled on current medical therapy, followed by PCP.    14. MALT lymphoma  Stable, controlled on current medical therapy, followed by PCP and oncology.    15. MALT (mucosa associated lymphoid tissue)  Stable, controlled on current medical therapy, followed by PCP and oncology.    S/p flu shot outside [facility].    Provided Norm with a 5-10 year written screening schedule and personal prevention plan. Recommendations were developed using the USPSTF age appropriate recommendations. Education, counseling, and referrals  were provided as needed. After Visit Summary printed and given to patient which includes a list of additional screenings\tests needed.    No follow-ups on file.    Denia Amaya PA-C  I offered to discuss advanced care planning, including how to pick a person who would make decisions for you if you were unable to make them for yourself, called a health care power of , and what kind of decisions you might make such as use of life sustaining treatments such as ventilators and tube feeding when faced with a life limiting illness recorded on a living will that they will need to know. (How you want to be cared for as you near the end of your natural life)     X Patient is interested in learning more about how to make advanced directives.  I provided them paperwork and offered to discuss this with them.

## 2024-02-01 NOTE — PATIENT INSTRUCTIONS
Counseling and Referral of Other Preventative  (Italic type indicates deductible and co-insurance are waived)    Patient Name: Norm Mendoza  Today's Date: 2/1/2024    Health Maintenance       Date Due Completion Date    Influenza Vaccine (1) 09/01/2023 9/21/2022    COVID-19 Vaccine (6 - 2023-24 season) 09/01/2023 9/21/2022    TETANUS VACCINE 01/19/2027 1/19/2017    Lipid Panel 12/15/2028 12/15/2023        No orders of the defined types were placed in this encounter.      The following information is provided to all patients.  This information is to help you find resources for any of the problems found today that may be affecting your health:                  Living healthy guide: www.[facility].[place].gov      Understanding Diabetes: www.diabetes.org      Eating healthy: www.cdc.gov/healthyweight      [facility] home safety checklist: www.cdc.gov/steadi/patient.html      Agency on Aging: www.goea.[place].gov      Alcoholics anonymous (AA): www.aa.org      Physical Activity: www.elis.nih.gov/pl2segk      Tobacco use: www.quitwithusla.org

## 2024-02-13 ENCOUNTER — TELEPHONE (OUTPATIENT)
Dept: INTERNAL MEDICINE | Facility: CLINIC | Age: 89
End: 2024-02-13
Payer: MEDICARE

## 2024-02-15 DIAGNOSIS — I10 ESSENTIAL HYPERTENSION: ICD-10-CM

## 2024-02-15 RX ORDER — AMLODIPINE BESYLATE 2.5 MG/1
2.5 TABLET ORAL
Qty: 90 TABLET | Refills: 3 | Status: SHIPPED | OUTPATIENT
Start: 2024-02-15

## 2024-02-15 NOTE — TELEPHONE ENCOUNTER
Refill Routing Note   Medication(s) are not appropriate for processing by [facility] for the following reason(s):        Drug-disease interaction    [facility] action(s):  Defer        Medication Therapy Plan: amLODIPine and Nonrheumatic aortic valve stenosis    Pharmacist review requested: Yes     Appointments  past 12m or future 3m with PCP    Date Provider   Last Visit   12/12/2023 Amber Jenkins MD   Next Visit   Visit date not found Amber Jenkins MD   ED visits in past 90 days: 0        Note composed:10:36 AM 02/15/2024

## 2024-02-15 NOTE — TELEPHONE ENCOUNTER
Nadine Mayfield, OTR/L          SLP:    Last Filed Values       None          Barriers to Discharge  Identified Barriers to Discharge/Transition Planning:       pending medical clearance for SNF return    Zuleima Vernon LCSW                 LVM to confirm AWV, genet.

## 2024-02-15 NOTE — TELEPHONE ENCOUNTER
No care due was identified.  [facility] Embedded Care Due Messages. Reference number: [id number].   2/15/2024 5:41:14 AM CST

## 2024-02-15 NOTE — TELEPHONE ENCOUNTER
Refill Decision Note   Norm Kelly  is requesting a refill authorization.  Brief Assessment and Rationale for Refill:  Approve     Medication Therapy Plan:         Pharmacist review requested: Yes   Comments:     Note composed:10:48 AM 02/15/2024

## 2024-03-28 DIAGNOSIS — I10 ESSENTIAL HYPERTENSION: ICD-10-CM

## 2024-03-28 RX ORDER — FUROSEMIDE 20 MG/1
TABLET ORAL
Qty: 30 TABLET | Refills: 6 | OUTPATIENT
Start: 2024-03-28

## 2024-03-28 NOTE — TELEPHONE ENCOUNTER
----- Message from Erica Botello sent at 3/28/2024  4:03 PM CDT -----  Contact: [phone]  Requesting an RX refill or new RX.  Is this a refill or new RX: refill  RX name and strength (copy/paste from chart):  Furosemide 20 MG ( not in med list)  Is this a 30 day or 90 day RX: 30  Pharmacy name and phone # (copy/paste from chart):    [id number] [place] [id number] [facility]  [address] [facility] [address]  Phone: [phone] Fax: [phone]  The doctors have asked that we provide their patients with the following 2 reminders -- prescription refills can take up to 72 hours, and a friendly reminder that in the future you can use your [facility] account to request refills: yes

## 2024-03-28 NOTE — TELEPHONE ENCOUNTER
No care due was identified.  [facility] Embedded Care Due Messages. Reference number: [id number].   3/28/2024 4:11:04 PM CDT   O-T Plasty Text: The defect edges were debeveled with a #15 scalpel blade.  Given the location of the defect, shape of the defect and the proximity to free margins an O-T plasty was deemed most appropriate.  Using a sterile surgical marker, an appropriate O-T plasty was drawn incorporating the defect and placing the expected incisions within the relaxed skin tension lines where possible.    The area thus outlined was incised deep to adipose tissue with a #15 scalpel blade.  The skin margins were undermined to an appropriate distance in all directions utilizing iris scissors.

## 2024-03-29 ENCOUNTER — OFFICE VISIT (OUTPATIENT)
Dept: URGENT CARE | Facility: CLINIC | Age: 89
End: 2024-03-29
Payer: MEDICARE

## 2024-03-29 VITALS
BODY MASS INDEX: 25.06 KG/M2 | HEIGHT: 72 IN | WEIGHT: 185 LBS | TEMPERATURE: 98 F | DIASTOLIC BLOOD PRESSURE: 100 MMHG | OXYGEN SATURATION: 96 % | RESPIRATION RATE: 16 BRPM | SYSTOLIC BLOOD PRESSURE: 155 MMHG | HEART RATE: 73 BPM

## 2024-03-29 DIAGNOSIS — R05.9 COUGH, UNSPECIFIED TYPE: ICD-10-CM

## 2024-03-29 DIAGNOSIS — J20.8 ACUTE VIRAL BRONCHITIS: Primary | ICD-10-CM

## 2024-03-29 DIAGNOSIS — R09.81 NASAL CONGESTION: ICD-10-CM

## 2024-03-29 DIAGNOSIS — Z86.79 HISTORY OF HEART FAILURE: ICD-10-CM

## 2024-03-29 LAB
CTP QC/QA: YES
SARS-COV-2 AG RESP QL IA.RAPID: NEGATIVE

## 2024-03-29 PROCEDURE — 71046 X-RAY EXAM CHEST 2 VIEWS: CPT | Mod: S$GLB,,, | Performed by: RADIOLOGY

## 2024-03-29 PROCEDURE — 99214 OFFICE O/P EST MOD 30 MIN: CPT | Mod: S$GLB,,, | Performed by: NURSE PRACTITIONER

## 2024-03-29 PROCEDURE — 87811 SARS-COV-2 COVID19 W/OPTIC: CPT | Mod: QW,S$GLB,, | Performed by: NURSE PRACTITIONER

## 2024-03-29 RX ORDER — ALBUTEROL SULFATE 90 UG/1
2 AEROSOL, METERED RESPIRATORY (INHALATION) EVERY 6 HOURS PRN
Qty: 18 G | Refills: 1 | Status: SHIPPED | OUTPATIENT
Start: 2024-03-29 | End: 2024-04-28

## 2024-03-29 RX ORDER — FLUTICASONE PROPIONATE 50 MCG
1 SPRAY, SUSPENSION (ML) NASAL DAILY
Qty: 9.9 ML | Refills: 0 | Status: SHIPPED | OUTPATIENT
Start: 2024-03-29 | End: 2024-04-05

## 2024-03-29 RX ORDER — PREDNISONE 10 MG/1
10 TABLET ORAL 2 TIMES DAILY
Qty: 6 TABLET | Refills: 0 | Status: SHIPPED | OUTPATIENT
Start: 2024-03-29 | End: 2024-04-01

## 2024-03-29 RX ORDER — BENZONATATE 100 MG/1
200 CAPSULE ORAL 3 TIMES DAILY PRN
Qty: 60 CAPSULE | Refills: 0 | Status: SHIPPED | OUTPATIENT
Start: 2024-03-29 | End: 2024-04-08

## 2024-03-29 NOTE — PATIENT INSTRUCTIONS
Discharge instructions for Acute Viral Bronchitis with Wheezing  and Cough  Pt to follow up with PCP. Leif called to make appointment. Left message.  See Clinical Reference Discharge Instructions  If Symptoms worsen pt is to go to Emergency Room  Pt to start Tessalon by mouth to help with Cough at home.  Pt to sleep elevated on 2-3 pillows    You should also call your doctor or nurse right away if:Coughing worsens  You have trouble breathing or noisy breathing (wheezing).  You have a fever or chest pain.  You cough up blood, or yellow or green mucus.  You cough so hard that it makes you vomit.  Your cough gets worse or lasts longer than 10 days.  You have a cough and have lost weight without trying.    What are the symptoms of bronchitis? -- The most common symptoms of bronchitis are:  A nagging cough that can last up to a few weeks  Coughing up mucus that is clear, yellow, or green - Some people think green mucus means you have a bacterial infection. But this is not always true.  You might also have normal cold or flu symptoms, like a stuffy nose, sore throat, or headache. People with bronchitis do not usually get a fever.    1) See orders for this visit as documented in the electronic medical record.  2) Symptomatic therapy suggested: use acetaminophen/ibuprofen every 6-8 hours prn pain or fever, push fluids.   3) Call or return to clinic prn if these symptoms worsen or fail to improve as anticipated.    Discussed results/diagnosis/plan with patient in clinic.  We had shared decision making for patient's treatment. Patient verbalized understanding and in agreement with current treatment plan.     Patient was instructed to return for re-evaluation with urgent care or PCP for continued outpatient workup and management if symptoms do not improve/worsening symptoms. Strict ED versus clinic precautions given in depth.    Discharge and follow-up instructions given verbally/printed with the patient who expressed  understanding. The instructions and results are also available on [employer]t.      - You must understand that you have received an Urgent Care treatment only and that you may be released before all of your medical problems are known or treated.   - You, the patient, will arrange for follow up care as instructed.   - Follow up with your PCP or specialty clinic as directed in the next 1-2 weeks if not improved or as needed.  You can call [phone] to schedule an appointment with the appropriate provider.   - If your condition worsens or fails to improve we recommend that you receive another evaluation at the ER immediately or contact your PCP to discuss your concerns or return here.        PAULIE Manzanares

## 2024-03-29 NOTE — PROGRESS NOTES
Subjective:      Patient ID: Norm Mendoza is a 89 y.o. male.    Vitals:  height is 6' (1.829 m) and weight is 83.9 kg (185 lb). His oral temperature is 97.9 °F (36.6 °C). His blood pressure is 155/100 (abnormal) and his pulse is 73. His respiration is 16 and oxygen saturation is 96%.     Chief Complaint: Cough    Pt is here with c/o cough and congestion. Pt reports he has history of hypertension and heart failure, and lives with shortness of breath, pt report cough will wake him up once lying down or sleeping and diminishes during day.    Cough  This is a new problem. The current episode started in the past 7 days. The problem has been gradually worsening. Associated symptoms include nasal congestion, postnasal drip and shortness of breath. Pertinent negatives include no chest pain, chills, ear pain, eye redness, fever, headaches, rash or sore throat.       Constitution: Negative for chills, sweating, fatigue and fever.   HENT:  Positive for postnasal drip. Negative for ear pain, congestion and sore throat.    Neck: Negative for neck pain and neck stiffness.   Cardiovascular:  Negative for chest pain, leg swelling, palpitations and sob on exertion.   Eyes:  Negative for eye pain, eye redness and vision loss.   Respiratory:  Positive for cough and shortness of breath. Negative for sputum production.         History of shortness of breath with heart failure   Gastrointestinal:  Negative for abdominal pain, nausea, vomiting and diarrhea.   Genitourinary:  Negative for dysuria, frequency, urgency, flank pain and hematuria.   Musculoskeletal:  Negative for pain and trauma.   Skin:  Negative for color change and rash.   Neurological:  Negative for dizziness, headaches and disorientation.   Psychiatric/Behavioral:  Negative for disorientation.       Objective:     Physical Exam   Constitutional: He is oriented to person, place, and time. He appears well-developed. He is cooperative.  Non-toxic appearance. He does  not appear ill. No distress.   HENT:   Head: Normocephalic and atraumatic.   Ears:   Right Ear: Hearing, tympanic membrane, external ear and ear canal normal.   Left Ear: Hearing, tympanic membrane, external ear and ear canal normal.   Nose: Mucosal edema and congestion present. No rhinorrhea or nasal deformity. No epistaxis. Right sinus exhibits no maxillary sinus tenderness and no frontal sinus tenderness. Left sinus exhibits no maxillary sinus tenderness and no frontal sinus tenderness.   Mouth/Throat: Uvula is midline and mucous membranes are normal. No trismus in the jaw. Normal dentition. No uvula swelling. Posterior oropharyngeal erythema present. No oropharyngeal exudate or posterior oropharyngeal edema.   Eyes: Conjunctivae and lids are normal. Pupils are equal, round, and reactive to light. No scleral icterus. Extraocular movement intact   Neck: Trachea normal and phonation normal. Neck supple. No thyromegaly present. No edema present. No erythema present. No neck rigidity present.   Cardiovascular: Normal rate, regular rhythm, S1 normal, S2 normal, normal heart sounds and normal pulses.   Pulmonary/Chest: Effort normal. No respiratory distress. He has no decreased breath sounds. He has wheezes. He has no rhonchi.   Respiratory rate 18- 20, with pulse sats at 96%         Comments: Respiratory rate 18- 20, with pulse sats at 96%    Abdominal: Normal appearance and bowel sounds are normal. Soft. flat abdomen   Musculoskeletal: Normal range of motion.         General: No deformity. Normal range of motion.      Right lower leg: No edema.      Left lower leg: No edema.   Lymphadenopathy:     He has no cervical adenopathy.   Neurological: no focal deficit. He is alert and oriented to person, place, and time. He exhibits normal muscle tone. Coordination normal.   Skin: Skin is warm, dry, intact, not diaphoretic and not pale. Capillary refill takes less than 2 seconds.   Psychiatric: His speech is normal and  behavior is normal. Mood, judgment and thought content normal.   Nursing note and vitals (Pt is awake and alert in no acute distress, shin warm and dry non -diaphoretic, with history of heart failure, and shortness of breath.) reviewed.    Results for orders placed or performed in visit on 03/29/24   SARS Coronavirus 2 Antigen, POCT Manual Read   Result Value Ref Range    SARS Coronavirus 2 Antigen Negative Negative     Acceptable Yes    XR CHEST PA AND LATERAL    Result Date: 3/29/2024  EXAMINATION: XR CHEST PA AND LATERAL CLINICAL HISTORY: Cough, unspecified TECHNIQUE: PA and lateral views of the chest were performed. COMPARISON: 01/05/2023 FINDINGS: The cardiomediastinal silhouette is prominent, magnified by technique noting calcification of the aorta..  There is no pleural effusion.  The trachea is midline.  The lungs are symmetrically expanded bilaterally with coarse interstitial attenuation and bilateral basilar subsegmental atelectasis..  No large focal consolidation seen.  There is no pneumothorax.  The osseous structures are remarkable for degenerative change..     1. Interstitial findings may reflect edema versus chronic change, no large focal consolidation.  Correlation with any history of COPD/emphysema. Electronically signed by: Alessandro Jiang MD Date:    03/29/2024 Time:    18:01       Assessment:     1. Acute viral bronchitis    2. Cough, unspecified type    3. History of heart failure    4. Nasal congestion      Discussed with pt Chest XR, findings with comparison of film from previous chest xray 01/05/2023.   Pt to follow up with PCP,  called and message left to schedule appointment.  Plan:       Acute viral bronchitis  -     albuterol (VENTOLIN HFA) 90 mcg/actuation inhaler; Inhale 2 puffs into the lungs every 6 (six) hours as needed for Wheezing. Rescue  Dispense: 18 g; Refill: 1  -     predniSONE (DELTASONE) 10 MG tablet; Take 1 tablet (10 mg total) by mouth 2 (two) times  daily. for 3 days  Dispense: 6 tablet; Refill: 0    Cough, unspecified type  -     SARS Coronavirus 2 Antigen, POCT Manual Read  -     XR CHEST PA AND LATERAL; Future; Expected date: 03/29/2024  -     benzonatate (TESSALON) 100 MG capsule; Take 2 capsules (200 mg total) by mouth 3 (three) times daily as needed.  Dispense: 60 capsule; Refill: 0  -     Ambulatory referral/consult to Internal Medicine    History of heart failure  -     Ambulatory referral/consult to Internal Medicine    Nasal congestion  -     fluticasone propionate (FLONASE) 50 mcg/actuation nasal spray; 1 spray (50 mcg total) by Each Nostril route once daily. for 7 days  Dispense: 9.9 mL; Refill: 0      Patient Instructions   Discharge instructions for Acute Viral Bronchitis with Wheezing  and Cough  Pt to follow up with PCP.  See Clinical Reference Discharge Instructions  If Symptoms worsen pt is to go to Emergency Room  Pt to start Tessalon by mouth to help with Cough at home.  Pt to sleep elevated on 2-3 pillows    You should also call your doctor or nurse right away if:Coughing worsens  You have trouble breathing or noisy breathing (wheezing).  You have a fever or chest pain.  You cough up blood, or yellow or green mucus.  You cough so hard that it makes you vomit.  Your cough gets worse or lasts longer than 10 days.  You have a cough and have lost weight without trying.    What are the symptoms of bronchitis? -- The most common symptoms of bronchitis are:  A nagging cough that can last up to a few weeks  Coughing up mucus that is clear, yellow, or green - Some people think green mucus means you have a bacterial infection. But this is not always true.  You might also have normal cold or flu symptoms, like a stuffy nose, sore throat, or headache. People with bronchitis do not usually get a fever.    1) See orders for this visit as documented in the electronic medical record.  2) Symptomatic therapy suggested: use acetaminophen/ibuprofen every 6-8  hours prn pain or fever, push fluids.   3) Call or return to clinic prn if these symptoms worsen or fail to improve as anticipated.    Discussed results/diagnosis/plan with patient in clinic.  We had shared decision making for patient's treatment. Patient verbalized understanding and in agreement with current treatment plan.     Patient was instructed to return for re-evaluation with urgent care or PCP for continued outpatient workup and management if symptoms do not improve/worsening symptoms. Strict ED versus clinic precautions given in depth.    Discharge and follow-up instructions given verbally/printed with the patient who expressed understanding. The instructions and results are also available on [employer].      - You must understand that you have received an Urgent Care treatment only and that you may be released before all of your medical problems are known or treated.   - You, the patient, will arrange for follow up care as instructed.   - Follow up with your PCP or specialty clinic as directed in the next 1-2 weeks if not improved or as needed.  You can call [phone] to schedule an appointment with the appropriate provider.   - If your condition worsens or fails to improve we recommend that you receive another evaluation at the ER immediately or contact your PCP to discuss your concerns or return here.        PAULIE Manzanares

## 2024-05-15 DIAGNOSIS — I48.91 ATRIAL FIBRILLATION, UNSPECIFIED TYPE: ICD-10-CM

## 2024-05-15 DIAGNOSIS — I10 ESSENTIAL HYPERTENSION: ICD-10-CM

## 2024-05-15 RX ORDER — METOPROLOL SUCCINATE 25 MG/1
12.5 TABLET, EXTENDED RELEASE ORAL
Qty: 45 TABLET | Refills: 0 | Status: SHIPPED | OUTPATIENT
Start: 2024-05-15

## 2024-06-05 ENCOUNTER — OFFICE VISIT (OUTPATIENT)
Dept: INTERNAL MEDICINE | Facility: CLINIC | Age: 89
End: 2024-06-05
Payer: MEDICARE

## 2024-06-05 VITALS
SYSTOLIC BLOOD PRESSURE: 110 MMHG | HEIGHT: 72 IN | DIASTOLIC BLOOD PRESSURE: 80 MMHG | RESPIRATION RATE: 18 BRPM | TEMPERATURE: 97 F | BODY MASS INDEX: 25.05 KG/M2 | HEART RATE: 60 BPM | WEIGHT: 184.94 LBS | OXYGEN SATURATION: 100 %

## 2024-06-05 DIAGNOSIS — E43 UNSPECIFIED SEVERE PROTEIN-CALORIE MALNUTRITION: ICD-10-CM

## 2024-06-05 DIAGNOSIS — I48.19 PERSISTENT ATRIAL FIBRILLATION: ICD-10-CM

## 2024-06-05 DIAGNOSIS — Z12.5 PROSTATE CANCER SCREENING: ICD-10-CM

## 2024-06-05 DIAGNOSIS — I35.0 AORTIC VALVE STENOSIS, MODERATE: ICD-10-CM

## 2024-06-05 DIAGNOSIS — M48.062 PSEUDOCLAUDICATION: ICD-10-CM

## 2024-06-05 DIAGNOSIS — C88.4 MALT LYMPHOMA: ICD-10-CM

## 2024-06-05 DIAGNOSIS — L57.0 ACTINIC KERATOSES: ICD-10-CM

## 2024-06-05 DIAGNOSIS — I10 ESSENTIAL HYPERTENSION: Primary | ICD-10-CM

## 2024-06-05 DIAGNOSIS — Z95.818 PRESENCE OF WATCHMAN LEFT ATRIAL APPENDAGE CLOSURE DEVICE: ICD-10-CM

## 2024-06-05 DIAGNOSIS — R53.83 FATIGUE, UNSPECIFIED TYPE: ICD-10-CM

## 2024-06-05 DIAGNOSIS — E55.9 VITAMIN D DEFICIENCY, UNSPECIFIED: ICD-10-CM

## 2024-06-05 DIAGNOSIS — R06.02 SHORTNESS OF BREATH: ICD-10-CM

## 2024-06-05 DIAGNOSIS — M47.816 OSTEOARTHRITIS OF LUMBAR SPINE, UNSPECIFIED SPINAL OSTEOARTHRITIS COMPLICATION STATUS: ICD-10-CM

## 2024-06-05 PROCEDURE — 99999 PR PBB SHADOW E&M-EST. PATIENT-LVL V: CPT | Mod: PBBFAC,HCNC,, | Performed by: INTERNAL MEDICINE

## 2024-06-05 PROCEDURE — 1126F AMNT PAIN NOTED NONE PRSNT: CPT | Mod: CPTII,S$GLB,, | Performed by: INTERNAL MEDICINE

## 2024-06-05 PROCEDURE — 3288F FALL RISK ASSESSMENT DOCD: CPT | Mod: CPTII,S$GLB,, | Performed by: INTERNAL MEDICINE

## 2024-06-05 PROCEDURE — 99215 OFFICE O/P EST HI 40 MIN: CPT | Mod: S$GLB,,, | Performed by: INTERNAL MEDICINE

## 2024-06-05 PROCEDURE — 1159F MED LIST DOCD IN RCRD: CPT | Mod: CPTII,S$GLB,, | Performed by: INTERNAL MEDICINE

## 2024-06-05 PROCEDURE — G2211 COMPLEX E/M VISIT ADD ON: HCPCS | Mod: S$GLB,,, | Performed by: INTERNAL MEDICINE

## 2024-06-05 PROCEDURE — 1101F PT FALLS ASSESS-DOCD LE1/YR: CPT | Mod: CPTII,S$GLB,, | Performed by: INTERNAL MEDICINE

## 2024-06-05 NOTE — PROGRESS NOTES
"Subjective:       Patient ID: Norm Mendoza is a 89 y.o. male.    Chief Complaint:   Follow-up, Hypertension, Hyperlipidemia, and Chronic Kidney Disease    HPI: Mr Mendoza presents for follow up HTN and Health Maintenance Issues  Mr Gutierrez today c/o feeling as though he is deteriorating over the last year or so  He c/o LBP and pain with certain position changes.He has gotten away from exercise   But still is doing "[employer]" driving about 4 hours/day  Walking wears him out and causes fatigue/joint  pain; he quit piliates 4 years ago  He feels better when walking with a "Grocery cart"  HTN: Med Tx 1- Toprol XL 25mg at 1/2 tab daily, 2- Avapro 150mg QD, 3- Norvasc 2.5mg QD         [employer] BPs:    Atrial Fibrillation:  Med Tx 1- ASA QD, 2- Toprol XL 25mg at .5 tab QD  1st dx'd 6/2019; + GI bleed/2ndary to MALT GI tumor resulted in Xaralto being HELD; 7/2019: s/p DCCE; 10/2019: s/p Watchman device/Was on Amiodarone transiently-now off; 2/2022-s/p 2nd  DCCV; Presently-Rate controlled and on ASA QD     MALT Tumor/Stage I Lymphoma: Heme-Onc/Dr Harper/Dr YESSENIA Prather;  Completed Rituximab x4 on 3/7/19; Presently on conservative following       Past Medical, Surgical, Social History: Please see as stated in Epic chart which has been reviewed.    Current Outpatient Medications   Medication Sig Dispense Refill    amLODIPine (NORVASC) 2.5 MG tablet TAKE 1 TABLET(2.5 MG) BY MOUTH EVERY DAY 90 tablet 3    clobetasoL (TEMOVATE) 0.05 % external solution Pt to mix in 1 jar of cerave cream and apply to affected areas bid 50 mL 3    diclofenac sodium (VOLTAREN ARTHRITIS PAIN) 1 % Gel Apply 2 Grams to painful right hand joint 2-3x/day as needed 100 g 1    irbesartan (AVAPRO) 150 MG tablet Take 1 tablet (150 mg total) by mouth once daily. 90 tablet 2    metoprolol succinate (TOPROL-XL) 25 MG 24 hr tablet TAKE 0.5 TABLETS BY MOUTH ONCE DAILY 45 tablet 0    multivitamin (THERAGRAN) per tablet Take 1 tablet by mouth once daily.      " mupirocin (BACTROBAN) 2 % ointment Apply to affected area 3 times daily 22 g 1    psyllium husk (METAMUCIL ORAL) Take by mouth.      albuterol (VENTOLIN HFA) 90 mcg/actuation inhaler Inhale 2 puffs into the lungs every 6 (six) hours as needed for Wheezing. Rescue 18 g 1     Current Facility-Administered Medications   Medication Dose Route Frequency Provider Last Rate Last Admin    lidocaine HCl 2% urojet   Urethral Once Grover Ochoa Jr., MD           Review of Systems   Constitutional:  Positive for fatigue.   Respiratory:  Positive for shortness of breath. Negative for chest tightness.    Cardiovascular:  Negative for palpitations and leg swelling.   Musculoskeletal:  Positive for back pain.       Objective:      Lab Results   Component Value Date    WBC 6.75 01/05/2024    HGB 13.7 (L) 01/05/2024    HCT 42.0 01/05/2024     01/05/2024    CHOL 202 (H) 12/15/2023    TRIG 78 12/15/2023    HDL 55 12/15/2023    ALT 23 01/05/2024    AST 22 01/05/2024     01/05/2024    K 4.6 01/05/2024     01/05/2024    CREATININE 1.1 01/05/2024    BUN 22 01/05/2024    CO2 26 01/05/2024    TSH 2.528 12/15/2023    PSA 2.5 09/10/2015    INR 1.0 02/03/2022    HGBA1C 5.2 08/19/2021     Physical Exam  Vitals reviewed.   Constitutional:       Appearance: Normal appearance.   HENT:      Head: Normocephalic and atraumatic.   Cardiovascular:      Rate and Rhythm: Normal rate. Rhythm irregular.      Heart sounds: Normal heart sounds.      Comments: VHR=60/irregular  Pulmonary:      Breath sounds: Normal breath sounds.   Abdominal:      General: Bowel sounds are normal.      Palpations: Abdomen is soft. There is no mass.      Tenderness: There is no abdominal tenderness.   Musculoskeletal:      Right lower leg: No edema.      Left lower leg: No edema.   Skin:     Findings: Lesion present.      Comments: + Few erythematous macular lesions on face?AKs  + Large irregular growth behind right ear/+ SEb keratosis   Neurological:       Mental Status: He is alert.   Psychiatric:         Mood and Affect: Mood normal.           Vital Signs  Temp: 97.4 °F (36.3 °C)  Temp Source: Other (see comments)  Pulse: 60  Resp: 18  SpO2: 100 %  BP: 110/80  BP Location: Left arm  Patient Position: Sitting  Pain Score: 0-No pain  Height and Weight  Height: 6' (182.9 cm)  Weight: 83.9 kg (184 lb 15.5 oz)  BSA (Calculated - sq m): 2.06 sq meters  BMI (Calculated): 25.1  Weight in (lb) to have BMI = 25: 183.9    Assessment:       1. Essential hypertension    2. Persistent atrial fibrillation    3. Presence of Watchman left atrial appendage closure device    4. Aortic valve stenosis, moderate    5. Shortness of breath    6. Pseudoclaudication    7. Osteoarthritis of lumbar spine, unspecified spinal osteoarthritis complication status    8. Fatigue, unspecified type    9. MALT lymphoma    10. Vitamin D deficiency, unspecified    11. Unspecified severe protein-calorie malnutrition    12. Actinic keratoses    13. Prostate cancer screening        Plan:     Health Maintenance   Topic Date Due    TETANUS VACCINE  01/19/2027    Lipid Panel  12/15/2028    Shingles Vaccine  Completed        Norm was seen today for follow-up, hypertension, hyperlipidemia and chronic kidney disease.    Diagnoses and all orders for this visit:    Essential hypertension/controlled      -     Continue: Med Tx 1- Toprol XL 25mg at 1/2 tab daily, 2- Avapro 150mg QD, 3- Norvasc 2.5mg QD    Persistent atrial fibrillation/  Presence of Watchman left atrial appendage closure device/Rate controlled and AC optional only    Aortic valve stenosis, moderate        -   ECHO      Shortness of breath  -     Vitamin B12; Future  -     Echo; Future  -     Ambulatory referral/consult to Cardiology; Future    Pseudoclaudication  -     MRI Lumbar Spine Without Contrast; Future    Osteoarthritis of lumbar spine, unspecified spinal osteoarthritis complication status  -     MRI Lumbar Spine Without Contrast;  Future    Fatigue, unspecified type  -     Vitamin D; Future  -     TSH; Future    MALT lymphoma        -     Follow along as per Heme-Onc/Dr Prather    Vitamin D deficiency, unspecified  -     Vitamin D; Future    Unspecified severe protein-calorie malnutrition  -     Vitamin B12; Future    Actinic keratoses  -     Ambulatory referral/consult to Dermatology; Future    Prostate cancer screening  -     PSA, Screening; Future

## 2024-06-18 ENCOUNTER — HOSPITAL ENCOUNTER (OUTPATIENT)
Dept: CARDIOLOGY | Facility: HOSPITAL | Age: 89
Discharge: HOME | End: 2024-06-18
Attending: INTERNAL MEDICINE
Payer: MEDICARE

## 2024-06-18 VITALS
BODY MASS INDEX: 24.79 KG/M2 | HEART RATE: 65 BPM | DIASTOLIC BLOOD PRESSURE: 80 MMHG | WEIGHT: 183 LBS | SYSTOLIC BLOOD PRESSURE: 110 MMHG | HEIGHT: 72 IN

## 2024-06-18 DIAGNOSIS — I35.0 AORTIC VALVE STENOSIS, MODERATE: ICD-10-CM

## 2024-06-18 DIAGNOSIS — R06.02 SHORTNESS OF BREATH: ICD-10-CM

## 2024-06-18 LAB
ASCENDING AORTA: 3.61 CM
AV INDEX (PROSTH): 0.16
AV MEAN GRADIENT: 36 MMHG
AV PEAK GRADIENT: 49 MMHG
AV VALVE AREA BY VELOCITY RATIO: 0.61 CM²
AV VALVE AREA: 0.65 CM²
AV VELOCITY RATIO: 0.15
BSA FOR ECHO PROCEDURE: 2.05 M2
CV ECHO LV RWT: 0.44 CM
DOP CALC AO PEAK VEL: 3.5 M/S
DOP CALC AO VTI: 81 CM
DOP CALC LVOT AREA: 4.2 CM2
DOP CALC LVOT DIAMETER: 2.3 CM
DOP CALC LVOT PEAK VEL: 0.51 M/S
DOP CALC LVOT STROKE VOLUME: 52.45 CM3
DOP CALCLVOT PEAK VEL VTI: 12.63 CM
E/E' RATIO: 11.67 M/S
ECHO LV POSTERIOR WALL: 0.98 CM (ref 0.6–1.1)
FRACTIONAL SHORTENING: 31 % (ref 28–44)
INTERVENTRICULAR SEPTUM: 1.08 CM (ref 0.6–1.1)
LA MAJOR: 5.71 CM
LA MINOR: 5.99 CM
LA WIDTH: 4.64 CM
LEFT ATRIUM SIZE: 4.83 CM
LEFT ATRIUM VOLUME INDEX MOD: 31.1 ML/M2
LEFT ATRIUM VOLUME INDEX: 54.6 ML/M2
LEFT ATRIUM VOLUME MOD: 63.41 CM3
LEFT ATRIUM VOLUME: 111.38 CM3
LEFT INTERNAL DIMENSION IN SYSTOLE: 3.09 CM (ref 2.1–4)
LEFT VENTRICLE DIASTOLIC VOLUME INDEX: 45.33 ML/M2
LEFT VENTRICLE DIASTOLIC VOLUME: 92.47 ML
LEFT VENTRICLE MASS INDEX: 78 G/M2
LEFT VENTRICLE SYSTOLIC VOLUME INDEX: 18.4 ML/M2
LEFT VENTRICLE SYSTOLIC VOLUME: 37.57 ML
LEFT VENTRICULAR INTERNAL DIMENSION IN DIASTOLE: 4.5 CM (ref 3.5–6)
LEFT VENTRICULAR MASS: 159.66 G
LV LATERAL E/E' RATIO: 9.55 M/S
LV SEPTAL E/E' RATIO: 15 M/S
MV PEAK E VEL: 1.05 M/S
OHS CV RV/LV RATIO: 1.01 CM
OHS LV EJECTION FRACTION SIMPSONS BIPLANE MOD: 45 %
PISA MRMAX VEL: 0.05 M/S
PISA TR MAX VEL: 3 M/S
RA MAJOR: 6.44 CM
RA PRESSURE ESTIMATED: 15 MMHG
RA WIDTH: 4.53 CM
RIGHT VENTRICULAR END-DIASTOLIC DIMENSION: 4.56 CM
RV TB RVSP: 18 MMHG
SINUS: 3.59 CM
STJ: 3.07 CM
TDI LATERAL: 0.11 M/S
TDI SEPTAL: 0.07 M/S
TDI: 0.09 M/S
TR MAX PG: 36 MMHG
TRICUSPID ANNULAR PLANE SYSTOLIC EXCURSION: 1.68 CM
TV REST PULMONARY ARTERY PRESSURE: 51 MMHG
Z-SCORE OF LEFT VENTRICULAR DIMENSION IN END DIASTOLE: -3.01
Z-SCORE OF LEFT VENTRICULAR DIMENSION IN END SYSTOLE: -1.48

## 2024-06-18 PROCEDURE — 93306 TTE W/DOPPLER COMPLETE: CPT

## 2024-06-18 PROCEDURE — 93306 TTE W/DOPPLER COMPLETE: CPT | Mod: 26,,, | Performed by: INTERNAL MEDICINE

## 2024-06-24 RX ORDER — IRBESARTAN 150 MG/1
150 TABLET ORAL DAILY
Qty: 90 TABLET | Refills: 2 | Status: SHIPPED | OUTPATIENT
Start: 2024-06-24

## 2024-06-28 ENCOUNTER — HOSPITAL ENCOUNTER (OUTPATIENT)
Dept: RADIOLOGY | Facility: HOSPITAL | Age: 89
Discharge: HOME | End: 2024-06-28
Attending: INTERNAL MEDICINE
Payer: MEDICARE

## 2024-06-28 DIAGNOSIS — M48.062 PSEUDOCLAUDICATION: ICD-10-CM

## 2024-06-28 DIAGNOSIS — M47.816 OSTEOARTHRITIS OF LUMBAR SPINE, UNSPECIFIED SPINAL OSTEOARTHRITIS COMPLICATION STATUS: ICD-10-CM

## 2024-06-28 PROCEDURE — 72148 MRI LUMBAR SPINE W/O DYE: CPT | Mod: TC,HCNC

## 2024-06-28 PROCEDURE — 72148 MRI LUMBAR SPINE W/O DYE: CPT | Mod: 26,HCNC,, | Performed by: RADIOLOGY

## 2024-07-01 ENCOUNTER — TELEPHONE (OUTPATIENT)
Dept: INTERNAL MEDICINE | Facility: CLINIC | Age: 89
End: 2024-07-01
Payer: MEDICARE

## 2024-07-01 ENCOUNTER — LAB VISIT (OUTPATIENT)
Dept: LAB | Facility: HOSPITAL | Age: 89
End: 2024-07-01
Payer: MEDICARE

## 2024-07-01 DIAGNOSIS — C88.4 MALT LYMPHOMA: ICD-10-CM

## 2024-07-01 DIAGNOSIS — E55.9 VITAMIN D DEFICIENCY, UNSPECIFIED: ICD-10-CM

## 2024-07-01 DIAGNOSIS — R06.02 SHORTNESS OF BREATH: ICD-10-CM

## 2024-07-01 DIAGNOSIS — R73.9 HYPERGLYCEMIA: Primary | ICD-10-CM

## 2024-07-01 DIAGNOSIS — R53.83 FATIGUE, UNSPECIFIED TYPE: ICD-10-CM

## 2024-07-01 DIAGNOSIS — E43 UNSPECIFIED SEVERE PROTEIN-CALORIE MALNUTRITION: ICD-10-CM

## 2024-07-01 DIAGNOSIS — Z12.5 PROSTATE CANCER SCREENING: ICD-10-CM

## 2024-07-01 LAB
25(OH)D3+25(OH)D2 SERPL-MCNC: 39 NG/ML (ref 30–96)
ALBUMIN SERPL BCP-MCNC: 3.6 G/DL (ref 3.5–5.2)
ALP SERPL-CCNC: 75 U/L (ref 55–135)
ALT SERPL W/O P-5'-P-CCNC: 19 U/L (ref 10–44)
ANION GAP SERPL CALC-SCNC: 9 MMOL/L (ref 8–16)
AST SERPL-CCNC: 20 U/L (ref 10–40)
BASOPHILS # BLD AUTO: 0.06 K/UL (ref 0–0.2)
BASOPHILS NFR BLD: 1.2 % (ref 0–1.9)
BILIRUB SERPL-MCNC: 0.6 MG/DL (ref 0.1–1)
BUN SERPL-MCNC: 25 MG/DL (ref 10–30)
CALCIUM SERPL-MCNC: 9.6 MG/DL (ref 8.7–10.5)
CHLORIDE SERPL-SCNC: 106 MMOL/L (ref 95–110)
CO2 SERPL-SCNC: 24 MMOL/L (ref 23–29)
COMPLEXED PSA SERPL-MCNC: 2.2 NG/ML (ref 0–4)
CREAT SERPL-MCNC: 1.1 MG/DL (ref 0.5–1.4)
DIFFERENTIAL METHOD BLD: ABNORMAL
EOSINOPHIL # BLD AUTO: 0 K/UL (ref 0–0.5)
EOSINOPHIL NFR BLD: 0.6 % (ref 0–8)
ERYTHROCYTE [DISTWIDTH] IN BLOOD BY AUTOMATED COUNT: 15.2 % (ref 11.5–14.5)
EST. GFR  (NO RACE VARIABLE): >60 ML/MIN/1.73 M^2
GLUCOSE SERPL-MCNC: 154 MG/DL (ref 70–110)
HCT VFR BLD AUTO: 41.6 % (ref 40–54)
HGB BLD-MCNC: 13.7 G/DL (ref 14–18)
IMM GRANULOCYTES # BLD AUTO: 0.03 K/UL (ref 0–0.04)
IMM GRANULOCYTES NFR BLD AUTO: 0.6 % (ref 0–0.5)
LDH SERPL L TO P-CCNC: 210 U/L (ref 110–260)
LYMPHOCYTES # BLD AUTO: 0.7 K/UL (ref 1–4.8)
LYMPHOCYTES NFR BLD: 13 % (ref 18–48)
MCH RBC QN AUTO: 32.9 PG (ref 27–31)
MCHC RBC AUTO-ENTMCNC: 32.9 G/DL (ref 32–36)
MCV RBC AUTO: 100 FL (ref 82–98)
MONOCYTES # BLD AUTO: 0.6 K/UL (ref 0.3–1)
MONOCYTES NFR BLD: 11 % (ref 4–15)
NEUTROPHILS # BLD AUTO: 3.7 K/UL (ref 1.8–7.7)
NEUTROPHILS NFR BLD: 73.6 % (ref 38–73)
NRBC BLD-RTO: 0 /100 WBC
PLATELET # BLD AUTO: 199 K/UL (ref 150–450)
PMV BLD AUTO: 9.5 FL (ref 9.2–12.9)
POTASSIUM SERPL-SCNC: 4.6 MMOL/L (ref 3.5–5.1)
PROT SERPL-MCNC: 7.1 G/DL (ref 6–8.4)
RBC # BLD AUTO: 4.17 M/UL (ref 4.6–6.2)
SODIUM SERPL-SCNC: 139 MMOL/L (ref 136–145)
TSH SERPL DL<=0.005 MIU/L-ACNC: 2.23 UIU/ML (ref 0.4–4)
VIT B12 SERPL-MCNC: 665 PG/ML (ref 210–950)
WBC # BLD AUTO: 5.01 K/UL (ref 3.9–12.7)

## 2024-07-01 PROCEDURE — 36415 COLL VENOUS BLD VENIPUNCTURE: CPT | Mod: HCNC | Performed by: INTERNAL MEDICINE

## 2024-07-01 PROCEDURE — 80053 COMPREHEN METABOLIC PANEL: CPT | Mod: HCNC | Performed by: INTERNAL MEDICINE

## 2024-07-01 PROCEDURE — 82607 VITAMIN B-12: CPT | Mod: HCNC | Performed by: INTERNAL MEDICINE

## 2024-07-01 PROCEDURE — 85025 COMPLETE CBC W/AUTO DIFF WBC: CPT | Mod: HCNC | Performed by: INTERNAL MEDICINE

## 2024-07-01 PROCEDURE — 83615 LACTATE (LD) (LDH) ENZYME: CPT | Mod: HCNC | Performed by: INTERNAL MEDICINE

## 2024-07-01 PROCEDURE — 82306 VITAMIN D 25 HYDROXY: CPT | Mod: HCNC | Performed by: INTERNAL MEDICINE

## 2024-07-01 PROCEDURE — 84153 ASSAY OF PSA TOTAL: CPT | Mod: HCNC | Performed by: INTERNAL MEDICINE

## 2024-07-01 PROCEDURE — 84443 ASSAY THYROID STIM HORMONE: CPT | Mod: HCNC | Performed by: INTERNAL MEDICINE

## 2024-07-01 NOTE — TELEPHONE ENCOUNTER
Cami-would you please call Mr Mendoza and let him know that his Lab all looked good except his glucose was high at 154. I would like him to do an additional lab(HgbA1C) to check for Diabetes-no hurry/whenever convenient.  Thanks

## 2024-07-05 ENCOUNTER — LAB VISIT (OUTPATIENT)
Dept: LAB | Facility: HOSPITAL | Age: 89
End: 2024-07-05
Attending: INTERNAL MEDICINE
Payer: MEDICARE

## 2024-07-05 DIAGNOSIS — R73.9 HYPERGLYCEMIA: ICD-10-CM

## 2024-07-05 LAB
ESTIMATED AVG GLUCOSE: 108 MG/DL (ref 68–131)
HBA1C MFR BLD: 5.4 % (ref 4–5.6)

## 2024-07-05 PROCEDURE — 83036 HEMOGLOBIN GLYCOSYLATED A1C: CPT | Mod: HCNC | Performed by: INTERNAL MEDICINE

## 2024-07-05 PROCEDURE — 36415 COLL VENOUS BLD VENIPUNCTURE: CPT | Mod: HCNC,PO | Performed by: INTERNAL MEDICINE

## 2024-07-08 ENCOUNTER — OFFICE VISIT (OUTPATIENT)
Dept: HEMATOLOGY/ONCOLOGY | Facility: CLINIC | Age: 89
End: 2024-07-08
Payer: MEDICARE

## 2024-07-08 VITALS
SYSTOLIC BLOOD PRESSURE: 157 MMHG | HEART RATE: 73 BPM | WEIGHT: 183.75 LBS | TEMPERATURE: 98 F | OXYGEN SATURATION: 96 % | RESPIRATION RATE: 18 BRPM | DIASTOLIC BLOOD PRESSURE: 77 MMHG | BODY MASS INDEX: 24.89 KG/M2 | HEIGHT: 72 IN

## 2024-07-08 DIAGNOSIS — C88.4 MALT (MUCOSA ASSOCIATED LYMPHOID TISSUE): Primary | ICD-10-CM

## 2024-07-08 PROCEDURE — 1126F AMNT PAIN NOTED NONE PRSNT: CPT | Mod: HCNC,CPTII,S$GLB, | Performed by: INTERNAL MEDICINE

## 2024-07-08 PROCEDURE — 1160F RVW MEDS BY RX/DR IN RCRD: CPT | Mod: HCNC,CPTII,S$GLB, | Performed by: INTERNAL MEDICINE

## 2024-07-08 PROCEDURE — 1101F PT FALLS ASSESS-DOCD LE1/YR: CPT | Mod: HCNC,CPTII,S$GLB, | Performed by: INTERNAL MEDICINE

## 2024-07-08 PROCEDURE — 3288F FALL RISK ASSESSMENT DOCD: CPT | Mod: HCNC,CPTII,S$GLB, | Performed by: INTERNAL MEDICINE

## 2024-07-08 PROCEDURE — 99999 PR PBB SHADOW E&M-EST. PATIENT-LVL III: CPT | Mod: PBBFAC,HCNC,, | Performed by: INTERNAL MEDICINE

## 2024-07-08 PROCEDURE — 99213 OFFICE O/P EST LOW 20 MIN: CPT | Mod: HCNC,S$GLB,, | Performed by: INTERNAL MEDICINE

## 2024-07-08 PROCEDURE — 1159F MED LIST DOCD IN RCRD: CPT | Mod: HCNC,CPTII,S$GLB, | Performed by: INTERNAL MEDICINE

## 2024-07-08 NOTE — PROGRESS NOTES
HEMATOLOGIC MALIGNANCIES PROGRESS NOTE    IDENTIFYING STATEMENT   Norm Mendoza (Norm) is a 89 y.o. male with a  of 1926 from [place], [place] with the diagnosis of gastric marginal zone lymphoma.      ONCOLOGY HISTORY:    -The patient was discharged on 2018 following a 3 day admission for a GIB. Patient was on Eliquis at the time and had been complaining of 1 week of black, tarry stools. EGD at the time was significant for nonbleeding gastric ulcers. Biopsies were taken and the patient was instructed to start protonix and hold eliquis at discharge. He requiring IV fluids and blood transfusion during the hospitalization. Biopsy results returned 2018 concerning for possible MALT-Lymphoma with molecular studies pending.  -PET confirmed stage 1 MALT lymphoma  -Completed Rituximab x4 on 3/7/19  -EGD: Erythematous, granular and scarred mucosa in the lesser curvature of the gastric body. Biopsied, + MALT lymphoma. 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   As patient is asymptomatic, will continue observation.    INTERVAL HISTORY:      Mr. Mendoza returns to clinic in follow-up of gastric marginal zone lymphoma. He reports chronic fatigue, which he attributes to CHF. He continues to drive. He lives at home with a woman , who provides most of his meals. No new symptoms of stomach discomfort, early satiety, lymphadenopathy, night sweats, weight loss.     - 2024: PCP visit -   - 2024: Medicare AW    Past Medical History, Past Social History and Past Family History have been reviewed and are unchanged except as noted in the interval history.    MEDICATIONS:     Current Outpatient Medications on File Prior to Visit   Medication Sig Dispense Refill    amLODIPine (NORVASC) 2.5 MG tablet TAKE 1 TABLET(2.5 MG) BY MOUTH EVERY DAY  90 tablet 3    clobetasoL (TEMOVATE) 0.05 % external solution Pt to mix in 1 jar of cerave cream and apply to affected areas bid 50 mL 3    diclofenac sodium (VOLTAREN ARTHRITIS PAIN) 1 % Gel Apply 2 Grams to painful right hand joint 2-3x/day as needed 100 g 1    irbesartan (AVAPRO) 150 MG tablet Take 1 tablet (150 mg total) by mouth once daily. 90 tablet 2    metoprolol succinate (TOPROL-XL) 25 MG 24 hr tablet TAKE 0.5 TABLETS BY MOUTH ONCE DAILY 45 tablet 0    multivitamin (THERAGRAN) per tablet Take 1 tablet by mouth once daily.      mupirocin (BACTROBAN) 2 % ointment Apply to affected area 3 times daily 22 g 1    psyllium husk (METAMUCIL ORAL) Take by mouth.      albuterol (VENTOLIN HFA) 90 mcg/actuation inhaler Inhale 2 puffs into the lungs every 6 (six) hours as needed for Wheezing. Rescue 18 g 1     No current facility-administered medications on file prior to visit.       ALLERGIES: Review of patient's allergies indicates:  No Known Allergies     ROS:       Review of Systems   Constitutional:  Positive for fatigue. Negative for diaphoresis, fever and unexpected weight change.   HENT:   Negative for lump/mass and sore throat.    Eyes:  Negative for icterus.   Respiratory:  Negative for cough and shortness of breath.    Cardiovascular:  Negative for chest pain and palpitations.   Gastrointestinal:  Negative for abdominal distention, constipation, diarrhea, nausea and vomiting.   Genitourinary:  Negative for dysuria and frequency.    Musculoskeletal:  Negative for arthralgias, gait problem and myalgias.   Skin:  Negative for rash.   Neurological:  Negative for dizziness, gait problem and headaches.   Hematological:  Negative for adenopathy. Does not bruise/bleed easily.   Psychiatric/Behavioral:  The patient is not nervous/anxious.        PHYSICAL EXAM:  Vitals:    07/08/24 0823   BP: (!) 157/77   Pulse: 73   Resp: 18   Temp: 97.5 °F (36.4 °C)   TempSrc: Oral   SpO2: 96%   Weight: 83.3 kg (183 lb 12.1 oz)  "  Height: 5' 11.65" (1.82 m)   PainSc: 0-No pain       KARNOFSKY PERFORMANCE STATUS 70%  ECOG 1    Physical Exam  Constitutional:       General: He is not in acute distress.     Appearance: He is well-developed.   HENT:      Head: Normocephalic and atraumatic.      Mouth/Throat:      Mouth: No oral lesions.   Eyes:      Conjunctiva/sclera: Conjunctivae normal.   Neck:      Thyroid: No thyromegaly.   Cardiovascular:      Rate and Rhythm: Normal rate and regular rhythm.      Heart sounds: Normal heart sounds. No murmur heard.  Pulmonary:      Breath sounds: Normal breath sounds. No wheezing or rales.   Abdominal:      General: There is no distension.      Palpations: Abdomen is soft. There is no hepatomegaly, splenomegaly or mass.      Tenderness: There is no abdominal tenderness.   Lymphadenopathy:      Cervical: No cervical adenopathy.      Right cervical: No deep cervical adenopathy.     Left cervical: No deep cervical adenopathy.   Skin:     Findings: No rash.   Neurological:      Mental Status: He is alert and oriented to person, place, and time.      Cranial Nerves: No cranial nerve deficit.      Coordination: Coordination normal.      Deep Tendon Reflexes: Reflexes are normal and symmetric.         LAB:   Results for orders placed or performed in visit on 07/05/24   Hemoglobin A1C   Result Value Ref Range    Hemoglobin A1C 5.4 4.0 - 5.6 %    Estimated Avg Glucose 108 68 - 131 mg/dL       PROBLEMS ASSESSED THIS VISIT:    1. MALT (mucosa associated lymphoid tissue)        PLAN:       Gastric marginal zone lymphoma  Clinically asymptomatic. No clinical signs of progression of disease. Discussed that we will continue clinical surveillance, and if he has recurrent symptoms, we can consider appropriate evaluation/management.     Follow-up  1 year     Dg Prather MD  Hematology and Stem Cell Transplant      "

## 2024-07-08 NOTE — PROGRESS NOTES
Route Chart for Scheduling    BMT Chart Routing      Follow up with physician 1 year.   Follow up with LAVONNE    Provider visit type Malignant hem   Infusion scheduling note    Injection scheduling note    Labs CBC, CMP and LDH   Scheduling:  Preferred lab:  Lab interval:  labs at time of return visit   Imaging    Pharmacy appointment    Other referrals

## 2024-07-11 ENCOUNTER — TELEPHONE (OUTPATIENT)
Dept: INTERNAL MEDICINE | Facility: CLINIC | Age: 89
End: 2024-07-11
Payer: MEDICARE

## 2024-07-11 DIAGNOSIS — R60.0 FLUID RETENTION IN LEGS: Primary | ICD-10-CM

## 2024-07-11 DIAGNOSIS — M48.062 SPINAL STENOSIS OF LUMBAR REGION WITH NEUROGENIC CLAUDICATION: ICD-10-CM

## 2024-07-11 DIAGNOSIS — R60.0 FLUID RETENTION IN LEGS: ICD-10-CM

## 2024-07-11 RX ORDER — FUROSEMIDE 20 MG/1
TABLET ORAL
Qty: 30 TABLET | Refills: 0 | Status: SHIPPED | OUTPATIENT
Start: 2024-07-11 | End: 2024-07-13 | Stop reason: SDUPTHER

## 2024-07-11 NOTE — TELEPHONE ENCOUNTER
Guillermina, this pt has an Appt with Dr Ambrocio in October but a recent ECHO showed worsening AS.  Is it possible to get him a sooner appt if Dr Ambrocio gets a cancel? If not we're good to stay with October/Thanks, Amber Dodson

## 2024-07-11 NOTE — TELEPHONE ENCOUNTER
Review of MRI Lumbar spine(6/28)-shows diffuse OA/DDD and moderate to severe spinal stenosis from L1-L5. Lab/[id number].  ECHO(6/18/24)-shows Aortic stenosis worsening to severe but with LVF stable at 45% and rest without change  A/P #1-Lumbar Spinal stenosis a) Refer to Orthopedic Spine Dept b) Consider Gabapentin trial, #2-Aortic Stenosis a) Cardiology/Dr Ambrocio as scheduled  Cami/Faviola-would you please schedule pt an appt in [facility].  Thanks

## 2024-07-12 NOTE — TELEPHONE ENCOUNTER
----- Message from Wilda Khanna sent at 7/12/2024 10:04 AM CDT -----  Contact: [facility] [phone]  Pharmacy is calling to clarify an RX.    RX name:  furosemide (LASIX) 20 MG tablet    What do they need to clarify:  needs to know how to take it     Comments:       [id number] - [facility] [address]  [address] [address]  Phone: [phone] Fax: [phone]

## 2024-07-12 NOTE — TELEPHONE ENCOUNTER
----- Message from Erica Botello sent at 7/12/2024  4:04 PM CDT -----  Contact: [facility] [phone]  Pharmacy is calling to clarify an RX.    RX name:  furosemide (LASIX) 20 MG tablet     What do they need to clarify:  calling to verify if pt should be taking once a week or once daily?    Comments:

## 2024-07-13 RX ORDER — FUROSEMIDE 20 MG/1
TABLET ORAL
Qty: 30 TABLET | Refills: 0 | Status: SHIPPED | OUTPATIENT
Start: 2024-07-13

## 2024-08-21 ENCOUNTER — OFFICE VISIT (OUTPATIENT)
Dept: URGENT CARE | Facility: CLINIC | Age: 89
End: 2024-08-21
Payer: MEDICARE

## 2024-08-21 VITALS
HEART RATE: 90 BPM | DIASTOLIC BLOOD PRESSURE: 93 MMHG | OXYGEN SATURATION: 99 % | RESPIRATION RATE: 20 BRPM | WEIGHT: 183 LBS | HEIGHT: 71 IN | BODY MASS INDEX: 25.62 KG/M2 | TEMPERATURE: 98 F | SYSTOLIC BLOOD PRESSURE: 156 MMHG

## 2024-08-21 DIAGNOSIS — S41.112A LACERATION OF LEFT UPPER EXTREMITY, INITIAL ENCOUNTER: Primary | ICD-10-CM

## 2024-08-21 PROBLEM — I48.0 PAROXYSMAL ATRIAL FIBRILLATION: Status: ACTIVE | Noted: 2024-08-21

## 2024-08-21 PROCEDURE — 12034 INTMD RPR S/TR/EXT 7.6-12.5: CPT | Mod: S$GLB,,, | Performed by: FAMILY MEDICINE

## 2024-08-21 PROCEDURE — 99499 UNLISTED E&M SERVICE: CPT | Mod: S$GLB,,, | Performed by: FAMILY MEDICINE

## 2024-08-21 RX ORDER — MUPIROCIN 20 MG/G
OINTMENT TOPICAL
Qty: 22 G | Refills: 1 | Status: SHIPPED | OUTPATIENT
Start: 2024-08-21

## 2024-08-21 NOTE — PROGRESS NOTES
"Subjective:      Patient ID: Norm Mendoza is a 89 y.o. male.    Vitals:  height is 5' 11" (1.803 m) and weight is 83 kg (183 lb). His oral temperature is 97.5 °F (36.4 °C). His blood pressure is 156/93 (abnormal) and his pulse is 90. His respiration is 20 and oxygen saturation is 99%.     Chief Complaint: Arm Injury    Patient presents with left arm injury from the metal part of a door. Onset today. Patient states all medication is up to date . TDAP 2017    Arm Injury   His dominant hand is their left hand. The incident occurred less than 1 hour ago. The incident occurred at home. The pain is at a severity of 7/10. The pain is moderate. The pain has been Constant since the incident.     ROS   Objective:     Physical Exam   Constitutional: He is oriented to person, place, and time. He does not appear ill. No distress.   Abdominal: Normal appearance.   Neurological: no focal deficit. He is alert and oriented to person, place, and time.   Skin: Skin is warm and dry.         Comments: Large laceration of left forearm -approx 9 cm long and extending down to subcutaneous  layer   Psychiatric: His behavior is normal. Judgment and thought content normal.   Nursing note and vitals reviewed.    Laceration Repair    Date/Time: 8/21/2024 10:15 AM    Performed by: Tatiana Naranjo DO  Authorized by: Tatiana Naranjo DO  Consent Done: Emergent Situation  Body area: upper extremity  Location details: left lower arm  Laceration length: 9 cm  Foreign bodies: no foreign bodies  Tendon involvement: none  Nerve involvement: none  Vascular damage: no  Anesthesia: local infiltration    Anesthesia:  Local Anesthetic: lidocaine 1% without epinephrine  Anesthetic total: 4 mL    Patient sedated: no  Preparation: Patient was prepped and draped in the usual sterile fashion.  Irrigation solution: saline (hibiclens)  Irrigation method: sponge.  Amount of cleaning: standard  Debridement: minimal  Degree of undermining: none  Skin closure: " Ethilon and 5-0 nylon  Number of sutures: 14  Technique: simple  Approximation: close  Approximation difficulty: simple  Dressing: antibiotic ointment and 4x4 sterile gauze  Patient tolerance: Patient tolerated the procedure well with no immediate complications         Assessment:     1. Laceration of left upper extremity, initial encounter        Plan:       Laceration of left upper extremity, initial encounter  -     Laceration Repair    Other orders  -     mupirocin (BACTROBAN) 2 % ointment; Apply to affected area 3 times daily  Dispense: 22 g; Refill: 1    Pt or guardian provided educational materials and instructions regarding their visit diagnosis.

## 2024-08-30 ENCOUNTER — OFFICE VISIT (OUTPATIENT)
Dept: URGENT CARE | Facility: CLINIC | Age: 89
End: 2024-08-30
Payer: MEDICARE

## 2024-08-30 VITALS
RESPIRATION RATE: 18 BRPM | TEMPERATURE: 98 F | DIASTOLIC BLOOD PRESSURE: 87 MMHG | HEIGHT: 72 IN | OXYGEN SATURATION: 96 % | BODY MASS INDEX: 24.79 KG/M2 | SYSTOLIC BLOOD PRESSURE: 154 MMHG | WEIGHT: 183 LBS | HEART RATE: 56 BPM

## 2024-08-30 DIAGNOSIS — Z48.02 VISIT FOR SUTURE REMOVAL: Primary | ICD-10-CM

## 2024-08-30 PROBLEM — J18.9 WALKING PNEUMONIA: Status: RESOLVED | Noted: 2023-01-05 | Resolved: 2024-08-30

## 2024-08-30 NOTE — PROGRESS NOTES
Subjective:      Patient ID: Norm Mendoza is a 89 y.o. male.    Vitals:  height is 6' (1.829 m) and weight is 83 kg (183 lb). His oral temperature is 98.1 °F (36.7 °C). His blood pressure is 154/87 (abnormal) and his pulse is 56 (abnormal). His respiration is 18 and oxygen saturation is 96%.     Chief Complaint: Suture / Staple Removal    Pt is here to get stitches removed. Pt states he was here about a week ago and spoke with dr walter. Pt hasnt taken any treatment and states no pain.     Suture / Staple Removal  The sutures were placed 11 to 14 days ago. He tried antibiotic ointment use since the wound repair. The treatment provided mild relief. His temperature was unmeasured prior to arrival. There has been no drainage from the wound. The redness has improved. The swelling has improved. The pain has improved. He has no difficulty moving the affected extremity or digit.     ROS   Objective:     Physical Exam   Constitutional: He is oriented to person, place, and time.   Abdominal: Normal appearance.   Neurological: no focal deficit. He is alert and oriented to person, place, and time.   Skin: Skin is warm and dry.         Comments: Wound is clean and dry -mild swelling of mid portion of wound   Psychiatric: His behavior is normal. Judgment and thought content normal.   Nursing note and vitals reviewed.  Suture Removal    Date/Time: 8/30/2024 2:15 PM  Location procedure was performed: [facility]    Performed by: Tatiana Walter DO  Authorized by: Tatiana Walter DO  Body area: upper extremity  Location details: left lower arm  Wound Appearance: clean, nontender, no drainage and nonpurulent (mild swelling in mid portion of wound)  Sutures Removed: 14  Post-removal: dressing applied  Facility: sutures placed in this facility  Patient tolerance: Patient tolerated the procedure well with no immediate complications       Wound still completely fused together and  some steri strips  were applied  Suture is very close in color to pts own skin hair color and I believe I removed all of the sutures - advised if he notices an signs of a retained stitch he should rtc  Assessment:     1. Visit for suture removal        Plan:       Visit for suture removal    Other orders  -     Suture Removal      Pt or guardian provided educational materials and instructions regarding their visit diagnosis.

## 2024-09-06 DIAGNOSIS — M54.50 LOW BACK PAIN, UNSPECIFIED BACK PAIN LATERALITY, UNSPECIFIED CHRONICITY, UNSPECIFIED WHETHER SCIATICA PRESENT: Primary | ICD-10-CM

## 2024-09-13 DIAGNOSIS — I48.91 ATRIAL FIBRILLATION, UNSPECIFIED TYPE: ICD-10-CM

## 2024-09-13 DIAGNOSIS — I10 ESSENTIAL HYPERTENSION: ICD-10-CM

## 2024-09-13 RX ORDER — METOPROLOL SUCCINATE 25 MG/1
12.5 TABLET, EXTENDED RELEASE ORAL
Qty: 45 TABLET | Refills: 3 | Status: SHIPPED | OUTPATIENT
Start: 2024-09-13

## 2024-09-23 ENCOUNTER — TELEPHONE (OUTPATIENT)
Dept: DERMATOLOGY | Facility: CLINIC | Age: 89
End: 2024-09-23
Payer: MEDICARE

## 2024-09-23 NOTE — TELEPHONE ENCOUNTER
----- Message from Nadia Diamond MA sent at 9/20/2024  4:15 PM CDT -----  Regarding: FW: Reschedule Existing Appointment    ----- Message -----  From: Maura Paula  Sent: 9/20/2024  11:16 AM CDT  To: Cirilo Frey Staff  Subject: Reschedule Existing Appointment                  Reschedule Existing Appointment     Current appt date: 9/30     Type of appt :EP     Physician: Cirilo     Reason for rescheduling:cannot make appt     Caller: Norm Mendoza       Contact Preference:[phone]

## 2024-09-25 ENCOUNTER — OFFICE VISIT (OUTPATIENT)
Dept: ORTHOPEDICS | Facility: CLINIC | Age: 89
End: 2024-09-25
Payer: MEDICARE

## 2024-09-25 ENCOUNTER — HOSPITAL ENCOUNTER (OUTPATIENT)
Dept: RADIOLOGY | Facility: HOSPITAL | Age: 89
Discharge: HOME | End: 2024-09-25
Attending: ORTHOPAEDIC SURGERY
Payer: MEDICARE

## 2024-09-25 VITALS — WEIGHT: 183.88 LBS | HEIGHT: 72 IN | BODY MASS INDEX: 24.91 KG/M2

## 2024-09-25 DIAGNOSIS — M54.50 LOW BACK PAIN, UNSPECIFIED BACK PAIN LATERALITY, UNSPECIFIED CHRONICITY, UNSPECIFIED WHETHER SCIATICA PRESENT: ICD-10-CM

## 2024-09-25 DIAGNOSIS — M48.062 SPINAL STENOSIS OF LUMBAR REGION WITH NEUROGENIC CLAUDICATION: ICD-10-CM

## 2024-09-25 PROCEDURE — 1101F PT FALLS ASSESS-DOCD LE1/YR: CPT | Mod: HCNC,CPTII,S$GLB, | Performed by: ORTHOPAEDIC SURGERY

## 2024-09-25 PROCEDURE — 1159F MED LIST DOCD IN RCRD: CPT | Mod: HCNC,CPTII,S$GLB, | Performed by: ORTHOPAEDIC SURGERY

## 2024-09-25 PROCEDURE — 99214 OFFICE O/P EST MOD 30 MIN: CPT | Mod: HCNC,S$GLB,, | Performed by: ORTHOPAEDIC SURGERY

## 2024-09-25 PROCEDURE — 72110 X-RAY EXAM L-2 SPINE 4/>VWS: CPT | Mod: 26,HCNC,, | Performed by: RADIOLOGY

## 2024-09-25 PROCEDURE — 3288F FALL RISK ASSESSMENT DOCD: CPT | Mod: HCNC,CPTII,S$GLB, | Performed by: ORTHOPAEDIC SURGERY

## 2024-09-25 PROCEDURE — 72110 X-RAY EXAM L-2 SPINE 4/>VWS: CPT | Mod: TC,HCNC

## 2024-09-25 PROCEDURE — 1126F AMNT PAIN NOTED NONE PRSNT: CPT | Mod: HCNC,CPTII,S$GLB, | Performed by: ORTHOPAEDIC SURGERY

## 2024-09-25 PROCEDURE — 99999 PR PBB SHADOW E&M-EST. PATIENT-LVL III: CPT | Mod: PBBFAC,HCNC,, | Performed by: ORTHOPAEDIC SURGERY

## 2024-09-25 NOTE — PROGRESS NOTES
DATE: 9/25/2024  PATIENT: Norm Mendzoa    Supervising Physician: Rivera Palm M.D.    CHIEF COMPLAINT: low back pain    HISTORY:  Norm Mendoza is a 89 y.o. male with a pmhx of CHF and a fib here for initial evaluation of low back pain (Back - 6). The pain has been present intermittently for a year, worsening over time. The patient describes the pain as episodic aching and sharp.  The pain is worse with nothing and improved by nothing and is random. There is negative associated numbness and tingling. There is negative subjective weakness. Prior treatments have included home exercises,, but no PT, ESIs, surgery.    The patient denies myelopathic symptoms such as handwriting changes or difficulty with buttons/coins/keys. Denies perineal paresthesias, bowel/bladder dysfunction.    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    Dr Vel LANE (peptic ulcer disease)     GI Bleeding 11/2018: Anticoagulant D/S'd and Gastric MALT tumor Dx'd    Skin cancer     x3, had mohs on nose    Squamous cell carcinoma excised 12/5/14    R lower back    Symptomatic anemia 12/06/2018    Urinary tract infection     x4     Past Surgical History:   Procedure Laterality Date    ACHILLES TENDON SURGERY      CATARACT EXTRACTION W/  INTRAOCULAR LENS IMPLANT Bilateral 2013    CLOSURE OF LEFT ATRIAL APPENDAGE USING DEVICE N/A 10/11/2019    Procedure: Left atrial appendage closure  device;  Surgeon: Hi Crowder MD;  Location: [facility] EP LAB;  Service: Cardiology;  Laterality: N/A;  AF, JACINTO, Watchman Implant, BSci, Gen, MB, 3 Prep    CYSTOSCOPY      bladder tumor    ESOPHAGOGASTRODUODENOSCOPY N/A 12/7/2018    Procedure: EGD (ESOPHAGOGASTRODUODENOSCOPY);  Surgeon: Austin Garcia MD;  Location: [facility] ENDO (2ND FLR);  Service: Endoscopy;  Laterality: N/A;    ESOPHAGOGASTRODUODENOSCOPY N/A 4/12/2019    Procedure: EGD (ESOPHAGOGASTRODUODENOSCOPY);  Surgeon: Austin Garcia MD;  Location: [facility] ENDO (4TH FLR);  Service: Endoscopy;  Laterality: N/A;  please schedule within 4 weeks    ESOPHAGOGASTRODUODENOSCOPY N/A 5/27/2021    Procedure: EGD (ESOPHAGOGASTRODUODENOSCOPY);  Surgeon: Austin Garcia MD;  Location: [facility] ENDO (2ND FLR);  Service: Endoscopy;  Laterality: N/A;  per Dr. Garcia done within the month with any provider/ ordered by oncology  2nd floor due to comorbidities  Watchman device  COVID test at [facility] on 5/24-GT    SKIN CANCER EXCISION      TREATMENT OF CARDIAC ARRHYTHMIA N/A 7/12/2019    Procedure: Cardioversion or Defibrillation;  Surgeon: Hi Crowder MD;  Location: [facility] EP LAB;  Service: Cardiology;  Laterality: N/A;  AF, JACINTO, DCCV, MAC, MB, 3 Prep    TREATMENT OF CARDIAC ARRHYTHMIA N/A 2/8/2022    Procedure: Cardioversion or Defibrillation;  Surgeon: Susan Orozco NP;  Location: [facility] EP LAB;  Service: Cardiology;  Laterality: N/A;  afib, DCCV, anes, MB, 3 Prep       Current Medications:   Current Outpatient Medications:     amLODIPine (NORVASC) 2.5 MG tablet, TAKE 1 TABLET(2.5 MG) BY MOUTH EVERY DAY, Disp: 90 tablet, Rfl: 3    furosemide (LASIX) 20 MG tablet, 1 tab up to once weekly as needed for fluid, Disp: 30 tablet, Rfl: 0    irbesartan (AVAPRO) 150 MG tablet, Take 1 tablet (150 mg total) by mouth once daily., Disp: 90 tablet, Rfl: 2    metoprolol succinate (TOPROL-XL) 25 MG 24 hr tablet, TAKE 1/2 TABLET BY MOUTH DAILY, Disp: 45 tablet, Rfl: 3    multivitamin  (THERAGRAN) per tablet, Take 1 tablet by mouth once daily., Disp: , Rfl:     albuterol (VENTOLIN HFA) 90 mcg/actuation inhaler, Inhale 2 puffs into the lungs every 6 (six) hours as needed for Wheezing. Rescue, Disp: 18 g, Rfl: 1    clobetasoL (TEMOVATE) 0.05 % external solution, Pt to mix in 1 jar of cerave cream and apply to affected areas bid (Patient not taking: Reported on 2024), Disp: 50 mL, Rfl: 3    diclofenac sodium (VOLTAREN ARTHRITIS PAIN) 1 % Gel, Apply 2 Grams to painful right hand joint 2-3x/day as needed (Patient not taking: Reported on 2024), Disp: 100 g, Rfl: 1    mupirocin (BACTROBAN) 2 % ointment, Apply to affected area 3 times daily (Patient not taking: Reported on 2024), Disp: 22 g, Rfl: 1    psyllium husk (METAMUCIL ORAL), Take by mouth. (Patient not taking: Reported on 2024), Disp: , Rfl:     Social History:   Social History     Socioeconomic History    Marital status:     Number of children: 3   Occupational History    Occupation: Financial Work/     Employer: retired    Occupation: actor    Occupation:    Tobacco Use    Smoking status: Former     Current packs/day: 0.00     Average packs/day: 1 pack/day for 25.0 years (25.0 ttl pk-yrs)     Types: Cigarettes     Start date: 9/3/1945     Quit date: 9/3/1970     Years since quittin.0    Smokeless tobacco: Never   Substance and Sexual Activity    Alcohol use: Yes     Alcohol/week: 14.0 standard drinks of alcohol     Types: 14 Shots of liquor per week     Comment: 2 Drinks daily    Drug use: No    Sexual activity: Yes     Partners: Female   Social History Narrative    He is  with 2/3 kids living(2 sons/1 daughter who passed away); He has 6 Grandkids(5 under 10 y/o); He has 1 son here in [place] /1 grandson at [facility]; His other son(3 kids) lives in [place]; His Grand-daughter(Her Mother passed away) with her 2 kids lives in [place]    He had worked for [employer]  which has dissolved but now works for [employer]     Social Determinants of Health     Financial Resource Strain: Low Risk  (2/1/2024)    Overall Financial Resource Strain (CARDIA)     Difficulty of Paying Living Expenses: Not hard at all   Food Insecurity: No Food Insecurity (2/1/2024)    Hunger Vital Sign     Worried About Running Out of Food in the Last Year: Never true     Ran Out of Food in the Last Year: Never true   Transportation Needs: No Transportation Needs (2/1/2024)    PRAPARE - Transportation     Lack of Transportation (Medical): No     Lack of Transportation (Non-Medical): No   Physical Activity: Inactive (2/1/2024)    Exercise Vital Sign     Days of Exercise per Week: 0 days     Minutes of Exercise per Session: 0 min   Stress: No Stress Concern Present (2/1/2024)    [employer] - Occupational Stress Questionnaire     Feeling of Stress : Not at all   Housing Stability: Low Risk  (2/1/2024)    Housing Stability Vital Sign     Unable to Pay for Housing in the Last Year: No     Number of Places Lived in the Last Year: 1     Unstable Housing in the Last Year: No       REVIEW OF SYSTEMS:  Constitution: Negative. Negative for chills, fever and night sweats.   Cardiovascular: Negative for chest pain and syncope.   Respiratory: Negative for cough and shortness of breath.   Gastrointestinal: See HPI. Negative for nausea/vomiting. Negative for abdominal pain.  Genitourinary: See HPI. Negative for discoloration or dysuria.  Skin: Negative for dry skin, itching and rash.   Hematologic/Lymphatic: Negative for bleeding problem. Does not bruise/bleed easily.   Musculoskeletal: Negative for falls and muscle weakness.   Neurological: See HPI. No seizures.   Endocrine: Negative for polydipsia, polyphagia and polyuria.   Allergic/Immunologic: Negative for hives and persistent infections.    PHYSICAL EXAMINATION:    Ht 6' (1.829 m)   Wt 83.4 kg (183 lb 13.8 oz)   BMI 24.94 kg/m²     General:  The patient is a very pleasant 89 y.o. male in no apparent distress, the patient is oriented to person, place and time.   Psych: Normal mood and affect  HEENT: Vision grossly intact, hearing intact to the spoken word.  Lungs: Respirations unlabored.  Gait: Normal station and gait, no difficulty with toe or heel walk.   Skin: Dorsal lumbar skin negative for rashes, lesions, hairy patches and surgical scars.  Range of motion: Lumbar range of motion is acceptable. There is mild lumbar tenderness to palpation.  Spinal Balance: Global saggital and coronal spinal balance acceptable, no significant for scoliosis and kyphosis.  Musculoskeletal: No pain with the range of motion of the bilateral hips. No trochanteric tenderness to palpation.  Vascular: Bilateral lower extremities warm and well perfused, Dorsalis pedis pulses 2+ bilaterally.  Neurological: Normal strength and tone in all major motor groups in the bilateral lower extremities. Normal sensation to light touch in the L2-S1 dermatomes bilaterally.  Deep tendon reflexes symmetric 2+ in the bilateral lower extremities.  Negative Babinski bilaterally.  Straight leg raise negative bilaterally.     IMAGING:   Today I personally reviewed AP, Lat and Flex/Ex upright L-spine films that demonstrate scoliosis and degenerative changes.    MRI lumbar demonstrates advanced lumbar spondylosis, significant for multilevel severe spinal canal stenosis and moderate to severe neural foraminal narrowing.     Body mass index is 24.94 kg/m².    Hemoglobin A1C   Date Value Ref Range Status   07/05/2024 5.4 4.0 - 5.6 % Final     Comment:     ADA Screening Guidelines:  5.7-6.4%  Consistent with prediabetes  >or=6.5%  Consistent with diabetes    High levels of fetal hemoglobin interfere with the HbA1C  assay. Heterozygous hemoglobin variants (HbS, HgC, etc)do  not significantly interfere with this assay.   However, presence of multiple variants may affect accuracy.     08/19/2021 5.2 4.0 -  5.6 % Final     Comment:     ADA Screening Guidelines:  5.7-6.4%  Consistent with prediabetes  >or=6.5%  Consistent with diabetes    High levels of fetal hemoglobin interfere with the HbA1C  assay. Heterozygous hemoglobin variants (HbS, HgC, etc)do  not significantly interfere with this assay.   However, presence of multiple variants may affect accuracy.           ASSESSMENT/PLAN:    Norm was seen today for low-back pain and back pain.    Diagnoses and all orders for this visit:    Spinal stenosis of lumbar region with neurogenic claudication  -     Ambulatory referral/consult to Orthopedics        Today we discussed at length all of the different treatment options including anti-inflammatories, acetaminophen, rest, ice, heat, physical therapy including strengthening and stretching exercises, home exercises, ROM, aerobic conditioning, aqua therapy, other modalities including ultrasound, massage, and dry needling, epidural steroid injections and finally surgical intervention.      Pt presents with chronic low back pain without radiculopathy. Given age/medical hx do not think patient is a good surgical candidate, patient agrees. Will message pain management regarding lumbar MBBs/RFAs

## 2024-09-27 ENCOUNTER — TELEPHONE (OUTPATIENT)
Dept: PAIN MEDICINE | Facility: CLINIC | Age: 89
End: 2024-09-27
Payer: MEDICARE

## 2024-09-27 NOTE — TELEPHONE ENCOUNTER
----- Message from Vik Jurado MD sent at 9/25/2024  3:11 PM CDT -----  Absolutely!     Team, please book this patient with which ever provider has the next open slot  ----- Message -----  From: Rere Coffey PA-C  Sent: 9/25/2024   2:58 PM CDT  To: Vik Jurado MD; Fredo Castle DO; #    Hiiiiii,    I just saw this 89 year old patient for chronic low back pain without radiculopathy. He is interested in lumbar MBBs/RFAs, could we get him in when yall have a sec? Thanks :)

## 2024-09-27 NOTE — TELEPHONE ENCOUNTER
Called pt to schedule new pt appt  No answer  LVM and call back number    Sent pt portal message    Placed recall within pt chart    ND

## 2024-09-30 ENCOUNTER — OFFICE VISIT (OUTPATIENT)
Dept: DERMATOLOGY | Facility: CLINIC | Age: 89
End: 2024-09-30
Payer: MEDICARE

## 2024-09-30 DIAGNOSIS — L82.1 SK (SEBORRHEIC KERATOSIS): ICD-10-CM

## 2024-09-30 DIAGNOSIS — D18.01 ANGIOMA OF SKIN: ICD-10-CM

## 2024-09-30 DIAGNOSIS — L57.0 ACTINIC KERATOSES: Primary | ICD-10-CM

## 2024-09-30 DIAGNOSIS — Z85.828 HISTORY OF NONMELANOMA SKIN CANCER: ICD-10-CM

## 2024-09-30 PROCEDURE — 1160F RVW MEDS BY RX/DR IN RCRD: CPT | Mod: HCNC,CPTII,S$GLB, | Performed by: DERMATOLOGY

## 2024-09-30 PROCEDURE — 99999 PR PBB SHADOW E&M-EST. PATIENT-LVL III: CPT | Mod: PBBFAC,HCNC,, | Performed by: DERMATOLOGY

## 2024-09-30 PROCEDURE — 1159F MED LIST DOCD IN RCRD: CPT | Mod: HCNC,CPTII,S$GLB, | Performed by: DERMATOLOGY

## 2024-09-30 PROCEDURE — 99214 OFFICE O/P EST MOD 30 MIN: CPT | Mod: 25,HCNC,S$GLB, | Performed by: DERMATOLOGY

## 2024-09-30 PROCEDURE — 3288F FALL RISK ASSESSMENT DOCD: CPT | Mod: HCNC,CPTII,S$GLB, | Performed by: DERMATOLOGY

## 2024-09-30 PROCEDURE — 17003 DESTRUCT PREMALG LES 2-14: CPT | Mod: HCNC,S$GLB,, | Performed by: DERMATOLOGY

## 2024-09-30 PROCEDURE — 17000 DESTRUCT PREMALG LESION: CPT | Mod: HCNC,S$GLB,, | Performed by: DERMATOLOGY

## 2024-09-30 PROCEDURE — 1101F PT FALLS ASSESS-DOCD LE1/YR: CPT | Mod: HCNC,CPTII,S$GLB, | Performed by: DERMATOLOGY

## 2024-09-30 PROCEDURE — 1126F AMNT PAIN NOTED NONE PRSNT: CPT | Mod: HCNC,CPTII,S$GLB, | Performed by: DERMATOLOGY

## 2024-09-30 RX ORDER — FLUOROURACIL 50 MG/G
CREAM TOPICAL
Qty: 30 G | Refills: 0 | Status: SHIPPED | OUTPATIENT
Start: 2024-09-30

## 2024-09-30 NOTE — PATIENT INSTRUCTIONS
CRYOSURGERY      Your doctor has used a method called cryosurgery to treat your skin condition. Cryosurgery refers to the use of very cold substances to treat a variety of skin conditions such as warts, pre-skin cancers, molluscum contagiosum, sun spots, and several benign growths. The substance we use in cryosurgery is liquid nitrogen and is so cold (-195 degrees Celsius) that is burns when administered.     Following treatment in the office, the skin may immediately burn and become red. You may find the area around the lesion is affected as well. It is sometimes necessary to treat not only the lesion, but a small area of the surrounding normal skin to achieve a good response.     A blister, and even a blood filled blister, may form after treatment.   This is a normal response. If the blister is painful, it is acceptable to sterilize a needle and with rubbing alcohol and gently pop the blister. It is important that you gently wash the area with soap and warm water as the blister fluid may contain wart virus if a wart was treated. Do no remove the roof of the blister.     The area treated can take anywhere from 1-3 weeks to heal. Healing time depends on the kind skin lesion treated, the location, and how aggressively the lesion was treated. It is recommended that the areas treated are covered with Vaseline or bacitracin ointment and a band-aid. If a band-aid is not practical, just ointment applied several times per day will do. Keeping these areas moist will speed the healing time.    Treatment with liquid nitrogen can leave a scar. In dark skin, it may be a light or dark scar, in light skin it may be a white or pink scar. These will generally fade with time, but may never go away completely.     If you have any concerns after your treatment, please feel free to call the office.       [address] [place] [address]/ [phone] FAX/ [email or web address]               Field Treatment for  Actinic Keratoses (precancerous lesions)    5-Fluoruracil + Dovonex (calcipotriene) - This is a topical chemotherapy for your skin. This cream should never be applied without discussing with you dermatologist.    This treatment gets your immune system involved in fighting precancers (actinic keratoses), even those we can't yet see.     HOW TO APPLY: Apply the combination cream to the affected area entire bilateral lateral cheekbones  2x/day x 7 days. Apply a fingertip length amount to the entire area. Wash your hands carefully after applying the creams and wipe glasses, BIPAP/CPAP machines, or anything else that comes in frequent contact with the creams.    WHAT TO EXPECT: Your skin will likely become red, crusted, sore, and tender during the treatment usually starting around day 3 and continuing for about 1 week after last application.     HOW TO HEAL FAST: To speed healing, wash with a gentle wash and apply Vaseline jelly especially to crusted open areas.    WE CAN HELP: Please contact us for any questions or problem shooting the application of these creams: [phone] or [email or web address]    GREAT NEWS: Newer studies suggest that the combination of these creams not only decrease the sun damage spots and precancers (actinic keratoses) but also decrease your risk of getting skin cancer the year following application.     IT WILL BE WORTH IT!!!       Your [facility] has been [facility] to [place] [facility].  The phone number is [phone].  Their location is:  [facility]. [place], [place], [address] [place] [address] Pharmacy should call you. You can opt to pick the Rx up or have the Rx mailed to you.     Hours of operation:   Monday- Friday:  8 a.m. to 6:00 p.m.  Saturday:  8 a.m. to 1:00 p.m.  Sunday:  Closed

## 2024-10-24 ENCOUNTER — OFFICE VISIT (OUTPATIENT)
Dept: URGENT CARE | Facility: CLINIC | Age: 89
End: 2024-10-24
Payer: MEDICARE

## 2024-10-24 VITALS
HEIGHT: 72 IN | SYSTOLIC BLOOD PRESSURE: 124 MMHG | WEIGHT: 183 LBS | RESPIRATION RATE: 16 BRPM | TEMPERATURE: 98 F | DIASTOLIC BLOOD PRESSURE: 73 MMHG | BODY MASS INDEX: 24.79 KG/M2 | OXYGEN SATURATION: 96 % | HEART RATE: 70 BPM

## 2024-10-24 DIAGNOSIS — L03.114 CELLULITIS OF LEFT ARM: Primary | ICD-10-CM

## 2024-10-24 PROCEDURE — 99213 OFFICE O/P EST LOW 20 MIN: CPT | Mod: S$GLB,,, | Performed by: NURSE PRACTITIONER

## 2024-10-24 RX ORDER — CEFUROXIME AXETIL 500 MG/1
500 TABLET ORAL 2 TIMES DAILY
Qty: 14 TABLET | Refills: 0 | Status: SHIPPED | OUTPATIENT
Start: 2024-10-24 | End: 2024-10-31

## 2024-10-24 RX ORDER — MUPIROCIN 20 MG/G
OINTMENT TOPICAL 3 TIMES DAILY
Qty: 22 G | Refills: 0 | Status: SHIPPED | OUTPATIENT
Start: 2024-10-24 | End: 2024-11-03

## 2024-10-24 NOTE — PATIENT INSTRUCTIONS
Cellulitis of left arm        -     cefUROXime (CEFTIN) 500 MG tablet; Take 1 tablet (500 mg total) by mouth 2 (two) times daily. for 7 days  Dispense: 14 tablet; Refill: 0    -     mupirocin (BACTROBAN) 2 % ointment; Apply topically 3 (three) times daily. for 10 days  Dispense: 22 g; Refill: 0      Wound Care    Gently clean wound with soap and water twice daily.    After cleaning and patting dry, apply antibiotic ointment to the wound site with a Q-tip.    Do not let thick, dark, adherent crust form since this may delay healing.  If a crust develops, use Q-tip and hydrogen peroxide diluted 1 to 1 with tap water to gently break it up.  Never pour/soak wound in hydrogen peroxide.      Signs of Infection:  Increase in redness (In initial stages, redness is normal at the wound edge but should not continue to increase)  Increase in pain and swelling (Similar to redness, pain and swelling is a normal part of healing, but should not continue or worsen after 3-5 days)  Yellowish drainage or fever after a few days    Itching is normal part of the healing process.  If severe or redness and water blisters develop, you may be allergic to ointment or tape.      Scar Care (after wound is completely healed):   * If Keloid history or suspected see Dermatology ASAP  Use sunscreen if sun exposure to decrease scarring  Once healed, use silicone sheeting nightly for 1-2 months

## 2024-10-24 NOTE — PROGRESS NOTES
Subjective:      Patient ID: Norm Mednoza is a 89 y.o. male.    Vitals:  height is 6' (1.829 m) and weight is 83 kg (183 lb). His temperature is 98.4 °F (36.9 °C). His blood pressure is 124/73 and his pulse is 70. His respiration is 16 and oxygen saturation is 96%.     Chief Complaint: Rash    Pt presents complaints of a possible rash. Left lower arm/ wrist.  Pt states he was seen about 08/30/2024 for a laceration and was given stiches. Pt states he came back to have them removed and was told the sutures were the same color as his skin so a suture might have been missed. Pt states ever since then his skin has not healed properly. Pt states scabs and skin discoloration. Pt state he is unsure if it has become infected.     Rash  This is a new problem. The affected locations include the left arm. The rash is characterized by dryness, redness, swelling and scaling. Pertinent negatives include no fatigue, fever or shortness of breath.     Constitution: Negative for chills, fatigue and fever.   Cardiovascular:  Negative for chest pain.   Respiratory:  Negative for chest tightness and shortness of breath.    Skin:  Positive for wound.   Neurological:  Negative for headaches.      Objective:     Physical Exam   Constitutional: He is oriented to person, place, and time.   Eyes: EOM are normal.   Cardiovascular: Normal rate and regular rhythm.   Pulmonary/Chest: Effort normal and breath sounds normal. No respiratory distress.   Neurological: He is alert and oriented to person, place, and time.   Skin: Skin is warm and dry.   Nursing note and vitals reviewed.        Assessment:     1. Cellulitis of left arm        Plan:       Cellulitis of left arm  -     cefUROXime (CEFTIN) 500 MG tablet; Take 1 tablet (500 mg total) by mouth 2 (two) times daily. for 7 days  Dispense: 14 tablet; Refill: 0  -     mupirocin (BACTROBAN) 2 % ointment; Apply topically 3 (three) times daily. for 10 days  Dispense: 22 g; Refill:  0      Patient Instructions   Cellulitis of left arm    -     cefUROXime (CEFTIN) 500 MG tablet; Take 1 tablet (500 mg total) by mouth 2 (two) times daily. for 7 days  Dispense: 14 tablet; Refill: 0    -     mupirocin (BACTROBAN) 2 % ointment; Apply topically 3 (three) times daily. for 10 days  Dispense: 22 g; Refill: 0      Wound Care    Gently clean wound with soap and water twice daily.    After cleaning and patting dry, apply antibiotic ointment to the wound site with a Q-tip.    Do not let thick, dark, adherent crust form since this may delay healing.  If a crust develops, use Q-tip and hydrogen peroxide diluted 1 to 1 with tap water to gently break it up.  Never pour/soak wound in hydrogen peroxide.      Signs of Infection:  Increase in redness (In initial stages, redness is normal at the wound edge but should not continue to increase)  Increase in pain and swelling (Similar to redness, pain and swelling is a normal part of healing, but should not continue or worsen after 3-5 days)  Yellowish drainage or fever after a few days    Itching is normal part of the healing process.  If severe or redness and water blisters develop, you may be allergic to ointment or tape.      Scar Care (after wound is completely healed):   * If Keloid history or suspected see Dermatology ASAP  Use sunscreen if sun exposure to decrease scarring  Once healed, use silicone sheeting nightly for 1-2 months

## 2024-10-25 ENCOUNTER — TELEPHONE (OUTPATIENT)
Dept: DERMATOLOGY | Facility: CLINIC | Age: 89
End: 2024-10-25
Payer: MEDICARE

## 2024-10-25 NOTE — TELEPHONE ENCOUNTER
----- Message from Med Assistant Zuniga sent at 10/25/2024 10:21 AM CDT -----  Regarding: FW: Appt  Contact: Pt  [phone]  Per JAM schedule with Tom for this one spot. Thanks  ----- Message -----  From: Codie Wilson  Sent: 10/24/2024  12:50 PM CDT  To: Cirilo Frey Staff  Subject: Appt                                             Pt is calling to state he has appt on 12/30 but states he has a spot in arm and states a doctor thinks he needs to be seen sooner please call

## 2024-10-29 ENCOUNTER — TELEPHONE (OUTPATIENT)
Dept: CARDIOLOGY | Facility: CLINIC | Age: 89
End: 2024-10-29
Payer: MEDICARE

## 2024-10-29 DIAGNOSIS — R06.02 SHORTNESS OF BREATH: ICD-10-CM

## 2024-10-29 DIAGNOSIS — I10 ESSENTIAL HYPERTENSION: Primary | ICD-10-CM

## 2024-10-29 DIAGNOSIS — I48.19 PERSISTENT ATRIAL FIBRILLATION: ICD-10-CM

## 2024-10-30 ENCOUNTER — OFFICE VISIT (OUTPATIENT)
Dept: CARDIOLOGY | Facility: CLINIC | Age: 89
End: 2024-10-30
Payer: MEDICARE

## 2024-10-30 VITALS
BODY MASS INDEX: 26.2 KG/M2 | WEIGHT: 183 LBS | HEIGHT: 70 IN | DIASTOLIC BLOOD PRESSURE: 70 MMHG | SYSTOLIC BLOOD PRESSURE: 123 MMHG | HEART RATE: 60 BPM

## 2024-10-30 DIAGNOSIS — I10 ESSENTIAL HYPERTENSION: ICD-10-CM

## 2024-10-30 DIAGNOSIS — E78.2 MIXED HYPERLIPIDEMIA: ICD-10-CM

## 2024-10-30 DIAGNOSIS — I48.19 PERSISTENT ATRIAL FIBRILLATION: ICD-10-CM

## 2024-10-30 DIAGNOSIS — N18.31 STAGE 3A CHRONIC KIDNEY DISEASE: ICD-10-CM

## 2024-10-30 DIAGNOSIS — R06.02 SHORTNESS OF BREATH: ICD-10-CM

## 2024-10-30 DIAGNOSIS — I35.0 NONRHEUMATIC AORTIC VALVE STENOSIS: Primary | ICD-10-CM

## 2024-10-30 PROCEDURE — 1159F MED LIST DOCD IN RCRD: CPT | Mod: HCNC,CPTII,S$GLB, | Performed by: INTERNAL MEDICINE

## 2024-10-30 PROCEDURE — 99999 PR PBB SHADOW E&M-EST. PATIENT-LVL IV: CPT | Mod: PBBFAC,HCNC,, | Performed by: INTERNAL MEDICINE

## 2024-10-30 PROCEDURE — 1126F AMNT PAIN NOTED NONE PRSNT: CPT | Mod: HCNC,CPTII,S$GLB, | Performed by: INTERNAL MEDICINE

## 2024-10-30 PROCEDURE — 99214 OFFICE O/P EST MOD 30 MIN: CPT | Mod: HCNC,S$GLB,, | Performed by: INTERNAL MEDICINE

## 2024-12-11 NOTE — PROGRESS NOTES
Subjective:   Chief Complaint: aortic Atherosclerosis, Hypertension, Hyperlipidemia (/), and Atrial Fibrillation  Last Clinic Visit: New Patient    History of Present Illness: Norm Mendoza is a 89 y.o. gentleman with moderate/severe aortic stenosis low-flow low gradient, chronic atrial fibrillation s/p watchman 2019, heart failure moderately reduced ejection fraction 45% June/2024, left bundle-branch block, hypertension, hyperlipidemia, who presents to follow-up with Cardiology.  He was most recently seen by general cardiology in 2019, has been seen by Dr. Crowder most recently with electrophysiology 1-2 years ago.  He has had ejection fraction which has dipped into the 40s back in 2019 when he had more issues with atrial fibrillation, ejection fraction normalized in 2021, and most recently dipped again.  Aortic stenosis was high moderate in 2021 with normal EF at that time, aortic valve area 1, peak velocity 3, mean gradient 30.  Most recently he was found to have aortic valve area 0.6, peak velocity 3.5, mean gradient 36, but in the setting of ejection fraction back into the 40s again along with left bundle-branch block.  He does have some intermittent dyspnea on exertion, last took Lasix approximately 1 month ago.  Denies any eddy orthopnea, but has had progressive dyspnea on exertion over the past 2-3 years.  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  Denies any issues with GI bleeding since Watchman procedure in 2019, and MALT is stable follows with oncology on a regular basis.    Dx:  Chronic Atrial fibrillation  -s/p watchman 2019  Low-flow low gradient severe aortic stenosis   Hypertension   Hyperlipidemia   Heart failure mildly reduced ejection fraction EF 40-45% 6/2024  Left bundle-branch block  GI bleed  2/2 [facility] 2019, treated with rituximab stable    Medications:  Outpatient Encounter Medications as of 10/30/2024   Medication Sig Dispense  Refill    amLODIPine (NORVASC) 2.5 MG tablet TAKE 1 TABLET(2.5 MG) BY MOUTH EVERY DAY 90 tablet 3    diclofenac sodium (VOLTAREN ARTHRITIS PAIN) 1 % Gel Apply 2 Grams to painful right hand joint 2-3x/day as needed 100 g 1    furosemide (LASIX) 20 MG tablet 1 tab up to once weekly as needed for fluid 30 tablet 0    irbesartan (AVAPRO) 150 MG tablet Take 1 tablet (150 mg total) by mouth once daily. 90 tablet 2    metoprolol succinate (TOPROL-XL) 25 MG 24 hr tablet TAKE 1/2 TABLET BY MOUTH DAILY 45 tablet 3    multivitamin (THERAGRAN) per tablet Take 1 tablet by mouth once daily.      albuterol (VENTOLIN HFA) 90 mcg/actuation inhaler Inhale 2 puffs into the lungs every 6 (six) hours as needed for Wheezing. Rescue 18 g 1    clobetasoL (TEMOVATE) 0.05 % external solution Pt to mix in 1 jar of cerave cream and apply to affected areas bid 50 mL 3    fluorouraciL (EFUDEX) 5 % cream Compound fluorouracil 5% + calcipotriene 0.005% cream. Apply a pea-sized amount to entire bilateral lateral cheekbones BID x 7 days. 30 g 0    mupirocin (BACTROBAN) 2 % ointment Apply to affected area 3 times daily 22 g 1    [] mupirocin (BACTROBAN) 2 % ointment Apply topically 3 (three) times daily. for 10 days 22 g 0    [DISCONTINUED] cefUROXime (CEFTIN) 500 MG tablet Take 1 tablet (500 mg total) by mouth 2 (two) times daily. for 7 days (Patient not taking: Reported on 10/30/2024) 14 tablet 0    [DISCONTINUED] psyllium husk (METAMUCIL ORAL) Take by mouth. (Patient not taking: Reported on 10/30/2024)       No facility-administered encounter medications on file as of 10/30/2024.     Social History:  Norm reports that he quit smoking about 54 years ago. His smoking use included cigarettes. He started smoking about 79 years ago. He has a 25 pack-year smoking history. He has never used smokeless tobacco. He reports current alcohol use of about 14.0 standard drinks of alcohol per week. He reports that he does not use drugs.    Objective:   BP  "123/70 (BP Location: Right arm, Patient Position: Sitting)   Pulse 60   Ht 5' 10" (1.778 m)   Wt 83 kg (182 lb 15.7 oz)   BMI 26.26 kg/m²     Physical Exam   Constitutional: He does not appear ill. No distress.   HENT:   Head: Normocephalic and atraumatic. Mouth/Throat: Mucous membranes are moist.   Cardiovascular: Normal rate, regular rhythm and normal pulses. Exam reveals no gallop and no friction rub.   Murmur (Systolic crescendo decrescendo murmur.) heard.  Pulmonary/Chest: Effort normal and breath sounds normal. No stridor. No respiratory distress. He has no wheezes. He has no rhonchi. He has no rales. He exhibits no tenderness.   Abdominal: Normal appearance.   Musculoskeletal:      Right lower leg: No edema.      Left lower leg: No edema.   Neurological: He is alert.   Skin: Skin is warm.      EKG:  My independent visualization of most recent EKG is atrial fibrillation/left bundle-branch block    TTE:  06/18/2024    Left Ventricle: The left ventricle is normal in size. Normal wall thickness. There is concentric remodeling. Global hypokinesis present. There is mildly reduced systolic function with a visually estimated ejection fraction of 40 - 45%. Biplane (2D) method of discs ejection fraction is 45%.    Right Ventricle: Normal right ventricular cavity size. Wall thickness is normal. Systolic function is normal.    Left Atrium: Left atrium is severely dilated.    Right Atrium: Right atrium is severely dilated.    Aortic Valve: The aortic valve is a trileaflet valve. There is severe  ( low flow low gradient) aortic valve sclerosis. Severely restricted motion. There is severe stenosis. Aortic valve area by VTI is 0.65 cm². Aortic valve peak velocity is 3.5 m/s. Mean gradient is 36 mmHg. Aortic Valve peak gradient is 49 mmHg. The dimensionless index is 0.16. There is mild aortic regurgitation.    Mitral Valve: There is mild bileaflet sclerosis. There is mild mitral annular calcification present. There is " mild regurgitation.    Tricuspid Valve: There is mild regurgitation.    Pulmonary Artery: There is mild to moderate pulmonary hypertension. The estimated pulmonary artery systolic pressure is 51 mmHg.    IVC/SVC: Elevated venous pressure at 15 mmHg.     10/18/2021   There is moderate aortic valve stenosis.  Aortic valve area is 1.03 cm2; peak velocity is 3.16 m/s; mean gradient is 30 mmHg.  The left ventricle is normal in size with mildly decreased systolic function.  The estimated ejection fraction is 45%.  Left ventricular diastolic dysfunction.  Moderate to severe left atrial enlargement.  Mild mitral regurgitation.  Mild tricuspid regurgitation.  There is abnormal septal wall motion consistent with left bundle branch block.  Mild right ventricular enlargement with normal right ventricular systolic function.  The quantitatively derived ejection fraction is 43%.  Mild aortic regurgitation.  The estimated PA systolic pressure is 39 mmHg.  Intermediate central venous pressure (8 mmHg).     Lipids:  Recent Labs   Lab 12/15/23  0906   LDL Cholesterol 131.4   HDL 55      Renal:  Recent Labs   Lab 07/01/24  0944   Creatinine 1.1   Potassium 4.6   CO2 24   BUN 25     Liver:  Recent Labs   Lab 07/01/24  0944   AST 20   ALT 19     Holter   10/15/2021   Persistent atrial fibrillation with ventricular rates varying between 41 and 136 bpm with an average of 75 bpm.     Assessment:     1. Nonrheumatic aortic valve stenosis    2. Shortness of breath    3. Stage 3a chronic kidney disease    4. Mixed hyperlipidemia    5. Essential hypertension    6. Persistent atrial fibrillation      Plan:   1. Nonrheumatic aortic valve stenosis (Primary)  Low-flow low gradient severe aortic stenosis by most recent echocardiogram, suspect some gradual dyspnea on exertion.  Still extremely functional for a 89-year-old gentleman, working 4 hours a day as an [employer] .  Will refer to Interventional Cardiology, determine whether or not they feel  his symptoms are related to aortic stenosis, I feel that they are, and if they think he is a candidate for TAVR, which I think he would be given his very high functional status decent renal function.  If so for both of these, then we can proceed with dobutamine stress echo if needed to obtain criteria for TAVR.  - Ambulatory referral/consult to Interventional Cardiology; Future    2. Shortness of breath  As above, progressive over the past 2 years, no eddy heart failure, but intermittent lightheadedness.  Suspect this may be related to his valve.  - Ambulatory referral/consult to Cardiology    3. Stage 3a chronic kidney disease  Stable    4. Mixed hyperlipidemia  Primary prevention continue current therapy    5. Essential hypertension  Well-controlled no changes    6. Persistent atrial fibrillation  Rate well-controlled s/p watchman    Follow up in 6 months    Jaxon Ambrocio MD [facility]

## 2025-01-06 ENCOUNTER — OFFICE VISIT (OUTPATIENT)
Dept: DERMATOLOGY | Facility: CLINIC | Age: OVER 89
End: 2025-01-06
Payer: MEDICARE

## 2025-01-06 DIAGNOSIS — D48.5 NEOPLASM OF UNCERTAIN BEHAVIOR OF SKIN: Primary | ICD-10-CM

## 2025-01-06 DIAGNOSIS — L57.0 AK (ACTINIC KERATOSIS): ICD-10-CM

## 2025-01-06 PROCEDURE — 17000 DESTRUCT PREMALG LESION: CPT | Mod: HCNC,XS,S$GLB, | Performed by: DERMATOLOGY

## 2025-01-06 PROCEDURE — 17003 DESTRUCT PREMALG LES 2-14: CPT | Mod: HCNC,S$GLB,, | Performed by: DERMATOLOGY

## 2025-01-06 PROCEDURE — 99499 UNLISTED E&M SERVICE: CPT | Mod: HCNC,S$GLB,, | Performed by: DERMATOLOGY

## 2025-01-06 PROCEDURE — 88305 TISSUE EXAM BY PATHOLOGIST: CPT | Mod: HCNC | Performed by: DERMATOLOGY

## 2025-01-06 PROCEDURE — 99999 PR PBB SHADOW E&M-EST. PATIENT-LVL III: CPT | Mod: PBBFAC,HCNC,, | Performed by: DERMATOLOGY

## 2025-01-06 PROCEDURE — 11102 TANGNTL BX SKIN SINGLE LES: CPT | Mod: HCNC,S$GLB,, | Performed by: DERMATOLOGY

## 2025-01-06 NOTE — PROGRESS NOTES
"Subjective:       Patient ID: Norm Mendoza is a 89 y.o. male.    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; 7/2019: s/p DCCE; 10/2019: s/p Watchman device/Was on Amiodarone transiently-now off; 2/2022-s/p 2nd  DCCV; Presently-Rate controlled and on ASA QD     Aortic Stenosis: "Severe" by last ECHO 6/2024; Cardiologist/Dr Ambrocio has referred him to Interventional Cards/Dr Brown-Reji    MALT Tumor/Stage I Lymphoma: Heme-Onc/Dr Harper/Dr YESSENIA Prather;  Completed Rituximab x4 on 3/7/19; Presently on conservative following    Etoh: + Glass of wine with dinner and a glass of bourbon after dinner; He states that he is not "dependant" on the alcohol and will cut out the bourbon for health maintenance purposes    History of Present Illness    CHIEF COMPLAINT:  Mr. Mendoza presents today for follow-up regarding back pain and heart issues.    LUMBAR SPINAL STENOSIS:  He has arthritis in the back, specifically lumbar spinal stenosis, causing pressure on nerve roots leading to back pain. The pain is relieved by sitting. He notes decreased strength in upper legs.    CARDIOVASCULAR:  He has an aortic valve condition with associated shortness of breath that is relieved by diuretics. He takes metoprolol, irbesartan, and amlodipine regularly, with furosemide as needed for fluid retention.    ENT:  He reports intermittent hearing changes which he attributes to earwax buildup.    DERMATOLOGY:  He underwent biopsy yesterday by dermatologist for suspected malignancy on arm following a traumatic injury to the area.      ROS:  ENT: +hearing loss  Respiratory: " +shortness of breath  Musculoskeletal: -joint pain, +back pain, +muscle weakness           Past Medical, Surgical, Social History: Please see as stated in Epic chart which has been reviewed.    Current Outpatient Medications   Medication Sig Dispense Refill    amLODIPine (NORVASC) 2.5 MG tablet TAKE 1 TABLET(2.5 MG) BY MOUTH EVERY DAY 90 tablet 3    clobetasoL (TEMOVATE) 0.05 % external solution Pt to mix in 1 jar of cerave cream and apply to affected areas bid 50 mL 3    diclofenac sodium (VOLTAREN ARTHRITIS PAIN) 1 % Gel Apply 2 Grams to painful right hand joint 2-3x/day as needed 100 g 1    fluorouraciL (EFUDEX) 5 % cream Compound fluorouracil 5% + calcipotriene 0.005% cream. Apply a pea-sized amount to entire bilateral lateral cheekbones BID x 7 days. 30 g 0    irbesartan (AVAPRO) 150 MG tablet Take 1 tablet (150 mg total) by mouth once daily. 90 tablet 2    metoprolol succinate (TOPROL-XL) 25 MG 24 hr tablet TAKE 1/2 TABLET BY MOUTH DAILY 45 tablet 3    multivitamin (THERAGRAN) per tablet Take 1 tablet by mouth once daily.      mupirocin (BACTROBAN) 2 % ointment Apply to affected area 3 times daily 22 g 1    albuterol (VENTOLIN HFA) 90 mcg/actuation inhaler Inhale 2 puffs into the lungs every 6 (six) hours as needed for Wheezing. Rescue 18 g 1    furosemide (LASIX) 20 MG tablet 1 tab 2 days/week for heart/blood pressure 30 tablet 0     No current facility-administered medications for this visit.       Review of Systems   HENT:          + Ear wax/Check on?ENT   Respiratory:  Positive for shortness of breath.         + SOB alleviated by lasix 20mg   Cardiovascular:  Positive for leg swelling. Negative for chest pain.   Musculoskeletal:  Positive for back pain.   Skin:  Positive for wound.        S/p Laceration Left Forearm       Objective:      Lab Results   Component Value Date    WBC 5.01 07/01/2024    HGB 13.7 (L) 07/01/2024    HCT 41.6 07/01/2024     07/01/2024    CHOL 202 (H) 12/15/2023    TRIG 78  "12/15/2023    HDL 55 12/15/2023    ALT 19 07/01/2024    AST 20 07/01/2024     07/01/2024    K 4.6 07/01/2024     07/01/2024    CREATININE 1.1 07/01/2024    BUN 25 07/01/2024    CO2 24 07/01/2024    TSH 2.234 07/01/2024    PSA 2.2 07/01/2024    INR 1.0 02/03/2022    HGBA1C 5.4 07/05/2024     Physical Exam  Vitals reviewed.   Constitutional:       Appearance: Normal appearance.   HENT:      Head: Normocephalic and atraumatic.      Ears:      Comments: +Right TM not visualized 2ndary to cerumen build up  Cardiovascular:      Rate and Rhythm: Normal rate. Rhythm irregular.   Pulmonary:      Breath sounds: Normal breath sounds.   Abdominal:      General: Bowel sounds are normal.      Palpations: Abdomen is soft. There is no mass.      Tenderness: There is no abdominal tenderness.   Musculoskeletal:      Right lower leg: No edema.      Left lower leg: No edema.      Comments: Trace if any pre-tibial edema   Neurological:      Mental Status: He is alert.   Psychiatric:         Mood and Affect: Mood normal.           Health Maintenance:  Last Colonoscopy:  2012-Negative    Last PSA: 7/2024-WNL          Vital Signs  Temp: 97.1 °F (36.2 °C)  Temp Source: Other (see comments)  Pulse: 81  Resp: 18  SpO2: (!) 93 %  BP: (!) 140/84  BP Location: Right arm  Patient Position: Sitting  Pain Score: 0-No pain  Height and Weight  Height: 5' 10" (177.8 cm)  Weight: 83.7 kg (184 lb 10.2 oz)  BSA (Calculated - sq m): 2.03 sq meters  BMI (Calculated): 26.5  Weight in (lb) to have BMI = 25: 173.9    Assessment:       1. Hypertensive heart disease with heart failure    2. Persistent atrial fibrillation    3. Aortic stenosis, severe    4. Dilated cardiomyopathy    5. Fluid retention in legs    6. Spinal stenosis of lumbar region with neurogenic claudication    7. MALT lymphoma    8. Impacted cerumen of right ear        Plan:     Health Maintenance   Topic Date Due    TETANUS VACCINE  01/19/2027    Lipid Panel  12/15/2028    " Shingles Vaccine  Completed    Influenza Vaccine  Completed    COVID-19 Vaccine  Completed    RSV Vaccine (Age 60+ and Pregnant patients)  Completed    Pneumococcal Vaccines (Age 50+)  Completed        Norm was seen today for follow-up, hyperlipidemia, hypertension and chronic kidney disease.    Diagnoses and all orders for this visit:    Hypertensive heart disease with heart failure/BP running a bit high        -     Add Lasix 20mg 2 days/week        -     Continue:Med Tx 1- Toprol XL 25mg at 1/2 tab daily, 2- Avapro 150mg QD, 3- Norvasc 2.5mg QD  -     Basic Metabolic Panel; Future  -     CBC Auto Differential; Future    Persistent atrial fibrillation  s/p Watchman procedure & rate controlled    Aortic stenosis, severe        -     Appt with Interventional Cardiology/Dr Stephanie Mendoza as scheduled    Dilated cardiomyopathy  -     furosemide (LASIX) 20 MG tablet; 1 tab 2 days/week for heart/blood pressure  -     BNP; Future    Fluid retention in legs  -     furosemide (LASIX) 20 MG tablet; 1 tab 2 days/week for heart/blood pressure    Spinal stenosis of lumbar region with neurogenic claudication  -     Ambulatory referral/consult to Pain Clinic; Future    MALT lymphoma    Impacted cerumen of right ear  -     Ambulatory referral/consult to ENT; Future    Health maintenance        -     return to clinic x6 months with one-week prior lab or see patient sooner if needed    Assessment & Plan    HEART DISEASE WITH HEART FAILURE:   Discussed the relationship between aortic valve dysfunction and fluid retention with the patient.   Educated about diuretic effects and rationale for use.   Modified furosemide (Lasix) 20 mg prescription: Instructed to take 1 tablet twice weekly (Wednesday and Saturday) regularly, regardless of perceived need, to prevent fluid buildup.   Continued current prescriptions: metoprolol 1/2 tablet daily, irbesartan daily, and amlodipine daily.   Advised to follow up with cardiologist as scheduled on  January 29th.   Noted patient's shortness of breath, which was alleviated by taking a diuretic.   Measured blood pressure, which was elevated at 160/80.   Explained that the valve issue is causing fluid buildup and affecting blood pressure.    ATRIAL FIBRILLATION:    3A CHRONIC KIDNEY DISEASE:   Ordered blood test to be performed before the patient leaves today.   Cautioned that excessive use of diuretics can put strain on the kidneys.    SPINAL STENOSIS:   Explained lumbar spinal stenosis and its effects on nerve roots and spinal cord.   Referred to pain management for potential steroid injections.    EXERCISE REGIMEN:   Recommend gradual increase in stationary bike exercise   Mr. Mendoza to start with 10-15 minutes, adding 5 minutes weekly, with a goal of 30-45 minutes 3 or more days/week    WAX REMOVAL:   Referred to ENT for earwax removal.    FOLLOW UP:   Schedule RTC x 4-5 months with prior lab           This note was generated with the assistance of ambient listening technology. Verbal consent was obtained by the patient and accompanying visitor(s) for the recording of patient appointment to facilitate this note. I attest to having reviewed and edited the generated note for accuracy, though some syntax or spelling errors may persist. Please contact the author of this note for any clarification.

## 2025-01-06 NOTE — PATIENT INSTRUCTIONS
Shave Biopsy Wound Care    Your doctor has performed a shave biopsy today.  A band aid and vaseline ointment has been placed over the site.  This should remain in place for NO LONGER THAN 48 hours.  It is fine to remove the bandaid after 24 hours, if the area is no longer bleeding. It is recommended that you keep the area dry (do not wet)) for the first 24 hours.  After 24 hours, wash the area with warm soap and water and apply Vaseline jelly.  Many patients prefer to use Neosporin or Bacitracin ointment.  This is acceptable; however, know that you can develop an allergy to this medication even if you have used it safely for years.  It is important to keep the area moist.  Letting it dry out and get air slows healing time, and will worsen the scar.        If you notice increasing redness, tenderness, pain, or yellow drainage at the biopsy site, please notify your doctor.  These are signs of an infection.    If your biopsy site is bleeding, apply firm pressure for 15 minutes straight.  Repeat for another 15 minutes, if it is still bleeding.   If the surgical site continues to bleed, then please contact your doctor.      For [facility] users:   You will receive your biopsy results in [facility] as soon as they are available. Please be assured that your physician/provider will review your results and will then determine what further treatment, evaluation, or planning is required. You should be contacted by your physician's/provider's office within 5 business days of receiving your results; If not, please reach out to directly. This is one more way [employer] is putting you first.     [facility] [place] [address]/ [phone] FAX/ [email or web address]         CRYOSURGERY      Your doctor has used a method called cryosurgery to treat your skin condition. Cryosurgery refers to the use of very cold substances to treat a variety of skin conditions such as warts, pre-skin cancers, molluscum  contagiosum, sun spots, and several benign growths. The substance we use in cryosurgery is liquid nitrogen and is so cold (-195 degrees Celsius) that is burns when administered.     Following treatment in the office, the skin may immediately burn and become red. You may find the area around the lesion is affected as well. It is sometimes necessary to treat not only the lesion, but a small area of the surrounding normal skin to achieve a good response.     A blister, and even a blood filled blister, may form after treatment.   This is a normal response. If the blister is painful, it is acceptable to sterilize a needle and with rubbing alcohol and gently pop the blister. It is important that you gently wash the area with soap and warm water as the blister fluid may contain wart virus if a wart was treated. Do no remove the roof of the blister.     The area treated can take anywhere from 1-3 weeks to heal. Healing time depends on the kind skin lesion treated, the location, and how aggressively the lesion was treated. It is recommended that the areas treated are covered with Vaseline or bacitracin ointment and a band-aid. If a band-aid is not practical, just ointment applied several times per day will do. Keeping these areas moist will speed the healing time.    Treatment with liquid nitrogen can leave a scar. In dark skin, it may be a light or dark scar, in light skin it may be a white or pink scar. These will generally fade with time, but may never go away completely.     If you have any concerns after your treatment, please feel free to call the office.       [facility] [place] [address]/ [phone] FAX/ [email or web address]

## 2025-01-06 NOTE — PROGRESS NOTES
Subjective:      Patient ID:  Norm Mendoza is a 89 y.o. male who presents for   Chief Complaint   Patient presents with    Follow-up     Efudex/dovonex     History of Present Illness: The patient presents for follow up of skin check.    The patient was last seen on: 9/30/2024 for cryosurgery to actinic keratoses which have resolved and efudex/dovonex tx to bilateral lateral cheekbones bid x 7 days.  This is a high risk patient here to check for the development of new lesions.    Other skin complaints:   Patient with new area of concern:   Location: L forearm. Pt states in oct 2024 he cut his arm and had 14 stitches. Wants are checked today.  Previous treatments: none          Review of Systems   Skin:  Positive for activity-related sunscreen use. Negative for daily sunscreen use, recent sunburn and wears hat.   Hematologic/Lymphatic: Bruises/bleeds easily.       Objective:   Physical Exam   Constitutional: He appears well-developed and well-nourished. No distress.   HENT:   Mouth/Throat: Lips normal.    Eyes: Lids are normal.    Neurological: He is alert and oriented to person, place, and time. He is not disoriented.   Psychiatric: He has a normal mood and affect.   Skin:   Areas Examined (abnormalities noted in diagram):   Head / Face Inspection Performed  Neck Inspection Performed  LUE Inspection Performed                 Diagram Legend     Erythematous scaling macule/papule c/w actinic keratosis       Vascular papule c/w angioma      Pigmented verrucoid papule/plaque c/w seborrheic keratosis      Yellow umbilicated papule c/w sebaceous hyperplasia      Irregularly shaped tan macule c/w lentigo     1-2 mm smooth white papules consistent with Milia      Movable subcutaneous cyst with punctum c/w epidermal inclusion cyst      Subcutaneous movable cyst c/w pilar cyst      Firm pink to brown papule c/w dermatofibroma      Pedunculated fleshy papule(s) c/w skin tag(s)      Evenly pigmented macule c/w  junctional nevus     Mildly variegated pigmented, slightly irregular-bordered macule c/w mildly atypical nevus      Flesh colored to evenly pigmented papule c/w intradermal nevus       Pink pearly papule/plaque c/w basal cell carcinoma      Erythematous hyperkeratotic cursted plaque c/w SCC      Surgical scar with no sign of skin cancer recurrence      Open and closed comedones      Inflammatory papules and pustules      Verrucoid papule consistent consistent with wart     Erythematous eczematous patches and plaques     Dystrophic onycholytic nail with subungual debris c/w onychomycosis     Umbilicated papule    Erythematous-base heme-crusted tan verrucoid plaque consistent with inflamed seborrheic keratosis     Erythematous Silvery Scaling Plaque c/w Psoriasis     See annotation            Assessment / Plan:      Pathology Orders:       Normal Orders This Visit    Specimen to Pathology, Dermatology     Questions:    Procedure Type: Dermatology and skin neoplasms    Number of Specimens: 1    ------------------------: -------------------------    Spec 1 Procedure: Biopsy    Spec 1 Clinical Impression: r/o scc, ka type    Spec 1 Source: left dorsal wrist    Release to patient: Immediate    Release to patient:           Neoplasm of uncertain behavior of skin  -     Specimen to Pathology, Dermatology    Shave biopsy procedure note:    Shave biopsy performed after verbal consent including risk of infection, scar, recurrence, need for additional treatment of site. Area prepped with alcohol, anesthetized with approximately 1.0cc of 1% lidocaine with epinephrine. Lesional tissue shaved with razor blade. Hemostasis achieved with application of aluminum chloride followed by hyfrecation. No complications. Dressing applied. Wound care explained.            AK (actinic keratosis)  Today's Plan:      Cryosurgery Procedure Note    Verbal consent from the patient is obtained including, but not limited to, risk of  hypopigmentation/hyperpigmentation, scar, recurrence of lesion. The patient is aware of the precancerous quality and need for treatment of these lesions. Liquid nitrogen cryosurgery is applied to the 8 actinic keratoses, as detailed in the physical exam, to produce a freeze injury. The patient is aware that blisters may form and is instructed on wound care with gentle cleansing and use of vaseline ointment to keep moist until healed. The patient is supplied a handout on cryosurgery and is instructed to call if lesions do not completely resolve.      No follow-ups on file.

## 2025-01-06 NOTE — ASSESSMENT & PLAN NOTE
Today's Plan:      Cryosurgery Procedure Note    Verbal consent from the patient is obtained including, but not limited to, risk of hypopigmentation/hyperpigmentation, scar, recurrence of lesion. The patient is aware of the precancerous quality and need for treatment of these lesions. Liquid nitrogen cryosurgery is applied to the 8 actinic keratoses, as detailed in the physical exam, to produce a freeze injury. The patient is aware that blisters may form and is instructed on wound care with gentle cleansing and use of vaseline ointment to keep moist until healed. The patient is supplied a handout on cryosurgery and is instructed to call if lesions do not completely resolve.

## 2025-01-07 ENCOUNTER — LAB VISIT (OUTPATIENT)
Dept: LAB | Facility: HOSPITAL | Age: OVER 89
End: 2025-01-07
Attending: INTERNAL MEDICINE
Payer: MEDICARE

## 2025-01-07 ENCOUNTER — OFFICE VISIT (OUTPATIENT)
Dept: INTERNAL MEDICINE | Facility: CLINIC | Age: OVER 89
End: 2025-01-07
Payer: MEDICARE

## 2025-01-07 VITALS
OXYGEN SATURATION: 93 % | DIASTOLIC BLOOD PRESSURE: 84 MMHG | RESPIRATION RATE: 18 BRPM | TEMPERATURE: 97 F | SYSTOLIC BLOOD PRESSURE: 140 MMHG | HEART RATE: 81 BPM | BODY MASS INDEX: 26.43 KG/M2 | HEIGHT: 70 IN | WEIGHT: 184.63 LBS

## 2025-01-07 DIAGNOSIS — M48.062 SPINAL STENOSIS OF LUMBAR REGION WITH NEUROGENIC CLAUDICATION: ICD-10-CM

## 2025-01-07 DIAGNOSIS — I48.19 PERSISTENT ATRIAL FIBRILLATION: ICD-10-CM

## 2025-01-07 DIAGNOSIS — I42.0 DILATED CARDIOMYOPATHY: ICD-10-CM

## 2025-01-07 DIAGNOSIS — H61.21 IMPACTED CERUMEN OF RIGHT EAR: ICD-10-CM

## 2025-01-07 DIAGNOSIS — I11.0 HYPERTENSIVE HEART DISEASE WITH HEART FAILURE: Primary | ICD-10-CM

## 2025-01-07 DIAGNOSIS — C88.40 MALT LYMPHOMA: ICD-10-CM

## 2025-01-07 DIAGNOSIS — R60.0 FLUID RETENTION IN LEGS: ICD-10-CM

## 2025-01-07 DIAGNOSIS — I11.0 HYPERTENSIVE HEART DISEASE WITH HEART FAILURE: ICD-10-CM

## 2025-01-07 DIAGNOSIS — I35.0 AORTIC STENOSIS, SEVERE: ICD-10-CM

## 2025-01-07 PROBLEM — N18.31 STAGE 3A CHRONIC KIDNEY DISEASE: Status: RESOLVED | Noted: 2024-01-08 | Resolved: 2025-01-07

## 2025-01-07 PROBLEM — I48.0 PAROXYSMAL ATRIAL FIBRILLATION: Status: RESOLVED | Noted: 2024-08-21 | Resolved: 2025-01-07

## 2025-01-07 PROBLEM — F10.20 ALCOHOL DEPENDENCE, DAILY USE: Status: RESOLVED | Noted: 2017-07-13 | Resolved: 2025-01-07

## 2025-01-07 LAB
ANION GAP SERPL CALC-SCNC: 10 MMOL/L (ref 8–16)
BASOPHILS # BLD AUTO: 0.07 K/UL (ref 0–0.2)
BASOPHILS NFR BLD: 1.3 % (ref 0–1.9)
BNP SERPL-MCNC: 331 PG/ML (ref 0–99)
BUN SERPL-MCNC: 24 MG/DL (ref 10–30)
CALCIUM SERPL-MCNC: 10 MG/DL (ref 8.7–10.5)
CHLORIDE SERPL-SCNC: 107 MMOL/L (ref 95–110)
CO2 SERPL-SCNC: 23 MMOL/L (ref 23–29)
CREAT SERPL-MCNC: 1 MG/DL (ref 0.5–1.4)
DIFFERENTIAL METHOD BLD: ABNORMAL
EOSINOPHIL # BLD AUTO: 0.1 K/UL (ref 0–0.5)
EOSINOPHIL NFR BLD: 1.4 % (ref 0–8)
ERYTHROCYTE [DISTWIDTH] IN BLOOD BY AUTOMATED COUNT: 14.6 % (ref 11.5–14.5)
EST. GFR  (NO RACE VARIABLE): >60 ML/MIN/1.73 M^2
GLUCOSE SERPL-MCNC: 104 MG/DL (ref 70–110)
HCT VFR BLD AUTO: 43.8 % (ref 40–54)
HGB BLD-MCNC: 14.5 G/DL (ref 14–18)
IMM GRANULOCYTES # BLD AUTO: 0.02 K/UL (ref 0–0.04)
IMM GRANULOCYTES NFR BLD AUTO: 0.4 % (ref 0–0.5)
LYMPHOCYTES # BLD AUTO: 0.7 K/UL (ref 1–4.8)
LYMPHOCYTES NFR BLD: 12.2 % (ref 18–48)
MCH RBC QN AUTO: 32.7 PG (ref 27–31)
MCHC RBC AUTO-ENTMCNC: 33.1 G/DL (ref 32–36)
MCV RBC AUTO: 99 FL (ref 82–98)
MONOCYTES # BLD AUTO: 0.6 K/UL (ref 0.3–1)
MONOCYTES NFR BLD: 10.2 % (ref 4–15)
NEUTROPHILS # BLD AUTO: 4.2 K/UL (ref 1.8–7.7)
NEUTROPHILS NFR BLD: 74.5 % (ref 38–73)
NRBC BLD-RTO: 0 /100 WBC
PLATELET # BLD AUTO: 212 K/UL (ref 150–450)
PMV BLD AUTO: 9.9 FL (ref 9.2–12.9)
POTASSIUM SERPL-SCNC: 4.6 MMOL/L (ref 3.5–5.1)
RBC # BLD AUTO: 4.44 M/UL (ref 4.6–6.2)
SODIUM SERPL-SCNC: 140 MMOL/L (ref 136–145)
WBC # BLD AUTO: 5.57 K/UL (ref 3.9–12.7)

## 2025-01-07 PROCEDURE — 83880 ASSAY OF NATRIURETIC PEPTIDE: CPT | Mod: HCNC | Performed by: INTERNAL MEDICINE

## 2025-01-07 PROCEDURE — 80048 BASIC METABOLIC PNL TOTAL CA: CPT | Mod: HCNC | Performed by: INTERNAL MEDICINE

## 2025-01-07 PROCEDURE — 36415 COLL VENOUS BLD VENIPUNCTURE: CPT | Mod: HCNC,PO | Performed by: INTERNAL MEDICINE

## 2025-01-07 PROCEDURE — 99999 PR PBB SHADOW E&M-EST. PATIENT-LVL V: CPT | Mod: PBBFAC,HCNC,, | Performed by: INTERNAL MEDICINE

## 2025-01-07 PROCEDURE — 85025 COMPLETE CBC W/AUTO DIFF WBC: CPT | Mod: HCNC | Performed by: INTERNAL MEDICINE

## 2025-01-07 RX ORDER — FUROSEMIDE 20 MG/1
TABLET ORAL
Qty: 30 TABLET | Refills: 0 | Status: SHIPPED | OUTPATIENT
Start: 2025-01-07

## 2025-01-08 LAB
FINAL PATHOLOGIC DIAGNOSIS: NORMAL
GROSS: NORMAL
Lab: NORMAL
MICROSCOPIC EXAM: NORMAL

## 2025-01-09 ENCOUNTER — TELEPHONE (OUTPATIENT)
Dept: INTERNAL MEDICINE | Facility: CLINIC | Age: OVER 89
End: 2025-01-09
Payer: MEDICARE

## 2025-01-09 NOTE — PROGRESS NOTES
1. Skin, left dorsal wrist, shave biopsy:   - KERATOACANTHOMA-TYPE SQUAMOUS CELL CARCINOMA      Recommend patient to Dr. Yung for Mohs surgery consultation. Picture in chart.     General derm team: Sent patient for Mohs surgery. F/u with me in 6 months.

## 2025-01-09 NOTE — TELEPHONE ENCOUNTER
Ada-would you call Mr Mendoza and let him know that his lab looked ok but the BNP was 331 consistent with some mild heart failure.  He needs to make sure and take the Lasix 20mg tab 2 days/week as we had discussed.  Thanks

## 2025-01-15 ENCOUNTER — TELEPHONE (OUTPATIENT)
Dept: PAIN MEDICINE | Facility: CLINIC | Age: OVER 89
End: 2025-01-15

## 2025-01-15 ENCOUNTER — OFFICE VISIT (OUTPATIENT)
Dept: PAIN MEDICINE | Facility: CLINIC | Age: OVER 89
End: 2025-01-15
Payer: MEDICARE

## 2025-01-15 VITALS — SYSTOLIC BLOOD PRESSURE: 139 MMHG | HEART RATE: 79 BPM | DIASTOLIC BLOOD PRESSURE: 79 MMHG

## 2025-01-15 DIAGNOSIS — M47.9 OSTEOARTHRITIS OF SPINE, UNSPECIFIED SPINAL OSTEOARTHRITIS COMPLICATION STATUS, UNSPECIFIED SPINAL REGION: Primary | ICD-10-CM

## 2025-01-15 DIAGNOSIS — M47.816 LUMBAR SPONDYLOSIS: ICD-10-CM

## 2025-01-15 DIAGNOSIS — M47.816 LUMBAR SPONDYLOSIS: Primary | ICD-10-CM

## 2025-01-15 DIAGNOSIS — M48.062 SPINAL STENOSIS OF LUMBAR REGION WITH NEUROGENIC CLAUDICATION: ICD-10-CM

## 2025-01-15 PROCEDURE — 99204 OFFICE O/P NEW MOD 45 MIN: CPT | Mod: S$GLB,,, | Performed by: EMERGENCY MEDICINE

## 2025-01-15 PROCEDURE — 3288F FALL RISK ASSESSMENT DOCD: CPT | Mod: CPTII,S$GLB,, | Performed by: EMERGENCY MEDICINE

## 2025-01-15 PROCEDURE — 1159F MED LIST DOCD IN RCRD: CPT | Mod: CPTII,S$GLB,, | Performed by: EMERGENCY MEDICINE

## 2025-01-15 PROCEDURE — 1125F AMNT PAIN NOTED PAIN PRSNT: CPT | Mod: CPTII,S$GLB,, | Performed by: EMERGENCY MEDICINE

## 2025-01-15 PROCEDURE — 1101F PT FALLS ASSESS-DOCD LE1/YR: CPT | Mod: CPTII,S$GLB,, | Performed by: EMERGENCY MEDICINE

## 2025-01-15 PROCEDURE — G2211 COMPLEX E/M VISIT ADD ON: HCPCS | Mod: S$GLB,,, | Performed by: EMERGENCY MEDICINE

## 2025-01-15 PROCEDURE — 99999 PR PBB SHADOW E&M-EST. PATIENT-LVL III: CPT | Mod: PBBFAC,,, | Performed by: EMERGENCY MEDICINE

## 2025-01-15 NOTE — PROGRESS NOTES
Interventional Pain Management - New Patient Visit    Chief Complaint   Patient presents with    Back Pain     Patient states that there's no pain. It feels like muscles are grabbing each other        Original HPI 01/15/2025: Norm Mendoza  presents to the clinic for the evaluation of the above pain. The pain started 1 year     Original Pain Description:  The pain is located in the back and is axial in nature. The pain is described as aching. Exacerbating factors: Walking and Getting out of bed/chair. Mitigating factors massage. Symptoms interfere with sleeping. The patient feels like symptoms have been worsening. Patient reports no. The patient nonedenies myelopathic symptoms such as handwriting changes or difficulty with buttons/coins/keys. noneDenies perineal paresthesias, bowel/bladder dysfunction. He reports that the pain feels like a grabbing when he is walking. Pain improved with massage    PAIN SCORES:  Best: Pain is 0  Current: Pain is 1  Worst: Pain is 4    6 weeks of Conservative therapy:  PT: Not yet  Chiro:  HEP: Participating    Treatments / Medications:   Diclofenac gel    Antiplatelets/Anticoagulants:  Aspirin    Interventional Pain Procedures:   Na    IMAGING:    XR LUMBAR SPINE AP AND LAT WITH FLEX/EXT  09/26/2024  Dextroscoliosis centered at mid lumbar level.  Degenerative changes are noted.  Scattered stool and bowel gas.  Probable compression L2 superior endplate.  Hypertrophic degenerative change and facet arthropathy.  Disc space narrowing particularly at L1-2.  Bones are demineralized.  Vascular calcifications noted.  Retrolisthesis L1 on L2 and L2 on L3.  Few mm anterolisthesis T12 on L1.  Alignment is anatomic at the T12-L1 level with extension.  Retro listhesis persist at L1-2 and L2-3.    MRI LUMBAR SPINE WITHOUT CONTRAST    06/28/2024   T12-L1: CDB.  BL facet joint arthropathy.  Mod spinal canal stenosis. Mild right neural foraminal narrowing.  L1-L2: Significant CDB. BL facet  joint arthropathy. Severe spinal canal stenosis. Mild right / severe left neural foraminal narrowing.  L2-L3: Significant CDB. BL facet joint arthropathy. Mod spinal canal stenosis. Mod right / severe left neural foraminal narrowing.  L3-L4: Significant CDB. BL facet joint arthropathy. Severe spinal canal stenosis. Severe BL neural foraminal narrowing.  Modic 1  L4-L5: Significant CDB. BL facet joint arthropathy. Mod spinal canal stenosis. Severe right / mod left neural foraminal narrowing.  L5-S1: CDB. BL facet joint arthropathy.  Mild spinal canal stenosis. Severe BL neural foraminal narrowing.  Modic 1     Past Surgical History:   Procedure Laterality Date    ACHILLES TENDON SURGERY      CATARACT EXTRACTION W/  INTRAOCULAR LENS IMPLANT Bilateral 2013    CLOSURE OF LEFT ATRIAL APPENDAGE USING DEVICE N/A 10/11/2019    Procedure: Left atrial appendage closure device;  Surgeon: Hi Crowder MD;  Location: [facility] EP LAB;  Service: Cardiology;  Laterality: N/A;  AF, JACINTO, Watchman Implant, BSci, Gen, MB, 3 Prep    CYSTOSCOPY      bladder tumor    ESOPHAGOGASTRODUODENOSCOPY N/A 12/7/2018    Procedure: EGD (ESOPHAGOGASTRODUODENOSCOPY);  Surgeon: Austin Garcia MD;  Location: [facility] (2ND FLR);  Service: Endoscopy;  Laterality: N/A;    ESOPHAGOGASTRODUODENOSCOPY N/A 4/12/2019    Procedure: EGD (ESOPHAGOGASTRODUODENOSCOPY);  Surgeon: Austin Garcia MD;  Location: [facility] (4TH FLR);  Service: Endoscopy;  Laterality: N/A;  please schedule within 4 weeks    ESOPHAGOGASTRODUODENOSCOPY N/A 5/27/2021    Procedure: EGD (ESOPHAGOGASTRODUODENOSCOPY);  Surgeon: Austin Garcia MD;  Location: [facility] (2ND FLR);  Service: Endoscopy;  Laterality: N/A;  per Dr. Garcia done within the month with any provider/ ordered by oncology  2nd floor due to comorbidities  Watchman device  COVID test at [facility] on 5/24-GT    SKIN CANCER EXCISION      TREATMENT OF CARDIAC ARRHYTHMIA N/A 7/12/2019    Procedure: Cardioversion or  Defibrillation;  Surgeon: Hi Crowder MD;  Location: [facility] EP LAB;  Service: Cardiology;  Laterality: N/A;  AF, JACINTO, DCCV, MAC, MB, 3 Prep    TREATMENT OF CARDIAC ARRHYTHMIA N/A 2022    Procedure: Cardioversion or Defibrillation;  Surgeon: Susan Orozco NP;  Location: [facility] EP LAB;  Service: Cardiology;  Laterality: N/A;  afib, DCCV, anes, MB, 3 Prep       Social History     Socioeconomic History    Marital status:     Number of children: 3   Occupational History    Occupation: Financial Work/     Employer: retired    Occupation: actor    Occupation:    Tobacco Use    Smoking status: Former     Current packs/day: 0.00     Average packs/day: 1 pack/day for 25.0 years (25.0 ttl pk-yrs)     Types: Cigarettes     Start date: 9/3/1945     Quit date: 9/3/1970     Years since quittin.4    Smokeless tobacco: Never   Substance and Sexual Activity    Alcohol use: Yes     Alcohol/week: 14.0 standard drinks of alcohol     Types: 14 Shots of liquor per week     Comment: 2 Drinks daily    Drug use: No    Sexual activity: Yes     Partners: Female   Social History Narrative    He is  with 2/3 kids living(2 sons/1 daughter who passed away); He has 6 Grandkids(5 under 10 y/o); He has 1 son here in [place] /1 grandson at [facility]; His other son(3 kids) lives in [place]; His Grand-daughter(Her Mother passed away) with her 2 kids lives in [place]    He had worked for [employer] which has dissolved but now works for [employer]    2025    He continues to work 4 hours/day as an [employer]     Interesting Family History: Pt's grandfather was in the Civil War     Social Drivers of Health     Financial Resource Strain: Low Risk  (2024)    Overall Financial Resource Strain (CARDIA)     Difficulty of Paying Living Expenses: Not hard at all   Food Insecurity: No Food Insecurity (2024)    Hunger Vital Sign     Worried About Running Out of Food in the Last Year:  Never true     Ran Out of Food in the Last Year: Never true   Transportation Needs: No Transportation Needs (2/1/2024)    PRAPARE - Transportation     Lack of Transportation (Medical): No     Lack of Transportation (Non-Medical): No   Physical Activity: Inactive (9/30/2024)    Exercise Vital Sign     Days of Exercise per Week: 0 days     Minutes of Exercise per Session: 0 min   Stress: No Stress Concern Present (2/1/2024)    [employer] - Occupational Stress Questionnaire     Feeling of Stress : Not at all   Housing Stability: Low Risk  (2/1/2024)    Housing Stability Vital Sign     Unable to Pay for Housing in the Last Year: No     Number of Places Lived in the Last Year: 1     Unstable Housing in the Last Year: No       Medications/Allergies: See med card    ROS:  GENERAL: No fever. No chills. No fatigue. Denies weight loss. Denies weight gain.  Back / musculoskeletal / neuro : See HPI    VITALS:   Vitals:    01/15/25 0923   BP: 139/79   Pulse: 79   PainSc: 10-Worst pain ever   PainLoc: Back     There is no height or weight on file to calculate BMI.       No data to display                PHYSICAL EXAM:   GENERAL: Well appearing, in no acute distress, alert and oriented x3.  PSYCH:  Mood and affect appropriate.  SKIN: Skin color, texture, turgor normal, no rashes or lesions.  HEENT:  Normocephalic, atraumatic. Cranial nerves grossly intact.  NECK: No pain to palpation over the cervical paraspinous muscles. No pain to palpation over facets. No pain with neck flexion, extension, or lateral flexion.   PULM: No evidence of respiratory difficulty, symmetric chest rise.  GI:  Non-distended  BACK: Normal range of motion. No pain to palpation over the spinous processes.  Pain with axial loading bilaterally There is no pain with palpation over the sacroiliac joints bilaterally.   EXTREMITIES: No deformities, edema, or skin discoloration.   MUSCULOSKELETAL: Shoulder, hip, and knee provocative  maneuvers are negative. No atrophy is noted.  NEURO: Sensation is equal and appropriate bilaterally. Bilateral upper and lower extremity strength is normal and symmetric. Bilateral upper and lower extremity coordination and muscle stretch reflexes are physiologic and symmetric. Plantar response are downgoing. Straight leg raising in the supine position is negative to radicular pain.   GAIT: normal.      LABS:    Lab Results   Component Value Date    HGBA1C 5.4 07/05/2024       Lab Results   Component Value Date    CREATININE 1.0 01/07/2025         ASSESSMENT: 89 y.o. year old male with pain, consistent with:    Encounter Diagnosis   Name Primary?    Spinal stenosis of lumbar region with neurogenic claudication        DISCUSSION: Norm Mendoza is a very active patient who comes to us with chronic lower back pain that is mostly axial secondary to lumbar spondylosis and spinal stenosis.      PLAN:  - I have stressed the importance of physical activity and a home exercise plan to help with pain and improve health.  - Patient can continue with medications for now since they are providing benefits, using them appropriately, and without side effects.  - Counseled patient regarding the importance of activity modification and constant sleeping habits.  - Interventions: Schedule for a Diagnostic/Therapeutic Medial branch block at bilateral L3, L4, and L5 to help with axial low back pain and progress with a home exercise program.. Explained the risks and benefits of the procedure in detail with the patient today in clinic along with alternative treatment options, and the patient elected to pursue the intervention at this time.    - Anticoagulation use: No no anticoagulation  - If no benefit with above procedure, consider Schedule for a Lumbar Epidural Steroid Injection at L3-4 to help with pain and progress with a home exercise program..   - Medications:  Patient declined at this time  - Therapy:  After procedure  -  Imaging: Reviewed available imaging with patient and answered any questions they had regarding study.  - The patient's pathophysiology was explained in detail with reference to x-rays, models, other visual aids as appropriate.   - Follow up visit: return to clinic in 3-4 weeks after procedure      I would like to thank Amber Jenkins MD for the opportunity to assist in the care of this patient. We had a very nice visit and I look forward to continuing their care. Please let me know if I can be of further assistance.     Vik Jurado MD  01/15/2025

## 2025-01-15 NOTE — TELEPHONE ENCOUNTER
----- Message from Vik Jurado MD sent at 1/15/2025  9:44 AM CST -----  Regarding: Order for LUIS M COLLAZO    Patient Name: LUIS M COLLAZO([id number])  Sex: Male  : 1926      PCP: NILTON RODRIGUEZ    Center: [facility]     Types of orders made on 01/15/2025: Outpatient Referral, Procedure Request    Order Date:1/15/2025  Ordering User:VIK JURADO [[id number]]  Encounter Provider:Vik Jurado MD [[id number]]  Authorizing Provider: Vik Jurado MD [[id number]]  Department:[facility][[id number]]    Common Order Information  Procedure -> Medial Branch Block (Specify level and laterality) Cmt: L3,4,5 BL    Order Specific Information  Order: Procedure Order to Pain Management [Custom: [id number]]  Order #:          [id number]: 1 FUTURE    Priority: Routine  Class: Clinic Performed    Future Order Information      Expires on:01/15/2026            Expected by:01/15/2025                   Associated Diagnoses      M47.9 Osteoarthritis of spine, unspecified spinal osteoarthritis       complication status, unspecified spinal region      M47.816 Lumbar spondylosis      Physician -> jesi         Facility Name: -> [facility]           Priority: Routine  Class: Clinic Performed    Future Order Information      Expires on:01/15/2026            Expected by:01/15/2025                   Associated Diagnoses      M47.9 Osteoarthritis of spine, unspecified spinal osteoarthritis       complication status, unspecified spinal region      M47.816 Lumbar spondylosis      Procedure -> Medial Branch Block (Specify level and laterality) Cmt: L3,4,5                 BL        Physician -> jesi         Facility Name: -> [facility]

## 2025-01-24 ENCOUNTER — TELEPHONE (OUTPATIENT)
Dept: PAIN MEDICINE | Facility: CLINIC | Age: OVER 89
End: 2025-01-24
Payer: MEDICARE

## 2025-01-27 ENCOUNTER — OFFICE VISIT (OUTPATIENT)
Dept: DERMATOLOGY | Facility: CLINIC | Age: OVER 89
End: 2025-01-27
Payer: MEDICARE

## 2025-01-27 ENCOUNTER — TELEPHONE (OUTPATIENT)
Dept: INTERNAL MEDICINE | Facility: CLINIC | Age: OVER 89
End: 2025-01-27
Payer: MEDICARE

## 2025-01-27 ENCOUNTER — OFFICE VISIT (OUTPATIENT)
Dept: INTERNAL MEDICINE | Facility: CLINIC | Age: OVER 89
End: 2025-01-27
Payer: MEDICARE

## 2025-01-27 VITALS
TEMPERATURE: 97 F | WEIGHT: 186.75 LBS | RESPIRATION RATE: 18 BRPM | HEART RATE: 74 BPM | SYSTOLIC BLOOD PRESSURE: 129 MMHG | OXYGEN SATURATION: 98 % | HEIGHT: 72 IN | DIASTOLIC BLOOD PRESSURE: 78 MMHG | BODY MASS INDEX: 25.29 KG/M2

## 2025-01-27 DIAGNOSIS — C44.89 OTHER SPECIFIED MALIGNANT NEOPLASM OF OVERLAPPING SITES OF SKIN: Primary | ICD-10-CM

## 2025-01-27 DIAGNOSIS — M25.562 LEFT MEDIAL KNEE PAIN: Primary | ICD-10-CM

## 2025-01-27 PROCEDURE — 99214 OFFICE O/P EST MOD 30 MIN: CPT | Mod: HCNC,S$GLB,,

## 2025-01-27 PROCEDURE — 1159F MED LIST DOCD IN RCRD: CPT | Mod: CPTII,S$GLB,, | Performed by: DERMATOLOGY

## 2025-01-27 PROCEDURE — 1160F RVW MEDS BY RX/DR IN RCRD: CPT | Mod: CPTII,S$GLB,, | Performed by: DERMATOLOGY

## 2025-01-27 PROCEDURE — 99999 PR PBB SHADOW E&M-EST. PATIENT-LVL IV: CPT | Mod: PBBFAC,HCNC,,

## 2025-01-27 PROCEDURE — G2211 COMPLEX E/M VISIT ADD ON: HCPCS | Mod: S$GLB,,, | Performed by: DERMATOLOGY

## 2025-01-27 PROCEDURE — 99214 OFFICE O/P EST MOD 30 MIN: CPT | Mod: S$GLB,,, | Performed by: DERMATOLOGY

## 2025-01-27 PROCEDURE — 99999 PR PBB SHADOW E&M-EST. PATIENT-LVL III: CPT | Mod: PBBFAC,,, | Performed by: DERMATOLOGY

## 2025-01-27 RX ORDER — IBUPROFEN 400 MG/1
400 TABLET ORAL 3 TIMES DAILY
Start: 2025-01-27 | End: 2025-02-01

## 2025-01-27 NOTE — PROGRESS NOTES
[facility]    SUBJECTIVE:      History of Present Illness    CHIEF COMPLAINT:  Mr. Mendoza presents today for left knee pain.    LEFT KNEE PAIN:  He reports left knee pain that started 10 days ago when getting out of bed. Pain is localized to the medial side of the knee and is worse with initial weight bearing but improves with continued walking. The pain significantly affects daily activities, including difficulty getting in and out of cars. He has tried ibuprofen with minimal relief. X-rays were completed in May 2023.    MUSCULOSKELETAL:  He reports riding stationary bike for 3-4 minutes daily for general mobility. MRI of spine was completed approximately 3 months ago.    SKIN CANCER:  He sustained an arm injury in October requiring 14 stitches. Following stitch removal, the area began to swell. 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 He is scheduled for surgical intervention with Dr. Yung in the second week of February.      ROS:  Musculoskeletal: +joint pain, +joint swelling           PAST HISTORY:     The patient's list of active medical history, family/social history, medications, and allergies as documented has been reviewed.     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    Dr Vel LANE (peptic ulcer disease)     GI Bleeding 11/2018: Anticoagulant D/S'd and Gastric MALT tumor Dx'd    SCC (squamous cell  carcinoma) 05/04/2023    L ventral forearm excised by APC    Skin cancer     x3, had mohs on nose    Squamous cell carcinoma excised 12/5/14    R lower back    Symptomatic anemia 12/06/2018    Urinary tract infection     x4       Past Surgical History:  Past Surgical History:   Procedure Laterality Date    ACHILLES TENDON SURGERY      CATARACT EXTRACTION W/  INTRAOCULAR LENS IMPLANT Bilateral 2013    CLOSURE OF LEFT ATRIAL APPENDAGE USING DEVICE N/A 10/11/2019    Procedure: Left atrial appendage closure device;  Surgeon: Hi Crowder MD;  Location: [facility] EP LAB;  Service: Cardiology;  Laterality: N/A;  AF, JACINTO, Watchman Implant, BSci, Gen, MB, 3 Prep    CYSTOSCOPY      bladder tumor    ESOPHAGOGASTRODUODENOSCOPY N/A 12/7/2018    Procedure: EGD (ESOPHAGOGASTRODUODENOSCOPY);  Surgeon: Austin Garcia MD;  Location: [facility] (2ND FLR);  Service: Endoscopy;  Laterality: N/A;    ESOPHAGOGASTRODUODENOSCOPY N/A 4/12/2019    Procedure: EGD (ESOPHAGOGASTRODUODENOSCOPY);  Surgeon: Austin Garcia MD;  Location: [facility] (4TH FLR);  Service: Endoscopy;  Laterality: N/A;  please schedule within 4 weeks    ESOPHAGOGASTRODUODENOSCOPY N/A 5/27/2021    Procedure: EGD (ESOPHAGOGASTRODUODENOSCOPY);  Surgeon: Austin Garcia MD;  Location: [facility] (2ND FLR);  Service: Endoscopy;  Laterality: N/A;  per Dr. Garcia done within the month with any provider/ ordered by oncology  2nd floor due to comorbidities  Watchman device  COVID test at [facility] on 5/24-GT    SKIN CANCER EXCISION      TREATMENT OF CARDIAC ARRHYTHMIA N/A 7/12/2019    Procedure: Cardioversion or Defibrillation;  Surgeon: Hi Crowder MD;  Location: [facility] EP LAB;  Service: Cardiology;  Laterality: N/A;  AF, JACINTO, DCCV, MAC, MB, 3 Prep    TREATMENT OF CARDIAC ARRHYTHMIA N/A 2/8/2022    Procedure: Cardioversion or Defibrillation;  Surgeon: Susan Orozco NP;  Location: [facility] EP LAB;  Service: Cardiology;  Laterality: N/A;  afib, DCCV, anes, MB, 3 Prep       Family  History:  family history includes Hypertension in his father; Stroke in his brother and father.     Social History:  Social History     Socioeconomic History    Marital status:     Number of children: 3   Occupational History    Occupation: Financial Work/     Employer: retired    Occupation: actor    Occupation:    Tobacco Use    Smoking status: Former     Current packs/day: 0.00     Average packs/day: 1 pack/day for 25.0 years (25.0 ttl pk-yrs)     Types: Cigarettes     Start date: 9/3/1945     Quit date: 9/3/1970     Years since quittin.4    Smokeless tobacco: Never   Substance and Sexual Activity    Alcohol use: Yes     Alcohol/week: 14.0 standard drinks of alcohol     Types: 14 Shots of liquor per week     Comment: 2 Drinks daily    Drug use: No    Sexual activity: Yes     Partners: Female   Social History Narrative    He is  with 2/3 kids living(2 sons/1 daughter who passed away); He has 6 Grandkids(5 under 10 y/o); He has 1 son here in [place] /1 grandson at [facility]; His other son(3 kids) lives in [place]; His Grand-daughter(Her Mother passed away) with her 2 kids lives in [place]    He had worked for [employer] which has dissolved but now works for [employer]    2025    He continues to work 4 hours/day as an [employer]     Interesting Family History: Pt's grandfather was in the Civil War     Social Drivers of Health     Financial Resource Strain: Low Risk  (2024)    Overall Financial Resource Strain (CARDIA)     Difficulty of Paying Living Expenses: Not hard at all   Food Insecurity: No Food Insecurity (2024)    Hunger Vital Sign     Worried About Running Out of Food in the Last Year: Never true     Ran Out of Food in the Last Year: Never true   Transportation Needs: No Transportation Needs (2024)    PRAPARE - Transportation     Lack of Transportation (Medical): No     Lack of Transportation (Non-Medical): No   Physical Activity:  Inactive (2024)    Exercise Vital Sign     Days of Exercise per Week: 0 days     Minutes of Exercise per Session: 0 min   Stress: No Stress Concern Present (2024)    [place] [employer] - Occupational Stress Questionnaire     Feeling of Stress : Not at all   Housing Stability: Low Risk  (2024)    Housing Stability Vital Sign     Unable to Pay for Housing in the Last Year: No     Number of Places Lived in the Last Year: 1     Unstable Housing in the Last Year: No       MEDICATIONS AND ALLERGIES:    Medications:  Outpatient Encounter Medications as of 2025   Medication Sig Note Dispense Refill    amLODIPine (NORVASC) 2.5 MG tablet TAKE 1 TABLET(2.5 MG) BY MOUTH EVERY DAY  90 tablet 3    diclofenac sodium (VOLTAREN ARTHRITIS PAIN) 1 % Gel Apply 2 Grams to painful right hand joint 2-3x/day as needed 2023:  g 1    fluorouraciL (EFUDEX) 5 % cream Compound fluorouracil 5% + calcipotriene 0.005% cream. Apply a pea-sized amount to entire bilateral lateral cheekbones BID x 7 days.  30 g 0    furosemide (LASIX) 20 MG tablet 1 tab 2 days/week for heart/blood pressure  30 tablet 0    irbesartan (AVAPRO) 150 MG tablet Take 1 tablet (150 mg total) by mouth once daily.  90 tablet 2    metoprolol succinate (TOPROL-XL) 25 MG 24 hr tablet TAKE 1/2 TABLET BY MOUTH DAILY  45 tablet 3    multivitamin (THERAGRAN) per tablet Take 1 tablet by mouth once daily.       albuterol (VENTOLIN HFA) 90 mcg/actuation inhaler Inhale 2 puffs into the lungs every 6 (six) hours as needed for Wheezing. Rescue  18 g 1    clobetasoL (TEMOVATE) 0.05 % external solution Pt to mix in 1 jar of cerave cream and apply to affected areas bid (Patient not taking: Reported on 2025) 2023: PRN 50 mL 3    [] ibuprofen (ADVIL,MOTRIN) 400 MG tablet Take 1 tablet (400 mg total) by mouth 3 (three) times daily. for 5 days       mupirocin (BACTROBAN) 2 % ointment Apply to affected area 3 times daily (Patient  not taking: Reported on 1/27/2025)  22 g 1     No facility-administered encounter medications on file as of 1/27/2025.       Allergies:  Review of patient's allergies indicates:  No Known Allergies    Health Maintenance:  Health Maintenance   Topic Date Due    TETANUS VACCINE  01/19/2027    Lipid Panel  12/15/2028    Shingles Vaccine  Completed    Influenza Vaccine  Completed    COVID-19 Vaccine  Completed    RSV Vaccine (Age 60+ and Pregnant patients)  Completed    Pneumococcal Vaccines (Age 50+)  Completed     Health Maintenance Topics with due status: Not Due       Topic Last Completion Date    TETANUS VACCINE 01/19/2017    Lipid Panel 12/15/2023       OBJECTIVE:      VITAL SIGNS    Vital Signs  Temp: 97.2 °F (36.2 °C)  Pulse: 74  Resp: 18  SpO2: 98 %  BP: 129/78  BP Location: Right arm  Patient Position: Sitting  Height and Weight  Height: 6' (182.9 cm)  Weight: 84.7 kg (186 lb 11.7 oz)  BSA (Calculated - sq m): 2.07 sq meters  BMI (Calculated): 25.3  Weight in (lb) to have BMI = 25: 183.9]    Physical Exam  Vitals reviewed.   Constitutional:       Appearance: Normal appearance.   HENT:      Head: Normocephalic and atraumatic.   Cardiovascular:      Rate and Rhythm: Normal rate.      Pulses: Normal pulses.   Pulmonary:      Effort: Pulmonary effort is normal.   Musculoskeletal:         General: Normal range of motion.      Right knee: Normal.      Left knee: Bony tenderness present. Tenderness present over the medial joint line.        Legs:    Neurological:      Mental Status: He is alert.          Laboratory:    CBC:  Recent Labs   Lab 01/05/24  1341 07/01/24  0944 01/07/25  1254   WBC 6.75 5.01 5.57   RBC 4.26 L 4.17 L 4.44 L   Hemoglobin 13.7 L 13.7 L 14.5   Hematocrit 42.0 41.6 43.8   Platelets 200 199 212   MCV 99 H 100 H 99 H   MCH 32.2 H 32.9 H 32.7 H   MCHC 32.6 32.9 33.1     CMP:  Recent Labs   Lab 12/15/23  0906 01/05/24  1341 07/01/24  0944 01/07/25  1254   Glucose 104 102 154 H 104   Calcium 9.6 9.3  9.6 10.0   Albumin 3.6 3.6 3.6  --    Total Protein 7.1 7.1 7.1  --    Sodium 140 141 139 140   Potassium 4.8 4.6 4.6 4.6   CO2 25 26 24 23   Chloride 109 109 106 107   BUN 27 22 25 24   Alkaline Phosphatase 75 83 75  --    ALT 23 23 19  --    AST 24 22 20  --    Total Bilirubin 0.4 0.6 0.6  --      URINALYSIS:       LIPIDS:  Recent Labs   Lab 12/15/23  0906 07/01/24  0944   TSH 2.528 2.234   HDL 55  --    Cholesterol 202 H  --    Triglycerides 78  --    LDL Cholesterol 131.4  --    HDL/Cholesterol Ratio 27.2  --    Non-HDL Cholesterol 147  --    Total Cholesterol/HDL Ratio 3.7  --      TSH:  Recent Labs   Lab 12/15/23  0906 07/01/24  0944   TSH 2.528 2.234     A1C:  Recent Labs   Lab 07/05/24  0846   Hemoglobin A1C 5.4       Radiology:          ASSESSMENT AND PLAN:     Norm Mendoza is a 89 y.o.male with:    Assessment & Plan    IMPRESSION:   Considered knee pain potentially due to arthritis, sprain, or strain of ligament based on patient's reported symptoms and physical exam findings   Ordered x-ray to rule out fracture and assess for possible arthritis   Implemented short-term pain management regimen with ibuprofen to reduce potential inflammation   Recommend conservative measures including rest, ice/heat therapy, compression, and elevation   Considering physical therapy pending x-ray results    SKIN CANCER:  1. Noted the patient's report of a 14-stitch cut on arm that led to swelling, which was later diagnosed as skin cancer.  2. Evaluated the skin cancer by surgeon Dr. Yung, who determined it was likely caused by trauma.  3. Assessed the operation as 'dicey' due to the size of the affected area.  4. Scheduled surgery for January 11th with Dr. Yung at Dermatology.  5. Considered potential complications with heart surgery due to skin cancer bacteria.    KNEE PAIN:  1. Evaluated the patient's knee pain, which started 10 days ago and is particularly sharp when bearing weight.  2. Performed physical exam,  revealing pain on the medial side of the knee with no swelling observed.  3. Reviewed previous x-rays from May 2023, which showed possible narrowing of the patellofemoral articulation and left suprapatellar effusion.  4. Suspected arthritis or a torn/broken structure, although no trauma was reported.  5. Ordered x-ray of the knee for the next day at 8:00 AM on the 5th floor.  6. Prescribed ibuprofen 400 mg orally 3 times daily for 5 days, to be taken with food to avoid stomach irritation.  7. Instructed the patient on RICE (Rest, Ice, Compression, Elevation) technique for knee care.  8. Advised to apply ice or rotate ice and heat on the affected knee.  9. Recommend performing range of motion exercises for the knee.  10. Considered physical therapy pending x-ray results.  11. Perform range of motion exercises for knee.    BACK ARTHRITIS:  1. Noted the patient's report of having some arthritis in the back.  2. Acknowledged that the patient visited a pain doctor who suggested an injection process, which the patient decided to postpone.  3. Confirmed no current treatment is being pursued for back pain.      FOLLOW UP:  1. Instructed the patient to contact the office regarding x-ray results, which are expected to be released to the patient's chart tomorrow.  2. Scheduled follow up in radiology department on 5th floor at 8:00 AM tomorrow for knee x-ray.          Left medial knee pain  -     Cancel: X-Ray Knee Complete 4 or More Views Left; Future; Expected date: 01/27/2025  -     ibuprofen (ADVIL,MOTRIN) 400 MG tablet; Take 1 tablet (400 mg total) by mouth 3 (three) times daily. for 5 days         Rest: Avoid activities that cause pain, and switch to activities that don't stress the shoulder.   Ice:  Apply ice or a cold pack to the shoulder for 10-20 minutes at a time, with a thin cloth between the ice and your skin   Heat: After 3 days of icing, apply heat to the shoulder with a heating pad, hot water bottle, or warm,  moist towel.   Compression:  Wrap the injured area with an elastic bandage to reduce swelling and provide support. Don't wrap it too tightly, as this can cause more swelling.   Elevation:  Keep the injured area raised above the level of your heart to reduce swelling and help drain fluid away from the injury   ROM exercises:  Gently move the shoulder joint through its full range of motion to prevent stiffness   Physical Therapy: as indicated to strengthen weak muscle and improve ROM      -Continue current medications and maintain follow up with specialists.  Return to clinic in Follow up in about 4 weeks (around 2/24/2025), or if symptoms worsen or fail to improve.          Susanna Vital, MSN, APRN, FNP-C  [facility]    This note was generated with the assistance of ambient listening technology. Verbal consent was obtained by the patient and accompanying visitor(s) for the recording of patient appointment to facilitate this note. I attest to having reviewed and edited the generated note for accuracy, though some syntax or spelling errors may persist. Please contact the author of this note for any clarification.        I spent 33 minutes on the day of this encounter for preparing for, evaluating, treating, and managing this patient.    This includes face-to-face time and non face-to-face time and includes the following:  --preparing to see the patient (eg, obtaining and/or reviewing old records such as, when applicable, primary care notes, specialist notes, hospital notes, review of laboratory tests, and/or radiographic and/or cardiology or other studies)  --performing a medically appropriate review of systems and examination and/or evaluation  --reviewing and independently interpreting results (not separately reported; eg, lab results) and communicating results to the patient and/or family/caregiver  --placing orders and/or reviewing other physician's orders which can both include  medications, laboratory studies, radiographic studies, procedures, referrals etcetera and   --counseling and educating the patient and/or family member/caregiver regarding the treatment plan  --documentation of the visit in the electronic health record  --communicating with other health care providers regarding referrals, studies, follow-up, etc        23-Apr-2023 02:51

## 2025-01-27 NOTE — PATIENT INSTRUCTIONS
Rest: Avoid activities that cause pain, and switch to activities that don't stress the shoulder.   Ice:  Apply ice or a cold pack to the shoulder for 10-20 minutes at a time, with a thin cloth between the ice and your skin   Heat: After 3 days of icing, apply heat to the joint with a heating pad, hot water bottle, or warm, moist towel.   Compression:  Wrap the injured area with an elastic bandage to reduce swelling and provide support. Don't wrap it too tightly, as this can cause more swelling.   Elevation:  Keep the injured area raised above the level of your heart to reduce swelling and help drain fluid away from the injury   ROM exercises:  Gently move the shoulder joint through its full range of motion to prevent stiffness   Physical Therapy: as indicated to strengthen weak muscle and improve ROM

## 2025-01-27 NOTE — TELEPHONE ENCOUNTER
----- Message from Elli sent at 1/27/2025  8:48 AM CST -----  Contact: The pt  [phone]  [email or web address]     Patient is calling for Medical Advice regarding:Pain in left knee     How long has patient had these symptoms: about a week     Pharmacy name and phone#:    Patient wants a call back     Comments:    Please advise patient replies from provider may take up to 48 hours.

## 2025-01-27 NOTE — PATIENT INSTRUCTIONS
PREOPERATIVE INSTRUCTIONS            You are scheduled to have Mohs surgery to remove a squamous cell carcinoma from your left wrist in our office using local anesthesia, the same type used for your biopsy. Dr. Lion Yung will be your surgeon and pathologist for the procedure.       Dr. Yung will make a round surgical incision to remove the tissue, also known as the first stage of Mohs surgery. Your tissue will be processed in our lab located in the office by our histotechnician. This process can take up to 45 minutes to complete. Once processing of your tissue is complete, Dr. Yung will examine your tissue under a microscope to see if clear margins have been obtained. If surgical margins are not clear, we will repeat the process of taking tissue, only where cancer is present. Dr. Yung will continue to remove the cancer bit by bit, piece by piece, until clear margins are obtained. He examines 100% of the margins to ensure he gets a clear margin.           Once clear margins are obtained, you will have a wound, or defect, where the skin cancer was located. Dr. Yung will discuss repair options with you and help you decide which repair you would like him to proceed with. Under certain circumstances, the option of letting the surgical wound heal on its own (without stitches) may be a better solution. Other times, discussion of a referral to an outside surgeon, such as a plastic surgeon, may be necessary.        With Dr. Yung's repairs, stitches will be applied that you will need to return to the clinic and have them removed 1-2 weeks later, depending on the location of the surgical site. Whether you have stitches or not, your surgical wound will require care following the procedure; instructions will be discussed, and you will be given a written copy following your surgery.       Please shower and shampoo before the procedure, as your wound and dressing site need to remain dry 24-48 hours after the surgery.        Please arrive on time so that our staff will have ample time for your preoperative preparation.       Children are not allowed in the procedure room. Unfortunately, we cannot provide childcare during clinic.        Do not apply makeup or hairspray if surgery is to be done on the face or scalp.       If you are taking a blood thinner (Aspirin, Coumadin, Plavix, Eliquis, Xarelto), you are NOT required to stop the medication prior to the procedure.     Do not consume alcohol 48 hours before or after surgery.     If you are sick, especially with fever, please call the office to reschedule your procedure.       ** PLEASE ADVISE THE DOCTOR OR MEDICAL STAFF IF YOU HAVE A PACEMAKER OR DEFIBRILLATOR OR ANY OTHER IMPLANTED MEDICAL DEVICE. **       You may be asked to abstain from certain physical activities that will put any stretch or strain on the area, to not pop your stitches. Please plan accordingly; patients who exercise or perform physical activity at work (bending, lifting) may have to take it easy until your sutures have been removed. We can provide a note for your employer, if necessary.       Please inform Dr. Yung and/or medical staff if you have plans to travel. Dr. Yung prefers patients not to fly on an airplane with stitches in place.       The greatest risk for postoperative bleeding is within the first 24-48 hours following your procedure.       Please call with any questions or concerns: [phone]. After hours, please call: [phone]          Please arrive for your surgery at: [address] [place] [address]       **When you arrive, please check in at Registration. Once you are checked in, please have a seat in the waiting area outside of [address] and we will be with you shortly after.

## 2025-01-27 NOTE — PROGRESS NOTES
"REFERRING PROVIDER:  Shante Kerr M.D.    CHIEF COMPLAINT:  Established patient being consulted for Mohs' surgery evaluation.    HISTORY OF PRESENT ILLNESS:  89 y.o. male presents with a SCC, KA-type with about 5 month(s) history of growth on the L dorsal wrist. He said this lesion showed up as a result of an injury to his L forearm which required sutures. He developed a "lump" and was told it was suture granuloma. After the lesion was persistent and kept growing and not healing, so he consulted with Dr. Kerr and she biopsied the lesion to confirm skin cancer.    Negative for scabbing.  Positive for crusting.  Positive for bleeding.  Positive for itching.    Biopsy consistent with squamous cell carcinoma.     No prior treatment.    Pacemaker: No  Defibrillator: No  Artificial joints: No  Artificial heart valves: No    PAST MEDICAL HISTORY:  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    Dr Vel LANE (peptic ulcer disease)     GI Bleeding 11/2018: Anticoagulant D/S'd and Gastric MALT tumor Dx'd    SCC (squamous cell carcinoma) 05/04/2023    L ventral forearm excised by APC    Skin cancer     x3, had mohs on nose    Squamous cell carcinoma excised 12/5/14    R lower back    Symptomatic anemia 12/06/2018    Urinary tract infection     x4       PAST SURGICAL HISTORY:    Past Surgical History:   Procedure Laterality Date    ACHILLES TENDON SURGERY      CATARACT EXTRACTION W/  INTRAOCULAR " LENS IMPLANT Bilateral 2013    CLOSURE OF LEFT ATRIAL APPENDAGE USING DEVICE N/A 10/11/2019    Procedure: Left atrial appendage closure device;  Surgeon: Hi Crowder MD;  Location: [facility] EP LAB;  Service: Cardiology;  Laterality: N/A;  AF, JACINTO, Watchman Implant, BSci, Gen, MB, 3 Prep    CYSTOSCOPY      bladder tumor    ESOPHAGOGASTRODUODENOSCOPY N/A 12/7/2018    Procedure: EGD (ESOPHAGOGASTRODUODENOSCOPY);  Surgeon: Austin Garcia MD;  Location: [facility] (2ND FLR);  Service: Endoscopy;  Laterality: N/A;    ESOPHAGOGASTRODUODENOSCOPY N/A 4/12/2019    Procedure: EGD (ESOPHAGOGASTRODUODENOSCOPY);  Surgeon: Austin Garcia MD;  Location: [facility] (4TH FLR);  Service: Endoscopy;  Laterality: N/A;  please schedule within 4 weeks    ESOPHAGOGASTRODUODENOSCOPY N/A 5/27/2021    Procedure: EGD (ESOPHAGOGASTRODUODENOSCOPY);  Surgeon: Austin Garcia MD;  Location: [facility] (2ND FLR);  Service: Endoscopy;  Laterality: N/A;  per Dr. Garcia done within the month with any provider/ ordered by oncology  2nd floor due to comorbidities  Watchman device  COVID test at [facility] on 5/24-GT    SKIN CANCER EXCISION      TREATMENT OF CARDIAC ARRHYTHMIA N/A 7/12/2019    Procedure: Cardioversion or Defibrillation;  Surgeon: Hi Crowder MD;  Location: [facility] EP LAB;  Service: Cardiology;  Laterality: N/A;  AF, JACINTO, DCCV, MAC, MB, 3 Prep    TREATMENT OF CARDIAC ARRHYTHMIA N/A 2/8/2022    Procedure: Cardioversion or Defibrillation;  Surgeon: Susan Orozco NP;  Location: [facility] EP LAB;  Service: Cardiology;  Laterality: N/A;  afib, DCCV, anes, MB, 3 Prep        SOCIAL HISTORY:  Dependencies:  former smoker    PERTINENT MEDICATIONS:  See medications list.      ALLERGIES:  Patient has no known allergies.    ROS:  Skin: See HPI  Constitutional: No fatigue, fever, malaise, weight loss, or night sweats.  Cardiovascular: No chest pain, palpitations, or edema.  Respiratory: No coughing, wheezing, SOB, or sputum production.    PE:  Physical  Exam    General: Mood and affect normal. Alert and orient X3. Normal appearance.  Scalp: no suspicious lesions  Eyelids: no suspicious lesions  Head/Face: no suspicious lesions  Lips: no suspicious lesions   Neck: no suspicious lesions  RUE: no suspicious lesions  LUE: 3.5 x 1.2 cm erythematous plaque on the L wrist dorsum  Lymph nodes: Bilateral pre-auricular, post-auricular, anterior cervical, posterior cervical, sublingual, submental, and occipital are not enlarged. No axillary anticubital lymphadenopathy.    IMPRESSION:  Biopsy proven  squamous cell carcinoma, KA-type  on the L dorsal wrist.    PLAN:  The diagnosis and the pathology report were discussed in detail with the patient. Treatment options were reviewed, including Mohs Micrographic Surgery, radiation, topical therapy, and standard excision.  After careful review of patient's history and physical exam, and after discussion of treatment options, the decision was made to perform Mohs micrographic surgery.    Scheduled patient for Mohs Micrographic Surgery. Risks, benefits, and alternatives of Mohs' surgery discussed with the patient. Discussed repair options including complex closure, skin flap, skin graft and second intention healing with the patient. Pre-operative instructions provided to the patient.    Pt is scheduled for Mohs surgery on 02/11/25.    IMAGES

## 2025-01-27 NOTE — TELEPHONE ENCOUNTER
Spoke to pt and he c/o left knee pain and didn't do anything to injure himself. He states it is making it difficult to walk. I offered him an apt with adarsh for today and he agreed. Apt scheduled

## 2025-01-28 ENCOUNTER — HOSPITAL ENCOUNTER (OUTPATIENT)
Dept: RADIOLOGY | Facility: HOSPITAL | Age: OVER 89
Discharge: HOME | End: 2025-01-28
Payer: MEDICARE

## 2025-01-28 DIAGNOSIS — M25.562 LEFT MEDIAL KNEE PAIN: ICD-10-CM

## 2025-01-28 PROCEDURE — 73562 X-RAY EXAM OF KNEE 3: CPT | Mod: TC,PO,LT

## 2025-01-28 PROCEDURE — 73562 X-RAY EXAM OF KNEE 3: CPT | Mod: 26,LT,, | Performed by: RADIOLOGY

## 2025-01-28 PROCEDURE — 73560 X-RAY EXAM OF KNEE 1 OR 2: CPT | Mod: 26,59,RT, | Performed by: RADIOLOGY

## 2025-01-29 ENCOUNTER — OFFICE VISIT (OUTPATIENT)
Dept: CARDIOLOGY | Facility: CLINIC | Age: OVER 89
End: 2025-01-29
Payer: MEDICARE

## 2025-01-29 VITALS
WEIGHT: 188.5 LBS | HEART RATE: 112 BPM | BODY MASS INDEX: 25.53 KG/M2 | OXYGEN SATURATION: 98 % | SYSTOLIC BLOOD PRESSURE: 176 MMHG | HEIGHT: 72 IN | DIASTOLIC BLOOD PRESSURE: 85 MMHG

## 2025-01-29 DIAGNOSIS — I51.89 LEFT VENTRICULAR DIASTOLIC DYSFUNCTION WITH PRESERVED SYSTOLIC FUNCTION: ICD-10-CM

## 2025-01-29 DIAGNOSIS — I10 ESSENTIAL HYPERTENSION: ICD-10-CM

## 2025-01-29 DIAGNOSIS — Z95.818 PRESENCE OF WATCHMAN LEFT ATRIAL APPENDAGE CLOSURE DEVICE: ICD-10-CM

## 2025-01-29 DIAGNOSIS — I35.0 NONRHEUMATIC AORTIC VALVE STENOSIS: Primary | ICD-10-CM

## 2025-01-29 DIAGNOSIS — E78.2 MIXED HYPERLIPIDEMIA: ICD-10-CM

## 2025-01-29 DIAGNOSIS — I35.0 NONRHEUMATIC AORTIC VALVE STENOSIS: ICD-10-CM

## 2025-01-29 DIAGNOSIS — I48.19 PERSISTENT ATRIAL FIBRILLATION: ICD-10-CM

## 2025-01-29 DIAGNOSIS — I70.0 AORTIC ATHEROSCLEROSIS: Primary | ICD-10-CM

## 2025-01-29 PROCEDURE — 3288F FALL RISK ASSESSMENT DOCD: CPT | Mod: HCNC,CPTII,S$GLB, | Performed by: INTERNAL MEDICINE

## 2025-01-29 PROCEDURE — 1126F AMNT PAIN NOTED NONE PRSNT: CPT | Mod: HCNC,CPTII,S$GLB, | Performed by: INTERNAL MEDICINE

## 2025-01-29 PROCEDURE — 1101F PT FALLS ASSESS-DOCD LE1/YR: CPT | Mod: HCNC,CPTII,S$GLB, | Performed by: INTERNAL MEDICINE

## 2025-01-29 PROCEDURE — 99999 PR PBB SHADOW E&M-EST. PATIENT-LVL III: CPT | Mod: PBBFAC,,, | Performed by: INTERNAL MEDICINE

## 2025-01-29 PROCEDURE — 1159F MED LIST DOCD IN RCRD: CPT | Mod: HCNC,CPTII,S$GLB, | Performed by: INTERNAL MEDICINE

## 2025-01-29 PROCEDURE — 99205 OFFICE O/P NEW HI 60 MIN: CPT | Mod: HCNC,S$GLB,, | Performed by: INTERNAL MEDICINE

## 2025-01-29 NOTE — ASSESSMENT & PLAN NOTE
Norm Mendoza is a 89 y.o. male referred by Dr Ambrocio for evaluation of severe AS (NYHA Class II sx).     The patient has undergone the following TAVR work-up:   ECHO (Date 06/18/24): SUJEY= 0.65 cm2, MG= 36mmHg, Peak Eric= 3.5 m/s, DI=0.16 EF= 40-45%. Suspect severe given DI, but pending DSE     C (Date ): clear proximal vessels with Cta/DSE, if any question will proceed with [facility]   STS: 6.5%   Frailty: not frail  Iliacs are > on R and >  on L pending    LVOT area by CTA is  cm2 ( mm X  mm) and Avg Diameter is  per my independent review of images on [employer]. Pending   Incidental findings on CT: pending   CT Surgery risk assessment:  risk, per   due to needs  Rhythm issues: AF, IVCD  PFTs: N/A  KCCQ/5 meter walk: done  Comorbidities: age, AF, CHF      Norm Mendoza is a  mm   valve candidate via  access. Pending CT    Plan:  -he is pending skin lesion removal in early Februrary, recommend proceeding with that  -complete TAVR workup with CT, DSE and CTS then RTC in 2 months

## 2025-01-29 NOTE — PROGRESS NOTES
Referring provider: Dr. Jaxon Ambrocio     Patient ID:  Norm Mendoza is a 89 y.o. y.o. male who presents for evaluation No chief complaint on file.    Norm Mendoza is a 89 y.o. male referred by Dr Ambrocio for evaluation of severe AS (NYHA Class II sx).     He has a history of MALT, AF (watchman), HTN and HLD. Over the past 6 months to a year he has noted increasing DUGAN, especially when going up to the stairs to his house. He denies angina, and has been maintained on weekly lasix since being diagnosed with CHF several years ago. He is very functional at baseline, and lives alone. He is currently working as an [employer] . No other acute events.     The patient has undergone the following TAVR work-up:   ECHO (Date 06/18/24): SUJEY= 0.65 cm2, MG= 36mmHg, Peak Eric= 3.5 m/s, DI=0.16 EF= 40-45%. Suspect severe given DI, but pending DSE     [facility] (Date ): clear proximal vessels with Cta/DSE, if any question will proceed with [facility]   STS: 6.5%   Frailty: not frail  Iliacs are > on R and >  on L pending    LVOT area by CTA is  cm2 ( mm X  mm) and Avg Diameter is  per my independent review of images on [employer]. Pending   Incidental findings on CT: pending   CT Surgery risk assessment:  risk, per   due to needs  Rhythm issues: AF, IVCD  PFTs: N/A  KCCQ/5 meter walk: done  Comorbidities: age, AF, CHF      Norm Mendoza is a  mm   valve candidate via  access. Pending CT      Review of Systems   Respiratory:  Positive for shortness of breath.    All other systems reviewed and are negative.     Objective:     BP (!) 176/85 (BP Location: Right arm, Patient Position: Sitting) Comment: pt wants to sit first  Pulse (!) 112   Ht 6' (1.829 m)   Wt 85.5 kg (188 lb 7.9 oz)   SpO2 98%   BMI 25.56 kg/m²     Physical Exam  Vitals and nursing note reviewed.   Constitutional:       Appearance: Normal appearance.   HENT:      Head: Normocephalic and atraumatic.      Right Ear: External ear normal.      Left Ear:  External ear normal.      Nose: Nose normal.      Mouth/Throat:      Mouth: Mucous membranes are moist.   Eyes:      Pupils: Pupils are equal, round, and reactive to light.   Cardiovascular:      Rate and Rhythm: Normal rate and regular rhythm.      Pulses: Normal pulses.      Heart sounds: Murmur heard.   Pulmonary:      Effort: Pulmonary effort is normal.   Abdominal:      General: Abdomen is flat.   Musculoskeletal:         General: Normal range of motion.      Cervical back: Normal range of motion.   Skin:     General: Skin is warm.      Capillary Refill: Capillary refill takes less than 2 seconds.   Neurological:      General: No focal deficit present.      Mental Status: He is alert and oriented to person, place, and time. Mental status is at baseline.     Labs:     Lab Results   Component Value Date     01/07/2025    K 4.6 01/07/2025     01/07/2025    CO2 23 01/07/2025    BUN 24 01/07/2025    CREATININE 1.0 01/07/2025    ANIONGAP 10 01/07/2025     Lab Results   Component Value Date    HGBA1C 5.4 07/05/2024     Lab Results   Component Value Date     (H) 01/07/2025     (H) 12/15/2023     (H) 04/17/2019       Lab Results   Component Value Date    WBC 5.57 01/07/2025    HGB 14.5 01/07/2025    HCT 43.8 01/07/2025     01/07/2025    GRAN 4.2 01/07/2025    GRAN 74.5 (H) 01/07/2025     Lab Results   Component Value Date    CHOL 202 (H) 12/15/2023    HDL 55 12/15/2023    LDLCALC 131.4 12/15/2023    TRIG 78 12/15/2023       Meds:     Current Outpatient Medications:     albuterol (VENTOLIN HFA) 90 mcg/actuation inhaler, Inhale 2 puffs into the lungs every 6 (six) hours as needed for Wheezing. Rescue, Disp: 18 g, Rfl: 1    amLODIPine (NORVASC) 2.5 MG tablet, TAKE 1 TABLET(2.5 MG) BY MOUTH EVERY DAY, Disp: 90 tablet, Rfl: 3    diclofenac sodium (VOLTAREN ARTHRITIS PAIN) 1 % Gel, Apply 2 Grams to painful right hand joint 2-3x/day as needed, Disp: 100 g, Rfl: 1    fluorouraciL (EFUDEX)  5 % cream, Compound fluorouracil 5% + calcipotriene 0.005% cream. Apply a pea-sized amount to entire bilateral lateral cheekbones BID x 7 days., Disp: 30 g, Rfl: 0    furosemide (LASIX) 20 MG tablet, 1 tab 2 days/week for heart/blood pressure, Disp: 30 tablet, Rfl: 0    ibuprofen (ADVIL,MOTRIN) 400 MG tablet, Take 1 tablet (400 mg total) by mouth 3 (three) times daily. for 5 days, Disp: , Rfl:     irbesartan (AVAPRO) 150 MG tablet, Take 1 tablet (150 mg total) by mouth once daily., Disp: 90 tablet, Rfl: 2    metoprolol succinate (TOPROL-XL) 25 MG 24 hr tablet, TAKE 1/2 TABLET BY MOUTH DAILY, Disp: 45 tablet, Rfl: 3    multivitamin (THERAGRAN) per tablet, Take 1 tablet by mouth once daily., Disp: , Rfl:     clobetasoL (TEMOVATE) 0.05 % external solution, Pt to mix in 1 jar of cerave cream and apply to affected areas bid (Patient not taking: Reported on 1/29/2025), Disp: 50 mL, Rfl: 3    mupirocin (BACTROBAN) 2 % ointment, Apply to affected area 3 times daily (Patient not taking: Reported on 1/29/2025), Disp: 22 g, Rfl: 1      Assessment & Plan:     Aortic atherosclerosis  -BP control    Essential hypertension  -elevated in clinic but typically well controlled, continue current regimen     Left ventricular diastolic dysfunction with preserved systolic function  -continue weekly diuresis and bp control     Atrial fibrillation  -s/p watchman  -continue metoprolol     Presence of Watchman left atrial appendage closure device  -stable     Nonrheumatic aortic valve stenosis  Norm Mendoza is a 89 y.o. male referred by Dr Ambrocio for evaluation of severe AS (NYHA Class II sx).     The patient has undergone the following TAVR work-up:   ECHO (Date 06/18/24): SUJEY= 0.65 cm2, MG= 36mmHg, Peak Eric= 3.5 m/s, DI=0.16 EF= 40-45%. Suspect severe given DI, but pending DSE     [facility] (Date ): clear proximal vessels with Cta/DSE, if any question will proceed with C   STS: 6.5%   Frailty: not frail  Iliacs are > on R and >  on L  pending    LVOT area by CTA is  cm2 ( mm X  mm) and Avg Diameter is  per my independent review of images on [employer]. Pending   Incidental findings on CT: pending   CT Surgery risk assessment:  risk, per Dr  due to needs  Rhythm issues: AF, IVCD  PFTs: N/A  KCCQ/5 meter walk: done  Comorbidities: age, AF, CHF      Norm Mendoza is a  mm   valve candidate via  access. Pending CT    Plan:  -he is pending skin lesion removal in early Februrary, recommend proceeding with that  -complete TAVR workup with CT, DSE and CTS then RTC in 2 months         Bakari Prather MD   Interventional Cardiology

## 2025-01-30 ENCOUNTER — HOSPITAL ENCOUNTER (OUTPATIENT)
Dept: RADIOLOGY | Facility: HOSPITAL | Age: OVER 89
Discharge: HOME | End: 2025-01-30
Attending: INTERNAL MEDICINE
Payer: MEDICARE

## 2025-01-30 DIAGNOSIS — I35.0 NONRHEUMATIC AORTIC VALVE STENOSIS: ICD-10-CM

## 2025-01-30 PROCEDURE — 74174 CTA ABD&PLVS W/CONTRAST: CPT | Mod: TC,HCNC

## 2025-01-30 PROCEDURE — 25500020 PHARM REV CODE 255: Mod: HCNC | Performed by: INTERNAL MEDICINE

## 2025-01-30 PROCEDURE — 74174 CTA ABD&PLVS W/CONTRAST: CPT | Mod: 26,HCNC,, | Performed by: RADIOLOGY

## 2025-01-30 PROCEDURE — 71275 CT ANGIOGRAPHY CHEST: CPT | Mod: 26,HCNC,, | Performed by: RADIOLOGY

## 2025-01-30 RX ADMIN — IOHEXOL 100 ML: 350 INJECTION, SOLUTION INTRAVENOUS at 08:01

## 2025-01-31 ENCOUNTER — HOSPITAL ENCOUNTER (OUTPATIENT)
Dept: CARDIOLOGY | Facility: HOSPITAL | Age: OVER 89
Discharge: HOME | End: 2025-01-31
Attending: INTERNAL MEDICINE
Payer: MEDICARE

## 2025-01-31 VITALS
RESPIRATION RATE: 18 BRPM | HEART RATE: 88 BPM | HEIGHT: 72 IN | DIASTOLIC BLOOD PRESSURE: 82 MMHG | BODY MASS INDEX: 24.38 KG/M2 | SYSTOLIC BLOOD PRESSURE: 144 MMHG | WEIGHT: 180 LBS

## 2025-01-31 DIAGNOSIS — I35.0 NONRHEUMATIC AORTIC VALVE STENOSIS: ICD-10-CM

## 2025-01-31 LAB
ASCENDING AORTA: 3.53 CM
AV AREA BY CONTINUOUS VTI: 0.9 CM2
AV INDEX (PROSTH): 0.18
AV LVOT MEAN GRADIENT: 2 MMHG
AV LVOT MEAN GRADIENT: 2 MMHG
AV LVOT PEAK GRADIENT: 3 MMHG
AV LVOT PEAK GRADIENT: 3 MMHG
AV MEAN GRADIENT: 54 MMHG
AV PEAK GRADIENT: 88 MMHG
AV REGURGITATION PRESSURE HALF TIME: 535 MS
AV VALVE AREA BY VELOCITY RATIO: 0.9 CM²
AV VALVE AREA: 0.9 CM²
AV VELOCITY RATIO: 0.17
BSA FOR ECHO PROCEDURE: 2.04 M2
CV ECHO LV RWT: 0.45 CM
CV STRESS BASE HR: 88 BPM
DIASTOLIC BLOOD PRESSURE: 82 MMHG
DOP CALC AO PEAK VEL: 4.7 M/S
DOP CALC AO VTI: 89.5 CM
DOP CALC LVOT AREA: 5.3 CM2
DOP CALC LVOT DIAMETER: 2.6 CM
DOP CALC LVOT PEAK VEL: 0.8 M/S
DOP CALC LVOT STROKE VOLUME: 84.9 CM3
DOP CALCLVOT PEAK VEL VTI: 16 CM
E WAVE DECELERATION TIME: 197 MS
E/A RATIO: 2.5
E/E' RATIO: 14 M/S
ECHO EF ESTIMATED: 45 %
ECHO LV POSTERIOR WALL: 1.1 CM (ref 0.6–1.1)
EJECTION FRACTION: 43 %
FRACTIONAL SHORTENING: 22.4 % (ref 28–44)
INTERVENTRICULAR SEPTUM: 1.3 CM (ref 0.6–1.1)
IVC DIAMETER: 2.66 CM
IVRT: 100 MS
LA MAJOR: 6.2 CM
LA MINOR: 6.5 CM
LA WIDTH: 4.8 CM
LEFT ATRIUM SIZE: 4.6 CM
LEFT ATRIUM VOLUME INDEX: 58 ML/M2
LEFT ATRIUM VOLUME: 119 CM3
LEFT INTERNAL DIMENSION IN SYSTOLE: 3.8 CM (ref 2.1–4)
LEFT VENTRICLE DIASTOLIC VOLUME INDEX: 55.98 ML/M2
LEFT VENTRICLE DIASTOLIC VOLUME: 114.19 ML
LEFT VENTRICLE MASS INDEX: 111 G/M2
LEFT VENTRICLE SYSTOLIC VOLUME INDEX: 30.7 ML/M2
LEFT VENTRICLE SYSTOLIC VOLUME: 62.56 ML
LEFT VENTRICULAR INTERNAL DIMENSION IN DIASTOLE: 4.9 CM (ref 3.5–6)
LEFT VENTRICULAR MASS: 226.4 G
LV LATERAL E/E' RATIO: 10.6
LV SEPTAL E/E' RATIO: 19
MV PEAK A VEL: 0.38 M/S
MV PEAK E VEL: 0.95 M/S
OHS CV CPX 1 MINUTE RECOVERY HEART RATE: 107 BPM
OHS CV CPX 85 PERCENT MAX PREDICTED HEART RATE MALE: 104
OHS CV CPX MAX PREDICTED HEART RATE: 122
OHS CV CPX PATIENT IS FEMALE: 0
OHS CV CPX PATIENT IS MALE: 1
OHS CV CPX PEAK DIASTOLIC BLOOD PRESSURE: 57 MMHG
OHS CV CPX PEAK HEAR RATE: 108 BPM
OHS CV CPX PEAK RATE PRESSURE PRODUCT: ABNORMAL
OHS CV CPX PEAK SYSTOLIC BLOOD PRESSURE: 119 MMHG
OHS CV CPX PERCENT MAX PREDICTED HEART RATE ACHIEVED: 89
OHS CV CPX RATE PRESSURE PRODUCT PRESENTING: ABNORMAL
OHS CV RV/LV RATIO: 0.92 CM
PISA TR MAX VEL: 3.5 M/S
POST STRESS EJECTION FRACTION: 48 %
RA MAJOR: 6.65 CM
RA PRESSURE ESTIMATED: 15 MMHG
RA WIDTH: 4.64 CM
RIGHT VENTRICLE DIASTOLIC BASEL DIMENSION: 4.5 CM
RV TB RVSP: 19 MMHG
RV TISSUE DOPPLER FREE WALL SYSTOLIC VELOCITY 1 (APICAL 4 CHAMBER VIEW): 7.66 CM/S
SINUS: 3.69 CM
STJ: 2.84 CM
SYSTOLIC BLOOD PRESSURE: 144 MMHG
TDI LATERAL: 0.09 M/S
TDI SEPTAL: 0.05 M/S
TDI: 0.07 M/S
TR MAX PG: 49 MMHG
TRICUSPID ANNULAR PLANE SYSTOLIC EXCURSION: 1.73 CM
TV PEAK GRADIENT: 50 MMHG
TV REST PULMONARY ARTERY PRESSURE: 64 MMHG
Z-SCORE OF LEFT VENTRICULAR DIMENSION IN END DIASTOLE: -2.16
Z-SCORE OF LEFT VENTRICULAR DIMENSION IN END SYSTOLE: 0.15

## 2025-01-31 PROCEDURE — 93351 STRESS TTE COMPLETE: CPT | Mod: HCNC

## 2025-01-31 PROCEDURE — 63600175 PHARM REV CODE 636 W HCPCS: Mod: HCNC | Performed by: INTERNAL MEDICINE

## 2025-01-31 PROCEDURE — 93351 STRESS TTE COMPLETE: CPT | Mod: 26,HCNC,, | Performed by: INTERNAL MEDICINE

## 2025-01-31 RX ORDER — DOBUTAMINE HYDROCHLORIDE 400 MG/100ML
15 INJECTION, SOLUTION INTRAVENOUS
Status: COMPLETED | OUTPATIENT
Start: 2025-01-31 | End: 2025-01-31

## 2025-01-31 RX ADMIN — DOBUTAMINE 15 MCG/KG/MIN: 12.5 INJECTION, SOLUTION, CONCENTRATE INTRAVENOUS at 03:01

## 2025-01-31 NOTE — NURSING NOTE
Patient verified by 2 identifiers and allergies reviewed.  22 g IV placed to Lt FA.  DSE Partner Protocol explained to patient, consent obtained & testing completed.  Pt tolerated testing well. IV removed post testing.  Post study discharge instructions reviewed with patient, patient verbalized understanding.  Patient discharged to home in stable condition.

## 2025-02-04 ENCOUNTER — TELEPHONE (OUTPATIENT)
Dept: INTERNAL MEDICINE | Facility: CLINIC | Age: OVER 89
End: 2025-02-04
Payer: MEDICARE

## 2025-02-04 DIAGNOSIS — M25.562 LEFT MEDIAL KNEE PAIN: Primary | ICD-10-CM

## 2025-02-04 NOTE — TELEPHONE ENCOUNTER
----- Message from Lisa sent at 2/3/2025  8:56 AM CST -----  .1MEDICALADVICE     Patient is calling for Medical Advice regarding: pt would like a call back in reference to the medication results from his last appt on Jan 27 as well as an x-ray. Please call to advise      How long has patient had these symptoms:    Pharmacy name and phone#:    Patient wants a call back or thru [facility]:    Comments:    Please advise patient replies from provider may take up to 48 hours.

## 2025-02-04 NOTE — TELEPHONE ENCOUNTER
Spoke to pt and he is asking for xray results  He took the Advil for 5 days and the pain is no better

## 2025-02-07 ENCOUNTER — PATIENT MESSAGE (OUTPATIENT)
Dept: INTERNAL MEDICINE | Facility: CLINIC | Age: OVER 89
End: 2025-02-07
Payer: MEDICARE

## 2025-02-11 ENCOUNTER — TELEPHONE (OUTPATIENT)
Dept: INTERNAL MEDICINE | Facility: CLINIC | Age: OVER 89
End: 2025-02-11
Payer: MEDICARE

## 2025-02-11 ENCOUNTER — PROCEDURE VISIT (OUTPATIENT)
Dept: DERMATOLOGY | Facility: CLINIC | Age: OVER 89
End: 2025-02-11
Payer: MEDICARE

## 2025-02-11 DIAGNOSIS — I11.0 HYPERTENSIVE HEART DISEASE WITH HEART FAILURE: ICD-10-CM

## 2025-02-11 DIAGNOSIS — I42.0 DILATED CARDIOMYOPATHY: Primary | ICD-10-CM

## 2025-02-11 DIAGNOSIS — C44.89 OTHER SPECIFIED MALIGNANT NEOPLASM OF OVERLAPPING SITES OF SKIN: Primary | ICD-10-CM

## 2025-02-11 PROCEDURE — 17313 MOHS 1 STAGE T/A/L: CPT | Mod: 51,S$GLB,, | Performed by: DERMATOLOGY

## 2025-02-11 PROCEDURE — 17315 MOHS SURG ADDL BLOCK: CPT | Mod: S$GLB,,, | Performed by: DERMATOLOGY

## 2025-02-11 PROCEDURE — 15220 FTH/GFT FR S/A/L 20 SQ CM/<: CPT | Mod: S$GLB,,, | Performed by: DERMATOLOGY

## 2025-02-11 PROCEDURE — 12032 INTMD RPR S/A/T/EXT 2.6-7.5: CPT | Mod: 51,59,S$GLB, | Performed by: DERMATOLOGY

## 2025-02-11 RX ORDER — DOXYCYCLINE 100 MG/1
100 CAPSULE ORAL EVERY 12 HOURS
Qty: 28 CAPSULE | Refills: 1 | Status: SHIPPED | OUTPATIENT
Start: 2025-02-11

## 2025-02-11 NOTE — TELEPHONE ENCOUNTER
"----- Message from Anna sent at 2/11/2025  8:56 AM CST -----  Contact: [phone]  type: Lab    Caller is requesting to schedule their Lab appointment prior to an appointment.    Order is not listed in EPIC.  Please enter order and contact patient to schedule.    Preferred Date and Time of Labs:anytime    Date of Appointment:none    Where would they like the lab performed?main campus    Would the patient rather a call back or a response via [facility]? Call back     Best Call Back Number:    Additional Information:mri on knee left    "Do not send messages requesting lab orders prior to Physical appt on these providers: Crescencio Bradley Giambrone,  Ravindra Hodge and Mert."  "

## 2025-02-11 NOTE — TELEPHONE ENCOUNTER
"----- Message from Anna sent at 2/11/2025  8:56 AM CST -----  Contact: [phone]  type: Lab    Caller is requesting to schedule their Lab appointment prior to an appointment.    Order is not listed in EPIC.  Please enter order and contact patient to schedule.    Preferred Date and Time of Labs:anytime    Date of Appointment:none    Where would they like the lab performed?main campus    Would the patient rather a call back or a response via [facility]? Call back     Best Call Back Number:    Additional Information:mri on knee left    "Do not send messages requesting lab orders prior to Physical appt on these providers: Crescencio Bradley Giambrone,  Ravindra Hodge and Mert."  "

## 2025-02-11 NOTE — PATIENT INSTRUCTIONS
CARE OF WOUNDS WITH STITCHES     MATERIALS:  -sterile saline solution   -Q-tips  -Aquaphor Healing Ointment  -Telfa or similar non-stick/non-adhesive dressing pad  -Bandage tape (any bandage tape that works for you and your skin is okay to use)    -if interested in Cover Roll Stretch Bandage tape you may find it available at the following pharmacies: [facility] on [facility] [facility] on [facility] [facility] on [facility].      1. Keep the pressure dressing dry and in place for 24 hours. After 24 hours, you may resume your daily showers. You can shower with the dressing in place or remove it before showering. However, once the dressing gets wet it must be changed. PLEASE DO NOT LEAVE A WET DRESSING IN PLACE.  2. In a clean disposable cup, add sterile saline solution; use Q-tips to dip in the solution and cleanse the incision line thoroughly. Be sure to remove any old or dried blood from around the incision line which could interfere with the healing process. Do not allow a thick crust or scab to form. Dry wound with Q-tip. Apply a thin film of Aquaphor Ointment over the incision line. Do this until the sutures are removed.   3. Use a piece of the non-stick/non-adherent dressing pad to cover the entire line of stitches. Then, use some bandage tape to cover the dressing pad completely, sealing air out on all sides of the wound.  4. Return to our clinic for your scheduled appointment to have your sutures removed. Sutures are normally removed 7-14 days after surgery depending on the location of treatment site.     IN CASE OF BLEEDING:  PLEASE DO NOT PANIC as this may increase bleeding.  Remove dressing, and clean the wound to see where bleeding is occurring and apply steady pressure with a clean cloth or gauze for 20 minutes (use a timer, if necessary). Let the timer run out before checking the area. If bleeding persists, repeat the 20 minutes of pressure. Ice or iced compresses may  also be used. If these measures do not stop the bleeding, please call the office at [phone]. AFTER HOURS: please call [phone]. Do not take aspirin, aspirin containing medications, or Aleve, unless prescribed, for one week after surgery.     A small amount of redness is normal along any wound edge while it is healing. If, however, you experience intense pain at the site, increasing redness, discharge of pus, fever, or note a foul odor these are signs of infection and you should call the clinic at the above number.     REMINDER: Your dressing should be changed at least once a day. Use two extra strength Tylenol and two Advil every six hours as you need to for mild pain. Do not drink alcoholic beverages while taking pain medication. No swimming or submerging the wound in bodies of water other than normal showering. Any questions about restrictions should be asked to medical staff and/or Dr. Yung.

## 2025-02-12 ENCOUNTER — OFFICE VISIT (OUTPATIENT)
Dept: DERMATOLOGY | Facility: CLINIC | Age: OVER 89
End: 2025-02-12
Payer: MEDICARE

## 2025-02-12 VITALS — HEART RATE: 78 BPM | RESPIRATION RATE: 16 BRPM | DIASTOLIC BLOOD PRESSURE: 80 MMHG | SYSTOLIC BLOOD PRESSURE: 126 MMHG

## 2025-02-12 DIAGNOSIS — Z48.817 ENCOUNTER FOR SURGICAL AFTERCARE FOLLOWING SURGERY OF SKIN OR SUBCUTANEOUS TISSUE: Primary | ICD-10-CM

## 2025-02-12 NOTE — PROGRESS NOTES
Mohs Surgery Procedure Note    Name: Norm Mendoza   YOB: 1926   Age: 89 y.o.  Date of Service: 02/11/2025  Surgeon: Lion Yung MD  Referring Provider:Shante Kerr M.D.  Assistants: Felicia Zimmerman      Pre-operative Diagnosis: Squamous cell carcinoma    Post-operative Diagnosis: Squamous cell carcinoma    Locations: left dorsal wrist    Pre-operative Size: 4.3 x 2.7 cm    Indications: size and location    Anesthesia: 1% lidocaine with 1:100,000 epinephrine ( 5 cc)    Stages Performed: 1    Case Number: [id number]    Procedure Details     Patient informed of risks including pain, permanent scarring, infection, light or dark discoloration, bleeding, infection, weakness, numbness and recurrence of the lesion. Benefits of the procedure and written informed consent were obtained. Alternatives to Mohs surgery were discussed in detail with the patient. This includes: XRT, standard excision, electrodesiccation and curettage, cryotherapy and topicals.     Based on my medical judgement, Mohs surgery is the most appropriate treatment for this cancer compared to other treatments. The rationale for Mohs was explained to the patient. Prior to the procedure, treatment site was clearly identified and confirmed by the patient. Dr. Lion Yung operated in two distinct and integrated capacities as surgeon and pathologist.     Stage 1: The patient was placed on the operation room table. The lesion site was outlined with a tissue marking pen. The lesion and surrounding area was given a sterile prep using chlorhexidine and draped in the usual sterile fashion.  It was then infiltrated with local anesthesia. All gross tumor was debulked using curettage. An incision at a 45 degree angle following the standard Mohs approach was done and the specimen was harvested. Hemostasis was obtained by bipolar electrocoagulation . The specimen was oriented, mapped and divided into 8 blocks. Each section was then  chromacoded and processed in the Mohs lab using the Mohs protocol and submitted for frozen section. Frozen section analysis showed: fibrosis noted; Pseudoepitheliomatous  hyperplasia (on traumatic scar) on A5. Hyperkeratosis on A4. No residual tumor seen.  Surgical Defect Size: 4.7 x 3.0 cm  Depth of Final Defect: subcutis  Number of Slides: 18    Repair Note:  Procedure: Full Thickness Skin Graft and Intermediate Repair of primary defect    Anesthetic: 1% lidocaine with 1:100,000 epinephrine ( 8 cc)     Surgeon: Dr. Lion Yung MD  Assistants: Sabina Bailey CST, Felicia Kirk    Indication: Status post Mohs' Micrographic Surgery for Squamous cell carcinoma   Location: left wrist  Defect Size: 4.7 x 3.0 cm    Procedure in Detail: After the patient's carcinoma had been completely removed with Mohs' Micrographic Surgery, a repair of the surgical defect was undertaken. An intermediate repair was was accomplished on the medial and lateral aspects of the wound to decrease the surface area of the primary defect to facilitate easier grafting.  These areas were closed with 4-0 Monocryl and 4-0 Prolene.  The patient was returned to the operating suite where the areas of the left wrist & left clavicle were prepped, draped, and anesthetized in the usual sterile fashion. A full thickness skin graft was harvested from the left clavicle area and placed in normal saline. Electrocoagulation was used to obtain hemostasis and the donor site was closed in a layered fashion with buried vertical mattress sutures of 4-0 Monocryl and simple running sutures of 4-0 Prolene. The graft was removed from normal saline, trimmed of all subcutaneous tissue, and placed onto the primary defect. The graft was trimmed of all subcutaneous fat and fenestrated in 4 places with an 11 blade.  The graft was secured to the defect with basting sutures of 5-0 Prolene. The graft was meticulously trimmed to exactly fit the defect and the wound was approximated  and closed with a peripheral running suture of 5-0 Prolene. The patient tolerated the procedure well.     The area was cleaned and dressed appropriately and the patient was given wound care instructions, as well as an appointment for follow-up evaluation.     The patient was placed on Doxycycline 100 m po bid x 14 days #28.    Size of Graft: 4.0 x 2.0 cm  EBL:  < 5 cc  Complications: none      Plan:  1. Instructed to keep the wound dry and covered for 24-48h and clean thereafter.  2. The patient was prescribed Doxycycline 100 m po bid x 14 days #28.  3. Warning signs of infection were reviewed.    4. Recommended that the patient use OTC acetaminophen and OTC ibuprofen as needed for pain.   5. Return in 2 weeks for suture removal.    A pre op      B post mohs      C post repair    D donor site

## 2025-02-13 ENCOUNTER — OFFICE VISIT (OUTPATIENT)
Dept: ORTHOPEDICS | Facility: CLINIC | Age: OVER 89
End: 2025-02-13
Payer: MEDICARE

## 2025-02-13 VITALS
HEART RATE: 79 BPM | WEIGHT: 188.06 LBS | DIASTOLIC BLOOD PRESSURE: 92 MMHG | SYSTOLIC BLOOD PRESSURE: 147 MMHG | BODY MASS INDEX: 25.5 KG/M2

## 2025-02-13 DIAGNOSIS — M25.562 LEFT MEDIAL KNEE PAIN: Primary | ICD-10-CM

## 2025-02-13 PROCEDURE — 99213 OFFICE O/P EST LOW 20 MIN: CPT | Mod: HCNC,S$GLB,, | Performed by: NURSE PRACTITIONER

## 2025-02-13 PROCEDURE — 1160F RVW MEDS BY RX/DR IN RCRD: CPT | Mod: HCNC,CPTII,S$GLB, | Performed by: NURSE PRACTITIONER

## 2025-02-13 PROCEDURE — 1159F MED LIST DOCD IN RCRD: CPT | Mod: HCNC,CPTII,S$GLB, | Performed by: NURSE PRACTITIONER

## 2025-02-13 PROCEDURE — 99999 PR PBB SHADOW E&M-EST. PATIENT-LVL III: CPT | Mod: PBBFAC,HCNC,, | Performed by: NURSE PRACTITIONER

## 2025-02-13 PROCEDURE — 1125F AMNT PAIN NOTED PAIN PRSNT: CPT | Mod: HCNC,CPTII,S$GLB, | Performed by: NURSE PRACTITIONER

## 2025-02-13 NOTE — PROGRESS NOTES
SUBJECTIVE:   History of Present Illness    CHIEF COMPLAINT:  - Left knee pain    HPI:  Norm presents with left knee pain that started about a month ago. He describes the pain as severe, located on the medial side of the knee, which he can pinpoint precisely. The pain is severe when he puts weight on it and even when he touches the area. He denies any trauma or fall causing the knee pain. Pain is exacerbated by weight-bearing activities.    Norm saw his primary care physician a month ago for this issue, who ordered an x-ray that showed no abnormalities. He believes an MRI is necessary due to the severity of his symptoms.    He occasionally has hip pain that fluctuates, as well as some back discomfort due to arthritis diagnosed via MRI. He was scheduled for back injections but postponed them due to other health concerns.    Norm recently had cancer surgery on his arm on Tuesday of the current week, requiring a skin graft, which explains his arm being in a sling. He also mentions having heart failure and is scheduled for a heart valve replacement in two months.    He denies any history of knee problems prior to this incident.    SURGICAL HISTORY:  - Skin cancer surgery: Tuesday of the current week, requiring a skin graft      ROS:  Constitutional: -fever, -chills  Eyes: -visual changes  ENT: -nasal congestion, -sore throat  Respiratory: -cough, -shortness of breath  Cardiovascular: -chest pain, -palpitations  Gastrointestinal: -nausea, -vomiting  Genitourinary: -hematuria, -dysuria  Integument/Breast: -rash, -pruritus, -breast skin changes  Hematologic/Lymphatic: -easy bruising, -lymphadenopathy  Musculoskeletal: +arthralgia, -myalgia, +back pain, -joint stiffness  Neurological: -seizures, -tremors  Behavioral/Psych: -hallucinations  Endocrine: -heat intolerance, -cold intolerance  Skin: +skin lesion         Review of patient's allergies indicates:  No Known Allergies      Current Outpatient Medications   Medication  Sig Dispense Refill    amLODIPine (NORVASC) 2.5 MG tablet TAKE 1 TABLET(2.5 MG) BY MOUTH EVERY DAY 90 tablet 3    clobetasoL (TEMOVATE) 0.05 % external solution Pt to mix in 1 jar of cerave cream and apply to affected areas bid 50 mL 3    diclofenac sodium (VOLTAREN ARTHRITIS PAIN) 1 % Gel Apply 2 Grams to painful right hand joint 2-3x/day as needed 100 g 1    doxycycline (VIBRAMYCIN) 100 MG Cap Take 1 capsule (100 mg total) by mouth every 12 (twelve) hours. 28 capsule 1    fluorouraciL (EFUDEX) 5 % cream Compound fluorouracil 5% + calcipotriene 0.005% cream. Apply a pea-sized amount to entire bilateral lateral cheekbones BID x 7 days. 30 g 0    furosemide (LASIX) 20 MG tablet 1 tab 2 days/week for heart/blood pressure 30 tablet 0    irbesartan (AVAPRO) 150 MG tablet Take 1 tablet (150 mg total) by mouth once daily. 90 tablet 2    metoprolol succinate (TOPROL-XL) 25 MG 24 hr tablet TAKE 1/2 TABLET BY MOUTH DAILY 45 tablet 3    multivitamin (THERAGRAN) per tablet Take 1 tablet by mouth once daily.      mupirocin (BACTROBAN) 2 % ointment Apply to affected area 3 times daily 22 g 1    albuterol (VENTOLIN HFA) 90 mcg/actuation inhaler Inhale 2 puffs into the lungs every 6 (six) hours as needed for Wheezing. Rescue 18 g 1     No current facility-administered medications for this visit.       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    Dr Vel LANE (peptic ulcer disease)     GI Bleeding 11/2018:  Anticoagulant D/S'd and Gastric MALT tumor Dx'd    SCC (squamous cell carcinoma) 05/04/2023    L ventral forearm excised by APC    Skin cancer     x3, had mohs on nose    Squamous cell carcinoma excised 12/5/14    R lower back    Symptomatic anemia 12/06/2018    Urinary tract infection     x4       Past Surgical History:   Procedure Laterality Date    ACHILLES TENDON SURGERY      CATARACT EXTRACTION W/  INTRAOCULAR LENS IMPLANT Bilateral 2013    CLOSURE OF LEFT ATRIAL APPENDAGE USING DEVICE N/A 10/11/2019    Procedure: Left atrial appendage closure device;  Surgeon: Hi Crowder MD;  Location: [facility] EP LAB;  Service: Cardiology;  Laterality: N/A;  AF, JACINTO, Watchman Implant, BSci, Gen, MB, 3 Prep    CYSTOSCOPY      bladder tumor    ESOPHAGOGASTRODUODENOSCOPY N/A 12/7/2018    Procedure: EGD (ESOPHAGOGASTRODUODENOSCOPY);  Surgeon: Austin Garcia MD;  Location: [facility] (2ND FLR);  Service: Endoscopy;  Laterality: N/A;    ESOPHAGOGASTRODUODENOSCOPY N/A 4/12/2019    Procedure: EGD (ESOPHAGOGASTRODUODENOSCOPY);  Surgeon: Austin Garcia MD;  Location: [facility] (4TH FLR);  Service: Endoscopy;  Laterality: N/A;  please schedule within 4 weeks    ESOPHAGOGASTRODUODENOSCOPY N/A 5/27/2021    Procedure: EGD (ESOPHAGOGASTRODUODENOSCOPY);  Surgeon: Austin Garcia MD;  Location: [facility] (2ND FLR);  Service: Endoscopy;  Laterality: N/A;  per Dr. Garcia done within the month with any provider/ ordered by oncology  2nd floor due to comorbidities  Watchman device  COVID test at [facility] on 5/24-GT    SKIN CANCER EXCISION      TREATMENT OF CARDIAC ARRHYTHMIA N/A 7/12/2019    Procedure: Cardioversion or Defibrillation;  Surgeon: Hi Crowder MD;  Location: [facility] EP [facility];  Service: Cardiology;  Laterality: N/A;  AF, JACINTO, DCCV, MAC, MB, 3 Prep    TREATMENT OF CARDIAC ARRHYTHMIA N/A 2/8/2022    Procedure: Cardioversion or Defibrillation;  Surgeon: Susan Orozco NP;  Location: [facility] EP [facility];  Service: Cardiology;  Laterality:  N/A;  afib, DCCV, anes, MB, 3 Prep       Family History   Problem Relation Name Age of Onset    Stroke Father      Hypertension Father      Stroke Brother      Anesthesia problems Neg Hx      Malignant hypertension Neg Hx      Hypotension Neg Hx      Malignant hyperthermia Neg Hx      Pseudochol deficiency Neg Hx      Melanoma Neg Hx      Heart attack Neg Hx      Heart disease Neg Hx      Heart failure Neg Hx      Cataracts Neg Hx      Glaucoma Neg Hx      Macular degeneration Neg Hx         OBJECTIVE:     PHYSICAL EXAM:  Vital Signs (Most Recent)  Vitals:    02/13/25 0855   BP: (!) 147/92   Pulse: 79       Weight: 85.3 kg (188 lb 0.8 oz),   Estimated body mass index is 25.5 kg/m² as calculated from the following:    Height as of 1/31/25: 6' (1.829 m).    Weight as of this encounter: 85.3 kg (188 lb 0.8 oz).   General Appearance: Well nourished, well developed, in no acute distress.  HENT: Normal cephalic, oropharynx pink and moist  Eyes: PERRLA bilaterally and EOM x 4  Respiratory: Even and unlabored  Skin: Warm and Dry.   Psychiatric: AAO x 4, Mood & affect are normal.    Physical Exam    Musculoskeletal: Knee is not warm to touch. No swelling in knee. No fluid in knee. Muscle strength 5/5. Range of motion 0 to 120 degrees. Valgus stress causes medial knee pain.  IMAGING:  - X-rays of the left knee: January 27, 2025, Joint spaces were maintained, bony structures were intact, and no fluid was seen in the left knee.  - Full body MRI: Findings revealed arthritis in the back.         All radiographs were personally reviewed by me.    ASSESSMENT/PLAN:       ICD-10-CM ICD-9-CM   1. Left medial knee pain  M25.562 719.46     Assessment & Plan    IMAGING:  - Ordered MRI Left Knee to evaluate for ligament, tendon issues, and potential stress fractures.    PROCEDURES:  - Discussed MRI procedure with patient, explaining it would help identify any ligament, tendon, or stress fracture issues.    FOLLOW UP:  - Follow up after  MRI results are available.       In the meantime, he can use Salonpas patches PRN.  12 hours on and 12 hours off.    This note was generated with the assistance of ambient listening technology. Verbal consent was obtained by the patient and accompanying visitor(s) for the recording of patient appointment to facilitate this note. I attest to having reviewed and edited the generated note for accuracy, though some syntax or spelling errors may persist. Please contact the author of this note for any clarification.

## 2025-02-14 ENCOUNTER — HOSPITAL ENCOUNTER (OUTPATIENT)
Dept: RADIOLOGY | Facility: HOSPITAL | Age: OVER 89
Discharge: HOME | End: 2025-02-14
Attending: NURSE PRACTITIONER
Payer: MEDICARE

## 2025-02-14 DIAGNOSIS — M25.562 LEFT MEDIAL KNEE PAIN: ICD-10-CM

## 2025-02-14 PROCEDURE — 73721 MRI JNT OF LWR EXTRE W/O DYE: CPT | Mod: TC,LT

## 2025-02-14 PROCEDURE — 73721 MRI JNT OF LWR EXTRE W/O DYE: CPT | Mod: 26,LT,, | Performed by: RADIOLOGY

## 2025-02-14 NOTE — PROGRESS NOTES
Referring Provider:  Dr. Kerr    89 y.o. male patient is here for wound check after Mohs surgery to remove SCC on the left wrist with Full Thickness Skin Graft; 1 day s/p.    Patient reports no problems.    WOUND PE:  The left wrist graft is well mending with minimal bleeding. Mild firmness and erythema of scar. No nodularity.    Sutures intact. No signs or symptoms of infection. Full-thickness skin graft is pink and viable.      IMPRESSION:  Healing operative site from Mohs' surgery to remove SCC on the left wrist with FTSG repair; 1 day s/p.    PLAN:    Wound was cleaned and pressure dressing was applied.   Patient instructed to continue daily wound care and reduce use of the left arm as much as possible.     RTC in 5 days for wound check.        1 day post  mohs/FTSG

## 2025-02-17 ENCOUNTER — OFFICE VISIT (OUTPATIENT)
Dept: DERMATOLOGY | Facility: CLINIC | Age: OVER 89
End: 2025-02-17
Payer: MEDICARE

## 2025-02-17 ENCOUNTER — RESULTS FOLLOW-UP (OUTPATIENT)
Dept: ORTHOPEDICS | Facility: CLINIC | Age: OVER 89
End: 2025-02-17

## 2025-02-17 DIAGNOSIS — Z48.817 AFTERCARE FOLLOWING SURGERY OF THE SKIN AND SUBCUTANEOUS TISSUE, NEC: Primary | ICD-10-CM

## 2025-02-17 DIAGNOSIS — S83.242A ACUTE MEDIAL MENISCUS TEAR OF LEFT KNEE, INITIAL ENCOUNTER: Primary | ICD-10-CM

## 2025-02-17 NOTE — PROGRESS NOTES
89 y.o. male patient is here for wound check after Mohs surgery with FTSG repair to remove a SCC on the L wrist 6 days ago; graft tissue harvested from L clavicular area.    Patient reports no problems.    WOUND PE:  The L wrist graft is taking well and has excellent blood flow.    Sutures intact. Wound healing well. No signs or symptoms of infection.      IMPRESSION:  Healing operative site from Mohs' surgery L wrist to remove SCC with FTSG repair 6 days ago; graft harvested from L clavicular area.    PLAN:  Continue wound care and dressing changes.     RTC:  RTC in 1 week for wound check and suture removal.

## 2025-02-17 NOTE — PROGRESS NOTES
Please scheduled patient with Dr. Mishra as his left knee MRI showed:    Oblique tear predominantly posterior horn medial meniscus with reactive bone marrow edema at the level of the subchondral aspect anteromedial tibial plateau     Call patient with date and time of appointment.

## 2025-02-17 NOTE — PROGRESS NOTES
I called and spoke with patient regarding his left knee MRI.  Findings showed the following:    Oblique tear predominantly posterior horn medial meniscus with reactive bone marrow edema at the level of the subchondral aspect anteromedial tibial plateau     I explained to him that I had discussed the findings with Dr. Long.  He has recommended referral to PT and send to [facility] for further management.      All of the patient's questions were answered to his satisfaction.

## 2025-02-25 ENCOUNTER — OFFICE VISIT (OUTPATIENT)
Dept: DERMATOLOGY | Facility: CLINIC | Age: OVER 89
End: 2025-02-25
Payer: MEDICARE

## 2025-02-25 DIAGNOSIS — Z48.817 AFTERCARE FOLLOWING SURGERY OF THE SKIN OR SUBCUTANEOUS TISSUE: Primary | ICD-10-CM

## 2025-02-25 PROCEDURE — 99999 PR PBB SHADOW E&M-EST. PATIENT-LVL II: CPT | Mod: PBBFAC,,, | Performed by: DERMATOLOGY

## 2025-02-25 PROCEDURE — 99024 POSTOP FOLLOW-UP VISIT: CPT | Mod: S$GLB,,, | Performed by: DERMATOLOGY

## 2025-02-25 NOTE — PROGRESS NOTES
Suture Removal note:  CC: 89 y.o. male patient is here for wound check and suture removal after Mohs surgery with FTSG repair to remove SCC on the left wrist 14 days ago; graft tissue harvested from the left clavicular area.       HPI: Patient is s/p excision of SCC from the left wrist on 2/11/25.  Patient reports no problems.    WOUND PE:    The left clavicular area wound is healing well. No signs or symptoms of infection. Mild erythema.     The left wrist wound is slowly healing. No signs or symptoms of infection. Mild erythema.     IMPRESSION:  Healing operative site from Mohs' surgery with FTSG repair to remove SCC on the left wrist 14 days ago; graft harvested from the left clavicular area.     PLAN:  Left clavicular area sutures removed and Steri-strips applied.     Continue wound care and dressing changes on the left wrist.       RTC: In 2 days for left wrist wound check and suture removal.       L clavicular area       L wrist FTSG

## 2025-02-27 ENCOUNTER — OFFICE VISIT (OUTPATIENT)
Dept: DERMATOLOGY | Facility: CLINIC | Age: OVER 89
End: 2025-02-27
Payer: MEDICARE

## 2025-02-27 ENCOUNTER — OFFICE VISIT (OUTPATIENT)
Dept: OTOLARYNGOLOGY | Facility: CLINIC | Age: OVER 89
End: 2025-02-27
Payer: MEDICARE

## 2025-02-27 VITALS
TEMPERATURE: 98 F | SYSTOLIC BLOOD PRESSURE: 149 MMHG | DIASTOLIC BLOOD PRESSURE: 93 MMHG | WEIGHT: 185.31 LBS | HEIGHT: 72 IN | BODY MASS INDEX: 25.1 KG/M2 | HEART RATE: 64 BPM

## 2025-02-27 DIAGNOSIS — Z48.817 AFTERCARE FOLLOWING SURGERY OF THE SKIN OR SUBCUTANEOUS TISSUE: Primary | ICD-10-CM

## 2025-02-27 DIAGNOSIS — H61.23 BILATERAL IMPACTED CERUMEN: ICD-10-CM

## 2025-02-27 PROCEDURE — 99024 POSTOP FOLLOW-UP VISIT: CPT | Mod: S$GLB,,, | Performed by: DERMATOLOGY

## 2025-02-27 PROCEDURE — 99999 PR PBB SHADOW E&M-EST. PATIENT-LVL I: CPT | Mod: PBBFAC,,, | Performed by: DERMATOLOGY

## 2025-02-27 NOTE — PROGRESS NOTES
Procedure: Cerumen removal [id number]     Pre procedure Diagnosis: bilateral Cerumen Impaction     Post procedure Diagnosis: same     Instrument: Binocular Microscope     Anesthesia: None     Procedure: After verbal consent was obtained, the patient was laid supine in the small procedure room. A microscope was used to examine the ear. Instrumentation and suction were used to remove any cerumen from the EAC. If indicated, the procedure was repeated on the contralateral side. The patient tolerated the procedure well and without any acute complication. Findings below.      Findings:               Right Ear:               EAC: Normal, Cerumen filled              Tympanic membrane: Intact              Middle Ear: No effusion present and Ossicles in normal position  Left Ear:               EAC: Normal Cerumen filled              Tympanic membrane: Intact              Middle Ear: No effusion present and Ossicles in normal position     The cerumen was removed completely from both ears.

## 2025-02-27 NOTE — PROGRESS NOTES
Suture Removal note:  CC: 89 y.o. male patient is here for wound check and suture removal after Mohs surgery with FTSG repair to remove SCC on the left wrist 16 days ago; graft tissue harvested from the left clavicular area.       HPI: Patient is s/p excision of SCC from the left wrist on 2/11/25.  Patient reports no problems.    WOUND PE:    The left wrist wound is healing well. Eroded area measuring 1.5 x 0.7 cm.  No signs or symptoms of infection. Mild erythema.     IMPRESSION:  Healing operative site from Mohs' surgery with FTSG repair to remove SCC on the left wrist 14 days ago; graft harvested from the left clavicular area.     PLAN:  Left wrist sutures removed. Aquaphor and dressing applied.   Continue wound care and dressing changes.    RTC: In 2 weeks for follow up evaluation, or sooner if any problems or concerns with the treated area arise.

## 2025-03-11 ENCOUNTER — CLINICAL SUPPORT (OUTPATIENT)
Dept: REHABILITATION | Facility: OTHER | Age: OVER 89
End: 2025-03-11
Payer: MEDICARE

## 2025-03-11 DIAGNOSIS — M25.562 LEFT MEDIAL KNEE PAIN: Primary | ICD-10-CM

## 2025-03-11 DIAGNOSIS — Z74.09 IMPAIRED FUNCTIONAL MOBILITY, BALANCE, GAIT, AND ENDURANCE: ICD-10-CM

## 2025-03-11 DIAGNOSIS — S83.242A ACUTE MEDIAL MENISCUS TEAR OF LEFT KNEE, INITIAL ENCOUNTER: ICD-10-CM

## 2025-03-11 PROCEDURE — 97162 PT EVAL MOD COMPLEX 30 MIN: CPT | Mod: PN | Performed by: PHYSICAL THERAPIST

## 2025-03-11 PROCEDURE — 97530 THERAPEUTIC ACTIVITIES: CPT | Mod: PN | Performed by: PHYSICAL THERAPIST

## 2025-03-11 NOTE — PROGRESS NOTES
[facility]   Physical Therapy Initial Evaluation      Name: Norm Hoff [facility] Number: [id number]    Therapy Diagnosis:   Encounter Diagnoses   Name Primary?    Acute medial meniscus tear of left knee, initial encounter     Left medial knee pain Yes    Impaired functional mobility, balance, gait, and endurance         Physician: Khang Bradshaw, NP    Physician Orders: PT Eval and Treat   Medical Diagnosis from Referral: S83.242A (ICD-10-CM) - Acute medial meniscus tear of left knee, initial encounter  Evaluation Date: 3/11/2025  Authorization Period Expiration: 12/31/2025  Plan of Care Expiration: 5/6/2025  Progress Note Due: 4/6/2025  Visit # / Visits authorized: 1/ pending   FOTO: 1/ 3    Precautions: Standard     Time In: 0915  Time Out: 1005  Total Billable Time: 10 minutes    Subjective     Date of onset: 4 months ago    History of current condition - Norm reports: He tore his meniscus and thought they would recommend surgery, but they recommended therapy. He says 4 months ago when he got out of bed he felt a stabbing pain in his knee. He says pain is not constant, it comes and goes, but there's a spot if he presses on his knee it will hurt. He says pain is not as bad now as it was initially, but still bothers him.   He also reports hx of chronic low back, though says that has not been as bad recently. He reports that he has heart failure that causes shortness of breath, notes that with that he has difficulty with going up steeper stairs. He says he is scheduled for valve replacement sometime in the next couple of months.     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 Activity limited due to shortness of breath with CHF  Current Level of Function: I with ADL's and driving. Continues with activity limitations due to SOB, notes a little more difficulty with stairs since onset of pain.     Pain:  Current 3/10, worst 7/10, best 0/10   Location: medial L knee  Description: Aching and Sharp  Aggravating Factors: initial steps on rising from sitting (particularly from a deep chair) or getting OOB, pain with ascending   Easing Factors: sitting down/rest     Patients goals: be able to go for a mild walk (also limited by heart)     Medical History:   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    Dr Vel LANE (peptic ulcer disease)     GI Bleeding 11/2018: Anticoagulant D/S'd and Gastric MALT tumor Dx'd    SCC (squamous cell carcinoma) 05/04/2023    L ventral forearm excised by APC    Skin cancer     x3, had mohs on nose    Squamous cell carcinoma excised 12/5/14    R lower back    Symptomatic anemia 12/06/2018    Urinary tract infection     x4       Surgical History:   Norm Luis Eduardo Moraleser  has a past surgical history that includes Cystoscopy; Achilles tendon surgery; Skin cancer excision; Esophagogastroduodenoscopy (N/A, 12/7/2018); Esophagogastroduodenoscopy (N/A, 4/12/2019); Treatment of cardiac arrhythmia (N/A, 7/12/2019); Cataract extraction w/   intraocular lens implant (Bilateral, 2013); Closure of left atrial appendage using device (N/A, 10/11/2019); Esophagogastroduodenoscopy (N/A, 5/27/2021); and Treatment of cardiac arrhythmia (N/A, 2/8/2022).    Medications:   Norm has a current medication list which includes the following prescription(s): albuterol, amlodipine, clobetasol, diclofenac sodium, doxycycline, fluorouracil, furosemide, irbesartan, metoprolol succinate, multivitamin, and mupirocin.    Allergies:   Review of patient's allergies indicates:  No Known Allergies     Objective      Observation: Pt is alert and oriented, good historian.     Gait: forward flexed posture, wide KIERAN      Range of Motion:   Knee Left active Left Passive Right Active R passive   Flexion 120 125 120 125   Extension -2 0 0 +1           Lower Extremity Strength  Right LE  Left LE    Ankle dorsiflexion: 5/5 Ankle dorsiflexion: 5/5   Ankle plantarflexion: 4/5 Ankle plantarflexion: 4/5   Knee extension: 5/5 Knee extension: 4+/5   Knee flexion: 5/5 Knee flexion: 5/5   Hip flexion: 4+/5 Hip flexion: 4+/5   Hip external rotation 4+/5 Hip external rotation 4+/5   Hip internal rotation 5/5 Hip internal rotation 4+/5   Hip abduction:  4+/5 Hip abduction: 4/5   Hip adduction: 5/5 Hip adduction: 5/5   Hip extension: 4/5 Hip extension 4-/5       Flexibility:    Hamstring: R = mod russo; L = mod russo   Quad: R = marked russo; L = marked russo   Piriformis: R: mod russo; L mod russo       Function:    - SLS R: poor  - SLS L: poor      Joint Mobility: mild hypomobility to B patella grossly      Palpation: tenderness to L knee medial tibial plateau        Limitation/Restriction for FOTO Knee Survey    Therapist reviewed FOTO scores for Norm Mendoza on 3/11/2025.   FOTO documents entered into EPIC - see Media section.    Intake Score: 50%    Goal: 61%         Treatment     Total Treatment time (time-based codes) separate from Evaluation: 10 minutes      Norm received the treatments listed  "below:      therapeutic activities to improve functional performance for 10  minutes, including:  Development, demonstration, and review of home exercise program to include:   Seated HS stretch 3 x 30"   Piriformis stretch 3 x 30"        Patient Education and Home Exercises     Education provided:   - therapy rationale and plan of care    Written Home Exercises Provided: yes. Exercises were reviewed and Norm was able to demonstrate them prior to the end of the session.  Norm demonstrated good  understanding of the education provided. See EMR under Patient Instructions for exercises provided during therapy sessions.    Assessment     Norm is a 89 y.o. male referred to outpatient Physical Therapy with a medical diagnosis of S83.242A (ICD-10-CM) - Acute medial meniscus tear of left knee, initial encounter. Patient presents with s/s consistent with referring diagnosis. He reports gradually improving pain to medial L knee, with tenderness palpated at medial joint line/medial tibial plateau. He notes pain with initial steps on rising and ascending stairs. He is limited with walking, but notes that his shortness of breath from cardiac condition limits him more than the knee. Mild weakness noted with MMT as above. Marked flexibility restrictions noted particularly to B hamstrings and quads.     Patient prognosis is Good.   Patient will benefit from skilled outpatient Physical Therapy to address the deficits stated above and in the chart below, provide patient /family education, and to maximize patientt's level of independence.     Plan of care discussed with patient: Yes  Patient's spiritual, cultural and educational needs considered and patient is agreeable to the plan of care and goals as stated below:     Anticipated Barriers for therapy: standard, upcoming heart surgery     Medical Necessity is demonstrated by the following  History  Co-morbidities and personal factors that may impact the plan of care [] LOW: no " personal factors / co-morbidities  [] MODERATE: 1-2 personal factors / co-morbidities  [x] HIGH: 3+ personal factors / co-morbidities    Moderate / High Support Documentation:   Co-morbidities affecting plan of care:    Alcohol dependence, daily use 07/13/2017    Allergy     Bladder cancer     tumor that was benign     Cataract     H/O nonmelanoma skin cancer 02/04/2016    Hematuria     Hypertension     Hypertensive heart disease with heart failure 02/01/2024    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    Mixed hyperlipidemia 05/02/2018    On continuous oral anticoagulation 07/30/2019    Paroxysmal atrial fibrillation 11/30/2018    S/P D/C Cardioversion 7/2019, S/P Watchman device 8/2020    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    Dr Vel LANE (peptic ulcer disease)     GI Bleeding 11/2018: Anticoagulant D/S'd and Gastric MALT tumor Dx'd    SCC (squamous cell carcinoma) 05/04/2023    L ventral forearm excised by APC    Skin cancer     x3, had mohs on nose    Squamous cell carcinoma excised 12/5/14    R lower back    Symptomatic anemia 12/06/2018    Urinary tract infection     x4   past surgical history that includes Cystoscopy; Achilles tendon surgery; Skin cancer excision; Esophagogastroduodenoscopy (N/A, 12/7/2018); Esophagogastroduodenoscopy (N/A, 4/12/2019); Treatment of cardiac arrhythmia (N/A, 7/12/2019); Cataract extraction w/  intraocular lens implant (Bilateral, 2013); Closure of left atrial appendage using device (N/A, 10/11/2019); Esophagogastroduodenoscopy (N/A, 5/27/2021); and Treatment of cardiac arrhythmia (N/A, 2/8/2022).    Personal Factors:   lifestyle     Examination  Body Structures and Functions, activity limitations and participation restrictions that may impact the plan of care [] LOW: addressing 1-2 elements  [x] MODERATE: 3+ elements  [] HIGH: 4+ elements (please support below)    Moderate / High Support Documentation: flexibility, pain with transfers, SOB limits activity      Clinical Presentation [] LOW: stable  [x] MODERATE: Evolving  [] HIGH: Unstable     Decision Making/ Complexity Score: moderate       Goals:  Short Term Goals (4 weeks)  1. Pt will demonstrate improvements in B knee flexibility for ease with ambulation  2. Pt will demonstrate improvements in B knee strength to 4+/5 for ease with stair navigation.  3. Pt will report <5/10 pain at worse within the L knee for ease with ADL's    Long Term Goals (8 weeks)  1.Pt will demonstrate 5/5 strength grossly within the B LE for ease with community mobility and stair navigation  2. Pt will report being independent with his/her HEP for maintenance of improvements gained during therapy sessions  3. Pt will report ability to walk for 10 minutes or greater without limitation due to knee pain  4. Pt will report <3/10 pain at worst within the L knee for ease with ADLs      Plan     Plan of care Certification: 3/11/2025 to 5/6/2025.    Outpatient Physical Therapy 2 times weekly for 8 weeks to include the following interventions: Electrical Stimulation unattended, Gait Training, Manual Therapy, Moist Heat/ Ice, Neuromuscular Re-ed, Patient Education, Therapeutic Activities, and Therapeutic Exercise.     Sheila Bhandari, PT, DPT       Physician's Signature: _________________________________________ Date: ________________

## 2025-03-13 ENCOUNTER — CLINICAL SUPPORT (OUTPATIENT)
Dept: REHABILITATION | Facility: OTHER | Age: OVER 89
End: 2025-03-13
Payer: MEDICARE

## 2025-03-13 DIAGNOSIS — Z74.09 IMPAIRED FUNCTIONAL MOBILITY, BALANCE, GAIT, AND ENDURANCE: ICD-10-CM

## 2025-03-13 DIAGNOSIS — M25.562 LEFT MEDIAL KNEE PAIN: Primary | ICD-10-CM

## 2025-03-13 PROCEDURE — 97140 MANUAL THERAPY 1/> REGIONS: CPT | Mod: PN

## 2025-03-13 PROCEDURE — 97112 NEUROMUSCULAR REEDUCATION: CPT | Mod: PN

## 2025-03-13 PROCEDURE — 97530 THERAPEUTIC ACTIVITIES: CPT | Mod: PN

## 2025-03-18 ENCOUNTER — OFFICE VISIT (OUTPATIENT)
Dept: DERMATOLOGY | Facility: CLINIC | Age: OVER 89
End: 2025-03-18
Payer: MEDICARE

## 2025-03-18 ENCOUNTER — DOCUMENTATION ONLY (OUTPATIENT)
Dept: REHABILITATION | Facility: OTHER | Age: OVER 89
End: 2025-03-18

## 2025-03-18 ENCOUNTER — CLINICAL SUPPORT (OUTPATIENT)
Dept: REHABILITATION | Facility: OTHER | Age: OVER 89
End: 2025-03-18
Payer: MEDICARE

## 2025-03-18 DIAGNOSIS — Z48.817 AFTERCARE FOLLOWING SURGERY OF THE SKIN AND SUBCUTANEOUS TISSUE, NEC: Primary | ICD-10-CM

## 2025-03-18 DIAGNOSIS — M25.562 LEFT MEDIAL KNEE PAIN: Primary | ICD-10-CM

## 2025-03-18 DIAGNOSIS — Z74.09 IMPAIRED FUNCTIONAL MOBILITY, BALANCE, GAIT, AND ENDURANCE: ICD-10-CM

## 2025-03-18 PROCEDURE — 97112 NEUROMUSCULAR REEDUCATION: CPT | Mod: PN,CQ

## 2025-03-18 PROCEDURE — 97530 THERAPEUTIC ACTIVITIES: CPT | Mod: PN,CQ

## 2025-03-18 NOTE — PROGRESS NOTES
REFERRING PROVIDER:  Dr. Kerr    89 y.o. male patient is here for wound check after Mohs surgery to remove SCC on the left wrist with Full Thickness Skin Graft repair; 5 weeks s/p.    Patient reports no problems.    WOUND PE:  The left wrist graft is well healed. Mild firmness and erythema of scar. No nodularity.    Full-thickness skin graft is pink and viable.       IMPRESSION:  Healing operative site from Mohs' surgery to remove SCC on the left wrist with Full Thickness Skin Graft repair; 5 weeks s/p.    PLAN:    Patient may discontinue daily wound care.  Protect the area from the sun. Apply Aquaphor ointment twice a day for 2-3 months.     RTC:  In 2-3 months with Shante Kerr M.D. for skin check or sooner if new concern arises.  RTC PRN.      5 weeks post mohs/FTSG

## 2025-03-18 NOTE — PROGRESS NOTES
"[facility]   Physical Therapy Treatment Note      Name: Norm Hoff [facility] Number: [id number]    Therapy Diagnosis:   Encounter Diagnoses   Name Primary?    Left medial knee pain Yes    Impaired functional mobility, balance, gait, and endurance      Physician: Khang Bradshaw NP    Visit Date: 3/13/2025    Physician Orders: PT Eval and Treat   Medical Diagnosis from Referral: S83.242A (ICD-10-CM) - Acute medial meniscus tear of left knee, initial encounter  Evaluation Date: 3/11/2025  Authorization Period Expiration: 12/31/2025  Plan of Care Expiration: 5/6/2025  Progress Note Due: 4/6/2025  Visit # / Visits authorized: 2/ pending   FOTO: 1/ 3     Precautions: Standard      Time In: 515  Time Out: 615  Total Billable Time: 60 minutes    PTA Visit #: 0/5       Subjective     Patient reports: no new complaints since previous visit. Overall feeling ok today. Reports that daily activity is more limited due to fatigue from heart condition vs knee pain  He was compliant with home exercise program.  Response to previous treatment: fair  Functional change: none    Pain: 3/10  Location: medial L knee      Objective      Objective Measures updated at progress report unless specified.     Treatment     Norm received the treatments listed below:      therapeutic exercises to develop strength, endurance, ROM, flexibility, posture, and core stabilization for 8 minutes including:  Seated HS stretch 3 x 30"  Piriformis stretch 3 x 30"  supine hip flexor quad stretch x 3 x 30" w/ strap    manual therapy techniques: Joint mobilizations, Manual traction, Myofacial release, and Soft tissue Mobilization were applied to the: L knee for 8 minutes, including:  joint mobs to patella, tibiofemoral jt    neuromuscular re-education activities to improve: Balance, Coordination, Kinesthetic, Sense, Proprioception, and Posture for 29 minutes. The following activities were included:  HL hip ADD x 20 x 5" " "holds  HL 3-way clam x 20 ea x GTB at knees  HL bridge with band x 20 x GTB at knees    Matrix leg extensions x 2x10 x 15#  Matric leg curls x2x10 x 15#    therapeutic activities to improve functional performance for 15  minutes, including:  Standing heel raises/toe raises x 20 ea  Step ups x 20 x 6" step - HHAx1-2        Patient Education and Home Exercises       Education provided:   - none today    Written Home Exercises Provided: Pt instructed to continue prior HEP. Exercises were reviewed and Norm was able to demonstrate them prior to the end of the session.  Norm demonstrated good  understanding of the education provided. See Electronic Medical Record under Patient Instructions for exercises provided during therapy sessions    Assessment     Good return technique with HEP, indicating good compliance at home. Attempted to progress functional strength and endurance activities with fair tolerance overall. Progress as able nv.    Norm Is progressing well towards his goals.   Patient prognosis is Good.     Patient will continue to benefit from skilled outpatient physical therapy to address the deficits listed in the problem list box on initial evaluation, provide pt/family education and to maximize pt's level of independence in the home and community environment.     Patient's spiritual, cultural and educational needs considered and pt agreeable to plan of care and goals.     Anticipated barriers to physical therapy: standard, upcoming heart surgery     Goals:   Short Term Goals (4 weeks)  1. Pt will demonstrate improvements in B knee flexibility for ease with ambulation  2. Pt will demonstrate improvements in B knee strength to 4+/5 for ease with stair navigation.  3. Pt will report <5/10 pain at worse within the L knee for ease with ADL's     Long Term Goals (8 weeks)  1.Pt will demonstrate 5/5 strength grossly within the B LE for ease with community mobility and stair navigation  2. Pt will report being " independent with his/her HEP for maintenance of improvements gained during therapy sessions  3. Pt will report ability to walk for 10 minutes or greater without limitation due to knee pain  4. Pt will report <3/10 pain at worst within the L knee for ease with ADLs    Plan     Cont with POC for strength, stability of BLE.    Corazon Rousseau, PT

## 2025-03-18 NOTE — PROGRESS NOTES
"[facility]   Physical Therapy Treatment Note      Name: Norm Hoff [facility] Number: [id number]    Therapy Diagnosis:   Encounter Diagnoses   Name Primary?    Left medial knee pain Yes    Impaired functional mobility, balance, gait, and endurance        Physician: Khang Bradshaw NP    Visit Date: 3/18/2025    Physician Orders: PT Eval and Treat   Medical Diagnosis from Referral: S83.242A (ICD-10-CM) - Acute medial meniscus tear of left knee, initial encounter  Evaluation Date: 3/11/2025  Authorization Period Expiration: 12/31/2025  Plan of Care Expiration: 5/6/2025  Progress Note Due: 4/6/2025  Visit # / Visits authorized: 3/21   FOTO: 1/ 3     Precautions: Standard      Time In: 515  Time Out: 615  Total Billable Time: 60 minutes    PTA Visit #: 1/5       Subjective     Patient reports: he is having pain in the LB (arthritis) and the exercises activated it, which comes and goes. States he has no pain in the L knee today.     He was compliant with home exercise program.   Response to previous treatment: good   Functional change: none     Pain: 0/10   Location: medial L knee      Objective      Objective Measures updated at progress report unless specified.     Treatment     Norm received the treatments listed below:      therapeutic exercises to develop strength, endurance, ROM, flexibility, posture, and core stabilization for 9 minutes including:  Seated HS stretch 3 x 30"   Piriformis stretch 3 x 30" B   supine hip flexor quad stretch x 3 x 30" w/ strap     manual therapy techniques: Joint mobilizations, Manual traction, Myofacial release, and Soft tissue Mobilization were applied to the: L knee for 00 minutes, including:  joint mobs to patella, tibiofemoral jt     neuromuscular re-education activities to improve: Balance, Coordination, Kinesthetic, Sense, Proprioception, and Posture for 13 minutes. The following activities were included:  HL hip ADD x 2' x 5" holds   HL " "3-way clam x 20 ea x GTB at knees   HL bridge with band x 20 x GTB at knees     therapeutic activities to improve functional performance for 38 minutes, including:  Matrix leg extensions x 3x10 x 15#   Matric leg curls x3x10 x 15#     Standing heel raises/toe raises x 20 ea   Step ups x 20 x 6" step - HHAx1-2   + STS 2 x 10       Patient Education and Home Exercises       Education provided:   - none today    Written Home Exercises Provided: Pt instructed to continue prior HEP. Exercises were reviewed and Norm was able to demonstrate them prior to the end of the session.  Norm demonstrated good  understanding of the education provided. See Electronic Medical Record under Patient Instructions for exercises provided during therapy sessions    Assessment     Overall good tolerance to treatment with no adverse effects. Required seated rest breaks between STS sets, but able to recover quickly cardiovascular (approximately 10-15 seconds). Good training effects achieved at end of session. Continue to monitor and progress pt as tolerated.     Norm Is progressing well towards his goals.   Patient prognosis is Good.     Patient will continue to benefit from skilled outpatient physical therapy to address the deficits listed in the problem list box on initial evaluation, provide pt/family education and to maximize pt's level of independence in the home and community environment.     Patient's spiritual, cultural and educational needs considered and pt agreeable to plan of care and goals.     Anticipated barriers to physical therapy: standard, upcoming heart surgery     Goals: updated 03/18/2025   Short Term Goals (4 weeks)  1. Pt will demonstrate improvements in B knee flexibility for ease with ambulation (Progressing, not met)   2. Pt will demonstrate improvements in B knee strength to 4+/5 for ease with stair navigation. (Progressing, not met)   3. Pt will report <5/10 pain at worse within the L knee for ease with ADL's " (Progressing, not met)      Long Term Goals (8 weeks)  1.Pt will demonstrate 5/5 strength grossly within the B LE for ease with community mobility and stair navigation (Progressing, not met)   2. Pt will report being independent with his/her HEP for maintenance of improvements gained during therapy sessions (Progressing, not met)   3. Pt will report ability to walk for 10 minutes or greater without limitation due to knee pain (Progressing, not met)   4. Pt will report <3/10 pain at worst within the L knee for ease with ADLs (Progressing, not met)     Plan     Cont with POC for strength, stability of BLE.     Samantha Rodas, PTA

## 2025-03-18 NOTE — PROGRESS NOTES
Physical Therapist and Physical Therapist Assistant met face to face to discuss patient's treatment plan and progress towards established goals. Pt will be seen by a physical therapist minimally every 6th visit or every 30 days.    Samantha Rodas, JOE  03/18/2025

## 2025-03-19 ENCOUNTER — TELEPHONE (OUTPATIENT)
Dept: CARDIOTHORACIC SURGERY | Facility: CLINIC | Age: OVER 89
End: 2025-03-19
Payer: MEDICARE

## 2025-03-19 NOTE — TELEPHONE ENCOUNTER
Received a message that pt's consult to [facility] was cancelled by Dr. Brown's nurse and that Interventional Cardiology will notify [facility] if pt needs to be seen.     ----- Message from NELLY Frey sent at 3/19/2025 11:12 AM CDT -----  Regarding: referral to [facility]  Hi please assist with referral appointment.Thank you in advance,david

## 2025-03-20 ENCOUNTER — PATIENT MESSAGE (OUTPATIENT)
Dept: REHABILITATION | Facility: OTHER | Age: OVER 89
End: 2025-03-20

## 2025-03-25 ENCOUNTER — CLINICAL SUPPORT (OUTPATIENT)
Dept: REHABILITATION | Facility: OTHER | Age: OVER 89
End: 2025-03-25
Payer: MEDICARE

## 2025-03-25 DIAGNOSIS — M25.562 LEFT MEDIAL KNEE PAIN: Primary | ICD-10-CM

## 2025-03-25 DIAGNOSIS — Z74.09 IMPAIRED FUNCTIONAL MOBILITY, BALANCE, GAIT, AND ENDURANCE: ICD-10-CM

## 2025-03-25 PROCEDURE — 97530 THERAPEUTIC ACTIVITIES: CPT | Mod: HCNC,PN | Performed by: PHYSICAL THERAPIST

## 2025-03-25 PROCEDURE — 97112 NEUROMUSCULAR REEDUCATION: CPT | Mod: HCNC,PN | Performed by: PHYSICAL THERAPIST

## 2025-03-25 NOTE — PROGRESS NOTES
[facility]   Physical Therapy Treatment Note      Name: Norm Hoff [facility] Number: [id number]    Therapy Diagnosis:   Encounter Diagnoses   Name Primary?    Left medial knee pain Yes    Impaired functional mobility, balance, gait, and endurance          Physician: Khang Bradshaw NP    Visit Date: 3/25/2025    Physician Orders: PT Eval and Treat   Medical Diagnosis from Referral: S83.242A (ICD-10-CM) - Acute medial meniscus tear of left knee, initial encounter  Evaluation Date: 3/11/2025  Authorization Period Expiration: 12/31/2025  Plan of Care Expiration: 5/6/2025  Progress Note Due: 4/6/2025  Visit # / Visits authorized: 4/21   FOTO: 1/ 3     Precautions: Standard      Time In: 1715  Time Out: 1805  Total Billable Time: 30 minutes    PTA Visit #: 0/5       Subjective     Patient reports: his knee has not been hurting at all recently. He's concerned about pushing exercise too much while he's waiting for his valve repair. He feels ready to finish with therapy today, and will try to get back to some exercise once his cardiac condition allows.     He was compliant with home exercise program.   Response to previous treatment: good   Functional change: none     Pain: 0/10   Location: medial L knee      Objective      Objective Measures updated at progress report unless specified.   3/25/2025    Range of Motion:   Knee Left active Left Passive Right Active R passive   Flexion 120 125 120 125   Extension -2 0 0 +1               Lower Extremity Strength  Right LE   Left LE     Ankle dorsiflexion: 5/5 Ankle dorsiflexion: 5/5   Ankle plantarflexion: 4/5 Ankle plantarflexion: 4/5   Knee extension: 5/5 Knee extension: 4+/5   Knee flexion: 5/5 Knee flexion: 5/5   Hip flexion: 4+/5 Hip flexion: 4+/5   Hip external rotation 4+/5 Hip external rotation 4+/5   Hip internal rotation 5/5 Hip internal rotation 4+/5   Hip abduction:  4+/5 Hip abduction: 4+/5   Hip adduction: 5/5 Hip adduction:  "5/5   Hip extension: 4/5 Hip extension 4/5         Flexibility:               Hamstring: R = mod russo; L = mod russo              Quad: R = marked russo; L = marked russo              Piriformis: R: mod russo; L mod russo  Treatment     Norm received the treatments listed below:      therapeutic exercises to develop strength, endurance, ROM, flexibility, posture, and core stabilization for 5 minutes including:  Seated HS stretch 3 x 30"   Piriformis stretch 3 x 30" B   supine hip flexor quad stretch x 3 x 30" w/ strap     manual therapy techniques: Joint mobilizations, Manual traction, Myofacial release, and Soft tissue Mobilization were applied to the: L knee for 00 minutes, including:  joint mobs to patella, tibiofemoral jt     neuromuscular re-education activities to improve: Balance, Coordination, Kinesthetic, Sense, Proprioception, and Posture for 15 minutes. The following activities were included:  HL hip ADD x 2' x 5" holds   HL 3-way clam x 20 ea x GTB at knees   HL bridge with band x 20 x GTB at knees     therapeutic activities to improve functional performance for 30 minutes, including:  Reassessment and discharge    Matrix leg extensions x 3x10 x 15#   Matrix leg curls x3x10 x 15#     Standing heel raises/toe raises x 20 ea   Step ups x 20 x 6" step - HHAx1-2   + STS 2 x 10       Patient Education and Home Exercises       Education provided:   - none today    Written Home Exercises Provided: Pt instructed to continue prior HEP. Exercises were reviewed and Norm was able to demonstrate them prior to the end of the session.  Norm demonstrated good  understanding of the education provided. See Electronic Medical Record under Patient Instructions for exercises provided during therapy sessions    Assessment     Pt reports resolution of knee pain at this time. His continued activity limitations are related to his SOB related to CHF and he denies any limitation related to knee at this time. Some improvement noted with B LE " with MMT. Pt advised on resuming exercise as appropriate and as instructed following his upcoming heart surgery. Pt is discharged at this time due to improved pain and per his request.     Norm Is progressing well towards his goals.   Patient prognosis is Good.     Patient will continue to benefit from skilled outpatient physical therapy to address the deficits listed in the problem list box on initial evaluation, provide pt/family education and to maximize pt's level of independence in the home and community environment.     Patient's spiritual, cultural and educational needs considered and pt agreeable to plan of care and goals.     Anticipated barriers to physical therapy: standard, upcoming heart surgery     Goals: updated 03/25/2025   Short Term Goals (4 weeks)  1. Pt will demonstrate improvements in B knee flexibility for ease with ambulation (Progressing, not met)   2. Pt will demonstrate improvements in B knee strength to 4+/5 for ease with stair navigation. (met)   3. Pt will report <5/10 pain at worse within the L knee for ease with ADL's (met)      Long Term Goals (8 weeks)  1.Pt will demonstrate 5/5 strength grossly within the B LE for ease with community mobility and stair navigation (Progressing, not met)   2. Pt will report being independent with his/her HEP for maintenance of improvements gained during therapy sessions (met)   3. Pt will report ability to walk for 10 minutes or greater without limitation due to knee pain (Progressing, not met)   4. Pt will report <3/10 pain at worst within the L knee for ease with ADLs (met)     Plan     Discharge to independent HEP per pt request     Sheila Bhandari, PT, DPT

## 2025-04-14 ENCOUNTER — LAB VISIT (OUTPATIENT)
Dept: LAB | Facility: HOSPITAL | Age: OVER 89
End: 2025-04-14
Attending: INTERNAL MEDICINE
Payer: MEDICARE

## 2025-04-14 ENCOUNTER — EDUCATION (OUTPATIENT)
Dept: CARDIOLOGY | Facility: CLINIC | Age: OVER 89
End: 2025-04-14

## 2025-04-14 ENCOUNTER — OFFICE VISIT (OUTPATIENT)
Dept: CARDIOLOGY | Facility: CLINIC | Age: OVER 89
End: 2025-04-14
Payer: MEDICARE

## 2025-04-14 ENCOUNTER — TELEPHONE (OUTPATIENT)
Dept: INTERNAL MEDICINE | Facility: CLINIC | Age: OVER 89
End: 2025-04-14
Payer: MEDICARE

## 2025-04-14 VITALS
HEART RATE: 73 BPM | DIASTOLIC BLOOD PRESSURE: 73 MMHG | OXYGEN SATURATION: 98 % | WEIGHT: 184.31 LBS | SYSTOLIC BLOOD PRESSURE: 137 MMHG | HEIGHT: 72 IN | BODY MASS INDEX: 24.96 KG/M2

## 2025-04-14 DIAGNOSIS — I35.0 NONRHEUMATIC AORTIC VALVE STENOSIS: ICD-10-CM

## 2025-04-14 DIAGNOSIS — I10 ESSENTIAL HYPERTENSION: Primary | ICD-10-CM

## 2025-04-14 DIAGNOSIS — Z01.818 PRE-OP TESTING: ICD-10-CM

## 2025-04-14 DIAGNOSIS — Z01.818 PRE-OP TESTING: Primary | ICD-10-CM

## 2025-04-14 DIAGNOSIS — I42.0 DILATED CARDIOMYOPATHY: ICD-10-CM

## 2025-04-14 DIAGNOSIS — E78.2 MIXED HYPERLIPIDEMIA: ICD-10-CM

## 2025-04-14 DIAGNOSIS — I48.19 PERSISTENT ATRIAL FIBRILLATION: ICD-10-CM

## 2025-04-14 LAB
ANION GAP (OHS): 6 MMOL/L (ref 8–16)
BUN SERPL-MCNC: 26 MG/DL (ref 10–30)
CALCIUM SERPL-MCNC: 9.1 MG/DL (ref 8.7–10.5)
CHLORIDE SERPL-SCNC: 106 MMOL/L (ref 95–110)
CO2 SERPL-SCNC: 27 MMOL/L (ref 23–29)
CREAT SERPL-MCNC: 0.9 MG/DL (ref 0.5–1.4)
ERYTHROCYTE [DISTWIDTH] IN BLOOD BY AUTOMATED COUNT: 15.4 % (ref 11.5–14.5)
GFR SERPLBLD CREATININE-BSD FMLA CKD-EPI: >60 ML/MIN/1.73/M2
GLUCOSE SERPL-MCNC: 85 MG/DL (ref 70–110)
HCT VFR BLD AUTO: 41.4 % (ref 40–54)
HGB BLD-MCNC: 13.4 GM/DL (ref 14–18)
INDIRECT COOMBS: NORMAL
MCH RBC QN AUTO: 31.8 PG (ref 27–31)
MCHC RBC AUTO-ENTMCNC: 32.4 G/DL (ref 32–36)
MCV RBC AUTO: 98 FL (ref 82–98)
PLATELET # BLD AUTO: 189 K/UL (ref 150–450)
PMV BLD AUTO: 9.5 FL (ref 9.2–12.9)
POTASSIUM SERPL-SCNC: 4.6 MMOL/L (ref 3.5–5.1)
RBC # BLD AUTO: 4.21 M/UL (ref 4.6–6.2)
RH BLD: NORMAL
SODIUM SERPL-SCNC: 139 MMOL/L (ref 136–145)
WBC # BLD AUTO: 6.48 K/UL (ref 3.9–12.7)

## 2025-04-14 PROCEDURE — 99999 PR PBB SHADOW E&M-EST. PATIENT-LVL IV: CPT | Mod: PBBFAC,HCNC,, | Performed by: INTERNAL MEDICINE

## 2025-04-14 PROCEDURE — 1159F MED LIST DOCD IN RCRD: CPT | Mod: CPTII,S$GLB,, | Performed by: INTERNAL MEDICINE

## 2025-04-14 PROCEDURE — 3288F FALL RISK ASSESSMENT DOCD: CPT | Mod: CPTII,S$GLB,, | Performed by: INTERNAL MEDICINE

## 2025-04-14 PROCEDURE — 1101F PT FALLS ASSESS-DOCD LE1/YR: CPT | Mod: CPTII,S$GLB,, | Performed by: INTERNAL MEDICINE

## 2025-04-14 PROCEDURE — 80048 BASIC METABOLIC PNL TOTAL CA: CPT | Mod: HCNC

## 2025-04-14 PROCEDURE — 36415 COLL VENOUS BLD VENIPUNCTURE: CPT | Mod: HCNC

## 2025-04-14 PROCEDURE — 1126F AMNT PAIN NOTED NONE PRSNT: CPT | Mod: CPTII,S$GLB,, | Performed by: INTERNAL MEDICINE

## 2025-04-14 PROCEDURE — 99215 OFFICE O/P EST HI 40 MIN: CPT | Mod: S$GLB,,, | Performed by: INTERNAL MEDICINE

## 2025-04-14 PROCEDURE — 86850 RBC ANTIBODY SCREEN: CPT | Mod: HCNC | Performed by: INTERNAL MEDICINE

## 2025-04-14 PROCEDURE — 85027 COMPLETE CBC AUTOMATED: CPT | Mod: HCNC

## 2025-04-14 RX ORDER — NAPROXEN SODIUM 220 MG/1
81 TABLET, FILM COATED ORAL DAILY
Start: 2025-04-14 | End: 2026-04-14

## 2025-04-14 NOTE — TELEPHONE ENCOUNTER
Spoke with pt who has more prominent laura-gastric and per-pancreatic LA noted on recent scan. He is asymptomatic but is past due to see Heme-Onc/?Dr YESSENIA Prather. 'Will call to schedule follow up appt in Heme-Onc  Cami/Faviola  Would you please call Heme-Onc today to try and get pt scheduled to be seen as soon as possible/he's past due for follow up.Let me know after you get it scheduled.  Thanks so much

## 2025-04-14 NOTE — PROGRESS NOTES
Referring provider: No ref. provider found     Patient ID:  Norm Mendoza is a 89 y.o. y.o. male who presents for follow-up No chief complaint on file.    Norm Mendoza is a 89 y.o. male referred by Dr Ambrocio for evaluation of severe AS (NYHA Class II sx).    Since our last visit, he continues have shortness of breath.  He does say that Lasix has helped him a little bit.  He has now noticed increased fatigue as well.  Had a dobutamine stress echo that revealed true severe aortic stenosis.  No other acute events     The patient has undergone the following TAVR work-up:   ECHO (Date 06/18/24): SUJEY= 0.65 cm2, MG= 36mmHg, Peak Eric= 3.5 m/s, DI=0.16 EF= 40-45%.   DSE: SUJEY= 0.9 cm2, MG= 54, Peak Eric= 4.7   LHC (Date): pending    STS: 6.5%   Frailty: not frail  Iliacs are >10.5 on R   LVOT area by CTA is 6.38 cm2 (32.2 mm X 25.05 mm) and Avg Diameter is 28.5 per my independent review of images on [employer].   Incidental findings on CT: prominent LN in stomach, PCP messaged   CT Surgery risk assessment:  risk, per   due to needs  Rhythm issues: AF, IVCD  PFTs: N/A  KCCQ/5 meter walk: done  Comorbidities: age, AF, CHF        Norm Mendoza is a 34 mm  Evolut valve candidate via RTF access.    ROS     Objective:     /73 (BP Location: Left arm, Patient Position: Sitting)   Pulse 73   Ht 6' (1.829 m)   Wt 83.6 kg (184 lb 4.9 oz)   SpO2 98%   BMI 25.00 kg/m²     Physical Exam    Labs:     Lab Results   Component Value Date     01/07/2025    K 4.6 01/07/2025     01/07/2025    CO2 23 01/07/2025    BUN 24 01/07/2025    CREATININE 1.0 01/07/2025    ANIONGAP 10 01/07/2025     Lab Results   Component Value Date    HGBA1C 5.4 07/05/2024     Lab Results   Component Value Date     (H) 01/07/2025     (H) 12/15/2023     (H) 04/17/2019       Lab Results   Component Value Date    WBC 5.57 01/07/2025    HGB 14.5 01/07/2025    HCT 43.8 01/07/2025     01/07/2025    GRAN  4.2 01/07/2025    GRAN 74.5 (H) 01/07/2025     Lab Results   Component Value Date    CHOL 202 (H) 12/15/2023    HDL 55 12/15/2023    LDLCALC 131.4 12/15/2023    TRIG 78 12/15/2023       Meds:   Current Medications[1]      Assessment & Plan:     Essential hypertension  -continue current regimen    Persistent atrial fibrillation  S/p watchman  -continue metoprolol for rate control    Dilated cardiomyopathy  Continue GDMT per gen cards    Nonrheumatic aortic valve stenosis  Norm Mendoza is a 89 y.o. male referred by Dr Ambrocio for evaluation of severe AS (NYHA Class II sx).     The patient has undergone the following TAVR work-up:   ECHO (Date 06/18/24): SUJEY= 0.65 cm2, MG= 36mmHg, Peak Eric= 3.5 m/s, DI=0.16 EF= 40-45%.   DSE: SUJEY= 0.9 cm2, MG= 54, Peak Eric= 4.7   LHC (Date): pending    STS: 6.5%   Frailty: not frail  Iliacs are >10.5 on R   LVOT area by CTA is 6.38 cm2 (32.2 mm X 25.05 mm) and Avg Diameter is 28.5 per my independent review of images on [facility].   Incidental findings on CT: prominent LN in stomach, PCP messaged   CT Surgery risk assessment:  risk, per   due to needs  Rhythm issues: AF, IVCD  PFTs: N/A  KCCQ/5 meter walk: done  Comorbidities: age, AF, CHF        Norm Mendoza is a 34 mm  Evolut valve candidate via RTF access.    Plan:   -plan for LHC via RRA, asa monotherapy  -needs to see CTS as well to complete TAVR workup       Bakari Prather MD   Interventional Cardiology            [1]   Current Outpatient Medications:     albuterol (VENTOLIN HFA) 90 mcg/actuation inhaler, Inhale 2 puffs into the lungs every 6 (six) hours as needed for Wheezing. Rescue, Disp: 18 g, Rfl: 1    amLODIPine (NORVASC) 2.5 MG tablet, TAKE 1 TABLET(2.5 MG) BY MOUTH EVERY DAY, Disp: 90 tablet, Rfl: 3    clobetasoL (TEMOVATE) 0.05 % external solution, Pt to mix in 1 jar of cerave cream and apply to affected areas bid, Disp: 50 mL, Rfl: 3    diclofenac sodium (VOLTAREN ARTHRITIS PAIN) 1 % Gel, Apply 2 Grams  to painful right hand joint 2-3x/day as needed, Disp: 100 g, Rfl: 1    doxycycline (VIBRAMYCIN) 100 MG Cap, Take 1 capsule (100 mg total) by mouth every 12 (twelve) hours., Disp: 28 capsule, Rfl: 1    fluorouraciL (EFUDEX) 5 % cream, Compound fluorouracil 5% + calcipotriene 0.005% cream. Apply a pea-sized amount to entire bilateral lateral cheekbones BID x 7 days., Disp: 30 g, Rfl: 0    furosemide (LASIX) 20 MG tablet, 1 tab 2 days/week for heart/blood pressure, Disp: 30 tablet, Rfl: 0    irbesartan (AVAPRO) 150 MG tablet, Take 1 tablet (150 mg total) by mouth once daily., Disp: 90 tablet, Rfl: 2    metoprolol succinate (TOPROL-XL) 25 MG 24 hr tablet, TAKE 1/2 TABLET BY MOUTH DAILY, Disp: 45 tablet, Rfl: 3    multivitamin (THERAGRAN) per tablet, Take 1 tablet by mouth once daily., Disp: , Rfl:     mupirocin (BACTROBAN) 2 % ointment, Apply to affected area 3 times daily, Disp: 22 g, Rfl: 1    aspirin 81 MG Chew, Take 1 tablet (81 mg total) by mouth once daily., Disp: , Rfl:

## 2025-04-14 NOTE — H&P (VIEW-ONLY)
Referring provider: No ref. provider found     Patient ID:  Norm Mendoza is a 89 y.o. y.o. male who presents for follow-up No chief complaint on file.    Norm Mendoza is a 89 y.o. male referred by Dr Ambrocio for evaluation of severe AS (NYHA Class II sx).    Since our last visit, he continues have shortness of breath.  He does say that Lasix has helped him a little bit.  He has now noticed increased fatigue as well.  Had a dobutamine stress echo that revealed true severe aortic stenosis.  No other acute events     The patient has undergone the following TAVR work-up:   ECHO (Date 06/18/24): SUJEY= 0.65 cm2, MG= 36mmHg, Peak Eric= 3.5 m/s, DI=0.16 EF= 40-45%.   DSE: SUJEY= 0.9 cm2, MG= 54, Peak Eric= 4.7   LHC (Date): pending    STS: 6.5%   Frailty: not frail  Iliacs are >10.5 on R   LVOT area by CTA is 6.38 cm2 (32.2 mm X 25.05 mm) and Avg Diameter is 28.5 per my independent review of images on [employer].   Incidental findings on CT: prominent LN in stomach, PCP messaged   CT Surgery risk assessment:  risk, per   due to needs  Rhythm issues: AF, IVCD  PFTs: N/A  KCCQ/5 meter walk: done  Comorbidities: age, AF, CHF        Norm Mendoza is a 34 mm  Evolut valve candidate via RTF access.    ROS     Objective:     /73 (BP Location: Left arm, Patient Position: Sitting)   Pulse 73   Ht 6' (1.829 m)   Wt 83.6 kg (184 lb 4.9 oz)   SpO2 98%   BMI 25.00 kg/m²     Physical Exam    Labs:     Lab Results   Component Value Date     01/07/2025    K 4.6 01/07/2025     01/07/2025    CO2 23 01/07/2025    BUN 24 01/07/2025    CREATININE 1.0 01/07/2025    ANIONGAP 10 01/07/2025     Lab Results   Component Value Date    HGBA1C 5.4 07/05/2024     Lab Results   Component Value Date     (H) 01/07/2025     (H) 12/15/2023     (H) 04/17/2019       Lab Results   Component Value Date    WBC 5.57 01/07/2025    HGB 14.5 01/07/2025    HCT 43.8 01/07/2025     01/07/2025    GRAN  4.2 01/07/2025    GRAN 74.5 (H) 01/07/2025     Lab Results   Component Value Date    CHOL 202 (H) 12/15/2023    HDL 55 12/15/2023    LDLCALC 131.4 12/15/2023    TRIG 78 12/15/2023       Meds:   Current Medications[1]      Assessment & Plan:     Essential hypertension  -continue current regimen    Persistent atrial fibrillation  S/p watchman  -continue metoprolol for rate control    Dilated cardiomyopathy  Continue GDMT per gen cards    Nonrheumatic aortic valve stenosis  Norm Mendoza is a 89 y.o. male referred by Dr Ambrocio for evaluation of severe AS (NYHA Class II sx).     The patient has undergone the following TAVR work-up:   ECHO (Date 06/18/24): SUJEY= 0.65 cm2, MG= 36mmHg, Peak Eric= 3.5 m/s, DI=0.16 EF= 40-45%.   DSE: SUJEY= 0.9 cm2, MG= 54, Peak Eric= 4.7   LHC (Date): pending    STS: 6.5%   Frailty: not frail  Iliacs are >10.5 on R   LVOT area by CTA is 6.38 cm2 (32.2 mm X 25.05 mm) and Avg Diameter is 28.5 per my independent review of images on [employer].   Incidental findings on CT: prominent LN in stomach, PCP messaged   CT Surgery risk assessment:  risk, per   due to needs  Rhythm issues: AF, IVCD  PFTs: N/A  KCCQ/5 meter walk: done  Comorbidities: age, AF, CHF        Norm Mendoza is a 34 mm  Evolut valve candidate via RTF access.    Plan:   -plan for LHC via RRA, asa monotherapy  -needs to see CTS as well to complete TAVR workup       Bakari Prather MD   Interventional Cardiology            [1]   Current Outpatient Medications:     albuterol (VENTOLIN HFA) 90 mcg/actuation inhaler, Inhale 2 puffs into the lungs every 6 (six) hours as needed for Wheezing. Rescue, Disp: 18 g, Rfl: 1    amLODIPine (NORVASC) 2.5 MG tablet, TAKE 1 TABLET(2.5 MG) BY MOUTH EVERY DAY, Disp: 90 tablet, Rfl: 3    clobetasoL (TEMOVATE) 0.05 % external solution, Pt to mix in 1 jar of cerave cream and apply to affected areas bid, Disp: 50 mL, Rfl: 3    diclofenac sodium (VOLTAREN ARTHRITIS PAIN) 1 % Gel, Apply 2 Grams  to painful right hand joint 2-3x/day as needed, Disp: 100 g, Rfl: 1    doxycycline (VIBRAMYCIN) 100 MG Cap, Take 1 capsule (100 mg total) by mouth every 12 (twelve) hours., Disp: 28 capsule, Rfl: 1    fluorouraciL (EFUDEX) 5 % cream, Compound fluorouracil 5% + calcipotriene 0.005% cream. Apply a pea-sized amount to entire bilateral lateral cheekbones BID x 7 days., Disp: 30 g, Rfl: 0    furosemide (LASIX) 20 MG tablet, 1 tab 2 days/week for heart/blood pressure, Disp: 30 tablet, Rfl: 0    irbesartan (AVAPRO) 150 MG tablet, Take 1 tablet (150 mg total) by mouth once daily., Disp: 90 tablet, Rfl: 2    metoprolol succinate (TOPROL-XL) 25 MG 24 hr tablet, TAKE 1/2 TABLET BY MOUTH DAILY, Disp: 45 tablet, Rfl: 3    multivitamin (THERAGRAN) per tablet, Take 1 tablet by mouth once daily., Disp: , Rfl:     mupirocin (BACTROBAN) 2 % ointment, Apply to affected area 3 times daily, Disp: 22 g, Rfl: 1    aspirin 81 MG Chew, Take 1 tablet (81 mg total) by mouth once daily., Disp: , Rfl:

## 2025-04-14 NOTE — ASSESSMENT & PLAN NOTE
Norm Mendoza is a 89 y.o. male referred by Dr Ambrocio for evaluation of severe AS (NYHA Class II sx).     The patient has undergone the following TAVR work-up:   ECHO (Date 06/18/24): SUJEY= 0.65 cm2, MG= 36mmHg, Peak Eric= 3.5 m/s, DI=0.16 EF= 40-45%.   DSE: SUJEY= 0.9 cm2, MG= 54, Peak Eric= 4.7   LHC (Date): pending    STS: 6.5%   Frailty: not frail  Iliacs are >10.5 on R   LVOT area by CTA is 6.38 cm2 (32.2 mm X 25.05 mm) and Avg Diameter is 28.5 per my independent review of images on [employer].   Incidental findings on CT: prominent LN in stomach, PCP messaged   CT Surgery risk assessment:  risk, per   due to needs  Rhythm issues: AF, IVCD  PFTs: N/A  KCCQ/5 meter walk: done  Comorbidities: age, AF, CHF        Norm Mendoza is a 34 mm  Evolut valve candidate via RTF access.    Plan:   -plan for LHC via RRA, asa monotherapy  -needs to see CTS as well to complete TAVR workup

## 2025-04-14 NOTE — PROGRESS NOTES
OUTPATIENT CATHETERIZATION INSTRUCTIONS    You have been scheduled for a procedure in the catheterization lab on Monday, April 28, 2025.     Please report to the Cardiology Waiting Area on the Third floor of the hospital and check in at 12:00PM.  You will then be taken to the SSCU (Short Stay Cardiac Unit) and prepared for your procedure. Please be aware that this is not the time of your procedure but the time you are to arrive. The procedures are scheduled on an hourly basis; however, emergency cases take precedence over all other cases.       Nothing to eat after MIDNIGHT 12 AM You may have clear liquids until the time of your admission which should be 2 hours prior to your procedure.          You are encouraged to drink at least 16 ounces of clear liquids prior to your admission to SSCU.          Clear liquids include water, black coffee, clear juices, and performance drinks - no pulp or milk.         .    2.   You may take your regular morning medications with water.       3.   Hold the following medications prior to your procedure:NONE                       4.   Start taking 81 mg Aspirin daily, including day of procedure        The procedure will take 1-2 hours to perform. During the procedure you will receive IV sedation administered by a nurse.  Most patients will sleep through procedure.  After the procedure, you will either return to SSCU or spend some time in the cath lab recovery room.  If appropriate, your family will be able to stay with you during this time.      You should be discharged home that same day if you are stable and there are no complications.  Your doctor will determine whether you are discharged or kept overnight  based on your particular procedure and time that procedure is completed.   The results of your procedure will be discussed with you before you are discharged.     YOU WILL NOT BE ABLE TO DRIVE YOURSELF HOME.  SOMEONE MUST BE ABLE TO DRIVE YOU HOME FROM HOSPITAL.       If you  should have any questions, concerns, or need to change the date of your procedure, please call  NELLY Lucas @ [phone].      INSTRUCTIONS WERE GIVEN TO THE PATIENT VERBALLY AND THEY VERBALIZED UNDERSTANDING.  THEY DO NOT REQUIRE ANY SPECIAL NEEDS AND DO NOT HAVE ANY LEARNING BARRIERS.                      Directions for Reporting to Cardiology Waiting Area in the Hospital  If you park in the Parking Garage:  Take elevators to the1st floor of the parking garage.  Continue past the gift shop, coffee shop, and piano.  Take a right and go to the gold elevators. (Elevator B)  Take the elevator to the 3rd floor.  Follow the arrow on the sign on the wall that says Cath Lab Registration/EP Lab Registration.  Follow the long hallway all the way around until you come to a big open area.  This is the registration area.  Check in at Reception Desk.    OR    If family is dropping you off:  Have them drop you off at the front of the Hospital under the green overhang.  Enter through the doors and take a right.  Take the E elevators to the 3rd floor Cardiology Waiting Area.  Check in at the Reception Desk in the waiting room.                Insurance Authorization/Personal Assistance    The [facility] pre-service team will submit information to your insurance carrier for authorization.  Please note that we do not handle any insurance issues in our office.  If there are any financial obligations, such as co-pays, deductibles, or out of pocket fees, you will be contacted by a representative prior to your procedure.  It is the patient's responsibility to assure that their procedure is cleared in advance.  Our expert financial counselors are available to provide  patients with assistance for personalized cost estimates.  Financial counselors can be reached the following ways:    Pre Service Representatives:  [phone]  Financial Counselors:  [phone]  [facility] messaging - accessed via the patient portal  Live chat accessed  "through [email or web address]      FMLA/Disability Forms    ALL FMLA/Disability forms and claims are  processed through the Disability office.  All claims must be initiated through this office.  Once the forms are completed, they will be sent to the physician for signature.  Please allow 10 - 14 working days to have forms completed and returned to you.   You may reach this office in  the following ways:      Email:  [email or web address]  Fax:  [phone]  Phone:  [phone]    Release of Information and Medical Records      Our office will send a copy of any office notes or procedure notes to your referring provider.   ALL other medical records are released through the Medical Records office.  Phone:  [phone]  Fax:  [phone]    Miscellaneous Information    On-line information:  [email or web address]  Parking at [facility] is free and open 24/7  [facility] parking is located on the first floor of the garage and is available for a small fee Monday - Friday from 6a - 6 pm  [facility] located at [facility]:  [phone], or [email or web address]  If you need assistance with setting up or troubleshooting "MyChart" please call [phone]                        "

## 2025-04-15 DIAGNOSIS — I35.0 SEVERE AORTIC STENOSIS: Primary | ICD-10-CM

## 2025-04-15 DIAGNOSIS — I35.0 NONRHEUMATIC AORTIC VALVE STENOSIS: Primary | ICD-10-CM

## 2025-04-15 RX ORDER — DIPHENHYDRAMINE HCL 50 MG
50 CAPSULE ORAL ONCE
OUTPATIENT
Start: 2025-04-15 | End: 2025-04-15

## 2025-04-15 RX ORDER — SODIUM CHLORIDE 9 MG/ML
INJECTION, SOLUTION INTRAVENOUS CONTINUOUS
OUTPATIENT
Start: 2025-04-15 | End: 2025-04-15

## 2025-04-15 RX ORDER — SODIUM CHLORIDE 0.9 % (FLUSH) 0.9 %
10 SYRINGE (ML) INJECTION
Status: SHIPPED | OUTPATIENT
Start: 2025-04-15

## 2025-04-16 ENCOUNTER — TELEPHONE (OUTPATIENT)
Dept: HEMATOLOGY/ONCOLOGY | Facility: CLINIC | Age: OVER 89
End: 2025-04-16
Payer: MEDICARE

## 2025-04-16 ENCOUNTER — TELEPHONE (OUTPATIENT)
Dept: INTERNAL MEDICINE | Facility: CLINIC | Age: OVER 89
End: 2025-04-16
Payer: MEDICARE

## 2025-04-16 ENCOUNTER — TELEPHONE (OUTPATIENT)
Dept: CARDIOTHORACIC SURGERY | Facility: CLINIC | Age: OVER 89
End: 2025-04-16
Payer: MEDICARE

## 2025-04-16 NOTE — TELEPHONE ENCOUNTER
Called pt to scheduled CTS consult per Dr. Brown. Scheduled pt for appt on 4/30 with Dr. Tompkins. Pt verbalized understanding of appt date, time, and location. Pt denies any questions at this time. Appt slip mailed.

## 2025-04-16 NOTE — TELEPHONE ENCOUNTER
Called and spoke with pt about scheduled follow up appointment per request of PCP. Scheduled pts 1 year recall appointment in July. Dr. Prather nurse Leatha evaluated pts lab and informed pt that they are stable and that he does not need to be seen urgently for an appointment. Pt will come in for labs a week before his MD visit om July 11th.

## 2025-04-16 NOTE — TELEPHONE ENCOUNTER
----- Message from Faviola sent at 4/16/2025 10:49 AM CDT -----  Regarding: RE: [facility] staff scheduled Mr Mendoza MRN: [id number] on 7/18/25, which is when his annual is due.  ----- Message -----  From: Cami Lock MA  Sent: 4/15/2025   9:57 AM CDT  To: Faviola Mtz  Subject: [facility]                                          Good morningWould you please call Heme-Onc today to try and get pt scheduled to be seen as soon as possible/he's past due for follow up.Let me know after you get it scheduledThank Seamus,TONJA Almanza

## 2025-04-16 NOTE — TELEPHONE ENCOUNTER
----- Message from Faviola sent at 4/16/2025  9:08 AM CDT -----  Regarding: FW: [facility]  MRN:[id number]- Norm Mendoza.    Pt's PCP requested a follow-up visit with Dr Prather. Please call Pt to schedule.Thanks Faviola  ----- Message -----  From: Cami Lock MA  Sent: 4/15/2025   9:57 AM CDT  To: Faviola Mtz  Subject: [facility]                                          Good morningWould you please call Heme-Onc today to try and get pt scheduled to be seen as soon as possible/he's past due for follow up.Let me know after you get it scheduledThank You,TONJA Almanza

## 2025-04-24 ENCOUNTER — LAB VISIT (OUTPATIENT)
Dept: LAB | Facility: HOSPITAL | Age: OVER 89
End: 2025-04-24
Attending: INTERNAL MEDICINE
Payer: MEDICARE

## 2025-04-24 DIAGNOSIS — I42.0 DILATED CARDIOMYOPATHY: ICD-10-CM

## 2025-04-24 DIAGNOSIS — I11.0 HYPERTENSIVE HEART DISEASE WITH HEART FAILURE: ICD-10-CM

## 2025-04-24 LAB
ALBUMIN SERPL BCP-MCNC: 3.6 G/DL (ref 3.5–5.2)
ALP SERPL-CCNC: 105 UNIT/L (ref 40–150)
ALT SERPL W/O P-5'-P-CCNC: 22 UNIT/L (ref 10–44)
ANION GAP (OHS): 7 MMOL/L (ref 8–16)
AST SERPL-CCNC: 24 UNIT/L (ref 11–45)
BILIRUB SERPL-MCNC: 0.5 MG/DL (ref 0.1–1)
BNP SERPL-MCNC: 700 PG/ML (ref 0–99)
BUN SERPL-MCNC: 25 MG/DL (ref 10–30)
CALCIUM SERPL-MCNC: 9.4 MG/DL (ref 8.7–10.5)
CHLORIDE SERPL-SCNC: 107 MMOL/L (ref 95–110)
CO2 SERPL-SCNC: 29 MMOL/L (ref 23–29)
CREAT SERPL-MCNC: 1 MG/DL (ref 0.5–1.4)
GFR SERPLBLD CREATININE-BSD FMLA CKD-EPI: >60 ML/MIN/1.73/M2
GLUCOSE SERPL-MCNC: 102 MG/DL (ref 70–110)
POTASSIUM SERPL-SCNC: 4.7 MMOL/L (ref 3.5–5.1)
PROT SERPL-MCNC: 7.3 GM/DL (ref 6–8.4)
SODIUM SERPL-SCNC: 143 MMOL/L (ref 136–145)

## 2025-04-24 PROCEDURE — 83880 ASSAY OF NATRIURETIC PEPTIDE: CPT

## 2025-04-24 PROCEDURE — 80053 COMPREHEN METABOLIC PANEL: CPT

## 2025-04-24 PROCEDURE — 36415 COLL VENOUS BLD VENIPUNCTURE: CPT

## 2025-04-28 ENCOUNTER — TELEPHONE (OUTPATIENT)
Dept: ADMINISTRATIVE | Facility: CLINIC | Age: OVER 89
End: 2025-04-28
Payer: MEDICARE

## 2025-04-28 ENCOUNTER — RESULTS FOLLOW-UP (OUTPATIENT)
Dept: INTERNAL MEDICINE | Facility: CLINIC | Age: OVER 89
End: 2025-04-28

## 2025-04-28 ENCOUNTER — HOSPITAL ENCOUNTER (OUTPATIENT)
Facility: HOSPITAL | Age: OVER 89
Discharge: HOME | End: 2025-04-28
Attending: INTERNAL MEDICINE | Admitting: INTERNAL MEDICINE
Payer: MEDICARE

## 2025-04-28 VITALS
RESPIRATION RATE: 16 BRPM | BODY MASS INDEX: 24.38 KG/M2 | TEMPERATURE: 98 F | HEART RATE: 74 BPM | WEIGHT: 180 LBS | SYSTOLIC BLOOD PRESSURE: 130 MMHG | DIASTOLIC BLOOD PRESSURE: 86 MMHG | OXYGEN SATURATION: 95 % | HEIGHT: 72 IN

## 2025-04-28 DIAGNOSIS — I70.0 AORTIC ATHEROSCLEROSIS: ICD-10-CM

## 2025-04-28 DIAGNOSIS — E78.2 MIXED HYPERLIPIDEMIA: ICD-10-CM

## 2025-04-28 DIAGNOSIS — I35.0 SEVERE AORTIC STENOSIS: ICD-10-CM

## 2025-04-28 DIAGNOSIS — Z01.818 PRE-OP TESTING: ICD-10-CM

## 2025-04-28 DIAGNOSIS — I70.0 AORTIC ATHEROSCLEROSIS: Primary | ICD-10-CM

## 2025-04-28 LAB
OHS QRS DURATION: 144 MS
OHS QTC CALCULATION: 451 MS
POCT GLUCOSE: 96 MG/DL (ref 70–110)

## 2025-04-28 PROCEDURE — 25000003 PHARM REV CODE 250: Performed by: INTERNAL MEDICINE

## 2025-04-28 PROCEDURE — 63600175 PHARM REV CODE 636 W HCPCS: Performed by: STUDENT IN AN ORGANIZED HEALTH CARE EDUCATION/TRAINING PROGRAM

## 2025-04-28 PROCEDURE — 93454 CORONARY ARTERY ANGIO S&I: CPT | Performed by: INTERNAL MEDICINE

## 2025-04-28 PROCEDURE — 99152 MOD SED SAME PHYS/QHP 5/>YRS: CPT | Performed by: INTERNAL MEDICINE

## 2025-04-28 PROCEDURE — 93010 ELECTROCARDIOGRAM REPORT: CPT | Mod: ,,, | Performed by: INTERNAL MEDICINE

## 2025-04-28 PROCEDURE — C1887 CATHETER, GUIDING: HCPCS | Performed by: INTERNAL MEDICINE

## 2025-04-28 PROCEDURE — C1894 INTRO/SHEATH, NON-LASER: HCPCS | Performed by: INTERNAL MEDICINE

## 2025-04-28 PROCEDURE — C1769 GUIDE WIRE: HCPCS | Performed by: INTERNAL MEDICINE

## 2025-04-28 PROCEDURE — 99152 MOD SED SAME PHYS/QHP 5/>YRS: CPT | Mod: ,,, | Performed by: INTERNAL MEDICINE

## 2025-04-28 PROCEDURE — 25000003 PHARM REV CODE 250: Performed by: STUDENT IN AN ORGANIZED HEALTH CARE EDUCATION/TRAINING PROGRAM

## 2025-04-28 PROCEDURE — 99153 MOD SED SAME PHYS/QHP EA: CPT | Performed by: INTERNAL MEDICINE

## 2025-04-28 PROCEDURE — 93454 CORONARY ARTERY ANGIO S&I: CPT | Mod: 26,,, | Performed by: INTERNAL MEDICINE

## 2025-04-28 PROCEDURE — 25500020 PHARM REV CODE 255: Performed by: INTERNAL MEDICINE

## 2025-04-28 PROCEDURE — 93005 ELECTROCARDIOGRAM TRACING: CPT

## 2025-04-28 PROCEDURE — 27201423 OPTIME MED/SURG SUP & DEVICES STERILE SUPPLY: Performed by: INTERNAL MEDICINE

## 2025-04-28 PROCEDURE — 63600175 PHARM REV CODE 636 W HCPCS: Performed by: INTERNAL MEDICINE

## 2025-04-28 RX ORDER — LIDOCAINE HYDROCHLORIDE 20 MG/ML
INJECTION, SOLUTION EPIDURAL; INFILTRATION; INTRACAUDAL; PERINEURAL
Status: DISCONTINUED | OUTPATIENT
Start: 2025-04-28 | End: 2025-04-28 | Stop reason: HOSPADM

## 2025-04-28 RX ORDER — LOSARTAN POTASSIUM 50 MG/1
50 TABLET ORAL ONCE
Status: COMPLETED | OUTPATIENT
Start: 2025-04-28 | End: 2025-04-28

## 2025-04-28 RX ORDER — DIPHENHYDRAMINE HCL 50 MG
50 CAPSULE ORAL ONCE
Status: COMPLETED | OUTPATIENT
Start: 2025-04-28 | End: 2025-04-28

## 2025-04-28 RX ORDER — MIDAZOLAM HYDROCHLORIDE 1 MG/ML
INJECTION INTRAMUSCULAR; INTRAVENOUS
Status: DISCONTINUED | OUTPATIENT
Start: 2025-04-28 | End: 2025-04-28 | Stop reason: HOSPADM

## 2025-04-28 RX ORDER — FENTANYL CITRATE 50 UG/ML
INJECTION, SOLUTION INTRAMUSCULAR; INTRAVENOUS
Status: DISCONTINUED | OUTPATIENT
Start: 2025-04-28 | End: 2025-04-28 | Stop reason: HOSPADM

## 2025-04-28 RX ORDER — AMLODIPINE BESYLATE 2.5 MG/1
2.5 TABLET ORAL ONCE
Status: DISCONTINUED | OUTPATIENT
Start: 2025-04-28 | End: 2025-04-28 | Stop reason: HOSPADM

## 2025-04-28 RX ORDER — ACETAMINOPHEN 325 MG/1
650 TABLET ORAL EVERY 4 HOURS PRN
Status: DISCONTINUED | OUTPATIENT
Start: 2025-04-28 | End: 2025-04-28 | Stop reason: HOSPADM

## 2025-04-28 RX ORDER — HYDRALAZINE HYDROCHLORIDE 20 MG/ML
5 INJECTION INTRAMUSCULAR; INTRAVENOUS ONCE
Status: COMPLETED | OUTPATIENT
Start: 2025-04-28 | End: 2025-04-28

## 2025-04-28 RX ORDER — HEPARIN SOD,PORCINE/0.9 % NACL 1000/500ML
INTRAVENOUS SOLUTION INTRAVENOUS
Status: DISCONTINUED | OUTPATIENT
Start: 2025-04-28 | End: 2025-04-28 | Stop reason: HOSPADM

## 2025-04-28 RX ORDER — SODIUM CHLORIDE 9 MG/ML
INJECTION, SOLUTION INTRAVENOUS CONTINUOUS
Status: ACTIVE | OUTPATIENT
Start: 2025-04-28 | End: 2025-04-28

## 2025-04-28 RX ORDER — HEPARIN SODIUM 1000 [USP'U]/ML
INJECTION, SOLUTION INTRAVENOUS; SUBCUTANEOUS
Status: DISCONTINUED | OUTPATIENT
Start: 2025-04-28 | End: 2025-04-28 | Stop reason: HOSPADM

## 2025-04-28 RX ADMIN — METOPROLOL SUCCINATE 12.5 MG: 25 TABLET, EXTENDED RELEASE ORAL at 06:04

## 2025-04-28 RX ADMIN — LOSARTAN POTASSIUM 50 MG: 50 TABLET, FILM COATED ORAL at 06:04

## 2025-04-28 RX ADMIN — HYDRALAZINE HYDROCHLORIDE 5 MG: 20 INJECTION, SOLUTION INTRAMUSCULAR; INTRAVENOUS at 07:04

## 2025-04-28 RX ADMIN — SODIUM CHLORIDE: 9 INJECTION, SOLUTION INTRAVENOUS at 01:04

## 2025-04-28 RX ADMIN — DIPHENHYDRAMINE HYDROCHLORIDE 50 MG: 50 CAPSULE ORAL at 01:04

## 2025-04-28 NOTE — TELEPHONE ENCOUNTER
Mr. Collazo is 89 yo and has not had a statin prescribed in his recent past despite an LDL in the 100's. Last lipid panel was in . Lipid panel order pended. Will defer to PCP (with whom he has an appointment on ) to decide if statin therapy should be initiated (which could cause potential side effects such as myopathy).    ----- Message from NELLY Forman sent at 2025 12:07 PM CDT -----  Regarding: Order for LUIS M COLLAZO    Patient Name: LUIS M COLLAZO([id number])  Sex: Male  : 1926      PCP: NILTON RODRIGUEZ    Center: [facility]     Types of orders made on 2025: Diet, ECG, IV, Medications, Nursing,                                       Transfer    Order Date:4/15/2025  Ordering User:JOHNNY ESTRADA [[id number]]  Attending Provider:Vern Giang MD [[id number]]  Authorizing Provider: Vern Giang MD [[id number]]  Department:[facility] SHORT STAY CARDIAC UNIT[[id number]]    Order Specific Information  Order: Notify [facility] [Custom: [id number]]  Order #: [id number]: 1    Priority: Routine  Class: Hospital Performed    Associated Diagnoses      I35.0 Severe aortic stenosis    Released on: 2025 12:07 PM        Priority: Routine  Class: Hospital Performed    Associated Diagnoses      I35.0 Severe aortic stenosis    Released on: 2025 12:07 PM

## 2025-04-28 NOTE — NURSING
Vascband removed per protocol. Pt tolerated well. R wrist with gauze and tegaderm CDI. No bleeding or hematoma noted. Call light in reach.

## 2025-04-28 NOTE — BRIEF OP NOTE
Brief Operative Note:    : Vern Giang MD     Referring Physician: Vern Giang     All Operators: Surgeon(s):  Vern Giang MD Davis, Arthur P Jr., MD     Preoperative Diagnosis: Severe aortic stenosis [I35.0]     Postop Diagnosis: Severe aortic stenosis [I35.0]    Treatments/Procedures: Procedure(s) (LRB):  ANGIOGRAM, CORONARY ARTERY (N/A)    Access: Right radial artery    Findings: No significant obstructive coronary disease is present.     See catheterization report for full details.    Intervention: none    See catheterization report for full details.    Closure device: vasc band        Plan:  - Post cath protocol   - IVF @ 150 cc/hr x 4 hours  - Bed rest x 2 hours   - Continue aspirin 81 mg daily   - Risk factor reduction (BP <130/80 mmHg, glycemic control, etc)  - Follow up with outpatient cardiologist    Estimated Blood loss: 20 cc    Specimens removed: No    Lisa Macias MD  Interventional Cardiology PGY-4

## 2025-04-28 NOTE — Clinical Note
NOTES FOR VISIT:   - We will arrange treatment and follow-up pending the biopsy results.   - We discussed the actinic keratosis of the face and treatment options including PDT and Efudex. Patient prefers to think about this decision and will contact clinic with her decision. She is leaning more towards PDT, information for both treatments placed in patients AVS. Patient will treat the chest with Efudex.   - We discussed the nature of the benign lesion(s) and removal options if they become symptomatic including painful, itchy, bothersome or start bleeding.  - Follow up for skin exam in 1 year         FOLLOW-UP: Return in about 1 year (around 11/2/2023) for Yearly skin check.    Talya was seen today for office visit.    Diagnoses and all orders for this visit:    Squamous cell carcinoma vs wart of the left inferior tip of the nose  -     [facility] SEND OUT  -     SPECIMEN HANDLING  -     SHAV SKIN LES 0.5CM OR LESS FACE,FACIAL    Actinic keratoses  -     fluorouracil (Efudex) 5 % cream; Apply to chest BID for 2 weeks, rest off med for 2 weeks, then use again for 2 weeks.    Solar lentiginosis    Seborrheic keratosis       There are no discontinued medications.     Chief Complaint   Patient presents with   • Office Visit     Skin exam       HISTORY: Talya is here today for:  - skin exam of the face and neck   - Lesion on the nose, present for 1 month with pain when picked at. Patient treated as an acne pimple wit no help.     PAST DERMATOLOGY-SPECIFIC HISTORY:  Actinic keratosis with cryotherapy and PDT 01/2021    ACTIVE DERMATOLOGY CONDITIONS AND PRESCRIPTIONS:  N/A    PAST MEDICAL HISTORY, PAST SURGICAL HISTORY, SOCIAL HISTORY, AND FAMILY HISTORY WERE REVIEWED.    PHYSICAL EXAMINATION: CONSTITUTIONAL:  Talya is a 63 year old female who is well developed, well nourished, in no acute distress.  There were no vitals taken for this visit.. NEUROLOGIC AND PSYCHIATRIC: She has a normal mood and  The procedural consent was signed. The blood consent was signed. A history and physical note was completed in the chart. affect. SKIN: Complete examination, including palpation and careful visual examination of the area of interest revealed no other significant abnormality or eruption. I noted:    - She had a 4.5 x 3 mm keratotic, infiltrated, papule on her left inferior tip of the nose  - There was 4-12 mm scaly, red papules on their forehead, cheeks, temples, about 12 total lesions. As well as 4-8 on the decolletage of the chest, about 12-15 lesions   - She had 4-12 mm, regularly-pigmented, brown macules on her face.  - They had multiple 2-10 mm, scaly, brown lesion(s) with obvious horn pearls without  surrounding erythema on their face.     On 11/2/2022, I Parvin ESTRELLA LPN scribed the services personally performed by Dr. Ayden Avendano MD      The documentation recorded by the scribe accurately and completely reflects the service(s) I personally performed and the decisions made by me.

## 2025-04-28 NOTE — NURSING
Pt ambulated around unit and to restroom. Pt voided. Tolerated walk well. Vital signs remain stable. No c/o pain or discomfort at this time, resp even and unlabored. Vascband to radial site is CDI. No active bleeding. No hematoma noted..

## 2025-04-28 NOTE — DISCHARGE SUMMARY
Discharge Summary        Admit Date: 4/28/2025    Discharge Date:  4/28/2025    Attending Physician: Vern Giang MD    Discharge Physician: Lisa Macias MD    Principal Diagnoses: <principal problem not specified>  Indication for Admission: ANGIOGRAM, CORONARY ARTERY (N/A)    Discharged Condition: Good    Hospital Course:   Patient presented for outpatient coronary angiogram which went without complication. Coronary angiogram was nonobstructive. See full cath report in Epic for details. Hemostasis of patient's R Radial access site was achieved with Vasc band. Patient was monitored per post-cath protocol, and his R radial access site was c/d/i with no hematoma. Patient was able to ambulate without difficulty. He was feeling well and anticipating discharge home today.     Outpatient Plan:  - There were no medication changes    Diet: Cardiac diet    Activity: Ad henrry, wound care instructions provided    Disposition: Home or Self Care    Follow Up: follow up with cardiology as scheduled      Discharge Medications:      Medication List        CONTINUE taking these medications      albuterol 90 mcg/actuation inhaler  Commonly known as: VENTOLIN HFA  Inhale 2 puffs into the lungs every 6 (six) hours as needed for Wheezing. Rescue     amLODIPine 2.5 MG tablet  Commonly known as: NORVASC  TAKE 1 TABLET(2.5 MG) BY MOUTH EVERY DAY     aspirin 81 MG Chew  Take 1 tablet (81 mg total) by mouth once daily.     clobetasoL 0.05 % external solution  Commonly known as: TEMOVATE  Pt to mix in 1 jar of cerave cream and apply to affected areas bid     diclofenac sodium 1 % Gel  Commonly known as: VOLTAREN ARTHRITIS PAIN  Apply 2 Grams to painful right hand joint 2-3x/day as needed     doxycycline 100 MG Cap  Commonly known as: VIBRAMYCIN  Take 1 capsule (100 mg total) by mouth every 12 (twelve) hours.     fluorouraciL 5 % cream  Commonly known as: EFUDEX  Compound fluorouracil 5% + calcipotriene 0.005% cream. Apply a pea-sized  amount to entire bilateral lateral cheekbones BID x 7 days.     furosemide 20 MG tablet  Commonly known as: LASIX  1 tab 2 days/week for heart/blood pressure     irbesartan 150 MG tablet  Commonly known as: AVAPRO  Take 1 tablet (150 mg total) by mouth once daily.     metoprolol succinate 25 MG 24 hr tablet  Commonly known as: TOPROL-XL  TAKE 1/2 TABLET BY MOUTH DAILY     multivitamin per tablet  Commonly known as: THERAGRAN     mupirocin 2 % ointment  Commonly known as: BACTROBAN  Apply to affected area 3 times daily

## 2025-04-28 NOTE — NURSING
Date of last visit:  10/16/2019  Date of next visit:  Visit date not found    Requested Prescriptions     Pending Prescriptions Disp Refills    nabumetone (RELAFEN) 750 MG tablet 60 tablet 2     Sig: TAKE 1 TABLET TWICE A DAY    hydroCHLOROthiazide (HYDRODIURIL) 25 MG tablet 30 tablet 2     Sig: TAKE 1 TABLET DAILY Received report from NELLY Yoo. Patient s/p [facility], AAOx4. VSS, no c/o pain or discomfort at this time, resp even and unlabored. Vascband to right radial is CDI. No active bleeding. No hematoma noted. Post procedure protocol reviewed with patient and patient's friend, Claudette. Understanding verbalized. Friend at bedside. Nurse call bell within reach.

## 2025-04-28 NOTE — PLAN OF CARE
Patient arrived to room. PIV X  2  placed. Admit assessment completed. Plan of care discussed with patient. Significant other at bedside.  Nurse call bell within reach. Will monitor

## 2025-04-29 NOTE — NURSING
Patient discharged per MD orders. Instructions given on medications, wound care, activity, signs of infection, when to call MD, and follow up appointments. Pt and friend verbalized understanding. Telemetry and PIV x2 removed. Patient transferred off of unit via wheelchair by RN.

## 2025-04-29 NOTE — PROGRESS NOTES
Subjective:      Patient ID: Norm Mendoza is a 89 y.o. male.    Chief Complaint: No chief complaint on file.      HPI:  Norm Mendoza is a 89 y.o. male who presents as an initial consult for severe aortic stenosis and EDMUND evaluation.  He has a medical history significant for MALT, AF (watchman), HTN and HLD (please see below for full medical history). He developed dyspnea on exertion which prompted a cardiac evaluation .  During this evaluation, he was found to have severe aortic stenosis. He is currently in work up for a EDMUND with Dr. Brown.     Family and social history reviewed    Review of patient's allergies indicates:  No Known Allergies  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    Dr Vel LANE (peptic ulcer disease)     GI Bleeding 11/2018: Anticoagulant D/S'd and Gastric MALT tumor Dx'd    SCC (squamous cell carcinoma) 05/04/2023    L ventral forearm excised by APC    Skin cancer     x3, had mohs on nose    Squamous cell carcinoma excised 12/5/14    R lower back    Symptomatic anemia 12/06/2018    Urinary tract infection     x4     Past Surgical History:   Procedure Laterality Date    ACHILLES TENDON SURGERY      CATARACT EXTRACTION W/  INTRAOCULAR LENS IMPLANT Bilateral 2013    CLOSURE OF LEFT ATRIAL APPENDAGE USING DEVICE N/A 10/11/2019    Procedure: Left atrial appendage closure device;  Surgeon: Hi Crowder MD;  Location: [facility];  Service:  Cardiology;  Laterality: N/A;  AF, JACINTO, Watchman Implant, BSci, Gen, MB, 3 Prep    CYSTOSCOPY      bladder tumor    ESOPHAGOGASTRODUODENOSCOPY N/A 12/7/2018    Procedure: EGD (ESOPHAGOGASTRODUODENOSCOPY);  Surgeon: Austin Garcia MD;  Location: [facility] ENDO (2ND FLR);  Service: Endoscopy;  Laterality: N/A;    ESOPHAGOGASTRODUODENOSCOPY N/A 4/12/2019    Procedure: EGD (ESOPHAGOGASTRODUODENOSCOPY);  Surgeon: Austin Garcia MD;  Location: [facility] ENDO (4TH FLR);  Service: Endoscopy;  Laterality: N/A;  please schedule within 4 weeks    ESOPHAGOGASTRODUODENOSCOPY N/A 5/27/2021    Procedure: EGD (ESOPHAGOGASTRODUODENOSCOPY);  Surgeon: Austin Garcia MD;  Location: [facility] (2ND FLR);  Service: Endoscopy;  Laterality: N/A;  per Dr. Garcia done within the month with any provider/ ordered by oncology  2nd floor due to comorbidities  Watchman device  COVID test at [facility] on 5/24-GT    SKIN CANCER EXCISION      TREATMENT OF CARDIAC ARRHYTHMIA N/A 7/12/2019    Procedure: Cardioversion or Defibrillation;  Surgeon: Hi Crowder MD;  Location: [facility] EP LAB;  Service: Cardiology;  Laterality: N/A;  AF, JACINTO, DCCV, MAC, MB, 3 Prep    TREATMENT OF CARDIAC ARRHYTHMIA N/A 2/8/2022    Procedure: Cardioversion or Defibrillation;  Surgeon: Susan Orozco NP;  Location: [facility] EP LAB;  Service: Cardiology;  Laterality: N/A;  afib, DCCV, anes, MB, 3 Prep     Family History       Problem Relation (Age of Onset)    Hypertension Father    Stroke Father, Brother          Social History[1]  Current Medications[2]  Current medications Reviewed    Review of Systems   Constitutional:  Positive for fatigue. Negative for activity change, appetite change and fever.   HENT:  Negative for nosebleeds.    Respiratory:  Positive for shortness of breath. Negative for cough.    Cardiovascular:  Negative for chest pain, palpitations and leg swelling.   Gastrointestinal:  Negative for abdominal distention, abdominal pain and nausea.   Genitourinary:  Negative  for frequency.   Musculoskeletal:  Negative for arthralgias and myalgias.   Skin:  Negative for rash.   Neurological:  Negative for dizziness and numbness.   Hematological:  Does not bruise/bleed easily.     Objective:   Physical Exam  HENT:      Head: Normocephalic and atraumatic.   Eyes:      Extraocular Movements: Extraocular movements intact.   Cardiovascular:      Rate and Rhythm: Normal rate and regular rhythm.   Pulmonary:      Effort: Pulmonary effort is normal.   Abdominal:      General: Abdomen is flat.      Palpations: Abdomen is soft.   Musculoskeletal:         General: Normal range of motion.      Cervical back: Normal range of motion.   Skin:     General: Skin is warm and dry.      Capillary Refill: Capillary refill takes less than 2 seconds.   Neurological:      General: No focal deficit present.         Diagnostic Results: Reviewed   ECHO, Stress:    Left Ventricle: The left ventricle is normal in size. Mildly increased wall thickness. Mild septal thickening. There is concentric remodeling. Mild global hypokinesis present. There is mildly reduced systolic function with a visually estimated ejection fraction of 40 - 45%. Ejection fraction is approximately 43%.    Right Ventricle: Normal right ventricular cavity size. Wall thickness is normal. Systolic function is normal.    Left Atrium: Left atrium is severely dilated.    Right Atrium: Right atrium is severely dilated.    Aortic Valve: There is severe aortic valve sclerosis. There is moderate annular calcification present. Severely restricted motion. There is severe stenosis. Aortic valve area by VTI is 0.9 cm². Aortic valve peak velocity is 4.7 m/s. Mean gradient is 54 mmHg. The dimensionless index is 0.18.The peak gradients were with Dobutamine.    Tricuspid Valve: There is mild regurgitation.    Pulmonic Valve: There is mild regurgitation.    Pulmonary Artery: The estimated pulmonary artery systolic pressure is 64 mmHg.    IVC/SVC: Elevated venous  pressure at 15 mmHg.    Stress Protocol: The patient was infused intravenously with dobutamine. The patient received a graduated infusion of the stress agent beginning at  mcg/kg/min . The peak heart rate was 108 bpm, which is 89% of age predicted maximum heart rate. The patient reported no symptoms during the stress test. The test was stopped because the end of the protocol was reached.    Baseline ECG: The Baseline ECG reveals atrial fibrillation with LBBB. The axis is normal. The ST segments are normal.    Stress ECG: There is no ST segment deviation identified during the protocol. During stress, occasional PVCs are noted.    ECG Conclusion: The ECG portion of the study is uninterpretable due to left bundle branch block.    Post-stress Echo: The left ventricle systolic function is mildly decreased with an EF of 48%.    Post-stress Conclusion: The study is negative with no echocardiographic evidence of stress induced ischemia.    LHC: 4/28/2025    There was non-obstructive coronary artery disease..    The estimated blood loss was <50 mL.    CT EDMUND: 1/30/2025  Impression:  Severe calcifications of the aortic valve.  Arterial measurements as detailed in the body of the report.  New prominent enlarged gastrohepatic and peripancreatic lymph nodes of indeterminate etiology.  No soft tissue mass within the chest abdomen or pelvis to suggest underlying malignancy however malignancy cannot be excluded.  Given the location of the lymph nodes, consider the possibility of occult gastric or distal esophageal carcinoma..  Pulmonary findings suggestive of pulmonary edema.  Bilateral pleural effusions.  Enlarged pulmonary trunk, which can be seen with pulmonary artery hypertension.  This report was flagged in Epic as abnormal.  Assessment:   Severe Aortic Stenosis  Plan:            [1]   Social History  Socioeconomic History    Marital status:     Number of children: 3   Occupational History    Occupation: Financial  Work/     Employer: retired    Occupation: actor    Occupation:    Tobacco Use    Smoking status: Former     Current packs/day: 0.00     Average packs/day: 1 pack/day for 25.0 years (25.0 ttl pk-yrs)     Types: Cigarettes     Start date: 9/3/1945     Quit date: 9/3/1970     Years since quittin.6    Smokeless tobacco: Never   Substance and Sexual Activity    Alcohol use: Yes     Alcohol/week: 14.0 standard drinks of alcohol     Types: 14 Shots of liquor per week     Comment: 2 Drinks daily    Drug use: No    Sexual activity: Yes     Partners: Female   Social History Narrative    He is  with 2/3 kids living(2 sons/1 daughter who passed away); He has 6 Grandkids(5 under 10 y/o); He has 1 son here in [place] /1 grandson at [facility]; His other son(3 kids) lives in [place]; His Grand-daughter(Her Mother passed away) with her 2 kids lives in [place]    He had worked for [employer] which has dissolved but now works for [employer]    2025    He continues to work 4 hours/day as an [employer]     Interesting Family History: Pt's grandfather was in the Civil War     Social Drivers of Health     Financial Resource Strain: Low Risk  (2024)    Overall Financial Resource Strain (CARDIA)     Difficulty of Paying Living Expenses: Not hard at all   Food Insecurity: No Food Insecurity (2024)    Hunger Vital Sign     Worried About Running Out of Food in the Last Year: Never true     Ran Out of Food in the Last Year: Never true   Transportation Needs: No Transportation Needs (2024)    PRAPARE - Transportation     Lack of Transportation (Medical): No     Lack of Transportation (Non-Medical): No   Physical Activity: Inactive (2024)    Exercise Vital Sign     Days of Exercise per Week: 0 days     Minutes of Exercise per Session: 0 min   Stress: No Stress Concern Present (2024)    [employer] - Occupational Stress Questionnaire      Feeling of Stress : Not at all   Housing Stability: Low Risk  (2/1/2024)    Housing Stability Vital Sign     Unable to Pay for Housing in the Last Year: No     Number of Places Lived in the Last Year: 1     Unstable Housing in the Last Year: No   [2]   Current Outpatient Medications:     albuterol (VENTOLIN HFA) 90 mcg/actuation inhaler, Inhale 2 puffs into the lungs every 6 (six) hours as needed for Wheezing. Rescue, Disp: 18 g, Rfl: 1    amLODIPine (NORVASC) 2.5 MG tablet, TAKE 1 TABLET(2.5 MG) BY MOUTH EVERY DAY, Disp: 90 tablet, Rfl: 3    aspirin 81 MG Chew, Take 1 tablet (81 mg total) by mouth once daily., Disp: , Rfl:     clobetasoL (TEMOVATE) 0.05 % external solution, Pt to mix in 1 jar of cerave cream and apply to affected areas bid, Disp: 50 mL, Rfl: 3    diclofenac sodium (VOLTAREN ARTHRITIS PAIN) 1 % Gel, Apply 2 Grams to painful right hand joint 2-3x/day as needed, Disp: 100 g, Rfl: 1    doxycycline (VIBRAMYCIN) 100 MG Cap, Take 1 capsule (100 mg total) by mouth every 12 (twelve) hours., Disp: 28 capsule, Rfl: 1    fluorouraciL (EFUDEX) 5 % cream, Compound fluorouracil 5% + calcipotriene 0.005% cream. Apply a pea-sized amount to entire bilateral lateral cheekbones BID x 7 days., Disp: 30 g, Rfl: 0    furosemide (LASIX) 20 MG tablet, 1 tab 2 days/week for heart/blood pressure, Disp: 30 tablet, Rfl: 0    irbesartan (AVAPRO) 150 MG tablet, Take 1 tablet (150 mg total) by mouth once daily., Disp: 90 tablet, Rfl: 2    metoprolol succinate (TOPROL-XL) 25 MG 24 hr tablet, TAKE 1/2 TABLET BY MOUTH DAILY, Disp: 45 tablet, Rfl: 3    multivitamin (THERAGRAN) per tablet, Take 1 tablet by mouth once daily., Disp: , Rfl:     mupirocin (BACTROBAN) 2 % ointment, Apply to affected area 3 times daily, Disp: 22 g, Rfl: 1  No current facility-administered medications for this visit.

## 2025-04-30 ENCOUNTER — OFFICE VISIT (OUTPATIENT)
Dept: CARDIOLOGY | Facility: CLINIC | Age: OVER 89
End: 2025-04-30
Payer: MEDICARE

## 2025-04-30 ENCOUNTER — EDUCATION (OUTPATIENT)
Dept: CARDIOLOGY | Facility: CLINIC | Age: OVER 89
End: 2025-04-30

## 2025-04-30 ENCOUNTER — OFFICE VISIT (OUTPATIENT)
Dept: CARDIOTHORACIC SURGERY | Facility: CLINIC | Age: OVER 89
End: 2025-04-30
Payer: MEDICARE

## 2025-04-30 VITALS
SYSTOLIC BLOOD PRESSURE: 148 MMHG | BODY MASS INDEX: 25.12 KG/M2 | OXYGEN SATURATION: 97 % | HEART RATE: 85 BPM | DIASTOLIC BLOOD PRESSURE: 86 MMHG | WEIGHT: 185.44 LBS | HEIGHT: 72 IN

## 2025-04-30 VITALS
DIASTOLIC BLOOD PRESSURE: 79 MMHG | SYSTOLIC BLOOD PRESSURE: 155 MMHG | OXYGEN SATURATION: 97 % | HEART RATE: 77 BPM | WEIGHT: 185.19 LBS | BODY MASS INDEX: 25.08 KG/M2 | HEIGHT: 72 IN

## 2025-04-30 DIAGNOSIS — I35.0 NONRHEUMATIC AORTIC VALVE STENOSIS: ICD-10-CM

## 2025-04-30 DIAGNOSIS — I35.0 SEVERE AORTIC STENOSIS: Primary | ICD-10-CM

## 2025-04-30 DIAGNOSIS — I48.19 PERSISTENT ATRIAL FIBRILLATION: ICD-10-CM

## 2025-04-30 DIAGNOSIS — I10 ESSENTIAL HYPERTENSION: ICD-10-CM

## 2025-04-30 DIAGNOSIS — I35.0 NONRHEUMATIC AORTIC VALVE STENOSIS: Primary | ICD-10-CM

## 2025-04-30 DIAGNOSIS — E78.2 MIXED HYPERLIPIDEMIA: Primary | ICD-10-CM

## 2025-04-30 PROCEDURE — 99999 PR PBB SHADOW E&M-EST. PATIENT-LVL III: CPT | Mod: PBBFAC,,, | Performed by: INTERNAL MEDICINE

## 2025-04-30 PROCEDURE — 3288F FALL RISK ASSESSMENT DOCD: CPT | Mod: CPTII,HCNC,S$GLB, | Performed by: INTERNAL MEDICINE

## 2025-04-30 PROCEDURE — 1159F MED LIST DOCD IN RCRD: CPT | Mod: CPTII,HCNC,S$GLB, | Performed by: INTERNAL MEDICINE

## 2025-04-30 PROCEDURE — 99205 OFFICE O/P NEW HI 60 MIN: CPT | Mod: HCNC,S$GLB,, | Performed by: STUDENT IN AN ORGANIZED HEALTH CARE EDUCATION/TRAINING PROGRAM

## 2025-04-30 PROCEDURE — 1101F PT FALLS ASSESS-DOCD LE1/YR: CPT | Mod: CPTII,HCNC,S$GLB, | Performed by: INTERNAL MEDICINE

## 2025-04-30 PROCEDURE — 99215 OFFICE O/P EST HI 40 MIN: CPT | Mod: HCNC,S$GLB,, | Performed by: INTERNAL MEDICINE

## 2025-04-30 PROCEDURE — 1126F AMNT PAIN NOTED NONE PRSNT: CPT | Mod: CPTII,HCNC,S$GLB, | Performed by: INTERNAL MEDICINE

## 2025-04-30 PROCEDURE — 1126F AMNT PAIN NOTED NONE PRSNT: CPT | Mod: CPTII,HCNC,S$GLB, | Performed by: STUDENT IN AN ORGANIZED HEALTH CARE EDUCATION/TRAINING PROGRAM

## 2025-04-30 PROCEDURE — 99999 PR PBB SHADOW E&M-EST. PATIENT-LVL III: CPT | Mod: PBBFAC,HCNC,, | Performed by: STUDENT IN AN ORGANIZED HEALTH CARE EDUCATION/TRAINING PROGRAM

## 2025-04-30 RX ORDER — SODIUM CHLORIDE 0.9 % (FLUSH) 0.9 %
10 SYRINGE (ML) INJECTION
Status: DISCONTINUED | OUTPATIENT
Start: 2025-04-30 | End: 2025-05-01

## 2025-04-30 RX ORDER — SODIUM CHLORIDE 9 MG/ML
INJECTION, SOLUTION INTRAVENOUS CONTINUOUS
OUTPATIENT
Start: 2025-04-30 | End: 2025-04-30

## 2025-04-30 RX ORDER — DIPHENHYDRAMINE HCL 50 MG
50 CAPSULE ORAL ONCE
OUTPATIENT
Start: 2025-04-30 | End: 2025-04-30

## 2025-04-30 NOTE — ASSESSMENT & PLAN NOTE
Norm Mendoza is a 89 y.o. male referred by Dr Ambrocio for evaluation of severe AS (NYHA Class II sx).     The patient has undergone the following TAVR work-up:   ECHO (Date 06/18/24): SUJEY= 0.65 cm2, MG= 36mmHg, Peak Eric= 3.5 m/s, DI=0.16 EF= 40-45%.   DSE: SUJEY= 0.9 cm2, MG= 54, Peak Eric= 4.7   LHC (Date 04/28/25): non-obstructive disease   STS: 6.5%   Frailty: not frail  Iliacs are >10.5 on R   LVOT area by CTA is 6.38 cm2 (32.2 mm X 25.05 mm) and Avg Diameter is 28.5 per my independent review of images on appbackr.   Incidental findings on CT: prominent LN in stomach, PCP messaged   CT Surgery risk assessment:  risk, per   due to seen 04/29/25  Rhythm issues: AF, IVCD  PFTs: N/A  KCCQ/5 meter walk: done  Comorbidities: age, AF, CHF        Norm Mendoza is a 34 mm  Evolut valve candidate via RTF access.     Transcatheter Aortic valve replacement   Valve: 34mm Evolut  ECMO:  On Call  TAVR access: RTF  Valvuloplasty balloon: N/A  Viabahn size if needed: 11 x 5  Antithrombotic therapy: asa alone  Temporary pacing wire: No, Will pace from TAVR wire  Pacemaker risk factors: none   EP Consult ordered if post procedure arrhythmias develop  Post op destination: Stepdown  To be done during procedure:  Antibiotics given  CT Surgery present in lab for valve deployment per standard of care.  Surgeon :  Heart failure on admission?  CONSENTING:  The patient was told that the procedure carries around a 12.5% risk of permanent pacemaker requirement and that risk depends on the patients underlying conduction system as described in item 8.   Prior to the clin visit, all available records from referring provider were reviewed.  I have personally reviewed all the lab tests available related to the patient's case  The patient's images were reviewed by myself and the procedural planning was done with my own interpretation of the iliac and aortic anatomy based on CTA, angiography or JACINTO. I have reviewed all other  imaging studies relevant to the patient including echocardiography and coronary angiography.  Patient was educated abut the pathophysiology and natural history of severe aortic stenosis and was educated about the treatment options including surgical and transcatheter valve replacement. She agrees to be full code for the forseable future and to undergo workup for treatment of severe AS.   The risks, benefits, and alternatives of transcatheter aortic valve replacement were discussed with the patient. All questions were answered and informed consent was obtained. I had a detailed discussion with the patient regarding risk of stroke, MI, bleeding access site complications including limb loss, allergy, kidney failure including dialysis and death.  The patient understands the risks and benefits and wishes to go ahead with the procedure.  The referring Cardiologist was notified of the plan  All patient's questions were answered    Shared Decision Making:  This patient was presented to a multidisciplinary heart team involving both a Structural Heart Specialist (Interventional Cardiologist) and a Cardiothoracic Surgeon. The patient was involved in shared decision-making to define clearer goals for treatment and align health decisions with their current values.  The patient understands the risks and expected benefits of their treatment options.

## 2025-04-30 NOTE — PROGRESS NOTES
TRANSCATHETER AORTIC VALVE REPLACEMENT    For questions, concerns or rescheduling issues please call our office at [phone]      Your procedure has been scheduled on Thursday, June 5, 2025.     Please report to the Cardiology Waiting Area on the Third floor of the hospital and check in at 5 :00 AM.   You will then be taken to the SSCU (Short Stay Cardiac Unit) and prepared for your procedure. Please be aware that this is not the time of your procedure but the time you are to arrive.     Preperations for your procedure:  Shower with Dial soap the night before and the morning of your procedure. Be sure to clean your groin area well.  Nothing to eat or drink after MIDNIGHT the night before.                                            You may take your regular morning medications with as much water or clear liquid as necessary.   Bring your cane and/or walker with you to the hospital if you routinely use one of these devices.  Bring CPAP/BiPAP, or oxygen if you are on oxygen at home.  Call the office for any signs of infection ( fever, cough, pneumonia, urinary tract, etc.) We cannot implant a device in an infected patient.    Medications:  You may take your regular scheduled medications the morning of your procedure with as much water as necessary.  If you are diabetic and on Metformin (Glucophage), do not take it the day before, the day of, and 2 days after your procedure.    If you take a weekly GLP-1 Inhibitor  You must be off that medication for one full week prior to your procedure.  Anesthesia will cancel your procedure if you do not hold this medication for a minimum of 7 days prior to procedure.       How long will the procedure take?  The procedure takes approximately three to four hours.  After the procedure, you will go to an ICU or the Cardiac Step Down Unit.  The decision will be made by the valve team.  You will be monitored closely for the next several hours and remain overnight.       When can I go  home?  Your length of stay in the hospital, will be determined by your physician.  Be prepared to stay 1-3 days.  You will be discharged when your physician thinks it is safe for you to go home.  You must have someone to drive you home when you are discharged. It is strongly advised that you have a responsible adult to stay with you for the first 24 hours after discharge.       Follow Up After Valve Replacement:  You will be scheduled for follow up in one month and one year with an echo, lab work, and a clinic visit.    If you are in a research trial, your follow up will be slightly different and according to the trial requirements.  You can follow up with your regular cardiologist regarding any other heart issues.    DO NOT SCHEDULE ANY ELECTIVE PROCEDURES FOR 6 MONTHS AFTER TAVR.  THIS INCLUDES DENTAL WORK, COLONOSCOPY,ETC.             Directions for Reporting to Cardiology Waiting Area in the Hospital    If you park in the Parking Garage:  Take elevators to the1st floor of the parking garage.  Continue past the gift shop, coffee shop, and piano.  Take Elevator B (gold elevators) to 3rd floor and follow signs to  Cath Lab Registration/EP Lab Registration.  Follow the long hallway all the way around until you come to the waiting area.  Check in at Reception desk.    OR    If family is dropping you off:  Have them drop you off at the front of the Hospital under the green overhang.  Enter through the doors and take a right.  Take the E elevators to the 3rd floor Cardiology Waiting Area.  Check in at the Reception Desk in the waiting room.    Wheelchairs are available on the first floor of the parking garage.                     Insurance Authorization/Personal Assistance    The [facility] pre-service team will submit information to your insurance carrier for authorization.  Please note that we do not handle any insurance issues in our office.  If there are any financial obligations, such as co-pays, deductibles, or  "out of pocket fees, you will be contacted by a representative prior to your procedure.  It is the patient's responsibility to assure that their procedure is cleared in advance.  Our expert financial counselors are available to provide  patients with assistance for personalized cost estimates.  Financial counselors can be reached the following ways:    Pre Service Representatives:  [phone]  Financial Counselors:  [phone]  [employer] messaging - accessed via the patient portal  Live chat accessed through [email or web address]      FMLA/Disability Forms    ALL FMLA/Disability forms and claims are  processed through the Disability office.  All claims must be initiated through this office.  Once the forms are completed, they will be sent to the physician for signature.  Please allow 10 - 14 working days to have forms completed and returned to you.   You may reach this office in  the following ways:      Email:  [email or web address]  Fax:  [phone]  Phone:  [phone]    Release of Information and Medical Records      Our office will send a copy of any office notes or procedure notes to your referring provider.   ALL other medical records are released through the Medical Records office.  Phone:  [phone]  Fax:  [phone]    Miscellaneous Information    On-line information:  [email or web address]  Parking at [facility] is free and open 24/7  [facility] parking is located on the first floor of the garage and is available for a small fee Monday - Friday from 6a - 6 pm  [facility] located at [facility]:  [phone], or [email or web address]  If you need assistance with setting up or troubleshooting "[employer]" please call [phone]                           "

## 2025-04-30 NOTE — PROGRESS NOTES
TAVR DISCHARGE INSTRUCTIONS      1. General Post Op Instructions:  -   No lifting greater than 5 pounds for 5 days  -   No driving or operating heavy machinery for 5 days  -   You may shower as soon as you get home but no bathing or submerging in water       (lakes, pools, tub etc) for 1 week.                -   You may remove the dressing and replace with a band-aid.      -   Keep incision dry and clean.  No lotions, oils, or creams.  You may change the band-aid daily                   until a scab has formed over              -   You may feel a small lump in your groin area due to a closure device used after the procedure.                     The site may be black and blue or swollen and pink for a few days. If the site enlarges,                    becomes painful and/or starts to bleed,please call.               -    You may return to your regular activities as tolerated.        If you develop any fever, urinary tract infection, or any other signs of infection, please call our office immediately.    2.  Preventing infection on Your heart Valve:  -   DO NOT have any dental work, surgery, or any other elective procedures for 6 months.  -   Provide a copy of this card to your Dentist or Gastroenterologist to keep in your chart.  -  You DO need to take antibiotics prior to any routine dental cleaning, GI procedure (colonoscopy, endoscopy), (cystoscopy), or respiratory procedures (bronchoscopy).                -  You DO need antibiotics if you are having a procedure that requires cutting any infected area.              -  Your Dentist or Gastroenterologist will prescribe these for you prior to your appointment.        3.  Managing your heart Failure:  -    Weigh yourself daily and keep track of your weight  -    If you are on Lasix, continue to take it as prescribed.  -    If you gain 3 pounds overnight or 5 pounds over 5 days, double Lasix for 3 days or begin taking  "Lasix once a day for 3 days                   4.  Follow Up:  -   TAVR follow up protocol requires you to return for a one month and a one-year visit. We will schedule an echo, blood work  and an appointment to see a    member of our team.      -   Please continue to see your regular Cardiologist for all other issues.  -   You will receive an ID card from the  of your valve in 4-6 weeks.  Make a copy of the card, and keep it with you at all times.    5.  Discharge:  - If you have any questions or concerns after discharge, please do not hesitate to call our office and speak with a nurse at [phone]              - If you have any problems or concerns related to a Pacemaker or Event monitor please call the Arrhythmia department at [phone].             -  Please address any other concerns with your primary Cardiologist.                   -  We answer messages sent via the "[employer]" portal Monday - Friday 8am - 5pm.               -  Please do not send emergency messages through the portal.                  After hours and weekends, please call [phone] to speak to a Registered Nurse.             Insurance Authorization/Personal Assistance    The [facility] pre-service team will submit information to your insurance carrier for authorization.  Please note that we do not handle any insurance issues in our office.  If there are any financial obligations, such as co-pays, deductibles, or out of pocket fees, you will be contacted by a representative prior to your procedure.  It is the patient's responsibility to assure that their procedure is cleared in advance.  Our expert financial counselors are available to provide  patients with assistance for personalized cost estimates.  Financial counselors can be reached the following ways:    Pre Service Representatives:  [phone]  Financial Counselors:  [phone]  [employer] messaging - accessed via the patient portal  Live chat accessed through " "[email or web address]      FMLA/Disability Forms    ALL FMLA/Disability forms and claims are  processed through the Disability office.  All claims must be initiated through this office.  Once the forms are completed, they will be sent to the physician for signature.  Please allow 10 - 14 working days to have forms completed and returned to you.   You may reach this office in  the following ways:      Email:  [email or web address]  Fax:  [phone]  Phone:  [phone]    Release of Information and Medical Records      Our office will send a copy of any office notes or procedure notes to your referring provider.   ALL other medical records are released through the Medical Records office.  Phone:  [phone]  Fax:  [phone]    Miscellaneous Information    On-line information:  [email or web address]  Parking at [facility] is free and open 24/7  [facility] parking is located on the first floor of the garage and is available for a small fee Monday - Friday from 6a - 6 pm  [place] located at [facility]:  [phone], or [email or web address]  If you need assistance with setting up or troubleshooting "MyChart" please call [phone]                                        "

## 2025-04-30 NOTE — PROGRESS NOTES
"Subjective:       Patient ID: Norm Mendoza is a 89 y.o. male.    Chief Complaint:   Follow-up, Hypertension, and Hyperlipidemia    HPI: Mr Mendoza presents for follow up HTN and Chronic Issues      HTN: Med Tx 1- Toprol XL 25mg at 1/2 tab daily, 2- Avapro 150mg QD, 3- Norvasc 2.5mg QD         Digital Home BPs:127//95     Atrial Fibrillation:  Med Tx 1- ASA QD, 2- Toprol XL 25mg at .5 tab QD  1st dx'd 6/2019; + GI bleed/2ndary to MALT GI tumor resulted in Xaralto being HELD; 7/2019: s/p DCCE; 10/2019: s/p Watchman device/Was on Amiodarone transiently-now off; 2/2022-s/p 2nd  DCCV; Presently-Rate controlled and on ASA QD     Aortic Stenosis: "Severe" by last ECHO 6/2024; Cardiologist/Dr Ambrocio has referred him to Interventional Cards/Dr Pizarro; s/p Eval/Dr Pizarro and TAVR scheduled for 6/5/25     MALT Tumor/Stage I Lymphoma: Heme-Onc/Dr Harper/Dr YESSENIA Prather;  Completed Rituximab x4 on 3/7/19; Presently on conservative following     Etoh: + Glass of wine with dinner and a glass of bourbon after dinner; He states that he is not "dependant" on the alcohol and will cut out the bourbon for health maintenance purposes    History of Present Illness    CHIEF COMPLAINT:  Mr. Mendoza presents today for follow up after recent angiogram and cardiac valve evaluation    CARDIOVASCULAR:  He underwent angiogram on the 28th of this month with post-procedure blood pressure remaining persistently elevated for several hours requiring extended monitoring. He reports progressive weakness and shortness of breath, particularly when climbing stairs, requiring rest after exertion. He has a history of atrial fibrillation.    CURRENT MEDICATIONS:  He takes irbesartan 150mg daily, amlodipine 2.5mg daily, metoprolol XL 12.5mg daily, and Lasix every two days. He started baby aspirin 1-2 weeks ago as recommended by Dr. Mendoza's team.    MEDICAL HISTORY:  He has history of MALT lymphoma. 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 He has history of possible left meniscus tear that resolved after physical therapy, and reports intermittent muscular back pain.      ROS:  Respiratory: +shortness of breath, +exertional dyspnea  Cardiovascular: +lower extremity edema  Musculoskeletal: -joint pain, +back pain, +muscle pain, +muscle spasms  Neurological: +weakness           Past Medical, Surgical, Social History: Please see as stated in Epic chart which has been reviewed.    Current Medications[1]    Review of Systems   Respiratory:  Positive for shortness of breath.    Musculoskeletal:         Left Knee pain much better to gone       Objective:      Lab Results   Component Value Date    WBC 6.48 04/14/2025    HGB 13.4 (L) 04/14/2025    HCT 41.4 04/14/2025     04/14/2025    CHOL 202 (H) 12/15/2023    TRIG 78 12/15/2023    HDL 55 12/15/2023    ALT 22 04/24/2025    AST 24 04/24/2025     04/24/2025    K 4.7 04/24/2025     04/24/2025    CREATININE 1.0 04/24/2025    BUN 25 04/24/2025    CO2 29 04/24/2025    TSH 2.234 07/01/2024    PSA 2.2 07/01/2024    INR 1.0 02/03/2022    HGBA1C 5.4 07/05/2024     Physical Exam  Vitals reviewed.   Constitutional:       Appearance: Normal appearance.   Neck:      Comments: No JVD  Cardiovascular:      Rate and Rhythm: Normal rate. Rhythm irregular.      Comments: VHR=78  Pulmonary:      Breath sounds: Wheezing present.      Comments: Lungs clears except for fine apical wheezes upon lying down  Abdominal:      General: Bowel sounds are normal.      Palpations: Abdomen is soft. There is no mass.      Tenderness: There is no abdominal tenderness.   Musculoskeletal:      Right lower leg: Edema present.      Left lower leg: Edema present.      Comments: Trace bilateral pre-tibial edema   Lymphadenopathy:      Cervical: No cervical adenopathy.   Skin:     Findings: Lesion present.      Comments: + Well  healed left forearm lesion s/p Mohs   Neurological:      Mental Status: He is alert.           Health Maintenance:  Last Colonoscopy:  2012- Normal?    Last PSA: 7/2024- Normal          Vitals:    05/01/25 0932 05/01/25 1051   BP: 124/64 110/60   BP Location: Right arm Right arm   Patient Position: Sitting Sitting   Pulse: 67    Resp: 18    Temp: 96.9 °F (36.1 °C)    TempSrc: Other (see comments)    SpO2: 98%    Weight: 84.4 kg (185 lb 15.3 oz)    Height: 6' (1.829 m)           Assessment:       1. Essential hypertension    2. Aortic stenosis, severe    3. Persistent atrial fibrillation    4. Presence of Watchman left atrial appendage closure device    5. MALT lymphoma    6. Degeneration of intervertebral disc of lumbar region with discogenic back pain and lower extremity pain    7. Hyperlipidemia, unspecified hyperlipidemia type        Plan:     Health Maintenance   Topic Date Due    TETANUS VACCINE  01/19/2027    Lipid Panel  12/15/2028    Shingles Vaccine  Completed    Influenza Vaccine  Completed    COVID-19 Vaccine  Completed    RSV Vaccine (Age 60+ and Pregnant patients)  Completed    Pneumococcal Vaccines (Age 50+)  Completed        Norm was seen today for follow-up, hypertension and hyperlipidemia.    Diagnoses and all orders for this visit:    Essential hypertension/controlled        -     Continue: Med Tx 1- Toprol XL 25mg at 1/2 tab daily, 2- Avapro 150mg QD, 3- Norvasc 2.5mg QD  -     Comprehensive Metabolic Panel; Future  -     CBC Auto Differential; Future  -     TSH; Future    Aortic stenosis, severe/pending TAVR per Dr. Reji Brown in Cardiology    Persistent atrial fibrillation/status post Watchman    Presence of Watchman left atrial appendage closure device    MALT lymphoma/positive increased perigastric lymphadenopathy noted on recent CT abdomen with patient asymptomatic          -    Hematology-Oncology appointment with Dr. Prather scheduled and pending    Degeneration of intervertebral disc of  lumbar region with discogenic back pain and lower extremity pain    Hyperlipidemia, unspecified hyperlipidemia type  -     Lipid Panel; Future    Health maintenance/with recent history of squamous cell cancer removal from left arm         -    schedule follow-up appointment in dermatology with Dr. Kerr         -    return to clinic x6 months with one-week prior fasting lab or see patient sooner if needed    Assessment & Plan    ATRIAL FIBRILLATION:   Monitored patient's history of atrial fibrillation and its impact on overall health.   Evaluated gradually increasing weakness which may be related to this condition.   Assessed the necessity of valve replacement to manage atrial fibrillation and improve overall health.   Prescribed irbesartan 150 mg daily, amlodipine 2.5 mg daily, and Metoprolol XL 12.5 mg daily for management.    HYPERTENSION:   Monitored blood pressure which was persistently elevated after angiogram but has shown improvement.   Mr. Mendoza is utilizing a digital program for home monitoring.   Blood pressure was within normal limits this morning, though elevated on Monday.   Discussed management and medication adjustments.   Current regimen includes irbesartan 150 mg daily, amlodipine 2.5 mg daily, and Metoprolol XL 12.5 mg daily.    WEAKNESS:   Monitored patient's report of progressive weakness and its impact on activities of daily living.   Discussed relationship between weakness and the need for valve replacement.   Planned for valve replacement to address this symptom and improve overall health condition.    SHORTNESS OF BREATH:   Evaluated patient's report of dyspnea, particularly during stair climbing.   Discussed correlation between shortness of breath and the need for valve replacement.   Planned for valve replacement to address this symptom and improve cardiopulmonary function.    BACK PAIN:   Monitored patient's experience of muscle spasms and intermittent back pain.   Evaluated as  non-constant and related to muscular issues rather than skeletal pathology.    HISTORY OF NON-HODGKIN LYMPHOMA:   CT Abdomen + Swollen lymph nodes in perigastric location-pt asymptomatic    Referred patient back to hematology-oncology with follow-up visit scheduled in July for lymph node evaluation.    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   Surgical site is healing appropriately.   Advised patient to follow up with dermatologist Dr. Ferrari every 6 months for routine skin exam and to evaluate healing of the surgical site.    HISTORY OF KNEE ISSUES:   Monitored history of knee issues, including a torn meniscus that has completely resolved following physical therapy.   Discussed resolution and reviewed past MRI findings.           This note was generated with the assistance of ambient listening technology. Verbal consent was obtained by the patient and accompanying visitor(s) for the recording of patient appointment to facilitate this note. I attest to having reviewed and edited the generated note for accuracy, though some syntax or spelling errors may persist. Please contact the author of this note for any clarification.           [1]   Current Outpatient Medications   Medication Sig Dispense Refill    amLODIPine (NORVASC) 2.5 MG tablet TAKE 1 TABLET(2.5 MG) BY MOUTH EVERY DAY 90 tablet 3    aspirin 81 MG Chew Take 1 tablet (81 mg total) by mouth once daily.      clobetasoL (TEMOVATE) 0.05 % external solution Pt to mix in 1 jar of cerave cream and apply to affected areas bid 50 mL 3    diclofenac sodium (VOLTAREN ARTHRITIS PAIN) 1 % Gel Apply 2 Grams to painful right hand joint 2-3x/day as needed 100 g 1    doxycycline (VIBRAMYCIN) 100 MG Cap Take 1 capsule (100 mg total) by mouth every 12 (twelve) hours. 28 capsule 1    fluorouraciL (EFUDEX) 5 % cream Compound fluorouracil 5% + calcipotriene 0.005% cream. Apply a pea-sized  amount to entire bilateral lateral cheekbones BID x 7 days. 30 g 0    furosemide (LASIX) 20 MG tablet 1 tab 2 days/week for heart/blood pressure 30 tablet 0    irbesartan (AVAPRO) 150 MG tablet Take 1 tablet (150 mg total) by mouth once daily. 90 tablet 2    metoprolol succinate (TOPROL-XL) 25 MG 24 hr tablet TAKE 1/2 TABLET BY MOUTH DAILY 45 tablet 3    multivitamin (THERAGRAN) per tablet Take 1 tablet by mouth once daily.      mupirocin (BACTROBAN) 2 % ointment Apply to affected area 3 times daily 22 g 1     No current facility-administered medications for this visit.

## 2025-04-30 NOTE — PROGRESS NOTES
Referring provider: No ref. provider found     Patient ID:  Norm Mendoza is a 89 y.o. y.o. male who presents for follow-up No chief complaint on file.    Norm Mendoza is a 89 y.o. male referred by Dr Ambrocio for evaluation of severe AS (NYHA Class II sx).     He presents today to discuss consents for TAVR. He states he continues to have DUGAN and is ready to have the procedure done. No other acute events.     The patient has undergone the following TAVR work-up:   ECHO (Date 06/18/24): SUJEY= 0.65 cm2, MG= 36mmHg, Peak Eric= 3.5 m/s, DI=0.16 EF= 40-45%.   DSE: SUJEY= 0.9 cm2, MG= 54, Peak Eric= 4.7   LHC (Date 04/28/25): non-obstructive disease   STS: 6.5%   Frailty: not frail  Iliacs are >10.5 on R   LVOT area by CTA is 6.38 cm2 (32.2 mm X 25.05 mm) and Avg Diameter is 28.5 per my independent review of images on [email or web address].   Incidental findings on CT: prominent LN in stomach, PCP messaged   CT Surgery risk assessment:  risk, per   due to seen 04/29/25  Rhythm issues: AF, IVCD  PFTs: N/A  KCCQ/5 meter walk: done  Comorbidities: age, AF, CHF        Norm Mendoza is a 34 mm  Evolut valve candidate via RTF access.    Review of Systems   All other systems reviewed and are negative.     Objective:     BP (!) 155/79 (BP Location: Left arm, Patient Position: Sitting)   Pulse 77   Ht 6' (1.829 m)   Wt 84 kg (185 lb 3 oz)   SpO2 97%   BMI 25.12 kg/m²     Physical Exam  Vitals and nursing note reviewed.   Constitutional:       Appearance: Normal appearance.   HENT:      Head: Normocephalic and atraumatic.      Right Ear: External ear normal.      Left Ear: External ear normal.      Nose: Nose normal.      Mouth/Throat:      Mouth: Mucous membranes are moist.   Eyes:      Pupils: Pupils are equal, round, and reactive to light.   Cardiovascular:      Rate and Rhythm: Normal rate and regular rhythm.      Pulses: Normal pulses.      Heart sounds: Murmur heard.   Pulmonary:      Effort: Pulmonary effort is  normal.   Abdominal:      General: Abdomen is flat.   Musculoskeletal:         General: Normal range of motion.      Cervical back: Normal range of motion.   Skin:     General: Skin is warm.      Capillary Refill: Capillary refill takes less than 2 seconds.   Neurological:      General: No focal deficit present.      Mental Status: He is alert and oriented to person, place, and time. Mental status is at baseline.     Labs:     Lab Results   Component Value Date     04/24/2025     01/07/2025    K 4.7 04/24/2025    K 4.6 01/07/2025     04/24/2025     01/07/2025    CO2 29 04/24/2025    CO2 23 01/07/2025    BUN 25 04/24/2025    CREATININE 1.0 04/24/2025    ANIONGAP 7 (L) 04/24/2025     Lab Results   Component Value Date    HGBA1C 5.4 07/05/2024     Lab Results   Component Value Date     (H) 04/24/2025     (H) 01/07/2025     (H) 12/15/2023     (H) 04/17/2019       Lab Results   Component Value Date    WBC 6.48 04/14/2025    HGB 13.4 (L) 04/14/2025    HGB 14.5 01/07/2025    HCT 41.4 04/14/2025    HCT 43.8 01/07/2025     04/14/2025     01/07/2025    GRAN 4.2 01/07/2025    GRAN 74.5 (H) 01/07/2025     Lab Results   Component Value Date    CHOL 202 (H) 12/15/2023    HDL 55 12/15/2023    LDLCALC 131.4 12/15/2023    TRIG 78 12/15/2023       Meds:   Current Medications[1]      Assessment & Plan:     Nonrheumatic aortic valve stenosis  Norm Mendoza is a 89 y.o. male referred by Dr Ambrocio for evaluation of severe AS (NYHA Class II sx).     The patient has undergone the following TAVR work-up:   ECHO (Date 06/18/24): SUJEY= 0.65 cm2, MG= 36mmHg, Peak Eric= 3.5 m/s, DI=0.16 EF= 40-45%.   DSE: SUJEY= 0.9 cm2, MG= 54, Peak Eric= 4.7   [facility] (Date 04/28/25): non-obstructive disease   STS: 6.5%   Frailty: not frail  Iliacs are >10.5 on R   LVOT area by CTA is 6.38 cm2 (32.2 mm X 25.05 mm) and Avg Diameter is 28.5 per my independent review of images on [employer].    Incidental findings on CT: prominent LN in stomach, PCP messaged   CT Surgery risk assessment:  risk, per Dr  due to seen 04/29/25  Rhythm issues: AF, IVCD  PFTs: N/A  KCCQ/5 meter walk: done  Comorbidities: age, AF, CHF        Norm Mendoza is a 34 mm  Evolut valve candidate via RTF access.     Transcatheter Aortic valve replacement   Valve: 34mm Evolut  ECMO:  On Call  TAVR access: RTF  Valvuloplasty balloon: N/A  Viabahn size if needed: 11 x 5  Antithrombotic therapy: asa alone  Temporary pacing wire: No, Will pace from TAVR wire  Pacemaker risk factors: none   EP Consult ordered if post procedure arrhythmias develop  Post op destination: Stepdown  To be done during procedure:  Antibiotics given  CT Surgery present in lab for valve deployment per standard of care.  Surgeon :  Heart failure on admission?  CONSENTING:  The patient was told that the procedure carries around a 12.5% risk of permanent pacemaker requirement and that risk depends on the patients underlying conduction system as described in item 8.   Prior to the clinc visit, all available records from referring provider were reviewed.  I have personally reviewed all the lab tests available related to the patient's case  The patient's images were reviewed by myself and the procedural planning was done with my own interpretation of the iliac and aortic anatomy based on CTA, angiography or JACINTO. I have reviewed all other imaging studies relevant to the patient including echocardiography and coronary angiography.  Patient was educated abut the pathophysiology and natural history of severe aortic stenosis and was educated about the treatment options including surgical and transcatheter valve replacement. She agrees to be full code for the forseable future and to undergo workup for treatment of severe AS.   The risks, benefits, and alternatives of transcatheter aortic valve replacement were discussed with the patient. All questions were answered and  informed consent was obtained. I had a detailed discussion with the patient regarding risk of stroke, MI, bleeding access site complications including limb loss, allergy, kidney failure including dialysis and death.  The patient understands the risks and benefits and wishes to go ahead with the procedure.  The referring Cardiologist was notified of the plan  All patient's questions were answered    Shared Decision Making:  This patient was presented to a multidisciplinary heart team involving both a Structural Heart Specialist (Interventional Cardiologist) and a Cardiothoracic Surgeon. The patient was involved in shared decision-making to define clearer goals for treatment and align health decisions with their current values.  The patient understands the risks and expected benefits of their treatment options.          Persistent atrial fibrillation  -s/p tr Prather MD   Interventional Cardiology              [1]    Current Outpatient Medications:     albuterol (VENTOLIN HFA) 90 mcg/actuation inhaler, Inhale 2 puffs into the lungs every 6 (six) hours as needed for Wheezing. Rescue, Disp: 18 g, Rfl: 1    amLODIPine (NORVASC) 2.5 MG tablet, TAKE 1 TABLET(2.5 MG) BY MOUTH EVERY DAY, Disp: 90 tablet, Rfl: 3    aspirin 81 MG Chew, Take 1 tablet (81 mg total) by mouth once daily., Disp: , Rfl:     clobetasoL (TEMOVATE) 0.05 % external solution, Pt to mix in 1 jar of cerave cream and apply to affected areas bid, Disp: 50 mL, Rfl: 3    diclofenac sodium (VOLTAREN ARTHRITIS PAIN) 1 % Gel, Apply 2 Grams to painful right hand joint 2-3x/day as needed, Disp: 100 g, Rfl: 1    doxycycline (VIBRAMYCIN) 100 MG Cap, Take 1 capsule (100 mg total) by mouth every 12 (twelve) hours., Disp: 28 capsule, Rfl: 1    fluorouraciL (EFUDEX) 5 % cream, Compound fluorouracil 5% + calcipotriene 0.005% cream. Apply a pea-sized amount to entire bilateral lateral cheekbones BID x 7 days., Disp: 30 g, Rfl: 0    furosemide  (LASIX) 20 MG tablet, 1 tab 2 days/week for heart/blood pressure, Disp: 30 tablet, Rfl: 0    irbesartan (AVAPRO) 150 MG tablet, Take 1 tablet (150 mg total) by mouth once daily., Disp: 90 tablet, Rfl: 2    metoprolol succinate (TOPROL-XL) 25 MG 24 hr tablet, TAKE 1/2 TABLET BY MOUTH DAILY, Disp: 45 tablet, Rfl: 3    multivitamin (THERAGRAN) per tablet, Take 1 tablet by mouth once daily., Disp: , Rfl:     mupirocin (BACTROBAN) 2 % ointment, Apply to affected area 3 times daily, Disp: 22 g, Rfl: 1    Current Facility-Administered Medications:     sodium chloride 0.9% flush 10 mL, 10 mL, Intravenous, PRN, Vern Giang MD

## 2025-05-01 ENCOUNTER — OFFICE VISIT (OUTPATIENT)
Dept: INTERNAL MEDICINE | Facility: CLINIC | Age: OVER 89
End: 2025-05-01
Payer: MEDICARE

## 2025-05-01 VITALS
WEIGHT: 185.94 LBS | DIASTOLIC BLOOD PRESSURE: 60 MMHG | SYSTOLIC BLOOD PRESSURE: 110 MMHG | TEMPERATURE: 97 F | HEIGHT: 72 IN | BODY MASS INDEX: 25.18 KG/M2 | HEART RATE: 67 BPM | RESPIRATION RATE: 18 BRPM | OXYGEN SATURATION: 98 %

## 2025-05-01 DIAGNOSIS — I48.19 PERSISTENT ATRIAL FIBRILLATION: ICD-10-CM

## 2025-05-01 DIAGNOSIS — I10 ESSENTIAL HYPERTENSION: Primary | ICD-10-CM

## 2025-05-01 DIAGNOSIS — M51.362 DEGENERATION OF INTERVERTEBRAL DISC OF LUMBAR REGION WITH DISCOGENIC BACK PAIN AND LOWER EXTREMITY PAIN: ICD-10-CM

## 2025-05-01 DIAGNOSIS — E78.5 HYPERLIPIDEMIA, UNSPECIFIED HYPERLIPIDEMIA TYPE: ICD-10-CM

## 2025-05-01 DIAGNOSIS — Z95.818 PRESENCE OF WATCHMAN LEFT ATRIAL APPENDAGE CLOSURE DEVICE: ICD-10-CM

## 2025-05-01 DIAGNOSIS — I35.0 AORTIC STENOSIS, SEVERE: ICD-10-CM

## 2025-05-01 DIAGNOSIS — C88.40 MALT LYMPHOMA: ICD-10-CM

## 2025-05-01 PROCEDURE — 99999 PR PBB SHADOW E&M-EST. PATIENT-LVL IV: CPT | Mod: PBBFAC,HCNC,, | Performed by: INTERNAL MEDICINE

## 2025-05-01 PROCEDURE — 3288F FALL RISK ASSESSMENT DOCD: CPT | Mod: CPTII,HCNC,S$GLB, | Performed by: INTERNAL MEDICINE

## 2025-05-01 PROCEDURE — 1159F MED LIST DOCD IN RCRD: CPT | Mod: CPTII,HCNC,S$GLB, | Performed by: INTERNAL MEDICINE

## 2025-05-01 PROCEDURE — 1126F AMNT PAIN NOTED NONE PRSNT: CPT | Mod: CPTII,HCNC,S$GLB, | Performed by: INTERNAL MEDICINE

## 2025-05-01 PROCEDURE — 1101F PT FALLS ASSESS-DOCD LE1/YR: CPT | Mod: CPTII,HCNC,S$GLB, | Performed by: INTERNAL MEDICINE

## 2025-05-01 PROCEDURE — 99215 OFFICE O/P EST HI 40 MIN: CPT | Mod: HCNC,S$GLB,, | Performed by: INTERNAL MEDICINE

## 2025-05-27 RX ORDER — IRBESARTAN 150 MG/1
150 TABLET ORAL DAILY
Qty: 90 TABLET | Refills: 3 | Status: SHIPPED | OUTPATIENT
Start: 2025-05-27

## 2025-05-27 NOTE — TELEPHONE ENCOUNTER
Refill Decision Note   Norm Mendoza  is requesting a refill authorization.  Brief Assessment and Rationale for Refill:  Approve     Medication Therapy Plan:         Comments:     Note composed:2:09 PM 05/27/2025

## 2025-05-27 NOTE — TELEPHONE ENCOUNTER
No care due was identified.  Health [facility] Embedded Care Due Messages. Reference number: [id number].   5/27/2025 12:54:52 PM CDT

## 2025-05-29 ENCOUNTER — LAB VISIT (OUTPATIENT)
Dept: LAB | Facility: HOSPITAL | Age: OVER 89
End: 2025-05-29
Attending: INTERNAL MEDICINE
Payer: MEDICARE

## 2025-05-29 DIAGNOSIS — I35.0 NONRHEUMATIC AORTIC VALVE STENOSIS: ICD-10-CM

## 2025-05-29 LAB
ANION GAP (OHS): 7 MMOL/L (ref 8–16)
BUN SERPL-MCNC: 22 MG/DL (ref 10–30)
CALCIUM SERPL-MCNC: 9 MG/DL (ref 8.7–10.5)
CHLORIDE SERPL-SCNC: 104 MMOL/L (ref 95–110)
CO2 SERPL-SCNC: 28 MMOL/L (ref 23–29)
CREAT SERPL-MCNC: 1.1 MG/DL (ref 0.5–1.4)
ERYTHROCYTE [DISTWIDTH] IN BLOOD BY AUTOMATED COUNT: 15.6 % (ref 11.5–14.5)
GFR SERPLBLD CREATININE-BSD FMLA CKD-EPI: 60 ML/MIN/1.73/M2
GLUCOSE SERPL-MCNC: 105 MG/DL (ref 70–110)
HCT VFR BLD AUTO: 41.4 % (ref 40–54)
HGB BLD-MCNC: 13.5 GM/DL (ref 14–18)
INDIRECT COOMBS: NORMAL
MCH RBC QN AUTO: 31.4 PG (ref 27–31)
MCHC RBC AUTO-ENTMCNC: 32.6 G/DL (ref 32–36)
MCV RBC AUTO: 96 FL (ref 82–98)
PLATELET # BLD AUTO: 189 K/UL (ref 150–450)
PMV BLD AUTO: 9.4 FL (ref 9.2–12.9)
POTASSIUM SERPL-SCNC: 4.5 MMOL/L (ref 3.5–5.1)
RBC # BLD AUTO: 4.3 M/UL (ref 4.6–6.2)
RH BLD: NORMAL
SODIUM SERPL-SCNC: 139 MMOL/L (ref 136–145)
WBC # BLD AUTO: 4.84 K/UL (ref 3.9–12.7)

## 2025-05-29 PROCEDURE — 85027 COMPLETE CBC AUTOMATED: CPT | Mod: HCNC

## 2025-05-29 PROCEDURE — 36415 COLL VENOUS BLD VENIPUNCTURE: CPT | Mod: HCNC

## 2025-05-29 PROCEDURE — 86850 RBC ANTIBODY SCREEN: CPT | Mod: HCNC | Performed by: INTERNAL MEDICINE

## 2025-05-29 PROCEDURE — 82310 ASSAY OF CALCIUM: CPT | Mod: HCNC

## 2025-06-04 ENCOUNTER — ANESTHESIA EVENT (OUTPATIENT)
Dept: MEDSURG UNIT | Facility: HOSPITAL | Age: OVER 89
End: 2025-06-04
Payer: MEDICARE

## 2025-06-05 ENCOUNTER — TELEPHONE (OUTPATIENT)
Dept: ADMINISTRATIVE | Facility: CLINIC | Age: OVER 89
End: 2025-06-05
Payer: MEDICARE

## 2025-06-05 ENCOUNTER — HOSPITAL ENCOUNTER (INPATIENT)
Facility: HOSPITAL | Age: OVER 89
LOS: 1 days | Discharge: HOME | DRG: 267 | End: 2025-06-06
Attending: INTERNAL MEDICINE | Admitting: INTERNAL MEDICINE
Payer: MEDICARE

## 2025-06-05 ENCOUNTER — ANESTHESIA (OUTPATIENT)
Dept: MEDSURG UNIT | Facility: HOSPITAL | Age: OVER 89
End: 2025-06-05
Payer: MEDICARE

## 2025-06-05 DIAGNOSIS — I44.7 LBBB (LEFT BUNDLE BRANCH BLOCK): ICD-10-CM

## 2025-06-05 DIAGNOSIS — Z95.3 S/P TAVR (TRANSCATHETER AORTIC VALVE REPLACEMENT): ICD-10-CM

## 2025-06-05 DIAGNOSIS — I35.0 NONRHEUMATIC AORTIC VALVE STENOSIS: Primary | ICD-10-CM

## 2025-06-05 DIAGNOSIS — I49.9 ARRHYTHMIA: ICD-10-CM

## 2025-06-05 DIAGNOSIS — I50.22 CHRONIC HEART FAILURE WITH MILDLY REDUCED EJECTION FRACTION (HFMREF, 41-49%): ICD-10-CM

## 2025-06-05 DIAGNOSIS — I35.0 SEVERE AORTIC STENOSIS: ICD-10-CM

## 2025-06-05 DIAGNOSIS — Z01.818 PRE-OP TESTING: ICD-10-CM

## 2025-06-05 DIAGNOSIS — I42.0 DILATED CARDIOMYOPATHY: ICD-10-CM

## 2025-06-05 LAB
BNP SERPL-MCNC: 318 PG/ML (ref 0–99)
OHS QRS DURATION: 144 MS
OHS QRS DURATION: 158 MS
OHS QTC CALCULATION: 472 MS
OHS QTC CALCULATION: 475 MS
POCT GLUCOSE: 113 MG/DL (ref 70–110)
POCT GLUCOSE: 130 MG/DL (ref 70–110)
POCT GLUCOSE: 92 MG/DL (ref 70–110)

## 2025-06-05 PROCEDURE — C1769 GUIDE WIRE: HCPCS | Mod: HCNC | Performed by: INTERNAL MEDICINE

## 2025-06-05 PROCEDURE — 02RF38Z REPLACEMENT OF AORTIC VALVE WITH ZOOPLASTIC TISSUE, PERCUTANEOUS APPROACH: ICD-10-PCS | Performed by: INTERNAL MEDICINE

## 2025-06-05 PROCEDURE — 33210 INSERT ELECTRD/PM CATH SNGL: CPT | Mod: XE,HCNC | Performed by: INTERNAL MEDICINE

## 2025-06-05 PROCEDURE — 20600001 HC STEP DOWN PRIVATE ROOM: Mod: HCNC

## 2025-06-05 PROCEDURE — 25000003 PHARM REV CODE 250: Mod: HCNC

## 2025-06-05 PROCEDURE — C1779 LEAD, PMKR, TRANSVENOUS VDD: HCPCS | Mod: HCNC | Performed by: INTERNAL MEDICINE

## 2025-06-05 PROCEDURE — 63600175 PHARM REV CODE 636 W HCPCS: Mod: HCNC

## 2025-06-05 PROCEDURE — 27201423 OPTIME MED/SURG SUP & DEVICES STERILE SUPPLY: Mod: HCNC | Performed by: INTERNAL MEDICINE

## 2025-06-05 PROCEDURE — 33361 REPLACE AORTIC VALVE PERQ: CPT | Mod: Q0,HCNC | Performed by: INTERNAL MEDICINE

## 2025-06-05 PROCEDURE — C1894 INTRO/SHEATH, NON-LASER: HCPCS | Mod: HCNC | Performed by: INTERNAL MEDICINE

## 2025-06-05 PROCEDURE — 25000003 PHARM REV CODE 250: Mod: HCNC | Performed by: STUDENT IN AN ORGANIZED HEALTH CARE EDUCATION/TRAINING PROGRAM

## 2025-06-05 PROCEDURE — 93005 ELECTROCARDIOGRAM TRACING: CPT | Mod: HCNC

## 2025-06-05 PROCEDURE — C1760 CLOSURE DEV, VASC: HCPCS | Mod: HCNC | Performed by: INTERNAL MEDICINE

## 2025-06-05 PROCEDURE — 27800903 OPTIME MED/SURG SUP & DEVICES OTHER IMPLANTS: Mod: HCNC | Performed by: INTERNAL MEDICINE

## 2025-06-05 PROCEDURE — 93010 ELECTROCARDIOGRAM REPORT: CPT | Mod: HCNC,,, | Performed by: INTERNAL MEDICINE

## 2025-06-05 PROCEDURE — 27201037 HC PRESSURE MONITORING SET UP: Mod: HCNC

## 2025-06-05 PROCEDURE — C1725 CATH, TRANSLUMIN NON-LASER: HCPCS | Mod: HCNC | Performed by: INTERNAL MEDICINE

## 2025-06-05 PROCEDURE — 99499 UNLISTED E&M SERVICE: CPT | Mod: HCNC,,, | Performed by: INTERNAL MEDICINE

## 2025-06-05 PROCEDURE — 5A1223Z PERFORMANCE OF CARDIAC PACING, CONTINUOUS: ICD-10-PCS | Performed by: INTERNAL MEDICINE

## 2025-06-05 PROCEDURE — 37000008 HC ANESTHESIA 1ST 15 MINUTES: Mod: HCNC | Performed by: INTERNAL MEDICINE

## 2025-06-05 PROCEDURE — 99223 1ST HOSP IP/OBS HIGH 75: CPT | Mod: HCNC,GC,, | Performed by: STUDENT IN AN ORGANIZED HEALTH CARE EDUCATION/TRAINING PROGRAM

## 2025-06-05 PROCEDURE — 33361 REPLACE AORTIC VALVE PERQ: CPT | Mod: 62,Q0,HCNC, | Performed by: INTERNAL MEDICINE

## 2025-06-05 PROCEDURE — 83880 ASSAY OF NATRIURETIC PEPTIDE: CPT | Mod: HCNC | Performed by: INTERNAL MEDICINE

## 2025-06-05 PROCEDURE — 25000003 PHARM REV CODE 250: Mod: HCNC | Performed by: INTERNAL MEDICINE

## 2025-06-05 PROCEDURE — 37000009 HC ANESTHESIA EA ADD 15 MINS: Mod: HCNC | Performed by: INTERNAL MEDICINE

## 2025-06-05 DEVICE — SYS EVOLUT FX LOADING 34MM: Type: IMPLANTABLE DEVICE | Site: OTHER (ADD COMMENT) | Status: FUNCTIONAL

## 2025-06-05 DEVICE — TAV EVFXPLUS-34 COMM US
Type: IMPLANTABLE DEVICE | Site: HEART | Status: FUNCTIONAL
Brand: EVOLUT™ FX+

## 2025-06-05 DEVICE — SYS EVOLUT FX DELIVERY 34MM: Type: IMPLANTABLE DEVICE | Site: OTHER (ADD COMMENT) | Status: FUNCTIONAL

## 2025-06-05 RX ORDER — ONDANSETRON HYDROCHLORIDE 2 MG/ML
4 INJECTION, SOLUTION INTRAVENOUS DAILY PRN
Status: DISCONTINUED | OUTPATIENT
Start: 2025-06-05 | End: 2025-06-05 | Stop reason: HOSPADM

## 2025-06-05 RX ORDER — PROPOFOL 10 MG/ML
VIAL (ML) INTRAVENOUS CONTINUOUS PRN
Status: DISCONTINUED | OUTPATIENT
Start: 2025-06-05 | End: 2025-06-05

## 2025-06-05 RX ORDER — PHENYLEPHRINE HCL IN 0.9% NACL 1 MG/10 ML
SYRINGE (ML) INTRAVENOUS
Status: DISCONTINUED | OUTPATIENT
Start: 2025-06-05 | End: 2025-06-05

## 2025-06-05 RX ORDER — PROTAMINE SULFATE 10 MG/ML
INJECTION, SOLUTION INTRAVENOUS
Status: DISCONTINUED | OUTPATIENT
Start: 2025-06-05 | End: 2025-06-05

## 2025-06-05 RX ORDER — SODIUM CHLORIDE 9 MG/ML
INJECTION, SOLUTION INTRAVENOUS CONTINUOUS
Status: ACTIVE | OUTPATIENT
Start: 2025-06-05 | End: 2025-06-05

## 2025-06-05 RX ORDER — HYDRALAZINE HYDROCHLORIDE 20 MG/ML
5 INJECTION INTRAMUSCULAR; INTRAVENOUS
OUTPATIENT
Start: 2025-06-05

## 2025-06-05 RX ORDER — TALC
6 POWDER (GRAM) TOPICAL NIGHTLY PRN
Status: DISCONTINUED | OUTPATIENT
Start: 2025-06-05 | End: 2025-06-06 | Stop reason: HOSPADM

## 2025-06-05 RX ORDER — SODIUM CHLORIDE 9 MG/ML
INJECTION, SOLUTION INTRAVENOUS CONTINUOUS
Status: DISCONTINUED | OUTPATIENT
Start: 2025-06-05 | End: 2025-06-06

## 2025-06-05 RX ORDER — LIDOCAINE HYDROCHLORIDE 20 MG/ML
INJECTION, SOLUTION EPIDURAL; INFILTRATION; INTRACAUDAL; PERINEURAL
Status: DISCONTINUED | OUTPATIENT
Start: 2025-06-05 | End: 2025-06-05

## 2025-06-05 RX ORDER — DIPHENHYDRAMINE HCL 50 MG
50 CAPSULE ORAL ONCE
Status: COMPLETED | OUTPATIENT
Start: 2025-06-05 | End: 2025-06-05

## 2025-06-05 RX ORDER — GLUCAGON 1 MG
1 KIT INJECTION
Status: DISCONTINUED | OUTPATIENT
Start: 2025-06-05 | End: 2025-06-05 | Stop reason: HOSPADM

## 2025-06-05 RX ORDER — NAPROXEN SODIUM 220 MG/1
81 TABLET, FILM COATED ORAL DAILY
Status: DISCONTINUED | OUTPATIENT
Start: 2025-06-05 | End: 2025-06-06 | Stop reason: HOSPADM

## 2025-06-05 RX ORDER — ACETAMINOPHEN 325 MG/1
650 TABLET ORAL EVERY 4 HOURS PRN
Status: DISCONTINUED | OUTPATIENT
Start: 2025-06-05 | End: 2025-06-06 | Stop reason: HOSPADM

## 2025-06-05 RX ORDER — HEPARIN SODIUM 1000 [USP'U]/ML
INJECTION, SOLUTION INTRAVENOUS; SUBCUTANEOUS
Status: DISCONTINUED | OUTPATIENT
Start: 2025-06-05 | End: 2025-06-05

## 2025-06-05 RX ORDER — FENTANYL CITRATE 50 UG/ML
25 INJECTION, SOLUTION INTRAMUSCULAR; INTRAVENOUS EVERY 5 MIN PRN
Status: DISCONTINUED | OUTPATIENT
Start: 2025-06-05 | End: 2025-06-05 | Stop reason: HOSPADM

## 2025-06-05 RX ORDER — FLUCONAZOLE 2 MG/ML
INJECTION, SOLUTION INTRAVENOUS
Status: DISCONTINUED | OUTPATIENT
Start: 2025-06-05 | End: 2025-06-05

## 2025-06-05 RX ORDER — ONDANSETRON HYDROCHLORIDE 2 MG/ML
4 INJECTION, SOLUTION INTRAVENOUS DAILY PRN
Status: DISCONTINUED | OUTPATIENT
Start: 2025-06-05 | End: 2025-06-05

## 2025-06-05 RX ORDER — VALSARTAN 40 MG/1
80 TABLET ORAL DAILY
Status: DISCONTINUED | OUTPATIENT
Start: 2025-06-06 | End: 2025-06-06 | Stop reason: HOSPADM

## 2025-06-05 RX ORDER — AMLODIPINE BESYLATE 2.5 MG/1
2.5 TABLET ORAL DAILY
Status: DISCONTINUED | OUTPATIENT
Start: 2025-06-06 | End: 2025-06-06 | Stop reason: HOSPADM

## 2025-06-05 RX ORDER — ONDANSETRON 8 MG/1
8 TABLET, ORALLY DISINTEGRATING ORAL EVERY 8 HOURS PRN
Status: DISCONTINUED | OUTPATIENT
Start: 2025-06-05 | End: 2025-06-06 | Stop reason: HOSPADM

## 2025-06-05 RX ADMIN — LIDOCAINE HYDROCHLORIDE 40 MG: 20 INJECTION, SOLUTION EPIDURAL; INFILTRATION; INTRACAUDAL at 08:06

## 2025-06-05 RX ADMIN — ASPIRIN 81 MG CHEWABLE TABLET 81 MG: 81 TABLET CHEWABLE at 12:06

## 2025-06-05 RX ADMIN — PROPOFOL 75 MCG/KG/MIN: 10 INJECTION, EMULSION INTRAVENOUS at 07:06

## 2025-06-05 RX ADMIN — SODIUM CHLORIDE, SODIUM ACETATE ANHYDROUS, SODIUM GLUCONATE, POTASSIUM CHLORIDE, AND MAGNESIUM CHLORIDE: 526; 222; 502; 37; 30 INJECTION, SOLUTION INTRAVENOUS at 07:06

## 2025-06-05 RX ADMIN — SODIUM CHLORIDE 2 G: 9 INJECTION, SOLUTION INTRAVENOUS at 07:06

## 2025-06-05 RX ADMIN — PROPOFOL 10 MG: 10 INJECTION, EMULSION INTRAVENOUS at 07:06

## 2025-06-05 RX ADMIN — LIDOCAINE HYDROCHLORIDE 20 MG: 20 INJECTION, SOLUTION EPIDURAL; INFILTRATION; INTRACAUDAL at 08:06

## 2025-06-05 RX ADMIN — PROTAMINE SULFATE 85 MG: 10 INJECTION, SOLUTION INTRAVENOUS at 08:06

## 2025-06-05 RX ADMIN — SODIUM CHLORIDE: 0.9 INJECTION, SOLUTION INTRAVENOUS at 01:06

## 2025-06-05 RX ADMIN — HEPARIN SODIUM 8500 UNITS: 1000 INJECTION, SOLUTION INTRAVENOUS; SUBCUTANEOUS at 07:06

## 2025-06-05 RX ADMIN — Medication 50 MCG: at 08:06

## 2025-06-05 RX ADMIN — SODIUM CHLORIDE: 9 INJECTION, SOLUTION INTRAVENOUS at 10:06

## 2025-06-05 RX ADMIN — DIPHENHYDRAMINE HYDROCHLORIDE 50 MG: 50 CAPSULE ORAL at 06:06

## 2025-06-05 RX ADMIN — LIDOCAINE HYDROCHLORIDE 40 MG: 20 INJECTION, SOLUTION EPIDURAL; INFILTRATION; INTRACAUDAL at 07:06

## 2025-06-05 RX ADMIN — FLUCONAZOLE IN SODIUM CHLORIDE 400 MG: 2 INJECTION, SOLUTION INTRAVENOUS at 07:06

## 2025-06-05 RX ADMIN — PROTAMINE SULFATE 50 MG: 10 INJECTION, SOLUTION INTRAVENOUS at 09:06

## 2025-06-05 NOTE — PROGRESS NOTES
Dr. Prather at bedside for TVP placement.   Team has decided to complete procedure in the lab.   9:47 patient transferred to lab

## 2025-06-05 NOTE — HPI
Mr. Mendoza 89 year-old man with history of HTN, permanent AFib, MALT (completed Rituximab x 4 3/7/19) and severe AS who presented to the hospital for TAVR. He is now s/p Evolut Bioprsth 34mm and QRS widen from 144 ms (baseline for him) to 158 ms. EP has been consulted.     Last TTE (1/29/2025):    Left Ventricle: The left ventricle is normal in size. Mildly increased wall thickness. Mild septal thickening. There is concentric remodeling. Mild global hypokinesis present. There is mildly reduced systolic function with a visually estimated ejection fraction of 40 - 45%. Ejection fraction is approximately 43%.    Right Ventricle: Normal right ventricular cavity size. Wall thickness is normal. Systolic function is normal.    Left Atrium: Left atrium is severely dilated.    Right Atrium: Right atrium is severely dilated.

## 2025-06-05 NOTE — NURSING
Notified  pt sounds little wheezing when exhale, sat O2 95% at room air, denies sob, and verified with Crescencio PETIT bed rest done at 1030, and  is ok pt up in chair now.

## 2025-06-05 NOTE — SUBJECTIVE & OBJECTIVE
Past Medical History:   Diagnosis Date    Acute medial meniscal tear, left, subsequent encounter 02/2025    s/p MRI    Alcohol dependence, daily use 07/13/2017    Allergy     Bladder cancer     tumor that was benign     Cataract     Cholelithiasis     By CT 1/2025    H/O nonmelanoma skin cancer 02/04/2016    Hematuria     Hypertension     Hypertensive heart disease with heart failure 02/01/2024    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    Mixed hyperlipidemia 05/02/2018    On continuous oral anticoagulation 07/30/2019    Paroxysmal atrial fibrillation 11/30/2018    S/P D/C Cardioversion 7/2019, S/P Watchman device 8/2020    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    Dr Vel LANE (peptic ulcer disease)     GI Bleeding 11/2018: Anticoagulant D/S'd and Gastric MALT tumor Dx'd    SCC (squamous cell carcinoma) 05/04/2023    L ventral forearm excised by APC    Skin cancer     x3, had mohs on nose    Squamous cell carcinoma excised 12/5/14    R lower back    Symptomatic anemia 12/06/2018    Urinary tract infection     x4       Past Surgical History:   Procedure Laterality Date    ACHILLES TENDON SURGERY      CATARACT EXTRACTION W/  INTRAOCULAR LENS IMPLANT Bilateral 2013    CLOSURE OF LEFT ATRIAL APPENDAGE USING DEVICE N/A 10/11/2019    Procedure: Left atrial appendage closure device;  Surgeon: Hi Crowder MD;  Location: [facility] EP LAB;  Service: Cardiology;  Laterality: N/A;  AF, JACINTO, Watchman Implant, BSci, Gen, MB, 3 Prep    CORONARY ANGIOGRAPHY N/A 4/28/2025    Procedure: ANGIOGRAM, CORONARY ARTERY;  Surgeon: Vern Giang MD;  Location: [facility] CATH LAB;  Service: Cardiology;  Laterality: N/A;    CYSTOSCOPY      bladder tumor    ESOPHAGOGASTRODUODENOSCOPY N/A 12/7/2018    Procedure: EGD (ESOPHAGOGASTRODUODENOSCOPY);  Surgeon: Austin Garcia MD;  Location: [facility] ENDO (2ND FLR);  Service: Endoscopy;  Laterality: N/A;    ESOPHAGOGASTRODUODENOSCOPY N/A 4/12/2019    Procedure: EGD  (ESOPHAGOGASTRODUODENOSCOPY);  Surgeon: Austin Garcia MD;  [place] [facility] ENDO (4TH FLR);  Service: Endoscopy;  Laterality: N/A;  please schedule within 4 weeks    ESOPHAGOGASTRODUODENOSCOPY N/A 5/27/2021    Procedure: EGD (ESOPHAGOGASTRODUODENOSCOPY);  Surgeon: Austin Garcia MD;  Location: [facility] ENDO (2ND FLR);  Service: Endoscopy;  Laterality: N/A;  per Dr. Garcia done within the month with any provider/ ordered by oncology  2nd floor due to comorbidities  Watchman device  COVID test at [facility] on 5/24-GT    SKIN CANCER EXCISION      TREATMENT OF CARDIAC ARRHYTHMIA N/A 7/12/2019    Procedure: Cardioversion or Defibrillation;  Surgeon: Hi Crowder MD;  Location: [facility] EP LAB;  Service: Cardiology;  Laterality: N/A;  AF, JACINTO, DCCV, MAC, MB, 3 Prep    TREATMENT OF CARDIAC ARRHYTHMIA N/A 2/8/2022    Procedure: Cardioversion or Defibrillation;  Surgeon: Susan Orozco NP;  Location: [facility] EP LAB;  Service: Cardiology;  Laterality: N/A;  afib, DCCV, anes, MB, 3 Prep       Review of patient's allergies indicates:  No Known Allergies    PTA Medications   Medication Sig    amLODIPine (NORVASC) 2.5 MG tablet TAKE 1 TABLET(2.5 MG) BY MOUTH EVERY DAY    aspirin 81 MG Chew Take 1 tablet (81 mg total) by mouth once daily.    irbesartan (AVAPRO) 150 MG tablet Take 1 tablet (150 mg total) by mouth once daily.    metoprolol succinate (TOPROL-XL) 25 MG 24 hr tablet TAKE 1/2 TABLET BY MOUTH DAILY    multivitamin (THERAGRAN) per tablet Take 1 tablet by mouth once daily.    clobetasoL (TEMOVATE) 0.05 % external solution Pt to mix in 1 jar of cerave cream and apply to affected areas bid    diclofenac sodium (VOLTAREN ARTHRITIS PAIN) 1 % Gel Apply 2 Grams to painful right hand joint 2-3x/day as needed    doxycycline (VIBRAMYCIN) 100 MG Cap Take 1 capsule (100 mg total) by mouth every 12 (twelve) hours.    fluorouraciL (EFUDEX) 5 % cream Compound fluorouracil 5% + calcipotriene 0.005% cream. Apply a pea-sized amount to entire  bilateral lateral cheekbones BID x 7 days.    furosemide (LASIX) 20 MG tablet 1 tab 2 days/week for heart/blood pressure    mupirocin (BACTROBAN) 2 % ointment Apply to affected area 3 times daily     Family History       Problem Relation (Age of Onset)    Hypertension Father    Stroke Father, Brother          Tobacco Use    Smoking status: Former     Current packs/day: 0.00     Average packs/day: 1 pack/day for 25.0 years (25.0 ttl pk-yrs)     Types: Cigarettes     Start date: 9/3/1945     Quit date: 9/3/1970     Years since quittin.7    Smokeless tobacco: Never   Substance and Sexual Activity    Alcohol use: Yes     Alcohol/week: 14.0 standard drinks of alcohol     Types: 14 Shots of liquor per week     Comment: 2 Drinks daily    Drug use: No    Sexual activity: Yes     Partners: Female     Review of Systems   All other systems reviewed and are negative.    Objective:     Vital Signs (Most Recent):    Vital Signs (24h Range):           There is no height or weight on file to calculate BMI.            No intake or output data in the 24 hours ending 25 0631    Lines/Drains/Airways       None                    Physical Exam  Constitutional:       Appearance: Normal appearance.   HENT:      Mouth/Throat:      Mouth: Mucous membranes are moist.   Eyes:      Extraocular Movements: Extraocular movements intact.      Conjunctiva/sclera: Conjunctivae normal.   Cardiovascular:      Rate and Rhythm: Normal rate and regular rhythm.      Pulses: Normal pulses.   Pulmonary:      Effort: Pulmonary effort is normal.   Abdominal:      General: Abdomen is flat.      Palpations: Abdomen is soft.   Skin:     General: Skin is warm and dry.   Neurological:      General: No focal deficit present.      Mental Status: He is alert and oriented to person, place, and time.            Significant Labs: All pertinent lab results from the last 24 hours have been reviewed.    Significant Imaging: Reviewed

## 2025-06-05 NOTE — NURSING TRANSFER
Nursing Transfer Note      6/5/2025   1223 PM    Nurse giving handoff:RitchieRN  Nurse receiving handoff:Loli Roberto).RN    Reason patient is being transferred: TAVR done    Transfer To: room 3098    Transfer via bed    Transfer with cardiac monitoring    Transported by RN    Telemetry: Box Number [id number]  Order for Tele Monitor? Yes      Medicines sent: NS gtt going at 10 mL/hr at cordis catheter, and NS gtt going at 150 mL/hr at rt arm IV.     Any special needs or follow-up needed: monitor procedure site    Patient belongings transferred with patient: Yes    Chart send with patient: Yes    Notified: spouse    Patient reassessed at: 1223 06/05/2025     Upon arrival to floor: cardiac monitor applied, patient oriented to room, call bell in reach, and bed in lowest position

## 2025-06-05 NOTE — Clinical Note
The catheter was repositioned into the aorta. An angiography was performed of the aorta. The angiography was performed via power injection. The injected amount was 38 mL contrast at 20 mL/s. The PSI from the power injection was 700.

## 2025-06-05 NOTE — SUBJECTIVE & OBJECTIVE
Past Medical History:   Diagnosis Date    Acute medial meniscal tear, left, subsequent encounter 02/2025    s/p MRI    Alcohol dependence, daily use 07/13/2017    Allergy     Bladder cancer     tumor that was benign     Cataract     Cholelithiasis     By CT 1/2025    H/O nonmelanoma skin cancer 02/04/2016    Hematuria     Hypertension     Hypertensive heart disease with heart failure 02/01/2024    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    Mixed hyperlipidemia 05/02/2018    On continuous oral anticoagulation 07/30/2019    Paroxysmal atrial fibrillation 11/30/2018    S/P D/C Cardioversion 7/2019, S/P Watchman device 8/2020    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    Dr Vel LANE (peptic ulcer disease)     GI Bleeding 11/2018: Anticoagulant D/S'd and Gastric MALT tumor Dx'd    SCC (squamous cell carcinoma) 05/04/2023    L ventral forearm excised by APC    Skin cancer     x3, had mohs on nose    Squamous cell carcinoma excised 12/5/14    R lower back    Symptomatic anemia 12/06/2018    Urinary tract infection     x4       Past Surgical History:   Procedure Laterality Date    ACHILLES TENDON SURGERY      CATARACT EXTRACTION W/  INTRAOCULAR LENS IMPLANT Bilateral 2013    CLOSURE OF LEFT ATRIAL APPENDAGE USING DEVICE N/A 10/11/2019    Procedure: Left atrial appendage closure device;  Surgeon: Hi Crowder MD;  Location: [facility] EP LAB;  Service: Cardiology;  Laterality: N/A;  AF, JACINTO, Watchman Implant, BSci, Gen, MB, 3 Prep    CORONARY ANGIOGRAPHY N/A 4/28/2025    Procedure: ANGIOGRAM, CORONARY ARTERY;  Surgeon: Vern Giang MD;  Location: [facility] CATH LAB;  Service: Cardiology;  Laterality: N/A;    CYSTOSCOPY      bladder tumor    ESOPHAGOGASTRODUODENOSCOPY N/A 12/7/2018    Procedure: EGD (ESOPHAGOGASTRODUODENOSCOPY);  Surgeon: Austin Garcia MD;  Location: [facility] ENDO (2ND FLR);  Service: Endoscopy;  Laterality: N/A;    ESOPHAGOGASTRODUODENOSCOPY N/A 4/12/2019    Procedure: EGD  (ESOPHAGOGASTRODUODENOSCOPY);  Surgeon: Austin Garcia MD;  Location: [facility] ENDO (4TH FLR);  Service: Endoscopy;  Laterality: N/A;  please schedule within 4 weeks    ESOPHAGOGASTRODUODENOSCOPY N/A 5/27/2021    Procedure: EGD (ESOPHAGOGASTRODUODENOSCOPY);  Surgeon: Austin Garcia MD;  Location: [facility] ENDO (2ND FLR);  Service: Endoscopy;  Laterality: N/A;  per Dr. Garcia done within the month with any provider/ ordered by oncology  2nd floor due to comorbidities  Watchman device  COVID test at [facility] on 5/24-GT    SKIN CANCER EXCISION      TREATMENT OF CARDIAC ARRHYTHMIA N/A 7/12/2019    Procedure: Cardioversion or Defibrillation;  Surgeon: Hi Crowder MD;  Location: [facility] EP LAB;  Service: Cardiology;  Laterality: N/A;  AF, JACINTO, DCCV, MAC, MB, 3 Prep    TREATMENT OF CARDIAC ARRHYTHMIA N/A 2/8/2022    Procedure: Cardioversion or Defibrillation;  Surgeon: Susan Orozco NP;  Location: [facility] EP LAB;  Service: Cardiology;  Laterality: N/A;  afib, DCCV, anes, MB, 3 Prep       Review of patient's allergies indicates:  No Known Allergies    No current facility-administered medications on file prior to encounter.     Current Outpatient Medications on File Prior to Encounter   Medication Sig    amLODIPine (NORVASC) 2.5 MG tablet TAKE 1 TABLET(2.5 MG) BY MOUTH EVERY DAY    aspirin 81 MG Chew Take 1 tablet (81 mg total) by mouth once daily.    metoprolol succinate (TOPROL-XL) 25 MG 24 hr tablet TAKE 1/2 TABLET BY MOUTH DAILY    multivitamin (THERAGRAN) per tablet Take 1 tablet by mouth once daily.    clobetasoL (TEMOVATE) 0.05 % external solution Pt to mix in 1 jar of cerave cream and apply to affected areas bid    diclofenac sodium (VOLTAREN ARTHRITIS PAIN) 1 % Gel Apply 2 Grams to painful right hand joint 2-3x/day as needed    doxycycline (VIBRAMYCIN) 100 MG Cap Take 1 capsule (100 mg total) by mouth every 12 (twelve) hours.    fluorouraciL (EFUDEX) 5 % cream Compound fluorouracil 5% + calcipotriene 0.005% cream. Apply a  pea-sized amount to entire bilateral lateral cheekbones BID x 7 days.    furosemide (LASIX) 20 MG tablet 1 tab 2 days/week for heart/blood pressure    mupirocin (BACTROBAN) 2 % ointment Apply to affected area 3 times daily     Family History       Problem Relation (Age of Onset)    Hypertension Father    Stroke Father, Brother          Tobacco Use    Smoking status: Former     Current packs/day: 0.00     Average packs/day: 1 pack/day for 25.0 years (25.0 ttl pk-yrs)     Types: Cigarettes     Start date: 9/3/1945     Quit date: 9/3/1970     Years since quittin.7    Smokeless tobacco: Never   Substance and Sexual Activity    Alcohol use: Yes     Alcohol/week: 14.0 standard drinks of alcohol     Types: 14 Shots of liquor per week     Comment: 2 Drinks daily    Drug use: No    Sexual activity: Yes     Partners: Female     Review of Systems   All other systems reviewed and are negative.    Objective:     Vital Signs (Most Recent):  Temp: 97.9 °F (36.6 °C) (25 1045)  Pulse: 68 (25 1045)  Resp: (!) 28 (25 104)  BP: (!) 154/81 (25 1045)  SpO2: (!) 93 % (25 104) Vital Signs (24h Range):  Temp:  [97.3 °F (36.3 °C)-97.9 °F (36.6 °C)] 97.9 °F (36.6 °C)  Pulse:  [67-72] 68  Resp:  [18-37] 28  SpO2:  [87 %-100 %] 93 %  BP: (137-183)/(80-99) 154/81  Arterial Line BP: (173-190)/(70-86) 174/70       Weight: 81.6 kg (180 lb)  Body mass index is 24.41 kg/m².    SpO2: (!) 93 %        Physical Exam  HENT:      Mouth/Throat:      Mouth: Mucous membranes are moist.   Cardiovascular:      Rate and Rhythm: Normal rate. Rhythm irregular.      Pulses: Normal pulses.   Pulmonary:      Effort: Pulmonary effort is normal.   Skin:     General: Skin is warm.      Capillary Refill: Capillary refill takes less than 2 seconds.   Neurological:      Mental Status: He is alert.            Significant Labs:   Recent Lab Results         25  0906   25  0637   25  0617        BNP   318  Comment: Values  of less than 100 pg/ml are consistent with non-CHF populations.          QRS Duration 158     144       [facility] QTC Calculation 802 066

## 2025-06-05 NOTE — NURSING
Notified  tele showing variable pacing rhythm, and afib, HR stays in 60s, TVP machine blinking sensing, picture of airstrip taken, and sent to  and  said dit's overcounting, not pacing, no new order, WCTM.         EKG done, showing Afib, team aware.

## 2025-06-05 NOTE — ASSESSMENT & PLAN NOTE
Transcatheter Aortic valve replacement   Valve: 34mm Evolut  ECMO:  On Call  TAVR access: RTF  Valvuloplasty balloon: N/A  Viabahn size if needed: 11 x 5  Antithrombotic therapy: asa alone  Temporary pacing wire: No, Will pace from TAVR wire  Pacemaker risk factors: none   EP Consult ordered if post procedure arrhythmias develop  Post op destination: Stepdown  To be done during procedure:  Antibiotics given  CT Surgery present in lab for valve deployment per standard of care.  Surgeon :  Heart failure on admission?  CONSENTING:  The patient was told that the procedure carries around a 12.5% risk of permanent pacemaker requirement and that risk depends on the patients underlying conduction system as described in item 8.   Prior to the clin visit, all available records from referring provider were reviewed.  I have personally reviewed all the lab tests available related to the patient's case  The patient's images were reviewed by myself and the procedural planning was done with my own interpretation of the iliac and aortic anatomy based on CTA, angiography or JACINTO. I have reviewed all other imaging studies relevant to the patient including echocardiography and coronary angiography.  Patient was educated abut the pathophysiology and natural history of severe aortic stenosis and was educated about the treatment options including surgical and transcatheter valve replacement. She agrees to be full code for the forseable future and to undergo workup for treatment of severe AS.   The risks, benefits, and alternatives of transcatheter aortic valve replacement were discussed with the patient. All questions were answered and informed consent was obtained. I had a detailed discussion with the patient regarding risk of stroke, MI, bleeding access site complications including limb loss, allergy, kidney failure including dialysis and death.  The patient understands the risks and benefits and wishes to go ahead with the  procedure.  The referring Cardiologist was notified of the plan  All patient's questions were answered     Shared Decision Making:  This patient was presented to a multidisciplinary heart team involving both a Structural Heart Specialist (Interventional Cardiologist) and a Cardiothoracic Surgeon. The patient was involved in shared decision-making to define clearer goals for treatment and align health decisions with their current values.  The patient understands the risks and expected benefits of their treatment options.

## 2025-06-05 NOTE — PLAN OF CARE
VSS. BG monitored. Family at bedside. Afib on tele, team aware. Continue NS gtt at 10 mL/hr at cordis catheter. Pt up in chair during the day. Pt educated on fall risk and remained free from falls/trauma/injury. Denies chest pain, SOB, palpitations, dizziness, pain, or discomfort. Plan of care reviewed with pt, all questions answered. Bed locked in lowest position, call bell within reach, no acute distress noted, will continue to monitor.

## 2025-06-05 NOTE — ASSESSMENT & PLAN NOTE
Patient Active Problem List   Diagnosis    AK (actinic keratosis)    History of benign bladder tumor    Aortic atherosclerosis    Essential hypertension    Left ventricular diastolic dysfunction with preserved systolic function    Nonrheumatic aortic valve stenosis    Mixed hyperlipidemia    Aortic root dilation    PVC's (premature ventricular contractions)    Tortuous aorta    Persistent atrial fibrillation    MALT lymphoma    PUD (peptic ulcer disease)    Dilated cardiomyopathy    History of cardioversion    Atrial fibrillation    Presence of Watchman left atrial appendage closure device    MALT (mucosa associated lymphoid tissue)    Skin tear of left elbow without complication    Trauma    Cough    Rhinitis    At risk for falls    History of skin cancer    Hypertensive heart disease with heart failure    Left medial knee pain    Impaired functional mobility, balance, gait, and endurance    LBBB (left bundle branch block)       Mr. Mendoza 89 year-old man with history of HTN, permanent AFib, MALT (completed Rituximab x 4 3/7/19) and severe AS who presented to the hospital for TAVR. He is now s/p Evolut Bioprsth 34mm and QRS widen from 144 ms (baseline for him) to 158 ms. EP consulted for further management of TVP and if PPM is indicated.     Had no intraprocedural complications.   Stable LBBB post-TAVR or Stable QRS morphology and no AV block during or after case.      Last TTE   TTE (1/29/2025) 40-45 % LVEF         A/P:   Norm Mendoza with severe AS s/p TAVR. Last EF 40-45 %  Symptomatic severe AS with LVEF of  40-45 % s/p TAVR with LBBB. Recommend overnight telemetry monitoring. If LBBB persists then will plan for EPS +/-PPM.      Pre-TAVR EKG: Afib HR 69 BPM, QRS: 144 ms  Post-TAVR EKG 1:  Afib HR 65 BPM, QRS: 158 ms     Tele: Afib with wide QRS.   TVP: VVI, base rate 40, threshold 1.5 , output 10    PLAN:  - Continue telemetry and TVP overnight  - Repeat ECG at 5 am  - NPO at MN, for possible PPM  placement in AM  - Routine Post op management per structural team    - EP team will continue to follow, please call for any questions or concerns   - Provided rhythm remains stable will plan for outpatient event monitor on discharge.  - No evidence of AV block or change in QRS. No indication for PPM at this time. Will continue to monitor.    If LBBB:  - If LBBB persists in AM, will need EP study to evaluate the need for PPM  - If LBBB resolves, will go home with 30 day event monitor

## 2025-06-05 NOTE — HPI
Norm Mendoza is a 89 y.o. male referred by Dr Ambrocio for evaluation of severe AS (NYHA Class II sx).     He presents today to discuss consents for TAVR. He states he continues to have DUGAN and is ready to have the procedure done. No other acute events.      The patient has undergone the following TAVR work-up:   ECHO (Date 06/18/24): SUJEY= 0.65 cm2, MG= 36mmHg, Peak Eric= 3.5 m/s, DI=0.16 EF= 40-45%.   DSE: SUJEY= 0.9 cm2, MG= 54, Peak Eric= 4.7   LHC (Date 04/28/25): non-obstructive disease   STS: 6.5%   Frailty: not frail  Iliacs are >10.5 on R   LVOT area by CTA is 6.38 cm2 (32.2 mm X 25.05 mm) and Avg Diameter is 28.5 per my independent review of images on rPath.   Incidental findings on CT: prominent LN in stomach, PCP messaged   CT Surgery risk assessment:  risk, per   due to seen 04/29/25  Rhythm issues: AF, IVCD  PFTs: N/A  KCCQ/5 meter walk: done  Comorbidities: age, AF, CHF        Norm Mendoza is a 34 mm  Evolut valve candidate via RTF access.

## 2025-06-05 NOTE — BRIEF OP NOTE
Brief Operative Note:    : Vern Giang MD     Referring Physician: Vern Giang     All Operators: Surgeon(s):  Denise Barton MD Bansal, Aditya, MD Davis, Arthur P Jr., Vern Austin MD     Preoperative Diagnosis: Severe aortic stenosis [I35.0]     Postop Diagnosis: Severe aortic stenosis [I35.0]    Treatments/Procedures: Procedure(s) (LRB):  REPLACEMENT, AORTIC VALVE, TRANSCATHETER (TAVR) (N/A)  Cardiac Cath Cosurgeon (N/A)  REPLACEMENT, AORTIC VALVE, TRANSCATHETER (TAVR)    Access: Right CFA, Left CFA, Left radial artery    Findings:Severe structural heart disease is present.     See catheterization report for full details.    Intervention: TAVR    See catheterization report for full details.    Closure device: Manual pressure, Perclose       Plan:  - Post cath protocol   - IVF @ 150 cc/hr x 3 hours  - Bed rest x 2 hours   - Follow up with outpatient cardiologist    Estimated Blood loss: 20 cc    Specimens removed: No    PATITO. Mily Barton  Cardiovascular Disease fellow, PGY-4  [facility]

## 2025-06-05 NOTE — H&P
[facility] - Short Stay Cardiac Unit  Interventional Cardiology  H&P    Patient Name: Norm Mendoza  MRN: [id number]  Admission Date: 6/5/2025  Code Status: Prior   Attending Provider: Vern Giang MD   Primary Care Physician: Amber Jenkins MD  Principal Problem:Nonrheumatic aortic valve stenosis    Patient information was obtained from patient, past medical records, and ER records.     Subjective:     Chief Complaint:  Severe AS     HPI:  Norm Mendoza is a 89 y.o. male referred by Dr Ambrocio for evaluation of severe AS (NYHA Class II sx).     He presents today to discuss consents for TAVR. He states he continues to have DUGAN and is ready to have the procedure done. No other acute events.      The patient has undergone the following TAVR work-up:   ECHO (Date 06/18/24): SUJEY= 0.65 cm2, MG= 36mmHg, Peak Eric= 3.5 m/s, DI=0.16 EF= 40-45%.   DSE: SUJEY= 0.9 cm2, MG= 54, Peak Eric= 4.7   LHC (Date 04/28/25): non-obstructive disease   STS: 6.5%   Frailty: not frail  Iliacs are >10.5 on R   LVOT area by CTA is 6.38 cm2 (32.2 mm X 25.05 mm) and Avg Diameter is 28.5 per my independent review of images on Syn[employer].   Incidental findings on CT: prominent LN in stomach, PCP messaged   CT Surgery risk assessment:  risk, per Dr  due to seen 04/29/25  Rhythm issues: AF, IVCD  PFTs: N/A  KCCQ/5 meter walk: done  Comorbidities: age, AF, CHF        Norm Mendoza is a 34 mm  Evolut valve candidate via RTF access.    Past Medical History:   Diagnosis Date    Acute medial meniscal tear, left, subsequent encounter 02/2025    s/p MRI    Alcohol dependence, daily use 07/13/2017    Allergy     Bladder cancer     tumor that was benign     Cataract     Cholelithiasis     By CT 1/2025    H/O nonmelanoma skin cancer 02/04/2016    Hematuria     Hypertension     Hypertensive heart disease with heart failure 02/01/2024    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    Mixed hyperlipidemia 05/02/2018    On continuous oral anticoagulation  07/30/2019    Paroxysmal atrial fibrillation 11/30/2018    S/P D/C Cardioversion 7/2019, S/P Watchman device 8/2020    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    Dr Vel LANE (peptic ulcer disease)     GI Bleeding 11/2018: Anticoagulant D/S'd and Gastric MALT tumor Dx'd    SCC (squamous cell carcinoma) 05/04/2023    L ventral forearm excised by APC    Skin cancer     x3, had mohs on nose    Squamous cell carcinoma excised 12/5/14    R lower back    Symptomatic anemia 12/06/2018    Urinary tract infection     x4       Past Surgical History:   Procedure Laterality Date    ACHILLES TENDON SURGERY      CATARACT EXTRACTION W/  INTRAOCULAR LENS IMPLANT Bilateral 2013    CLOSURE OF LEFT ATRIAL APPENDAGE USING DEVICE N/A 10/11/2019    Procedure: Left atrial appendage closure device;  Surgeon: Hi Crowder MD;  Location: [facility] EP LAB;  Service: Cardiology;  Laterality: N/A;  AF, JACINTO, Watchman Implant, BSci, Gen, MB, 3 Prep    CORONARY ANGIOGRAPHY N/A 4/28/2025    Procedure: ANGIOGRAM, CORONARY ARTERY;  Surgeon: Vern Giang MD;  Location: [facility] CATH LAB;  Service: Cardiology;  Laterality: N/A;    CYSTOSCOPY      bladder tumor    ESOPHAGOGASTRODUODENOSCOPY N/A 12/7/2018    Procedure: EGD (ESOPHAGOGASTRODUODENOSCOPY);  Surgeon: Austin Garcia MD;  Location: [facility] (2ND FLR);  Service: Endoscopy;  Laterality: N/A;    ESOPHAGOGASTRODUODENOSCOPY N/A 4/12/2019    Procedure: EGD (ESOPHAGOGASTRODUODENOSCOPY);  Surgeon: Austin Garcia MD;  Location: [facility] (4TH FLR);  Service: Endoscopy;  Laterality: N/A;  please schedule within 4 weeks    ESOPHAGOGASTRODUODENOSCOPY N/A 5/27/2021    Procedure: EGD (ESOPHAGOGASTRODUODENOSCOPY);  Surgeon: Austin Garcia MD;  Location: [facility] (2ND FLR);  Service: Endoscopy;  Laterality: N/A;  per Dr. Garcia done within the month with any provider/ ordered by oncology  2nd floor due to comorbidities  Watchman device  COVID test at [facility] on 5/24-GT     SKIN CANCER EXCISION      TREATMENT OF CARDIAC ARRHYTHMIA N/A 7/12/2019    Procedure: Cardioversion or Defibrillation;  Surgeon: Hi Crowder MD;  Location: [facility] EP LAB;  Service: Cardiology;  Laterality: N/A;  AF, JACINTO, DCCV, MAC, MB, 3 Prep    TREATMENT OF CARDIAC ARRHYTHMIA N/A 2/8/2022    Procedure: Cardioversion or Defibrillation;  Surgeon: Susan Orozco NP;  Location: [facility] EP LAB;  Service: Cardiology;  Laterality: N/A;  afib, DCCV, anes, MB, 3 Prep       Review of patient's allergies indicates:  No Known Allergies    PTA Medications   Medication Sig    amLODIPine (NORVASC) 2.5 MG tablet TAKE 1 TABLET(2.5 MG) BY MOUTH EVERY DAY    aspirin 81 MG Chew Take 1 tablet (81 mg total) by mouth once daily.    irbesartan (AVAPRO) 150 MG tablet Take 1 tablet (150 mg total) by mouth once daily.    metoprolol succinate (TOPROL-XL) 25 MG 24 hr tablet TAKE 1/2 TABLET BY MOUTH DAILY    multivitamin (THERAGRAN) per tablet Take 1 tablet by mouth once daily.    clobetasoL (TEMOVATE) 0.05 % external solution Pt to mix in 1 jar of cerave cream and apply to affected areas bid    diclofenac sodium (VOLTAREN ARTHRITIS PAIN) 1 % Gel Apply 2 Grams to painful right hand joint 2-3x/day as needed    doxycycline (VIBRAMYCIN) 100 MG Cap Take 1 capsule (100 mg total) by mouth every 12 (twelve) hours.    fluorouraciL (EFUDEX) 5 % cream Compound fluorouracil 5% + calcipotriene 0.005% cream. Apply a pea-sized amount to entire bilateral lateral cheekbones BID x 7 days.    furosemide (LASIX) 20 MG tablet 1 tab 2 days/week for heart/blood pressure    mupirocin (BACTROBAN) 2 % ointment Apply to affected area 3 times daily     Family History       Problem Relation (Age of Onset)    Hypertension Father    Stroke Father, Brother          Tobacco Use    Smoking status: Former     Current packs/day: 0.00     Average packs/day: 1 pack/day for 25.0 years (25.0 ttl pk-yrs)     Types: Cigarettes     Start date: 9/3/1945     Quit date: 9/3/1970      Years since quittin.7    Smokeless tobacco: Never   Substance and Sexual Activity    Alcohol use: Yes     Alcohol/week: 14.0 standard drinks of alcohol     Types: 14 Shots of liquor per week     Comment: 2 Drinks daily    Drug use: No    Sexual activity: Yes     Partners: Female     Review of Systems   All other systems reviewed and are negative.    Objective:     Vital Signs (Most Recent):    Vital Signs (24h Range):           There is no height or weight on file to calculate BMI.            No intake or output data in the 24 hours ending 25 0631    Lines/Drains/Airways       None                    Physical Exam  Constitutional:       Appearance: Normal appearance.   HENT:      Mouth/Throat:      Mouth: Mucous membranes are moist.   Eyes:      Extraocular Movements: Extraocular movements intact.      Conjunctiva/sclera: Conjunctivae normal.   Cardiovascular:      Rate and Rhythm: Normal rate and regular rhythm.      Pulses: Normal pulses.   Pulmonary:      Effort: Pulmonary effort is normal.   Abdominal:      General: Abdomen is flat.      Palpations: Abdomen is soft.   Skin:     General: Skin is warm and dry.   Neurological:      General: No focal deficit present.      Mental Status: He is alert and oriented to person, place, and time.            Significant Labs: All pertinent lab results from the last 24 hours have been reviewed.    Significant Imaging: Reviewed  Assessment and Plan:     Cardiac/Vascular  * Nonrheumatic aortic valve stenosis  Transcatheter Aortic valve replacement   Valve: 34mm Evolut  ECMO:  On Call  TAVR access: RTF  Valvuloplasty balloon: N/A  Viabahn size if needed: 11 x 5  Antithrombotic therapy: asa alone  Temporary pacing wire: No, Will pace from TAVR wire  Pacemaker risk factors: none   EP Consult ordered if post procedure arrhythmias develop  Post op destination: Stepdown  To be done during procedure:  Antibiotics given  CT Surgery present in lab for valve deployment per  standard of care.  Surgeon :  Heart failure on admission?  CONSENTING:  The patient was told that the procedure carries around a 12.5% risk of permanent pacemaker requirement and that risk depends on the patients underlying conduction system as described in item 8.   Prior to the [facility] visit, all available records from referring provider were reviewed.  I have personally reviewed all the lab tests available related to the patient's case  The patient's images were reviewed by myself and the procedural planning was done with my own interpretation of the iliac and aortic anatomy based on CTA, angiography or JACINTO. I have reviewed all other imaging studies relevant to the patient including echocardiography and coronary angiography.  Patient was educated abut the pathophysiology and natural history of severe aortic stenosis and was educated about the treatment options including surgical and transcatheter valve replacement. She agrees to be full code for the forseable future and to undergo workup for treatment of severe AS.   The risks, benefits, and alternatives of transcatheter aortic valve replacement were discussed with the patient. All questions were answered and informed consent was obtained. I had a detailed discussion with the patient regarding risk of stroke, MI, bleeding access site complications including limb loss, allergy, kidney failure including dialysis and death.  The patient understands the risks and benefits and wishes to go ahead with the procedure.  The referring Cardiologist was notified of the plan  All patient's questions were answered     Shared Decision Making:  This patient was presented to a multidisciplinary heart team involving both a Structural Heart Specialist (Interventional Cardiologist) and a Cardiothoracic Surgeon. The patient was involved in shared decision-making to define clearer goals for treatment and align health decisions with their current values.  The patient understands the  risks and expected benefits of their treatment options.      Denise Barton MD  Interventional Cardiology   [facility] - Short Stay Cardiac Unit

## 2025-06-05 NOTE — TELEPHONE ENCOUNTER
No recommendations at this time due to age and potential side effects of initiating a statin.    ----- Message from NELLY Forman sent at 2025  5:42 AM CDT -----  Regarding: Order for LUIS M COLLAZO    Patient Name: LUIS M COLLAZO([id number])  Sex: Male  : 1926      PCP: NILTON RODRIGUEZ    Center: [facility]     Types of orders made on 2025: Diet, ECG, IV, Lab, Medications, Nursing,                                       Transfer    Order Date:2025  Ordering User:JOHNNY ESTRADA [[id number]]  Attending Provider:Vern Giang MD [[id number]]  Authorizing Provider: Vern Giang MD [[id number]  Department:[facility] SHORT STAY CARDIAC UNIT[[id number]]    Order Specific Information  Order: Notify [facility] [Custom: [id number]]  Order #: [id number]: 1    Priority: Routine  Class: Hospital Performed    Associated Diagnoses      I35.0 Severe aortic stenosis    Released on: 2025  5:42 AM        Priority: Routine  Class: Hospital Performed    Associated Diagnoses      I35.0 Severe aortic stenosis    Released on: 2025  5:42 AM

## 2025-06-05 NOTE — NURSING TRANSFER
Nursing Transfer Note      6/5/2025   12:05 PM    Nurse giving handoff:francisco fuller  Nurse receiving handoff:francisco reece     Reason patient is being transferred: post procedure     Transfer To: [id number]    Transfer via bed    Transfer with cardiac monitoring    Transported by rn      Telemetry: Box Number [id number], Rate 70, and Rhythm paced/afib  Order for Tele Monitor? Yes      Medicines sent: iv fluids     Any special needs or follow-up needed: routine    Chart send with patient: Yes    Patient reassessed at: 1130 (date, time)

## 2025-06-05 NOTE — CONSULTS
[facility] - Cath Lab  Cardiac Electrophysiology  Consult Note    Admission Date: 6/5/2025  Code Status: Prior   Attending Provider: Vern Giang MD  Consulting Provider: Dereje Lawton MD  Principal Problem:Nonrheumatic aortic valve stenosis    Inpatient consult to Electrophysiology  Consult performed by: Dereje Stokes MD  Consult ordered by: Denise Barton MD        Subjective:          HPI:   Mr. Mendoza 89 year-old man with history of HTN, permanent AFib, MALT (completed Rituximab x 4 3/7/19) and severe AS who presented to the hospital for TAVR. He is now s/p Evolut Bioprsth 34mm and QRS widen from 144 ms (baseline for him) to 158 ms. EP has been consulted.     Last TTE (1/29/2025):    Left Ventricle: The left ventricle is normal in size. Mildly increased wall thickness. Mild septal thickening. There is concentric remodeling. Mild global hypokinesis present. There is mildly reduced systolic function with a visually estimated ejection fraction of 40 - 45%. Ejection fraction is approximately 43%.    Right Ventricle: Normal right ventricular cavity size. Wall thickness is normal. Systolic function is normal.    Left Atrium: Left atrium is severely dilated.    Right Atrium: Right atrium is severely dilated.    Past Medical History:   Diagnosis Date    Acute medial meniscal tear, left, subsequent encounter 02/2025    s/p MRI    Alcohol dependence, daily use 07/13/2017    Allergy     Bladder cancer     tumor that was benign     Cataract     Cholelithiasis     By CT 1/2025    H/O nonmelanoma skin cancer 02/04/2016    Hematuria     Hypertension     Hypertensive heart disease with heart failure 02/01/2024    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    Mixed hyperlipidemia 05/02/2018    On continuous oral anticoagulation 07/30/2019    Paroxysmal atrial fibrillation 11/30/2018    S/P D/C Cardioversion 7/2019, S/P Watchman device 8/2020    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      Vel    PUD (peptic ulcer disease)     GI Bleeding 11/2018: Anticoagulant D/S'd and Gastric MALT tumor Dx'd    SCC (squamous cell carcinoma) 05/04/2023    L ventral forearm excised by APC    Skin cancer     x3, had mohs on nose    Squamous cell carcinoma excised 12/5/14    R lower back    Symptomatic anemia 12/06/2018    Urinary tract infection     x4       Past Surgical History:   Procedure Laterality Date    ACHILLES TENDON SURGERY      CATARACT EXTRACTION W/  INTRAOCULAR LENS IMPLANT Bilateral 2013    CLOSURE OF LEFT ATRIAL APPENDAGE USING DEVICE N/A 10/11/2019    Procedure: Left atrial appendage closure device;  Surgeon: Hi Crowder MD;  Location: [facility] EP LAB;  Service: Cardiology;  Laterality: N/A;  AF, JACINTO, Watchman Implant, BSci, Gen, MB, 3 Prep    CORONARY ANGIOGRAPHY N/A 4/28/2025    Procedure: ANGIOGRAM, CORONARY ARTERY;  Surgeon: Vern Giang MD;  Location: [facility] CATH LAB;  Service: Cardiology;  Laterality: N/A;    CYSTOSCOPY      bladder tumor    ESOPHAGOGASTRODUODENOSCOPY N/A 12/7/2018    Procedure: EGD (ESOPHAGOGASTRODUODENOSCOPY);  Surgeon: Austin Garcia MD;  Location: [facility] (2ND FLR);  Service: Endoscopy;  Laterality: N/A;    ESOPHAGOGASTRODUODENOSCOPY N/A 4/12/2019    Procedure: EGD (ESOPHAGOGASTRODUODENOSCOPY);  Surgeon: Austin Garcia MD;  Location: [facility] (4TH FLR);  Service: Endoscopy;  Laterality: N/A;  please schedule within 4 weeks    ESOPHAGOGASTRODUODENOSCOPY N/A 5/27/2021    Procedure: EGD (ESOPHAGOGASTRODUODENOSCOPY);  Surgeon: Austin Garcia MD;  Location: [facility] (2ND FLR);  Service: Endoscopy;  Laterality: N/A;  per Dr. Garcia done within the month with any provider/ ordered by oncology  2nd floor due to comorbidities  Watchman device  COVID test at [facility] on 5/24-GT    SKIN CANCER EXCISION      TREATMENT OF CARDIAC ARRHYTHMIA N/A 7/12/2019    Procedure: Cardioversion or Defibrillation;  Surgeon: Hi Crowder MD;  Location: [facility] EP LAB;  Service:  Cardiology;  Laterality: N/A;  AF, JACINTO, DCCV, MAC, MB, 3 Prep    TREATMENT OF CARDIAC ARRHYTHMIA N/A 2022    Procedure: Cardioversion or Defibrillation;  Surgeon: Susan Orozco NP;  Location: [facility] EP LAB;  Service: Cardiology;  Laterality: N/A;  afib, DCCV, anes, MB, 3 Prep       Review of patient's allergies indicates:  No Known Allergies    No current facility-administered medications on file prior to encounter.     Current Outpatient Medications on File Prior to Encounter   Medication Sig    amLODIPine (NORVASC) 2.5 MG tablet TAKE 1 TABLET(2.5 MG) BY MOUTH EVERY DAY    aspirin 81 MG Chew Take 1 tablet (81 mg total) by mouth once daily.    metoprolol succinate (TOPROL-XL) 25 MG 24 hr tablet TAKE 1/2 TABLET BY MOUTH DAILY    multivitamin (THERAGRAN) per tablet Take 1 tablet by mouth once daily.    clobetasoL (TEMOVATE) 0.05 % external solution Pt to mix in 1 jar of cerave cream and apply to affected areas bid    diclofenac sodium (VOLTAREN ARTHRITIS PAIN) 1 % Gel Apply 2 Grams to painful right hand joint 2-3x/day as needed    doxycycline (VIBRAMYCIN) 100 MG Cap Take 1 capsule (100 mg total) by mouth every 12 (twelve) hours.    fluorouraciL (EFUDEX) 5 % cream Compound fluorouracil 5% + calcipotriene 0.005% cream. Apply a pea-sized amount to entire bilateral lateral cheekbones BID x 7 days.    furosemide (LASIX) 20 MG tablet 1 tab 2 days/week for heart/blood pressure    mupirocin (BACTROBAN) 2 % ointment Apply to affected area 3 times daily     Family History       Problem Relation (Age of Onset)    Hypertension Father    Stroke Father, Brother          Tobacco Use    Smoking status: Former     Current packs/day: 0.00     Average packs/day: 1 pack/day for 25.0 years (25.0 ttl pk-yrs)     Types: Cigarettes     Start date: 9/3/1945     Quit date: 9/3/1970     Years since quittin.7    Smokeless tobacco: Never   Substance and Sexual Activity    Alcohol use: Yes     Alcohol/week: 14.0 standard drinks of alcohol      Types: 14 Shots of liquor per week     Comment: 2 Drinks daily    Drug use: No    Sexual activity: Yes     Partners: Female     Review of Systems   All other systems reviewed and are negative.    Objective:     Vital Signs (Most Recent):  Temp: 97.9 °F (36.6 °C) (06/05/25 1045)  Pulse: 68 (06/05/25 1045)  Resp: (!) 28 (06/05/25 1045)  BP: (!) 154/81 (06/05/25 1045)  SpO2: (!) 93 % (06/05/25 1045) Vital Signs (24h Range):  Temp:  [97.3 °F (36.3 °C)-97.9 °F (36.6 °C)] 97.9 °F (36.6 °C)  Pulse:  [67-72] 68  Resp:  [18-37] 28  SpO2:  [87 %-100 %] 93 %  BP: (137-183)/(80-99) 154/81  Arterial Line BP: (173-190)/(70-86) 174/70       Weight: 81.6 kg (180 lb)  Body mass index is 24.41 kg/m².    SpO2: (!) 93 %        Physical Exam  HENT:      Mouth/Throat:      Mouth: Mucous membranes are moist.   Cardiovascular:      Rate and Rhythm: Normal rate. Rhythm irregular.      Pulses: Normal pulses.   Pulmonary:      Effort: Pulmonary effort is normal.   Skin:     General: Skin is warm.      Capillary Refill: Capillary refill takes less than 2 seconds.   Neurological:      Mental Status: He is alert.            Significant Labs:   Recent Lab Results         06/05/25  0906   06/05/25  0637   06/05/25  0617        BNP   318  Comment: Values of less than 100 pg/ml are consistent with non-CHF populations.          QRS Duration 158     144       [facility] QTC Calculation 472     475                           Assessment and Plan:     LBBB (left bundle branch block)    Patient Active Problem List   Diagnosis    AK (actinic keratosis)    History of benign bladder tumor    Aortic atherosclerosis    Essential hypertension    Left ventricular diastolic dysfunction with preserved systolic function    Nonrheumatic aortic valve stenosis    Mixed hyperlipidemia    Aortic root dilation    PVC's (premature ventricular contractions)    Tortuous aorta    Persistent atrial fibrillation    MALT lymphoma    PUD (peptic ulcer disease)    Dilated  cardiomyopathy    History of cardioversion    Atrial fibrillation    Presence of Watchman left atrial appendage closure device    MALT (mucosa associated lymphoid tissue)    Skin tear of left elbow without complication    Trauma    Cough    Rhinitis    At risk for falls    History of skin cancer    Hypertensive heart disease with heart failure    Left medial knee pain    Impaired functional mobility, balance, gait, and endurance    LBBB (left bundle branch block)       Mr. Mendoza 89 year-old man with history of HTN, permanent AFib, MALT (completed Rituximab x 4 3/7/19) and severe AS who presented to the hospital for TAVR. He is now s/p Evolut Bioprsth 34mm and QRS widen from 144 ms (baseline for him) to 158 ms. EP consulted for further management of TVP and if PPM is indicated.     Had no intraprocedural complications.   Stable LBBB post-TAVR or Stable QRS morphology and no AV block during or after case.      Last TTE   TTE (1/29/2025) 40-45 % LVEF         A/P:   Norm Mendoza with severe AS s/p TAVR. Last EF 40-45 %  Symptomatic severe AS with LVEF of  40-45 % s/p TAVR with LBBB. Recommend overnight telemetry monitoring. If LBBB persists then will plan for EPS +/-PPM.      Pre-TAVR EKG: Afib HR 69 BPM, QRS: 144 ms  Post-TAVR EKG 1:  Afib HR 65 BPM, QRS: 158 ms     Tele: Afib with wide QRS.   TVP: VVI, base rate 40, threshold 1.5 , output 10    PLAN:  - Continue telemetry and TVP overnight  - Repeat ECG at 5 am  - NPO at MN, for possible PPM placement in AM  - Routine Post op management per structural team    - EP team will continue to follow, please call for any questions or concerns   - Provided rhythm remains stable will plan for outpatient event monitor on discharge.  - No evidence of AV block or change in QRS. No indication for PPM at this time. Will continue to monitor.    If LBBB:  - If LBBB persists in AM, will need EP study to evaluate the need for PPM  - If LBBB resolves, will go home with 30 day  event monitor          Thank you for your consult. I will follow-up with patient. Please contact us if you have any additional questions.    Dereje Lawton MD  Cardiac Electrophysiology  [facility] - Cath Lab

## 2025-06-05 NOTE — Clinical Note
90 ml of contrast were injected throughout the case. 110 mL of contrast was the total wasted during the case. 200 mL was the total amount used during the case. 22-May-2018 17:59

## 2025-06-06 ENCOUNTER — CLINICAL SUPPORT (OUTPATIENT)
Dept: CARDIOLOGY | Facility: HOSPITAL | Age: OVER 89
DRG: 267 | End: 2025-06-06
Attending: INTERNAL MEDICINE
Payer: MEDICARE

## 2025-06-06 VITALS
SYSTOLIC BLOOD PRESSURE: 132 MMHG | DIASTOLIC BLOOD PRESSURE: 65 MMHG | WEIGHT: 180 LBS | OXYGEN SATURATION: 96 % | TEMPERATURE: 98 F | HEIGHT: 72 IN | RESPIRATION RATE: 18 BRPM | BODY MASS INDEX: 24.38 KG/M2 | HEART RATE: 68 BPM

## 2025-06-06 PROBLEM — I48.11 LONGSTANDING PERSISTENT ATRIAL FIBRILLATION: Status: ACTIVE | Noted: 2018-11-30

## 2025-06-06 PROBLEM — I48.11 LONGSTANDING PERSISTENT ATRIAL FIBRILLATION: Status: ACTIVE | Noted: 2019-12-04

## 2025-06-06 PROBLEM — Z95.3 S/P TAVR (TRANSCATHETER AORTIC VALVE REPLACEMENT): Status: ACTIVE | Noted: 2025-06-06

## 2025-06-06 PROBLEM — I50.22 CHRONIC HEART FAILURE WITH MILDLY REDUCED EJECTION FRACTION (HFMREF, 41-49%): Status: ACTIVE | Noted: 2025-06-06

## 2025-06-06 LAB
GLUCOSE SERPL-MCNC: 118 MG/DL (ref 70–110)
GLUCOSE SERPL-MCNC: 118 MG/DL (ref 70–110)
OHS QRS DURATION: 152 MS
OHS QRS DURATION: 154 MS
OHS QTC CALCULATION: 455 MS
OHS QTC CALCULATION: 474 MS
POC ACTIVATED CLOTTING TIME K: 147 SEC (ref 74–137)
POC ACTIVATED CLOTTING TIME K: 147 SEC (ref 74–137)
POC ACTIVATED CLOTTING TIME K: 256 SEC (ref 74–137)
POC ACTIVATED CLOTTING TIME K: 274 SEC (ref 74–137)
POCT GLUCOSE: 118 MG/DL (ref 70–110)
POCT GLUCOSE: 118 MG/DL (ref 70–110)
SAMPLE: ABNORMAL

## 2025-06-06 PROCEDURE — 25000003 PHARM REV CODE 250: Mod: HCNC

## 2025-06-06 PROCEDURE — 99231 SBSQ HOSP IP/OBS SF/LOW 25: CPT | Mod: HCNC,GC,, | Performed by: INTERNAL MEDICINE

## 2025-06-06 PROCEDURE — 99239 HOSP IP/OBS DSCHRG MGMT >30: CPT | Mod: HCNC,,,

## 2025-06-06 PROCEDURE — 93010 ELECTROCARDIOGRAM REPORT: CPT | Mod: HCNC,,, | Performed by: INTERNAL MEDICINE

## 2025-06-06 PROCEDURE — 25000003 PHARM REV CODE 250: Mod: HCNC | Performed by: STUDENT IN AN ORGANIZED HEALTH CARE EDUCATION/TRAINING PROGRAM

## 2025-06-06 PROCEDURE — 02PA3MZ REMOVAL OF CARDIAC LEAD FROM HEART, PERCUTANEOUS APPROACH: ICD-10-PCS | Performed by: INTERNAL MEDICINE

## 2025-06-06 PROCEDURE — 93005 ELECTROCARDIOGRAM TRACING: CPT | Mod: HCNC

## 2025-06-06 RX ORDER — MUPIROCIN 20 MG/G
OINTMENT TOPICAL 2 TIMES DAILY
Status: DISCONTINUED | OUTPATIENT
Start: 2025-06-06 | End: 2025-06-06 | Stop reason: HOSPADM

## 2025-06-06 RX ADMIN — Medication 6 MG: at 12:06

## 2025-06-06 RX ADMIN — AMLODIPINE BESYLATE 2.5 MG: 2.5 TABLET ORAL at 07:06

## 2025-06-06 RX ADMIN — VALSARTAN 80 MG: 40 TABLET, FILM COATED ORAL at 07:06

## 2025-06-06 RX ADMIN — ASPIRIN 81 MG CHEWABLE TABLET 81 MG: 81 TABLET CHEWABLE at 07:06

## 2025-06-06 NOTE — NURSING
Home Oxygen Evaluation    Date Performed:     1) Patient's Home O2 Sat on room air, while at rest: 96        If O2 sats on room air at rest are 88% or below, patient qualifies. No additional testing needed. Document N/A in steps 2 and 3. If 89% or above, complete steps 2.      2) Patient's O2 Sat on room air while exercisin        If O2 sats on room air while exercising remain 89% or above patient does not qualify, no further testing needed Document N/A in step 3. If O2 sats on room air while exercising are 88% or below, continue to step 3.      3) Patient's O2 Sat while exercising on O2: n/a at n/a LPM         (Must show improvement from #2 for patients to qualify)    If O2 sats improve on oxygen, patient qualifies for portable oxygen. If not, the patient does not qualify.

## 2025-06-06 NOTE — SUBJECTIVE & OBJECTIVE
Interval History: s/p TAVR with 34 mm Evolut valve 6/5/2025    Objective:     Vital Signs (Most Recent):  Temp: 97.6 °F (36.4 °C) (06/06/25 0745)  Pulse: 78 (06/06/25 1018)  Resp: 18 (06/06/25 0745)  BP: 137/73 (06/06/25 0745)  SpO2: 96 % (06/06/25 1018) Vital Signs (24h Range):  Temp:  [97.4 °F (36.3 °C)-98.6 °F (37 °C)] 97.6 °F (36.4 °C)  Pulse:  [65-86] 78  Resp:  [15-22] 18  SpO2:  [94 %-99 %] 96 %  BP: (132-166)/(64-90) 137/73  Arterial Line BP: (173)/(69) 173/69     Weight: 81.6 kg (180 lb)  Body mass index is 24.41 kg/m².    SpO2: 96 %         Intake/Output Summary (Last 24 hours) at 6/6/2025 1057  Last data filed at 6/6/2025 0923  Gross per 24 hour   Intake 1156.09 ml   Output 700 ml   Net 456.09 ml       Lines/Drains/Airways       Peripheral Intravenous Line  Duration                  Peripheral IV - Single Lumen 06/05/25 0646 20 G Left Forearm 1 day         Peripheral IV - Single Lumen 06/05/25 0646 20 G Right Forearm 1 day                     Physical Exam  Vitals and nursing note reviewed.   Constitutional:       General: He is not in acute distress.     Appearance: Normal appearance. He is not ill-appearing or toxic-appearing.   HENT:      Head: Normocephalic and atraumatic.   Eyes:      Pupils: Pupils are equal, round, and reactive to light.   Cardiovascular:      Rate and Rhythm: Normal rate.   Pulmonary:      Effort: Pulmonary effort is normal. No respiratory distress.   Musculoskeletal:         General: Normal range of motion.      Cervical back: Normal range of motion.   Skin:     General: Skin is warm and dry.      Capillary Refill: Capillary refill takes less than 2 seconds.   Neurological:      General: No focal deficit present.      Mental Status: He is alert.            Significant Labs: All pertinent lab results from the last 24 hours have been reviewed.    Significant Imaging: N/A

## 2025-06-06 NOTE — EICU
Virtual ICU Quality Rounds    Admit Date: 6/5/2025  Hospital Day: 1    ICU Day: 21h    24H Vital Sign Range:  Temp:  [97.4 °F (36.3 °C)-98.6 °F (37 °C)]   Pulse:  [65-86]   Resp:  [15-28]   BP: (132-172)/(64-90)   SpO2:  [87 %-100 %]   Arterial Line BP: (173-190)/(69-86)     VICU Surveillance Screening    Daily review of  line necessity(optional): N/A      LDA reconciliation : Yes

## 2025-06-06 NOTE — CARE UPDATE
[facility] Care Support Interaction      I have reviewed the chart of Norm Mendoza who is hospitalized for Nonrheumatic aortic valve stenosis. The patient is currently located in the following unit: CSU        I have assisted the primary physician in management of the following:      MRSA Decolonization - Mupirocin ordered and CHG ordered       Tracy Brooke PA-C  [facility]

## 2025-06-06 NOTE — PROGRESS NOTES
[facility] - [facility]  Cardiac Electrophysiology  Progress Note    Admission Date: 6/5/2025  Code Status: Prior   Attending Physician: Vern Giang MD   Expected Discharge Date:   Principal Problem:Nonrheumatic aortic valve stenosis    Subjective:     Interval History: Morning EKG with no changes from previous ones. Still showing LBBB with  ms (previous ones 144 - 154 ms). He did not need back up pacing from TVP. Recommend MCOT and can be discharged from EP stand point.     ROS  Objective:     Vital Signs (Most Recent):  Temp: 97.6 °F (36.4 °C) (06/06/25 0745)  Pulse: 78 (06/06/25 1018)  Resp: 18 (06/06/25 0745)  BP: 137/73 (06/06/25 0745)  SpO2: 96 % (06/06/25 1018) Vital Signs (24h Range):  Temp:  [97.4 °F (36.3 °C)-98.6 °F (37 °C)] 97.6 °F (36.4 °C)  Pulse:  [65-86] 78  Resp:  [15-22] 18  SpO2:  [94 %-99 %] 96 %  BP: (132-166)/(64-90) 137/73  Arterial Line BP: (173)/(69) 173/69     Weight: 81.6 kg (180 lb)  Body mass index is 24.41 kg/m².     SpO2: 96 %        Physical Exam       Significant Labs:   Recent Lab Results  (Last 5 results in the past 24 hours)        06/06/25  0758   06/06/25  0747   06/06/25  0530   06/05/25  2109   06/05/25  1833        QRS Duration     154           OHS QTC Calculation     455           POC Glucose 118               POCT Glucose   118     130   113                                Assessment and Plan:     LBBB (left bundle branch block)    Patient Active Problem List   Diagnosis    AK (actinic keratosis)    History of benign bladder tumor    Aortic atherosclerosis    Essential hypertension    Left ventricular diastolic dysfunction with preserved systolic function    Nonrheumatic aortic valve stenosis    Mixed hyperlipidemia    Aortic root dilation    PVC's (premature ventricular contractions)    Tortuous aorta    Persistent atrial fibrillation    MALT lymphoma    PUD (peptic ulcer disease)    Dilated cardiomyopathy    History of cardioversion    Atrial  fibrillation    Presence of Watchman left atrial appendage closure device    MALT (mucosa associated lymphoid tissue)    Skin tear of left elbow without complication    Trauma    Cough    Rhinitis    At risk for falls    History of skin cancer    Hypertensive heart disease with heart failure    Left medial knee pain    Impaired functional mobility, balance, gait, and endurance    LBBB (left bundle branch block)    S/P TAVR (transcatheter aortic valve replacement)       Mr. Mendoza 89 year-old man with history of HTN, permanent AFib, MALT (completed Rituximab x 4 3/7/19) and severe AS who presented to the hospital for TAVR. He is now s/p Evolut Bioprsth 34mm and QRS widen from 144 ms (baseline for him) to 158 ms. EP consulted for further management of TVP and if PPM is indicated.     Had no intraprocedural complications.   Stable LBBB post-TAVR or Stable QRS morphology and no AV block during or after case.      Last TTE   TTE (1/29/2025) 40-45 % LVEF         A/P:   Norm Mendoza with severe AS s/p TAVR. Last EF 40-45 %  Symptomatic severe AS with LVEF of  40-45 % s/p TAVR with LBBB. Recommend overnight telemetry monitoring. If LBBB persists then will plan for EPS +/-PPM.      Pre-TAVR EKG: Afib HR 69 BPM, QRS: 144 ms  Post-TAVR EKG 1:  Afib HR 65 BPM, QRS: 158 ms   Post- TAVR EKG 2: Afib HR 65 BPM, QRS: 154 ms     Tele: Afib with wide QRS.   TVP: VVI, base rate 40, threshold 1.5 , output 10    PLAN:  - No indication for EPS  - Remove TVP   - MCOT ordered          Dereje Lawton MD  Cardiac Electrophysiology  [facility] - [facility]

## 2025-06-06 NOTE — ASSESSMENT & PLAN NOTE
Patient Active Problem List   Diagnosis    AK (actinic keratosis)    History of benign bladder tumor    Aortic atherosclerosis    Essential hypertension    Left ventricular diastolic dysfunction with preserved systolic function    Nonrheumatic aortic valve stenosis    Mixed hyperlipidemia    Aortic root dilation    PVC's (premature ventricular contractions)    Tortuous aorta    Persistent atrial fibrillation    MALT lymphoma    PUD (peptic ulcer disease)    Dilated cardiomyopathy    History of cardioversion    Atrial fibrillation    Presence of Watchman left atrial appendage closure device    MALT (mucosa associated lymphoid tissue)    Skin tear of left elbow without complication    Trauma    Cough    Rhinitis    At risk for falls    History of skin cancer    Hypertensive heart disease with heart failure    Left medial knee pain    Impaired functional mobility, balance, gait, and endurance    LBBB (left bundle branch block)    S/P TAVR (transcatheter aortic valve replacement)       Mr. Mendoza 89 year-old man with history of HTN, permanent AFib, MALT (completed Rituximab x 4 3/7/19) and severe AS who presented to the hospital for TAVR. He is now s/p Evolut Bioprsth 34mm and QRS widen from 144 ms (baseline for him) to 158 ms. EP consulted for further management of TVP and if PPM is indicated.     Had no intraprocedural complications.   Stable LBBB post-TAVR or Stable QRS morphology and no AV block during or after case.      Last TTE   TTE (1/29/2025) 40-45 % LVEF         A/P:   Norm Mendoza with severe AS s/p TAVR. Last EF 40-45 %  Symptomatic severe AS with LVEF of  40-45 % s/p TAVR with LBBB. Recommend overnight telemetry monitoring. If LBBB persists then will plan for EPS +/-PPM.      Pre-TAVR EKG: Afib HR 69 BPM, QRS: 144 ms  Post-TAVR EKG 1:  Afib HR 65 BPM, QRS: 158 ms   Post- TAVR EKG 2: Afib HR 65 BPM, QRS: 154 ms     Tele: Afib with wide QRS.   TVP: VVI, base rate 40, threshold 1.5 , output  10    PLAN:  - No indication for EPS  - Remove TVP   - MCOT ordered

## 2025-06-06 NOTE — EICU
Virtual ICU Quality Rounds    Admit Date: 6/5/2025  Hospital Day: 1    ICU Day: 21h    24H Vital Sign Range:  Temp:  [97.4 °F (36.3 °C)-98.6 °F (37 °C)]   Pulse:  [65-86]   Resp:  [15-28]   BP: (132-172)/(64-90)   SpO2:  [87 %-99 %]   Arterial Line BP: (173-181)/(69-73)     VICU Surveillance Screening      LDA reconciliation : Yes

## 2025-06-06 NOTE — PLAN OF CARE
[facility] - Card[facility]  Discharge Final Note    Primary Care Provider: Amber Jenkins MD    Expected Discharge Date: 6/6/2025    Patient discharged to home via family personal transportation.     Patient's bedside nurse and family notified of the above.    Discharge Plan A and Plan B have been determined by review of patient's clinical status, future medical and therapeutic needs, and coverage/benefits for post-acute care in coordination with multidisciplinary team members.        Final Discharge Note (most recent)       Final Note - 06/06/25 1205          Final Note    Assessment Type Final Discharge Note (P)      Anticipated Discharge Disposition Home or Self Care (P)      Hospital Resources/Appts/Education Provided Provided patient/caregiver with written discharge plan information;Appointments scheduled and added to AVS (P)         Post-Acute Status    Discharge Delays None known at this time (P)                      Important Message from Medicare                 Future Appointments   Date Time Provider Department Center   7/11/2025 10:00 AM LAB, APPOINTMENT [place] [facility]   [facility]   7/15/2025 10:15 AM LAB, APPOINTMENT [place] [facility]   7/15/2025 11:30 AM Daisy Shah, PAQuinnC [facility] CARDVAL [facility]   7/18/2025  8:00 AM Dg Prather MD [facility] Elpidio Klein   8/5/2025  8:40 AM Shante Kerr MD NOM DERM [facility]   10/8/2025  9:30 AM LAB, APPOINTMENT [facility] NOM LAB [facility]   10/15/2025  9:00 AM Amber Jenkins MD [facility] IM Faby KONG scheduled post-discharge follow-up appointment and information added to AVS.

## 2025-06-06 NOTE — SUBJECTIVE & OBJECTIVE
Interval History: Morning EKG with no changes from previous ones. Still showing LBBB with  ms (previous ones 144 - 154 ms). He did not need back up pacing from TVP. Recommend MCOT and can be discharged from EP stand point.     ROS  Objective:     Vital Signs (Most Recent):  Temp: 97.6 °F (36.4 °C) (06/06/25 0745)  Pulse: 78 (06/06/25 1018)  Resp: 18 (06/06/25 0745)  BP: 137/73 (06/06/25 0745)  SpO2: 96 % (06/06/25 1018) Vital Signs (24h Range):  Temp:  [97.4 °F (36.3 °C)-98.6 °F (37 °C)] 97.6 °F (36.4 °C)  Pulse:  [65-86] 78  Resp:  [15-22] 18  SpO2:  [94 %-99 %] 96 %  BP: (132-166)/(64-90) 137/73  Arterial Line BP: (173)/(69) 173/69     Weight: 81.6 kg (180 lb)  Body mass index is 24.41 kg/m².     SpO2: 96 %        Physical Exam       Significant Labs:   Recent Lab Results  (Last 5 results in the past 24 hours)        06/06/25  0758   06/06/25  0747   06/06/25  0530   06/05/25  2109   06/05/25  1833        QRS Duration     154           OHS QTC Calculation     455           POC Glucose 118               POCT Glucose   118     130   113

## 2025-06-06 NOTE — PLAN OF CARE
Patient is ready for discharge. Patient stable alert and oriented. IVs removed. No complaints of pain. Discussed discharge plan. Reviewed medications and side effects, appointments, and answered questions with patient and family. No new Rx for pt.  30 day cardiac monitor applied on pt by tech, instructions provided.       Problem: Adult Inpatient Plan of Care  Goal: Plan of Care Review  Outcome: Met  Goal: Patient-Specific Goal (Individualized)  Outcome: Met  Goal: Absence of Hospital-Acquired Illness or Injury  Outcome: Met  Goal: Optimal Comfort and Wellbeing  Outcome: Met  Goal: Readiness for Transition of Care  Outcome: Met     Problem: Wound  Goal: Optimal Coping  Outcome: Met  Goal: Optimal Functional Ability  Outcome: Met  Goal: Absence of Infection Signs and Symptoms  Outcome: Met  Goal: Improved Oral Intake  Outcome: Met  Goal: Optimal Pain Control and Function  Outcome: Met  Goal: Skin Health and Integrity  Outcome: Met  Goal: Optimal Wound Healing  Outcome: Met     Problem: Cardiac Catheterization (Diagnostic/Interventional)  Goal: Absence of Bleeding  Outcome: Met  Goal: Absence of Contrast-Induced Injury  Outcome: Met  Goal: Stable Heart Rate and Rhythm  Outcome: Met  Goal: Absence of Embolism Signs and Symptoms  Outcome: Met  Goal: Optimal Pain Control and Function  Outcome: Met  Goal: Absence of Vascular Access Complication  Outcome: Met

## 2025-06-06 NOTE — DISCHARGE SUMMARY
[facility] - Cardiac Intensive [facility]  Interventional Cardiology  Discharge Summary      Patient Name: Norm Mendoza  MRN: [id number]  Admission Date: 6/5/2025  Hospital Length of Stay: 1 days  Discharge Date and Time: 06/06/2025 11:07 AM  Attending Physician: Vern Giang MD  Discharging Provider: Daisy Shah PA-C  Primary Care Physician: Amber Jenkins MD    HPI:  Norm Mendoza is a 89 y.o. male referred by Dr Ambrocio for evaluation of severe AS (NYHA Class II sx).     He presents today to discuss consents for TAVR. He states he continues to have DUGAN and is ready to have the procedure done. No other acute events.      The patient has undergone the following TAVR work-up:   ECHO (Date 06/18/24): SUJEY= 0.65 cm2, MG= 36mmHg, Peak Eric= 3.5 m/s, DI=0.16 EF= 40-45%.   DSE: SUJEY= 0.9 cm2, MG= 54, Peak Eric= 4.7   LHC (Date 04/28/25): non-obstructive disease   STS: 6.5%   Frailty: not frail  Iliacs are >10.5 on R   LVOT area by CTA is 6.38 cm2 (32.2 mm X 25.05 mm) and Avg Diameter is 28.5 per my independent review of images on [facility].   Incidental findings on CT: prominent LN in stomach, PCP messaged   CT Surgery risk assessment:  risk, per Dr  due to seen 04/29/25  Rhythm issues: AF, IVCD  PFTs: N/A  KCCQ/5 meter walk: done  Comorbidities: age, AF, CHF        Norm Mendoza is a 34 mm  Evolut valve candidate via RTF access.    Procedure(s):  Insertion, Pacemaker, Temporary Transvenous     Indwelling Lines/Drains at time of discharge:  Lines/Drains/Airways       None                   Hospital Course:  Patient brought for planned TAVR that was successful with a 34 mm Evolut valve, please see report for full details. The patient tolerated the procedure well. ECG post TAVR revealed baseline LBBB. The patient was instructed to continue home medications. He was given information for follow-up appointments then discharged home in stable condition with 30 day event monitor.    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 Follow up with general cardiology.    S/P TAVR (transcatheter aortic valve replacement)  S/p TAVR. Routine TAVR post-op care instruction given. Continue Aspirin 81 mg daily. Follow-up with interventional cardiology in one month with echo and labs.    LBBB (left bundle branch block)  Chronic. Unchanged post-TAVR. Sent home with 30 day cardiac monitor. Follow up with general cardiology.    Presence of Watchman left atrial appendage closure device  S/p watchman 2019. Continue aspirin 81. Follow up with general cardiology.    Longstanding persistent atrial fibrillation  Chronic. Stable. S/p watchman. Continue aspirin. Follow up with general cardiology.    Mixed hyperlipidemia  Stable. Follow up with PCP.        Discharged Condition: good    Follow Up:  Interventional cardiology for one month TAVR follow-up with echocardiogram and labs. Appointment information given to patient.  Follow up with general cardiology for other cardiac conditions.    Patient Instructions:      ACCEPT - Ambulatory referral/consult to [facility]   Standing Status: Future   Referral Priority: Routine Referral Type: Consultation   Referral Reason: Specialty Services Required   Requested Specialty: Cardiology   Number of Visits Requested: 1     Medications:  Reconciled Home Medications:      Medication List        CONTINUE taking these medications      amLODIPine 2.5 MG tablet  Commonly known as: NORVASC  TAKE 1 TABLET(2.5 MG) BY  MOUTH EVERY DAY     aspirin 81 MG Chew  Take 1 tablet (81 mg total) by mouth once daily.     clobetasoL 0.05 % external solution  Commonly known as: TEMOVATE  Pt to mix in 1 jar of cerave cream and apply to affected areas bid     diclofenac sodium 1 % Gel  Commonly known as: VOLTAREN ARTHRITIS PAIN  Apply 2 Grams to painful right hand joint 2-3x/day as needed     doxycycline 100 MG Cap  Commonly known as: VIBRAMYCIN  Take 1 capsule (100 mg total) by mouth every 12 (twelve) hours.     fluorouraciL 5 % cream  Commonly known as: EFUDEX  Compound fluorouracil 5% + calcipotriene 0.005% cream. Apply a pea-sized amount to entire bilateral lateral cheekbones BID x 7 days.     furosemide 20 MG tablet  Commonly known as: LASIX  1 tab 2 days/week for heart/blood pressure     irbesartan 150 MG tablet  Commonly known as: AVAPRO  Take 1 tablet (150 mg total) by mouth once daily.     metoprolol succinate 25 MG 24 hr tablet  Commonly known as: TOPROL-XL  TAKE 1/2 TABLET BY MOUTH DAILY     multivitamin per tablet  Commonly known as: THERAGRAN  Take 1 tablet by mouth once daily.     mupirocin 2 % ointment  Commonly known as: BACTROBAN  Apply to affected area 3 times daily              Time spent on the discharge of patient: 35 minutes    Daisy Shah PA-C  Interventional Cardiology  [facility] - [facility]

## 2025-06-06 NOTE — PLAN OF CARE
Patient alert and oriented x4. Patient free from fall and injury. Fall precautions remained in place. VSS. Patient remained on room air. Sats 94-97%. AFib on telemetry. Rate controlled in 60-80s. Plan of care reviewed with the patient. Cordis with TVP connected to pacer remained in place. Normal Saline remained at 10 ml/hr. Perclose site remained with small amount of drainage. No hematoma formation present. Pedal pulses remained palpable. Patient denies pain, chest pain, headaches, or SOB. No acute distress noted. Plan of care continues.       Problem: Adult Inpatient Plan of Care  Goal: Plan of Care Review  Outcome: Progressing  Goal: Patient-Specific Goal (Individualized)  Outcome: Progressing  Goal: Absence of Hospital-Acquired Illness or Injury  Outcome: Progressing  Goal: Optimal Comfort and Wellbeing  Outcome: Progressing  Goal: Readiness for Transition of Care  Outcome: Progressing     Problem: Wound  Goal: Optimal Coping  Outcome: Progressing  Goal: Optimal Functional Ability  Outcome: Progressing  Goal: Absence of Infection Signs and Symptoms  Outcome: Progressing  Goal: Improved Oral Intake  Outcome: Progressing  Goal: Optimal Pain Control and Function  Outcome: Progressing  Goal: Skin Health and Integrity  Outcome: Progressing  Goal: Optimal Wound Healing  Outcome: Progressing     Problem: Cardiac Catheterization (Diagnostic/Interventional)  Goal: Absence of Bleeding  Outcome: Progressing  Goal: Absence of Contrast-Induced Injury  Outcome: Progressing  Goal: Stable Heart Rate and Rhythm  Outcome: Progressing  Goal: Absence of Embolism Signs and Symptoms  Outcome: Progressing  Goal: Optimal Pain Control and Function  Outcome: Progressing  Goal: Absence of Vascular Access Complication  Outcome: Progressing

## 2025-06-06 NOTE — HOSPITAL COURSE
Patient brought for planned TAVR that was successful with a 34 mm Evolut valve, please see report for full details. The patient tolerated the procedure well. ECG post TAVR revealed baseline LBBB. The patient was instructed to continue home medications. He was given information for follow-up appointments then discharged home in stable condition with 30 day event monitor.

## 2025-06-06 NOTE — PLAN OF CARE
Initial Discharge Planning Assessment:  Patient admitted on: 6/5/25     Chart reviewed, Care plan discussed with treatment team,  attending Dr. Brown      PCP updated in Epic: Dr. Owens   Pharmacy, updated in Epic: [facility]      DME at home: None       Current dispo: Home with family       Transportation: Family can provide      Power of  or Living Will: Family      Anticipated DC needs from CM perspective:  None     Discharge Plan A and Plan B have been determined by review of patient's clinical status, future medical and therapeutic needs, and coverage/benefits for post-acute care in coordination with multidisciplinary team members.       [facility] - Cardiac Intensive Care  Initial Discharge Assessment       Primary Care Provider: Amber Jenkins MD    Admission Diagnosis: Severe aortic stenosis [I35.0]    Admission Date: 6/5/2025  Expected Discharge Date: 6/6/2025    Transition of Care Barriers: None    Payor: HU[employer] MANAGED MEDICARE / Plan: HU[employer] MEDICARE HMO / Product Type: Capitation /     Extended Emergency Contact Information  Primary Emergency Contact: Jamie Mendoza  Address: unknown   [place] [facility]  Mobile Phone: [phone]  Relation: Son  Secondary Emergency Contact: JAMAL ROTHMAN  Mobile Phone: [phone]  Relation: Friend  Preferred language: English   needed? No    Discharge Plan A: Home with family  Discharge Plan B: Home with family      [id number] - [place], [place] - [address] AT [address]  [address]  [facility] [address]  Phone: [phone] Fax: [phone]    [place] - [address]  [address]  [facility] [place] [facility] [address]  Phone: [phone] Fax: [phone]      Initial Assessment (most recent)       Adult Discharge Assessment - 06/06/25 1202          Discharge Assessment    Assessment Type Discharge Planning Assessment     Confirmed/corrected address, phone number  and insurance Yes     Confirmed Demographics Correct on Facesheet     Source of Information patient;family;health record     People in Home spouse     Do you expect to return to your current living situation? Yes     Do you have help at home or someone to help you manage your care at home? Yes     Prior to hospitilization cognitive status: Alert/Oriented     Current cognitive status: Alert/Oriented     Walking or Climbing Stairs Difficulty no     Dressing/Bathing Difficulty no     Equipment Currently Used at Home none     Readmission within 30 days? No     Patient currently being followed by outpatient case management? No     Do you currently have service(s) that help you manage your care at home? No     Do you take prescription medications? Yes     Do you have prescription coverage? Yes     Do you have any problems affording any of your prescribed medications? No     Is the patient taking medications as prescribed? yes     How do you get to doctors appointments? family or friend will provide     Are you on dialysis? No     Do you take coumadin? No     Discharge Plan A Home with family     Discharge Plan B Home with family     DME Needed Upon Discharge  none     Discharge Plan discussed with: Patient;Spouse/sig other     Transition of Care Barriers None        Physical Activity    On average, how many days per week do you engage in moderate to strenuous exercise (like a brisk walk)? 0 days     On average, how many minutes do you engage in exercise at this level? 0 min        Financial Resource Strain    How hard is it for you to pay for the very basics like food, housing, medical care, and heating? Not hard at all        Housing Stability    In the last 12 months, was there a time when you were not able to pay the mortgage or rent on time? No     At any time in the past 12 months, were you homeless or living in a shelter (including now)? No        Transportation Needs    In the past 12 months, has lack of  transportation kept you from medical appointments or from getting medications? No     In the past 12 months, has lack of transportation kept you from meetings, work, or from getting things needed for daily living? No        Food Insecurity    Within the past 12 months, you worried that your food would run out before you got the money to buy more. Never true     Within the past 12 months, the food you bought just didn't last and you didn't have money to get more. Never true        Stress    Do you feel stress - tense, restless, nervous, or anxious, or unable to sleep at night because your mind is troubled all the time - these days? Not at all        Social Isolation    How often do you feel lonely or isolated from those around you?  Never        Alcohol Use    Q1: How often do you have a drink containing alcohol? Never (P)      Q2: How many drinks containing alcohol do you have on a typical day when you are drinking? Patient does not drink (P)      Q3: How often do you have six or more drinks on one occasion? Never (P)         Utilities    In the past 12 months has the electric, gas, oil, or water company threatened to shut off services in your home? No (P)         Health Literacy    How often do you need to have someone help you when you read instructions, pamphlets, or other written material from your doctor or pharmacy? Never (P)

## 2025-06-06 NOTE — ASSESSMENT & PLAN NOTE
S/p TAVR. Routine TAVR post-op care instruction given. Continue Aspirin 81 mg daily. Follow-up with interventional cardiology in one month with echo and labs.

## 2025-06-06 NOTE — ASSESSMENT & PLAN NOTE
Chronic. Unchanged post-TAVR. Sent home with 30 day cardiac monitor. Follow up with general cardiology.

## 2025-06-09 ENCOUNTER — PATIENT OUTREACH (OUTPATIENT)
Dept: ADMINISTRATIVE | Facility: CLINIC | Age: OVER 89
End: 2025-06-09
Payer: MEDICARE

## 2025-06-09 NOTE — PROGRESS NOTES
C3 nurse spoke with Norm Mendoza for a TCC post hospital discharge follow up call. The patient does not have a scheduled HOSFU appointment with his PCP, Amber Jenkins MD within 5-7 days post hospital discharge date of 6/6/25. C3 nurse was unable to schedule HOSFU appointment in [facility] with PCP and pt denied needing a NPHV.    Message sent to PCP staff requesting they contact patient and schedule follow up appointment.

## 2025-06-11 ENCOUNTER — TELEPHONE (OUTPATIENT)
Dept: INTERNAL MEDICINE | Facility: CLINIC | Age: OVER 89
End: 2025-06-11
Payer: MEDICARE

## 2025-06-11 DIAGNOSIS — I10 ESSENTIAL HYPERTENSION: ICD-10-CM

## 2025-06-11 RX ORDER — AMLODIPINE BESYLATE 2.5 MG/1
2.5 TABLET ORAL DAILY
Qty: 90 TABLET | Refills: 3 | Status: SHIPPED | OUTPATIENT
Start: 2025-06-11

## 2025-06-11 NOTE — TELEPHONE ENCOUNTER
Cami  Pt is s/p TAVR(Aortic valve replacement) 6/5 with D/C on 6/6.  He needs a Cardiology follow up? Can you call them?  Let me know when it's scheduled/Thanks

## 2025-06-11 NOTE — TELEPHONE ENCOUNTER
Refill Routing Note   Medication(s) are not appropriate for processing by [facility] for the following reason(s):        ED/Hospital Visit since last OV with provider    [facility] action(s):  Defer      Medication Therapy Plan: 6/5/25 ED VISIT - Nonrheumatic aortic valve stenosis    Extended chart review required: Yes     Appointments  past 12m or future 3m with PCP    Date Provider   Last Visit   5/1/2025 Amber Jenkins MD   Next Visit   10/15/2025 Amber Jenkins MD   ED visits in past 90 days: 0        Note composed:12:47 PM 06/11/2025

## 2025-06-11 NOTE — TELEPHONE ENCOUNTER
I had to leave a message for Cardiology to call me back about a post op apt    Pt is scheduled for post op testing so I didn't have them move his apt until we speak to someone in the office.

## 2025-06-11 NOTE — TELEPHONE ENCOUNTER
Cami  Pt is s/p TAVR(Aortic valve replacement) 6/5 with D/C on 6/6.  He needs a Cardiology follow up? Can you call them?  Let me know when it's scheduled/Thanks   Intimate partner violence:     Fear of current or ex partner: None     Emotionally abused: None     Physically abused: None     Forced sexual activity: None   Other Topics Concern    None   Social History Narrative    None       Current Outpatient Medications   Medication Sig Dispense Refill    metoprolol succinate (TOPROL XL) 100 MG extended release tablet Take 1 tablet by mouth 2 times daily 180 tablet 3    lisinopril (PRINIVIL;ZESTRIL) 10 MG tablet Take 1 tablet by mouth daily 90 tablet 3    apixaban (ELIQUIS) 5 MG TABS tablet Take 1 tablet by mouth 2 times daily 180 tablet 1    nitroGLYCERIN (NITROSTAT) 0.4 MG SL tablet Place 1 tablet under the tongue every 5 minutes as needed for Chest pain 25 tablet 0    CRESTOR 40 MG tablet 40 mg daily       vitamin D (ERGOCALCIFEROL) 38751 UNITS CAPS capsule Take 50,000 Units by mouth once a week.  aspirin 81 MG EC tablet Take 81 mg by mouth daily.  Tiotropium Bromide Monohydrate (SPIRIVA HANDIHALER IN) Inhale 1 puff into the lungs daily       Albuterol Sulfate (PROAIR HFA IN) Inhale 1 puff into the lungs as needed        No current facility-administered medications for this visit. 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 History of Present Illness: Randal Maldonado states that He does house work, yard work, goes up the stairs & goes shopping. He denies any CP. He denies any  orthopnea/PND, or lower extremity edema. He does occasionally feel palpitations that last 1-2 seconds. He does have COPD and has chronic dyspnea on exertion but feels that he is still at his normal baseline. REVIEW OF SYSTEMS:  As above. Patient does not complain of any fever, chills, nausea, vomiting or diarrhea.  No focal, motor or neurological deficits. No changes in his/her vision, hearing, bowel or bladder habits. He is not known to have a history of thyroid problems. No recent nose bleeds. PHYSICAL EXAM:  Vitals:    04/29/19 1053   BP: 136/84   Pulse: 100   Resp: 16   Weight: 250 lb 6.4 oz (113.6 kg)   Height: 5' 11\" (1.803 m)       GENERAL:  He is alert and oriented x 3, communicates well, in no distress. NECK:  No masses, trachea is mid position. Supple, full ROM, no JVD or bruits. No palpable thyromegaly or lymphadenopathy. HEART:  Irregular rate and rhythm. Normal S1 and S2. There is an S4 gallop and a I/VI systolic murmur. LUNGS:  Poor air movement. Slight left sided wheezing which improved with coughing. No use of accessory muscles. symmetrical excursion. ABDOMEN:  Soft, non-tender. Normal bowel sounds. EXTREMITIES:  Full ROM x 4. No bilateral lower extremity edema. Good distal pulses. EYES:  Extraocular muscles intact. PERRL. Normal lids & conjunctiva. ENT:  Nares are clear & not bleeding. Moist mucosa. Normal lips formation. No external masses   NEURO: no tremors, full ROM x 4, EOMI. SKIN:  Warm, dry and intact. Normal turgor. EKG: Atrial fibrillation. 100 bpm, nl axis, nonspecific ST - T wave changes are a little more pronounced. Inferior q-waves, RBBB. Assessment:   1. Coronary artery disease as outlined above. Asymptomatic at this time  2. COPD  3. RBBB, stable  4. Hypertension, well controled at this time. 5. Hypercholesterolemia  6. 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 Newly diagnosed atrial fibrillation area and heart rate 100 bpm.        Recommendations:  1. He is following the cholesterol with Dr. Jono Rogers. 2. Increase the Toprol to 100 mg twice a day  3. Continue the L across  4.  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      Thank you for allowing me to participate in your patient's care.       [address], [address] [facility]  Interventional Cardiology

## 2025-06-11 NOTE — TELEPHONE ENCOUNTER
Pt needs a hospital f/u apt and Susanna thinks you may need to see him bc he is complex    Please advise

## 2025-06-11 NOTE — TELEPHONE ENCOUNTER
No care due was identified.  [facility] Embedded Care Due Messages. Reference number: [id number].   6/11/2025 10:51:59 AM CDT

## 2025-06-12 NOTE — TELEPHONE ENCOUNTER
Cami Nolan really should be an easy follow up appt but since Susanna is not comfortable, can you schedule him to see Kiya Neal NP at the main campus.  Thanks so much

## 2025-06-12 NOTE — PROGRESS NOTES
[facility]  Follow-up Visit Progress Note     PRESENTING HISTORY     PCP: Amber Jenkins MD  Chief Complaint/Reason for Visit:  Follow up from recent visit.    No chief complaint on file.    History of Present Illness & ROS: Mr. Norm Mendoza is a 89 y.o. male.  Pleasant gentleman.   Since most recent discharge, does not feel he has progressed much since the AVR. He is hoping that the 'recent surgery and the follow up Ultrasound will afford him some improvement with heart's function'. No significant SOB, that is not perceivably different from baseline. He has been able to navigate a flight of stairs and do some light home chores.   He is works as an [employer] . Not working at this time.   Having some issues with 'sleeping at night'. Have advised against the Nyquil and recommend a very low dose of Melatonin starting with 1 mg and not more than 3 mg nightly.       Review of Systems:  Eyes: denies visual changes at this time denies floaters   ENT: no nasal congestion or sore throat  Respiratory: no cough or shorness of breath  Cardiovascular: no chest pain or palpitations  Gastrointestinal: no nausea or vomiting, no abdominal pain or change in bowel habits  Genitourinary: no hematuria or dysuria; denies frequency  Hematologic/Lymphatic: no easy bruising or lymphadenopathy  Musculoskeletal: no arthralgias or myalgias  Neurological: no seizures or tremors  Endocrine: no heat or cold intolerance      PAST HISTORY:     Past Medical History:   Diagnosis Date    Acute medial meniscal tear, left, subsequent encounter 02/2025    s/p MRI    Alcohol dependence, daily use 07/13/2017    Allergy     Bladder cancer     tumor that was benign     Cataract     Cholelithiasis     By CT 1/2025    H/O nonmelanoma skin cancer 02/04/2016    Hematuria     Hypertension     Hypertensive heart disease with heart failure 02/01/2024    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    Mixed hyperlipidemia 05/02/2018    On continuous oral  anticoagulation 07/30/2019    Paroxysmal atrial fibrillation 11/30/2018    S/P D/C Cardioversion 7/2019, S/P Watchman device 8/2020    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    Dr Vel LANE (peptic ulcer disease)     GI Bleeding 11/2018: Anticoagulant D/S'd and Gastric MALT tumor Dx'd    SCC (squamous cell carcinoma) 05/04/2023    L ventral forearm excised by APC    Skin cancer     x3, had mohs on nose    Squamous cell carcinoma excised 12/5/14    R lower back    Symptomatic anemia 12/06/2018    Urinary tract infection     x4       Past Surgical History:   Procedure Laterality Date    ACHILLES TENDON SURGERY      CARDIAC CATH COSURGEON N/A 6/5/2025    Procedure: Cardiac Cath Cosurgeon;  Surgeon: Stoney Cuoch MD;  Location: [facility] CATH LAB;  Service: Cardiology;  Laterality: N/A;    CATARACT EXTRACTION W/  INTRAOCULAR LENS IMPLANT Bilateral 2013    CLOSURE OF LEFT ATRIAL APPENDAGE USING DEVICE N/A 10/11/2019    Procedure: Left atrial appendage closure device;  Surgeon: Hi Crowder MD;  Location: [facility] EP LAB;  Service: Cardiology;  Laterality: N/A;  AF, JACINTO, Watchman Implant, BSci, Gen, MB, 3 Prep    CORONARY ANGIOGRAPHY N/A 4/28/2025    Procedure: ANGIOGRAM, CORONARY ARTERY;  Surgeon: Vern Giang MD;  Location: [facility] CATH LAB;  Service: Cardiology;  Laterality: N/A;    CYSTOSCOPY      bladder tumor    ESOPHAGOGASTRODUODENOSCOPY N/A 12/7/2018    Procedure: EGD (ESOPHAGOGASTRODUODENOSCOPY);  Surgeon: Austin Garcia MD;  Location: [facility] (2ND FLR);  Service: Endoscopy;  Laterality: N/A;    ESOPHAGOGASTRODUODENOSCOPY N/A 4/12/2019    Procedure: EGD (ESOPHAGOGASTRODUODENOSCOPY);  Surgeon: Austin Garcia MD;  Location: [facility] (4TH FLR);  Service: Endoscopy;  Laterality: N/A;  please schedule within 4 weeks    ESOPHAGOGASTRODUODENOSCOPY N/A 5/27/2021    Procedure: EGD (ESOPHAGOGASTRODUODENOSCOPY);  Surgeon: Austin Garcia MD;  Location: [facility] (2ND FLR);   Service: Endoscopy;  Laterality: N/A;  per Dr. Garcia done within the month with any provider/ ordered by oncology  2nd floor due to comorbidities  Watchman device  COVID test at [facility] on 5/24-GT    INSERTION, PACEMAKER, TEMPORARY TRANSVENOUS  6/5/2025    Procedure: Insertion, Pacemaker, Temporary Transvenous;  Surgeon: Vern Giang MD;  Location: [facility] CATH LAB;  Service: Cardiology;;    SKIN CANCER EXCISION      TRANSCATHETER AORTIC VALVE REPLACEMENT (TAVR) N/A 6/5/2025    Procedure: REPLACEMENT, AORTIC VALVE, TRANSCATHETER (TAVR);  Surgeon: Vern Giang MD;  Location: [facility] CATH LAB;  Service: Cardiology;  Laterality: N/A;    TRANSCATHETER AORTIC VALVE REPLACEMENT (TAVR)  6/5/2025    Procedure: REPLACEMENT, AORTIC VALVE, TRANSCATHETER (TAVR);  Surgeon: Stoney Couch MD;  Location: [facility] CATH LAB;  Service: Cardiology;;    TREATMENT OF CARDIAC ARRHYTHMIA N/A 7/12/2019    Procedure: Cardioversion or Defibrillation;  Surgeon: Hi Crowder MD;  Location: [facility] EP LAB;  Service: Cardiology;  Laterality: N/A;  AF, JACINTO, DCCV, MAC, MB, 3 Prep    TREATMENT OF CARDIAC ARRHYTHMIA N/A 2/8/2022    Procedure: Cardioversion or Defibrillation;  Surgeon: Susan Orozco NP;  Location: [facility] EP LAB;  Service: Cardiology;  Laterality: N/A;  afib, DCCV, anes, MB, 3 Prep       Family History   Problem Relation Name Age of Onset    Stroke Father      Hypertension Father      Stroke Brother      Anesthesia problems Neg Hx      Malignant hypertension Neg Hx      Hypotension Neg Hx      Malignant hyperthermia Neg Hx      Pseudochol deficiency Neg Hx      Melanoma Neg Hx      Heart attack Neg Hx      Heart disease Neg Hx      Heart failure Neg Hx      Cataracts Neg Hx      Glaucoma Neg Hx      Macular degeneration Neg Hx         Social History     Socioeconomic History    Marital status:     Number of children: 3   Occupational History    Occupation: Financial Work/     Employer: retired    Occupation:  actor    Occupation:    Tobacco Use    Smoking status: Former     Current packs/day: 0.00     Average packs/day: 1 pack/day for 25.0 years (25.0 ttl pk-yrs)     Types: Cigarettes     Start date: 9/3/1945     Quit date: 9/3/1970     Years since quittin.8    Smokeless tobacco: Never   Substance and Sexual Activity    Alcohol use: Yes     Alcohol/week: 14.0 standard drinks of alcohol     Types: 14 Shots of liquor per week     Comment: 2 Drinks daily    Drug use: No    Sexual activity: Yes     Partners: Female   Social History Narrative    He is  with 2/3 kids living(2 sons/1 daughter who passed away); He has 6 Grandkids(5 under 10 y/o); He has 1 son here in [place] /1 grandson at [facility]; His other son(3 kids) lives in [place]; His Grand-daughter(Her Mother passed away) with her 2 kids lives in [place]    He and his wife raised his Grand-daughter(daughter's child)    He had worked for [employer] which has dissolved but now works for [employer]    2025    He continues to work 4 hours/day as an [employer]     Interesting Family History: Pt's maternal grandfather([place]) was in the Civil War; paternal grand-parents were from [place]     Social Drivers of Health     Financial Resource Strain: Low Risk  (2025)    Overall Financial Resource Strain (CARDIA)     Difficulty of Paying Living Expenses: Not hard at all   Food Insecurity: No Food Insecurity (2025)    Hunger Vital Sign     Worried About Running Out of Food in the Last Year: Never true     Ran Out of Food in the Last Year: Never true   Transportation Needs: No Transportation Needs (2025)    PRAPARE - Transportation     Lack of Transportation (Medical): No     Lack of Transportation (Non-Medical): No   Physical Activity: Inactive (2025)    Exercise Vital Sign     Days of Exercise per Week: 0 days     Minutes of Exercise per Session: 0 min   Stress: No Stress Concern Present (2025)    [place] [employer] - Occupational Stress Questionnaire     Feeling of Stress : Not at all   Housing Stability: Low Risk  (6/6/2025)    Housing Stability Vital Sign     Unable to Pay for Housing in the Last Year: No     Number of Times Moved in the Last Year: 0     Homeless in the Last Year: No       MEDICATIONS & ALLERGIES:     Medications Ordered Prior to Encounter[1]     Review of patient's allergies indicates:  No Known Allergies    Medications Reconciliation:   I have reconciled the patient's home medications and discharge medications with the patient/family. I have updated all changes.  Refer to After-Visit Medication List.    OBJECTIVE:     Vital Signs:  There were no vitals filed for this visit.  Wt Readings from Last 3 Encounters:   06/06/25 0745 81.6 kg (180 lb)   06/05/25 0606 81.6 kg (180 lb)   05/01/25 0932 84.4 kg (185 lb 15.3 oz)   04/30/25 1337 84 kg (185 lb 3 oz)     There is no height or weight on file to calculate BMI.   Wt Readings from Last 3 Encounters:   06/13/25 84.1 kg (185 lb 6.5 oz)   06/06/25 81.6 kg (180 lb)   05/01/25 84.4 kg (185 lb 15.3 oz)     Temp Readings from Last 3 Encounters:   06/06/25 97.8 °F (36.6 °C) (Oral)   05/01/25 96.9 °F (36.1 °C) (Other (see comments))   04/28/25 97.5 °F (36.4 °C) (Temporal)     BP Readings from Last 3 Encounters:   06/13/25 (!) 140/68   06/06/25 132/65   05/01/25 110/60     Pulse Readings from Last 3 Encounters:   06/13/25 71   06/06/25 68   05/01/25 67       Physical Exam:  General: Well developed, well nourished. No distress.  HEENT: Head is normocephalic, atraumatic  Eyes: Clear conjunctiva.  Neck: Supple, symmetrical neck; trachea midline.  Lungs: Clear to auscultation bilaterally and normal respiratory effort.  Cardiovascular: Heart with regular rate and rhythm. No murmurs, gallops or rubs  Skin: Skin color, texture, turgor normal. No rashes.  Neurologic: No focal numbness or weakness.   Psychiatric: Not depressed.    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%)    S/P TAVR (transcatheter aortic valve replacement)    LBBB (left bundle branch block)    Impaired functional mobility, balance, gait, and endurance    Hypertensive heart disease with heart failure    Presence of Watchman left atrial appendage closure device    Longstanding persistent atrial fibrillation    Dilated cardiomyopathy    Mixed hyperlipidemia    Nonrheumatic aortic valve stenosis    Essential hypertension    Aortic atherosclerosis        Recent admission TAVR  Cardiac/Vascular  * Nonrheumatic aortic valve stenosis  *LBBB (left bundle branch block) / Mixed hyperlipidemia  Chronic... unchanged post TAVR.   S/p TAVR with 30 day mobile event monitoring...  *Weight Today: 84.1 kg  ` ASA  ` follow up with Cardiology: on a 30 day Echo with his team of experts.: 7/15 appt   *Offered to be seen by Cardiology sooner; declines; does not feel necessary.        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   *S/p watchman  *HR today: 71  ` ASA  ` follow up with EP-Cardiology        HTN;   Today: 140/68  ` Norvasc   ` Avapro  ` Toprol XL    *Keep- all scheduled and recommend follow ups as per Cardiology and Primary Care Physician.   *Have shared notations with PCP today...    Future Appointments   Date Time Provider Department Center   7/11/2025 10:00 AM LAB, APPOINTMENT [facility] LAB [facility]   7/15/2025  8:45 AM ECHO, [facility] ECHOSTR [facility]   7/15/2025 10:15 AM LAB, APPOINTMENT [facility] LAB [facility]   7/15/2025 11:30 AM Daisy Shah, SEAN [facility] CARDVAL [facility]   7/18/2025  8:00 AM Dg Prather MD [facility] HC BMT [place] [facility]   8/5/2025  8:40 AM Shante Kerr MD [facility] DERM [facility]   10/8/2025  9:30 AM LAB, APPOINTMENT [facility] INTMED [facility] LAB IM [facility] PCW   10/15/2025  9:00 AM Amber Jenkins MD [facility] IM [place]        Medication List            Accurate as of June 13, 2025  1:37 PM. If you have any questions, ask your nurse or doctor.                CONTINUE taking these medications      amLODIPine 2.5 MG tablet  Commonly known as: NORVASC  Take 1 tablet (2.5 mg total) by mouth once daily.     aspirin 81 MG Chew  Take 1 tablet (81 mg total) by mouth once daily.     clobetasoL 0.05 % external solution  Commonly known as: TEMOVATE  Pt to mix in 1 jar of cerave cream and apply to affected areas bid     diclofenac sodium 1 % Gel  Commonly known as: VOLTAREN ARTHRITIS PAIN  Apply 2 Grams to painful right hand joint 2-3x/day as needed     doxycycline 100 MG Cap  Commonly known as: VIBRAMYCIN  Take 1 capsule (100 mg total) by mouth every 12 (twelve) hours.     fluorouraciL 5 % cream  Commonly known as: EFUDEX  Compound fluorouracil 5% + calcipotriene 0.005% cream. Apply a pea-sized amount to entire bilateral lateral cheekbones BID x 7 days.     furosemide 20 MG tablet  Commonly known as: LASIX  1 tab 2 days/week for heart/blood pressure     irbesartan 150 MG tablet  Commonly known as: AVAPRO  Take 1 tablet (150 mg total) by mouth once daily.      metoprolol succinate 25 MG 24 hr tablet  Commonly known as: TOPROL-XL  TAKE 1/2 TABLET BY MOUTH DAILY     multivitamin per tablet  Commonly known as: THERAGRAN     mupirocin 2 % ointment  Commonly known as: BACTROBAN  Apply to affected area 3 times daily            Total time spent: > 60 minutes, including face to face, coordination of care, planning and interventions.     Signing Physician:  PAULIE Bowser         [1]   Current Outpatient Medications on File Prior to Visit   Medication Sig Dispense Refill    amLODIPine (NORVASC) 2.5 MG tablet Take 1 tablet (2.5 mg total) by mouth once daily. 90 tablet 3    aspirin 81 MG Chew Take 1 tablet (81 mg total) by mouth once daily.      clobetasoL (TEMOVATE) 0.05 % external solution Pt to mix in 1 jar of cerave cream and apply to affected areas bid (Patient not taking: Reported on 6/9/2025) 50 mL 3    diclofenac sodium (VOLTAREN ARTHRITIS PAIN) 1 % Gel Apply 2 Grams to painful right hand joint 2-3x/day as needed (Patient not taking: Reported on 6/9/2025) 100 g 1    doxycycline (VIBRAMYCIN) 100 MG Cap Take 1 capsule (100 mg total) by mouth every 12 (twelve) hours. (Patient not taking: Reported on 6/9/2025) 28 capsule 1    fluorouraciL (EFUDEX) 5 % cream Compound fluorouracil 5% + calcipotriene 0.005% cream. Apply a pea-sized amount to entire bilateral lateral cheekbones BID x 7 days. (Patient not taking: Reported on 6/9/2025) 30 g 0    furosemide (LASIX) 20 MG tablet 1 tab 2 days/week for heart/blood pressure 30 tablet 0    irbesartan (AVAPRO) 150 MG tablet Take 1 tablet (150 mg total) by mouth once daily. 90 tablet 3    metoprolol succinate (TOPROL-XL) 25 MG 24 hr tablet TAKE 1/2 TABLET BY MOUTH DAILY 45 tablet 3    multivitamin (THERAGRAN) per tablet Take 1 tablet by mouth once daily.      mupirocin (BACTROBAN) 2 % ointment Apply to affected area 3 times daily (Patient not taking: Reported on 6/9/2025) 22 g 1     No current facility-administered medications  on file prior to visit.

## 2025-06-12 NOTE — TELEPHONE ENCOUNTER
Received this message from cardiology about hosp f/u apt    Hi Cami,Reaching out to clarify follow up concerns. Patient needs the regular one week hospital follow-up with PCP appt. He should be stable, but if there appears to be post-op concerns upon evaluation, we will gladly schedule to see patient early. Otherwise, our TAVR guidelines are to have follow-up with an echo 30 days post-TAVR.     Let me know if you want me to schedule him with Susanna or another MD in the clinic

## 2025-06-12 NOTE — TELEPHONE ENCOUNTER
----- Message from Daisy Shah PA-C sent at 6/12/2025 10:25 AM CDT -----  Regarding: hospital f/u  [facility],Reaching out to clarify follow up concerns. Patient needs the regular one week hospital follow-up with PCP appt. He should be stable, but if there appears to be post-op concerns upon evaluation, we will gladly schedule to see patient early. Otherwise, our TAVR guidelines are to have follow-up with an echo 30 days post-TAVR.

## 2025-06-13 ENCOUNTER — OFFICE VISIT (OUTPATIENT)
Dept: INTERNAL MEDICINE | Facility: CLINIC | Age: OVER 89
End: 2025-06-13
Payer: MEDICARE

## 2025-06-13 VITALS
HEIGHT: 72 IN | BODY MASS INDEX: 25.12 KG/M2 | WEIGHT: 185.44 LBS | OXYGEN SATURATION: 98 % | HEART RATE: 71 BPM | DIASTOLIC BLOOD PRESSURE: 68 MMHG | SYSTOLIC BLOOD PRESSURE: 140 MMHG

## 2025-06-13 DIAGNOSIS — I35.0 NONRHEUMATIC AORTIC VALVE STENOSIS: ICD-10-CM

## 2025-06-13 DIAGNOSIS — Z95.3 S/P TAVR (TRANSCATHETER AORTIC VALVE REPLACEMENT): ICD-10-CM

## 2025-06-13 DIAGNOSIS — Z74.09 IMPAIRED FUNCTIONAL MOBILITY, BALANCE, GAIT, AND ENDURANCE: ICD-10-CM

## 2025-06-13 DIAGNOSIS — I50.22 CHRONIC HEART FAILURE WITH MILDLY REDUCED EJECTION FRACTION (HFMREF, 41-49%): ICD-10-CM

## 2025-06-13 DIAGNOSIS — I44.7 LBBB (LEFT BUNDLE BRANCH BLOCK): ICD-10-CM

## 2025-06-13 DIAGNOSIS — Z95.818 PRESENCE OF WATCHMAN LEFT ATRIAL APPENDAGE CLOSURE DEVICE: ICD-10-CM

## 2025-06-13 DIAGNOSIS — I11.0 HYPERTENSIVE HEART DISEASE WITH HEART FAILURE: ICD-10-CM

## 2025-06-13 DIAGNOSIS — Z09 HOSPITAL DISCHARGE FOLLOW-UP: Primary | ICD-10-CM

## 2025-06-13 DIAGNOSIS — E78.2 MIXED HYPERLIPIDEMIA: ICD-10-CM

## 2025-06-13 DIAGNOSIS — I10 ESSENTIAL HYPERTENSION: ICD-10-CM

## 2025-06-13 DIAGNOSIS — I42.0 DILATED CARDIOMYOPATHY: ICD-10-CM

## 2025-06-13 DIAGNOSIS — I48.11 LONGSTANDING PERSISTENT ATRIAL FIBRILLATION: ICD-10-CM

## 2025-06-13 DIAGNOSIS — I70.0 AORTIC ATHEROSCLEROSIS: ICD-10-CM

## 2025-06-13 PROCEDURE — 99999 PR PBB SHADOW E&M-EST. PATIENT-LVL III: CPT | Mod: PBBFAC,HCNC,, | Performed by: NURSE PRACTITIONER

## 2025-06-13 NOTE — Clinical Note
He is doing ok...visit went well... doing fairly well...  we chatted about a lot. He will be seeing Cards on 7/15...advised to hold off on resuming Ubering until seen by Cardiology. He agreed. If you have a minute and are able (know you are busy), but, maybe a soft touch phone-call to him would be well embraced by him :)  Hope you are doing well.  All the Best,  Tea Huertas

## 2025-06-30 NOTE — Clinical Note
Addended by: JOSSUE HAM on: 6/30/2025 02:04 PM     Modules accepted: Orders     The groin and right radial was prepped. The site was prepped with ChloraPrep. The site was clipped. The patient was draped.

## 2025-07-11 ENCOUNTER — LAB VISIT (OUTPATIENT)
Dept: LAB | Facility: HOSPITAL | Age: OVER 89
End: 2025-07-11
Attending: INTERNAL MEDICINE
Payer: MEDICARE

## 2025-07-11 DIAGNOSIS — C88.40 MALT (MUCOSA ASSOCIATED LYMPHOID TISSUE): ICD-10-CM

## 2025-07-11 LAB
ABSOLUTE EOSINOPHIL (OHS): 0.15 K/UL
ABSOLUTE MONOCYTE (OHS): 0.63 K/UL (ref 0.3–1)
ABSOLUTE NEUTROPHIL COUNT (OHS): 3.04 K/UL (ref 1.8–7.7)
ALBUMIN SERPL BCP-MCNC: 3.7 G/DL (ref 3.5–5.2)
ALP SERPL-CCNC: 93 UNIT/L (ref 40–150)
ALT SERPL W/O P-5'-P-CCNC: 21 UNIT/L (ref 10–44)
ANION GAP (OHS): 11 MMOL/L (ref 8–16)
AST SERPL-CCNC: 22 UNIT/L (ref 11–45)
BASOPHILS # BLD AUTO: 0.06 K/UL
BASOPHILS NFR BLD AUTO: 1.3 %
BILIRUB SERPL-MCNC: 0.6 MG/DL (ref 0.1–1)
BUN SERPL-MCNC: 19 MG/DL (ref 10–30)
CALCIUM SERPL-MCNC: 9.1 MG/DL (ref 8.7–10.5)
CHLORIDE SERPL-SCNC: 105 MMOL/L (ref 95–110)
CO2 SERPL-SCNC: 25 MMOL/L (ref 23–29)
CREAT SERPL-MCNC: 1 MG/DL (ref 0.5–1.4)
ERYTHROCYTE [DISTWIDTH] IN BLOOD BY AUTOMATED COUNT: 15 % (ref 11.5–14.5)
GFR SERPLBLD CREATININE-BSD FMLA CKD-EPI: >60 ML/MIN/1.73/M2
GLUCOSE SERPL-MCNC: 106 MG/DL (ref 70–110)
HCT VFR BLD AUTO: 40.6 % (ref 40–54)
HGB BLD-MCNC: 13.2 GM/DL (ref 14–18)
IMM GRANULOCYTES # BLD AUTO: 0.03 K/UL (ref 0–0.04)
IMM GRANULOCYTES NFR BLD AUTO: 0.7 % (ref 0–0.5)
LDH SERPL-CCNC: 245 U/L (ref 110–260)
LYMPHOCYTES # BLD AUTO: 0.67 K/UL (ref 1–4.8)
MCH RBC QN AUTO: 32 PG (ref 27–31)
MCHC RBC AUTO-ENTMCNC: 32.5 G/DL (ref 32–36)
MCV RBC AUTO: 99 FL (ref 82–98)
NUCLEATED RBC (/100WBC) (OHS): 0 /100 WBC
PLATELET # BLD AUTO: 211 K/UL (ref 150–450)
PMV BLD AUTO: 9.7 FL (ref 9.2–12.9)
POTASSIUM SERPL-SCNC: 4.8 MMOL/L (ref 3.5–5.1)
PROT SERPL-MCNC: 6.7 GM/DL (ref 6–8.4)
RBC # BLD AUTO: 4.12 M/UL (ref 4.6–6.2)
RELATIVE EOSINOPHIL (OHS): 3.3 %
RELATIVE LYMPHOCYTE (OHS): 14.6 % (ref 18–48)
RELATIVE MONOCYTE (OHS): 13.8 % (ref 4–15)
RELATIVE NEUTROPHIL (OHS): 66.3 % (ref 38–73)
SODIUM SERPL-SCNC: 141 MMOL/L (ref 136–145)
WBC # BLD AUTO: 4.58 K/UL (ref 3.9–12.7)

## 2025-07-11 PROCEDURE — 85025 COMPLETE CBC W/AUTO DIFF WBC: CPT | Mod: HCNC

## 2025-07-11 PROCEDURE — 83615 LACTATE (LD) (LDH) ENZYME: CPT | Mod: HCNC

## 2025-07-11 PROCEDURE — 80053 COMPREHEN METABOLIC PANEL: CPT | Mod: HCNC

## 2025-07-11 PROCEDURE — 36415 COLL VENOUS BLD VENIPUNCTURE: CPT | Mod: HCNC

## 2025-07-15 ENCOUNTER — OFFICE VISIT (OUTPATIENT)
Dept: CARDIOLOGY | Facility: CLINIC | Age: OVER 89
End: 2025-07-15
Payer: MEDICARE

## 2025-07-15 ENCOUNTER — HOSPITAL ENCOUNTER (OUTPATIENT)
Dept: CARDIOLOGY | Facility: HOSPITAL | Age: OVER 89
Discharge: HOME | End: 2025-07-15
Attending: INTERNAL MEDICINE
Payer: MEDICARE

## 2025-07-15 VITALS
HEIGHT: 71 IN | HEART RATE: 71 BPM | WEIGHT: 185.19 LBS | DIASTOLIC BLOOD PRESSURE: 68 MMHG | BODY MASS INDEX: 25.93 KG/M2 | SYSTOLIC BLOOD PRESSURE: 140 MMHG

## 2025-07-15 VITALS
HEART RATE: 81 BPM | WEIGHT: 184.5 LBS | SYSTOLIC BLOOD PRESSURE: 150 MMHG | BODY MASS INDEX: 24.99 KG/M2 | HEIGHT: 72 IN | DIASTOLIC BLOOD PRESSURE: 68 MMHG | OXYGEN SATURATION: 97 %

## 2025-07-15 DIAGNOSIS — I35.0 NONRHEUMATIC AORTIC VALVE STENOSIS: ICD-10-CM

## 2025-07-15 DIAGNOSIS — I48.11 LONGSTANDING PERSISTENT ATRIAL FIBRILLATION: ICD-10-CM

## 2025-07-15 DIAGNOSIS — Z95.3 S/P TAVR (TRANSCATHETER AORTIC VALVE REPLACEMENT): Primary | ICD-10-CM

## 2025-07-15 DIAGNOSIS — I44.7 LBBB (LEFT BUNDLE BRANCH BLOCK): ICD-10-CM

## 2025-07-15 LAB
AORTIC SIZE INDEX (SOV): 1.6 CM/M2
AORTIC SIZE INDEX: 2 CM/M2
ASCENDING AORTA: 4.1 CM
AV AREA BY CONTINUOUS VTI: 1.6 CM2
AV INDEX (PROSTH): 0.35
AV LVOT MEAN GRADIENT: 1 MMHG
AV LVOT PEAK GRADIENT: 1 MMHG
AV MEAN GRADIENT: 8 MMHG
AV PEAK GRADIENT: 13 MMHG
AV VALVE AREA BY VELOCITY RATIO: 1.6 CM²
AV VALVE AREA: 1.7 CM2
AV VELOCITY RATIO: 0.33
BSA FOR ECHO PROCEDURE: 2.05 M2
CV ECHO LV RWT: 0.36 CM
DOP CALC AO PEAK VEL: 1.8 M/S
DOP CALC AO VTI: 33.8 CM
DOP CALC LVOT AREA: 4.9 CM2
DOP CALC LVOT DIAMETER: 2.5 CM
DOP CALC LVOT PEAK VEL: 0.6 M/S
DOP CALC LVOT STROKE VOLUME: 57.4 CM3
DOP CALCLVOT PEAK VEL VTI: 11.7 CM
ECHO EF ESTIMATED: 46 %
ECHO LV POSTERIOR WALL: 1 CM (ref 0.6–1.1)
EJECTION FRACTION: 50 %
FRACTIONAL SHORTENING: 23.6 % (ref 28–44)
INTERVENTRICULAR SEPTUM: 1.1 CM (ref 0.6–1.1)
IVC DIAMETER: 2.11 CM
IVRT: 117 MS
LA MAJOR: 5.6 CM
LA MINOR: 5.9 CM
LA WIDTH: 5 CM
LEFT ATRIUM SIZE: 5.2 CM
LEFT ATRIUM VOLUME INDEX MOD: 43 ML/M2
LEFT ATRIUM VOLUME INDEX: 62 ML/M2
LEFT ATRIUM VOLUME MOD: 87 ML
LEFT ATRIUM VOLUME: 127 CM3
LEFT INTERNAL DIMENSION IN SYSTOLE: 4.2 CM (ref 2.1–4)
LEFT VENTRICLE DIASTOLIC VOLUME INDEX: 72.55 ML/M2
LEFT VENTRICLE DIASTOLIC VOLUME: 148 ML
LEFT VENTRICLE MASS INDEX: 111.5 G/M2
LEFT VENTRICLE SYSTOLIC VOLUME INDEX: 38.7 ML/M2
LEFT VENTRICLE SYSTOLIC VOLUME: 79 ML
LEFT VENTRICULAR INTERNAL DIMENSION IN DIASTOLE: 5.5 CM (ref 3.5–6)
LEFT VENTRICULAR MASS: 227.4 G
Lab: 1.8 CM/M
Lab: 2.3 CM/M
OHS CV CPX PATIENT HEIGHT IN: 70.87
OHS CV RV/LV RATIO: 0.64 CM
PISA TR MAX VEL: 3.3 M/S
RA MAJOR: 6 CM
RA PRESSURE ESTIMATED: 15 MMHG
RA WIDTH: 4.65 CM
RIGHT ATRIAL AREA: 24.1 CM2
RIGHT VENTRICLE DIASTOLIC BASEL DIMENSION: 3.5 CM
RV TB RVSP: 18 MMHG
RV TISSUE DOPPLER FREE WALL SYSTOLIC VELOCITY 1 (APICAL 4 CHAMBER VIEW): 13.5 CM/S
SINUS: 3.3 CM
STJ: 3 CM
TDI LATERAL: 0.12 M/S
TDI SEPTAL: 0.06 M/S
TDI: 0.09 M/S
TRICUSPID ANNULAR PLANE SYSTOLIC EXCURSION: 2 CM
TV PEAK GRADIENT: 42 MMHG
TV REST PULMONARY ARTERY PRESSURE: 59 MMHG
Z-SCORE OF LEFT VENTRICULAR DIMENSION IN END DIASTOLE: -1
Z-SCORE OF LEFT VENTRICULAR DIMENSION IN END SYSTOLE: 0.95

## 2025-07-15 PROCEDURE — 93306 TTE W/DOPPLER COMPLETE: CPT | Mod: HCNC

## 2025-07-15 PROCEDURE — 99999 PR PBB SHADOW E&M-EST. PATIENT-LVL IV: CPT | Mod: PBBFAC,HCNC,,

## 2025-07-15 PROCEDURE — 99214 OFFICE O/P EST MOD 30 MIN: CPT | Mod: HCNC,S$GLB,,

## 2025-07-15 PROCEDURE — 3288F FALL RISK ASSESSMENT DOCD: CPT | Mod: CPTII,HCNC,S$GLB,

## 2025-07-15 PROCEDURE — 93306 TTE W/DOPPLER COMPLETE: CPT | Mod: 26,HCNC,, | Performed by: INTERNAL MEDICINE

## 2025-07-15 PROCEDURE — 1126F AMNT PAIN NOTED NONE PRSNT: CPT | Mod: CPTII,HCNC,S$GLB,

## 2025-07-15 PROCEDURE — 1101F PT FALLS ASSESS-DOCD LE1/YR: CPT | Mod: CPTII,HCNC,S$GLB,

## 2025-07-15 PROCEDURE — 1160F RVW MEDS BY RX/DR IN RCRD: CPT | Mod: CPTII,HCNC,S$GLB,

## 2025-07-15 PROCEDURE — 1159F MED LIST DOCD IN RCRD: CPT | Mod: CPTII,HCNC,S$GLB,

## 2025-07-15 NOTE — Clinical Note
"Subjective:       Patient ID: Bella Clarke is a 61 y.o. female.    Chief Complaint:    Right ear fullness for the past 2 months. Has right ear tinnitus intermittently. She is not sure if she has hearing loss - right- Hearing Screening not done.  Has non allergic rhinitis. Remote allergy evaluation, two decades ago was normal.    HPI:     female, 61- year- old is seen as " a new patient " with the above complaints.  Emely has had right ear fullness, but not pain of 2 plus months duration.  It is relieved from time to time, only to re appear..   Typically mornings are better, fullness worsens off and on, though the day.  Has stopped up nares and occasional PND.  No vertigo . Has intermittent ringing of the right ear  SHE CONSULTED THE NP OF DR JAIME TANG, Otolaryngologist. She diagnosed Emely having Eustachian Tube Dysfunction . Flonase and Azelastine nose sprays were recommended. Hearing screening was not done      Emely does not have a history of recurrent sinus or bronchial infections in the past 20 years after immunization with PPSV- 23 vaccinations.  Remote allergy work up, about 20 years ago was normal. Nasal allergy symptoms are treated symptomatically.    Never vaped nicotine or smoked cigarettes. NO ONGOING ALLERGENS OR IRRITANTS EXPOSURE.-- at home or recreationally or through hobbies. She is a . Environmental history was obtained.          Outpatient Medications Marked as Taking for the 7/16/25 encounter (Office Visit) with Ganesh Teran MD   Medication Sig Dispense Refill    azelastine (ASTELIN) 137 mcg (0.1 %) nasal spray by Nasal route 2 (two) times daily.      fluticasone propionate (FLONASE) 50 mcg/actuation nasal spray 1 spray by Each Nostril route once daily.      levothyroxine (SYNTHROID) 50 MCG tablet Take 50 mcg by mouth before breakfast.       Current Facility-Administered Medications for the 7/16/25 encounter (Office Visit) with Ganesh Teran MD   Medication Dose " The defib pads were placed on the patient's chest and back per protocol. "Route Frequency Provider Last Rate Last Admin    [COMPLETED] pneumoc 20-prisca conj-dip cr(PF) (PREVNAR-20 (PF)) injection Syrg 0.5 mL  0.5 mL Intramuscular 1 time in Clinic/HOD    0.5 mL at 07/16/25 1019        Patient has no known allergies.     History reviewed. No pertinent past medical history.    No family history on file.    Environmental History: Dust Mite Controls: Dust mite controls are not in place.     Review of Systems   Constitutional: Negative.    HENT:  Positive for tinnitus.         Right ear fullness x 2 months  Tinnitus - right ear   Eyes: Negative.    Respiratory: Negative.     Cardiovascular: Negative.    Gastrointestinal: Negative.    Endocrine: Negative.    Genitourinary: Negative.    Musculoskeletal: Negative.    Skin: Negative.    Allergic/Immunologic: Negative.    Neurological: Negative.    Hematological: Negative.    Psychiatric/Behavioral: Negative.       Objective:     Visit Vitals  /77 (BP Location: Left arm, Patient Position: Sitting)   Pulse 74   Temp 97.7 °F (36.5 °C)   Ht 5' 6.5" (1.689 m)   Wt 65.4 kg (144 lb 2.9 oz)   BMI 22.92 kg/m²       Physical Exam  Vitals and nursing note reviewed.   Constitutional:       Appearance: Normal appearance. She is normal weight.   HENT:      Head: Normocephalic and atraumatic.      Right Ear: Tympanic membrane, ear canal and external ear normal.      Left Ear: Tympanic membrane, ear canal and external ear normal.      Nose: Congestion present.      Mouth/Throat:      Mouth: Mucous membranes are dry.      Pharynx: Oropharynx is clear.   Eyes:      Extraocular Movements: Extraocular movements intact.      Conjunctiva/sclera: Conjunctivae normal.      Pupils: Pupils are equal, round, and reactive to light.   Cardiovascular:      Rate and Rhythm: Normal rate and regular rhythm.      Pulses: Normal pulses.      Heart sounds: Normal heart sounds.   Pulmonary:      Effort: Pulmonary effort is normal.      Breath sounds: Normal breath sounds. "   Abdominal:      General: Abdomen is flat. Bowel sounds are normal.      Palpations: Abdomen is soft.   Musculoskeletal:         General: Normal range of motion.      Cervical back: Normal range of motion and neck supple.   Skin:     General: Skin is warm and dry.   Neurological:      General: No focal deficit present.      Mental Status: She is alert and oriented to person, place, and time.   Psychiatric:         Mood and Affect: Mood normal.         Behavior: Behavior normal.         Thought Content: Thought content normal.         Judgment: Judgment normal.       Assessment:      1. Recurrent infections    2. Tinnitus, unspecified laterality    3. Fluid level behind tympanic membrane, unspecified laterality    4       No Allergic Rhinitis    Plan:     May work up for hydrops.  Refer to Neuro Otologist- Phuc Cordova MD, [facility].  HCTZ- Triamterene 25- 37.5-- one in the morning with BP and electrolyte monitoring.  Had PPSV- 23 x 2 ABOUT 20 AND 15 YEARS AGO.    Immunize with PREVNAR- 20 today.  Defer post titers.  Follow up as needed.                  Problems Address                                                 Amount and/or Complexity                                                                      Risk       3           [] 2 or more self-limited or minor problems                      [] Limited                                                                        [] Low                  [] 1 stable chronic illness                                                  Any combination of the two                                               OTC drugs                  []Acute, uncomplicated illness or injury                            Review of prior external notes from unique source           Minor surgery with no risk factors                                                                                                               [] 1 []2  []3+                                                                                                               Review of results from each unique test                                                                                                               [] 1 []2  [] 3+                                                                                                              Order of each unique test                                                                                                               [] 1 []2  [] 3+                                                                                                              Or                                                                                                             [] Assessment requiring an independent historian      4            [x] One or more chronic illness with exacerbation,              [x] Moderate                                                                      [x] Moderate                 Progression, or side effects of treatment                            -test documents or independent historians                        Prescription drug management                []  2 or more sable chronic illnesses                                    [] Independent interpretation of tests                              Minor surgery with identifiable risk                [] 1 undiagnosed new problem with uncertain prognosis    [] Discussion or management of test results                    elective major surgery                [] 1 acute illness with                systemic symptoms                                                                                                                                                              [] 1 acute complicated injury                                                                                                                                          Elective major surgery                                                                                                                                                                                                                                                                                                                                                                                                   5            [] 1 or more chronic illnesses with severe exacerbation,     [] Extensive(two from below)                                         [] High                                                                                                               [] Independent interpretation of results                         Drug therapy requiring intensive                                                                                                               []Discussion of management or test interpretation           monitoring                                                                                                                                                                                                       Decision to de-escalate care                 [] 1 acute or chronic illness or injury that poses a threat                                                                                               Decision regarding hospitalization

## 2025-07-16 NOTE — PROGRESS NOTES
Interventional Cardiology Clinic Note    General Cardiologist: Dr. Ambrocio    HPI:     Norm Mendoza is a 89 y.o. male with HTN, HLD, severe AS s/p TAVR 6/2025, LBBB, Afib s/p Watchman, who presents for 1 month TAVR follow-up.    CARDIAC HISTORY:  TAVR with 34 mm Evolut valve 06/05/2025 (Dr. Brown)  Post-TAVR: baseline LBBB  TTE (one mo post TAVR) 7/15/2025: normal functioning bioprosthetic valve, SUJEY 1.7, peak velocity 1.8, mean gradient 8, DI 0.35, no significant PVL, EF 55-55%  AF (longstanding)  Watchman device placed 08/20/2020    HPI:  Patient underwent a TAVR procedure with a 34 mm Evolut valve on 06/05/2025 by Dr. Brown. Post-TAVR, he was noted to have a baseline LBBB and was sent home with a 30-day event monitor, which detected longstanding AFib as the predominant rhythm.  He reports a delayed improvement in his condition. He notes starting to see improvement about 1 week ago, particularly improvement in his breathing. He has been able to increase his activity, although this has caused some muscle discomfort in his back. His fluid retention has improved. He is currently taking Lasix 20 every other day. He has history of AFib with Watchman placed on 08/20/2020. He is not currently on anticoagulation but continues to take aspirin. He has had no chest pain, dyspnea on exertion, palpitations, leg swelling, orthopnea, or other acute concerns at this time.    MEDICATIONS:  Aspirin 81 mg daily  Irbesartan 150 mg  Amlodipine 2.5 mg  Furosemide 20 mg every other day  Metoprolol succinate 12.5 mg    SOCIAL HISTORY:  Occupation: driving [employer]       NYHA I / CCS 0    ROS:      ROS findings as noted in HPI above.    PMH:     Past Medical History:   Diagnosis Date    Acute medial meniscal tear, left, subsequent encounter 02/2025    s/p MRI    Alcohol dependence, daily use 07/13/2017    Allergy     Bladder cancer     tumor that was benign     Cataract     Cholelithiasis     By CT 1/2025    H/O nonmelanoma skin  cancer 02/04/2016    Hematuria     Hypertension     Hypertensive heart disease with heart failure 02/01/2024    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    Mixed hyperlipidemia 05/02/2018    On continuous oral anticoagulation 07/30/2019    Paroxysmal atrial fibrillation 11/30/2018    S/P D/C Cardioversion 7/2019, S/P Watchman device 8/2020    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    Dr Vel LANE (peptic ulcer disease)     GI Bleeding 11/2018: Anticoagulant D/S'd and Gastric MALT tumor Dx'd    SCC (squamous cell carcinoma) 05/04/2023    L ventral forearm excised by APC    Skin cancer     x3, had mohs on nose    Squamous cell carcinoma excised 12/5/14    R lower back    Symptomatic anemia 12/06/2018    Urinary tract infection     x4     Past Surgical History:   Procedure Laterality Date    ACHILLES TENDON SURGERY      CARDIAC CATH COSURGEON N/A 6/5/2025    Procedure: Cardiac Cath Cosurgeon;  Surgeon: Stoney Couch MD;  Location: [facility] CATH LAB;  Service: Cardiology;  Laterality: N/A;    CATARACT EXTRACTION W/  INTRAOCULAR LENS IMPLANT Bilateral 2013    CLOSURE OF LEFT ATRIAL APPENDAGE USING DEVICE N/A 10/11/2019    Procedure: Left atrial appendage closure device;  Surgeon: Hi Crowder MD;  Location: [facility] EP LAB;  Service: Cardiology;  Laterality: N/A;  AF, JACINTO, Watchman Implant, BSci, Gen, MB, 3 Prep    CORONARY ANGIOGRAPHY N/A 4/28/2025    Procedure: ANGIOGRAM, CORONARY ARTERY;  Surgeon: Vern Giang MD;  Location: [facility] CATH LAB;  Service: Cardiology;  Laterality: N/A;    CYSTOSCOPY      bladder tumor    ESOPHAGOGASTRODUODENOSCOPY N/A 12/7/2018    Procedure: EGD (ESOPHAGOGASTRODUODENOSCOPY);  Surgeon: Austin Garcia MD;  Location: [facility] ENDO (2ND FLR);  Service: Endoscopy;  Laterality: N/A;    ESOPHAGOGASTRODUODENOSCOPY N/A 4/12/2019    Procedure: EGD (ESOPHAGOGASTRODUODENOSCOPY);  Surgeon: Austin Garcia MD;  Location: [facility] ENDO (4TH FLR);  Service: Endoscopy;  Laterality: N/A;   please schedule within 4 weeks    ESOPHAGOGASTRODUODENOSCOPY N/A 2021    Procedure: EGD (ESOPHAGOGASTRODUODENOSCOPY);  Surgeon: Austin Garcia MD;  [facility]  Service: Endoscopy;  Laterality: N/A;  per Dr. Garcia done within the month with any provider/ ordered by oncology  2nd floor due to comorbidities  Watchman device  COVID test at [facility] on -GT    INSERTION, PACEMAKER, TEMPORARY TRANSVENOUS  2025    Procedure: Insertion, Pacemaker, Temporary Transvenous;  Surgeon: Vern Giang MD;  Location: [facility] CATH LAB;  Service: Cardiology;;    SKIN CANCER EXCISION      TRANSCATHETER AORTIC VALVE REPLACEMENT (TAVR) N/A 2025    Procedure: REPLACEMENT, AORTIC VALVE, TRANSCATHETER (TAVR);  Surgeon: Vern Giang MD;  Location: [facility] CATH LAB;  Service: Cardiology;  Laterality: N/A;    TRANSCATHETER AORTIC VALVE REPLACEMENT (TAVR)  2025    Procedure: REPLACEMENT, AORTIC VALVE, TRANSCATHETER (TAVR);  Surgeon: Stoney Couch MD;  Location: [facility] CATH LAB;  Service: Cardiology;;    TREATMENT OF CARDIAC ARRHYTHMIA N/A 2019    Procedure: Cardioversion or Defibrillation;  Surgeon: Hi Crowder MD;  Location: [facility] EP LAB;  Service: Cardiology;  Laterality: N/A;  AF, JACINTO, DCCV, MAC, MB, 3 Prep    TREATMENT OF CARDIAC ARRHYTHMIA N/A 2022    Procedure: Cardioversion or Defibrillation;  Surgeon: Susan Orozco NP;  Location: [facility] EP LAB;  Service: Cardiology;  Laterality: N/A;  afib, DCCV, anes, MB, 3 Prep     Allergies:   Review of patient's allergies indicates:  No Known Allergies  Medications:   Medications Ordered Prior to Encounter[1]  Social History:     Social History     Tobacco Use    Smoking status: Former     Current packs/day: 0.00     Average packs/day: 1 pack/day for 25.0 years (25.0 ttl pk-yrs)     Types: Cigarettes     Start date: 9/3/1945     Quit date: 9/3/1970     Years since quittin.9    Smokeless tobacco: Never   Substance Use Topics    Alcohol  use: Yes     Alcohol/week: 14.0 standard drinks of alcohol     Types: 14 Shots of liquor per week     Comment: 2 Drinks daily     Family History:     Family History   Problem Relation Name Age of Onset    Stroke Father      Hypertension Father      Stroke Brother      Anesthesia problems Neg Hx      Malignant hypertension Neg Hx      Hypotension Neg Hx      Malignant hyperthermia Neg Hx      Pseudochol deficiency Neg Hx      Melanoma Neg Hx      Heart attack Neg Hx      Heart disease Neg Hx      Heart failure Neg Hx      Cataracts Neg Hx      Glaucoma Neg Hx      Macular degeneration Neg Hx       Physical Exam:   BP (!) 150/68 (BP Location: Left arm, Patient Position: Sitting)   Pulse 81   Ht 6' (1.829 m)   Wt 83.7 kg (184 lb 8.4 oz)   SpO2 97%   BMI 25.03 kg/m²        Physical Exam  Vitals and nursing note reviewed.   Constitutional:       General: He is not in acute distress.     Appearance: Normal appearance. He is not ill-appearing or toxic-appearing.   HENT:      Head: Normocephalic and atraumatic.   Eyes:      Pupils: Pupils are equal, round, and reactive to light.   Cardiovascular:      Rate and Rhythm: Normal rate and regular rhythm.      Pulses: Normal pulses.   Pulmonary:      Effort: Pulmonary effort is normal. No respiratory distress.   Musculoskeletal:         General: Normal range of motion.      Cervical back: Normal range of motion.      Right lower leg: No edema.      Left lower leg: No edema.   Skin:     General: Skin is warm and dry.      Capillary Refill: Capillary refill takes less than 2 seconds.   Neurological:      General: No focal deficit present.      Mental Status: He is alert.          Labs:     Lab Results   Component Value Date     07/11/2025     01/07/2025    K 4.8 07/11/2025    K 4.6 01/07/2025     07/11/2025     01/07/2025    CO2 25 07/11/2025    CO2 23 01/07/2025    BUN 19 07/11/2025    CREATININE 1.1 07/15/2025    ANIONGAP 11 07/11/2025     Lab Results    Component Value Date    HGBA1C 5.4 07/05/2024     Lab Results   Component Value Date     (H) 06/05/2025     (H) 04/24/2025     (H) 01/07/2025     (H) 12/15/2023     (H) 04/17/2019    Lab Results   Component Value Date    WBC 5.15 07/15/2025    HGB 13.1 (L) 07/15/2025    HGB 14.5 01/07/2025    HCT 41.1 07/15/2025    HCT 43.8 01/07/2025     07/15/2025     01/07/2025    GRAN 4.2 01/07/2025    GRAN 74.5 (H) 01/07/2025     Lab Results   Component Value Date    CHOL 202 (H) 12/15/2023    HDL 55 12/15/2023    LDLCALC 131.4 12/15/2023    TRIG 78 12/15/2023          Lipids:  Recent Labs   Lab 12/15/23  0906   LDL Cholesterol 131.4   HDL 55      Renal:  Recent Labs   Lab 07/11/25  0950 07/15/25  0919   Creatinine 1.0 1.1   Potassium 4.8  --    CO2 25  --    BUN 19  --      Liver:  Recent Labs   Lab 07/11/25  0950   AST 22   ALT 21       Imaging:     TTE (7/15/2025):    Left Ventricle: The left ventricle is normal in size. Mild septal thickening. Regional wall motion abnormalities present. See diagram for wall motion findings. There is low normal systolic function with a visually estimated ejection fraction of 50 - 55%. Ejection fraction is approximately 50%.    Right Ventricle: The right ventricle is normal in size measuring 3.5 cmWall thickness is normal. . Systolic function is normal.    Left Atrium: The left atrium is severely dilated.    Right Atrium: The right atrium is moderately dilated .    Aortic Valve: There is a transcatheter valve replacement in the aortic position. Aortic valve area by VTI is 1.7 cm2. Aortic valve peak velocity is 1.8 m/s. Mean gradient is 8 mmHg. The dimensionless index is 0.35. There is no significant regurgitation. Mild paravalvular regurgitation.    Mitral Valve: There is mild bileaflet sclerosis. There is mild posterior mitral annular calcification. There is mild to moderate regurgitation.    Pulmonic Valve: There is mild regurgitation.     Aorta: The aortic root is normal in size measuring 3.3 cm. The proximal ascending aorta is mildly to moderately dilated measuring 4.1 cm.    Pulmonary Artery: The estimated pulmonary artery systolic pressure is 59 mmHg.    IVC/SVC: Elevated venous pressure at 15 mmHg.    30 day event monitor post TAVR:    Rate controlled atrial fibrillation    No significant bradycardia or signs of heart block.    Assessment & Plan:       ICD-10-CM ICD-9-CM   1. S/P TAVR (transcatheter aortic valve replacement)  Z95.3 V43.3   2. Nonrheumatic aortic valve stenosis  I35.0 424.1   3. LBBB (left bundle branch block)  I44.7 426.3   4. Longstanding persistent atrial fibrillation  I48.11 427.31       S/P TAVR (transcatheter aortic valve replacement)  - One month post TAVR. Symptoms improved.   - Echocardiogram 1-month post-TAVR shows normal functioning bioprosthetic valve with improved hemodynamics. MG 8, P Eric 1.8; Will repeat on next follow-up visit.  - Continued aspirin 81 mg daily indefinitely for valve protection.  - Evaluated need for continued Lasix use, considering improved cardiac function post-TAVR. Adjusted Lasix 20 mg to as needed based on weight gain and fluid retention.  - Discussed importance of antibiotic prophylaxis before dental procedures to prevent valve infection. Advised waiting approximately 6 months post-procedure before scheduling elective dental work. SBEP with antibiotics for life.  - Follow up regularly with general cardiologist, Dr. Ambrocio.  - Follow up in one year for TAVR follow-up with echo before.    Nonrheumatic aortic valve stenosis  - s/p TAVR. Symptoms improved. Stable per echo. Continue to monitor.    LBBB (left bundle branch block)  - Stable post TAVR. No concerning heart block on 30 day event monitor.     Longstanding persistent atrial fibrillation  - Chronic. Stable. S/p watchman. Continue aspirin. Follow up with general cardiology.    Follow up in clinic with echo on/around 6/5/2026, 1 year from  TAVR. Follow-up with general cardiologist/PCP for regular visits.    Signed:  Daisy Shah PA-C  Interventional Cardiology        This note was generated with the assistance of ambient listening technology. Verbal consent was obtained by the patient and accompanying visitor(s) for the recording of patient appointment to facilitate this note. I attest to having reviewed and edited the generated note for accuracy, though some syntax or spelling errors may persist. Please contact the author of this note for any clarification.            [1]   Current Outpatient Medications on File Prior to Visit   Medication Sig Dispense Refill    amLODIPine (NORVASC) 2.5 MG tablet Take 1 tablet (2.5 mg total) by mouth once daily. 90 tablet 3    aspirin 81 MG Chew Take 1 tablet (81 mg total) by mouth once daily.      furosemide (LASIX) 20 MG tablet 1 tab 2 days/week for heart/blood pressure 30 tablet 0    irbesartan (AVAPRO) 150 MG tablet Take 1 tablet (150 mg total) by mouth once daily. 90 tablet 3    metoprolol succinate (TOPROL-XL) 25 MG 24 hr tablet TAKE 1/2 TABLET BY MOUTH DAILY 45 tablet 3    multivitamin (THERAGRAN) per tablet Take 1 tablet by mouth once daily.      clobetasoL (TEMOVATE) 0.05 % external solution Pt to mix in 1 jar of cerave cream and apply to affected areas bid (Patient not taking: Reported on 6/9/2025) 50 mL 3    diclofenac sodium (VOLTAREN ARTHRITIS PAIN) 1 % Gel Apply 2 Grams to painful right hand joint 2-3x/day as needed (Patient not taking: Reported on 6/9/2025) 100 g 1    mupirocin (BACTROBAN) 2 % ointment Apply to affected area 3 times daily (Patient not taking: Reported on 6/9/2025) 22 g 1    [DISCONTINUED] doxycycline (VIBRAMYCIN) 100 MG Cap Take 1 capsule (100 mg total) by mouth every 12 (twelve) hours. (Patient not taking: Reported on 6/9/2025) 28 capsule 1    [DISCONTINUED] fluorouraciL (EFUDEX) 5 % cream Compound fluorouracil 5% + calcipotriene 0.005% cream. Apply a pea-sized amount to entire  bilateral lateral cheekbones BID x 7 days. (Patient not taking: Reported on 6/9/2025) 30 g 0     No current facility-administered medications on file prior to visit.

## 2025-07-16 NOTE — ASSESSMENT & PLAN NOTE
- One month post TAVR. Symptoms improved.   - Echocardiogram 1-month post-TAVR shows normal functioning bioprosthetic valve with improved hemodynamics. MG 8, P Eric 1.8; Will repeat on next follow-up visit.  - Continued aspirin 81 mg daily indefinitely for valve protection.  - Evaluated need for continued Lasix use, considering improved cardiac function post-TAVR. Adjusted Lasix 20 mg to as needed based on weight gain and fluid retention.  - Discussed importance of antibiotic prophylaxis before dental procedures to prevent valve infection. Advised waiting approximately 6 months post-procedure before scheduling elective dental work. SBEP with antibiotics for life.  - Follow up regularly with general cardiologist, Dr. Ambrocio.  - Follow up in one year for TAVR follow-up with echo before.

## 2025-07-18 ENCOUNTER — OFFICE VISIT (OUTPATIENT)
Dept: HEMATOLOGY/ONCOLOGY | Facility: CLINIC | Age: OVER 89
End: 2025-07-18
Payer: MEDICARE

## 2025-07-18 VITALS
HEIGHT: 72 IN | SYSTOLIC BLOOD PRESSURE: 165 MMHG | RESPIRATION RATE: 16 BRPM | OXYGEN SATURATION: 97 % | BODY MASS INDEX: 25.05 KG/M2 | HEART RATE: 70 BPM | DIASTOLIC BLOOD PRESSURE: 78 MMHG | WEIGHT: 184.94 LBS

## 2025-07-18 DIAGNOSIS — C88.40 MALT (MUCOSA ASSOCIATED LYMPHOID TISSUE): Primary | ICD-10-CM

## 2025-07-18 PROCEDURE — 99999 PR PBB SHADOW E&M-EST. PATIENT-LVL III: CPT | Mod: PBBFAC,HCNC,, | Performed by: INTERNAL MEDICINE

## 2025-07-18 NOTE — PROGRESS NOTES
Route Chart for Scheduling    BMT Chart Routing      Follow up with physician 1 year.   Follow up with LAVONNE    Provider visit type Malignant hem   Infusion scheduling note    Injection scheduling note    Labs CBC, CMP and LDH   Scheduling:  Preferred lab:  Lab interval:     Imaging    Pharmacy appointment    Other referrals

## 2025-07-18 NOTE — PROGRESS NOTES
HEMATOLOGIC MALIGNANCIES PROGRESS NOTE    IDENTIFYING STATEMENT   Norm Mendoza (Norm) is a 89 y.o. male with a  of 1926 from [place], [place] with the diagnosis of gastric marginal zone lymphoma.      ONCOLOGY HISTORY:    -The patient was discharged on 2018 following a 3 day admission for a GIB. Patient was on Eliquis at the time and had been complaining of 1 week of black, tarry stools. EGD at the time was significant for nonbleeding gastric ulcers. Biopsies were taken and the patient was instructed to start protonix and hold eliquis at discharge. He requiring IV fluids and blood transfusion during the hospitalization. Biopsy results returned 2018 concerning for possible MALT-Lymphoma with molecular studies pending.  -PET confirmed stage 1 MALT lymphoma  -Completed Rituximab x4 on 3/7/19  -EGD: Erythematous, granular and scarred mucosa in the lesser curvature of the gastric body. Biopsied, + MALT lymphoma. 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   As patient is asymptomatic, will continue observation.    INTERVAL HISTORY:      Mr. Mendoza returns to clinic in follow-up of gastric marginal zone lymphoma.     - 2025: cardiology follow-up after TAVR  - 2025: TAVR  - 2025: Internal medicine visit  - 2025: Angiogram    He continues to do well. He reports improvement in fatigue and shortness of breath since having TAVR. He is still drinking alcohol, approximately 1 martini and 2 bourbon drinks per night.     Past Medical History, Past Social History and Past Family History have been reviewed and are unchanged except as noted in the interval history.    MEDICATIONS:     Current Outpatient Medications on File Prior to Visit   Medication Sig Dispense Refill    amLODIPine (NORVASC) 2.5 MG tablet Take 1 tablet (2.5 mg  total) by mouth once daily. 90 tablet 3    aspirin 81 MG Chew Take 1 tablet (81 mg total) by mouth once daily.      furosemide (LASIX) 20 MG tablet 1 tab 2 days/week for heart/blood pressure 30 tablet 0    irbesartan (AVAPRO) 150 MG tablet Take 1 tablet (150 mg total) by mouth once daily. 90 tablet 3    metoprolol succinate (TOPROL-XL) 25 MG 24 hr tablet TAKE 1/2 TABLET BY MOUTH DAILY 45 tablet 3    multivitamin (THERAGRAN) per tablet Take 1 tablet by mouth once daily.      clobetasoL (TEMOVATE) 0.05 % external solution Pt to mix in 1 jar of cerave cream and apply to affected areas bid (Patient not taking: Reported on 7/18/2025) 50 mL 3    diclofenac sodium (VOLTAREN ARTHRITIS PAIN) 1 % Gel Apply 2 Grams to painful right hand joint 2-3x/day as needed (Patient not taking: Reported on 7/18/2025) 100 g 1    mupirocin (BACTROBAN) 2 % ointment Apply to affected area 3 times daily (Patient not taking: Reported on 7/18/2025) 22 g 1     No current facility-administered medications on file prior to visit.       ALLERGIES: Review of patient's allergies indicates:  No Known Allergies     ROS:       Review of Systems   Constitutional:  Positive for fatigue. Negative for diaphoresis, fever and unexpected weight change.   HENT:   Negative for lump/mass and sore throat.    Eyes:  Negative for icterus.   Respiratory:  Negative for cough and shortness of breath.    Cardiovascular:  Negative for chest pain and palpitations.   Gastrointestinal:  Negative for abdominal distention, constipation, diarrhea, nausea and vomiting.   Genitourinary:  Negative for dysuria and frequency.    Musculoskeletal:  Negative for arthralgias, gait problem and myalgias.   Skin:  Negative for rash.   Neurological:  Negative for dizziness, gait problem and headaches.   Hematological:  Negative for adenopathy. Does not bruise/bleed easily.   Psychiatric/Behavioral:  The patient is not nervous/anxious.        PHYSICAL EXAM:  Vitals:    07/18/25 0746   BP: (!)  165/78   Pulse: 70   Resp: 16   Temp: (P) 97.4 °F (36.3 °C)   TempSrc: Oral   SpO2: 97%   Weight: 83.9 kg (184 lb 15.5 oz)   Height: 6' (1.829 m)   PainSc: 0-No pain       KARNOFSKY PERFORMANCE STATUS 70%  ECOG 1    Physical Exam  Constitutional:       General: He is not in acute distress.     Appearance: He is well-developed.   HENT:      Head: Normocephalic and atraumatic.      Mouth/Throat:      Mouth: No oral lesions.   Eyes:      Conjunctiva/sclera: Conjunctivae normal.   Neck:      Thyroid: No thyromegaly.   Cardiovascular:      Rate and Rhythm: Normal rate and regular rhythm.      Heart sounds: Normal heart sounds. No murmur heard.  Pulmonary:      Breath sounds: Normal breath sounds. No wheezing or rales.   Abdominal:      General: There is no distension.      Palpations: Abdomen is soft. There is no hepatomegaly, splenomegaly or mass.      Tenderness: There is no abdominal tenderness.   Lymphadenopathy:      Cervical: No cervical adenopathy.      Right cervical: No deep cervical adenopathy.     Left cervical: No deep cervical adenopathy.   Skin:     Findings: No rash.   Neurological:      Mental Status: He is alert and oriented to person, place, and time.      Cranial Nerves: No cranial nerve deficit.      Coordination: Coordination normal.      Deep Tendon Reflexes: Reflexes are normal and symmetric.         LAB:   Results for orders placed or performed during the hospital encounter of 07/15/25   Echo    Collection Time: 07/15/25  9:05 AM   Result Value Ref Range    LV Diastolic Volume 148 mL    Echo EF Estimated 46 %    LV Systolic Volume 79 mL    IVS 1.1 0.6 - 1.1 cm    LVIDd 5.5 3.5 - 6.0 cm    LVIDs 4.2 (A) 2.1 - 4.0 cm    LVOT diameter 2.5 cm    PW 1.0 0.6 - 1.1 cm    IVRT 117 ms    AV LVOT peak gradient 1 mmHg    LVOT mn grad 1 mmHg    LVOT peak juanis 0.6 m/s    LVOT peak VTI 11.7 cm    RV- obrien basal diam 3.5 cm    RV S' 13.50 cm/s    LA size 5.2 cm    Left Atrium Major Axis 5.6 cm    Left Atrium  Minor Axis 5.9 cm    LA Vol (MOD) 87 mL    RA Major Axis 6.00 cm    RA Area 24.1 cm2    AV valve area 1.7 cm2    AV area by cont VTI 1.6 cm2    AV peak gradient 13 mmHg    AV mean gradient 8 mmHg    Ao peak eric 1.8 m/s    Ao VTI 33.8 cm    TDI LATERAL 0.12 m/s    TDI SEPTAL 0.06 m/s    TV peak gradient 42 mmHg    TR Max Eric 3.3 m/s    Ascending aorta 4.1 cm    STJ 3.0 cm    IVC diameter 2.11 cm    Sinus 3.3 cm    RA Width 4.65 cm    TAPSE 2.0 cm    LA WIDTH 5.0 cm    BSA 2.05 m2    OHS CV CPX PATIENT HEIGHT IN 70.866     LVOT stroke volume 57.4 cm3    LV Systolic Volume Index 38.7 mL/m2    LV Diastolic Volume Index 72.55 mL/m2    LVOT area 4.9 cm2    FS 23.6 (A) 28 - 44 %    Left Ventricle Relative Wall Thickness 0.36 cm    LV mass 227.4 g    LV Mass Index 111.5 g/m2    PARIS 62 mL/m2    LA Vol 127 cm3    RV/LV Ratio 0.64 cm    PARIS (MOD) 43 mL/m2    AV Velocity Ratio 0.33     AV index (prosthetic) 0.35     SUJEY by Velocity Ratio 1.6 cm²    ASI 1.6 cm/m2    Aortic Height Index (SOV) 1.8 cm/m    ASI 2.0 cm/m2    Aortic Height Index 2.3 cm/m    Mean e' 0.09 m/s    ZLVIDS 0.95     ZLVIDD -1.00     EF 50 %    TV resting pulmonary artery pressure 59 mmHg    RV TB RVSP 18 mmHg    Est. RA pres 15 mmHg     *Note: Due to a large number of results and/or encounters for the requested time period, some results have not been displayed. A complete set of results can be found in Results Review.       PROBLEMS ASSESSED THIS VISIT:    1. MALT (mucosa associated lymphoid tissue)      PLAN:       Gastric marginal zone lymphoma  Clinically asymptomatic. No clinical signs of progression of disease. Discussed that we will continue clinical surveillance, and if he has recurrent symptoms, we can consider appropriate evaluation/management.     Follow-up  1 year     Dg Prather MD  Malignant Hematology, Stem Cell Transplantation, and Cellular Therapy

## 2025-08-05 ENCOUNTER — OFFICE VISIT (OUTPATIENT)
Dept: DERMATOLOGY | Facility: CLINIC | Age: OVER 89
End: 2025-08-05
Payer: MEDICARE

## 2025-08-05 DIAGNOSIS — D18.01 ANGIOMA OF SKIN: ICD-10-CM

## 2025-08-05 DIAGNOSIS — L82.1 SK (SEBORRHEIC KERATOSIS): ICD-10-CM

## 2025-08-05 DIAGNOSIS — L57.0 AK (ACTINIC KERATOSIS): Primary | ICD-10-CM

## 2025-08-05 DIAGNOSIS — Z85.828 HISTORY OF NONMELANOMA SKIN CANCER: ICD-10-CM

## 2025-08-05 PROCEDURE — 17003 DESTRUCT PREMALG LES 2-14: CPT | Mod: HCNC,S$GLB,, | Performed by: DERMATOLOGY

## 2025-08-05 PROCEDURE — 1160F RVW MEDS BY RX/DR IN RCRD: CPT | Mod: CPTII,HCNC,S$GLB, | Performed by: DERMATOLOGY

## 2025-08-05 PROCEDURE — 1126F AMNT PAIN NOTED NONE PRSNT: CPT | Mod: CPTII,HCNC,S$GLB, | Performed by: DERMATOLOGY

## 2025-08-05 PROCEDURE — 17000 DESTRUCT PREMALG LESION: CPT | Mod: HCNC,S$GLB,, | Performed by: DERMATOLOGY

## 2025-08-05 PROCEDURE — 99213 OFFICE O/P EST LOW 20 MIN: CPT | Mod: 25,HCNC,S$GLB, | Performed by: DERMATOLOGY

## 2025-08-05 PROCEDURE — 99999 PR PBB SHADOW E&M-EST. PATIENT-LVL III: CPT | Mod: PBBFAC,HCNC,, | Performed by: DERMATOLOGY

## 2025-08-05 PROCEDURE — 1159F MED LIST DOCD IN RCRD: CPT | Mod: CPTII,HCNC,S$GLB, | Performed by: DERMATOLOGY

## 2025-08-05 NOTE — PATIENT INSTRUCTIONS

## 2025-08-05 NOTE — PROGRESS NOTES
Subjective:      Patient ID:  Norm Mendoza is a 89 y.o. male who presents for   Chief Complaint   Patient presents with    Skin Check     ubse     History of Present Illness: The patient presents for follow up of skin check.    The patient was last seen on: 1/6/25 for cryosurgery to actinic keratoses which have resolved and bx to left dorsal wrist c/w SCC excised by Dilshad on 2/11/25.   This is a high risk patient here to check for the development of new lesions.     Other skin complaints: no new concerns          Review of Systems   Skin:  Positive for activity-related sunscreen use. Negative for daily sunscreen use, recent sunburn and wears hat.   Hematologic/Lymphatic: Bruises/bleeds easily.       Objective:   Physical Exam   Constitutional: He appears well-developed and well-nourished. No distress.   HENT:   Mouth/Throat: Lips normal.    Eyes: Lids are normal.    Neurological: He is alert and oriented to person, place, and time. He is not disoriented.   Psychiatric: He has a normal mood and affect.   Skin:   Areas Examined (abnormalities noted in diagram):   Scalp / Hair Palpated and Inspected  Head / Face Inspection Performed  Neck Inspection Performed  Chest / Axilla Inspection Performed  Back Inspection Performed  RUE Inspected  LUE Inspection Performed                 Diagram Legend     Erythematous scaling macule/papule c/w actinic keratosis       Vascular papule c/w angioma      Pigmented verrucoid papule/plaque c/w seborrheic keratosis      Yellow umbilicated papule c/w sebaceous hyperplasia      Irregularly shaped tan macule c/w lentigo     1-2 mm smooth white papules consistent with Milia      Movable subcutaneous cyst with punctum c/w epidermal inclusion cyst      Subcutaneous movable cyst c/w pilar cyst      Firm pink to brown papule c/w dermatofibroma      Pedunculated fleshy papule(s) c/w skin tag(s)      Evenly pigmented macule c/w junctional nevus     Mildly variegated pigmented,  slightly irregular-bordered macule c/w mildly atypical nevus      Flesh colored to evenly pigmented papule c/w intradermal nevus       Pink pearly papule/plaque c/w basal cell carcinoma      Erythematous hyperkeratotic cursted plaque c/w SCC      Surgical scar with no sign of skin cancer recurrence      Open and closed comedones      Inflammatory papules and pustules      Verrucoid papule consistent consistent with wart     Erythematous eczematous patches and plaques     Dystrophic onycholytic nail with subungual debris c/w onychomycosis     Umbilicated papule    Erythematous-base heme-crusted tan verrucoid plaque consistent with inflamed seborrheic keratosis     Erythematous Silvery Scaling Plaque c/w Psoriasis     See annotation      Assessment / Plan:        AK (actinic keratosis)  Cryosurgery Procedure Note    Verbal consent from the patient is obtained including, but not limited to, risk of hypopigmentation/hyperpigmentation, scar, recurrence of lesion. The patient is aware of the precancerous quality and need for treatment of these lesions. Liquid nitrogen cryosurgery is applied to the 3 actinic keratoses, as detailed in the physical exam, to produce a freeze injury. The patient is aware that blisters may form and is instructed on wound care with gentle cleansing and use of vaseline ointment to keep moist until healed. The patient is supplied a handout on cryosurgery and is instructed to call if lesions do not completely resolve.     SK (seborrheic keratosis)   - minor problem and chronic.   Reassurance given to patient. No treatment necessary.     Angioma of skin   - minor problem and chronic.   Reassurance given to patient. No treatment necessary.     History of nonmelanoma skin cancer  Area(s) of previous NMSC evaluated with no signs of recurrence.    Upper body skin examination performed today including at least 6 points as noted in physical examination. No lesions suspicious for malignancy  noted.    Recommend daily sun protection/avoidance and use of at least SPF 30, broad spectrum sunscreen (OTC drug).             No follow-ups on file.

## (undated) DEVICE — KIT CUSTOM MANIFOLD

## (undated) DEVICE — CATH AL 3.0 6FR

## (undated) DEVICE — COVER TABLE 44X90 STERILE

## (undated) DEVICE — PAD DEFIB CADENCE ADULT R2

## (undated) DEVICE — KIT MNTR POLE MT DUL 12&48 MAC

## (undated) DEVICE — BOWL FLUID - BACK STOP

## (undated) DEVICE — KIT GLIDESHEATH SLEND 6FR 10CM

## (undated) DEVICE — GUIDEWIRE SUPRA CORE 035 190CM

## (undated) DEVICE — HEMOSTAT VASC BAND REG 24CM

## (undated) DEVICE — PACER CABLE

## (undated) DEVICE — GUIDEWIRE CONFIDA BECKER CURVE

## (undated) DEVICE — BLLN LOMA VISTA TRUE 24MM

## (undated) DEVICE — KIT PROBE COVER WITH GEL

## (undated) DEVICE — CABLE PACING ALLGTR CLIP 12FT

## (undated) DEVICE — SPIKE SHORT LG BORE 1-WAY 2IN

## (undated) DEVICE — GUIDEWIRE EMERALD 150CM PTFE

## (undated) DEVICE — COVER INSTR ELASTIC BAND 40X20

## (undated) DEVICE — SYR MARK 7 ARTERION 150ML

## (undated) DEVICE — GUIDEWIRE STF .035X180CM ANG

## (undated) DEVICE — CATH IMPULSE FR4 5FR 125CM

## (undated) DEVICE — COVER SURG TABLE BACK 44X76IN

## (undated) DEVICE — DRAPE ANGIO BRACH 38X44IN

## (undated) DEVICE — CATH JACKY RADIAL 3.5 110CM

## (undated) DEVICE — DILATOR 12FR

## (undated) DEVICE — TRANSDUCER ADULT DISP

## (undated) DEVICE — CATH MPA2 INFINITI 4FR 100CM

## (undated) DEVICE — TUBING PRSS MON M/M LL 72IN

## (undated) DEVICE — COVER PROBE US 5.5X58L NON LTX

## (undated) DEVICE — CATH DIAG IMPULSE 6FR FL4

## (undated) DEVICE — KIT MICROINTRO 4F .018X40X7CM

## (undated) DEVICE — CATH ANGIO DIAG PIG 6FX110

## (undated) DEVICE — SHEATH DRYSEAL 18FR 33CM

## (undated) DEVICE — CATH TEMP PACER 5.0FR

## (undated) DEVICE — CATH DIAG IMPULSE 6FR FR4

## (undated) DEVICE — INTRO SWARTZ SL2 8.5FR 63CM

## (undated) DEVICE — STOPCOCK 3-WAY

## (undated) DEVICE — CATH DXTERITY JL50 100CM 6FR

## (undated) DEVICE — SHEATH WATCHMAN DBL CURVE SYS

## (undated) DEVICE — TRAY CATH LAB OMC

## (undated) DEVICE — NDL TRANSEPTAL ADULT 71.0

## (undated) DEVICE — GUIDEWIRE STF .035X260CM STR

## (undated) DEVICE — NAMIC CONTRAST CONTROLLER

## (undated) DEVICE — CATH IMPULSE PIGTAIL 6F 110CM

## (undated) DEVICE — OMNIPAQUE CONTRAST 350MG/100ML

## (undated) DEVICE — ELECTRODE REM PLYHSV RETURN 9

## (undated) DEVICE — GUIDE WIRE AMPLATZ .035X1 MDTH

## (undated) DEVICE — PACK DRAPE UNIVERSAL CONVERTOR

## (undated) DEVICE — KIT PERCUTANEOUS SHEATH

## (undated) DEVICE — GUIDEWIRE X SPORT .014IN 190CM

## (undated) DEVICE — SUT PERCLOSE PROSTYLE MEDIATE

## (undated) DEVICE — SHEATH PINNACLE 8FR

## (undated) DEVICE — CATH IMPULSE D6F AL2 5PK

## (undated) DEVICE — SET INTRO PERFRMR SHTH 16FR

## (undated) DEVICE — TUBING HPCIL ROT M/F ADPT 10IN

## (undated) DEVICE — DRAPE INCISE IOBAN 2 23X17IN